# Patient Record
Sex: MALE | Race: WHITE | NOT HISPANIC OR LATINO | Employment: FULL TIME | URBAN - METROPOLITAN AREA
[De-identification: names, ages, dates, MRNs, and addresses within clinical notes are randomized per-mention and may not be internally consistent; named-entity substitution may affect disease eponyms.]

---

## 2017-01-21 RX ORDER — METFORMIN HYDROCHLORIDE 500 MG/1
TABLET, EXTENDED RELEASE ORAL
Qty: 360 TABLET | Refills: 1 | Status: SHIPPED | OUTPATIENT
Start: 2017-01-21 | End: 2017-02-21 | Stop reason: SDUPTHER

## 2017-01-21 RX ORDER — GEMFIBROZIL 600 MG/1
TABLET, FILM COATED ORAL
Qty: 180 TABLET | Refills: 1 | Status: SHIPPED | OUTPATIENT
Start: 2017-01-21 | End: 2017-02-21 | Stop reason: SDUPTHER

## 2017-02-05 ENCOUNTER — PATIENT MESSAGE (OUTPATIENT)
Dept: FAMILY MEDICINE | Facility: CLINIC | Age: 57
End: 2017-02-05

## 2017-02-06 ENCOUNTER — PATIENT MESSAGE (OUTPATIENT)
Dept: FAMILY MEDICINE | Facility: CLINIC | Age: 57
End: 2017-02-06

## 2017-02-07 ENCOUNTER — OFFICE VISIT (OUTPATIENT)
Dept: FAMILY MEDICINE | Facility: CLINIC | Age: 57
End: 2017-02-07
Payer: MEDICARE

## 2017-02-07 VITALS
WEIGHT: 315 LBS | SYSTOLIC BLOOD PRESSURE: 190 MMHG | OXYGEN SATURATION: 98 % | BODY MASS INDEX: 41.75 KG/M2 | HEART RATE: 96 BPM | DIASTOLIC BLOOD PRESSURE: 86 MMHG | HEIGHT: 73 IN | TEMPERATURE: 98 F

## 2017-02-07 DIAGNOSIS — E66.01 MORBID OBESITY DUE TO EXCESS CALORIES: ICD-10-CM

## 2017-02-07 DIAGNOSIS — M25.561 ARTHRALGIA OF RIGHT KNEE: ICD-10-CM

## 2017-02-07 DIAGNOSIS — K04.7 DENTAL ABSCESS: Primary | ICD-10-CM

## 2017-02-07 PROCEDURE — 2022F DILAT RTA XM EVC RTNOPTHY: CPT | Mod: S$GLB,,,

## 2017-02-07 PROCEDURE — 3079F DIAST BP 80-89 MM HG: CPT | Mod: S$GLB,,,

## 2017-02-07 PROCEDURE — 3077F SYST BP >= 140 MM HG: CPT | Mod: S$GLB,,,

## 2017-02-07 PROCEDURE — 99999 PR PBB SHADOW E&M-EST. PATIENT-LVL IV: CPT | Mod: PBBFAC,,,

## 2017-02-07 PROCEDURE — 4010F ACE/ARB THERAPY RXD/TAKEN: CPT | Mod: S$GLB,,,

## 2017-02-07 PROCEDURE — 99214 OFFICE O/P EST MOD 30 MIN: CPT | Mod: 25,S$GLB,,

## 2017-02-07 PROCEDURE — 20610 DRAIN/INJ JOINT/BURSA W/O US: CPT | Mod: RT,S$GLB,,

## 2017-02-07 PROCEDURE — 3046F HEMOGLOBIN A1C LEVEL >9.0%: CPT | Mod: S$GLB,,,

## 2017-02-07 RX ORDER — METHYLPREDNISOLONE ACETATE 80 MG/ML
80 INJECTION, SUSPENSION INTRA-ARTICULAR; INTRALESIONAL; INTRAMUSCULAR; SOFT TISSUE
Status: DISCONTINUED | OUTPATIENT
Start: 2017-02-07 | End: 2017-02-07 | Stop reason: HOSPADM

## 2017-02-07 RX ORDER — AMOXICILLIN 500 MG/1
500 TABLET, FILM COATED ORAL EVERY 8 HOURS
Qty: 30 TABLET | Refills: 0 | Status: ON HOLD | OUTPATIENT
Start: 2017-02-07 | End: 2017-02-17 | Stop reason: HOSPADM

## 2017-02-07 RX ADMIN — METHYLPREDNISOLONE ACETATE 80 MG: 80 INJECTION, SUSPENSION INTRA-ARTICULAR; INTRALESIONAL; INTRAMUSCULAR; SOFT TISSUE at 12:02

## 2017-02-07 NOTE — PROCEDURES
Large Joint Aspiration/Injection  Date/Time: 2/7/2017 12:00 PM  Performed by: REJI HALE  Authorized by: REJI HALE     Consent Done?:  Yes (Written)  Indications:  Pain  Procedure site marked: Yes    Timeout: Prior to procedure the correct patient, procedure, and site was verified      Location:  Knee  Site:  R knee  Prep: Patient was prepped and draped in usual sterile fashion    Ultrasonic Guidance for needle placement: No  Needle size:  18 G  Approach:  Anteromedial  Medications:  80 mg methylPREDNISolone acetate 80 mg/mL  Aspirate amount (ml):  0  Patient tolerance:  Patient tolerated the procedure well with no immediate complications

## 2017-02-07 NOTE — LETTER
February 7, 2017      [facility]  [address] [facility] [address]  Phone: [phone]  Fax: [phone]       Patient: Virgiloi Acuña   YOB: 1960  Date of Visit: 02/07/2017    To Whom It May Concern:    Virgilio was at [facility] on 02/07/2017. He may return to work/school on 02/13/2017 with no restrictions. If you have any questions or concerns, or if I can be of further assistance, please do not hesitate to contact me.    Sincerely,    CLEMENTINE Smith

## 2017-02-07 NOTE — PROGRESS NOTES
Subjective:       Patient ID: Virgilio Acuña is a 56 y.o. male.    Chief Complaint: Joint Swelling    HPI   Patient presents with a one-week complaint of right knee pain that he describes as a constant severe intensity aching sensation that is worse when he moves.  He voices associated decrease in range of motion.   He says the pain started spontaneously.  He denies any recent or remote trauma to the knee but does state that he had a medial meniscectomy some years back.    The patient also voices a small abscess on his lower left gingiva.  He states it is been there about 3 days it is constant and mild in intensity.  He has now made appointment to see his dentist yet but says he plans to.  The patient denies any associated symptoms.  He cannot identify exacerbating or mitigating factors.    The patient also voices a chronic history of diabetes which is now very well controlled his last hemoglobin A1c was 9.5%.  He states he needs a glucometer.  He says he is trying to get his sugar under control.  He denies any organized diet or exercise routine.     also is noted to be morbidly obese with a BMI 44.3.  He denies any exercise routine state that he can't because of his chronic pain issues.    Review of Systems   Constitutional: Negative for activity change, appetite change, fatigue and unexpected weight change.   HENT: Negative.         Dental abscess   Eyes: Negative.    Respiratory: Negative for cough, chest tightness, shortness of breath and wheezing.    Cardiovascular: Negative for chest pain, palpitations and leg swelling.   Gastrointestinal: Negative for constipation, diarrhea, nausea and vomiting.   Endocrine: Negative.    Genitourinary: Negative.    Musculoskeletal: Positive for arthralgias (right knee).   Skin: Negative for color change.   Allergic/Immunologic: Negative.    Neurological: Negative for dizziness, weakness and light-headedness.   Hematological: Negative.    Psychiatric/Behavioral: Negative  for sleep disturbance.         Objective:      Physical Exam   Constitutional: He is oriented to person, place, and time. Vital signs are normal. He appears well-developed and well-nourished. He is active and cooperative. No distress.   HENT:   Head: Normocephalic and atraumatic.   Mouth/Throat:       Eyes: Conjunctivae are normal. Pupils are equal, round, and reactive to light. No scleral icterus.   Neck: Normal range of motion. Neck supple.   Cardiovascular: Normal rate, regular rhythm, normal heart sounds and intact distal pulses.  Exam reveals no gallop and no friction rub.    No murmur heard.  Pulmonary/Chest: Effort normal and breath sounds normal. No respiratory distress. He has no wheezes. He has no rales. He exhibits no tenderness.   Musculoskeletal: He exhibits no edema or tenderness.        Right knee: He exhibits decreased range of motion and swelling. He exhibits no effusion, no ecchymosis, no deformity, no laceration, no erythema, normal alignment, no LCL laxity, normal patellar mobility, no bony tenderness, normal meniscus and no MCL laxity. No tenderness found.   Neurological: He is alert and oriented to person, place, and time. He exhibits normal muscle tone. Coordination normal.   Skin: Skin is warm and dry. No rash noted. He is not diaphoretic. No erythema. No pallor.   Psychiatric: He has a normal mood and affect. His speech is normal and behavior is normal. Judgment and thought content normal.       Assessment:       1. Dental abscess    2. Arthralgia of right knee    3. Uncontrolled type 2 diabetes mellitus with diabetic neuropathy, with long-term current use of insulin    4. Morbid obesity due to excess calories          Plan:   Dental abscess  -     amoxicillin (AMOXIL) 500 MG Tab; Take 1 tablet (500 mg total) by mouth every 8 (eight) hours.  Dispense: 30 tablet; Refill: 0    Arthralgia of right knee        -     Steroidal injection of the right knee    Uncontrolled type 2 diabetes mellitus  with diabetic neuropathy, with long-term current use of insulin        -     Provided the patient with a glucometer    Morbid obesity due to excess calories        -     Discussed carbohydrate control as well as the importance of exercise and weight loss          Disclaimer: This note is prepared using voice recognition software.  As such there may be errors in the dictation.  It has not been proofread.

## 2017-02-10 ENCOUNTER — TELEPHONE (OUTPATIENT)
Dept: SPORTS MEDICINE | Facility: CLINIC | Age: 57
End: 2017-02-10

## 2017-02-10 ENCOUNTER — OFFICE VISIT (OUTPATIENT)
Dept: SPORTS MEDICINE | Facility: CLINIC | Age: 57
End: 2017-02-10
Payer: MEDICARE

## 2017-02-10 ENCOUNTER — HOSPITAL ENCOUNTER (OUTPATIENT)
Dept: RADIOLOGY | Facility: HOSPITAL | Age: 57
Discharge: HOME | End: 2017-02-10
Attending: FAMILY MEDICINE
Payer: MEDICARE

## 2017-02-10 VITALS — WEIGHT: 315 LBS | TEMPERATURE: 99 F | HEIGHT: 73 IN | BODY MASS INDEX: 41.75 KG/M2

## 2017-02-10 DIAGNOSIS — M25.561 CHRONIC PAIN OF RIGHT KNEE: Primary | ICD-10-CM

## 2017-02-10 DIAGNOSIS — M25.461 EFFUSION OF RIGHT KNEE: ICD-10-CM

## 2017-02-10 DIAGNOSIS — M25.561 RIGHT KNEE PAIN, UNSPECIFIED CHRONICITY: ICD-10-CM

## 2017-02-10 DIAGNOSIS — M17.0 PRIMARY OSTEOARTHRITIS OF BOTH KNEES: ICD-10-CM

## 2017-02-10 DIAGNOSIS — G89.29 CHRONIC PAIN OF RIGHT KNEE: Primary | ICD-10-CM

## 2017-02-10 LAB
APPEARANCE FLD: NORMAL
BODY FLD TYPE: NORMAL
BODY FLD TYPE: NORMAL
BODY FLUID COMMENTS: NORMAL
COLOR FLD: YELLOW
CRYSTALS FLD MICRO: NEGATIVE
LYMPHOCYTES NFR FLD MANUAL: 1 %
MONOS+MACROS NFR FLD MANUAL: 5 %
NEUTROPHILS NFR FLD MANUAL: 94 %
WBC # FLD: NORMAL /CU MM

## 2017-02-10 PROCEDURE — 73564 X-RAY EXAM KNEE 4 OR MORE: CPT | Mod: TC,PO,RT

## 2017-02-10 PROCEDURE — 20611 DRAIN/INJ JOINT/BURSA W/US: CPT | Mod: RT,S$GLB,, | Performed by: FAMILY MEDICINE

## 2017-02-10 PROCEDURE — 73562 X-RAY EXAM OF KNEE 3: CPT | Mod: 26,59,LT, | Performed by: RADIOLOGY

## 2017-02-10 PROCEDURE — 73564 X-RAY EXAM KNEE 4 OR MORE: CPT | Mod: 26,RT,, | Performed by: RADIOLOGY

## 2017-02-10 PROCEDURE — 99203 OFFICE O/P NEW LOW 30 MIN: CPT | Mod: 25,S$GLB,, | Performed by: FAMILY MEDICINE

## 2017-02-10 PROCEDURE — 99999 PR PBB SHADOW E&M-EST. PATIENT-LVL III: CPT | Mod: PBBFAC,,, | Performed by: FAMILY MEDICINE

## 2017-02-10 NOTE — TELEPHONE ENCOUNTER
Left voicemail.     Informed pt Dr. Padilla has written letter for pt excusing him from missed work today.     At his f/u appt 02/13/17, Dr. Padilla will write another letter excusing him from work on that day.     Return call to [facility] if he has more questions.

## 2017-02-10 NOTE — MR AVS SNAPSHOT
"    [facility]  [address]  [facility] [address]  Phone: [phone]                  Virgilio Acuña   2/10/2017 12:00 PM   Appointment    Description:  Male : 1960   Provider:  Vinicius Padilla MD   Department:  [facility]                To Do List           Future Appointments        Provider Department Dept Phone    2/10/2017 12:00 PM Vinicius Padilla MD [facility] [phone]      Goals (5 Years of Data)     None      [facility]     [facility] -  Assistance  Registered nurses in the [facility] provide clinical advisement, health education, appointment booking, and other advisory services.  Call for this free service at [phone].             Medications           Message regarding Medications     Verify the changes and/or additions to your medication regime listed below are the same as discussed with your clinician today.  If any of these changes or additions are incorrect, please notify your healthcare provider.             Verify that the below list of medications is an accurate representation of the medications you are currently taking.  If none reported, the list may be blank. If incorrect, please contact your healthcare provider. Carry this list with you in case of emergency.           Current Medications     amoxicillin (AMOXIL) 500 MG Tab Take 1 tablet (500 mg total) by mouth every 8 (eight) hours.    BD ULTRA-FINE WILDA PEN NEEDLES 32 x 5/32 " Ndle INJECT FOUR TIMES DAILY    blood sugar diagnostic Strp Accucheck Smartview test strips and Fastclix lancets. AC/HS    busPIRone (BUSPAR) 7.5 MG tablet Take 1 tablet (7.5 mg total) by mouth 2 (two) times daily.    clonazepam (KLONOPIN) 0.5 MG tablet Take 0.5 mg by mouth as needed.     enalapril (VASOTEC) 10 MG tablet Take 1 tablet (10 mg total) by mouth once daily.    enalapril (VASOTEC) 10 MG tablet TAKE 1 TABLET DAILY    gabapentin " (NEURONTIN) 600 MG tablet Take 1 tablet (600 mg total) by mouth once daily.    gemfibrozil (LOPID) 600 MG tablet TAKE 1 TABLET TWICE A DAY BEFORE MEALS    gemfibrozil (LOPID) 600 MG tablet TAKE 1 TABLET TWICE A DAY BEFORE MEALS    insulin aspart (NOVOLOG FLEXPEN) 100 unit/mL InPn pen INJECT 65 UNITS SUBCUTANEOUSLY THREE TIMES DAILY WITH MEALS PLUS SLIDING SCALE. Supply with pen needles    insulin glargine (LANTUS SOLOSTAR) 100 unit/mL (3 mL) InPn pen Inject 70 Units into the skin once daily. Supply pen needles    lancets (ACCU-CHEK SOFTCLIX LANCETS) Misc 1 Device by Other route 4 (four) times daily.    metformin (GLUCOPHAGE-XR) 500 MG 24 hr tablet Take 2 tablets (1,000 mg total) by mouth 2 (two) times daily with meals.    metformin (GLUCOPHAGE-XR) 500 MG 24 hr tablet TAKE 2 TABLETS TWICE A DAY WITH MEALS    NOVOLOG FLEXPEN 100 unit/mL InPn pen INJECT 65 UNITS UNDER THE SKIN THREE TIMES A DAY WITH MEALS PLUS SLIDING SCALE    oxycodone (ROXICODONE) 15 MG Tab Take 7.5 mg by mouth every 4 (four) hours as needed (takes 1/2 tablet every 4 to 6 hrs PRN pain).     pravastatin (PRAVACHOL) 40 MG tablet Take 1 tablet (40 mg total) by mouth once daily.           Clinical Reference Information           Allergies as of 2/10/2017     No Known Allergies      Immunizations Administered on Date of Encounter - 2/10/2017     None      Language Assistance Services     ATTENTION: Language assistance services are available, free of charge. Please call [phone].      ATENCIÓN: Si habla sterling, tiene a maravilla disposición servicios gratuitos de asistencia lingüística. Llame al [phone].     [facility] Ý: N?u b?n nói Ti?ng Vi?t, có các d?ch v? h? tr? ngôn ng? mi?n phí dành cho b?n. G?i s? [phone].         [facility] complies with applicable Federal civil rights laws and does not discriminate on the basis of race, color, national origin, age, disability, or sex.

## 2017-02-10 NOTE — PROGRESS NOTES
Virgilio Acuña, a 56 y.o. male, presents today for evaluation of his right knee.      HISTORY OF PRESENT ILLNESS   Location: global knee, right  Onset: insidious, early February 2017  Palliative:    Relative rest   Oral analgesics   Cane   Asp/CSI, [facility], 02/08/17, OSH in [place]   Provocative:    ADLs  Prior:    13.10 - R knee - arthroscopy - [facility]   Progression: worsening discomfort  Quality:    Sharp at times   Ache    Swollen   Radiation: none  Severity: per nursing documentation  Timing: intermittent w/ use  Trauma: none    Review of systems (ROS):  A 10+ review of systems was performed with pertinent positives and negatives noted above in the history of present illness. Other systems were negative unless otherwise specified.      PHYSICAL EXAMINATION  General:  The patient is alert and oriented x 3. Mood is pleasant. Observation of ears, eyes and nose reveal no gross abnormalities. HEENT: NCAT, sclera anicteric.   Lungs: Respirations are equal and unlabored.  Gait is coordinated. Patient can toe walk and heel walk without difficulty.    RIGHT KNEE EXAMINATION    Observation/Inspection  Gait:   Antalgic   Alignment:  Neutral   Scars:   Present   Muscle atrophy: Mild  Effusion:  Present with pitting edema   Warmth:  None   Discoloration:   none     Tenderness / Crepitus (T / C):         T / C      T / C  Patella   + / -   Lateral joint line   + / -     Peripatellar medial  +  Medial joint line    + / -  Peripatellar lateral +  Medial plica   - / -  Patellar tendon +   Popliteal fossa   + / -  Quad tendon   +   Gastrocnemius   -  Prepatellar Bursa + / -   Quadricep   -  Tibial tubercle  -  Thigh/hamstring  -  Pes anserine/HS -  Fibula    -  ITB   - / -  Tibia     -  Tib/fib joint  - / -  LCL    -    MFC   - / -   MCL: Proximal  -    LFC   - / -   Distal    -          ROM: (* = pain)  PASSIVE   ACTIVE    Left :   5 / 0 / 145   5 / 0 / 145     Right :    5 / 0 / 60   5 / 0 / 60    Patellofemoral  examination:  See above noted areas of tenderness.   Patella position    Subluxation / dislocation: Centered        Sup. / Inf;   Normal   Crepitus (PF):    Absent   Patellar Mobility:       Medial-lateral:   Normal    Superior-inferior:  Normal    Inferior tilt   Normal    Patellar tendon:  Normal   Lateral tilt:    Normal   J-sign:     None   Patellofemoral grind:   No pain       Meniscal Signs:     Pain on terminal extension:  +*  Pain on terminal flexion:  +*  Teagans maneuver:  +*  Squat     NT    Ligament Examination:  ACL / Lachman:  WNL  PCL-Post.  drawer: normal 0 to 2mm  MCL- Valgus:  normal 0 to 2mm  LCL- Varus:    normal 0 to 2mm  Pivot shift:  guarding   Dial Test:   difference c/w other side   At 30° flexion: normal (< 5°)    At 90° flexion: normal (< 5°)   Reverse Pivot Shift:   normal (Equal)     Strength: (* = with pain) Painful Side  Quadriceps   5/5  Hamstrin/5    Extremity Neuro-vascular Examination:   Sensation:  Grossly intact to light touch all dermatomal regions.   Motor Function:  Fully intact motor function at hip, knee, foot and ankle    DTRs;  quadriceps and  achilles 2+.  No clonus and downgoing Babinski.    Vascular status:  DP and PT pulses 2+, brisk capillary refill, symmetric.     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, jovan med compartments, bilat  #2 knee effusion, right   S/p arthroscopy 13.10, [facility]   #3 pitting edema, lower extremity, right   W/ DM2    No evidence of neurologic pathology  No evidence of vascular pathology    Imaging studies reviewed:   X-ray knee, bilateral 17.02    Plan:    #1  Discuss following resolution of #2    We discussed the importance of appropriate diet, weight, and regular exercise including quadriceps strengthening     We discussed options including:  #1 watchful waiting  #2 physical therapy aimed at:   Quadriceps strengthening   Gait training   #3 injection  therapy:   I    VSI    Orthobiologics   #4 MRI for further evaluation      The patient chooses #    #2  Aspiration  Aspiration fluid sent for lab analysis    #3   Consultation w/ cardiology colleagues    Pain management: handout given  Bracing: cane, prn  Physical therapy:   Activity (e.g. sports, work) restrictions: as tolerated   School/vocation:     Follow up on Monday  Review results of lab analysis  Cardiology appt scheduled  Should symptoms worsen or fail to resolve, consider:  Revisiting the above options

## 2017-02-10 NOTE — PROCEDURES
Large Joint Aspiration/Injection  Date/Time: 2/10/2017 11:43 AM  Performed by: JEFFERY DE LA ROSA  Authorized by: JEFFREY DE LA ROSA     Consent Done?:  Yes (Verbal)  Indications:  Pain  Procedure site marked: Yes    Timeout: Prior to procedure the correct patient, procedure, and site was verified      Location:  Knee  Site:  R knee  Prep: Patient was prepped and draped in usual sterile fashion    Ultrasonic Guidance for needle placement: Yes  Images are saved and documented.  Needle size:  18 G  Approach:  Lateral  Aspirate amount (ml):  20  Aspirate:  Cloudy  Patient tolerance:  Patient tolerated the procedure well with no immediate complications    Additional Comments: Description of ultrasound utilization for needle guidance:   Ultrasound guidance used for needle localization.  Images saved and stored for documentation.  The knee joint was visualized.  Dynamic visualization of the 20g x 1.5  needle was continuous throughout the procedure.

## 2017-02-10 NOTE — TELEPHONE ENCOUNTER
----- Message from Neha Padilla sent at 2/10/2017 12:12 PM CST -----  Contact: [email or web address]  Pt called in stating that he needs a letter to excuse him from work for Monday and Tueday. Pt asked if letter can be emailed to him    Email: [email or web address]  Call back is home number    Thank you

## 2017-02-11 LAB
GRAM STN SPEC: NORMAL
GRAM STN SPEC: NORMAL

## 2017-02-13 ENCOUNTER — OFFICE VISIT (OUTPATIENT)
Dept: SPORTS MEDICINE | Facility: CLINIC | Age: 57
DRG: 488 | End: 2017-02-13
Payer: COMMERCIAL

## 2017-02-13 VITALS — WEIGHT: 315 LBS | TEMPERATURE: 98 F | BODY MASS INDEX: 41.75 KG/M2 | HEIGHT: 73 IN

## 2017-02-13 DIAGNOSIS — M25.561 CHRONIC PAIN OF RIGHT KNEE: ICD-10-CM

## 2017-02-13 DIAGNOSIS — M25.461 EFFUSION OF RIGHT KNEE: Primary | ICD-10-CM

## 2017-02-13 DIAGNOSIS — R60.0 BILATERAL EDEMA OF LOWER EXTREMITY: ICD-10-CM

## 2017-02-13 DIAGNOSIS — G89.29 CHRONIC PAIN OF RIGHT KNEE: ICD-10-CM

## 2017-02-13 DIAGNOSIS — M17.11 PRIMARY OSTEOARTHRITIS OF RIGHT KNEE: ICD-10-CM

## 2017-02-13 LAB
BACTERIA SPEC AEROBE CULT: NO GROWTH
GRAM STN SPEC: NORMAL
GRAM STN SPEC: NORMAL
PATH INTERP FLD-IMP: NORMAL

## 2017-02-13 PROCEDURE — 99999 PR PBB SHADOW E&M-EST. PATIENT-LVL III: CPT | Mod: PBBFAC,,, | Performed by: FAMILY MEDICINE

## 2017-02-13 PROCEDURE — 20611 DRAIN/INJ JOINT/BURSA W/US: CPT | Mod: RT,S$GLB,, | Performed by: FAMILY MEDICINE

## 2017-02-13 PROCEDURE — 99214 OFFICE O/P EST MOD 30 MIN: CPT | Mod: 25,S$GLB,, | Performed by: FAMILY MEDICINE

## 2017-02-13 NOTE — MR AVS SNAPSHOT
"    [facility]  [address]  [facility] [address]  Phone: [phone]                  Virgilio Acuña   2017 2:30 PM   Appointment    Description:  Male : 1960   Provider:  Vinicius Padilla MD   Department:  [facility]                To Do List           Future Appointments        Provider Department Dept Phone    2017 2:30 PM Vinicius Padilla MD [facility] [phone]      Goals (5 Years of Data)     None      [facility]     [facility] -  Assistance  Registered nurses in the [facility] Center provide clinical advisement, health education, appointment booking, and other advisory services.  Call for this free service at [phone].             Medications           Message regarding Medications     Verify the changes and/or additions to your medication regime listed below are the same as discussed with your clinician today.  If any of these changes or additions are incorrect, please notify your healthcare provider.             Verify that the below list of medications is an accurate representation of the medications you are currently taking.  If none reported, the list may be blank. If incorrect, please contact your healthcare provider. Carry this list with you in case of emergency.           Current Medications     amoxicillin (AMOXIL) 500 MG Tab Take 1 tablet (500 mg total) by mouth every 8 (eight) hours.    BD ULTRA-FINE WILDA PEN NEEDLES 32 x 5/32 " Ndle INJECT FOUR TIMES DAILY    blood sugar diagnostic Strp Accucheck Smartview test strips and Fastclix lancets. AC/HS    busPIRone (BUSPAR) 7.5 MG tablet Take 1 tablet (7.5 mg total) by mouth 2 (two) times daily.    clonazepam (KLONOPIN) 0.5 MG tablet Take 0.5 mg by mouth as needed.     enalapril (VASOTEC) 10 MG tablet Take 1 tablet (10 mg total) by mouth once daily.    enalapril (VASOTEC) 10 MG tablet TAKE 1 TABLET DAILY    gabapentin " (NEURONTIN) 600 MG tablet Take 1 tablet (600 mg total) by mouth once daily.    gemfibrozil (LOPID) 600 MG tablet TAKE 1 TABLET TWICE A DAY BEFORE MEALS    gemfibrozil (LOPID) 600 MG tablet TAKE 1 TABLET TWICE A DAY BEFORE MEALS    insulin aspart (NOVOLOG FLEXPEN) 100 unit/mL InPn pen INJECT 65 UNITS SUBCUTANEOUSLY THREE TIMES DAILY WITH MEALS PLUS SLIDING SCALE. Supply with pen needles    insulin glargine (LANTUS SOLOSTAR) 100 unit/mL (3 mL) InPn pen Inject 70 Units into the skin once daily. Supply pen needles    lancets (ACCU-CHEK SOFTCLIX LANCETS) Misc 1 Device by Other route 4 (four) times daily.    metformin (GLUCOPHAGE-XR) 500 MG 24 hr tablet Take 2 tablets (1,000 mg total) by mouth 2 (two) times daily with meals.    metformin (GLUCOPHAGE-XR) 500 MG 24 hr tablet TAKE 2 TABLETS TWICE A DAY WITH MEALS    NOVOLOG FLEXPEN 100 unit/mL InPn pen INJECT 65 UNITS UNDER THE SKIN THREE TIMES A DAY WITH MEALS PLUS SLIDING SCALE    oxycodone (ROXICODONE) 15 MG Tab Take 7.5 mg by mouth every 4 (four) hours as needed (takes 1/2 tablet every 4 to 6 hrs PRN pain).     pravastatin (PRAVACHOL) 40 MG tablet Take 1 tablet (40 mg total) by mouth once daily.           Clinical Reference Information           Allergies as of 2/13/2017     No Known Allergies      Immunizations Administered on Date of Encounter - 2/13/2017     None      Language Assistance Services     ATTENTION: Language assistance services are available, free of charge. Please call [phone].      ATENCIÓN: Si habla sterling, tiene a maravilla disposición servicios gratuitos de asistencia lingüística. Llame al [phone].     [facility] Ý: N?u b?n nói Ti?ng Vi?t, có các d?ch v? h? tr? ngôn ng? mi?n phí dành cho b?n. G?i s? [phone].         [facility] complies with applicable Federal civil rights laws and does not discriminate on the basis of race, color, national origin, age, disability, or sex.

## 2017-02-13 NOTE — LETTER
Patient: Virgilio Acuña    YOB: 1960   Clinic Number: [id number]   Today's Date: February 13, 2017        Certificate to Return to Work     Virgilio was seen by Vinicius Padilla MD on 2/13/2017.    iVrgilio can return to work on 02/14/17 with limited duty.    Specific restrictions: limit prolonged standing/ambulation/stair climbing.     Virgilio will follow back up with me on 02/27/17.     If you have any questions or concerns, please feel free to contact the office at [phone].    Thank you.    Vinicius Padilla MD        Signature: __________________________________________________    Kassidy Santana  Clinical Assistant to Vinicius Padilla MD  [facility]

## 2017-02-13 NOTE — PROCEDURES
Large Joint Aspiration/Injection  Date/Time: 2/13/2017 5:42 PM  Performed by: JEFFREY DE LA ROSA  Authorized by: JEFFREY DE LA ROSA     Consent Done?:  Yes (Verbal)  Indications:  Pain  Procedure site marked: Yes    Timeout: Prior to procedure the correct patient, procedure, and site was verified      Location:  Knee  Site:  R knee  Prep: Patient was prepped and draped in usual sterile fashion    Ultrasonic Guidance for needle placement: Yes  Images are saved and documented.  Needle size:  18 G  Approach:  Lateral  Aspirate amount (ml):  20  Aspirate:  Cloudy  Patient tolerance:  Patient tolerated the procedure well with no immediate complications    Additional Comments: Description of ultrasound utilization for needle guidance:   Ultrasound guidance used for needle localization.  Images saved and stored for documentation.  The knee joint was visualized.  Dynamic visualization of the 20g x 1.5  needle was continuous throughout the procedure.

## 2017-02-13 NOTE — PROGRESS NOTES
Virgilio Acuña, a 56 y.o. male, presents today for evaluation of his right knee.      HISTORY OF PRESENT ILLNESS   Location: global knee, right  Onset: insidious, early February 2017  Palliative:    Relative rest   Oral analgesics   Cane   Asp/CSIJose, 02/08/17, OSH in [place]    AspJose, 02/10/17, moderate improvement   Provocative:    ADLs  Prior:    13.10 - R knee - arthroscopy - [facility]   Progression: slowly improving discomfort  Quality:    Sharp at times   Ache    Swollen   Radiation: none  Severity: per nursing documentation  Timing: intermittent w/ use  Trauma: none    Review of systems (ROS):  A 10+ review of systems was performed with pertinent positives and negatives noted above in the history of present illness. Other systems were negative unless otherwise specified.    PHYSICAL EXAMINATION  General:  The patient is alert and oriented x 3. Mood is pleasant. Observation of ears, eyes and nose reveal no gross abnormalities. HEENT: NCAT, sclera anicteric.   Lungs: Respirations are equal and unlabored.  Gait is coordinated. Patient can toe walk and heel walk without difficulty.    RIGHT KNEE EXAMINATION    Observation/Inspection  Gait:   Antalgic   Alignment:  Neutral   Scars:   Present   Muscle atrophy: Mild  Effusion:  Present with pitting edema   Warmth:  None   Discoloration:   none     Tenderness / Crepitus (T / C):         T / C      T / C  Patella   + / -   Lateral joint line   + / -     Peripatellar medial  +  Medial joint line    + / -  Peripatellar lateral +  Medial plica   - / -  Patellar tendon +   Popliteal fossa   + / -  Quad tendon   +   Gastrocnemius   -  Prepatellar Bursa + / -   Quadricep   -  Tibial tubercle  -  Thigh/hamstring  -  Pes anserine/HS -  Fibula    -  ITB   - / -  Tibia     -  Tib/fib joint  - / -  LCL    -    MFC   - / -   MCL: Proximal  -    LFC   - / -   Distal    -          ROM: (* = pain)  PASSIVE   ACTIVE    Left :   5 / 0 / 145   5 / 0 / 145     Right :     5 / 0  60   5 / 0 / 60    Patellofemoral examination:  See above noted areas of tenderness.   Patella position    Subluxation / dislocation: Centered        Sup. / Inf;   Normal   Crepitus (PF):    Absent   Patellar Mobility:       Medial-lateral:   Normal    Superior-inferior:  Normal    Inferior tilt   Normal    Patellar tendon:  Normal   Lateral tilt:    Normal   J-sign:     None   Patellofemoral grind:   No pain     Meniscal Signs:     Pain on terminal extension:  +*  Pain on terminal flexion:  +*  Teagans maneuver:  +*  Squat     NT    Ligament Examination:  ACL / Lachman:  WNL  PCL-Post.  drawer: normal 0 to 2mm  MCL- Valgus:  normal 0 to 2mm  LCL- Varus:    normal 0 to 2mm  Pivot shift:  guarding   Dial Test:   difference c/w other side   At 30° flexion: normal (< 5°)    At 90° flexion: normal (< 5°)   Reverse Pivot Shift:   normal (Equal)     Strength: (* = with pain) Painful Side  Quadriceps   5/5  Hamstrin/5    Extremity Neuro-vascular Examination:   Sensation:  Grossly intact to light touch all dermatomal regions.   Motor Function:  Fully intact motor function at hip, knee, foot and ankle    DTRs;  quadriceps and  achilles 2+.  No clonus and downgoing Babinski.    Vascular status:  DP and PT pulses 2+, brisk capillary refill, symmetric.     Other Findings:    ASSESSMENT & PLAN  Assessment:   #1 Kellgren-Jose Grade II osteoarthritis of knee, jovan med compartments, bilat  #2 recurrent knee effusion, right   S/p arthroscopy 13.10, [facility]   #3 pitting edema, lower extremity, right  #4 poor dentition   On amoxicillin  #5 modestly controlled DM2   Followed by [facility]    No evidence of neurologic pathology    Imaging studies reviewed:   X-ray knee, bilateral 17.02    Plan:    Concerning case given the lack of clear etiology.    He continues to be afebrile; there is no evidence of bacteremia/septicemia.    Recurrent knee aspirate is likewise cloudy.  Prior lab studies are  negative for evidence of bacteria or crystalline pathology.  Cultures are being followed.  We will repeat aspirate lab tests on today's aspirate.  Continued vigilance for septic arthritis.    Encouraged to see cardiology (consultation ordered).      No calf pain, but w/ RLE edematous changes, will obtain RLE ultrasound to r/o DVT.    If the above do not elucidate the underlying pathology, will obtain MRI of knee.    Pain management: handout given  Bracing: cane, prn  Physical therapy:   Activity (e.g. sports, work) restrictions: as tolerated   School/vocation:     Follow up   Should symptoms worsen or fail to resolve, consider:  Revisiting the above options

## 2017-02-14 ENCOUNTER — PATIENT MESSAGE (OUTPATIENT)
Dept: SPORTS MEDICINE | Facility: CLINIC | Age: 57
End: 2017-02-14

## 2017-02-14 ENCOUNTER — TELEPHONE (OUTPATIENT)
Dept: CARDIOLOGY | Facility: CLINIC | Age: 57
End: 2017-02-14

## 2017-02-14 ENCOUNTER — TELEPHONE (OUTPATIENT)
Dept: SPORTS MEDICINE | Facility: CLINIC | Age: 57
End: 2017-02-14

## 2017-02-14 DIAGNOSIS — M79.661 PAIN AND SWELLING OF LOWER LEG, RIGHT: Primary | ICD-10-CM

## 2017-02-14 DIAGNOSIS — M79.89 PAIN AND SWELLING OF LOWER LEG, RIGHT: Primary | ICD-10-CM

## 2017-02-14 LAB
BODY FLD TYPE: NORMAL
CRYSTALS FLD MICRO: NEGATIVE
PATH INTERP FLD-IMP: NORMAL

## 2017-02-14 NOTE — TELEPHONE ENCOUNTER
Left voicemail.     Informed pt Dr. Padilla is wanting to rule out a DVT.     Return call to \[facility]\"" to schedule.     ==    Spoke c pt.     Given # to Cardiology in [place] & was instructed to speak to Nimisha for US & cardio appt scheduling [phone].     Pt understood.

## 2017-02-14 NOTE — TELEPHONE ENCOUNTER
----- Message from Radha Johnson sent at 2/14/2017  8:30 AM CST -----  bharati Bansal called for you to set up an Ultrasound. Please give him a call back..thanks

## 2017-02-15 ENCOUNTER — PATIENT MESSAGE (OUTPATIENT)
Dept: SPORTS MEDICINE | Facility: CLINIC | Age: 57
End: 2017-02-15

## 2017-02-15 ENCOUNTER — TELEPHONE (OUTPATIENT)
Dept: SPORTS MEDICINE | Facility: CLINIC | Age: 57
End: 2017-02-15

## 2017-02-15 ENCOUNTER — CLINICAL SUPPORT (OUTPATIENT)
Dept: CARDIOLOGY | Facility: CLINIC | Age: 57
DRG: 488 | End: 2017-02-15
Payer: MEDICARE

## 2017-02-15 DIAGNOSIS — M79.661 PAIN AND SWELLING OF LOWER LEG, RIGHT: ICD-10-CM

## 2017-02-15 DIAGNOSIS — M79.89 PAIN AND SWELLING OF LOWER LEG, RIGHT: ICD-10-CM

## 2017-02-15 PROCEDURE — 93970 EXTREMITY STUDY: CPT | Mod: S$GLB,,, | Performed by: INTERNAL MEDICINE

## 2017-02-15 NOTE — TELEPHONE ENCOUNTER
Patient is now scheduled for the appropriate testing and is seeing Dr Bai as well in [place] per pcp referral, was already scheduled.

## 2017-02-15 NOTE — TELEPHONE ENCOUNTER
Left voicemail.    Informed pt I spoke c cardiology & appt have been scheduled c Dr. Bai 02/16/17 @ 10:30am.    I also explained by Dr. Padilla cancelled his VSI injection appts.     CHAINelst message was also sent to pt.

## 2017-02-15 NOTE — TELEPHONE ENCOUNTER
W/ pt    Pt did not schedule his ultrasound/Dopler appointment nor his cardiology referral.  I impressed upon him 1) why I wanted these done and 2) the urgency with which I would like them performed.  We discussed his lab results which, thus far, are notable for evidence of inflammation though not infection.  We will continue to follow the cultures for growth of pathogens, though as time passes septic arthritis seems less and less likely.  If 1) cardiology visit and 2) ultrasound imaging do not yield causative etiology, we will obtain MRI imaging of the knee.

## 2017-02-15 NOTE — TELEPHONE ENCOUNTER
Spoke c pt.     Scheduled US today at [facility] @ 2:00p.    Pt again stressed that his job has no light duty.     Dr. Padilla will draft letter & it will be emailed to pt as requested.

## 2017-02-16 ENCOUNTER — ANESTHESIA EVENT (OUTPATIENT)
Dept: SURGERY | Facility: HOSPITAL | Age: 57
DRG: 488 | End: 2017-02-16
Payer: COMMERCIAL

## 2017-02-16 ENCOUNTER — PATIENT MESSAGE (OUTPATIENT)
Dept: SPORTS MEDICINE | Facility: CLINIC | Age: 57
End: 2017-02-16

## 2017-02-16 ENCOUNTER — OFFICE VISIT (OUTPATIENT)
Dept: CARDIOLOGY | Facility: CLINIC | Age: 57
DRG: 488 | End: 2017-02-16
Payer: MEDICARE

## 2017-02-16 ENCOUNTER — TELEPHONE (OUTPATIENT)
Dept: SPORTS MEDICINE | Facility: CLINIC | Age: 57
End: 2017-02-16

## 2017-02-16 ENCOUNTER — HOSPITAL ENCOUNTER (INPATIENT)
Facility: HOSPITAL | Age: 57
LOS: 2 days | Discharge: HOME HEALTH | DRG: 488 | End: 2017-02-18
Attending: EMERGENCY MEDICINE | Admitting: ORTHOPAEDIC SURGERY
Payer: COMMERCIAL

## 2017-02-16 VITALS
HEIGHT: 73 IN | SYSTOLIC BLOOD PRESSURE: 159 MMHG | DIASTOLIC BLOOD PRESSURE: 78 MMHG | WEIGHT: 315 LBS | HEART RATE: 61 BPM | BODY MASS INDEX: 41.75 KG/M2

## 2017-02-16 DIAGNOSIS — I10 ESSENTIAL HYPERTENSION: ICD-10-CM

## 2017-02-16 DIAGNOSIS — E11.42 TYPE 2 DIABETES MELLITUS WITH DIABETIC POLYNEUROPATHY, WITH LONG-TERM CURRENT USE OF INSULIN: ICD-10-CM

## 2017-02-16 DIAGNOSIS — Z79.4 TYPE 2 DIABETES MELLITUS WITH DIABETIC POLYNEUROPATHY, WITH LONG-TERM CURRENT USE OF INSULIN: ICD-10-CM

## 2017-02-16 DIAGNOSIS — E66.01 MORBID OBESITY DUE TO EXCESS CALORIES: Primary | ICD-10-CM

## 2017-02-16 DIAGNOSIS — M25.561 CHRONIC PAIN OF RIGHT KNEE: Primary | ICD-10-CM

## 2017-02-16 DIAGNOSIS — M00.9 SEPTIC JOINT OF RIGHT KNEE JOINT: ICD-10-CM

## 2017-02-16 DIAGNOSIS — L97.529 FOOT ULCER, LEFT, WITH UNSPECIFIED SEVERITY: ICD-10-CM

## 2017-02-16 DIAGNOSIS — L97.529 DIABETIC ULCER OF LEFT FOOT ASSOCIATED WITH TYPE 2 DIABETES MELLITUS: ICD-10-CM

## 2017-02-16 DIAGNOSIS — G89.29 CHRONIC PAIN OF RIGHT KNEE: Primary | ICD-10-CM

## 2017-02-16 DIAGNOSIS — R60.0 EDEMA OF RIGHT LOWER EXTREMITY: ICD-10-CM

## 2017-02-16 DIAGNOSIS — M00.9 PYOGENIC ARTHRITIS OF RIGHT KNEE JOINT: ICD-10-CM

## 2017-02-16 DIAGNOSIS — M00.9 PYOGENIC ARTHRITIS OF RIGHT KNEE JOINT, DUE TO UNSPECIFIED ORGANISM: Primary | ICD-10-CM

## 2017-02-16 DIAGNOSIS — E11.621 DIABETIC ULCER OF LEFT FOOT ASSOCIATED WITH TYPE 2 DIABETES MELLITUS: ICD-10-CM

## 2017-02-16 LAB
ALBUMIN SERPL BCP-MCNC: 2.9 G/DL
ALP SERPL-CCNC: 71 U/L
ALT SERPL W/O P-5'-P-CCNC: 28 U/L
ANION GAP SERPL CALC-SCNC: 9 MMOL/L
APTT BLDCRRT: 23.1 SEC
AST SERPL-CCNC: 17 U/L
BASOPHILS # BLD AUTO: 0.06 K/UL
BASOPHILS NFR BLD: 0.7 %
BILIRUB SERPL-MCNC: 0.2 MG/DL
BUN SERPL-MCNC: 21 MG/DL
CALCIUM SERPL-MCNC: 10.1 MG/DL
CHLORIDE SERPL-SCNC: 99 MMOL/L
CO2 SERPL-SCNC: 30 MMOL/L
CREAT SERPL-MCNC: 1 MG/DL
CRP SERPL-MCNC: 36.8 MG/L
DIFFERENTIAL METHOD: ABNORMAL
EOSINOPHIL # BLD AUTO: 0.1 K/UL
EOSINOPHIL NFR BLD: 1 %
ERYTHROCYTE [DISTWIDTH] IN BLOOD BY AUTOMATED COUNT: 13.8 %
ERYTHROCYTE [SEDIMENTATION RATE] IN BLOOD BY WESTERGREN METHOD: 99 MM/HR
EST. GFR  (AFRICAN AMERICAN): >60 ML/MIN/1.73 M^2
EST. GFR  (NON AFRICAN AMERICAN): >60 ML/MIN/1.73 M^2
GLUCOSE SERPL-MCNC: 188 MG/DL
HCT VFR BLD AUTO: 39.8 %
HGB BLD-MCNC: 13 G/DL
INR PPP: 1
LYMPHOCYTES # BLD AUTO: 2.2 K/UL
LYMPHOCYTES NFR BLD: 24.9 %
MCH RBC QN AUTO: 28.4 PG
MCHC RBC AUTO-ENTMCNC: 32.7 %
MCV RBC AUTO: 87 FL
MONOCYTES # BLD AUTO: 0.7 K/UL
MONOCYTES NFR BLD: 7.3 %
NEUTROPHILS # BLD AUTO: 5.7 K/UL
NEUTROPHILS NFR BLD: 64.2 %
PLATELET # BLD AUTO: 509 K/UL
PMV BLD AUTO: 8.9 FL
POCT GLUCOSE: 115 MG/DL (ref 70–110)
POCT GLUCOSE: 172 MG/DL (ref 70–110)
POTASSIUM SERPL-SCNC: 4.6 MMOL/L
PREALB SERPL-MCNC: 21 MG/DL
PROT SERPL-MCNC: 7.7 G/DL
PROTHROMBIN TIME: 10.3 SEC
RBC # BLD AUTO: 4.58 M/UL
SODIUM SERPL-SCNC: 138 MMOL/L
WBC # BLD AUTO: 8.88 K/UL

## 2017-02-16 PROCEDURE — 99222 1ST HOSP IP/OBS MODERATE 55: CPT | Mod: GC,,, | Performed by: INTERNAL MEDICINE

## 2017-02-16 PROCEDURE — 99285 EMERGENCY DEPT VISIT HI MDM: CPT | Mod: ,,, | Performed by: EMERGENCY MEDICINE

## 2017-02-16 PROCEDURE — 87075 CULTR BACTERIA EXCEPT BLOOD: CPT

## 2017-02-16 PROCEDURE — 63600175 PHARM REV CODE 636 W HCPCS: Performed by: STUDENT IN AN ORGANIZED HEALTH CARE EDUCATION/TRAINING PROGRAM

## 2017-02-16 PROCEDURE — 87186 SC STD MICRODIL/AGAR DIL: CPT

## 2017-02-16 PROCEDURE — 85610 PROTHROMBIN TIME: CPT

## 2017-02-16 PROCEDURE — 85651 RBC SED RATE NONAUTOMATED: CPT

## 2017-02-16 PROCEDURE — 84134 ASSAY OF PREALBUMIN: CPT

## 2017-02-16 PROCEDURE — 85025 COMPLETE CBC W/AUTO DIFF WBC: CPT

## 2017-02-16 PROCEDURE — 11000001 HC ACUTE MED/SURG PRIVATE ROOM

## 2017-02-16 PROCEDURE — 99999 PR PBB SHADOW E&M-EST. PATIENT-LVL III: CPT | Mod: PBBFAC,,, | Performed by: INTERNAL MEDICINE

## 2017-02-16 PROCEDURE — 96374 THER/PROPH/DIAG INJ IV PUSH: CPT

## 2017-02-16 PROCEDURE — 80053 COMPREHEN METABOLIC PANEL: CPT

## 2017-02-16 PROCEDURE — 83036 HEMOGLOBIN GLYCOSYLATED A1C: CPT

## 2017-02-16 PROCEDURE — 93005 ELECTROCARDIOGRAM TRACING: CPT

## 2017-02-16 PROCEDURE — 96375 TX/PRO/DX INJ NEW DRUG ADDON: CPT

## 2017-02-16 PROCEDURE — 87147 CULTURE TYPE IMMUNOLOGIC: CPT

## 2017-02-16 PROCEDURE — 25000003 PHARM REV CODE 250: Performed by: STUDENT IN AN ORGANIZED HEALTH CARE EDUCATION/TRAINING PROGRAM

## 2017-02-16 PROCEDURE — 86140 C-REACTIVE PROTEIN: CPT

## 2017-02-16 PROCEDURE — 87070 CULTURE OTHR SPECIMN AEROBIC: CPT

## 2017-02-16 PROCEDURE — 99285 EMERGENCY DEPT VISIT HI MDM: CPT | Mod: 25

## 2017-02-16 PROCEDURE — 99204 OFFICE O/P NEW MOD 45 MIN: CPT | Mod: S$GLB,,, | Performed by: INTERNAL MEDICINE

## 2017-02-16 PROCEDURE — 3077F SYST BP >= 140 MM HG: CPT | Mod: S$GLB,,, | Performed by: INTERNAL MEDICINE

## 2017-02-16 PROCEDURE — 82962 GLUCOSE BLOOD TEST: CPT

## 2017-02-16 PROCEDURE — 85730 THROMBOPLASTIN TIME PARTIAL: CPT

## 2017-02-16 PROCEDURE — 87077 CULTURE AEROBIC IDENTIFY: CPT

## 2017-02-16 PROCEDURE — 99499 UNLISTED E&M SERVICE: CPT | Mod: ,,, | Performed by: PHYSICIAN ASSISTANT

## 2017-02-16 PROCEDURE — 3078F DIAST BP <80 MM HG: CPT | Mod: S$GLB,,, | Performed by: INTERNAL MEDICINE

## 2017-02-16 PROCEDURE — 93010 ELECTROCARDIOGRAM REPORT: CPT | Mod: ,,, | Performed by: INTERNAL MEDICINE

## 2017-02-16 RX ORDER — ENALAPRIL MALEATE 10 MG/1
10 TABLET ORAL DAILY
Status: DISCONTINUED | OUTPATIENT
Start: 2017-02-16 | End: 2017-02-18 | Stop reason: HOSPADM

## 2017-02-16 RX ORDER — RAMELTEON 8 MG/1
8 TABLET ORAL NIGHTLY PRN
Status: DISCONTINUED | OUTPATIENT
Start: 2017-02-16 | End: 2017-02-18 | Stop reason: HOSPADM

## 2017-02-16 RX ORDER — OXYCODONE HYDROCHLORIDE 5 MG/1
10 TABLET ORAL EVERY 4 HOURS PRN
Status: DISCONTINUED | OUTPATIENT
Start: 2017-02-16 | End: 2017-02-18 | Stop reason: HOSPADM

## 2017-02-16 RX ORDER — IBUPROFEN 200 MG
16 TABLET ORAL
Status: DISCONTINUED | OUTPATIENT
Start: 2017-02-16 | End: 2017-02-18 | Stop reason: HOSPADM

## 2017-02-16 RX ORDER — CLONAZEPAM 0.5 MG/1
0.5 TABLET ORAL 2 TIMES DAILY PRN
Status: DISCONTINUED | OUTPATIENT
Start: 2017-02-16 | End: 2017-02-18 | Stop reason: HOSPADM

## 2017-02-16 RX ORDER — SODIUM CHLORIDE 0.9 % (FLUSH) 0.9 %
3 SYRINGE (ML) INJECTION EVERY 8 HOURS
Status: DISCONTINUED | OUTPATIENT
Start: 2017-02-16 | End: 2017-02-18 | Stop reason: HOSPADM

## 2017-02-16 RX ORDER — DIPHENHYDRAMINE HCL 25 MG
25 CAPSULE ORAL EVERY 6 HOURS PRN
Status: DISCONTINUED | OUTPATIENT
Start: 2017-02-16 | End: 2017-02-16

## 2017-02-16 RX ORDER — IBUPROFEN 200 MG
24 TABLET ORAL
Status: DISCONTINUED | OUTPATIENT
Start: 2017-02-16 | End: 2017-02-18 | Stop reason: HOSPADM

## 2017-02-16 RX ORDER — ACETAMINOPHEN 325 MG/1
650 TABLET ORAL EVERY 8 HOURS PRN
Status: DISCONTINUED | OUTPATIENT
Start: 2017-02-16 | End: 2017-02-16

## 2017-02-16 RX ORDER — ONDANSETRON 8 MG/1
8 TABLET, ORALLY DISINTEGRATING ORAL EVERY 8 HOURS PRN
Status: DISCONTINUED | OUTPATIENT
Start: 2017-02-16 | End: 2017-02-18 | Stop reason: HOSPADM

## 2017-02-16 RX ORDER — ACETAMINOPHEN 325 MG/1
650 TABLET ORAL EVERY 4 HOURS PRN
Status: DISCONTINUED | OUTPATIENT
Start: 2017-02-16 | End: 2017-02-18 | Stop reason: HOSPADM

## 2017-02-16 RX ORDER — INSULIN ASPART 100 [IU]/ML
1-10 INJECTION, SOLUTION INTRAVENOUS; SUBCUTANEOUS
Status: DISCONTINUED | OUTPATIENT
Start: 2017-02-16 | End: 2017-02-18 | Stop reason: HOSPADM

## 2017-02-16 RX ORDER — PRAVASTATIN SODIUM 40 MG/1
40 TABLET ORAL DAILY
Status: DISCONTINUED | OUTPATIENT
Start: 2017-02-16 | End: 2017-02-18 | Stop reason: HOSPADM

## 2017-02-16 RX ORDER — MORPHINE SULFATE 2 MG/ML
2 INJECTION, SOLUTION INTRAMUSCULAR; INTRAVENOUS EVERY 4 HOURS PRN
Status: DISCONTINUED | OUTPATIENT
Start: 2017-02-16 | End: 2017-02-18

## 2017-02-16 RX ORDER — DIPHENHYDRAMINE HYDROCHLORIDE 50 MG/ML
25 INJECTION INTRAMUSCULAR; INTRAVENOUS EVERY 4 HOURS PRN
Status: DISCONTINUED | OUTPATIENT
Start: 2017-02-16 | End: 2017-02-18 | Stop reason: HOSPADM

## 2017-02-16 RX ORDER — GABAPENTIN 300 MG/1
600 CAPSULE ORAL DAILY
Status: DISCONTINUED | OUTPATIENT
Start: 2017-02-16 | End: 2017-02-18 | Stop reason: HOSPADM

## 2017-02-16 RX ORDER — HYDRALAZINE HYDROCHLORIDE 25 MG/1
25 TABLET, FILM COATED ORAL EVERY 6 HOURS PRN
Status: DISCONTINUED | OUTPATIENT
Start: 2017-02-16 | End: 2017-02-18 | Stop reason: HOSPADM

## 2017-02-16 RX ORDER — OXYCODONE HYDROCHLORIDE 5 MG/1
5 TABLET ORAL EVERY 4 HOURS PRN
Status: DISCONTINUED | OUTPATIENT
Start: 2017-02-16 | End: 2017-02-18 | Stop reason: HOSPADM

## 2017-02-16 RX ORDER — GLUCAGON 1 MG
1 KIT INJECTION
Status: DISCONTINUED | OUTPATIENT
Start: 2017-02-16 | End: 2017-02-18 | Stop reason: HOSPADM

## 2017-02-16 RX ADMIN — SODIUM CHLORIDE, PRESERVATIVE FREE 3 ML: 5 INJECTION INTRAVENOUS at 04:02

## 2017-02-16 RX ADMIN — MORPHINE SULFATE 2 MG: 2 INJECTION, SOLUTION INTRAMUSCULAR; INTRAVENOUS at 05:02

## 2017-02-16 RX ADMIN — ENALAPRIL MALEATE 10 MG: 10 TABLET ORAL at 05:02

## 2017-02-16 RX ADMIN — GABAPENTIN 600 MG: 300 CAPSULE ORAL at 04:02

## 2017-02-16 RX ADMIN — SODIUM CHLORIDE, PRESERVATIVE FREE 3 ML: 5 INJECTION INTRAVENOUS at 10:02

## 2017-02-16 RX ADMIN — MORPHINE SULFATE 2 MG: 2 INJECTION, SOLUTION INTRAMUSCULAR; INTRAVENOUS at 10:02

## 2017-02-16 RX ADMIN — PRAVASTATIN SODIUM 40 MG: 20 TABLET ORAL at 04:02

## 2017-02-16 NOTE — CONSULTS
"[facility] Hospital Medicine  Consult Note    Patient Name: Virgilio Acuña  MRN: [id number]  Admission Date: 2/16/2017  Hospital Length of Stay: 0 days  Attending Physician: Rich Vargas MD   Primary Care Provider: Juanito Hilton MD     [facility] Medicine Team: Networked reference to record PCT  Cassidy Fuentes MD      Patient information was obtained from patient and past medical records.     Inpatient consult to Hospital Medicine-Ortho Comanagement Only  Consult performed by: CASSIDY FUENTES  Consult ordered by: MARGARET BROWN  Reason for consult: diabetes management        Subjective:     Principal Problem: Septic joint of right knee joint    Chief Complaint:   Chief Complaint   Patient presents with    Knee Pain     Complains of R knee pain, sent for ortho consult for possible septic joint.        HPI: 56 year old male with PMHx T2DM, HTN, HLD who presents with R septic knee joint. Pt reports pain and swelling in his R knee for 2 weeks. He denies any fevers or chills at home. He also denies any chest pain, sob, abdominal pain, N/V, diarrhea, constipation or urinary symptoms. Pt is scheduled to have a wash out per Orthopedics on 2/17. Medicine was consulted for diabetes management.    Past Medical History   Diagnosis Date    Diabetes mellitus, type II 2003    Diabetic nephropathy     Dyslipidemia     HTN (hypertension)     Obese        Past Surgical History   Procedure Laterality Date    Elbow surgery      Knee cartilage surgery  10/21/2013     right knee    Left index finger surgery  03/2015       Review of patient's allergies indicates:  No Known Allergies    No current facility-administered medications on file prior to encounter.      Current Outpatient Prescriptions on File Prior to Encounter   Medication Sig    amoxicillin (AMOXIL) 500 MG Tab Take 1 tablet (500 mg total) by mouth every 8 (eight) hours.    BD ULTRA-FINE WILDA PEN NEEDLES 32 x 5/32 " Ndle INJECT FOUR TIMES DAILY    " blood sugar diagnostic Strp Accucheck Smartview test strips and Fastclix lancets. AC/HS    busPIRone (BUSPAR) 7.5 MG tablet Take 1 tablet (7.5 mg total) by mouth 2 (two) times daily.    clonazepam (KLONOPIN) 0.5 MG tablet Take 0.5 mg by mouth as needed.     enalapril (VASOTEC) 10 MG tablet Take 1 tablet (10 mg total) by mouth once daily.    enalapril (VASOTEC) 10 MG tablet TAKE 1 TABLET DAILY    gabapentin (NEURONTIN) 600 MG tablet Take 1 tablet (600 mg total) by mouth once daily.    gemfibrozil (LOPID) 600 MG tablet TAKE 1 TABLET TWICE A DAY BEFORE MEALS    gemfibrozil (LOPID) 600 MG tablet TAKE 1 TABLET TWICE A DAY BEFORE MEALS    insulin aspart (NOVOLOG FLEXPEN) 100 unit/mL InPn pen INJECT 65 UNITS SUBCUTANEOUSLY THREE TIMES DAILY WITH MEALS PLUS SLIDING SCALE. Supply with pen needles    insulin glargine (LANTUS SOLOSTAR) 100 unit/mL (3 mL) InPn pen Inject 70 Units into the skin once daily. Supply pen needles    lancets (ACCU-CHEK SOFTCLIX LANCETS) Misc 1 Device by Other route 4 (four) times daily.    metformin (GLUCOPHAGE-XR) 500 MG 24 hr tablet Take 2 tablets (1,000 mg total) by mouth 2 (two) times daily with meals.    metformin (GLUCOPHAGE-XR) 500 MG 24 hr tablet TAKE 2 TABLETS TWICE A DAY WITH MEALS    NOVOLOG FLEXPEN 100 unit/mL InPn pen INJECT 65 UNITS UNDER THE SKIN THREE TIMES A DAY WITH MEALS PLUS SLIDING SCALE    oxycodone (ROXICODONE) 15 MG Tab Take 7.5 mg by mouth every 4 (four) hours as needed (takes 1/2 tablet every 4 to 6 hrs PRN pain).     pravastatin (PRAVACHOL) 40 MG tablet Take 1 tablet (40 mg total) by mouth once daily.     Family History     Problem Relation (Age of Onset)    Diabetes Mother, Father        Social History Main Topics    Smoking status: Never Smoker    Smokeless tobacco: Never Used    Alcohol use Yes      Comment: occasional    Drug use: No    Sexual activity: Yes     Partners: Female     Review of Systems   Constitutional: Negative for chills and fever.    HENT: Negative for postnasal drip, rhinorrhea, sinus pressure and sore throat.    Eyes: Negative for photophobia and visual disturbance.   Respiratory: Negative for cough, shortness of breath and wheezing.    Cardiovascular: Negative for chest pain, palpitations and leg swelling.   Gastrointestinal: Negative for abdominal pain, constipation, diarrhea, nausea and vomiting.   Genitourinary: Negative for difficulty urinating and dysuria.   Musculoskeletal: Positive for arthralgias (R knee) and joint swelling (R knee).   Skin: Negative for pallor and rash.   Neurological: Negative for dizziness, syncope, weakness, light-headedness and headaches.   Hematological: Negative for adenopathy. Does not bruise/bleed easily.   Psychiatric/Behavioral: Negative for dysphoric mood. The patient is not nervous/anxious.      Objective:     Vital Signs (Most Recent):  Temp: 97.3 °F (36.3 °C) (02/16/17 1325)  Pulse: 74 (02/16/17 1325)  Resp: 18 (02/16/17 1325)  BP: (!) 203/86 (02/16/17 1325)  SpO2: 95 % (02/16/17 1325) Vital Signs (24h Range):  Temp:  [97.3 °F (36.3 °C)] 97.3 °F (36.3 °C)  Pulse:  [61-74] 74  Resp:  [18] 18  SpO2:  [95 %] 95 %  BP: (159-203)/(78-86) 203/86     Weight: (!) 158.8 kg (350 lb)  Body mass index is 46.18 kg/(m^2).    Physical Exam   Constitutional: He is oriented to person, place, and time. He appears well-developed and well-nourished. No distress.   HENT:   Head: Normocephalic.   Right Ear: External ear normal.   Left Ear: External ear normal.   Eyes: EOM are normal. Pupils are equal, round, and reactive to light.   Neck: Normal range of motion. Neck supple.   Cardiovascular: Normal rate, regular rhythm, normal heart sounds and intact distal pulses.  Exam reveals no gallop and no friction rub.    No murmur heard.  Pulmonary/Chest: Effort normal and breath sounds normal. No respiratory distress. He has no wheezes. He has no rales.   Abdominal: Soft. Bowel sounds are normal. He exhibits no distension. There  is no tenderness.   Musculoskeletal: He exhibits edema (R knee) and tenderness (R knee).   Lymphadenopathy:     He has no cervical adenopathy.   Neurological: He is alert and oriented to person, place, and time.   Skin: Skin is warm and dry.   Psychiatric: He has a normal mood and affect. Thought content normal.       Significant Labs: CBC: No results for input(s): WBC, HGB, HCT, PLT in the last 48 hours.  CMP: No results for input(s): NA, K, CL, CO2, GLU, BUN, CREATININE, CALCIUM, PROT, ALBUMIN, BILITOT, ALKPHOS, AST, ALT, ANIONGAP, EGFRNONAA in the last 48 hours.    Invalid input(s): ESTGFAFRICA    Significant Imaging: I have reviewed all pertinent imaging results/findings within the past 24 hours.    Assessment/Plan:     * Septic joint of right knee joint  --Treatment per primary team  --Plan for I&D tomorrow      Diabetic peripheral neuropathy associated with type 2 diabetes mellitus  --HbA1c 9.5 in September 2016. Repeat HbA1c pending  --Pt reports blood glucoses normally run in the 150s. He reports he does not always take his insulin before eating  --Pt takes 70 units lantus daily and 65 units aspart qac with ssi  --Agree with starting 50 units levemir qhs. Will hold aspart scheduled for now as pt is to be npo for procedure tomorrow.  --Moderate dose ssi  --Diabetic diet      Essential hypertension  --Pt on enalapril at home  --BP elevated to 200s on admission. Likely a component of pain associated with this  --Pain control per primary team  --Continue home enalapril.      VTE Risk Mitigation         Ordered     Medium Risk of VTE  Once      02/16/17 1413     Place sequential compression device  Until discontinued      02/16/17 1413        Thank you for your consult. I will follow-up with patient. Please contact us if you have any additional questions.    Csasidy Ta MD  Department of Hospital Medicine   [facility]

## 2017-02-16 NOTE — ED PROVIDER NOTES
Encounter Date: 2/16/2017    SCRIBE #1 NOTE: I, Freddy Ramirez, am scribing for, and in the presence of,  Dr. Vargas. I have scribed the following portions of the note - the EKG reading and the Resident attestation.       History     Chief Complaint   Patient presents with    Knee Pain     Complains of R knee pain, sent for ortho consult for possible septic joint.     Review of patient's allergies indicates:  No Known Allergies  HPI Comments: 56 year old gentleman with a past medical history of DM2, diabetic neuropathy, diabetic nephropathy, morbid obesity, HLD, HTN and right knee injury s/p meniscectomy several years ago who presents to the ED due to laboratory findings of leukocytosis of right knee aspirate concerning for infection despite negative cultures. The patient states that he had developed right knee pain and swelling with the right knee tapped in clinic. The patient states that he had developed an abscess in his mouth around the same time as his knee pain for which he was prescribed a penicillin course he has since completed. He states that otherwise he feels fine other that being angry about having to be here. On further questioning the patient states that he also has had an unhealing wound of his left great toe. He states that he was wearing boots for an extended period of time so his feet became very moist and due to his neuropathy he was unable to feel the injury. He states that since that time the wound has stayed approximately the same without discharge or erythema. The patient denies fever, chills.    The history is provided by the patient.     Past Medical History   Diagnosis Date    Diabetes mellitus, type II 2003    Diabetic nephropathy     Dyslipidemia     HTN (hypertension)     Obese      Past Medical History Pertinent Negatives   Diagnosis Date Noted    Difficult intubation 2/16/2015    General anesthetics causing adverse effect in therapeutic use 2/16/2015    Hypotension, iatrogenic  2/16/2015    Malignant hyperthermia 2/16/2015    PONV (postoperative nausea and vomiting) 2/16/2015    Respiratory distress 2/16/2015     Past Surgical History   Procedure Laterality Date    Elbow surgery      Knee cartilage surgery  10/21/2013     right knee    Left index finger surgery  03/2015     Family History   Problem Relation Age of Onset    Diabetes Mother     Diabetes Father      Social History   Substance Use Topics    Smoking status: Never Smoker    Smokeless tobacco: Never Used    Alcohol use Yes      Comment: occasional     Review of Systems   Constitutional: Negative for chills, diaphoresis, fatigue and fever.   HENT: Negative for dental problem, facial swelling and rhinorrhea.    Eyes: Negative for photophobia and visual disturbance.   Respiratory: Negative for cough, chest tightness and shortness of breath.    Cardiovascular: Positive for leg swelling (right knee, improved from 2 weeks ago). Negative for chest pain and palpitations.   Gastrointestinal: Negative for abdominal distention, abdominal pain, anal bleeding, blood in stool, constipation, diarrhea, nausea and vomiting.   Genitourinary: Negative for difficulty urinating, dysuria, frequency, hematuria and urgency.   Musculoskeletal: Negative for arthralgias, joint swelling and myalgias.   Neurological: Negative for dizziness, facial asymmetry, weakness, numbness and headaches.   Hematological: Negative for adenopathy. Does not bruise/bleed easily.   Psychiatric/Behavioral: Positive for agitation. Negative for behavioral problems, confusion, decreased concentration and self-injury. The patient is nervous/anxious.        Physical Exam   Initial Vitals   BP Pulse Resp Temp SpO2   02/16/17 1325 02/16/17 1325 02/16/17 1325 02/16/17 1325 02/16/17 1325   203/86 74 18 97.3 °F (36.3 °C) 95 %     Physical Exam    Constitutional: He appears well-developed and well-nourished. He is not diaphoretic. No distress.   HENT:   Head: Normocephalic and  atraumatic.   Nose: Nose normal.   Mouth/Throat: Oropharynx is clear and moist. No oropharyngeal exudate.   Eyes: Conjunctivae are normal. Pupils are equal, round, and reactive to light. Right eye exhibits no discharge. Left eye exhibits no discharge. No scleral icterus.   Neck: Normal range of motion. Neck supple. No thyromegaly present. No tracheal deviation present. No JVD present.   Cardiovascular: Normal rate, regular rhythm and normal heart sounds.   Pulmonary/Chest: Breath sounds normal. No respiratory distress. He has no wheezes. He exhibits no tenderness.   Abdominal: Soft. Bowel sounds are normal. He exhibits no distension. There is no tenderness.   Musculoskeletal: Normal range of motion. He exhibits tenderness (right knee). He exhibits no edema.   Right knee ROM limited by pain     Lymphadenopathy:     He has no cervical adenopathy.   Neurological: He is alert and oriented to person, place, and time. He has normal strength.   Skin: Skin is warm and dry. No abscess noted. No erythema. No pallor.   Wound to left great toe, transverse. Wound extends beyond dermis. No bleeding or discharge.   Psychiatric: Judgment and thought content normal.         ED Course   Procedures  Labs Reviewed   C-REACTIVE PROTEIN - Abnormal; Notable for the following:        Result Value    CRP 36.8 (*)     All other components within normal limits   SEDIMENTATION RATE, MANUAL - Abnormal; Notable for the following:     Sed Rate 99 (*)     All other components within normal limits   COMPREHENSIVE METABOLIC PANEL - Abnormal; Notable for the following:     CO2 30 (*)     Glucose 188 (*)     BUN, Bld 21 (*)     Albumin 2.9 (*)     All other components within normal limits   CBC W/ AUTO DIFFERENTIAL - Abnormal; Notable for the following:     RBC 4.58 (*)     Hemoglobin 13.0 (*)     Hematocrit 39.8 (*)     Platelets 509 (*)     MPV 8.9 (*)     All other components within normal limits   POCT GLUCOSE - Abnormal; Notable for the  following:     POCT Glucose 172 (*)     All other components within normal limits   CULTURE, ANAEROBIC   CULTURE, AEROBIC  (SPECIFY SOURCE)   APTT   PREALBUMIN   PROTIME-INR   HEMOGLOBIN A1C   POCT GLUCOSE MONITORING CONTINUOUS   POCT GLUCOSE MONITORING CONTINUOUS   POCT GLUCOSE MONITORING CONTINUOUS     EKG Readings: (Independently Interpreted)   Sinus rhythm rate of 65, T wave inversions in 3 and AVF which represent changes from previous EKG on 10/8/13, possible inferior ischemia. Negative STEMI          Medical Decision Making:   History:   Old Medical Records: I decided to obtain old medical records.  Initial Assessment:   56 year old gentleman with history of poorly controlled DM with complication of nephropathy and neuropathy, morbid obesity, HTN, HLD who presents for findings of leukocytotic joint aspirate from right knee with pain and effusion concerning for septic arthritis of right knee.  Differential Diagnosis:   Septic arthritis  Clinical Tests:   Lab Tests: Ordered  Radiological Study: Reviewed  ED Management:  - Patient evaluated at bedside  - orthopedic surgery at bedside already aware, will admit to orthopedic surgery for washout in AM  - podiatry consulted to evaluate unhealing left toe wound         APC / Resident Notes:   Update 1630:  - no systemic leukocytosis  - CRP 37  - CBC non contributory  - CMP non contributory    Update 1710:  - sed rate 99       Scribe Attestation:   Scribe #1: I performed the above scribed service and the documentation accurately describes the services I performed. I attest to the accuracy of the note.    Attending Attestation:   Physician Attestation Statement for Resident:  As the supervising MD   Physician Attestation Statement: I have personally seen and examined this patient.   I agree with the above history. -: Pt was seen and examined by me. Pt reports that his knee is better now than before when he had the arthrocentesis. He denies any fever, chills, increased  swelling or redness. He has been able to walk on it better since the fluid was drawn off. Ortho has been down to see him and wants him to be admitted for arthroscopy and wash out    As the supervising MD I agree with the above PE.   -: Agree with the findings of the resident. Skin is warm and dry. Sclera and conjunctivae are clear. PERRL and EOMI. Chest: lungs are clear, heart regular rhythm no murmur or gallop. Abdomen is soft and non tender. Extremities: right knee gives no evidence of erythema. There is mild soft tissue swelling, which may be a small effusion. Pt does have a dime sized ulceration on the plantar surface of his left great toe. There is no evidence of secondary infection there is no lymphangitis. Full range of motion and extension and flexion without difficulty. Pulses are 2+. Neuro: gross motor and sensory intact, cranial nerves intact.    As the supervising MD I agree with the above treatment, course, plan, and disposition.   -: Plan is as per ortho. Pt will be admitted for arthroscopy and wash out of the knee joint. Podiatry consult has been requested for his toe lesions           Physician Attestation for Scribe:  Physician Attestation Statement for Scribe #1: I, Dr. Vargas, reviewed documentation, as scribed by Freddy Ramirez in my presence, and it is both accurate and complete.                 ED Course     Clinical Impression:   The primary encounter diagnosis was Pyogenic arthritis of right knee joint, due to unspecified organism. Diagnoses of Septic joint of right knee joint and Diabetic ulcer of left foot associated with type 2 diabetes mellitus were also pertinent to this visit.          Robbie Aly MD  Resident  02/16/17 [id number]

## 2017-02-16 NOTE — TELEPHONE ENCOUNTER
Reviewed cardiology consultation: negative for CV etiology of LE edema.    Reviewed ultrasound evaluation: negative for vascular / thrombus etiology of LE edema.    Discussed case with [place]: even w/ negative Gram stain and cultures, given leukocyte count well over 100,000, we will send pt to ED for knee arthroscopy and clean out.      Pt was called and plan was discussed.  He will present to the ED.  He was instructed as to NPO status.  On-call team was alerted.

## 2017-02-16 NOTE — ASSESSMENT & PLAN NOTE
--Pt on enalapril at home  --BP elevated to 200s on admission. Likely a component of pain associated with this  --Pain control per primary team  --Continue home enalapril.

## 2017-02-16 NOTE — SUBJECTIVE & OBJECTIVE
"Past Medical History   Diagnosis Date    Diabetes mellitus, type II 2003    Diabetic nephropathy     Dyslipidemia     HTN (hypertension)     Obese        Past Surgical History   Procedure Laterality Date    Elbow surgery      Knee cartilage surgery  10/21/2013     right knee    Left index finger surgery  03/2015       Review of patient's allergies indicates:  No Known Allergies    No current facility-administered medications on file prior to encounter.      Current Outpatient Prescriptions on File Prior to Encounter   Medication Sig    amoxicillin (AMOXIL) 500 MG Tab Take 1 tablet (500 mg total) by mouth every 8 (eight) hours.    BD ULTRA-FINE WILDA PEN NEEDLES 32 x 5/32 " Ndle INJECT FOUR TIMES DAILY    blood sugar diagnostic Strp Accucheck Smartview test strips and Fastclix lancets. AC/HS    busPIRone (BUSPAR) 7.5 MG tablet Take 1 tablet (7.5 mg total) by mouth 2 (two) times daily.    clonazepam (KLONOPIN) 0.5 MG tablet Take 0.5 mg by mouth as needed.     enalapril (VASOTEC) 10 MG tablet Take 1 tablet (10 mg total) by mouth once daily.    enalapril (VASOTEC) 10 MG tablet TAKE 1 TABLET DAILY    gabapentin (NEURONTIN) 600 MG tablet Take 1 tablet (600 mg total) by mouth once daily.    gemfibrozil (LOPID) 600 MG tablet TAKE 1 TABLET TWICE A DAY BEFORE MEALS    gemfibrozil (LOPID) 600 MG tablet TAKE 1 TABLET TWICE A DAY BEFORE MEALS    insulin aspart (NOVOLOG FLEXPEN) 100 unit/mL InPn pen INJECT 65 UNITS SUBCUTANEOUSLY THREE TIMES DAILY WITH MEALS PLUS SLIDING SCALE. Supply with pen needles    insulin glargine (LANTUS SOLOSTAR) 100 unit/mL (3 mL) InPn pen Inject 70 Units into the skin once daily. Supply pen needles    lancets (ACCU-CHEK SOFTCLIX LANCETS) Misc 1 Device by Other route 4 (four) times daily.    metformin (GLUCOPHAGE-XR) 500 MG 24 hr tablet Take 2 tablets (1,000 mg total) by mouth 2 (two) times daily with meals.    metformin (GLUCOPHAGE-XR) 500 MG 24 hr tablet TAKE 2 TABLETS TWICE A " DAY WITH MEALS    NOVOLOG FLEXPEN 100 unit/mL InPn pen INJECT 65 UNITS UNDER THE SKIN THREE TIMES A DAY WITH MEALS PLUS SLIDING SCALE    oxycodone (ROXICODONE) 15 MG Tab Take 7.5 mg by mouth every 4 (four) hours as needed (takes 1/2 tablet every 4 to 6 hrs PRN pain).     pravastatin (PRAVACHOL) 40 MG tablet Take 1 tablet (40 mg total) by mouth once daily.     Family History     Problem Relation (Age of Onset)    Diabetes Mother, Father        Social History Main Topics    Smoking status: Never Smoker    Smokeless tobacco: Never Used    Alcohol use Yes      Comment: occasional    Drug use: No    Sexual activity: Yes     Partners: Female     Review of Systems   Constitutional: Negative for chills and fever.   HENT: Negative for postnasal drip, rhinorrhea, sinus pressure and sore throat.    Eyes: Negative for photophobia and visual disturbance.   Respiratory: Negative for cough, shortness of breath and wheezing.    Cardiovascular: Negative for chest pain, palpitations and leg swelling.   Gastrointestinal: Negative for abdominal pain, constipation, diarrhea, nausea and vomiting.   Genitourinary: Negative for difficulty urinating and dysuria.   Musculoskeletal: Positive for arthralgias (R knee) and joint swelling (R knee).   Skin: Negative for pallor and rash.   Neurological: Negative for dizziness, syncope, weakness, light-headedness and headaches.   Hematological: Negative for adenopathy. Does not bruise/bleed easily.   Psychiatric/Behavioral: Negative for dysphoric mood. The patient is not nervous/anxious.      Objective:     Vital Signs (Most Recent):  Temp: 97.3 °F (36.3 °C) (02/16/17 1325)  Pulse: 74 (02/16/17 1325)  Resp: 18 (02/16/17 1325)  BP: (!) 203/86 (02/16/17 1325)  SpO2: 95 % (02/16/17 1325) Vital Signs (24h Range):  Temp:  [97.3 °F (36.3 °C)] 97.3 °F (36.3 °C)  Pulse:  [61-74] 74  Resp:  [18] 18  SpO2:  [95 %] 95 %  BP: (159-203)/(78-86) 203/86     Weight: (!) 158.8 kg (350 lb)  Body mass index is  46.18 kg/(m^2).    Physical Exam   Constitutional: He is oriented to person, place, and time. He appears well-developed and well-nourished. No distress.   HENT:   Head: Normocephalic.   Right Ear: External ear normal.   Left Ear: External ear normal.   Eyes: EOM are normal. Pupils are equal, round, and reactive to light.   Neck: Normal range of motion. Neck supple.   Cardiovascular: Normal rate, regular rhythm, normal heart sounds and intact distal pulses.  Exam reveals no gallop and no friction rub.    No murmur heard.  Pulmonary/Chest: Effort normal and breath sounds normal. No respiratory distress. He has no wheezes. He has no rales.   Abdominal: Soft. Bowel sounds are normal. He exhibits no distension. There is no tenderness.   Musculoskeletal: He exhibits edema (R knee) and tenderness (R knee).   Lymphadenopathy:     He has no cervical adenopathy.   Neurological: He is alert and oriented to person, place, and time.   Skin: Skin is warm and dry.   Psychiatric: He has a normal mood and affect. Thought content normal.       Significant Labs: CBC: No results for input(s): WBC, HGB, HCT, PLT in the last 48 hours.  CMP: No results for input(s): NA, K, CL, CO2, GLU, BUN, CREATININE, CALCIUM, PROT, ALBUMIN, BILITOT, ALKPHOS, AST, ALT, ANIONGAP, EGFRNONAA in the last 48 hours.    Invalid input(s): ESTGFAFRICA    Significant Imaging: I have reviewed all pertinent imaging results/findings within the past 24 hours.

## 2017-02-16 NOTE — IP AVS SNAPSHOT
[facility]  [address]  [facility] [address]  Phone: [phone]           Patient Discharge Instructions     Our goal is to set you up for success. This packet includes information on your condition, medications, and your home care. It will help you to care for yourself so you don't get sicker and need to go back to the hospital.     Please ask your nurse if you have any questions.        There are many details to remember when preparing to leave the hospital. Here is what you will need to do:    1. Take your medicine. If you are prescribed medications, review your Medication List in the following pages. You may have new medications to  at the pharmacy and others that you'll need to stop taking. Review the instructions for how and when to take your medications. Talk with your doctor or nurses if you are unsure of what to do.     2. Go to your follow-up appointments. Specific follow-up information is listed in the following pages. Your may be contacted by a transition nurse or clinical provider about future appointments. Be sure we have all of the phone numbers to reach you, if needed. Please contact your provider's office if you are unable to make an appointment.     3. Watch for warning signs. Your doctor or nurse will give you detailed warning signs to watch for and when to call for assistance. These instructions may also include educational information about your condition. If you experience any of warning signs to your health, call your doctor.               [facility]  Unless otherwise directed by your provider, please contact [facility], our nurse care line that is available for 24/7 assistance.     [phone] (toll-free)    Registered nurses in the [facility] provide clinical advisement, health education, appointment booking, and other advisory services.                    ** Verify the list of medication(s) below is accurate and up  "to date. Carry this with you in case of emergency. If your medications have changed, please notify your healthcare provider.             Medication List      START taking these medications        Additional Info                      cefTRIAXone 2 g in dextrose 5 % 50 mL 2 g/50 mL Pgbk IVPB   Commonly known as:  ROCEPHIN   Quantity:  14 each   Refills:  0   Dose:  2 g   Indications:  Bone/Joint    Last time this was given:  2 g on 2/18/2017  2:49 PM   Instructions:  Inject 50 mLs (2 g total) into the vein once daily.     Begin Date    AM    Noon    PM    Bedtime       dextrose 5 % SolP 250 mL with vancomycin 1,000 mg SolR 1,500 mg   Quantity:  1500 mg   Refills:  27   Dose:  1500 mg   Indications:  Bone/Joint    Last time this was given:  1,500 mg on 2/18/2017  9:56 AM   Instructions:  Inject 1,500 mg into the vein every 12 (twelve) hours.     Begin Date    AM    Noon    PM    Bedtime       docusate sodium 100 MG capsule   Commonly known as:  COLACE   Quantity:  60 capsule   Refills:  1   Dose:  100 mg    Instructions:  Take 1 capsule (100 mg total) by mouth 2 (two) times daily as needed for Constipation.     Begin Date    AM    Noon    PM    Bedtime       ondansetron 8 MG Tbdl   Commonly known as:  ZOFRAN-ODT   Quantity:  30 tablet   Refills:  0   Dose:  8 mg    Instructions:  Take 1 tablet (8 mg total) by mouth every 6 (six) hours as needed.     Begin Date    AM    Noon    PM    Bedtime       oxycodone-acetaminophen  mg per tablet   Commonly known as:  PERCOCET   Quantity:  41 tablet   Refills:  0   Dose:  1 tablet    Instructions:  Take 1 tablet by mouth every 4 (four) hours as needed for Pain.     Begin Date    AM    Noon    PM    Bedtime         CONTINUE taking these medications        Additional Info                      BD ULTRA-FINE WILDA PEN NEEDLES 32 gauge x 5/32" Ndle   Quantity:  4 each   Refills:  3   Generic drug:  pen needle, diabetic    Instructions:  INJECT FOUR TIMES DAILY     Begin Date    AM "    Noon    PM    Bedtime       blood sugar diagnostic Strp   Quantity:  450 each   Refills:  3    Instructions:  Accucheck Smartview test strips and Fastclix lancets. AC/HS     Begin Date    AM    Noon    PM    Bedtime       busPIRone 7.5 MG tablet   Commonly known as:  BUSPAR   Quantity:  60 tablet   Refills:  3   Dose:  7.5 mg    Last time this was given:  7.5 mg on 2/17/2017  9:00 AM   Instructions:  Take 1 tablet (7.5 mg total) by mouth 2 (two) times daily.     Begin Date    AM    Noon    PM    Bedtime       clonazePAM 0.5 MG tablet   Commonly known as:  KLONOPIN   Refills:  0   Dose:  0.5 mg    Instructions:  Take 0.5 mg by mouth as needed.     Begin Date    AM    Noon    PM    Bedtime       * enalapril 10 MG tablet   Commonly known as:  VASOTEC   Quantity:  90 tablet   Refills:  2   Dose:  10 mg    Last time this was given:  10 mg on 2/18/2017  8:24 AM   Instructions:  Take 1 tablet (10 mg total) by mouth once daily.     Begin Date    AM    Noon    PM    Bedtime       * enalapril 10 MG tablet   Commonly known as:  VASOTEC   Quantity:  90 tablet   Refills:  1    Last time this was given:  10 mg on 2/18/2017  8:24 AM   Instructions:  TAKE 1 TABLET DAILY     Begin Date    AM    Noon    PM    Bedtime       gabapentin 600 MG tablet   Commonly known as:  NEURONTIN   Quantity:  90 tablet   Refills:  3   Dose:  600 mg    Instructions:  Take 1 tablet (600 mg total) by mouth once daily.     Begin Date    AM    Noon    PM    Bedtime       * gemfibrozil 600 MG tablet   Commonly known as:  LOPID   Quantity:  180 tablet   Refills:  2    Instructions:  TAKE 1 TABLET TWICE A DAY BEFORE MEALS     Begin Date    AM    Noon    PM    Bedtime       * gemfibrozil 600 MG tablet   Commonly known as:  LOPID   Quantity:  180 tablet   Refills:  1    Instructions:  TAKE 1 TABLET TWICE A DAY BEFORE MEALS     Begin Date    AM    Noon    PM    Bedtime       * insulin aspart 100 unit/mL Inpn pen   Commonly known as:  NOVOLOG FLEXPEN    Quantity:  60 mL   Refills:  3    Last time this was given:  2 Units on 2/18/2017 12:27 PM   Instructions:  INJECT 65 UNITS SUBCUTANEOUSLY THREE TIMES DAILY WITH MEALS PLUS SLIDING SCALE. Supply with pen needles     Begin Date    AM    Noon    PM    Bedtime       * NOVOLOG FLEXPEN 100 unit/mL Inpn pen   Quantity:  60 mL   Refills:  2   Generic drug:  insulin aspart    Last time this was given:  2 Units on 2/18/2017 12:27 PM   Instructions:  INJECT 65 UNITS UNDER THE SKIN THREE TIMES A DAY WITH MEALS PLUS SLIDING SCALE     Begin Date    AM    Noon    PM    Bedtime       insulin glargine 100 unit/mL (3 mL) Inpn pen   Commonly known as:  LANTUS SOLOSTAR   Quantity:  2 Box   Refills:  3   Dose:  70 Units    Instructions:  Inject 70 Units into the skin once daily. Supply pen needles     Begin Date    AM    Noon    PM    Bedtime       lancets Misc   Commonly known as:  ACCU-CHEK SOFTCLIX LANCETS   Quantity:  360 each   Refills:  3   Dose:  1 Device    Instructions:  1 Device by Other route 4 (four) times daily.     Begin Date    AM    Noon    PM    Bedtime       * metformin 500 MG 24 hr tablet   Commonly known as:  GLUCOPHAGE-XR   Quantity:  360 tablet   Refills:  2   Dose:  1000 mg    Instructions:  Take 2 tablets (1,000 mg total) by mouth 2 (two) times daily with meals.     Begin Date    AM    Noon    PM    Bedtime       * metformin 500 MG 24 hr tablet   Commonly known as:  GLUCOPHAGE-XR   Quantity:  360 tablet   Refills:  1    Instructions:  TAKE 2 TABLETS TWICE A DAY WITH MEALS     Begin Date    AM    Noon    PM    Bedtime       pravastatin 40 MG tablet   Commonly known as:  PRAVACHOL   Quantity:  30 tablet   Refills:  1   Dose:  40 mg    Last time this was given:  40 mg on 2/17/2017 10:43 AM   Instructions:  Take 1 tablet (40 mg total) by mouth once daily.     Begin Date    AM    Noon    PM    Bedtime       * Notice:  This list has 8 medication(s) that are the same as other medications prescribed for you. Read the  directions carefully, and ask your doctor or other care provider to review them with you.      STOP taking these medications     amoxicillin 500 MG Tab   Commonly known as:  AMOXIL       oxycodone 15 MG Tab   Commonly known as:  ROXICODONE            Where to Get Your Medications      These medications were sent to [facility] [place], [place] -  [facility]  [address], [facility] [address]     Phone:  [phone]     cefTRIAXone 2 g in dextrose 5 % 50 mL 2 g/50 mL Pgbk IVPB    dextrose 5 % SolP 250 mL with vancomycin 1,000 mg SolR 1,500 mg         You can get these medications from any pharmacy     Bring a paper prescription for each of these medications     docusate sodium 100 MG capsule    ondansetron 8 MG Tbdl    oxycodone-acetaminophen  mg per tablet                  Please bring to all follow up appointments:    1. A copy of your discharge instructions.  2. All medicines you are currently taking in their original bottles.  3. Identification and insurance card.    Please arrive 15 minutes ahead of scheduled appointment time.    Please call 24 hours in advance if you must reschedule your appointment and/or time.        Follow-up Information     Follow up with Cynthia Maher PA-C. Go in 2 weeks.    Specialty:  Orthopedic Surgery    Why:  For wound re-check    Contact information:    [address] [place] [id number] [phone]          Discharge Instructions     Future Orders    Activity as tolerated     Call MD for:  difficulty breathing or increased cough     Call MD for:  increased confusion or weakness     Call MD for:  persistent dizziness, light-headedness, or visual disturbances     Call MD for:  persistent nausea and vomiting or diarrhea     Call MD for:  redness, tenderness, or signs of infection (pain, swelling, redness, odor or green/yellow discharge around incision site)     Call MD for:  severe persistent headache     Call MD for:  severe uncontrolled pain      "Call MD for:  temperature >100.4     Call MD for:  worsening rash     Diet general     Questions:    Total calories:      Fat restriction, if any:      Protein restriction, if any:      Na restriction, if any:      Fluid restriction:      Additional restrictions:          Discharge Instructions         Pt set up with [facility], [phone], and [facility], [phone].  Home health nurse will be out tomorrow for labs and nursing care      Primary Diagnosis     Your primary diagnosis was:  Joint Infection      Admission Information     Date & Time Provider Department CSN    2/16/2017  1:59 PM Virgilio Lee MD [facility] [id number]      Care Providers     Provider Role Specialty Primary office phone    Virgilio Lee MD Attending Provider Orthopedic Surgery [phone]    Sondra Hernandes MD Consulting Physician  Hospitalist [phone]    Hilary Velazco MD Consulting Physician  Hospitalist [phone]    Virgilio Lee MD Surgeon  Orthopedic Surgery [phone]      Your Vitals Were     BP Pulse Temp Resp Height Weight    138/68 (BP Location: Left arm, Patient Position: Sitting, BP Method: Automatic) 70 97.8 °F (36.6 °C) (Oral) 18 6' 1" (1.854 m) 146 kg (321 lb 14 oz)    SpO2 BMI             95% 42.47 kg/m2         Recent Lab Values        7/7/2014 8/25/2014 11/4/2014 5/27/2015 8/6/2015 6/17/2016 9/19/2016 2/16/2017     10:15 AM  8:32 AM 11:40 AM  7:55 AM  8:06 AM  8:30 AM  8:19 AM  3:21 PM    A1C 9.9 (H) 9.2 (H) 9.0 (H) 9.2 (H) 8.6 (H) 9.2 (H) 9.5 (H) 8.3 (H)    Comment for A1C at  8:19 AM on 9/19/2016:  According to ADA guidelines, hemoglobin A1C <7.0% represents  optimal control in non-pregnant diabetic patients.  Different  metrics may apply to specific populations.   Standards of Medical Care in Diabetes - 2016.  For the purpose of screening for the presence of diabetes:  <5.7%     Consistent with the absence of diabetes  5.7-6.4%  Consistent with increasing risk " for diabetes   (prediabetes)  >or=6.5%  Consistent with diabetes  Currently no consensus exists for use of hemoglobin A1C  for diagnosis of diabetes for children.      Comment for A1C at  3:21 PM on 2/16/2017:  According to ADA guidelines, hemoglobin A1C <7.0% represents  optimal control in non-pregnant diabetic patients.  Different  metrics may apply to specific populations.   Standards of Medical Care in Diabetes - 2016.  For the purpose of screening for the presence of diabetes:  <5.7%     Consistent with the absence of diabetes  5.7-6.4%  Consistent with increasing risk for diabetes   (prediabetes)  >or=6.5%  Consistent with diabetes  Currently no consensus exists for use of hemoglobin A1C  for diagnosis of diabetes for children.        Pending Labs     Order Current Status    AFB Culture & Smear In process    Culture, Anaerobe In process    Culture, Anaerobe In process    Fungus culture In process    Fungus culture In process    VANCOMYCIN, TROUGH before 4th dose In process    Aerobic culture Preliminary result    Aerobic culture Preliminary result    Aerobic culture Preliminary result    Culture, Anaerobe Preliminary result      Allergies as of 2/18/2017     No Known Allergies      Advance Directives     An advance directive is a document which, in the event you are no longer able to make decisions for yourself, tells your healthcare team what kind of treatment you do or do not want to receive, or who you would like to make those decisions for you.  If you do not currently have an advance directive, [facility] encourages you to create one.  For more information call:  [phone]), [phone] ([phone]),  or log on to [email or web address].        Language Assistance Services     ATTENTION: Language assistance services are available, free of charge. Please call [phone].      ATENCIÓN: Si habla español, tiene a maravilla disposición servicios gratuitos de asistencia lingüística. Llame al  [phone].     NATASHA Ý: N?u b?n nói Ti?ng Vi?t, có các d?ch v? h? tr? ngôn ng? mi?n phí dành cho b?n. G?i s? [phone].        Diabetes Discharge Instructions                                    [facility] complies with applicable Federal civil rights laws and does not discriminate on the basis of race, color, national origin, age, disability, or sex.

## 2017-02-16 NOTE — ASSESSMENT & PLAN NOTE
--HbA1c 9.5 in September 2016. Repeat HbA1c pending  --Pt reports blood glucoses normally run in the 150s. He reports he does not always take his insulin before eating  --Pt takes 70 units lantus daily and 65 units aspart qac with ssi  --Agree with starting 50 units levemir qhs. Will hold aspart scheduled for now as pt is to be npo for procedure tomorrow.  --Moderate dose ssi  --Diabetic diet

## 2017-02-16 NOTE — ED TRIAGE NOTES
Pt comes to the ED with a chief complaint of R knee pain; states that sports medicine doctor says that there is an infection. Pt states that the pain began about to weeks ago. Pt denies any fever, chills, nausea/vomiting, SOB.

## 2017-02-16 NOTE — CONSULTS
Consult Note  Podiatry    Consult Requested By: Rich Vargas MD  Reason for Consult: left great toe unhealing wound to subcutaenous tissue for 3-4 mos in setting of poorly controlled DM2    SUBJECTIVE     History of Present Illness:  Virgilio Acuña is a 56 y.o. male who  has a past medical history of Diabetes mellitus, type II; Diabetic nephropathy; Dyslipidemia; HTN (hypertension); and Obese. He is inpatient with a septic knee undergoing surgery per orthopedics tomorrow morning. He has a left hallux ulcer that has been present for about 3-4 months, he has been putting neosporin and a band aid on it. Denies pain, however has neuropathy. Does not currently see a podiatrist or anyone else managing this wound.     Scheduled Meds:   busPIRone  7.5 mg Oral BID    enalapril  10 mg Oral Daily    gabapentin  600 mg Oral Daily    insulin detemir  50 Units Subcutaneous QHS    pravastatin  40 mg Oral Daily    sodium chloride 0.9%  3 mL Intravenous Q8H     Continuous Infusions:   PRN Meds:acetaminophen, clonazePAM, dextrose 50%, dextrose 50%, diphenhydrAMINE, glucagon (human recombinant), glucose, glucose, hydrALAZINE, insulin aspart, morphine, ondansetron, oxycodone, oxycodone, promethazine (PHENERGAN) IVPB, ramelteon    Review of patient's allergies indicates:  No Known Allergies     Past Medical History   Diagnosis Date    Diabetes mellitus, type II 2003    Diabetic nephropathy     Dyslipidemia     HTN (hypertension)     Obese      Past Surgical History   Procedure Laterality Date    Elbow surgery      Knee cartilage surgery  10/21/2013     right knee    Left index finger surgery  03/2015     Family History   Problem Relation Age of Onset    Diabetes Mother     Diabetes Father      Social History   Substance Use Topics    Smoking status: Never Smoker    Smokeless tobacco: Never Used    Alcohol use Yes      Comment: occasional       Review of Systems:  Constitutional: no fever or chills  Respiratory: no  cough or shortness of breath  Cardiovascular: no chest pain or palpitations  Gastrointestinal: no nausea or vomiting, tolerating diet  Musculoskeletal: no arthralgias or myalgias  Neurological: history of numbness or paresthesias in the feet    OBJECTIVE     Vital Signs (Most Recent):  Temp: 97.3 °F (36.3 °C) (02/16/17 1325)  Pulse: 74 (02/16/17 1325)  Resp: 18 (02/16/17 1325)  BP: (!) 203/86 (02/16/17 1325)  SpO2: 95 % (02/16/17 1325)    Vital Signs Range (Last 24H):  Temp:  [97.3 °F (36.3 °C)]   Pulse:  [61-74]   Resp:  [18]   BP: (159-203)/(78-86)   SpO2:  [95 %]     Physical Exam:  General: Oriented to person, place, time, and situation. No acute distress.  Vascular: DP and PT pulses are 2+ bilaterally. CRT<3 seconds to digits 1-5 bilaterally. No pedal edema or varicosities noted bilaterally.  Musculoskeletal: Equinus noted b/l ankles with < 10 deg DF noted. Strength 5/5 in DF/PF/Inv/Ev resistance with no reproduction of pain in any direction. Passive range of motion of ankle and pedal joints is painless b/l.  Neuro: Protective sensation absent bilateral   Derm: No open lesions, lacerations or wounds noted right foot.     Ulcer Location: left plantar distal hallux  Measurements:1.0x0.8x0.4 cm  Periwound: Intact  Drainage: serosanguinous.  Pus: None.  Malodor: None.  Base:  100% granular. 0% fibrin.  Signs of infection: None.            Laboratory:  CBC:   Recent Labs  Lab 02/16/17  1521   WBC 8.88   RBC 4.58*   HGB 13.0*   HCT 39.8*   *   MCV 87   MCH 28.4   MCHC 32.7     BMP:   Recent Labs  Lab 02/16/17  1521   *      K 4.6   CL 99   CO2 30*   BUN 21*   CREATININE 1.0   CALCIUM 10.1     ESR: No results for input(s): SEDRATE in the last 168 hours.  CRP:   Recent Labs  Lab 02/16/17  1521   CRP 36.8*     Microbiology Results (last 7 days)     Procedure Component Value Units Date/Time    Aerobic culture [[id number]] Collected:  02/16/17 1550    Order Status:  Sent Specimen:  Wound from Toe, Left  Foot Updated:  02/16/17 1609    Culture, Anaerobe [[id number]] Collected:  02/16/17 1550    Order Status:  Sent Specimen:  Wound from Toe, Left Foot Updated:  02/16/17 1609          Diagnostic Results:  X-Ray: pending    ASSESSMENT/PLAN     Assessment:  -DM II uncontrolled  -Diabetic ulcer left foot stable  -Neuropathy    Plan:  -Wound was inspected   Obtained verbal consent from the patient for wound debridement left great toe. No anesthesia was required, Utilizing a sterile #15 scalpel all non-viable skin was excised to level of SQ tissue. A healthy appearing wound base was achieved with minimal blood loss. Hemostasis achieved with compression. Wound site was irrigated with normal saline and dressed with anum and a football dressing. He tolerated the procedure well.  -Dressing to remain clean dry and intact, ambulate in darco boot only  -Aerobic and anaerobic cultures were taken.   -Will need to follow up with podiatry within 1 week of discharge.  -Appreciate the consult, podiatry will follow up Monday for dressing change if still inpatient. Call with questions or concerns    Inocente Smith DPM PGY-3  Pager [phone]

## 2017-02-16 NOTE — PROGRESS NOTES
Subjective:    Patient ID:  Virgilio Acuña is a 56 y.o. male who presents for evaluation of Hypertension (New pt); Knee Pain; and Edema      HPI 57 yo WM who had was on his feet for an extended period at work who developed swelling and pain in the right knee that has been aspirated twice in the past week who has unilateral swelling of calf and ankle of the affected leg. Had venous dopplers done yesterday that were negative.    TEST DESCRIPTION   Impression:    RIGHT:  No evidence of Right lower extremity DVT.      LEFT:  No evidence of Left lower extremity DVT.      Right calf edema.          This document has been electronically    SIGNED BY: HAZEL Lamas MD On: 02/15/2017 16:43    Review of Systems   Constitution: Negative for decreased appetite, fever, weakness, malaise/fatigue, weight gain and weight loss.   HENT: Negative for headaches, hearing loss and nosebleeds.    Eyes: Negative for visual disturbance.   Cardiovascular: Positive for leg swelling. Negative for chest pain, claudication, cyanosis, dyspnea on exertion, irregular heartbeat, near-syncope, orthopnea, palpitations, paroxysmal nocturnal dyspnea and syncope.   Respiratory: Negative for cough, hemoptysis, shortness of breath, sleep disturbances due to breathing, snoring and wheezing.    Endocrine: Negative for cold intolerance, heat intolerance, polydipsia and polyuria.   Hematologic/Lymphatic: Negative for adenopathy and bleeding problem. Does not bruise/bleed easily.   Skin: Negative for color change, itching, poor wound healing, rash and suspicious lesions.   Musculoskeletal: Positive for joint pain and joint swelling. Negative for arthritis, back pain, falls, muscle cramps, muscle weakness and myalgias.   Gastrointestinal: Negative for bloating, abdominal pain, change in bowel habit, constipation, flatus, heartburn, hematemesis, hematochezia, hemorrhoids, jaundice, melena, nausea and vomiting.   Genitourinary: Negative for bladder  "incontinence, decreased libido, frequency, hematuria, hesitancy and urgency.   Neurological: Negative for brief paralysis, difficulty with concentration, excessive daytime sleepiness, dizziness, focal weakness, light-headedness, loss of balance, numbness and vertigo.   Psychiatric/Behavioral: Negative for altered mental status, depression and memory loss. The patient does not have insomnia and is not nervous/anxious.    Allergic/Immunologic: Negative for environmental allergies, hives and persistent infections.        Objective:    Physical Exam   Constitutional: He is oriented to person, place, and time. He appears well-developed and well-nourished. No distress.   BP (!) 159/78  Pulse 61  Ht 6' 1" (1.854 m)  Wt (!) 149 kg (328 lb 7.8 oz)  BMI 43.34 kg/m2     HENT:   Head: Normocephalic and atraumatic.   Eyes: Conjunctivae and lids are normal. Pupils are equal, round, and reactive to light. Right eye exhibits no discharge. No scleral icterus.   Neck: Normal range of motion. Neck supple. No JVD present. No tracheal deviation present. No thyromegaly present.   Cardiovascular: Normal rate, regular rhythm, S1 normal, S2 normal, normal heart sounds and intact distal pulses.  Exam reveals no gallop and no friction rub.    No murmur heard.  Pulses:       Carotid pulses are 2+ on the right side, and 2+ on the left side.       Radial pulses are 2+ on the right side, and 2+ on the left side.        Femoral pulses are 2+ on the right side, and 2+ on the left side.       Popliteal pulses are 2+ on the right side, and 2+ on the left side.        Dorsalis pedis pulses are 2+ on the left side.        Posterior tibial pulses are 1+ on the right side, and 2+ on the left side.   Has decrease PT pulse on right because of ankle edema. DP good   Pulmonary/Chest: Effort normal and breath sounds normal. No respiratory distress. He has no wheezes. He has no rales. He exhibits no tenderness.   Abdominal: Soft. Bowel sounds are normal. He " exhibits no distension and no mass. There is no hepatosplenomegaly or hepatomegaly. There is no tenderness. There is no rebound and no guarding.   Musculoskeletal: Normal range of motion. He exhibits edema. He exhibits no tenderness.   Unilateral right calf and ankle edema   Lymphadenopathy:     He has no cervical adenopathy.   Neurological: He is alert and oriented to person, place, and time. He has normal reflexes. No cranial nerve deficit. Coordination normal.   Skin: Skin is warm and dry. No rash noted. He is not diaphoretic. No erythema. No pallor.   Psychiatric: He has a normal mood and affect. His speech is normal and behavior is normal. Judgment and thought content normal. Cognition and memory are normal.         Assessment:       1. Morbid obesity due to excess calories    2. Essential hypertension    3. Edema of right lower extremity         Plan:     There is no evidence that the swelling of calf and ankle is cardiac or vascular related.  BP up but patient in considerable pain  Follow up with orthopedics  No orders of the defined types were placed in this encounter.    Return if symptoms worsen or fail to improve.

## 2017-02-16 NOTE — H&P
H&P  Orthopedic Surgery      SUBJECTIVE:     History of Present Illness:  Virgilio Acuña is a 56 y.o. male who presents with right knee pain. The patient reports a 10 day history of atraumatic right knee pain. [facility] where arthrocentesis was performed and results were concerning for infection. He reports that his current pain level is 8/10.    Of note, he has also been on Penicillin for the past ~10 days for a dental abscess; last dose was yesterday.      Past Medical History   Diagnosis Date    Diabetes mellitus, type II 2003    Diabetic nephropathy     Dyslipidemia     HTN (hypertension)     Obese        Past Surgical History   Procedure Laterality Date    Elbow surgery      Knee cartilage surgery  10/21/2013     right knee    Left index finger surgery  03/2015       Family History   Problem Relation Age of Onset    Diabetes Mother     Diabetes Father        Social History     Social History    Marital status:      Spouse name: N/A    Number of children: N/A    Years of education: N/A     Occupational History     [employer]     Social History Main Topics    Smoking status: Never Smoker    Smokeless tobacco: Never Used    Alcohol use Yes      Comment: occasional    Drug use: No    Sexual activity: Yes     Partners: Female     Other Topics Concern    Not on file     Social History Narrative       Current Facility-Administered Medications   Medication Dose Route Frequency Provider Last Rate Last Dose    acetaminophen tablet 650 mg  650 mg Oral Q4H PRN Renny Villaseñor MD        busPIRone split tablet 7.5 mg  7.5 mg Oral BID Renny Villaseñor MD        clonazePAM tablet 0.5 mg  0.5 mg Oral BID PRN Renny Villaseñor MD        dextrose 50% injection 12.5 g  12.5 g Intravenous PRN Renny Villaseñor MD        dextrose 50% injection 25 g  25 g Intravenous PRN Renny Villaseñor MD         "diphenhydrAMINE capsule 25 mg  25 mg Oral Q6H PRN Renny Villaseñor MD        diphenhydrAMINE injection 25 mg  25 mg Intravenous Q4H PRN Renny Villaseñor MD        enalapril tablet 10 mg  10 mg Oral Daily Renny Villaseñor MD        gabapentin capsule 600 mg  600 mg Oral Daily Renny Villaseñor MD        glucagon (human recombinant) injection 1 mg  1 mg Intramuscular PRN Renny Villaseñor MD        glucose chewable tablet 16 g  16 g Oral PRN Renny Villaseñor MD        glucose chewable tablet 24 g  24 g Oral PRN Renny Villaseñor MD        hydrALAZINE tablet 25 mg  25 mg Oral Q6H PRN Renny Villaseñor MD        insulin aspart pen 1-10 Units  1-10 Units Subcutaneous QID (AC + HS) PRN Renny Villaseñor MD        insulin detemir pen 50 Units  50 Units Subcutaneous Daily Renny Villaseñor MD        morphine injection 2 mg  2 mg Intravenous Q4H PRN Renny Villaseñor MD        ondansetron disintegrating tablet 8 mg  8 mg Oral Q8H PRN Renny Villaseñor MD        oxycodone immediate release tablet 10 mg  10 mg Oral Q4H PRN Renny Villaseñor MD        oxycodone immediate release tablet 5 mg  5 mg Oral Q4H PRN Renny Villaseñor MD        pravastatin tablet 40 mg  40 mg Oral Daily Renny Villaseñor MD        promethazine (PHENERGAN) 6.25 mg in dextrose 5 % 50 mL IVPB  6.25 mg Intravenous Q6H PRN Renny Villaseñor MD        ramelteon tablet 8 mg  8 mg Oral Nightly PRN Renny Villaseñor MD        sodium chloride 0.9% flush 3 mL  3 mL Intravenous Q8H Renny Villaseñor MD         Current Outpatient Prescriptions   Medication Sig Dispense Refill    amoxicillin (AMOXIL) 500 MG Tab Take 1 tablet (500 mg total) by mouth every 8 (eight) hours. 30 tablet 0    BD ULTRA-FINE WILDA PEN NEEDLES 32 x 5/32 " Ndle INJECT FOUR TIMES DAILY 4 each 3    blood sugar diagnostic Strp Accucheck Smartview test strips and Fastclix lancets. AC/ each 3    busPIRone (BUSPAR) 7.5 MG tablet Take 1 tablet (7.5 mg total) by mouth 2 (two) times daily. " 60 tablet 3    clonazepam (KLONOPIN) 0.5 MG tablet Take 0.5 mg by mouth as needed.       enalapril (VASOTEC) 10 MG tablet Take 1 tablet (10 mg total) by mouth once daily. 90 tablet 2    enalapril (VASOTEC) 10 MG tablet TAKE 1 TABLET DAILY 90 tablet 1    gabapentin (NEURONTIN) 600 MG tablet Take 1 tablet (600 mg total) by mouth once daily. 90 tablet 3    gemfibrozil (LOPID) 600 MG tablet TAKE 1 TABLET TWICE A DAY BEFORE MEALS 180 tablet 2    gemfibrozil (LOPID) 600 MG tablet TAKE 1 TABLET TWICE A DAY BEFORE MEALS 180 tablet 1    insulin aspart (NOVOLOG FLEXPEN) 100 unit/mL InPn pen INJECT 65 UNITS SUBCUTANEOUSLY THREE TIMES DAILY WITH MEALS PLUS SLIDING SCALE. Supply with pen needles 60 mL 3    insulin glargine (LANTUS SOLOSTAR) 100 unit/mL (3 mL) InPn pen Inject 70 Units into the skin once daily. Supply pen needles 2 Box 3    lancets (ACCU-CHEK SOFTCLIX LANCETS) Misc 1 Device by Other route 4 (four) times daily. 360 each 3    metformin (GLUCOPHAGE-XR) 500 MG 24 hr tablet Take 2 tablets (1,000 mg total) by mouth 2 (two) times daily with meals. 360 tablet 2    metformin (GLUCOPHAGE-XR) 500 MG 24 hr tablet TAKE 2 TABLETS TWICE A DAY WITH MEALS 360 tablet 1    NOVOLOG FLEXPEN 100 unit/mL InPn pen INJECT 65 UNITS UNDER THE SKIN THREE TIMES A DAY WITH MEALS PLUS SLIDING SCALE 60 mL 2    oxycodone (ROXICODONE) 15 MG Tab Take 7.5 mg by mouth every 4 (four) hours as needed (takes 1/2 tablet every 4 to 6 hrs PRN pain).   0    pravastatin (PRAVACHOL) 40 MG tablet Take 1 tablet (40 mg total) by mouth once daily. 30 tablet 1       Review of patient's allergies indicates:  No Known Allergies      Review of Systems:  ROS: Patient denies constitutional symptoms, cardiac symptoms, respiratory symptoms, GI symptoms. The remainder of the musculoskeletal ROS is included in the HPI.      OBJECTIVE:     Vital Signs (Most Recent)  Vitals:    02/16/17 1325   BP: (!) 203/86   Pulse: 74   Resp: 18   Temp: 97.3 °F (36.3 °C)  "  TempSrc: Oral   SpO2: 95%   Weight: (!) 158.8 kg (350 lb)   Height: 6' 1" (1.854 m)         Physical Exam:  Constitutional:  NAD; well-developed and well-nourished  Pulmonary/Chest: Effort normal  Skin: Warm and dry  Neuro: Alert and oriented to person, place, and time; no focal numbness or weakness    RLE: skin intact. No erythema. Mild effusion. ROM intact from 0-110 degrees. NVI distally.  LLE: non-healing ulcer noted to great toe.    Diagnostic Results:  X-Ray: Reviewed   Mild degenerative changes in medial compartment    ASSESSMENT/PLAN:     56 y.o. male with septic arthritis of right knee  - To OR tomorrow for right knee arthroscopy (booked/marked/consented). I have explained the risks, benefits, and alternatives of the procedure in detail.  The patient voices understanding and all questions have been answered.  The patient agrees to proceed as planned.  - NPO after midnight/IVF's  - Hold antibiotics until cultures are taken intra-operatively. ID consult pending.  - Podiatry consult for left toe ulcer    Michael J. Casale, MD PGY-3  Department of Orthopaedic Surgery        Attg Note:  I agree with the above assessment and plan.  To OR for ATS exploration and irrigation.  The risks, benefits and alternatives to surgery were discussed with the patient at great length.  These include bleeding, infection, vessel/nerve damage, pain, numbness, tingling, complex regional pain syndrome, hardware/surgical failure, need for further surgery, joint damage, recurrent infection, DVT, PE, arthritis and death.  Patient states an understanding and wishes to proceed with surgery.   All questions were answered.  No guarantees were implied or stated.  Informed consent was obtained.      Virgilio Lee MD    "

## 2017-02-16 NOTE — ANESTHESIA PREPROCEDURE EVALUATION
"                                                                                                             02/16/2017  Pre-operative evaluation for Procedure(s) (LRB):  ARTHROSCOPY-KNEE DEBRIDEMENT-RIGHT (Right)    Virgilio Acuña is a 56 y.o. male with PMH significant for DM2 with peripheral neuropathy, HTN, and HLD who presents with R septic knee joint. Plan now on procedure above.     LDA: R forearm 20g    Prev airway:   Method of Intubation: Glidescope; Inserted by: CRNA; Airway Device: Endotracheal Tube; Mask Ventilation: Easy; Airway Device Size: 7.5; Style: Cuffed; Cuff Inflation: Minimal occlusive pressure; Inflation Amount: 3; Placement Verified By: Auscultation, Capnometry; Grade: Grade III; Complicating Factors: Obesity, Short neck, Poor neck/head extension; Intubation Findings: Positive EtCO2, Bilateral breath sounds, Atraumatic/Condition of teeth unchanged;  Depth of Insertion: 22; Securment: Lips; Complications: None    Drips: None    Patient Active Problem List   Diagnosis    Type II diabetes mellitus with neurological manifestations, uncontrolled    Diabetic peripheral neuropathy associated with type 2 diabetes mellitus    Obese    Dyslipidemia    Diabetic nephropathy    Fatigue    Medial meniscus tear    S/P knee surgery, medial/lateral menisectomy    Cellulitis of left index finger    Mucous cyst of finger    Infected finger joint    Essential hypertension    Edema of right lower extremity    Septic joint of right knee joint       Review of patient's allergies indicates:  No Known Allergies     No current facility-administered medications on file prior to encounter.      Current Outpatient Prescriptions on File Prior to Encounter   Medication Sig Dispense Refill    amoxicillin (AMOXIL) 500 MG Tab Take 1 tablet (500 mg total) by mouth every 8 (eight) hours. 30 tablet 0    BD ULTRA-FINE WILDA PEN NEEDLES 32 x 5/32 " Ndle INJECT FOUR TIMES DAILY 4 each 3    blood sugar diagnostic Strp " Accucheck Smartview test strips and Fastclix lancets. AC/ each 3    clonazepam (KLONOPIN) 0.5 MG tablet Take 0.5 mg by mouth as needed.       enalapril (VASOTEC) 10 MG tablet Take 1 tablet (10 mg total) by mouth once daily. 90 tablet 2    enalapril (VASOTEC) 10 MG tablet TAKE 1 TABLET DAILY 90 tablet 1    gabapentin (NEURONTIN) 600 MG tablet Take 1 tablet (600 mg total) by mouth once daily. 90 tablet 3    gemfibrozil (LOPID) 600 MG tablet TAKE 1 TABLET TWICE A DAY BEFORE MEALS 180 tablet 2    gemfibrozil (LOPID) 600 MG tablet TAKE 1 TABLET TWICE A DAY BEFORE MEALS 180 tablet 1    insulin aspart (NOVOLOG FLEXPEN) 100 unit/mL InPn pen INJECT 65 UNITS SUBCUTANEOUSLY THREE TIMES DAILY WITH MEALS PLUS SLIDING SCALE. Supply with pen needles 60 mL 3    insulin glargine (LANTUS SOLOSTAR) 100 unit/mL (3 mL) InPn pen Inject 70 Units into the skin once daily. Supply pen needles 2 Box 3    lancets (ACCU-CHEK SOFTCLIX LANCETS) Misc 1 Device by Other route 4 (four) times daily. 360 each 3    metformin (GLUCOPHAGE-XR) 500 MG 24 hr tablet Take 2 tablets (1,000 mg total) by mouth 2 (two) times daily with meals. 360 tablet 2    metformin (GLUCOPHAGE-XR) 500 MG 24 hr tablet TAKE 2 TABLETS TWICE A DAY WITH MEALS 360 tablet 1    NOVOLOG FLEXPEN 100 unit/mL InPn pen INJECT 65 UNITS UNDER THE SKIN THREE TIMES A DAY WITH MEALS PLUS SLIDING SCALE 60 mL 2    oxycodone (ROXICODONE) 15 MG Tab Take 7.5 mg by mouth every 4 (four) hours as needed (takes 1/2 tablet every 4 to 6 hrs PRN pain).   0    pravastatin (PRAVACHOL) 40 MG tablet Take 1 tablet (40 mg total) by mouth once daily. 30 tablet 1    busPIRone (BUSPAR) 7.5 MG tablet Take 1 tablet (7.5 mg total) by mouth 2 (two) times daily. 60 tablet 3       Past Surgical History   Procedure Laterality Date    Elbow surgery      Knee cartilage surgery  10/21/2013     right knee    Left index finger surgery  03/2015       Social History     Social History    Marital status:       Spouse name: N/A    Number of children: N/A    Years of education: N/A     Occupational History     [employer]     Social History Main Topics    Smoking status: Never Smoker    Smokeless tobacco: Never Used    Alcohol use Yes      Comment: occasional    Drug use: No    Sexual activity: Yes     Partners: Female     Other Topics Concern    Not on file     Social History Narrative     Vital Signs Range (Last 24H):  Temp:  [36.3 °C (97.3 °F)]   Pulse:  [61-74]   Resp:  [18]   BP: (159-203)/(78-86)   SpO2:  [95 %]       CBC:   Recent Labs      02/16/17   1521   WBC  8.88   RBC  4.58*   HGB  13.0*   HCT  39.8*   PLT  509*   MCV  87   MCH  28.4   MCHC  32.7       CMP:   Recent Labs      02/16/17   1521   NA  138   K  4.6   CL  99   CO2  30*   BUN  21*   CREATININE  1.0   GLU  188*   CALCIUM  10.1   ALBUMIN  2.9*   PROT  7.7   ALKPHOS  71   ALT  28   AST  17   BILITOT  0.2       INR  Recent Labs      02/16/17   1521   INR  1.0   APTT  23.1     Diagnostic Studies:  CXR: No acute cardiothoracic disease evident.     EKG: Ordered    2D Echo: None on file    [facility] Anesthesia Evaluation    I have reviewed the Patient Summary Reports.    I have reviewed the Nursing Notes.   I have reviewed the Medications.     Review of Systems  Anesthesia Hx:  History of prior surgery of interest to airway management or planning: Denies Family Hx of Anesthesia complications.   Denies Personal Hx of Anesthesia complications.   Social:  Social Alcohol Use, Non-Smoker    Cardiovascular:   Exercise tolerance: poor Hypertension Denies MI.  Denies CAD.    hyperlipidemia    Pulmonary:   Sleep Apnea    Renal/:   Denies Chronic Renal Disease.     Hepatic/GI:   Denies GERD. Denies Liver Disease.    Musculoskeletal:   R septic knee   Neurological:   Denies TIA. Denies CVA. Denies Seizures.   Peripheral Neuropathy    Endocrine:   Diabetes, type 2        Physical  Exam  General:  Morbid Obesity    Airway/Jaw/Neck:  Airway Findings: Mouth Opening: Normal Tongue: Large  General Airway Assessment: Possible difficult mask airway, Possible difficult intubation  Mallampati: III  TM Distance: 4 - 6 cm  Jaw/Neck Findings:  Micrognathia  Neck ROM: Normal ROM      Dental:  Dental Findings: In tact   Chest/Lungs:  Chest/Lungs Findings: Normal Respiratory Rate, Clear to auscultation     Heart/Vascular:  Heart Findings: Rate: Normal  Rhythm: Regular Rhythm        Mental Status:  Mental Status Findings:  Cooperative, Alert and Oriented         Anesthesia Plan  Type of Anesthesia, risks & benefits discussed:  Anesthesia Type:  general, regional  Patient's Preference:   Intra-op Monitoring Plan:   Intra-op Monitoring Plan Comments:   Post Op Pain Control Plan:   Post Op Pain Control Plan Comments:   Induction:   IV  Beta Blocker:  Patient is not currently on a Beta-Blocker (No further documentation required).       Informed Consent: Patient understands risks and agrees with Anesthesia plan.  Questions answered. Anesthesia consent signed with patient.  ASA Score: 3     Day of Surgery Review of History & Physical:    H&P update referred to the surgeon.         Ready For Surgery From Anesthesia Perspective.

## 2017-02-17 ENCOUNTER — ANESTHESIA (OUTPATIENT)
Dept: SURGERY | Facility: HOSPITAL | Age: 57
DRG: 488 | End: 2017-02-17
Payer: COMMERCIAL

## 2017-02-17 ENCOUNTER — SURGERY (OUTPATIENT)
Age: 57
End: 2017-02-17

## 2017-02-17 PROBLEM — L97.529 FOOT ULCER, LEFT: Status: ACTIVE | Noted: 2017-02-17

## 2017-02-17 LAB
ALBUMIN SERPL BCP-MCNC: 2.8 G/DL
ALP SERPL-CCNC: 69 U/L
ALT SERPL W/O P-5'-P-CCNC: 24 U/L
ANION GAP SERPL CALC-SCNC: 10 MMOL/L
AST SERPL-CCNC: 16 U/L
BACTERIA SPEC AEROBE CULT: NO GROWTH
BACTERIA SPEC ANAEROBE CULT: NORMAL
BASOPHILS # BLD AUTO: 0.02 K/UL
BASOPHILS NFR BLD: 0.2 %
BILIRUB SERPL-MCNC: 0.4 MG/DL
BUN SERPL-MCNC: 18 MG/DL
CALCIUM SERPL-MCNC: 9.2 MG/DL
CHLORIDE SERPL-SCNC: 100 MMOL/L
CO2 SERPL-SCNC: 26 MMOL/L
CREAT SERPL-MCNC: 0.8 MG/DL
DIFFERENTIAL METHOD: ABNORMAL
EOSINOPHIL # BLD AUTO: 0.2 K/UL
EOSINOPHIL NFR BLD: 1.7 %
ERYTHROCYTE [DISTWIDTH] IN BLOOD BY AUTOMATED COUNT: 13.9 %
EST. GFR  (AFRICAN AMERICAN): >60 ML/MIN/1.73 M^2
EST. GFR  (NON AFRICAN AMERICAN): >60 ML/MIN/1.73 M^2
ESTIMATED AVG GLUCOSE: 192 MG/DL
GLUCOSE SERPL-MCNC: 151 MG/DL
GRAM STN SPEC: NORMAL
GRAM STN SPEC: NORMAL
HBA1C MFR BLD HPLC: 8.3 %
HCT VFR BLD AUTO: 38.2 %
HGB BLD-MCNC: 12.2 G/DL
LYMPHOCYTES # BLD AUTO: 2.8 K/UL
LYMPHOCYTES NFR BLD: 30.9 %
MCH RBC QN AUTO: 28.4 PG
MCHC RBC AUTO-ENTMCNC: 31.9 %
MCV RBC AUTO: 89 FL
MONOCYTES # BLD AUTO: 0.6 K/UL
MONOCYTES NFR BLD: 6.4 %
NEUTROPHILS # BLD AUTO: 5.3 K/UL
NEUTROPHILS NFR BLD: 59.2 %
PLATELET # BLD AUTO: 444 K/UL
PMV BLD AUTO: 8.9 FL
POCT GLUCOSE: 140 MG/DL (ref 70–110)
POCT GLUCOSE: 142 MG/DL (ref 70–110)
POCT GLUCOSE: 157 MG/DL (ref 70–110)
POCT GLUCOSE: 243 MG/DL (ref 70–110)
POCT GLUCOSE: 264 MG/DL (ref 70–110)
POTASSIUM SERPL-SCNC: 4.3 MMOL/L
PROT SERPL-MCNC: 7 G/DL
RBC # BLD AUTO: 4.29 M/UL
SODIUM SERPL-SCNC: 136 MMOL/L
WBC # BLD AUTO: 9.02 K/UL

## 2017-02-17 PROCEDURE — 71000039 HC RECOVERY, EACH ADD'L HOUR: Performed by: ORTHOPAEDIC SURGERY

## 2017-02-17 PROCEDURE — D9220A PRA ANESTHESIA: Mod: CRNA,,, | Performed by: NURSE ANESTHETIST, CERTIFIED REGISTERED

## 2017-02-17 PROCEDURE — 63600175 PHARM REV CODE 636 W HCPCS: Performed by: ANESTHESIOLOGY

## 2017-02-17 PROCEDURE — 64447 NJX AA&/STRD FEMORAL NRV IMG: CPT | Mod: 59,RT,, | Performed by: ANESTHESIOLOGY

## 2017-02-17 PROCEDURE — 63600175 PHARM REV CODE 636 W HCPCS: Performed by: STUDENT IN AN ORGANIZED HEALTH CARE EDUCATION/TRAINING PROGRAM

## 2017-02-17 PROCEDURE — 76942 ECHO GUIDE FOR BIOPSY: CPT | Mod: 26,,, | Performed by: ANESTHESIOLOGY

## 2017-02-17 PROCEDURE — 3E0T3GC INTRODUCTION OF OTHER THERAPEUTIC SUBSTANCE INTO PERIPHERAL NERVES AND PLEXI, PERCUTANEOUS APPROACH: ICD-10-PCS | Performed by: ANESTHESIOLOGY

## 2017-02-17 PROCEDURE — 0S9C4ZZ DRAINAGE OF RIGHT KNEE JOINT, PERCUTANEOUS ENDOSCOPIC APPROACH: ICD-10-PCS | Performed by: ORTHOPAEDIC SURGERY

## 2017-02-17 PROCEDURE — 71000033 HC RECOVERY, INTIAL HOUR: Performed by: ORTHOPAEDIC SURGERY

## 2017-02-17 PROCEDURE — 11000001 HC ACUTE MED/SURG PRIVATE ROOM

## 2017-02-17 PROCEDURE — 27000221 HC OXYGEN, UP TO 24 HOURS

## 2017-02-17 PROCEDURE — 25000003 PHARM REV CODE 250: Performed by: STUDENT IN AN ORGANIZED HEALTH CARE EDUCATION/TRAINING PROGRAM

## 2017-02-17 PROCEDURE — 36000711: Performed by: ORTHOPAEDIC SURGERY

## 2017-02-17 PROCEDURE — 36415 COLL VENOUS BLD VENIPUNCTURE: CPT

## 2017-02-17 PROCEDURE — 85025 COMPLETE CBC W/AUTO DIFF WBC: CPT

## 2017-02-17 PROCEDURE — 87205 SMEAR GRAM STAIN: CPT

## 2017-02-17 PROCEDURE — 99223 1ST HOSP IP/OBS HIGH 75: CPT | Mod: ,,, | Performed by: INTERNAL MEDICINE

## 2017-02-17 PROCEDURE — 87075 CULTR BACTERIA EXCEPT BLOOD: CPT

## 2017-02-17 PROCEDURE — 76942 ECHO GUIDE FOR BIOPSY: CPT | Performed by: ANESTHESIOLOGY

## 2017-02-17 PROCEDURE — 87015 SPECIMEN INFECT AGNT CONCNTJ: CPT

## 2017-02-17 PROCEDURE — 29877 ARTHRS KNEE SURG DBRDMT/SHVG: CPT | Mod: RT,,, | Performed by: ORTHOPAEDIC SURGERY

## 2017-02-17 PROCEDURE — 87102 FUNGUS ISOLATION CULTURE: CPT

## 2017-02-17 PROCEDURE — 87070 CULTURE OTHR SPECIMN AEROBIC: CPT

## 2017-02-17 PROCEDURE — 25000003 PHARM REV CODE 250: Performed by: NURSE ANESTHETIST, CERTIFIED REGISTERED

## 2017-02-17 PROCEDURE — 87116 MYCOBACTERIA CULTURE: CPT

## 2017-02-17 PROCEDURE — 37000008 HC ANESTHESIA 1ST 15 MINUTES: Performed by: ORTHOPAEDIC SURGERY

## 2017-02-17 PROCEDURE — 0SBC4ZZ EXCISION OF RIGHT KNEE JOINT, PERCUTANEOUS ENDOSCOPIC APPROACH: ICD-10-PCS | Performed by: ORTHOPAEDIC SURGERY

## 2017-02-17 PROCEDURE — 36000710: Performed by: ORTHOPAEDIC SURGERY

## 2017-02-17 PROCEDURE — 63600175 PHARM REV CODE 636 W HCPCS

## 2017-02-17 PROCEDURE — 80053 COMPREHEN METABOLIC PANEL: CPT

## 2017-02-17 PROCEDURE — 63600175 PHARM REV CODE 636 W HCPCS: Performed by: NURSE ANESTHETIST, CERTIFIED REGISTERED

## 2017-02-17 PROCEDURE — 25000003 PHARM REV CODE 250: Performed by: PHYSICIAN ASSISTANT

## 2017-02-17 PROCEDURE — 25000003 PHARM REV CODE 250: Performed by: ORTHOPAEDIC SURGERY

## 2017-02-17 PROCEDURE — 27201423 OPTIME MED/SURG SUP & DEVICES STERILE SUPPLY: Performed by: ORTHOPAEDIC SURGERY

## 2017-02-17 PROCEDURE — 99223 1ST HOSP IP/OBS HIGH 75: CPT | Mod: 57,GC,, | Performed by: ORTHOPAEDIC SURGERY

## 2017-02-17 PROCEDURE — D9220A PRA ANESTHESIA: Mod: ANES,,, | Performed by: ANESTHESIOLOGY

## 2017-02-17 PROCEDURE — 27200750 HC INSULATED NEEDLE/ STIMUPLEX: Performed by: STUDENT IN AN ORGANIZED HEALTH CARE EDUCATION/TRAINING PROGRAM

## 2017-02-17 PROCEDURE — 99499 UNLISTED E&M SERVICE: CPT | Mod: ,,, | Performed by: PHYSICIAN ASSISTANT

## 2017-02-17 PROCEDURE — 37000009 HC ANESTHESIA EA ADD 15 MINS: Performed by: ORTHOPAEDIC SURGERY

## 2017-02-17 PROCEDURE — 63600175 PHARM REV CODE 636 W HCPCS: Performed by: PHYSICIAN ASSISTANT

## 2017-02-17 RX ORDER — ONDANSETRON 8 MG/1
8 TABLET, ORALLY DISINTEGRATING ORAL EVERY 6 HOURS PRN
Qty: 30 TABLET | Refills: 0 | Status: SHIPPED | OUTPATIENT
Start: 2017-02-17 | End: 2017-07-31

## 2017-02-17 RX ORDER — LORAZEPAM 2 MG/ML
1 INJECTION INTRAMUSCULAR ONCE
Status: COMPLETED | OUTPATIENT
Start: 2017-02-17 | End: 2017-02-17

## 2017-02-17 RX ORDER — BACITRACIN ZINC 500 UNIT/G
OINTMENT (GRAM) TOPICAL
Status: DISCONTINUED | OUTPATIENT
Start: 2017-02-17 | End: 2017-02-17 | Stop reason: HOSPADM

## 2017-02-17 RX ORDER — OXYCODONE AND ACETAMINOPHEN 10; 325 MG/1; MG/1
1 TABLET ORAL EVERY 4 HOURS PRN
Qty: 41 TABLET | Refills: 0 | Status: SHIPPED | OUTPATIENT
Start: 2017-02-17 | End: 2017-07-07

## 2017-02-17 RX ORDER — GLYCOPYRROLATE 0.2 MG/ML
INJECTION INTRAMUSCULAR; INTRAVENOUS
Status: DISCONTINUED | OUTPATIENT
Start: 2017-02-17 | End: 2017-02-17

## 2017-02-17 RX ORDER — FENTANYL CITRATE 50 UG/ML
25 INJECTION, SOLUTION INTRAMUSCULAR; INTRAVENOUS EVERY 5 MIN PRN
Status: COMPLETED | OUTPATIENT
Start: 2017-02-17 | End: 2017-02-17

## 2017-02-17 RX ORDER — HYDROMORPHONE HYDROCHLORIDE 1 MG/ML
0.2 INJECTION, SOLUTION INTRAMUSCULAR; INTRAVENOUS; SUBCUTANEOUS EVERY 5 MIN PRN
Status: DISCONTINUED | OUTPATIENT
Start: 2017-02-17 | End: 2017-02-18 | Stop reason: HOSPADM

## 2017-02-17 RX ORDER — SODIUM CHLORIDE 9 MG/ML
INJECTION, SOLUTION INTRAVENOUS CONTINUOUS PRN
Status: DISCONTINUED | OUTPATIENT
Start: 2017-02-17 | End: 2017-02-17

## 2017-02-17 RX ORDER — EPINEPHRINE CONVENIENCE KIT 1 MG/ML(1)
KIT INTRAMUSCULAR; SUBCUTANEOUS
Status: DISCONTINUED | OUTPATIENT
Start: 2017-02-17 | End: 2017-02-17 | Stop reason: HOSPADM

## 2017-02-17 RX ORDER — ACETAMINOPHEN 10 MG/ML
INJECTION, SOLUTION INTRAVENOUS
Status: DISCONTINUED | OUTPATIENT
Start: 2017-02-17 | End: 2017-02-17

## 2017-02-17 RX ORDER — LORAZEPAM 2 MG/ML
INJECTION INTRAMUSCULAR
Status: COMPLETED
Start: 2017-02-17 | End: 2017-02-17

## 2017-02-17 RX ORDER — ONDANSETRON 2 MG/ML
INJECTION INTRAMUSCULAR; INTRAVENOUS
Status: DISCONTINUED | OUTPATIENT
Start: 2017-02-17 | End: 2017-02-17

## 2017-02-17 RX ORDER — FENTANYL CITRATE 50 UG/ML
INJECTION, SOLUTION INTRAMUSCULAR; INTRAVENOUS
Status: DISCONTINUED | OUTPATIENT
Start: 2017-02-17 | End: 2017-02-17

## 2017-02-17 RX ORDER — LORAZEPAM 2 MG/ML
0.5 INJECTION INTRAMUSCULAR EVERY 10 MIN PRN
Status: COMPLETED | OUTPATIENT
Start: 2017-02-17 | End: 2017-02-17

## 2017-02-17 RX ORDER — PROPOFOL 10 MG/ML
VIAL (ML) INTRAVENOUS
Status: DISCONTINUED | OUTPATIENT
Start: 2017-02-17 | End: 2017-02-17

## 2017-02-17 RX ORDER — HYDROMORPHONE HYDROCHLORIDE 1 MG/ML
INJECTION, SOLUTION INTRAMUSCULAR; INTRAVENOUS; SUBCUTANEOUS
Status: COMPLETED
Start: 2017-02-17 | End: 2017-02-17

## 2017-02-17 RX ORDER — MIDAZOLAM HYDROCHLORIDE 1 MG/ML
INJECTION, SOLUTION INTRAMUSCULAR; INTRAVENOUS
Status: DISCONTINUED | OUTPATIENT
Start: 2017-02-17 | End: 2017-02-17

## 2017-02-17 RX ORDER — LIDOCAINE HCL/PF 100 MG/5ML
SYRINGE (ML) INTRAVENOUS
Status: DISCONTINUED | OUTPATIENT
Start: 2017-02-17 | End: 2017-02-17

## 2017-02-17 RX ORDER — CEFAZOLIN SODIUM 1 G/3ML
INJECTION, POWDER, FOR SOLUTION INTRAMUSCULAR; INTRAVENOUS
Status: DISCONTINUED | OUTPATIENT
Start: 2017-02-17 | End: 2017-02-17

## 2017-02-17 RX ORDER — DOCUSATE SODIUM 100 MG/1
100 CAPSULE, LIQUID FILLED ORAL 2 TIMES DAILY PRN
Qty: 60 CAPSULE | Refills: 1 | Status: SHIPPED | OUTPATIENT
Start: 2017-02-17 | End: 2017-07-31

## 2017-02-17 RX ORDER — MIDAZOLAM HYDROCHLORIDE 1 MG/ML
0.5 INJECTION INTRAMUSCULAR; INTRAVENOUS EVERY 5 MIN PRN
Status: DISCONTINUED | OUTPATIENT
Start: 2017-02-17 | End: 2017-02-17

## 2017-02-17 RX ORDER — FAMOTIDINE 10 MG/ML
INJECTION INTRAVENOUS
Status: DISCONTINUED | OUTPATIENT
Start: 2017-02-17 | End: 2017-02-17

## 2017-02-17 RX ADMIN — INSULIN ASPART 3 UNITS: 100 INJECTION, SOLUTION INTRAVENOUS; SUBCUTANEOUS at 08:02

## 2017-02-17 RX ADMIN — FENTANYL CITRATE 50 MCG: 50 INJECTION, SOLUTION INTRAMUSCULAR; INTRAVENOUS at 08:02

## 2017-02-17 RX ADMIN — ONDANSETRON 4 MG: 2 INJECTION INTRAMUSCULAR; INTRAVENOUS at 06:02

## 2017-02-17 RX ADMIN — HYDROMORPHONE HYDROCHLORIDE 0.2 MG: 1 INJECTION, SOLUTION INTRAMUSCULAR; INTRAVENOUS; SUBCUTANEOUS at 09:02

## 2017-02-17 RX ADMIN — LORAZEPAM 0.5 MG: 2 INJECTION INTRAMUSCULAR; INTRAVENOUS at 10:02

## 2017-02-17 RX ADMIN — FENTANYL CITRATE 75 MCG: 50 INJECTION, SOLUTION INTRAMUSCULAR; INTRAVENOUS at 07:02

## 2017-02-17 RX ADMIN — FAMOTIDINE 20 MG: 10 INJECTION, SOLUTION INTRAVENOUS at 06:02

## 2017-02-17 RX ADMIN — INSULIN ASPART 4 UNITS: 100 INJECTION, SOLUTION INTRAVENOUS; SUBCUTANEOUS at 06:02

## 2017-02-17 RX ADMIN — SODIUM CHLORIDE: 0.9 INJECTION, SOLUTION INTRAVENOUS at 07:02

## 2017-02-17 RX ADMIN — SODIUM CHLORIDE, PRESERVATIVE FREE 3 ML: 5 INJECTION INTRAVENOUS at 09:02

## 2017-02-17 RX ADMIN — FENTANYL CITRATE 50 MCG: 50 INJECTION, SOLUTION INTRAMUSCULAR; INTRAVENOUS at 07:02

## 2017-02-17 RX ADMIN — SODIUM CHLORIDE, SODIUM GLUCONATE, SODIUM ACETATE, POTASSIUM CHLORIDE, MAGNESIUM CHLORIDE, SODIUM PHOSPHATE, DIBASIC, AND POTASSIUM PHOSPHATE: .53; .5; .37; .037; .03; .012; .00082 INJECTION, SOLUTION INTRAVENOUS at 08:02

## 2017-02-17 RX ADMIN — PROPOFOL 40 MG: 10 INJECTION, EMULSION INTRAVENOUS at 07:02

## 2017-02-17 RX ADMIN — FENTANYL CITRATE 25 MCG: 50 INJECTION, SOLUTION INTRAMUSCULAR; INTRAVENOUS at 07:02

## 2017-02-17 RX ADMIN — SODIUM CHLORIDE, PRESERVATIVE FREE 3 ML: 5 INJECTION INTRAVENOUS at 05:02

## 2017-02-17 RX ADMIN — GABAPENTIN 600 MG: 300 CAPSULE ORAL at 10:02

## 2017-02-17 RX ADMIN — INSULIN DETEMIR 50 UNITS: 100 INJECTION, SOLUTION SUBCUTANEOUS at 08:02

## 2017-02-17 RX ADMIN — VANCOMYCIN HYDROCHLORIDE 1500 MG: 1 INJECTION, POWDER, LYOPHILIZED, FOR SOLUTION INTRAVENOUS at 09:02

## 2017-02-17 RX ADMIN — ENALAPRIL MALEATE 10 MG: 10 TABLET ORAL at 12:02

## 2017-02-17 RX ADMIN — PROPOFOL 160 MG: 10 INJECTION, EMULSION INTRAVENOUS at 07:02

## 2017-02-17 RX ADMIN — CEFAZOLIN 1 G: 1 INJECTION, POWDER, FOR SOLUTION INTRAVENOUS at 07:02

## 2017-02-17 RX ADMIN — OXYCODONE HYDROCHLORIDE 10 MG: 5 TABLET ORAL at 09:02

## 2017-02-17 RX ADMIN — PRAVASTATIN SODIUM 40 MG: 20 TABLET ORAL at 10:02

## 2017-02-17 RX ADMIN — FENTANYL CITRATE 25 MCG: 50 INJECTION INTRAMUSCULAR; INTRAVENOUS at 08:02

## 2017-02-17 RX ADMIN — LIDOCAINE HYDROCHLORIDE 100 SYRINGE: 20 INJECTION, SOLUTION INTRAVENOUS at 07:02

## 2017-02-17 RX ADMIN — MORPHINE SULFATE 2 MG: 2 INJECTION, SOLUTION INTRAMUSCULAR; INTRAVENOUS at 03:02

## 2017-02-17 RX ADMIN — BUSPIRONE HYDROCHLORIDE 7.5 MG: 5 TABLET ORAL at 09:02

## 2017-02-17 RX ADMIN — BACITRACIN ZINC 4 TUBE: 500 OINTMENT TOPICAL at 08:02

## 2017-02-17 RX ADMIN — EPINEPHRINE 3 MG: 1 INJECTION, SOLUTION INTRAMUSCULAR; SUBCUTANEOUS at 07:02

## 2017-02-17 RX ADMIN — MIDAZOLAM HYDROCHLORIDE 2 MG: 1 INJECTION, SOLUTION INTRAMUSCULAR; INTRAVENOUS at 06:02

## 2017-02-17 RX ADMIN — FENTANYL CITRATE 25 MCG: 50 INJECTION INTRAMUSCULAR; INTRAVENOUS at 09:02

## 2017-02-17 RX ADMIN — OXYCODONE HYDROCHLORIDE 10 MG: 5 TABLET ORAL at 06:02

## 2017-02-17 RX ADMIN — LORAZEPAM 1 MG: 2 INJECTION INTRAMUSCULAR; INTRAVENOUS at 11:02

## 2017-02-17 RX ADMIN — MORPHINE SULFATE 2 MG: 2 INJECTION, SOLUTION INTRAMUSCULAR; INTRAVENOUS at 08:02

## 2017-02-17 RX ADMIN — ACETAMINOPHEN 1000 MG: 10 INJECTION, SOLUTION INTRAVENOUS at 08:02

## 2017-02-17 RX ADMIN — BACITRACIN ZINC 7 TUBE: 500 OINTMENT TOPICAL at 07:02

## 2017-02-17 RX ADMIN — PROPOFOL 50 MG: 10 INJECTION, EMULSION INTRAVENOUS at 07:02

## 2017-02-17 RX ADMIN — FENTANYL CITRATE 100 MCG: 50 INJECTION INTRAMUSCULAR; INTRAVENOUS at 06:02

## 2017-02-17 RX ADMIN — VANCOMYCIN HYDROCHLORIDE 1000 MG: 1 INJECTION, POWDER, LYOPHILIZED, FOR SOLUTION INTRAVENOUS at 12:02

## 2017-02-17 RX ADMIN — GLYCOPYRROLATE 0.2 MG: 0.2 INJECTION, SOLUTION INTRAMUSCULAR; INTRAVENOUS at 07:02

## 2017-02-17 RX ADMIN — PROPOFOL 50 MG: 10 INJECTION, EMULSION INTRAVENOUS at 08:02

## 2017-02-17 RX ADMIN — OXYCODONE HYDROCHLORIDE 10 MG: 5 TABLET ORAL at 02:02

## 2017-02-17 NOTE — CONSULTS
[facility]  Infectious Disease  Consult Note    Patient Name: Virgilio Acuña  MRN: [id number]  Admission Date: 2/16/2017  Hospital Length of Stay: 0 days  Attending Physician: Rich Vargas MD  Primary Care Provider: Juanito Hilton MD     Isolation Status: No active isolations        Inpatient consult to Infectious Diseases  Consult performed by: CARI PEGUERO JR  Consult ordered by: MARGARET BROWN        Consult received.  Full consult to follow.      PANDA Moreira  Infectious Disease  [facility]

## 2017-02-17 NOTE — ASSESSMENT & PLAN NOTE
-cultures obtained by podiatry growing staph aureus. sensitivities pending  -no purulent discharge, no bone exposure  -podiatry following-dressing change on Monday

## 2017-02-17 NOTE — CONSULTS
[facility]  Infectious Disease  Consult Note    Patient Name: Virgilio Acuña  MRN: [id number]  Admission Date: 2/16/2017  Hospital Length of Stay: 1 days  Attending Physician: Virgilio Lee MD  Primary Care Provider: Juanito Hilton MD     Isolation Status: No active isolations    Patient information was obtained from patient and past medical records.      Consults  Assessment/Plan:     * Pyogenic arthritis of right knee joint  55 y/o male with hx of T2DM, HTN, HLP presented with 2 weeks of right knee pain, was seen in ortho clinic with aspiration positive for infection. Pt now s/p irrigation and debridement of right knee.   -Remained afebrile, no leukocytosis  -No urate or pyrophosphate crystals identified from aspiration  -Intra op cultures pending- will follow  -Started 1.5g q12 Vancomycin today. Vanc trough goal 15-20. Vanc trough due 2/19 at 8 AM.   -anticipate 4 weeks of IV abx for septic arthritis.   -Discussed with ID staff. Will follow     Foot ulcer, left  -cultures obtained by podiatry growing staph aureus. sensitivities pending  -no purulent discharge, no bone exposure  -podiatry following-dressing change on Monday    Discussed with ID staff. Will follow with you    Thank you for your consult. I will follow-up with patient. Please contact us if you have any additional questions.   If you have any questions over the weekend, please contact ID on call.    Kleber Jensen PA-C  Infectious Disease  [facility]  Pgr [phone]  Subjective:     Principal Problem: Pyogenic arthritis of right knee joint    HPI: 55 y/o male with Type 2 diabetes with polyneuropathy with long term insulin therapy , HLP and essential HTN being admitted to Orthopedic service for septic right knee with plans to take patient to OR today for I+D and washout of infected joint. Pt reports pain and swelling in his R knee for 2 weeks. He denies any fevers or chills at home. He also denies any chest pain,  "sob, abdominal pain, N/V, diarrhea, constipation or urinary symptoms. Pt denies having any prosthesis or hardware. He was seen in ortho clinic, had arthrocentesis which had >156k WBC and 94% segs. Cultures were no growth. ESR 99 and CRP 36.8. Intraop cultures are pending. Pt also notes having a left great toe ulcer for ~ 3 months, denies having any purulent material, but notes it being bloody. Podiatry saw patient,cleaned the wound and obtained cultures. Cultures are staph aureus. Otherwise denies having any other symptoms.     Past Medical History   Diagnosis Date    Cellulitis of left index finger 2/16/2015    Dyslipidemia     Essential hypertension 2/16/2017    Infected finger joint 2/17/2015    Obese     S/P knee surgery, medial/lateral menisectomy 11/4/2013    Type 2 diabetes mellitus with diabetic polyneuropathy, with long-term current use of insulin 2003       Past Surgical History   Procedure Laterality Date    Elbow surgery      Knee cartilage surgery  10/21/2013     right knee    Left index finger surgery  03/2015       Review of patient's allergies indicates:  No Known Allergies    Medications:  Prescriptions Prior to Admission   Medication Sig    BD ULTRA-FINE WILDA PEN NEEDLES 32 x 5/32 " Ndle INJECT FOUR TIMES DAILY    blood sugar diagnostic Strp Accucheck Smartview test strips and Fastclix lancets. AC/HS    clonazepam (KLONOPIN) 0.5 MG tablet Take 0.5 mg by mouth as needed.     enalapril (VASOTEC) 10 MG tablet Take 1 tablet (10 mg total) by mouth once daily.    enalapril (VASOTEC) 10 MG tablet TAKE 1 TABLET DAILY    gabapentin (NEURONTIN) 600 MG tablet Take 1 tablet (600 mg total) by mouth once daily.    gemfibrozil (LOPID) 600 MG tablet TAKE 1 TABLET TWICE A DAY BEFORE MEALS    gemfibrozil (LOPID) 600 MG tablet TAKE 1 TABLET TWICE A DAY BEFORE MEALS    insulin aspart (NOVOLOG FLEXPEN) 100 unit/mL InPn pen INJECT 65 UNITS SUBCUTANEOUSLY THREE TIMES DAILY WITH MEALS PLUS SLIDING " SCALE. Supply with pen needles    insulin glargine (LANTUS SOLOSTAR) 100 unit/mL (3 mL) InPn pen Inject 70 Units into the skin once daily. Supply pen needles    lancets (ACCU-CHEK SOFTCLIX LANCETS) Misc 1 Device by Other route 4 (four) times daily.    metformin (GLUCOPHAGE-XR) 500 MG 24 hr tablet Take 2 tablets (1,000 mg total) by mouth 2 (two) times daily with meals.    metformin (GLUCOPHAGE-XR) 500 MG 24 hr tablet TAKE 2 TABLETS TWICE A DAY WITH MEALS    NOVOLOG FLEXPEN 100 unit/mL InPn pen INJECT 65 UNITS UNDER THE SKIN THREE TIMES A DAY WITH MEALS PLUS SLIDING SCALE    pravastatin (PRAVACHOL) 40 MG tablet Take 1 tablet (40 mg total) by mouth once daily.    [DISCONTINUED] amoxicillin (AMOXIL) 500 MG Tab Take 1 tablet (500 mg total) by mouth every 8 (eight) hours.    [DISCONTINUED] oxycodone (ROXICODONE) 15 MG Tab Take 7.5 mg by mouth every 4 (four) hours as needed (takes 1/2 tablet every 4 to 6 hrs PRN pain).     busPIRone (BUSPAR) 7.5 MG tablet Take 1 tablet (7.5 mg total) by mouth 2 (two) times daily.     Antibiotics     Start     Stop Route Frequency Ordered    02/17/17 1000  vancomycin 1 g in dextrose 5 % 250 mL IVPB (ready to mix system)  (Vancomycin IVPB with levels panel)      -- IV Every 12 hours (non-standard times) 02/17/17 0854        Antifungals     None        Antivirals     None           Immunization History   Administered Date(s) Administered    Influenza 10/26/2011, 08/29/2013    influenza - Quadrivalent - PF (ADULT) 11/04/2014, 10/13/2015, 09/14/2016       Family History     Problem Relation (Age of Onset)    Diabetes Mother, Father        Social History     Social History    Marital status:      Spouse name: N/A    Number of children: N/A    Years of education: N/A     Occupational History     [employer]     Social History Main Topics    Smoking status: Never Smoker    Smokeless tobacco: Never Used    Alcohol  use Yes      Comment: occasional    Drug use: No    Sexual activity: Yes     Partners: Female     Other Topics Concern    None     Social History Narrative     Review of Systems   Constitutional: Negative for chills, diaphoresis, fatigue and fever.   HENT: Negative for congestion, ear pain, nosebleeds, rhinorrhea and sore throat.    Eyes: Negative for pain.   Respiratory: Negative for cough, shortness of breath and wheezing.    Cardiovascular: Negative for chest pain and leg swelling.   Gastrointestinal: Negative for abdominal pain, constipation, diarrhea, nausea and vomiting.   Genitourinary: Negative for dysuria, frequency and urgency.   Musculoskeletal: Negative for arthralgias, back pain and neck pain.        Positive right knee pain   Skin: Positive for wound (left great toe).   Neurological: Negative for weakness, numbness and headaches.     Objective:     Vital Signs (Most Recent):  Temp: 98 °F (36.7 °C) (02/17/17 1245)  Pulse: 60 (02/17/17 1245)  Resp: (!) 23 (02/17/17 1245)  BP: (!) 150/66 (02/17/17 1245)  SpO2: (!) 94 % (02/17/17 1245) Vital Signs (24h Range):  Temp:  [97.3 °F (36.3 °C)-98.7 °F (37.1 °C)] 98 °F (36.7 °C)  Pulse:  [56-75] 60  Resp:  [10-29] 23  SpO2:  [89 %-100 %] 94 %  BP: (143-203)/(57-98) 150/66     Weight: (!) 146 kg (321 lb 14 oz)  Body mass index is 42.47 kg/(m^2).    Estimated Creatinine Clearance: 153.2 mL/min (based on Cr of 0.8).    Physical Exam   Constitutional: He is oriented to person, place, and time. He appears well-developed and well-nourished. No distress.   Morbidly obese   HENT:   Head: Normocephalic and atraumatic.   Mouth/Throat: Oropharynx is clear and moist.   Eyes: EOM are normal. Pupils are equal, round, and reactive to light.   Neck: Normal range of motion. Neck supple.   Cardiovascular: Normal rate, regular rhythm, normal heart sounds and intact distal pulses.  Exam reveals no gallop and no friction rub.    No murmur heard.  Pulmonary/Chest: Effort normal and  breath sounds normal. No respiratory distress. He has no wheezes. He has no rales. He exhibits no tenderness.   Abdominal: Bowel sounds are normal. He exhibits no distension and no mass. There is no tenderness. There is no guarding.   Musculoskeletal: Normal range of motion. He exhibits no edema or deformity.   Right knee wrapped. Unable to assess. Left foot wrapped. Unable to assess ulcer at this time. Will refer to photos taken by podiatry.    Neurological: He is alert and oriented to person, place, and time.   Skin: Skin is warm and dry. No rash noted. He is not diaphoretic. No erythema.   Psychiatric: He has a normal mood and affect. His behavior is normal. Judgment and thought content normal.   Nursing note and vitals reviewed.          Significant Labs: None    Significant Imaging: I have reviewed all pertinent imaging results/findings within the past 24 hours.

## 2017-02-17 NOTE — BRIEF OP NOTE
Preop Dx: Probable septic arthritis right knee    Postop Dx: Probable septic arthritis right knee    Chondromalacia right MFC/LFC grade I, lateral tibial plateau grade III    Synovitis right knee    Partial tear lateral meniscus inner rim    Procedure: 1.  Right knee arthroscopic irrigation for septic arthritis    2.  Arthroscopic meniscectomy right knee partial lateral meniscus    3.  Right knee athrocopic synovectomy, minor, anterior    Surgeon: Virgilio Lee M.D.    Asst:  Michael Casale, M.D    Anesthesia: GLMA plus regional    EBL:  Minimal    IVF:  800cc crystalloid    Specimens: Cultures    Findings: As above    Dispo:  To PACU awake/stable.

## 2017-02-17 NOTE — ED NOTES
Patient identifiers verified and correct for Virgilio Acuña    C/C: Right knee pain/infection in the right knee  APPEARANCE: awake and alert in NAD.  SKIN: warm & dry. Pt has wound on left toe; MD cleaned/wrapped the foot.   MUSCULOSKELETAL: Patient moving all extremities spontaneously, slight swelling of the lower extremities. Ambulates independently.  RESPIRATORY: no shortness of breath. All breath sounds CTA bilaterally.  CARDIAC: heart tones normal. Regular rate and rhythm; 2+ distal pulses  ABDOMEN: S/ND/NT, normoactive bowel sounds present in all four quadrants. Normal stool pattern.  : voids spontaneously without difficulty.  Neurologic: AAO x 4; follows commands equal strength in all extremities; denies numbness/tingling.

## 2017-02-17 NOTE — SUBJECTIVE & OBJECTIVE
"Past Medical History   Diagnosis Date    Cellulitis of left index finger 2/16/2015    Dyslipidemia     Essential hypertension 2/16/2017    Infected finger joint 2/17/2015    Obese     S/P knee surgery, medial/lateral menisectomy 11/4/2013    Type 2 diabetes mellitus with diabetic polyneuropathy, with long-term current use of insulin 2003       Past Surgical History   Procedure Laterality Date    Elbow surgery      Knee cartilage surgery  10/21/2013     right knee    Left index finger surgery  03/2015       Review of patient's allergies indicates:  No Known Allergies    Medications:  Prescriptions Prior to Admission   Medication Sig    BD ULTRA-FINE WILDA PEN NEEDLES 32 x 5/32 " Ndle INJECT FOUR TIMES DAILY    blood sugar diagnostic Strp Accucheck Smartview test strips and Fastclix lancets. AC/HS    clonazepam (KLONOPIN) 0.5 MG tablet Take 0.5 mg by mouth as needed.     enalapril (VASOTEC) 10 MG tablet Take 1 tablet (10 mg total) by mouth once daily.    enalapril (VASOTEC) 10 MG tablet TAKE 1 TABLET DAILY    gabapentin (NEURONTIN) 600 MG tablet Take 1 tablet (600 mg total) by mouth once daily.    gemfibrozil (LOPID) 600 MG tablet TAKE 1 TABLET TWICE A DAY BEFORE MEALS    gemfibrozil (LOPID) 600 MG tablet TAKE 1 TABLET TWICE A DAY BEFORE MEALS    insulin aspart (NOVOLOG FLEXPEN) 100 unit/mL InPn pen INJECT 65 UNITS SUBCUTANEOUSLY THREE TIMES DAILY WITH MEALS PLUS SLIDING SCALE. Supply with pen needles    insulin glargine (LANTUS SOLOSTAR) 100 unit/mL (3 mL) InPn pen Inject 70 Units into the skin once daily. Supply pen needles    lancets (ACCU-CHEK SOFTCLIX LANCETS) Misc 1 Device by Other route 4 (four) times daily.    metformin (GLUCOPHAGE-XR) 500 MG 24 hr tablet Take 2 tablets (1,000 mg total) by mouth 2 (two) times daily with meals.    metformin (GLUCOPHAGE-XR) 500 MG 24 hr tablet TAKE 2 TABLETS TWICE A DAY WITH MEALS    NOVOLOG FLEXPEN 100 unit/mL InPn pen INJECT 65 UNITS UNDER THE SKIN THREE " TIMES A DAY WITH MEALS PLUS SLIDING SCALE    pravastatin (PRAVACHOL) 40 MG tablet Take 1 tablet (40 mg total) by mouth once daily.    [DISCONTINUED] amoxicillin (AMOXIL) 500 MG Tab Take 1 tablet (500 mg total) by mouth every 8 (eight) hours.    [DISCONTINUED] oxycodone (ROXICODONE) 15 MG Tab Take 7.5 mg by mouth every 4 (four) hours as needed (takes 1/2 tablet every 4 to 6 hrs PRN pain).     busPIRone (BUSPAR) 7.5 MG tablet Take 1 tablet (7.5 mg total) by mouth 2 (two) times daily.     Antibiotics     Start     Stop Route Frequency Ordered    02/17/17 1000  vancomycin 1 g in dextrose 5 % 250 mL IVPB (ready to mix system)  (Vancomycin IVPB with levels panel)      -- IV Every 12 hours (non-standard times) 02/17/17 0854        Antifungals     None        Antivirals     None           Immunization History   Administered Date(s) Administered    Influenza 10/26/2011, 08/29/2013    influenza - Quadrivalent - PF (ADULT) 11/04/2014, 10/13/2015, 09/14/2016       Family History     Problem Relation (Age of Onset)    Diabetes Mother, Father        Social History     Social History    Marital status:      Spouse name: N/A    Number of children: N/A    Years of education: N/A     Occupational History     [employer]     Social History Main Topics    Smoking status: Never Smoker    Smokeless tobacco: Never Used    Alcohol use Yes      Comment: occasional    Drug use: No    Sexual activity: Yes     Partners: Female     Other Topics Concern    None     Social History Narrative     Review of Systems   Constitutional: Negative for chills, diaphoresis, fatigue and fever.   HENT: Negative for congestion, ear pain, nosebleeds, rhinorrhea and sore throat.    Eyes: Negative for pain.   Respiratory: Negative for cough, shortness of breath and wheezing.    Cardiovascular: Negative for chest pain and leg swelling.   Gastrointestinal: Negative for abdominal pain,  constipation, diarrhea, nausea and vomiting.   Genitourinary: Negative for dysuria, frequency and urgency.   Musculoskeletal: Negative for arthralgias, back pain and neck pain.        Positive right knee pain   Skin: Positive for wound (left great toe).   Neurological: Negative for weakness, numbness and headaches.     Objective:     Vital Signs (Most Recent):  Temp: 98 °F (36.7 °C) (02/17/17 1245)  Pulse: 60 (02/17/17 1245)  Resp: (!) 23 (02/17/17 1245)  BP: (!) 150/66 (02/17/17 1245)  SpO2: (!) 94 % (02/17/17 1245) Vital Signs (24h Range):  Temp:  [97.3 °F (36.3 °C)-98.7 °F (37.1 °C)] 98 °F (36.7 °C)  Pulse:  [56-75] 60  Resp:  [10-29] 23  SpO2:  [89 %-100 %] 94 %  BP: (143-203)/(57-98) 150/66     Weight: (!) 146 kg (321 lb 14 oz)  Body mass index is 42.47 kg/(m^2).    Estimated Creatinine Clearance: 153.2 mL/min (based on Cr of 0.8).    Physical Exam   Constitutional: He is oriented to person, place, and time. He appears well-developed and well-nourished. No distress.   Morbidly obese   HENT:   Head: Normocephalic and atraumatic.   Mouth/Throat: Oropharynx is clear and moist.   Eyes: EOM are normal. Pupils are equal, round, and reactive to light.   Neck: Normal range of motion. Neck supple.   Cardiovascular: Normal rate, regular rhythm, normal heart sounds and intact distal pulses.  Exam reveals no gallop and no friction rub.    No murmur heard.  Pulmonary/Chest: Effort normal and breath sounds normal. No respiratory distress. He has no wheezes. He has no rales. He exhibits no tenderness.   Abdominal: Bowel sounds are normal. He exhibits no distension and no mass. There is no tenderness. There is no guarding.   Musculoskeletal: Normal range of motion. He exhibits no edema or deformity.   Right knee wrapped. Unable to assess. Left foot wrapped. Unable to assess ulcer at this time. Will refer to photos taken by podiatry.    Neurological: He is alert and oriented to person, place, and time.   Skin: Skin is warm and  dry. No rash noted. He is not diaphoretic. No erythema.   Psychiatric: He has a normal mood and affect. His behavior is normal. Judgment and thought content normal.   Nursing note and vitals reviewed.          Significant Labs: None    Significant Imaging: I have reviewed all pertinent imaging results/findings within the past 24 hours.

## 2017-02-17 NOTE — NURSING TRANSFER
Nursing Transfer Note      2/17/2017     Transfer To: [id number]    Transfer via bed    Transfer with n/a    Transported by [email or web address]    Medicines sent: n/a    Chart send with patient: Yes    Notified: spouse

## 2017-02-17 NOTE — ANESTHESIA POSTPROCEDURE EVALUATION
"Anesthesia Post Evaluation    Patient: Virgilio Acuña    Procedure(s) Performed: Procedure(s) (LRB):  ARTHROSCOPY-KNEE DEBRIDEMENT-RIGHT (Right)    Final Anesthesia Type: general  Patient location during evaluation: PACU  Patient participation: Yes- Able to Participate  Level of consciousness: awake and alert and oriented  Post-procedure vital signs: reviewed and stable  Pain management: adequate  Airway patency: patent  PONV status at discharge: No PONV  Anesthetic complications: no      Cardiovascular status: blood pressure returned to baseline and hemodynamically stable  Respiratory status: unassisted, spontaneous ventilation and room air  Hydration status: euvolemic  Follow-up not needed.        Visit Vitals    BP (!) 150/66    Pulse 60    Temp 36.7 °C (98 °F) (Oral)    Resp (!) 23    Ht 6' 1" (1.854 m)    Wt (!) 146 kg (321 lb 14 oz)    SpO2 (!) 94%    BMI 42.47 kg/m2       Pain/Sebastian Score: Pain Assessment Performed: Yes (2/17/2017  8:50 AM)  Presence of Pain: complains of pain/discomfort (2/17/2017  8:50 AM)  Pain Rating Prior to Med Admin: 9 (2/17/2017  9:55 AM)  Sebastian Score: 10 (2/17/2017  8:50 AM)      "

## 2017-02-17 NOTE — PROGRESS NOTES
ORTHO PROGRESS NOTE:    Virgilio Acuña is a 56 y.o. male admitted on 2/16/2017  Hospital Day: 1      The patient was seen and examined this morning at the bedside. No acute issues overnight.  Minimal pain to right knee this AM.  NPO in preparation for surgery.    PHYSICAL EXAM:  Awake/alert/oriented x 3  No acute distress  AFVSS  Good inspiratory effort with unlabored breathing    RLE: skin intact. No erythema. Mild effusion. ROM intact from 0-110 degrees. NVI distally.    Vitals:    02/16/17 1903 02/16/17 1946 02/16/17 2049 02/17/17 0343   BP: (!) 179/91 (!) 143/66 (!) 158/72 (!) 161/68   BP Location:   Left arm Left arm   Patient Position:   Lying Lying   BP Method:   Automatic Automatic   Pulse: 64 64 62 60   Resp:   16 18   Temp:   98.2 °F (36.8 °C) 98 °F (36.7 °C)   TempSrc:   Oral Oral   SpO2: (!) 93% 100% 100% 99%   Weight:       Height:              Recent Labs  Lab 02/16/17  1521   CALCIUM 10.1   PROT 7.7      K 4.6   CO2 30*   CL 99   BUN 21*   CREATININE 1.0       Recent Labs  Lab 02/16/17  1521   WBC 8.88   RBC 4.58*   HGB 13.0*   HCT 39.8*   *       Recent Labs  Lab 02/16/17  1521   INR 1.0   APTT 23.1       A/P: 56 y.o. male with right knee septic arthritis  - To OR this AM for right knee arthroscopy (booked/marked/consented).   - Hold antibiotics until cultures are taken intra-operatively. ID consult pending.    Mike Casale, M.D. PGY-3  Department of Orthopedic Surgery

## 2017-02-17 NOTE — TRANSFER OF CARE
"Anesthesia Transfer of Care Note    Patient: Virgilio Acuña    Procedure(s) Performed: Procedure(s) (LRB):  ARTHROSCOPY-KNEE DEBRIDEMENT-RIGHT (Right)    Patient location: PACU    Anesthesia Type: general    Transport from OR: Transported from OR on 6-10 L/min O2 by face mask with adequate spontaneous ventilation    Post pain: adequate analgesia    Post assessment: tolerated procedure well and no apparent anesthetic complications    Post vital signs: stable    Level of consciousness: awake    Nausea/Vomiting: no nausea/vomiting    Complications: none          Last vitals:   Visit Vitals    BP (!) 186/89    Pulse 75    Temp 36.7 °C (98 °F) (Axillary)    Resp 16    Ht 6' 1" (1.854 m)    Wt (!) 146 kg (321 lb 14 oz)    SpO2 98%    BMI 42.47 kg/m2     "

## 2017-02-17 NOTE — PLAN OF CARE
TOY informed by MD that pt will need IV Abx and will possibly discharge over the weekend. TOY left VM with request for call back from Diya at [facility] [phone] to provide referral. TOY added [facility] to pt's treatment team.    Stephanie Rivera LMSW  [phone]

## 2017-02-17 NOTE — ANESTHESIA RELEASE NOTE
"Anesthesia Release from PACU Note    Patient: Virgilio Acuña    Procedure(s) Performed: Procedure(s) (LRB):  ARTHROSCOPY-KNEE DEBRIDEMENT-RIGHT (Right)    Anesthesia type: general    Post pain: Adequate analgesia    Post assessment: no apparent anesthetic complications    Last Vitals:   Visit Vitals    BP (!) 150/66    Pulse 60    Temp 36.7 °C (98 °F) (Oral)    Resp (!) 23    Ht 6' 1" (1.854 m)    Wt (!) 146 kg (321 lb 14 oz)    SpO2 (!) 94%    BMI 42.47 kg/m2       Post vital signs: stable    Level of consciousness: awake, alert  and oriented    Nausea/Vomiting: no nausea/no vomiting    Complications: none    Airway Patency: patent    Respiratory: unassisted, spontaneous ventilation, room air    Cardiovascular: stable and blood pressure at baseline    Hydration: euvolemic  "

## 2017-02-17 NOTE — ASSESSMENT & PLAN NOTE
57 y/o male with hx of T2DM, HTN, HLP presented with 2 weeks of right knee pain, was seen in ortho clinic with aspiration positive for infection. Pt now s/p irrigation and debridement of right knee.   -Remained afebrile, no leukocytosis  -No urate or pyrophosphate crystals identified from aspiration  -Intra op cultures pending- will follow  -Started 1.5g q12 Vancomycin today. Vanc trough goal 15-20. Vanc trough due 2/19 at 8 AM.   -anticipate 4 weeks of IV abx for septic arthritis.   -Discussed with ID staff. Will follow

## 2017-02-17 NOTE — PLAN OF CARE
[facility]    HOME HEALTH ORDERS  FACE TO FACE ENCOUNTER    Patient Name: Virgilio Acuña  YOB: 1960    PCP: Juanito Hilton MD   PCP Address: [id number] [place] [address] / [place] [address]  PCP Phone Number: [phone]  PCP Fax: [phone]    Encounter Date: 02/17/2017    Admit to Home Health    Diagnoses:  Active Hospital Problems    Diagnosis  POA    *Pyogenic arthritis of right knee joint [M00.9]  Yes    Foot ulcer, left [L97.529]  Unknown    Essential hypertension [I10]  Yes    Type 2 diabetes mellitus with diabetic polyneuropathy, with long-term current use of insulin [E11.42, Z79.4]  Not Applicable    Obese [E66.9]  Yes      Resolved Hospital Problems    Diagnosis Date Resolved POA   No resolved problems to display.       No future appointments.  Follow-up Information     Follow up with Cynthia Maher PA-C. Go in 2 weeks.    Specialty:  Orthopedic Surgery    Why:  For wound re-check    Contact information:    [address] [place] [address] [phone]              I have seen and examined this patient face to face today. My clinical findings that support the need for the home health skilled services and home bound status are the following:  Medical restrictions requiring assistance of another human to leave home due to  IV infusion Needs.    Allergies:Review of patient's allergies indicates:  No Known Allergies    Diet: diabetic diet: 2000 calorie    Activities: activity as tolerated    Nursing:   SN to complete comprehensive assessment including routine vital signs. Instruct on disease process and s/s of complications to report to MD. Follow specific home health arthoplasty protocol. Review/verify medication list sent home with the patient at time of discharge  and instruct patient/caregiver as needed.     MISCELLANEOUS CARE:  LABS:  SN to perform labs:  CBC, CMP, ESR, CRP and Vanc Trough;  Frequency: Weekly ; Duration: 4 week(s).  Please fax lab results to the  "Infectious Disease Department at [phone]    Vanc Trough to be drawn morning of 2/19/17 prior to morning dose and sent to Infectious disease department       HOME INFUSION THERAPY:   SN to perform Infusion Therapy/Central Line Care.  Review Central Line Care & Central Line Flush with patient.    Administer (drug and dose): IV Vancomycin 1500mg q12 hours  Last dose given: 2/17/17  2200                       Home dose due: 2/18/17 2200    Scrub the Hub: Prior to accessing the line, always perform a 30 second alcohol scrub  Each lumen of the central line is to be flushed at least daily with 10 mL Normal Saline and 3 mL Heparin flush (100 units/mL)  Skilled Nurse (SN) may draw blood from IV access  Blood Draw Procedure:   - Aspirate at least 5 mL of blood   - Discard   - Obtain specimen   - Change posiflow cap   - Flush with 20 mL Normal Saline followed by a                 3-5 mL Heparin flush (100 units/mL)      WOUND CARE ORDERS  Change dressing to right knee PRN      Medications: Review discharge medications with patient and family and provide education.      Current Discharge Medication List      START taking these medications    Details   dextrose 5 % SolP 250 mL with vancomycin 1,000 mg SolR 1,500 mg Inject 1,500 mg into the vein every 12 (twelve) hours.  Qty: 1500 mg, Refills: 27  END DATE: 3/17/17      docusate sodium (COLACE) 100 MG capsule Take 1 capsule (100 mg total) by mouth 2 (two) times daily as needed for Constipation.  Qty: 60 capsule, Refills: 1      ondansetron (ZOFRAN-ODT) 8 MG TbDL Take 1 tablet (8 mg total) by mouth every 6 (six) hours as needed.  Qty: 30 tablet, Refills: 0      oxycodone-acetaminophen (PERCOCET)  mg per tablet Take 1 tablet by mouth every 4 (four) hours as needed for Pain.  Qty: 41 tablet, Refills: 0         CONTINUE these medications which have NOT CHANGED    Details   BD ULTRA-FINE WILDA PEN NEEDLES 32 x 5/32 " Ndle INJECT FOUR TIMES DAILY  Qty: 4 each, Refills: 3    "   blood sugar diagnostic Strp Accucheck Smartview test strips and Fastclix lancets. AC/HS  Qty: 450 each, Refills: 3      clonazepam (KLONOPIN) 0.5 MG tablet Take 0.5 mg by mouth as needed.       !! enalapril (VASOTEC) 10 MG tablet Take 1 tablet (10 mg total) by mouth once daily.  Qty: 90 tablet, Refills: 2      !! enalapril (VASOTEC) 10 MG tablet TAKE 1 TABLET DAILY  Qty: 90 tablet, Refills: 1      gabapentin (NEURONTIN) 600 MG tablet Take 1 tablet (600 mg total) by mouth once daily.  Qty: 90 tablet, Refills: 3    Associated Diagnoses: Other and unspecified hyperlipidemia      !! gemfibrozil (LOPID) 600 MG tablet TAKE 1 TABLET TWICE A DAY BEFORE MEALS  Qty: 180 tablet, Refills: 2      !! gemfibrozil (LOPID) 600 MG tablet TAKE 1 TABLET TWICE A DAY BEFORE MEALS  Qty: 180 tablet, Refills: 1      !! insulin aspart (NOVOLOG FLEXPEN) 100 unit/mL InPn pen INJECT 65 UNITS SUBCUTANEOUSLY THREE TIMES DAILY WITH MEALS PLUS SLIDING SCALE. Supply with pen needles  Qty: 60 mL, Refills: 3      insulin glargine (LANTUS SOLOSTAR) 100 unit/mL (3 mL) InPn pen Inject 70 Units into the skin once daily. Supply pen needles  Qty: 2 Box, Refills: 3      lancets (ACCU-CHEK SOFTCLIX LANCETS) Misc 1 Device by Other route 4 (four) times daily.  Qty: 360 each, Refills: 3      !! metformin (GLUCOPHAGE-XR) 500 MG 24 hr tablet Take 2 tablets (1,000 mg total) by mouth 2 (two) times daily with meals.  Qty: 360 tablet, Refills: 2      !! metformin (GLUCOPHAGE-XR) 500 MG 24 hr tablet TAKE 2 TABLETS TWICE A DAY WITH MEALS  Qty: 360 tablet, Refills: 1      !! NOVOLOG FLEXPEN 100 unit/mL InPn pen INJECT 65 UNITS UNDER THE SKIN THREE TIMES A DAY WITH MEALS PLUS SLIDING SCALE  Qty: 60 mL, Refills: 2      pravastatin (PRAVACHOL) 40 MG tablet Take 1 tablet (40 mg total) by mouth once daily.  Qty: 30 tablet, Refills: 1    Associated Diagnoses: Other and unspecified hyperlipidemia      busPIRone (BUSPAR) 7.5 MG tablet Take 1 tablet (7.5 mg total) by mouth 2  (two) times daily.  Qty: 60 tablet, Refills: 3    Associated Diagnoses: Anxiety       !! - Potential duplicate medications found. Please discuss with provider.      STOP taking these medications       amoxicillin (AMOXIL) 500 MG Tab Comments:   Reason for Stopping:         oxycodone (ROXICODONE) 15 MG Tab Comments:   Reason for Stopping:               I certify that this patient is confined to his home and needs intermittent skilled nursing care.

## 2017-02-17 NOTE — ANESTHESIA PROCEDURE NOTES
Adductor Canal Single Shot Block    Patient location during procedure: pre-op   Block not for primary anesthetic.  Reason for block: at surgeon's request and post-op pain management   Post-op Pain Location: Rt knee pain  Start time: 2/17/2017 6:40 AM  Timeout: 2/17/2017 6:32 AM   End time: 2/17/2017 6:45 AM  Staffing  Anesthesiologist: JOSE NEWMAN  Resident/CRNA: DEV DEL CASTILLO  Performed by: resident/CRNA   Preanesthetic Checklist  Completed: patient identified, site marked, surgical consent, pre-op evaluation, timeout performed, IV checked, risks and benefits discussed and monitors and equipment checked  Peripheral Block  Patient position: supine  Prep: ChloraPrep  Patient monitoring: heart rate, cardiac monitor, continuous pulse ox, continuous capnometry and frequent blood pressure checks  Block type: adductor canal  Laterality: right  Injection technique: single shot  Needle  Needle type: Stimuplex   Needle gauge: 21 G  Needle length: 4 in  Needle localization: anatomical landmarks and ultrasound guidance   -ultrasound image captured on disc.  Assessment  Injection assessment: negative aspiration, negative parasthesia and local visualized surrounding nerve  Paresthesia pain: none  Heart rate change: no  Slow fractionated injection: yes  Medications:  Bolus administered: 20 mL of 0.25 ropivacaine  Epinephrine added: 3.75 mcg/mL (1/300,000)  Additional Notes  VSS.  [facility] RN monitoring vitals throughout procedure.  Patient tolerated procedure well.

## 2017-02-18 VITALS
TEMPERATURE: 98 F | WEIGHT: 315 LBS | OXYGEN SATURATION: 95 % | HEIGHT: 73 IN | DIASTOLIC BLOOD PRESSURE: 68 MMHG | HEART RATE: 70 BPM | RESPIRATION RATE: 18 BRPM | SYSTOLIC BLOOD PRESSURE: 138 MMHG | BODY MASS INDEX: 41.75 KG/M2

## 2017-02-18 PROBLEM — L97.529 FOOT ULCER, LEFT: Status: ACTIVE | Noted: 2017-02-18

## 2017-02-18 LAB
ANION GAP SERPL CALC-SCNC: 9 MMOL/L
BASOPHILS # BLD AUTO: 0.05 K/UL
BASOPHILS NFR BLD: 0.5 %
BUN SERPL-MCNC: 20 MG/DL
CALCIUM SERPL-MCNC: 9.5 MG/DL
CHLORIDE SERPL-SCNC: 99 MMOL/L
CO2 SERPL-SCNC: 27 MMOL/L
CREAT SERPL-MCNC: 1 MG/DL
DIFFERENTIAL METHOD: ABNORMAL
EOSINOPHIL # BLD AUTO: 0.2 K/UL
EOSINOPHIL NFR BLD: 1.8 %
ERYTHROCYTE [DISTWIDTH] IN BLOOD BY AUTOMATED COUNT: 13.8 %
EST. GFR  (AFRICAN AMERICAN): >60 ML/MIN/1.73 M^2
EST. GFR  (NON AFRICAN AMERICAN): >60 ML/MIN/1.73 M^2
GLUCOSE SERPL-MCNC: 230 MG/DL
HCT VFR BLD AUTO: 40.3 %
HGB BLD-MCNC: 13.1 G/DL
LYMPHOCYTES # BLD AUTO: 2.5 K/UL
LYMPHOCYTES NFR BLD: 26.4 %
MCH RBC QN AUTO: 28.4 PG
MCHC RBC AUTO-ENTMCNC: 32.5 %
MCV RBC AUTO: 87 FL
MONOCYTES # BLD AUTO: 0.7 K/UL
MONOCYTES NFR BLD: 6.9 %
NEUTROPHILS # BLD AUTO: 6 K/UL
NEUTROPHILS NFR BLD: 62.4 %
PLATELET # BLD AUTO: 515 K/UL
PMV BLD AUTO: 8.8 FL
POCT GLUCOSE: 166 MG/DL (ref 70–110)
POCT GLUCOSE: 200 MG/DL (ref 70–110)
POCT GLUCOSE: 215 MG/DL (ref 70–110)
POCT GLUCOSE: 356 MG/DL (ref 70–110)
POCT GLUCOSE: 435 MG/DL (ref 70–110)
POTASSIUM SERPL-SCNC: 4.7 MMOL/L
RBC # BLD AUTO: 4.62 M/UL
SODIUM SERPL-SCNC: 135 MMOL/L
WBC # BLD AUTO: 9.61 K/UL

## 2017-02-18 PROCEDURE — 63600175 PHARM REV CODE 636 W HCPCS: Performed by: ORTHOPAEDIC SURGERY

## 2017-02-18 PROCEDURE — 25000003 PHARM REV CODE 250: Performed by: PHYSICIAN ASSISTANT

## 2017-02-18 PROCEDURE — 80048 BASIC METABOLIC PNL TOTAL CA: CPT

## 2017-02-18 PROCEDURE — 99232 SBSQ HOSP IP/OBS MODERATE 35: CPT | Mod: ,,, | Performed by: NURSE PRACTITIONER

## 2017-02-18 PROCEDURE — 63600175 PHARM REV CODE 636 W HCPCS: Performed by: NURSE PRACTITIONER

## 2017-02-18 PROCEDURE — 76937 US GUIDE VASCULAR ACCESS: CPT

## 2017-02-18 PROCEDURE — 36415 COLL VENOUS BLD VENIPUNCTURE: CPT

## 2017-02-18 PROCEDURE — 36569 INSJ PICC 5 YR+ W/O IMAGING: CPT

## 2017-02-18 PROCEDURE — 97530 THERAPEUTIC ACTIVITIES: CPT

## 2017-02-18 PROCEDURE — 97802 MEDICAL NUTRITION INDIV IN: CPT

## 2017-02-18 PROCEDURE — 63600175 PHARM REV CODE 636 W HCPCS: Performed by: PHYSICIAN ASSISTANT

## 2017-02-18 PROCEDURE — 85025 COMPLETE CBC W/AUTO DIFF WBC: CPT

## 2017-02-18 PROCEDURE — 25000003 PHARM REV CODE 250: Performed by: STUDENT IN AN ORGANIZED HEALTH CARE EDUCATION/TRAINING PROGRAM

## 2017-02-18 PROCEDURE — 63600175 PHARM REV CODE 636 W HCPCS: Performed by: STUDENT IN AN ORGANIZED HEALTH CARE EDUCATION/TRAINING PROGRAM

## 2017-02-18 PROCEDURE — 02HV33Z INSERTION OF INFUSION DEVICE INTO SUPERIOR VENA CAVA, PERCUTANEOUS APPROACH: ICD-10-PCS | Performed by: ORTHOPAEDIC SURGERY

## 2017-02-18 PROCEDURE — 97165 OT EVAL LOW COMPLEX 30 MIN: CPT

## 2017-02-18 PROCEDURE — C1751 CATH, INF, PER/CENT/MIDLINE: HCPCS

## 2017-02-18 PROCEDURE — 97161 PT EVAL LOW COMPLEX 20 MIN: CPT

## 2017-02-18 RX ORDER — SODIUM CHLORIDE 0.9 % (FLUSH) 0.9 %
10 SYRINGE (ML) INJECTION EVERY 6 HOURS
Status: DISCONTINUED | OUTPATIENT
Start: 2017-02-18 | End: 2017-02-18 | Stop reason: HOSPADM

## 2017-02-18 RX ORDER — MORPHINE SULFATE 4 MG/ML
4 INJECTION, SOLUTION INTRAMUSCULAR; INTRAVENOUS EVERY 4 HOURS PRN
Status: DISCONTINUED | OUTPATIENT
Start: 2017-02-18 | End: 2017-02-18 | Stop reason: HOSPADM

## 2017-02-18 RX ORDER — SODIUM CHLORIDE 0.9 % (FLUSH) 0.9 %
10 SYRINGE (ML) INJECTION
Status: DISCONTINUED | OUTPATIENT
Start: 2017-02-18 | End: 2017-02-18 | Stop reason: HOSPADM

## 2017-02-18 RX ADMIN — OXYCODONE HYDROCHLORIDE 10 MG: 5 TABLET ORAL at 01:02

## 2017-02-18 RX ADMIN — SODIUM CHLORIDE, PRESERVATIVE FREE 3 ML: 5 INJECTION INTRAVENOUS at 02:02

## 2017-02-18 RX ADMIN — INSULIN ASPART 2 UNITS: 100 INJECTION, SOLUTION INTRAVENOUS; SUBCUTANEOUS at 08:02

## 2017-02-18 RX ADMIN — GABAPENTIN 600 MG: 300 CAPSULE ORAL at 08:02

## 2017-02-18 RX ADMIN — SODIUM CHLORIDE, PRESERVATIVE FREE 3 ML: 5 INJECTION INTRAVENOUS at 04:02

## 2017-02-18 RX ADMIN — OXYCODONE HYDROCHLORIDE 10 MG: 5 TABLET ORAL at 09:02

## 2017-02-18 RX ADMIN — CEFTRIAXONE 2 G: 2 INJECTION, SOLUTION INTRAVENOUS at 02:02

## 2017-02-18 RX ADMIN — MORPHINE SULFATE 2 MG: 2 INJECTION, SOLUTION INTRAMUSCULAR; INTRAVENOUS at 01:02

## 2017-02-18 RX ADMIN — INSULIN ASPART 4 UNITS: 100 INJECTION, SOLUTION INTRAVENOUS; SUBCUTANEOUS at 05:02

## 2017-02-18 RX ADMIN — INSULIN ASPART 10 UNITS: 100 INJECTION, SOLUTION INTRAVENOUS; SUBCUTANEOUS at 04:02

## 2017-02-18 RX ADMIN — ENALAPRIL MALEATE 10 MG: 10 TABLET ORAL at 08:02

## 2017-02-18 RX ADMIN — VANCOMYCIN HYDROCHLORIDE 1500 MG: 1 INJECTION, POWDER, LYOPHILIZED, FOR SOLUTION INTRAVENOUS at 09:02

## 2017-02-18 RX ADMIN — MORPHINE SULFATE 2 MG: 2 INJECTION, SOLUTION INTRAMUSCULAR; INTRAVENOUS at 05:02

## 2017-02-18 RX ADMIN — OXYCODONE HYDROCHLORIDE 10 MG: 5 TABLET ORAL at 05:02

## 2017-02-18 RX ADMIN — MORPHINE SULFATE 4 MG: 4 INJECTION INTRAVENOUS at 12:02

## 2017-02-18 RX ADMIN — INSULIN ASPART 2 UNITS: 100 INJECTION, SOLUTION INTRAVENOUS; SUBCUTANEOUS at 12:02

## 2017-02-18 NOTE — PLAN OF CARE
Problem: Patient Care Overview  Goal: Plan of Care Review  Outcome: Ongoing (interventions implemented as appropriate)  Pt alert and oriented to name, , place, and situation. Bed in  Lowest position with call light within reach. No falls observed or reported. Pt encouraged to ambulate. Pt pain assessed and managed.

## 2017-02-18 NOTE — ASSESSMENT & PLAN NOTE
55 y/o male with hx of T2DM, HTN, HLP presented with 2 weeks of right knee pain, was seen in ortho clinic with aspiration positive for infection. Pt now POD#1  irrigation and debridement of right knee.   -Remained afebrile, no leukocytosis  -No urate or pyrophosphate crystals identified from aspiration  -Intra op cultures pending and show NGTD   -Currently on vancomycin IV.  Vanc trough goal 15-20. Vanc trough due 2/19 at 8 AM.  -  Will add ceftriaxone 2 grams IV q 24 hours.  First dose today in hospital before discharge.  - Anticipate 4 weeks of IV antibiotics.  Will need PICC      Discharge recommendations:   1.  Vancomycin 1.5 g IV q 12 hours.  Maintain trough 15-20.   2.  Ceftriaxone 2 grams IV q 24 hours.   3.  If discharged today, will need vancomycin trough tomorrow before the morning dose to be drawn by Home Health and fax results to ID tomorrow at [phone]  4.  Weekly vancomycin trough, cbc, cmp, esr, and crp and fax result to ID at [phone]  5.  ID follow up in 10-14 days.    6.  Will need podiatry follow up

## 2017-02-18 NOTE — CONSULTS
"RN generated c/s received re: DM. A1C of 8.3 noted. Provided pt w/ CHO counting & DM diet guidelines/handout. Pt reports he has had diet educ in the past & is familiar w/ guidelines. States his A1C is elevated "b/c I forget to take my insulin". Enc'd him to f/u w/ CDE in outpatient setting. RD's contact info provided in case further diet questions arise.   "

## 2017-02-18 NOTE — PROCEDURES
"Virgilio Acuña is a 56 y.o. male patient.    Temp: 98.4 °F (36.9 °C) (02/18/17 0800)  Pulse: 75 (02/18/17 0800)  Resp: 19 (02/18/17 0800)  BP: (!) 147/80 (02/18/17 0800)  SpO2: 96 % (02/18/17 0800)  Weight: (!) 146 kg (321 lb 14 oz) (02/17/17 0621)  Height: 6' 1" (185.4 cm) (02/17/17 0621)    PICC  Date/Time: 2/18/2017 11:45 AM  Performed by: DAI SEVERINO  Consent Done: Yes  Time out: Immediately prior to procedure a time out was called to verify the correct patient, procedure, equipment, support staff and site/side marked as required  Indications: med administration and vascular access  Anesthesia: local infiltration  Local anesthetic: lidocaine 1% without epinephrine  Anesthetic Total (mL): 0  Preparation: skin prepped with ChloraPrep  Skin prep agent dried: skin prep agent completely dried prior to procedure  Sterile barriers: all five maximum sterile barriers used - cap, mask, sterile gown, sterile gloves, and large sterile sheet  Hand hygiene: hand hygiene performed prior to central venous catheter insertion  Location details: right basilic  Catheter type: double lumen  Catheter size: 5 Fr  Catheter Length: 38cm    Ultrasound guidance: yes  Needle advanced into vessel with real time Ultrasound guidance.  Guidewire confirmed in vessel.  Image recorded and saved.  Sterile sheath used.  Number of attempts: 1  Post-procedure: blood return through all ports, chlorhexidine patch and sterile dressing applied    Assessment: placement verified by x-ray        Nivia Mcginnis  2/18/2017  "

## 2017-02-18 NOTE — PT/OT/SLP EVAL
Physical Therapy  Evaluation    Virgilio Acuña   MRN: [id number]   Admitting Diagnosis: Pyogenic arthritis of right knee joint    PT Received On: 02/18/17  PT Start Time: 0922     PT Stop Time: 0940    PT Total Time (min): 18 min       Billable Minutes:  Evaluation 8 and Therapeutic Activity 10    Diagnosis: Pyogenic arthritis of right knee joint      Past Medical History   Diagnosis Date    Cellulitis of left index finger 2/16/2015    Dyslipidemia     Essential hypertension 2/16/2017    Infected finger joint 2/17/2015    Obese     S/P knee surgery, medial/lateral menisectomy 11/4/2013    Type 2 diabetes mellitus with diabetic polyneuropathy, with long-term current use of insulin 2003      Past Surgical History   Procedure Laterality Date    Elbow surgery      Knee cartilage surgery  10/21/2013     right knee    Left index finger surgery  03/2015       Referring physician: Jesus  Date referred to PT: 2/18/2017      General Precautions: Standard, fall  Orthopedic Precautions: RLE weight bearing as tolerated   Braces: N/A       Do you have any cultural, spiritual, Yarsani conflicts, given your current situation?: none    Patient History:  Lives With: spouse  Living Arrangements: house  Home Accessibility: stairs to enter home  Home Layout: Able to live on 1st floor  Number of Stairs to Enter Home: 4  Stair Railings at Home: outside, present at both sides  Transportation Available: family or friend will provide  Living Environment Comment: Pt will have assistance from his wife at home 24/7.   Equipment Currently Used at Home: cane, straight  DME owned (not currently used):     Previous Level of Function:  Ambulation Skills: independent  Transfer Skills: independent  ADL Skills: independent  Work/Leisure Activity: independent    Subjective:  Communicated with nsg prior to session. Pt agreeable to PT session    Chief Complaint: decreased functional mobility with (R) LE weakness  Patient goals: return home to  [facility]    Pain Ratin/10   Location - Side: Right  Location - Orientation: posterior     Pain Addressed: Pre-medicate for activity  Pain Rating Post-Intervention: 4/10    Objective:   Patient found with:  (no lines)     Cognitive Exam:  Oriented to: Person, Place, Time and Situation    Follows Commands/attention: Follows multistep  commands  Communication: clear/fluent  Safety awareness/insight to disability: intact    Physical Exam:  Postural examination/scapula alignment: No postural abnormalities identified    Skin integrity: Visible skin intact  Edema: Mild (R) LE    Sensation:   Intact  light/touch (B) LE    Lower Extremity Range of Motion:  Right Lower Extremity: WNL  Left Lower Extremity: WNL    Lower Extremity Strength:  Right Lower Extremity: Deficits: 4+/5 grossly  Left Lower Extremity: WNL     Fine motor coordination:  Intact    Gross motor coordination: WFL    Functional Mobility:  Bed Mobility: pt found up in chair       Transfers:  Sit <> Stand Assistance: Supervision x2 trials from chair  Sit <> Stand Assistive Device: No Assistive Device  Bed <> Chair Technique: Stand Pivot  Bed <> Chair Assistance: Supervision  Bed <> Chair Assistive Device: No Assistive Device    Gait:   Gait Distance: 200' x1 trial with no AD, x1 trial with quad cane  Assistance 1: Supervision  Gait Assistive Device: No device, Large base quad cane  Gait Pattern: 2-point gait  Gait Deviation(s): decreased prashanth, increased time in double stance, decreased velocity of limb motion, decreased stride length, decreased step length, decreased toe-to-floor clearance, decreased weight-shifting ability     -Pt stability increased with use of quad cane and demonstrated a reduction in antalgic limp on (R) side. Pt given cueing to follow correct sequencing when using cane.    Stairs:  Pt ascended/descend 4 stair(s) with No Assistive Device with bilateral rails with Supervision or Set-up Assistance.     -Pt with no LOB during stair training  and was given cueing for step-sequencing    Balance:   Static Sit: GOOD: Takes MODERATE challenges from all directions  Dynamic Sit: GOOD: Maintains balance through MODERATE excursions of active trunk movement  Static Stand: GOOD: Takes MODERATE challenges from all directions  Dynamic stand: GOOD: Needs SUPERVISION only during gait and able to self right with moderate     Therapeutic Activities and Exercises:  -Pt educated on:  A. DME mgmt  B. Safety when using AD for ambulation  C. Car t/f  D. Stair training  E. Performing HEP to prevent blood clots    AM-PAC 6 CLICK MOBILITY  How much help from another person does this patient currently need?   1 = Unable, Total/Dependent Assistance  2 = A lot, Maximum/Moderate Assistance  3 = A little, Minimum/Contact Guard/Supervision  4 = None, Modified [place]/Independent    Turning over in bed (including adjusting bedclothes, sheets and blankets)?: 4  Sitting down on and standing up from a chair with arms (e.g., wheelchair, bedside commode, etc.): 4  Moving from lying on back to sitting on the side of the bed?: 4  Moving to and from a bed to a chair (including a wheelchair)?: 4  Need to walk in hospital room?: 4  Climbing 3-5 steps with a railing?: 4  Total Score: 24     AM-PAC Raw Score CMS G-Code Modifier Level of Impairment Assistance   6 % Total / Unable   7 - 9 CM 80 - 100% Maximal Assist   10 - 14 CL 60 - 80% Moderate Assist   15 - 19 CK 40 - 60% Moderate Assist   20 - 22 CJ 20 - 40% Minimal Assist   23 CI 1-20% SBA / CGA   24 CH 0% Independent/ Mod I     Patient left up in chair with all lines intact, call button in reach and nsg notified.    Assessment:   Virgilio Acuña is a 56 y.o. male with a medical diagnosis of Pyogenic arthritis of right knee joint and presents with impaired functional mobility. Pt tolerated session well today with no complications. Pt ambulated 200' x1 trial with no AD and demonstrated antalgic limp on (R) LE. Pt posture and  stability improved with use of quad cane for an additional 200'. Pt issued HEP and was educated on car t/f's and safety when returning home. Pt safe to d/c home with OPPT at this time.    Rehab identified problem list/impairments: Rehab identified problem list/impairments: weakness, impaired endurance, impaired self care skills, impaired functional mobilty, gait instability, impaired balance, pain, decreased lower extremity function, decreased ROM, impaired joint extensibility, orthopedic precautions    Rehab potential is good.    Activity tolerance: Good    Discharge recommendations: Discharge Facility/Level Of Care Needs: outpatient PT     Barriers to discharge: Barriers to Discharge: None    Equipment recommendations: Equipment Needed After Discharge: cane, quad     GOALS:   Physical Therapy Goals     Not on file      Multidisciplinary Problems (Resolved)        Problem: Physical Therapy Goal    Goal Priority Disciplines Outcome Goal Variances Interventions   Physical Therapy Goal   (Resolved)     PT/OT, PT Outcome(s) achieved               PLAN:  Pt to be d/c'd from therapy services          Vinicius Gomez, PT  02/18/2017

## 2017-02-18 NOTE — PROGRESS NOTES
Daily Orthopaedic Progress Note    Virgilio Acuña is a 56 y.o. male admitted on 2/16/2017  Hospital Day: 2  Post Op Day: 1 Day Post-Op      The patient was seen and examined this morning at the bedside. GURJIT overnight. Pain improved. PICC team consulted. ID on board. Remains on IV Vanc. Foot wound growing staph. Afebrile     +voiding  +flatus  +Tolerating PO  - fevers/chills     PHYSICAL EXAM:  Awake/alert/oriented x3, No acute distress, Afebrile, Vital signs stable  Good inspiratory effort with unlaboured breathing  Dressings Clean,Dry,Intact  Palpable distal pulses  Neurovascularly intact      Vitals:    02/17/17 1345 02/17/17 2015 02/18/17 0000 02/18/17 0500   BP: (!) 157/72 (!) 166/74 133/71 (!) 141/67   BP Location:       Patient Position:       BP Method:       Pulse: (!) 59 68 72 64   Resp:  20 20 20   Temp:  96.6 °F (35.9 °C) 98 °F (36.7 °C) 98 °F (36.7 °C)   TempSrc:  Oral     SpO2:  (!) 92% 98% (!) 94%   Weight:       Height:         I/O last 3 completed shifts:  In: 1100 [I.V.:1100]  Out: 225 [Urine:225]    Recent Labs  Lab 02/16/17  1521 02/17/17  0512   CALCIUM 10.1 9.2   PROT 7.7 7.0    136   K 4.6 4.3   CO2 30* 26   CL 99 100   BUN 21* 18   CREATININE 1.0 0.8       Recent Labs  Lab 02/16/17  1521 02/17/17  0512   WBC 8.88 9.02   RBC 4.58* 4.29*   HGB 13.0* 12.2*   HCT 39.8* 38.2*   * 444*       Recent Labs  Lab 02/16/17  1521   INR 1.0   APTT 23.1       A/P: 56 y.o. male 1 Day Post-Op s/p right knee ATS I&D for septic arthritis   -Continue with current pain control regimen  -Continue with current physical therapy plan, WBAT  -PICC  -Cont IV Abx, follow cx, NGTD,/ ID on board      Dispo: home when pain controlled and IV Abx setup with SAGE Miranda M.D. PGY3  [facility]   Orthopaedic Surgery Resident

## 2017-02-18 NOTE — PROGRESS NOTES
[facility]  Infectious Disease  Progress Note    Patient Name: Virgilio Acuña  MRN: [id number]  Admission Date: 2/16/2017  Length of Stay: 2 days  Attending Physician: Virgilio Lee MD  Primary Care Provider: Juanito Hilton MD    Isolation Status: No active isolations  Assessment/Plan:      * Pyogenic arthritis of right knee joint  57 y/o male with hx of T2DM, HTN, HLP presented with 2 weeks of right knee pain, was seen in ortho clinic with aspiration positive for infection. Pt now POD#1  irrigation and debridement of right knee.   -Remained afebrile, no leukocytosis  -No urate or pyrophosphate crystals identified from aspiration  -Intra op cultures pending and show NGTD   -Currently on vancomycin IV.  Vanc trough goal 15-20. Vanc trough due 2/19 at 8 AM.  -  Will add ceftriaxone 2 grams IV q 24 hours.  First dose today in hospital before discharge.  - Anticipate 4 weeks of IV antibiotics.  Will need PICC      Discharge recommendations:   1.  Vancomycin 1.5 g IV q 12 hours.  Maintain trough 15-20.   2.  Ceftriaxone 2 grams IV q 24 hours.   3. Estimated end date 4 weeks from date of washout  -  3/17/17.   4.  If discharged today, will need vancomycin trough tomorrow before the morning dose to be drawn by Home Health and fax results to ID tomorrow at [phone]  5.  Weekly vancomycin trough, cbc, cmp, esr, and crp and fax result to ID at [phone]  6.  ID follow up in 10-14 days.    7.  Will need podiatry follow up     Foot ulcer, left  - Cultures obtained by podiatry showing MSSA  - No purulent discharge, no bone exposure  -  X-ray shows no bony erosion.   -  Will need podiatry follow up as outpatient.    -  See antibiotic recommendations above      Plan discussed with ID staff   Discussed with Orthopedics     Thank you.   Please call for any questions or concerns.  MARYAM Loving, ANP-C  [phone]    Subjective:     Principal Problem:Pyogenic arthritis of right knee joint    Interval  History: No acute events overnight.  Complaining of 6/10 knee pain.  No other complaints.  Anxious to go home.  Does not want to stay another night.  Joint fluid cultures NGTD.  Foot ulcer showing MSSA.  Currently on IV vancomycin. For discharge today    HPI:  55 y/o male with Type 2 diabetes with polyneuropathy with long term insulin therapy , HLP and essential HTN being admitted to Orthopedic service for septic right knee with plans to take patient to OR today for I+D and washout of infected joint. Pt reports pain and swelling in his R knee for 2 weeks. He denies any fevers or chills at home. He also denies any chest pain, sob, abdominal pain, N/V, diarrhea, constipation or urinary symptoms. Pt denies having any prosthesis or hardware. He was seen in ortho clinic, had arthrocentesis which had >156k WBC and 94% segs. Cultures were no growth. ESR 99 and CRP 36.8. Intraop cultures are pending. Pt also notes having a left great toe ulcer for ~ 3 months, denies having any purulent material, but notes it being bloody. Podiatry saw patient,cleaned the wound and obtained cultures. Cultures are staph aureus. Otherwise denies having any other symptoms.     Review of Systems   Constitutional: Negative for chills, diaphoresis, fatigue and fever.   HENT: Negative for congestion, ear pain, nosebleeds, rhinorrhea and sore throat.    Eyes: Negative for pain.   Respiratory: Negative for cough, shortness of breath and wheezing.    Cardiovascular: Negative for chest pain and leg swelling.   Gastrointestinal: Negative for abdominal pain, constipation, diarrhea, nausea and vomiting.   Genitourinary: Negative for dysuria, frequency and urgency.   Musculoskeletal: Negative for arthralgias, back pain and neck pain.        Positive right knee pain   Skin: Positive for wound (left great toe).   Neurological: Negative for weakness, numbness and headaches.     Objective:     Vital Signs (Most Recent):  Temp: 98.4 °F (36.9 °C) (02/18/17  0800)  Pulse: 75 (02/18/17 0800)  Resp: 19 (02/18/17 0800)  BP: (!) 147/80 (02/18/17 0800)  SpO2: 96 % (02/18/17 0800) Vital Signs (24h Range):  Temp:  [96.6 °F (35.9 °C)-98.4 °F (36.9 °C)] 98.4 °F (36.9 °C)  Pulse:  [59-75] 75  Resp:  [15-23] 19  SpO2:  [92 %-98 %] 96 %  BP: (133-166)/(66-80) 147/80     Weight: (!) 146 kg (321 lb 14 oz)  Body mass index is 42.47 kg/(m^2).    Estimated Creatinine Clearance: 122.5 mL/min (based on Cr of 1).    Physical Exam   Constitutional: He is oriented to person, place, and time. He appears well-developed and well-nourished. No distress.   Morbidly obese   HENT:   Head: Normocephalic and atraumatic.   Mouth/Throat: Oropharynx is clear and moist.   Eyes: EOM are normal. Pupils are equal, round, and reactive to light.   Neck: Normal range of motion. Neck supple.   Cardiovascular: Normal rate, regular rhythm and normal heart sounds.  Exam reveals no gallop and no friction rub.    No murmur heard.  Pulmonary/Chest: Effort normal and breath sounds normal. No respiratory distress. He has no wheezes. He has no rales. He exhibits no tenderness.   Abdominal: Bowel sounds are normal. He exhibits no distension and no mass. There is no tenderness. There is no guarding.   Musculoskeletal: Normal range of motion. He exhibits no edema or deformity.   Right knee incision clean, dry, intact.  No drainage.   Left toe dressing c/d/i.  No football dressing to foot.    Neurological: He is alert and oriented to person, place, and time.   Skin: Skin is warm and dry. No rash noted. He is not diaphoretic. No erythema.   Psychiatric: He has a normal mood and affect. His behavior is normal. Judgment and thought content normal.   Nursing note and vitals reviewed.      Significant Labs:   Blood Culture: No results for input(s): LABBLOO in the last 4320 hours.  CBC:   Recent Labs  Lab 02/16/17  1521 02/17/17  0512 02/18/17  0719   WBC 8.88 9.02 9.61   HGB 13.0* 12.2* 13.1*   HCT 39.8* 38.2* 40.3   *  444* 515*     CMP:   Recent Labs  Lab 02/16/17  1521 02/17/17  0512 02/18/17  0719    136 135*   K 4.6 4.3 4.7   CL 99 100 99   CO2 30* 26 27   * 151* 230*   BUN 21* 18 20   CREATININE 1.0 0.8 1.0   CALCIUM 10.1 9.2 9.5   PROT 7.7 7.0  --    ALBUMIN 2.9* 2.8*  --    BILITOT 0.2 0.4  --    ALKPHOS 71 69  --    AST 17 16  --    ALT 28 24  --    ANIONGAP 9 10 9   EGFRNONAA >60.0 >60.0 >60.0       Significant Imaging: I have reviewed all pertinent imaging results/findings within the past 24 hours.

## 2017-02-18 NOTE — CONSULTS
Double lumen PICC placed in right basilic vein on 2/18/2017. Length 38cm and arm circumference 41cm. Lot#[id number].

## 2017-02-18 NOTE — PLAN OF CARE
[facility]    HOME HEALTH ORDERS  FACE TO FACE ENCOUNTER    Patient Name: Virgilio Acuña  YOB: 1960    PCP: Juanito Hilton MD   PCP Address: [id number] [place] [address] / [place] [address]  PCP Phone Number: [phone]  PCP Fax: [phone]    Encounter Date: 02/18/2017    Admit to Home Health    Diagnoses:  Active Hospital Problems    Diagnosis  POA    *Pyogenic arthritis of right knee joint [M00.9]  Yes    Foot ulcer, left [L97.529]  Yes    Essential hypertension [I10]  Yes    Type 2 diabetes mellitus with diabetic polyneuropathy, with long-term current use of insulin [E11.42, Z79.4]  Not Applicable    Obese [E66.9]  Yes      Resolved Hospital Problems    Diagnosis Date Resolved POA   No resolved problems to display.       No future appointments.  Follow-up Information     Follow up with Cynthia Maher PA-C. Go in 2 weeks.    Specialty:  Orthopedic Surgery    Why:  For wound re-check    Contact information:    [address] [place] [address] [phone]            I have seen and examined this patient face to face today. My clinical findings that support the need for the home health skilled services and home bound status are the following:  Medical restrictions requiring assistance of another human to leave home due to  IV infusion Needs.    Allergies:Review of patient's allergies indicates:  No Known Allergies    Diet: diabetic diet: 2000 calorie    Activities: activity as tolerated    Nursing:   SN to complete comprehensive assessment including routine vital signs. Instruct on disease process and s/s of complications to report to MD. Follow specific home health arthoplasty protocol. Review/verify medication list sent home with the patient at time of discharge  and instruct patient/caregiver as needed.     MISCELLANEOUS CARE:  LABS:  SN to perform labs:  CBC, CMP, ESR, CRP and Vanc Trough;  Frequency: Weekly ; Duration: 4 week(s).  Please fax lab results to the  Infectious Disease Department at [phone]    Vancomycin trough 2-19-17 before the morning dose to be drawn by Home Health and fax results to ID at [phone]    Weekly vancomycin trough, cbc, cmp, esr, and crp and fax result to ID at [phone]        HOME INFUSION THERAPY:   SN to perform Infusion Therapy/Central Line Care.  Review Central Line Care & Central Line Flush with patient.    Administer (drug and dose): IV Vancomycin 1500mg q12 hours  Last dose given: 2/17/17 2200                       Home dose due: 2/18/17 2200    Scrub the Hub: Prior to accessing the line, always perform a 30 second alcohol scrub  Each lumen of the central line is to be flushed at least daily with 10 mL Normal Saline and 3 mL Heparin flush (100 units/mL)  Skilled Nurse (SN) may draw blood from IV access  Blood Draw Procedure:   - Aspirate at least 5 mL of blood   - Discard   - Obtain specimen   - Change posiflow cap   - Flush with 20 mL Normal Saline followed by a                 3-5 mL Heparin flush (100 units/mL)      WOUND CARE ORDERS  Change dressing to right knee PRN      Medications: Review discharge medications with patient and family and provide education.      Current Discharge Medication List      START taking these medications    Details   cefTRIAXone 2 g in dextrose 5 % 50 mL (ROCEPHIN) 2 g/50 mL PgBk IVPB Inject 50 mLs (2 g total) into the vein once daily.  Qty: 14 each, Refills: 0      dextrose 5 % SolP 250 mL with vancomycin 1,000 mg SolR 1,500 mg Inject 1,500 mg into the vein every 12 (twelve) hours.  Qty: 1500 mg, Refills: 27      docusate sodium (COLACE) 100 MG capsule Take 1 capsule (100 mg total) by mouth 2 (two) times daily as needed for Constipation.  Qty: 60 capsule, Refills: 1      ondansetron (ZOFRAN-ODT) 8 MG TbDL Take 1 tablet (8 mg total) by mouth every 6 (six) hours as needed.  Qty: 30 tablet, Refills: 0      oxycodone-acetaminophen (PERCOCET)  mg per tablet Take 1 tablet by mouth every 4  "(four) hours as needed for Pain.  Qty: 41 tablet, Refills: 0         CONTINUE these medications which have NOT CHANGED    Details   BD ULTRA-FINE WILDA PEN NEEDLES 32 x 5/32 " Ndle INJECT FOUR TIMES DAILY  Qty: 4 each, Refills: 3      blood sugar diagnostic Strp Accucheck Smartview test strips and Fastclix lancets. AC/HS  Qty: 450 each, Refills: 3      clonazepam (KLONOPIN) 0.5 MG tablet Take 0.5 mg by mouth as needed.       !! enalapril (VASOTEC) 10 MG tablet Take 1 tablet (10 mg total) by mouth once daily.  Qty: 90 tablet, Refills: 2      !! enalapril (VASOTEC) 10 MG tablet TAKE 1 TABLET DAILY  Qty: 90 tablet, Refills: 1      gabapentin (NEURONTIN) 600 MG tablet Take 1 tablet (600 mg total) by mouth once daily.  Qty: 90 tablet, Refills: 3    Associated Diagnoses: Other and unspecified hyperlipidemia      !! gemfibrozil (LOPID) 600 MG tablet TAKE 1 TABLET TWICE A DAY BEFORE MEALS  Qty: 180 tablet, Refills: 2      !! gemfibrozil (LOPID) 600 MG tablet TAKE 1 TABLET TWICE A DAY BEFORE MEALS  Qty: 180 tablet, Refills: 1      !! insulin aspart (NOVOLOG FLEXPEN) 100 unit/mL InPn pen INJECT 65 UNITS SUBCUTANEOUSLY THREE TIMES DAILY WITH MEALS PLUS SLIDING SCALE. Supply with pen needles  Qty: 60 mL, Refills: 3      insulin glargine (LANTUS SOLOSTAR) 100 unit/mL (3 mL) InPn pen Inject 70 Units into the skin once daily. Supply pen needles  Qty: 2 Box, Refills: 3      lancets (ACCU-CHEK SOFTCLIX LANCETS) Misc 1 Device by Other route 4 (four) times daily.  Qty: 360 each, Refills: 3      !! metformin (GLUCOPHAGE-XR) 500 MG 24 hr tablet Take 2 tablets (1,000 mg total) by mouth 2 (two) times daily with meals.  Qty: 360 tablet, Refills: 2      !! metformin (GLUCOPHAGE-XR) 500 MG 24 hr tablet TAKE 2 TABLETS TWICE A DAY WITH MEALS  Qty: 360 tablet, Refills: 1      !! NOVOLOG FLEXPEN 100 unit/mL InPn pen INJECT 65 UNITS UNDER THE SKIN THREE TIMES A DAY WITH MEALS PLUS SLIDING SCALE  Qty: 60 mL, Refills: 2      pravastatin (PRAVACHOL) " 40 MG tablet Take 1 tablet (40 mg total) by mouth once daily.  Qty: 30 tablet, Refills: 1    Associated Diagnoses: Other and unspecified hyperlipidemia      busPIRone (BUSPAR) 7.5 MG tablet Take 1 tablet (7.5 mg total) by mouth 2 (two) times daily.  Qty: 60 tablet, Refills: 3    Associated Diagnoses: Anxiety       !! - Potential duplicate medications found. Please discuss with provider.      STOP taking these medications       amoxicillin (AMOXIL) 500 MG Tab Comments:   Reason for Stopping:         oxycodone (ROXICODONE) 15 MG Tab Comments:   Reason for Stopping:               I certify that this patient is confined to his home and needs intermittent skilled nursing care.

## 2017-02-18 NOTE — PT/OT/SLP DISCHARGE
Physical Therapy Discharge Summary    Virgilio Acuña  MRN: [id number]   Pyogenic arthritis of right knee joint   Patient Discharged from acute Physical Therapy on 2/18/17.  Please refer to prior PT noted date on 2/18/17 for functional status.     Assessment:   Patient has met all goals and is not appropriate for therapy.  GOALS:   Physical Therapy Goals     Not on file      Multidisciplinary Problems (Resolved)        Problem: Physical Therapy Goal    Goal Priority Disciplines Outcome Goal Variances Interventions   Physical Therapy Goal   (Resolved)     PT/OT, PT Outcome(s) achieved             Reasons for Discontinuation of Therapy Services  Satisfactory goal achievement.      Plan:  Patient Discharged to: Outpatient Therapy Services   Vinicius Gomez, PT  .

## 2017-02-18 NOTE — PLAN OF CARE
Spoke with pharmacist at [facility] who stated nurse would be coming out to do the teaching, and they would be dropping drugs off at patient's home.  Pt will be ready to be discharged after his teaching and his ride arrives.

## 2017-02-18 NOTE — DISCHARGE INSTRUCTIONS
Pt set up with [facility], [phone], and [facility], [phone].  Home health nurse will be out tomorrow for labs and nursing care

## 2017-02-18 NOTE — ASSESSMENT & PLAN NOTE
- Cultures obtained by podiatry showing MSSA  - No purulent discharge, no bone exposure  -  X-ray shows no bony erosion.   -  Will need podiatry follow up as outpatient.    -  See antibiotic recommendations above

## 2017-02-18 NOTE — PLAN OF CARE
Problem: Physical Therapy Goal  Goal: Physical Therapy Goal  Outcome: Outcome(s) achieved Date Met:  02/18/17  Pt tolerated session well today with no complications. Pt ambulated 200' x1 trial with no AD and demonstrated antalgic limp on (R) LE. Pt posture and stability improved with use of quad cane for an additional 200'. Pt issued HEP and was educated on car t/f's and safety when returning home. Pt safe to d/c home with OPPT at this time.

## 2017-02-18 NOTE — PLAN OF CARE
Problem: Patient Care Overview  Goal: Plan of Care Review  Outcome: Ongoing (interventions implemented as appropriate)  No falls or injuries this shift. Will continue to monitor.

## 2017-02-18 NOTE — SUBJECTIVE & OBJECTIVE
Interval History: No acute events overnight.  Complaining of 6/10 knee pain.  No other complaints.  Anxious to go home.  Does not want to stay another night.  Joint fluid cultures NGTD.  Foot ulcer showing MSSA.  Currently on IV vancomycin. For discharge today    HPI:  55 y/o male with Type 2 diabetes with polyneuropathy with long term insulin therapy , HLP and essential HTN being admitted to Orthopedic service for septic right knee with plans to take patient to OR today for I+D and washout of infected joint. Pt reports pain and swelling in his R knee for 2 weeks. He denies any fevers or chills at home. He also denies any chest pain, sob, abdominal pain, N/V, diarrhea, constipation or urinary symptoms. Pt denies having any prosthesis or hardware. He was seen in ortho clinic, had arthrocentesis which had >156k WBC and 94% segs. Cultures were no growth. ESR 99 and CRP 36.8. Intraop cultures are pending. Pt also notes having a left great toe ulcer for ~ 3 months, denies having any purulent material, but notes it being bloody. Podiatry saw patient,cleaned the wound and obtained cultures. Cultures are staph aureus. Otherwise denies having any other symptoms.     Review of Systems   Constitutional: Negative for chills, diaphoresis, fatigue and fever.   HENT: Negative for congestion, ear pain, nosebleeds, rhinorrhea and sore throat.    Eyes: Negative for pain.   Respiratory: Negative for cough, shortness of breath and wheezing.    Cardiovascular: Negative for chest pain and leg swelling.   Gastrointestinal: Negative for abdominal pain, constipation, diarrhea, nausea and vomiting.   Genitourinary: Negative for dysuria, frequency and urgency.   Musculoskeletal: Negative for arthralgias, back pain and neck pain.        Positive right knee pain   Skin: Positive for wound (left great toe).   Neurological: Negative for weakness, numbness and headaches.     Objective:     Vital Signs (Most Recent):  Temp: 98.4 °F (36.9 °C)  (02/18/17 0800)  Pulse: 75 (02/18/17 0800)  Resp: 19 (02/18/17 0800)  BP: (!) 147/80 (02/18/17 0800)  SpO2: 96 % (02/18/17 0800) Vital Signs (24h Range):  Temp:  [96.6 °F (35.9 °C)-98.4 °F (36.9 °C)] 98.4 °F (36.9 °C)  Pulse:  [59-75] 75  Resp:  [15-23] 19  SpO2:  [92 %-98 %] 96 %  BP: (133-166)/(66-80) 147/80     Weight: (!) 146 kg (321 lb 14 oz)  Body mass index is 42.47 kg/(m^2).    Estimated Creatinine Clearance: 122.5 mL/min (based on Cr of 1).    Physical Exam   Constitutional: He is oriented to person, place, and time. He appears well-developed and well-nourished. No distress.   Morbidly obese   HENT:   Head: Normocephalic and atraumatic.   Mouth/Throat: Oropharynx is clear and moist.   Eyes: EOM are normal. Pupils are equal, round, and reactive to light.   Neck: Normal range of motion. Neck supple.   Cardiovascular: Normal rate, regular rhythm and normal heart sounds.  Exam reveals no gallop and no friction rub.    No murmur heard.  Pulmonary/Chest: Effort normal and breath sounds normal. No respiratory distress. He has no wheezes. He has no rales. He exhibits no tenderness.   Abdominal: Bowel sounds are normal. He exhibits no distension and no mass. There is no tenderness. There is no guarding.   Musculoskeletal: Normal range of motion. He exhibits no edema or deformity.   Right knee incision clean, dry, intact.  No drainage.   Left toe dressing c/d/i.  No football dressing to foot.    Neurological: He is alert and oriented to person, place, and time.   Skin: Skin is warm and dry. No rash noted. He is not diaphoretic. No erythema.   Psychiatric: He has a normal mood and affect. His behavior is normal. Judgment and thought content normal.   Nursing note and vitals reviewed.      Significant Labs:   Blood Culture: No results for input(s): LABBLOO in the last 4320 hours.  CBC:   Recent Labs  Lab 02/16/17  1521 02/17/17  0512 02/18/17  0719   WBC 8.88 9.02 9.61   HGB 13.0* 12.2* 13.1*   HCT 39.8* 38.2* 40.3    * 444* 515*     CMP:   Recent Labs  Lab 02/16/17  1521 02/17/17  0512 02/18/17  0719    136 135*   K 4.6 4.3 4.7   CL 99 100 99   CO2 30* 26 27   * 151* 230*   BUN 21* 18 20   CREATININE 1.0 0.8 1.0   CALCIUM 10.1 9.2 9.5   PROT 7.7 7.0  --    ALBUMIN 2.9* 2.8*  --    BILITOT 0.2 0.4  --    ALKPHOS 71 69  --    AST 17 16  --    ALT 28 24  --    ANIONGAP 9 10 9   EGFRNONAA >60.0 >60.0 >60.0       Significant Imaging: I have reviewed all pertinent imaging results/findings within the past 24 hours.

## 2017-02-18 NOTE — PLAN OF CARE
Pt being discharged home today, on IV antibiotics. Pt due to have PICC line placement today.  Will put in call to [facility], [phone].    Received call back from pharmacistJean, [phone], will fax orders and paperwork to [phone]. Spoke with nurse on floor who stated she would let me know when pt's PICC line is placed.  I will also, let pharmacistJean know.  Awaiting PICC line placement.      Pt stated his wife will be providing transportation home.

## 2017-02-19 ENCOUNTER — TELEPHONE (OUTPATIENT)
Dept: INFECTIOUS DISEASES | Facility: HOSPITAL | Age: 57
End: 2017-02-19

## 2017-02-19 NOTE — DISCHARGE SUMMARY
[facility]  Discharge Summary  Orthopedic Surgery      Admit Date: 2/16/2017    Discharge Date and Time: 2/18/2017  5:13 PM    Attending Physician: Dr Virgilio Lee    Discharge Provider: Martin Miranda    Reason for Admission: Right knee septic arthritis     Procedures Performed: Procedure(s) (LRB):  ARTHROSCOPY-KNEE DEBRIDEMENT-RIGHT (Right)    Hospital Course     Virgilio Acuña is a 56 y.o. year old male who presented to [facility] with a chief complaint of right knee pain. Aspiration was performed in clinic showing 150,000 WBC consistent with infection. The decision was made to proceed with arthroscopic I&D for source control. No guarantees were made. Verbal and written consent was obtained. All risks and benefits were explained in full detail and the patient and family agreed to proceed. On the day of surgery, the patient underwent a right knee arthroscopy and washout without complication. The patient was transported from the operative suite to the PACU in stable condition. The patient was then transferred to the floor for post surgical care and close monitoring. No complications were encountered.  Physical therapy was consulted to assist with mobility and transfers and they felt that his progress was sufficient for discharge home. ID was consulted and recommended 1500 mg Vancomycin q12hrs for 4-6 weeks.The patient's pain was well controlled on oral medications and they wanted to be discharged home. The patient was scheduled for a follow up appointment in 2 weeks with Dr. Lee.  Wound care instructions were explained in detail to the patient and family.     Weight bearing status: WBAT           Consults: ID, PT and OT    Significant Diagnostic Studies: Microbiology: aspirate right knee joint    Final Diagnoses:   Principal Problem: Pyogenic arthritis of right knee joint   Secondary Diagnoses:   Active Hospital Problems    Diagnosis  POA    *Pyogenic arthritis of right knee joint [M00.9]  Yes  "   Foot ulcer, left []  Yes    Essential hypertension [I10]  Yes    Type 2 diabetes mellitus with diabetic polyneuropathy, with long-term current use of insulin [E11.42, Z79.4]  Not Applicable    Obese [E66.9]  Yes      Resolved Hospital Problems    Diagnosis Date Resolved POA   No resolved problems to display.       Discharged Condition: good    Disposition: Home or Self Care    Follow Up/Patient Instructions:     Medications:  Reconciled Home Medications:   Discharge Medication List as of 2/18/2017  4:25 PM      START taking these medications    Details   cefTRIAXone 2 g in dextrose 5 % 50 mL (ROCEPHIN) 2 g/50 mL PgBk IVPB Inject 50 mLs (2 g total) into the vein once daily., Starting 2/18/2017, Until Sat 3/4/17, Normal      dextrose 5 % SolP 250 mL with vancomycin 1,000 mg SolR 1,500 mg Inject 1,500 mg into the vein every 12 (twelve) hours., Starting 2/17/2017, Until Discontinued, Normal      docusate sodium (COLACE) 100 MG capsule Take 1 capsule (100 mg total) by mouth 2 (two) times daily as needed for Constipation., Starting 2/17/2017, Until Discontinued, Print      ondansetron (ZOFRAN-ODT) 8 MG TbDL Take 1 tablet (8 mg total) by mouth every 6 (six) hours as needed., Starting 2/17/2017, Until Discontinued, Print      oxycodone-acetaminophen (PERCOCET)  mg per tablet Take 1 tablet by mouth every 4 (four) hours as needed for Pain., Starting 2/17/2017, Until Discontinued, Print         CONTINUE these medications which have NOT CHANGED    Details   BD ULTRA-FINE WILDA PEN NEEDLES 32 x 5/32 " Ndle INJECT FOUR TIMES DAILY, Normal      blood sugar diagnostic Strp Accucheck Smartview test strips and Fastclix lancets. AC/HS, Normal      busPIRone (BUSPAR) 7.5 MG tablet Take 1 tablet (7.5 mg total) by mouth 2 (two) times daily., Starting 8/28/2015, Until Thu 2/16/17, Normal      clonazepam (KLONOPIN) 0.5 MG tablet Take 0.5 mg by mouth as needed. , Starting 8/19/2013, Until Discontinued, Historical Med    "   !! enalapril (VASOTEC) 10 MG tablet Take 1 tablet (10 mg total) by mouth once daily., Starting 9/23/2016, Until Discontinued, Normal      !! enalapril (VASOTEC) 10 MG tablet TAKE 1 TABLET DAILY, Normal      gabapentin (NEURONTIN) 600 MG tablet Take 1 tablet (600 mg total) by mouth once daily., Starting 9/23/2016, Until Discontinued, Normal      !! gemfibrozil (LOPID) 600 MG tablet TAKE 1 TABLET TWICE A DAY BEFORE MEALS, Normal      !! gemfibrozil (LOPID) 600 MG tablet TAKE 1 TABLET TWICE A DAY BEFORE MEALS, Normal      !! insulin aspart (NOVOLOG FLEXPEN) 100 unit/mL InPn pen INJECT 65 UNITS SUBCUTANEOUSLY THREE TIMES DAILY WITH MEALS PLUS SLIDING SCALE. Supply with pen needles, Normal      insulin glargine (LANTUS SOLOSTAR) 100 unit/mL (3 mL) InPn pen Inject 70 Units into the skin once daily. Supply pen needles, Starting 9/23/2016, Until Sat 9/23/17, Normal      lancets (ACCU-CHEK SOFTCLIX LANCETS) Misc 1 Device by Other route 4 (four) times daily., Starting 5/22/2015, Until Discontinued, Normal      !! metformin (GLUCOPHAGE-XR) 500 MG 24 hr tablet Take 2 tablets (1,000 mg total) by mouth 2 (two) times daily with meals., Starting 9/23/2016, Until Discontinued, Normal      !! metformin (GLUCOPHAGE-XR) 500 MG 24 hr tablet TAKE 2 TABLETS TWICE A DAY WITH MEALS, Normal      !! NOVOLOG FLEXPEN 100 unit/mL InPn pen INJECT 65 UNITS UNDER THE SKIN THREE TIMES A DAY WITH MEALS PLUS SLIDING SCALE, Normal      pravastatin (PRAVACHOL) 40 MG tablet Take 1 tablet (40 mg total) by mouth once daily., Starting 9/23/2016, Until Discontinued, Normal       !! - Potential duplicate medications found. Please discuss with provider.      STOP taking these medications       amoxicillin (AMOXIL) 500 MG Tab Comments:   Reason for Stopping:         oxycodone (ROXICODONE) 15 MG Tab Comments:   Reason for Stopping:               Discharge Procedure Orders  Diet general     Activity as tolerated     Ice to affected area     Keep surgical  extremity elevated     Call MD for:  temperature >100.4     Call MD for:  persistent nausea and vomiting or diarrhea     Call MD for:  severe uncontrolled pain     Call MD for:  redness, tenderness, or signs of infection (pain, swelling, redness, odor or green/yellow discharge around incision site)     Call MD for:  difficulty breathing or increased cough     Call MD for:  severe persistent headache     Call MD for:  worsening rash     Call MD for:  persistent dizziness, light-headedness, or visual disturbances     Call MD for:  increased confusion or weakness     Change dressing (specify)   Order Comments: Change dressing as needed with mepilex dressing. Keep wound clean and dry. Do not apply ointments of any kind to the wound as this may interfere with healing tissue. Pat dry, do not rub the wound. May clean with soap and water if necessary. Watch closely for signs of infection such as foul swelling odor, increased drainage, or redness.       Follow-up Information     Follow up with COREEN Simmons in 2 weeks.    Specialty:  Orthopedic Surgery    Why:  For wound re-check    Contact information:    [address]  [facility] [id number]  [phone]

## 2017-02-19 NOTE — TELEPHONE ENCOUNTER
Patient discharged yesterday and was to have vancomycin trough done this morning and faxed to me.  Have not received results.  Called [facility] to follow up on status of lab. Awaiting callback.  Called patient to see if blood was drawn.  No answer.

## 2017-02-20 ENCOUNTER — TELEPHONE (OUTPATIENT)
Dept: INFECTIOUS DISEASES | Facility: CLINIC | Age: 57
End: 2017-02-20

## 2017-02-20 LAB
BACTERIA SPEC AEROBE CULT: NORMAL
BACTERIA SPEC AEROBE CULT: NORMAL
BACTERIA SPEC ANAEROBE CULT: NORMAL

## 2017-02-20 NOTE — TELEPHONE ENCOUNTER
Called [facility].  Still have not received vancomycin trough that was drawn yesterday.    They have not received results from [facility].   They will fax/call when received.

## 2017-02-20 NOTE — OP NOTE
DATE OF PROCEDURE:  02/17/2017.    PREOPERATIVE DIAGNOSIS:  Probable septic arthritis of right knee.    POSTOPERATIVE DIAGNOSES:  1.  Probable septic arthritis of right knee.  2.  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  3.  Synovitis of right knee.  4.  Partial tear, lateral meniscus, inner rim, right knee.    PROCEDURE PERFORMED:  1.  Right knee arthroscopic irrigation for septic arthritis.  2.  Right knee arthroscopic meniscectomy for partial tear of lateral meniscus.  3.  Right knee arthroscopic synovectomy, minor.    SURGEON:  Virgilio Lee M.D.    ASSISTANT:  Michael Casale, M.D. (RES)    ANESTHESIA:  General laryngeal mask plus regional.    ESTIMATED BLOOD LOSS:  Minimal.    IV FLUIDS:  800 mL crystalloid.    SPECIMENS:  Cultures.    INDICATIONS FOR PROCEDURE:  The patient is a 56-year-old male who had pain and   effusion in the right knee, which was aspirated, returning a white blood cell   count of 130,000 range with 94% segs and negative crystals.  The patient is now   being brought to the Operating Room for arthroscopic exploration with   irrigation.  Risks, benefits and alternatives of surgery were discussed with the   patient prior to going to the Operating Room.  Informed consent was obtained.    PROCEDURE IN DETAIL:  The patient was identified in the preoperative holding   area and the site was marked.  The patient was wheeled to the Operating Room and   placed on the operating table in supine position.  Right lower extremity was   placed in a nonsterile tourniquet and prepped and draped in sterile fashion.  A   timeout was undertaken to confirm patient, site, surgery, and surgeon.  All   agreed and we proceeded.    The patient had a prior knee arthroscopy three years ago.  I used these portals   as well as inferomedial and inferolateral.  I placed a camera in the   inferolateral portal into the patellofemoral joint.  I got back some thickened   synovial fluid with little bit of murkiness.  I sent this for culture.  We   entered the patellofemoral joint where we made our way round the lateral gutter   and going back around towards the notch.  The patient had a good bit of   synovitis here, placed a shaver in the inferomedial portal, taking out synovitis   to good visualization of ACL and PCL, which were both noted to be intact.    Moving to the medial joint line, the medial meniscus looked pretty good, into   the medial femoral condyle.  Moving to the lateral joint line, the patient had a   little bit of synovitis in front of this which I also shaved with the   arthroscopic shaver.  He had some grade III chondromalacia of the lateral tibial   plateau and inner rim tear of the lateral meniscus, which I took the shaver to   bringing back to a good rim.  We then moved up the lateral gutter back into the   patellofemoral joint, got any loose debris out of the knee.  We put a total of 9   L of fluid through the knee, suctioned all the remaining fluid out, removed all   the instruments, closed the portal sites with 3-0 nylon suture in interrupted   fashion.    All instrument and sponge counts were reported correct at the end of the case.    There were no complications.  The patient was given antibiotics after cultures   were taken.    PLAN:  We will follow up on his cultures and treat him appropriately with   antibiotics.            /david [id number] chrissy(s)        ROMA  dd: 02/17/2017 13:21:56 (CST)  td: 02/17/2017 19:15:12 (CST)  Doc ID   #[id number]  Job ID #[id number]    CC:

## 2017-02-21 ENCOUNTER — PATIENT OUTREACH (OUTPATIENT)
Dept: ADMINISTRATIVE | Facility: CLINIC | Age: 57
End: 2017-02-21
Payer: COMMERCIAL

## 2017-02-21 ENCOUNTER — TELEPHONE (OUTPATIENT)
Dept: INFECTIOUS DISEASES | Facility: CLINIC | Age: 57
End: 2017-02-21

## 2017-02-21 LAB
BACTERIA SPEC AEROBE CULT: NO GROWTH
BACTERIA SPEC AEROBE CULT: NO GROWTH
BACTERIA SPEC ANAEROBE CULT: NORMAL

## 2017-02-21 NOTE — TELEPHONE ENCOUNTER
Call from [facility].  Vancomycin level 7.9. Unclear if he missed doses in transition to home.   Will repeat Wednesday after 4 regular doses before making any adjustments

## 2017-02-21 NOTE — PATIENT INSTRUCTIONS
Discharge Instructions for Total Knee Replacement  You have undergone knee replacement surgery. The knee joint forms where the thighbone, shinbone, and kneecap meet. The knee joint is supported by muscles and ligaments, and is lined with a cushioning called cartilage. Over time, cartilage wears away. This can make the knee feel stiff and painful. Your doctor replaced your painful joint with a knee prosthesis (artificial joint) to relieve pain and restore movement. Here are some instructions to follow once at home.  Home Care  When you are allowed to shower, carefully wash your incision with soap and water. Rinse the incision well. Then gently pat it dry. Dont rub the incision, or apply creams or lotions. And sit on a shower stool or chair when you shower to keep from falling.  Take pain medication as directed by your doctor.  Sitting and Sleeping  Sit in chairs with arms. The arms make it easier for you to stand up or sit down.  Dont sit for more than 30-45 minutes at one time.  Nap if you are tired, but dont stay in bed all day.  Sleep with a pillow under your ankle, not your knee. Be sure to change the position of your leg during the night.  Moving Safely  The key to successful recovery is movement is walking and exercising your knee as directed by your doctor.  Walk up and down stairs with support. Try one step at a time--good knee up, bad knee down. Use the railing if possible.  Dont drive until your doctor says its okay. Most people can start driving about 6 weeks after surgery. Dont drive while you are taking narcotic pain medication.  Other Precautions  Avoid soaking your knee in water (no hot tubs, bathtubs, swimming pools) until your doctor says its okay.  Wear the support stockings you were given in the hospital, as instructed by your doctor. You may wear these stockings for 4-6 weeks after surgery. If needed, you can place a bandage over the incision to prevent irritation from clothing or support  stockings.  Arrange your household to keep the items you need handy. Keep everything else out of the way. Remove items that may cause you to fall, such as throw rugs and electrical cords.  Use nonslip bath mats, grab bars, an elevated toilet seat, and a shower chair in your bathroom.  Until your balance, flexibility, and strength improve, use a cane, crutches, a walker, handrails, or someone to help you.  Keep your hands free by using a backpack, audrey pack, apron, or pockets to carry things.  Prevent infection. Ask your doctor for instructions if you havent already received them. Any infection will need to be treated immediately with antibiotics. Call your doctor right away if you think you might have an infection.  Tell your dentist that you have an artificial joint and take antibiotics as prescribed before any dental work.  Tell all your healthcare providers about your artificial joint before any medical procedure.  Maintain a healthy weight. Get help to lose any extra pounds. Added body weight puts stress on the knee.  Take any medication you may have been given after surgery. This may include blood-thinning medications to prevent blood clots or antibiotics to prevent infection.  Follow-Up  Make a follow-up appointment as directed by our staff. You will need to have your staples removed 2-3 weeks following surgery.    When to Seek Medical Attention  Call 911 right away if you have:  Chest pain.  Shortness of breath.  Any pain or tenderness in your calf.  Otherwise, call your doctor immediately if you have:  Fever of 100.4°F higher, or shaking chills.  Stiffness, or inability to move the knee.  Increased swelling in your leg.  Increased redness, tenderness, or swelling in or around the knee incision.  Drainage from the knee incision.  Increased knee pain.   © [id number] [employer], [address], [place] [address]. All rights reserved. This information is not intended as a substitute for  professional medical care. Always follow your healthcare professional's instructions.

## 2017-02-22 ENCOUNTER — PATIENT MESSAGE (OUTPATIENT)
Dept: ORTHOPEDICS | Facility: CLINIC | Age: 57
End: 2017-02-22

## 2017-02-22 ENCOUNTER — TELEPHONE (OUTPATIENT)
Dept: INFECTIOUS DISEASES | Facility: HOSPITAL | Age: 57
End: 2017-02-22

## 2017-02-23 NOTE — TELEPHONE ENCOUNTER
Patricia ([phone]) with [employer], advised pt's vanc trough is 8.8. Currently on 1.5g q12

## 2017-02-23 NOTE — TELEPHONE ENCOUNTER
jaci with [facility] advised to increase vancomycin to 1750mg q12, per onofre patel and will have home health repeat vanc trough before 4th dose

## 2017-02-24 ENCOUNTER — PATIENT MESSAGE (OUTPATIENT)
Dept: ORTHOPEDICS | Facility: CLINIC | Age: 57
End: 2017-02-24

## 2017-02-24 ENCOUNTER — DOCUMENTATION ONLY (OUTPATIENT)
Dept: ORTHOPEDICS | Facility: CLINIC | Age: 57
End: 2017-02-24

## 2017-02-24 LAB
BACTERIA SPEC ANAEROBE CULT: NORMAL
BACTERIA SPEC ANAEROBE CULT: NORMAL

## 2017-02-24 NOTE — PROGRESS NOTES
Notified pt. Pt stated that he have a little increase pain 5/10 and restless unable to sleep. Pt have been elevating, ice, and taking pain med as schedule. Pt can go to his pcp for medication to sleep. Patient states verbal understanding and has no further questions.

## 2017-02-27 ENCOUNTER — TELEPHONE (OUTPATIENT)
Dept: ORTHOPEDICS | Facility: CLINIC | Age: 57
End: 2017-02-27

## 2017-02-27 ENCOUNTER — OFFICE VISIT (OUTPATIENT)
Dept: ORTHOPEDICS | Facility: CLINIC | Age: 57
End: 2017-02-27
Payer: COMMERCIAL

## 2017-02-27 VITALS
BODY MASS INDEX: 41.75 KG/M2 | HEIGHT: 73 IN | RESPIRATION RATE: 16 BRPM | HEART RATE: 84 BPM | WEIGHT: 315 LBS | DIASTOLIC BLOOD PRESSURE: 78 MMHG | TEMPERATURE: 99 F | SYSTOLIC BLOOD PRESSURE: 139 MMHG

## 2017-02-27 DIAGNOSIS — Z98.890 S/P KNEE SURGERY: Primary | ICD-10-CM

## 2017-02-27 PROCEDURE — 99999 PR PBB SHADOW E&M-EST. PATIENT-LVL IV: CPT | Mod: PBBFAC,,, | Performed by: PHYSICIAN ASSISTANT

## 2017-02-27 PROCEDURE — 99024 POSTOP FOLLOW-UP VISIT: CPT | Mod: S$GLB,,, | Performed by: PHYSICIAN ASSISTANT

## 2017-02-27 RX ORDER — MELOXICAM 15 MG/1
15 TABLET ORAL DAILY
Qty: 30 TABLET | Refills: 0 | Status: SHIPPED | OUTPATIENT
Start: 2017-02-27 | End: 2017-03-20

## 2017-02-27 NOTE — TELEPHONE ENCOUNTER
----- Message from Nataliia Dumont sent at 2/27/2017 12:19 PM CST -----  Contact: pt  The pt is in a lot of pain and would like to be seen today by anyone. Please call the pt at [phone]

## 2017-02-28 NOTE — ADDENDUM NOTE
Addendum  created 02/27/17 2045 by Valerie Medel MD    Anesthesia Intra Blocks edited, Anesthesia Intra Flowsheets edited, LDA updated via procedure documentation, Sign clinical note

## 2017-03-01 NOTE — PROGRESS NOTES
Mr. Acuña is here today for a post-operative visit after a  1. Right knee arthroscopic irrigation for septic arthritis.  2. Right knee arthroscopic meniscectomy for partial tear of lateral meniscus.  3. Right knee arthroscopic synovectomy, minor.   by  on 02/17/2017.  he reports that he is doing not so good.  Pain is not controlled.  he is taking pain medication.  He reports swelling and increased pain in his knee. He stated that he has a shooting pain the travels down his leg. .    he denies fever, chills, and sweats since the time of the surgery.     Physical exam:  Walked into clinic, mild swelling no TTP.  Incision is clean, dry and intact.  Sutures in place.    PICC line in place    Assessment:  Post-op visit    Plan:  - patient is to rest ice elevate   - pain medication: patient has pain contract with outside main management facility , He is to use NSAID  - return to clinic in 2 weeks post op for reevaluation and possible removal of sutures.

## 2017-03-02 ENCOUNTER — DOCUMENTATION ONLY (OUTPATIENT)
Dept: ORTHOPEDICS | Facility: CLINIC | Age: 57
End: 2017-03-02

## 2017-03-02 NOTE — PROGRESS NOTES
Notified pt , pt will see a different provider 03/06/17 @ 1400/Mailed,(SB). Patient states verbal understanding and has no further questions.

## 2017-03-03 ENCOUNTER — TELEPHONE (OUTPATIENT)
Dept: INFECTIOUS DISEASES | Facility: CLINIC | Age: 57
End: 2017-03-03

## 2017-03-03 ENCOUNTER — TELEPHONE (OUTPATIENT)
Dept: FAMILY MEDICINE | Facility: CLINIC | Age: 57
End: 2017-03-03

## 2017-03-03 NOTE — TELEPHONE ENCOUNTER
Call from from [facility],  Vancomycin trough 8 on 1.75 q 12 hours.  Other labs pending.   Patient on empiric IV therapy for pyogenic arthritis of native right knee, sp I&D.  Cultures have been negative and we are treating empirically  Estimated end date 3/17.    Will wait for other labs.   Patient reports compliance with current therapy and hasn't missed doses.   Discussed with ID staff and pharmacist. Will increase to 2 grams IV q 12 hours.  Trough before 4th dose.      Was seen by Ortho on 2/28.  Surgical site looked good per their note.  No increased swelling/erythema in setting of subtherapeutic vancomycin levels.  If levels do not come up, will consider discontinuing vanc, continuing rocephin and adding minocycline.

## 2017-03-03 NOTE — TELEPHONE ENCOUNTER
Ricardo fax from  lab that was performed on 3/2 has patient's vancomycin level at 8.9     There is a note in system from infectious diseases from today.

## 2017-03-03 NOTE — TELEPHONE ENCOUNTER
Received verbal report on labs drawn today -     WBC 7.4  H/H 11.5/34.7  Creatinine .81  CRP 67.7 (up from baseline of 38)  ESR 93 (baseline 99)    Increase vancomycin to 2 grams IV q 12.    Repeat trough and labs and run stat on Monday

## 2017-03-05 ENCOUNTER — PATIENT MESSAGE (OUTPATIENT)
Dept: INFECTIOUS DISEASES | Facility: CLINIC | Age: 57
End: 2017-03-05

## 2017-03-06 ENCOUNTER — INFUSION (OUTPATIENT)
Dept: INFECTIOUS DISEASES | Facility: HOSPITAL | Age: 57
End: 2017-03-06
Attending: NURSE PRACTITIONER
Payer: COMMERCIAL

## 2017-03-06 ENCOUNTER — OFFICE VISIT (OUTPATIENT)
Dept: INFECTIOUS DISEASES | Facility: CLINIC | Age: 57
End: 2017-03-06
Payer: COMMERCIAL

## 2017-03-06 ENCOUNTER — OFFICE VISIT (OUTPATIENT)
Dept: ORTHOPEDICS | Facility: CLINIC | Age: 57
End: 2017-03-06
Payer: COMMERCIAL

## 2017-03-06 VITALS
WEIGHT: 315 LBS | SYSTOLIC BLOOD PRESSURE: 159 MMHG | HEIGHT: 73 IN | RESPIRATION RATE: 18 BRPM | DIASTOLIC BLOOD PRESSURE: 79 MMHG | TEMPERATURE: 98 F | BODY MASS INDEX: 41.75 KG/M2 | HEART RATE: 80 BPM

## 2017-03-06 DIAGNOSIS — Z79.2 RECEIVING INTRAVENOUS ANTIBIOTIC TREATMENT AT HOME: ICD-10-CM

## 2017-03-06 DIAGNOSIS — M00.9 PYOGENIC ARTHRITIS OF RIGHT KNEE JOINT, DUE TO UNSPECIFIED ORGANISM: Primary | ICD-10-CM

## 2017-03-06 DIAGNOSIS — M00.9 PYOGENIC ARTHRITIS OF RIGHT KNEE JOINT, DUE TO UNSPECIFIED ORGANISM: ICD-10-CM

## 2017-03-06 DIAGNOSIS — Z51.81 THERAPEUTIC DRUG MONITORING: ICD-10-CM

## 2017-03-06 DIAGNOSIS — Z79.2 RECEIVING INTRAVENOUS ANTIBIOTIC TREATMENT AT HOME: Primary | ICD-10-CM

## 2017-03-06 DIAGNOSIS — Z98.890 S/P RIGHT KNEE ARTHROSCOPY: Primary | ICD-10-CM

## 2017-03-06 LAB
ALBUMIN SERPL BCP-MCNC: 3.3 G/DL
ALP SERPL-CCNC: 65 U/L
ALT SERPL W/O P-5'-P-CCNC: 18 U/L
ANION GAP SERPL CALC-SCNC: 8 MMOL/L
AST SERPL-CCNC: 14 U/L
BASOPHILS # BLD AUTO: 0.01 K/UL
BASOPHILS NFR BLD: 0.2 %
BILIRUB SERPL-MCNC: 0.4 MG/DL
BUN SERPL-MCNC: 13 MG/DL
CALCIUM SERPL-MCNC: 9.3 MG/DL
CHLORIDE SERPL-SCNC: 101 MMOL/L
CO2 SERPL-SCNC: 27 MMOL/L
CREAT SERPL-MCNC: 0.9 MG/DL
CRP SERPL-MCNC: 35.4 MG/L
DIFFERENTIAL METHOD: ABNORMAL
EOSINOPHIL # BLD AUTO: 0.2 K/UL
EOSINOPHIL NFR BLD: 2.6 %
ERYTHROCYTE [DISTWIDTH] IN BLOOD BY AUTOMATED COUNT: 13.8 %
ERYTHROCYTE [SEDIMENTATION RATE] IN BLOOD BY WESTERGREN METHOD: 71 MM/HR
EST. GFR  (AFRICAN AMERICAN): >60 ML/MIN/1.73 M^2
EST. GFR  (NON AFRICAN AMERICAN): >60 ML/MIN/1.73 M^2
GLUCOSE SERPL-MCNC: 197 MG/DL
HCT VFR BLD AUTO: 36.8 %
HGB BLD-MCNC: 11.9 G/DL
LYMPHOCYTES # BLD AUTO: 1.8 K/UL
LYMPHOCYTES NFR BLD: 26.9 %
MCH RBC QN AUTO: 28.1 PG
MCHC RBC AUTO-ENTMCNC: 32.3 %
MCV RBC AUTO: 87 FL
MONOCYTES # BLD AUTO: 0.4 K/UL
MONOCYTES NFR BLD: 5.4 %
NEUTROPHILS # BLD AUTO: 4.3 K/UL
NEUTROPHILS NFR BLD: 63.7 %
PLATELET # BLD AUTO: 357 K/UL
PMV BLD AUTO: 9.3 FL
POTASSIUM SERPL-SCNC: 4.5 MMOL/L
PROT SERPL-MCNC: 7.4 G/DL
RBC # BLD AUTO: 4.23 M/UL
SODIUM SERPL-SCNC: 136 MMOL/L
VANCOMYCIN SERPL-MCNC: 9.8 UG/ML
WBC # BLD AUTO: 6.66 K/UL

## 2017-03-06 PROCEDURE — 85651 RBC SED RATE NONAUTOMATED: CPT

## 2017-03-06 PROCEDURE — 36592 COLLECT BLOOD FROM PICC: CPT

## 2017-03-06 PROCEDURE — 99999 PR PBB SHADOW E&M-EST. PATIENT-LVL IV: CPT | Mod: PBBFAC,,, | Performed by: NURSE PRACTITIONER

## 2017-03-06 PROCEDURE — 1160F RVW MEDS BY RX/DR IN RCRD: CPT | Mod: S$GLB,,, | Performed by: NURSE PRACTITIONER

## 2017-03-06 PROCEDURE — 80053 COMPREHEN METABOLIC PANEL: CPT

## 2017-03-06 PROCEDURE — 85025 COMPLETE CBC W/AUTO DIFF WBC: CPT

## 2017-03-06 PROCEDURE — 99999 PR PBB SHADOW E&M-EST. PATIENT-LVL III: CPT | Mod: PBBFAC,,, | Performed by: NURSE PRACTITIONER

## 2017-03-06 PROCEDURE — 86140 C-REACTIVE PROTEIN: CPT

## 2017-03-06 PROCEDURE — 99024 POSTOP FOLLOW-UP VISIT: CPT | Mod: S$GLB,,, | Performed by: NURSE PRACTITIONER

## 2017-03-06 PROCEDURE — 80202 ASSAY OF VANCOMYCIN: CPT

## 2017-03-06 PROCEDURE — 99212 OFFICE O/P EST SF 10 MIN: CPT | Mod: S$GLB,,, | Performed by: NURSE PRACTITIONER

## 2017-03-06 PROCEDURE — 3077F SYST BP >= 140 MM HG: CPT | Mod: S$GLB,,, | Performed by: NURSE PRACTITIONER

## 2017-03-06 PROCEDURE — 3078F DIAST BP <80 MM HG: CPT | Mod: S$GLB,,, | Performed by: NURSE PRACTITIONER

## 2017-03-06 PROCEDURE — 96374 THER/PROPH/DIAG INJ IV PUSH: CPT

## 2017-03-06 RX ORDER — OXYCODONE HYDROCHLORIDE 15 MG/1
TABLET ORAL
Refills: 0 | COMMUNITY
Start: 2017-02-19 | End: 2017-07-07

## 2017-03-06 RX ORDER — INSULIN GLARGINE 100 [IU]/ML
70 INJECTION, SOLUTION SUBCUTANEOUS DAILY
Qty: 2 BOX | Refills: 3 | Status: SHIPPED | OUTPATIENT
Start: 2017-03-06 | End: 2017-05-02 | Stop reason: SDUPTHER

## 2017-03-06 NOTE — PROGRESS NOTES
"Virgilio Acuña is here for post- op visit following a Right knee arthroscopic irrigation for septic arthritis, meniscectomy for partial tear of lateral meniscus, and synovectomy  by Dr. Lee on 2/17/17.        Exam:  Blood pressure (!) 159/79, pulse 80, temperature 97.8 °F (36.6 °C), temperature source Oral, resp. rate 18, height 6' 1" (1.854 m), weight (!) 145.6 kg (321 lb).   Incisions: well healed without drainage or erythema.   Effusion: small   TTP: Mild   Range of motion: 0-110.      Impression: 3 weeks s/p right knee arthroscopic irrigation for septic arthritis, meniscectomy for partial tear of lateral meniscus, and synovectomy    Plan:   -Follow up in 3 weeks with Cynthia Maher PA-C or sooner as needed.  -He will f/u with ID in clinic immediately following this appointment, then as scheduled. He will have PICC care and bloodwork done today in ID  -Pain medication will be managed by his pain management clinic. He is c/o insomnia, anxiety, and pain. I referred him back to his pain clinic as I do not know what medication he is taking and what would be appropriate. He has an appt with them 3.22. He states that his physician is no longer working there. I explained that someone there would be able to look at his chart and help him even if his physician isn't there.  -His [place] paperwork was sent to Medical Records for completion       "

## 2017-03-06 NOTE — PROGRESS NOTES
cathlfow administer, blood return noted.  Labs drawn per MD orders.  Dsg changed.  sharda well.  D/c'd in NAD

## 2017-03-06 NOTE — TELEPHONE ENCOUNTER
----- Message from Citlalli Ordonez sent at 3/6/2017 12:44 PM CST -----  Contact: Lee from [facility]  Lee is calling to speak with nurse in regards to Pt needing Rx for insulin glargine (LANTUS SOLOSTAR) 100 unit/mL (3 mL) InPn pen and test strips. Pt is completely out.    GEMFIBRODIL may increase pravastain concentration, Lee stated avoid combination,  Also recommends substituting with fenofibrate    Lee can be reached at [phone] ext [phone].  Thank you

## 2017-03-07 ENCOUNTER — PATIENT MESSAGE (OUTPATIENT)
Dept: INFECTIOUS DISEASES | Facility: CLINIC | Age: 57
End: 2017-03-07

## 2017-03-07 NOTE — PROGRESS NOTES
Subjective:      Patient ID: Virgilio Acuña is a 56 y.o. male.    Chief Complaint:Follow-up (septic arthritis of knee )      History of Present Illness  HPI     Follow-up    Additional comments: septic arthritis of knee        Last edited by MARYAM Márquez, ANP on 3/7/2017  7:17 AM. (History)      Virgilio Acuña is here for follow up of right knee arthroscopic irrigation for septic arthritis, meniscectomy for partial tear of lateral meniscus, and synovectomy  by Dr. Lee on 2/17/17.  His surgical cultures were negative and he was discharged on empiric vancomycin and ceftriaxone with plan for 4 weeks of therapy, estimated end date 3/17.   I am seeing him today with Orthopedics. He is accompanied by his wife.     He complains of continued pain and swelling in the right knee making it difficult for him to sleep.  Denies any drainage from incisions.  No fevers, chills.  No n/v/d.  He is complaining that his PICC line is very sluggish.  Having difficulty administering the antibiotic over prescribed time - takes hours.  No pain or swelling at PICC site.     Last vancomycin trough was 8.9 and dose was increased to 2 grams IV q 12 hours.  Was supposed to have labs done this morning, but HH did not come.  He did not take his vancomycin dose this morn as he was afraid it would take too long and he would not be able to make appointment.     Labs last week showed continued elevation of CRP and ESR (67 and 93).  No leukocytosis.      Review of Systems   Constitution: Negative for chills, decreased appetite, fever, weakness, malaise/fatigue and night sweats.   Cardiovascular: Positive for leg swelling. Negative for chest pain.   Respiratory: Negative for cough and shortness of breath.    Skin: Negative for itching and rash.   Musculoskeletal: Positive for joint pain and joint swelling. Negative for myalgias.   Gastrointestinal: Positive for constipation. Negative for diarrhea, nausea and vomiting.    Psychiatric/Behavioral: The patient is nervous/anxious.      Objective:   Physical Exam   Constitutional: He is oriented to person, place, and time. No distress.   Cardiovascular: Normal rate and regular rhythm.    Pulmonary/Chest: Effort normal and breath sounds normal. No respiratory distress.   Abdominal: Soft. There is no tenderness.   Musculoskeletal: He exhibits tenderness. He exhibits no edema.   Right knee TTP on medial side.  Mild swelling.  Sutures intact. No drainage, erythema.     Neurological: He is alert and oriented to person, place, and time.   Skin: Skin is warm and dry. No rash noted. He is not diaphoretic.   PICC RUE - c/d/i   Psychiatric: He has a normal mood and affect. His behavior is normal.   Vitals reviewed.    Assessment:       1. Pyogenic arthritis of right knee joint, due to unspecified organism    2. Receiving intravenous antibiotic treatment at home    3. Therapeutic drug monitoring        56 year old here for follow up of septic arthritis of right knee, now 3 weeks post-op I&D, meniscectomy for partial tear of lateral meniscus, and synovectomy.  All cultures to date have been negative.   On empiric therapy with vancomycin and ceftriaxone.  Patient has been having difficulty with PICC and IV infusions, which may explain difficulty attaining trough > 10.   Inflammatory markers elevated, though knee appears to be healing well with mild post-op swelling.      Plan:     1.  Continue IV vancomycin 2 grams q 12 hours and ceftriaxone 2 grams IV q 24 hours.   2.  Will get random vanc and labs here today  3.  Troubleshoot PICC today in our infusion center.  If unable to establish patency, will order new PICC/midline  4.  Will need to get vancomycin trough Wednesday.  If unable to get vancomycin troughs therapeutic, will consider minocycline and ceftriaxone to complete therapy.   5.  Continue weekly labs and follow markers  6.  Follow up 3 weeks with next orthopedics appointment

## 2017-03-08 NOTE — PT/OT/SLP EVAL
Occupational Therapy  Evaluation/Discharge Summary    Virgilio Acuña   MRN: [id number]   Admitting Diagnosis: Pyogenic arthritis of right knee joint    OT Date of Treatment: 17   OT Start Time: 922  OT Stop Time: 932  OT Total Time (min): 10 min    Billable Minutes:  Evaluation 10    Diagnosis: Pyogenic arthritis of right knee joint   S/p I&D R knee    Past Medical History:   Diagnosis Date    Cellulitis of left index finger 2015    Dyslipidemia     Essential hypertension 2017    Infected finger joint 2015    Obese     S/P knee surgery, medial/lateral menisectomy 2013    Type 2 diabetes mellitus with diabetic polyneuropathy, with long-term current use of insulin       Past Surgical History:   Procedure Laterality Date    ELBOW SURGERY      KNEE CARTILAGE SURGERY  10/21/2013    right knee    Left index finger surgery  2015     General Precautions: Standard, fall  Orthopedic Precautions: RLE weight bearing as tolerated    Patient History:  Living Environment  Lives With: spouse  Living Arrangements: house  Home Accessibility: stairs to enter home  Home Layout: Able to live on 1st floor  Number of Stairs to Enter Home: 4  Stair Railings at Home: outside, present at both sides  Transportation Available: family or friend will provide  Living Environment Comment:  Spv/(A) available  Equipment Currently Used at Home: cane, straight    Prior level of function:   Bed Mobility/Transfers: independent  Grooming: independent  Bathing: independent  Upper Body Dressing: independent  Lower Body Dressing: independent  Toileting: independent  Home Management Skills: independent    Subjective:  Communicated with RN prior to session.    Pt agreeable to Evaluation    Pain Ratin/10  Location - Side: Right  Location: knee  Pain Addressed: Pre-medicate for activity, Reposition, Distraction    Objective:  Upper Extremity Range of Motion:  Right Upper Extremity: WFL  Left Upper Extremity:  "WFL    Upper Extremity Strength:  Right Upper Extremity: WFL  Left Upper Extremity: WFL    Functional Mobility:  Transfers:  Sit <> Stand Assistance: Supervision  Sit <> Stand Assistive Device: Quad Cane    Functional Ambulation: SPv with  feet    Activities of Daily Living:  UE Dressing Level of Assistance: Supervision    LE Dressing Level of Assistance: Supervision    AM-PAC 6 CLICK ADL  How much help from another person does this patient currently need?  1 = Unable, Total/Dependent Assistance  2 = A lot, Maximum/Moderate Assistance  3 = A little, Minimum/Contact Guard/Supervision  4 = None, Modified [place]/Independent    Putting on and taking off regular lower body clothing? : 3  Bathing (including washing, rinsing, drying)?: 3  Toileting, which includes using toilet, bedpan, or urinal? : 3  Putting on and taking off regular upper body clothing?: 4  Taking care of personal grooming such as brushing teeth?: 4  Eating meals?: 4  Total Score: 21    AM-PAC Raw Score CMS "G-Code Modifier Level of Impairment Assistance   6 % Total / Unable   7 - 9 CM 80 - 100% Maximal Assist   10 - 14 CL 60 - 80% Moderate Assist   15 - 19 CK 40 - 60% Moderate Assist   20 - 22 CJ 20 - 40% Minimal Assist   23 CI 1-20% SBA / CGA   24 CH 0% Independent/ Mod I       Patient left ambulating with PT present    Assessment:  Virgilio Acuña is a 56 y.o. male with a medical diagnosis of Pyogenic arthritis of right knee joint and is performing ADLs and functional mobility with Spv and is not a candidate for skilled OT services at this time.    Rehab identified problem list/impairments: Rehab identified problem list/impairments: weakness, impaired endurance, gait instability, impaired functional mobilty, decreased lower extremity function, pain, edema, decreased ROM    Rehab potential is good.    Activity tolerance: Good    Discharge recommendations: Discharge Facility/Level Of Care Needs: outpatient PT     Barriers to discharge: " Barriers to Discharge: None    Equipment recommendations: cane, quad     GOALS:   N/A    PLAN: D/C OT  Plan of Care reviewed with: patient    JEREMÍAS Villalobos  02/18/2017

## 2017-03-10 ENCOUNTER — TELEPHONE (OUTPATIENT)
Dept: INFECTIOUS DISEASES | Facility: CLINIC | Age: 57
End: 2017-03-10

## 2017-03-10 ENCOUNTER — DOCUMENTATION ONLY (OUTPATIENT)
Dept: ORTHOPEDICS | Facility: CLINIC | Age: 57
End: 2017-03-10

## 2017-03-10 NOTE — PROGRESS NOTES
Notified [place] at [facility] at [phone]. Stated that the pt can call for appt and that they do accept his insurance. Notified pt with the updated information to call [facility]. Pt stated that he would like to get treatment in [place], [place], because it's much closer to his home. Pt stated that he has enough medication until 03/20-22/2017. Pt stated that he has an Appt 03/20/17 with [place].  Patient states verbal understanding and has no further questions.

## 2017-03-10 NOTE — PROGRESS NOTES
Called and Informed patient of appointment on 03/21/17 at 1420 PMR Dr. Kang and mailed.  Per PMR, If something come up earlier than 03/21/17. PMR will contact pt. Patient states verbal understanding and has no further questions.

## 2017-03-10 NOTE — TELEPHONE ENCOUNTER
ID follow up:  Vancomycin trough now 11.3.    Treating empirically - cultures negative   Will continue vancomycin 2 grams IV q 12 hours and repeat on Monday.   Continue ceftriaxone.

## 2017-03-11 ENCOUNTER — PATIENT MESSAGE (OUTPATIENT)
Dept: ORTHOPEDICS | Facility: CLINIC | Age: 57
End: 2017-03-11

## 2017-03-13 ENCOUNTER — PATIENT MESSAGE (OUTPATIENT)
Dept: ORTHOPEDICS | Facility: CLINIC | Age: 57
End: 2017-03-13

## 2017-03-13 ENCOUNTER — TELEPHONE (OUTPATIENT)
Dept: ORTHOPEDICS | Facility: CLINIC | Age: 57
End: 2017-03-13

## 2017-03-13 ENCOUNTER — PATIENT MESSAGE (OUTPATIENT)
Dept: INFECTIOUS DISEASES | Facility: CLINIC | Age: 57
End: 2017-03-13

## 2017-03-13 LAB
FUNGUS SPEC CULT: NORMAL
FUNGUS SPEC CULT: NORMAL

## 2017-03-13 NOTE — TELEPHONE ENCOUNTER
Received an e-mail from pt reporting significant pain.   Phoned pt and spoke with him about his pain.   Pt reports increased pain in the early morning hours and intermittently throughout the day.   Pt reports pain is tolerable right now.   Pt reports he is not looking for pain medication.   Offered pt an appointment in clinic today or tomorrow for evaluation.  Pt declined.   Pt states due to things that have transpired since sending the e-mail, he no longer can come in for evaluation and treatment.   Pt instructed to contact our office or go to the ED for uncontrolled pain or other serious symptoms.

## 2017-03-14 ENCOUNTER — TELEPHONE (OUTPATIENT)
Dept: INFECTIOUS DISEASES | Facility: CLINIC | Age: 57
End: 2017-03-14

## 2017-03-14 ENCOUNTER — TELEPHONE (OUTPATIENT)
Dept: INFECTIOUS DISEASES | Facility: HOSPITAL | Age: 57
End: 2017-03-14

## 2017-03-14 ENCOUNTER — TELEPHONE (OUTPATIENT)
Dept: ENDOCRINOLOGY | Facility: CLINIC | Age: 57
End: 2017-03-14

## 2017-03-14 ENCOUNTER — PATIENT MESSAGE (OUTPATIENT)
Dept: INFECTIOUS DISEASES | Facility: CLINIC | Age: 57
End: 2017-03-14

## 2017-03-14 DIAGNOSIS — Z79.2 RECEIVING INTRAVENOUS ANTIBIOTIC TREATMENT AT HOME: Primary | ICD-10-CM

## 2017-03-14 DIAGNOSIS — M00.9 PYOGENIC ARTHRITIS OF RIGHT KNEE JOINT, DUE TO UNSPECIFIED ORGANISM: ICD-10-CM

## 2017-03-14 RX ORDER — CEFADROXIL 1000 MG/1
1 TABLET ORAL 2 TIMES DAILY
Qty: 28 TABLET | Refills: 0 | Status: SHIPPED | OUTPATIENT
Start: 2017-03-14 | End: 2017-03-28

## 2017-03-14 RX ORDER — DOXYCYCLINE 100 MG/1
100 CAPSULE ORAL EVERY 12 HOURS
Qty: 28 CAPSULE | Refills: 0 | Status: SHIPPED | OUTPATIENT
Start: 2017-03-14 | End: 2017-03-28

## 2017-03-14 NOTE — TELEPHONE ENCOUNTER
----- Message from Stacia Edmondson sent at 3/14/2017 11:08 AM CDT -----  Contact: Lee sauceda [employer]  Needs test strips changed to match One Touch Verio IQ. Also needs lancets. The ones called in is not what he use.     Quick supply to  [facility] [address] [place] [address] [place] AT [facility] [address]    3 month supply to  [facility] - [address] [address]  Phone: [phone] Fax: [phone]

## 2017-03-14 NOTE — TELEPHONE ENCOUNTER
I called patient because we received a fax from home health yesterday that his picc was out a little bit. When I spoke to Vinh I was advised to call pt and to have him come in if he can. Patient has been having knee pain and said it was on a 5 for the pain scale. He said Monday he has an appointment in his town and he can come for 2:30pm. I scheduled him with Ortho. I will ask Vinh would she like to see him the same time. She will be on hospital that day so i'm not sure if the time will work for her. Will schedule if so.

## 2017-03-14 NOTE — TELEPHONE ENCOUNTER
Received report from [facility] that PICC was noted to be 5.5 cm out from site, stat loc no longer on line and dressing loose on bottom.  Line flushes, but slow to withdraw blood.  We have had difficulty managing vancomycin levels.  At last clinical visit, incision was noted to be well healed without drainage or erythema and patient appeared to be progressing as expected post operatively.  Inflammatory markers have not yet normalized, but unclear if this is infection or related to other inflammation.  Afebrile, no leukocytosis, and per patient no increased swelling/erythema.  Patient continues to complain of pain, but patient has issues with chronic pain management.  See prior encounters.  Concerned with potential infection risk with PICC.  Patient offered appointment here today or this week to assess, but he declined this week.   Patient is due to end 4 weeks of IV therapy on Friday.  Will discontinue PICC at this time and transition to oral antibiotics (doxy and cefadroxil empirically.  All cultures have been negative)  until he can be seen on Monday.  Will arrange to have PICC removed at [facility] in [place] ( [facility] unable to remove PICC).  Will see him with Orthopedics on Monday.   Discussed with patient and notified Elliot Benson.

## 2017-03-15 ENCOUNTER — PATIENT MESSAGE (OUTPATIENT)
Dept: ORTHOPEDICS | Facility: CLINIC | Age: 57
End: 2017-03-15

## 2017-03-15 ENCOUNTER — INFUSION (OUTPATIENT)
Dept: INFUSION THERAPY | Facility: HOSPITAL | Age: 57
End: 2017-03-15
Attending: NURSE PRACTITIONER
Payer: COMMERCIAL

## 2017-03-15 VITALS
WEIGHT: 315 LBS | HEART RATE: 74 BPM | SYSTOLIC BLOOD PRESSURE: 159 MMHG | BODY MASS INDEX: 41.75 KG/M2 | RESPIRATION RATE: 18 BRPM | TEMPERATURE: 99 F | DIASTOLIC BLOOD PRESSURE: 74 MMHG | HEIGHT: 73 IN

## 2017-03-15 NOTE — NURSING
Pt very upset ortho/ID Drs wont give him more pain meds for his knee. Pt states they say he doesn't need them. When asked questions during assessment, pt states why tell you, you would call me a liar. Hyacinth Schulte notified of issue. IM sent to [facility] need for consult on [facility].

## 2017-03-15 NOTE — PLAN OF CARE
Problem: Patient Care Overview  Goal: Discharge Needs Assessment  Outcome: Ongoing (interventions implemented as appropriate)  PICC line removed per protocol. Catheter intact. Tolerated well, ambulated w/ cane from infusion center

## 2017-03-15 NOTE — MR AVS SNAPSHOT
"Patient Information     Patient Name Sex Virgilio Holden Male 1960      Visit Information        Provider Department Dept Phone Center    3/15/2017 1:30 PM CHAIR 32, [facility] CHEMO [facility] [phone] [facility]      Patient Instructions     None      Your Current Medications Are     BD ULTRA-FINE WILDA PEN NEEDLES 32 x 5/32 " Ndle    blood sugar diagnostic Strp    cefadroxil (DURICEF) 1 gram tablet    clonazepam (KLONOPIN) 0.5 MG tablet    dextrose 5 % SolP 250 mL with vancomycin 1,000 mg SolR 1,500 mg    docusate sodium (COLACE) 100 MG capsule    doxycycline (VIBRAMYCIN) 100 MG Cap    enalapril (VASOTEC) 10 MG tablet    gabapentin (NEURONTIN) 600 MG tablet    gemfibrozil (LOPID) 600 MG tablet    insulin aspart (NOVOLOG FLEXPEN) 100 unit/mL InPn pen    insulin glargine (LANTUS SOLOSTAR) 100 unit/mL (3 mL) InPn pen    meloxicam (MOBIC) 15 MG tablet    metformin (GLUCOPHAGE-XR) 500 MG 24 hr tablet    NOVOLOG FLEXPEN 100 unit/mL InPn pen    ondansetron (ZOFRAN-ODT) 8 MG TbDL    oxycodone (ROXICODONE) 15 MG Tab    oxycodone-acetaminophen (PERCOCET)  mg per tablet    pravastatin (PRAVACHOL) 40 MG tablet    busPIRone (BUSPAR) 7.5 MG tablet      Appointments for Next Year     3/15/2017  1:30 PM INFUSION 030 MIN (30 min.) [facility] CHAIR [facility]    Arrive at check-in approximately 15 minutes before your scheduled appointment time. Bring all outside medical records and imaging, along with a list of your current medications and insurance card.    1st Floor    3/20/2017  2:30 PM ESTABLISHED PATIENT (30 min.) [facility] - Infectious Diseases MARYAM Márquez, ANP    Arrive at check-in approximately 15 minutes before your scheduled appointment time. Bring all outside medical records and imaging, along with a list of your current medications and insurance card.    1st Floor - [facility]    3/20/2017  2:30 PM ESTABLISHED PATIENT (30 min.) [facility] Ghosh - " "Orthopedics Mariluz Lauren NP    Arrive at check-in approximately 15 minutes before your scheduled appointment time. Bring all outside medical records and imaging, along with a list of your current medications and insurance card.    [facility] - 5th Floor    3/21/2017  2:20 PM CONSULT (40 min.) [facility]-Physical Med & Rehab Giulia Kang MD    Arrive at check-in approximately 15 minutes before your scheduled appointment time. Bring all outside medical records and imaging, along with a list of your current medications and insurance card.    [facility] - 5th Floor         Default Flowsheet Data (last 24 hours)      Amb Complex Vitals Lázaro        03/15/17 1230                Measurements    Weight (!)  146.2 kg (322 lb 6.4 oz)        Height 6' 1" (1.854 m)        BSA (Calculated - sq m) 2.74 sq meters        BMI (Calculated) 42.6        BP (!)  159/74        Temp 98.6 °F (37 °C)        Pulse 74        Resp 18                Allergies     No Known Allergies      Current Discharge Medication List     Cannot display discharge medications since this is not an admission.      "

## 2017-03-16 RX ORDER — PRAVASTATIN SODIUM 40 MG/1
TABLET ORAL
Qty: 90 TABLET | Refills: 1 | Status: SHIPPED | OUTPATIENT
Start: 2017-03-16 | End: 2017-07-31

## 2017-03-16 RX ORDER — CITALOPRAM 20 MG/1
TABLET, FILM COATED ORAL
Qty: 90 TABLET | Refills: 0 | Status: SHIPPED | OUTPATIENT
Start: 2017-03-16 | End: 2017-06-14 | Stop reason: SDUPTHER

## 2017-03-17 ENCOUNTER — TELEPHONE (OUTPATIENT)
Dept: INFECTIOUS DISEASES | Facility: CLINIC | Age: 57
End: 2017-03-17

## 2017-03-17 NOTE — TELEPHONE ENCOUNTER
Infectious Disease - Lab follow up   Labs received from  - drawn 3/13    Dx:Pyogenic arthritis right knee  Antibiotics:  IV ceftriaxone and vancomycin  Estimated End Date: 3/17    WBC - 7.4  H/H - 12/2/37.4  Platelets - 396  Creatinine - .75  ESR - 68  CRP - 52  AST/ALT - 17/19  Vancomycin Trough - 10.7    IV abx d/c'd on 3/15 since these labs drawn because PICC coming out.  Started on oral cefadroxil and doxycycline.  Follow up on 3/20 and will repeat blood work at that time.

## 2017-03-20 ENCOUNTER — OFFICE VISIT (OUTPATIENT)
Dept: INFECTIOUS DISEASES | Facility: CLINIC | Age: 57
End: 2017-03-20
Payer: COMMERCIAL

## 2017-03-20 ENCOUNTER — PATIENT MESSAGE (OUTPATIENT)
Dept: ORTHOPEDICS | Facility: CLINIC | Age: 57
End: 2017-03-20

## 2017-03-20 ENCOUNTER — HOSPITAL ENCOUNTER (OUTPATIENT)
Dept: RADIOLOGY | Facility: HOSPITAL | Age: 57
Discharge: HOME | End: 2017-03-20
Attending: ORTHOPAEDIC SURGERY
Payer: COMMERCIAL

## 2017-03-20 ENCOUNTER — OFFICE VISIT (OUTPATIENT)
Dept: ORTHOPEDICS | Facility: CLINIC | Age: 57
End: 2017-03-20
Payer: COMMERCIAL

## 2017-03-20 DIAGNOSIS — M25.561 RIGHT KNEE PAIN, UNSPECIFIED CHRONICITY: Primary | ICD-10-CM

## 2017-03-20 DIAGNOSIS — M25.561 RIGHT KNEE PAIN, UNSPECIFIED CHRONICITY: ICD-10-CM

## 2017-03-20 DIAGNOSIS — Z98.890 S/P RIGHT KNEE ARTHROSCOPY: Primary | ICD-10-CM

## 2017-03-20 DIAGNOSIS — Z98.890 S/P KNEE SURGERY: ICD-10-CM

## 2017-03-20 DIAGNOSIS — M00.9 PYOGENIC ARTHRITIS OF RIGHT KNEE JOINT, DUE TO UNSPECIFIED ORGANISM: Primary | ICD-10-CM

## 2017-03-20 PROBLEM — Z79.2 RECEIVING INTRAVENOUS ANTIBIOTIC TREATMENT AT HOME: Status: RESOLVED | Noted: 2017-03-06 | Resolved: 2017-03-20

## 2017-03-20 LAB
FUNGUS SPEC CULT: NORMAL
FUNGUS SPEC CULT: NORMAL

## 2017-03-20 PROCEDURE — 99999 PR PBB SHADOW E&M-EST. PATIENT-LVL III: CPT | Mod: PBBFAC,,, | Performed by: PHYSICIAN ASSISTANT

## 2017-03-20 PROCEDURE — 99212 OFFICE O/P EST SF 10 MIN: CPT | Mod: S$GLB,,, | Performed by: NURSE PRACTITIONER

## 2017-03-20 PROCEDURE — 1160F RVW MEDS BY RX/DR IN RCRD: CPT | Mod: S$GLB,,, | Performed by: NURSE PRACTITIONER

## 2017-03-20 PROCEDURE — 99999 PR PBB SHADOW E&M-EST. PATIENT-LVL III: CPT | Mod: PBBFAC,,, | Performed by: NURSE PRACTITIONER

## 2017-03-20 PROCEDURE — 99024 POSTOP FOLLOW-UP VISIT: CPT | Mod: S$GLB,,, | Performed by: PHYSICIAN ASSISTANT

## 2017-03-20 RX ORDER — DICLOFENAC SODIUM AND MISOPROSTOL 75; 200 MG/1; UG/1
1 TABLET, DELAYED RELEASE ORAL 2 TIMES DAILY
Qty: 60 TABLET | Refills: 2 | Status: SHIPPED | OUTPATIENT
Start: 2017-03-20 | End: 2017-04-05

## 2017-03-21 ENCOUNTER — TELEPHONE (OUTPATIENT)
Dept: ORTHOPEDICS | Facility: CLINIC | Age: 57
End: 2017-03-21

## 2017-03-21 ENCOUNTER — PATIENT MESSAGE (OUTPATIENT)
Dept: INFECTIOUS DISEASES | Facility: CLINIC | Age: 57
End: 2017-03-21

## 2017-03-21 DIAGNOSIS — M25.561 RIGHT KNEE PAIN, UNSPECIFIED CHRONICITY: Primary | ICD-10-CM

## 2017-03-21 DIAGNOSIS — Z98.890 S/P KNEE SURGERY: ICD-10-CM

## 2017-03-21 DIAGNOSIS — Z98.890 S/P RIGHT KNEE ARTHROSCOPY: Primary | ICD-10-CM

## 2017-03-21 RX ORDER — INDOMETHACIN 50 MG/1
50 CAPSULE ORAL
Qty: 15 CAPSULE | Refills: 0 | Status: SHIPPED | OUTPATIENT
Start: 2017-03-21 | End: 2017-03-26

## 2017-03-21 NOTE — TELEPHONE ENCOUNTER
Called patient re: voltren gel.   New prescription for Indomethacin sent to pharmacy. He is to call if no improvement.

## 2017-03-21 NOTE — PROGRESS NOTES
Subjective:      Patient ID: Virgilio Acuña is a 56 y.o. male.    Chief Complaint:Follow-up (pyogenic arthritis right knee )      History of Present Illness  HPI     Follow-up    Additional comments: pyogenic arthritis right knee        Last edited by MARYAM Márquez, ANP on 3/21/2017  7:24 AM. (History)        Virgilio Acuña is here today  for follow up of right knee arthroscopic irrigation for septic arthritis, meniscectomy for partial tear of lateral meniscus, and synovectomy  by Dr. Lee on 2/17/17.   His surgical cultures were negative and he was discharged on empiric vancomycin and ceftriaxone with plan for 4 weeks of therapy, estimated end date 3/17.  He ended IV therapy last week on 3/15 as PICC was coming out and not functioning properly and was pulled.  At that time, initiated empiric therapy with oral cefadroxil and doxycycline.    I am seeing him today with Orthopedics. He is accompanied by his wife.     He complains of continued pain and swelling in the right knee.  He has in the past had issues with chronic pain, following with pain management doctor with large doses of narcotic medications.  Recently lost pain management physician and has recently established with someone new.  See prior patient communications and telephone calls.   Denies any drainage from surgical incisions, no redness, no fevers, chills.  No n/v/d.   He does complain that the knee remains swollen.        Labs last visit had shown continued elevation of CRP and ESR (52, 93).  Labs last week showed slow trend down, CRP 52 and ESR 68.    No leukocytosis.   He reports he is taking his oral antibiotics without problem.     Review of Systems   Constitution: Negative for chills, fever, malaise/fatigue and night sweats.   Cardiovascular: Negative for chest pain, leg swelling and palpitations.   Respiratory: Negative for cough, shortness of breath and sputum production.    Hematologic/Lymphatic: Negative for adenopathy.   Skin:  Negative for poor wound healing and rash.   Musculoskeletal: Positive for joint pain and joint swelling.   Gastrointestinal: Negative for abdominal pain, diarrhea, nausea and vomiting.   Neurological: Negative for numbness and paresthesias.     Objective:   Physical Exam   Constitutional: He is oriented to person, place, and time. He appears well-developed and well-nourished. No distress.   Eyes: Conjunctivae are normal. No scleral icterus.   Cardiovascular: Normal rate.    Pulmonary/Chest: Effort normal. No respiratory distress.   Abdominal: There is no tenderness.   Musculoskeletal: He exhibits no edema or tenderness.   Right knee with mild TTP on medial side.  Mild swelling compared to left, but no significant appreciable increase from last visit. Surgical site healed.  No drainage.  No warmth or  Erythema of knee.    Neurological: He is alert and oriented to person, place, and time.   Skin: Skin is warm and dry. No rash noted. He is not diaphoretic.   Psychiatric: He has a normal mood and affect.   Vitals reviewed.    Assessment:       1. Pyogenic arthritis of right knee joint, due to unspecified organism    2. S/P knee surgery, medial/lateral menisectomy        56 year old here for follow up of septic arthritis of right knee, now weeks 4 weeks post-op I&D, meniscectomy for partial tear of lateral meniscus, and synovectomy.  All cultures to date have been negative.   Completed close to 4 weeks of empiric IV therapy and now on oral doxy and cefadroxil given continued elevation in inflammatory markers.  He has mild swelling, but no erythema, warmth of knee but continues to complain of unrelieved pain that has been very difficult to manage.  He does not appear to have active infection, but markers have not normalized.  May be more related to his chronic arthritis.  He has not been taking an antiinflammatory.   Will continue oral abx for now.  Will discuss with orthopedics to see if recommend attempt at knee  aspiration and more cultures    Plan:     1.  Doxycycline 100 mg orally twice a day and cefadroxil 1 gram orally twice a day X 1 more week.   2.  Will check cbc, cmp, crp, esr.    3.  Anti-inflammatories as prescribed by Orthopedics.   4.  Follow up with next Orthopedics visit.   5.  Consider Physical Therapy?    6.  Call or seek immediate medical attention for any fevers, chills, sweats, significant increase in swelling of knee over baseline, increase in redness, warmth, drainage from surgical site.

## 2017-03-22 ENCOUNTER — PATIENT MESSAGE (OUTPATIENT)
Dept: ORTHOPEDICS | Facility: CLINIC | Age: 57
End: 2017-03-22

## 2017-03-22 RX ORDER — MELOXICAM 15 MG/1
TABLET ORAL
Qty: 30 TABLET | Refills: 0 | OUTPATIENT
Start: 2017-03-22

## 2017-03-23 ENCOUNTER — PATIENT MESSAGE (OUTPATIENT)
Dept: ORTHOPEDICS | Facility: CLINIC | Age: 57
End: 2017-03-23

## 2017-03-23 NOTE — PROGRESS NOTES
Mr. Acuña is here today for a post-operative visit after a  1. Right knee arthroscopic irrigation for septic arthritis.  2. Right knee arthroscopic meniscectomy for partial tear of lateral meniscus.  3. Right knee arthroscopic synovectomy, minor.   by  on 02/17/2017.  he reports that he is doing not so good.  Pain is not controlled.  he is taking pain medication.  He reports swelling and increased pain in his knee after getting up in the morning.  He stated that he has tried rest ice elevation and antiinflammatory with out any relief. He stated that he does not want anything for the pain he just wants it to go away.   He was followed by pain management, though around the time he had surgery his doctor stopped seeing any patients. The patient has worked on establishing care with a new pain management physician close to his home and he has been able to do so. This physician is decreasing his oxycodone intake and placing him on a patch for potential withdrawal symptoms he may begin to have due to decreased dosage. Patient is also considering a stimulator in his back and or a pain pump.    he denies fever, chills, and sweats since the time of the surgery.   He is followed by infectious disease. He is currently takign antibiotics as prescribed.   Doxycycline 100 mg orally twice a day and cefadroxil 1 gram orally twice a day     Physical exam:  Walked into clinic , mild swelling, mild TTP.  Incision is clean, dry and intact well healed. range of motion knee is 0-90 with mild soreness,     Reviewed RADS with patient form 02/10/2017: DJD changes right knee joint particularly medial compartment and patellofemoral joint    Assessment:  Post-op visit ( 6 weeks)    Plan:  - patient is to rest ice elevate   - pain medication: patient has pain contract with outside main management facility , He is to use NSAID, order placed for voltren gel  - Order for home health PT placed, though patient stated that he does not feel  usman he will be able to participate  - continue antibiotic as prescribed by infectious disease, follow up with infectious disease.   - return to clinic in 6 weeks.

## 2017-03-24 ENCOUNTER — PATIENT MESSAGE (OUTPATIENT)
Dept: ORTHOPEDICS | Facility: CLINIC | Age: 57
End: 2017-03-24

## 2017-03-27 ENCOUNTER — PATIENT MESSAGE (OUTPATIENT)
Dept: ORTHOPEDICS | Facility: CLINIC | Age: 57
End: 2017-03-27

## 2017-03-28 ENCOUNTER — PATIENT MESSAGE (OUTPATIENT)
Dept: ORTHOPEDICS | Facility: CLINIC | Age: 57
End: 2017-03-28

## 2017-03-29 ENCOUNTER — PATIENT MESSAGE (OUTPATIENT)
Dept: ORTHOPEDICS | Facility: CLINIC | Age: 57
End: 2017-03-29

## 2017-03-30 ENCOUNTER — PATIENT MESSAGE (OUTPATIENT)
Dept: ORTHOPEDICS | Facility: CLINIC | Age: 57
End: 2017-03-30

## 2017-03-30 ENCOUNTER — TELEPHONE (OUTPATIENT)
Dept: ORTHOPEDICS | Facility: CLINIC | Age: 57
End: 2017-03-30

## 2017-03-31 ENCOUNTER — CLINICAL SUPPORT (OUTPATIENT)
Dept: FAMILY MEDICINE | Facility: CLINIC | Age: 57
End: 2017-03-31

## 2017-03-31 ENCOUNTER — TELEPHONE (OUTPATIENT)
Dept: INFECTIOUS DISEASES | Facility: CLINIC | Age: 57
End: 2017-03-31

## 2017-03-31 DIAGNOSIS — Z00.00 ROUTINE GENERAL MEDICAL EXAMINATION AT A HEALTH CARE FACILITY: ICD-10-CM

## 2017-03-31 PROCEDURE — 99080 SPECIAL REPORTS OR FORMS: CPT | Mod: S$GLB,,, | Performed by: FAMILY MEDICINE

## 2017-03-31 PROCEDURE — 99201 PR OFFICE/OUTPT VISIT,NEW,LEVL I: CPT | Mod: S$GLB,,, | Performed by: FAMILY MEDICINE

## 2017-03-31 NOTE — PROGRESS NOTES
Virgilio has presented today on behalf of [facility]. Virgilio Acuña has completed Fit for Duty Exam .    Total charge is 75.00      Sharri Matamoros

## 2017-03-31 NOTE — PROGRESS NOTES
Subjective:      Patient ID: Virgilio Acuña is a 56 y.o. male.    Chief Complaint: No chief complaint on file.    HPI  Review of Systems  Objective:     Physical Exam  Assessment:     No diagnosis found.  Plan:     New Prescriptions    No medications on file     Discontinued Medications    No medications on file     Modified Medications    No medications on file       There are no diagnoses linked to this encounter.

## 2017-03-31 NOTE — TELEPHONE ENCOUNTER
Called patient on behalf of [facility]. I asked pt how was he doing since he stopped his abx about 10 days. Patient said as far as his abx he is doing fine, no chills, fever, etc. He said his knee has been giving him problems but he will follow up with his provider for anti inflammatory medicine.

## 2017-04-03 ENCOUNTER — PATIENT MESSAGE (OUTPATIENT)
Dept: ORTHOPEDICS | Facility: CLINIC | Age: 57
End: 2017-04-03

## 2017-04-03 ENCOUNTER — LAB VISIT (OUTPATIENT)
Dept: LAB | Facility: HOSPITAL | Age: 57
End: 2017-04-03
Attending: NURSE PRACTITIONER
Payer: COMMERCIAL

## 2017-04-03 ENCOUNTER — OFFICE VISIT (OUTPATIENT)
Dept: FAMILY MEDICINE | Facility: CLINIC | Age: 57
End: 2017-04-03
Payer: COMMERCIAL

## 2017-04-03 ENCOUNTER — TELEPHONE (OUTPATIENT)
Dept: ORTHOPEDICS | Facility: CLINIC | Age: 57
End: 2017-04-03

## 2017-04-03 VITALS
HEIGHT: 73 IN | DIASTOLIC BLOOD PRESSURE: 69 MMHG | TEMPERATURE: 98 F | SYSTOLIC BLOOD PRESSURE: 149 MMHG | BODY MASS INDEX: 41.75 KG/M2 | HEART RATE: 74 BPM | OXYGEN SATURATION: 96 % | WEIGHT: 315 LBS

## 2017-04-03 DIAGNOSIS — M25.561 RIGHT KNEE PAIN, UNSPECIFIED CHRONICITY: ICD-10-CM

## 2017-04-03 DIAGNOSIS — M25.561 RIGHT KNEE PAIN, UNSPECIFIED CHRONICITY: Primary | ICD-10-CM

## 2017-04-03 DIAGNOSIS — M19.90 ARTHRITIS: ICD-10-CM

## 2017-04-03 LAB
RHEUMATOID FACT SERPL-ACNC: <10 IU/ML
URATE SERPL-MCNC: 7.2 MG/DL

## 2017-04-03 PROCEDURE — 86431 RHEUMATOID FACTOR QUANT: CPT

## 2017-04-03 PROCEDURE — 99999 PR PBB SHADOW E&M-EST. PATIENT-LVL V: CPT | Mod: PBBFAC,,, | Performed by: NURSE PRACTITIONER

## 2017-04-03 PROCEDURE — 36415 COLL VENOUS BLD VENIPUNCTURE: CPT | Mod: PO

## 2017-04-03 PROCEDURE — 1160F RVW MEDS BY RX/DR IN RCRD: CPT | Mod: S$GLB,,, | Performed by: NURSE PRACTITIONER

## 2017-04-03 PROCEDURE — 84550 ASSAY OF BLOOD/URIC ACID: CPT

## 2017-04-03 PROCEDURE — 3078F DIAST BP <80 MM HG: CPT | Mod: S$GLB,,, | Performed by: NURSE PRACTITIONER

## 2017-04-03 PROCEDURE — 99213 OFFICE O/P EST LOW 20 MIN: CPT | Mod: S$GLB,,, | Performed by: NURSE PRACTITIONER

## 2017-04-03 PROCEDURE — 86038 ANTINUCLEAR ANTIBODIES: CPT

## 2017-04-03 PROCEDURE — 3077F SYST BP >= 140 MM HG: CPT | Mod: S$GLB,,, | Performed by: NURSE PRACTITIONER

## 2017-04-03 NOTE — LETTER
April 3, 2017      Hank Fitzgerald MD  [address] [place] [id number]           [facility]  [address] [facility] [address]  Phone: [phone]  Fax: [phone]          Patient: Virgilio Acuña   MR Number: [id number]   YOB: 1960   Date of Visit: 4/3/2017       Dear Dr. Hank Fitzgerald:    Thank you for referring Virgilio Acuña to me for evaluation. Attached you will find relevant portions of my assessment and plan of care.    If you have questions, please do not hesitate to call me. I look forward to following Virgilio Acuña along with you.    Sincerely,    Breanna Askew, MINNA    Enclosure  CC:  No Recipients    If you would like to receive this communication electronically, please contact [email or web address] or [phone] to request more information on [employer] access.    For providers and/or their staff who would like to refer a patient to [facility], please contact us through our one-stop-shop provider referral line, [facility], at [phone].    If you feel you have received this communication in error or would no longer like to receive these types of communications, please e-mail [email or web address]

## 2017-04-03 NOTE — MR AVS SNAPSHOT
"    [facility]  [address] [address] [place] [address]  Phone: [phone]  Fax: [phone]                  Virgilio Acuña   4/3/2017 9:40 AM   Office Visit    Description:  Male : 1960   Provider:  Breanna Askew NP   Department:  [facility]           Reason for Visit     Leg Pain           Diagnoses this Visit        Comments    Right knee pain, unspecified chronicity    -  Primary     Arthritis                To Do List           Future Appointments        Provider Department Dept Phone    4/3/2017 9:40 AM Breanna Askew NP [facility] [phone]    2017 8:00 AM Cynthia Maher PA-C [facility] - Orthopedics [phone]      Goals (5 Years of Data)     None      [facility] On [facility]     [facility] On Call Nurse Care Line -  Assistance  Unless otherwise directed by your provider, please contact [facility], our nurse care line that is available for  assistance.     Registered nurses in the [facility] On Call Center provide: appointment scheduling, clinical advisement, health education, and other advisory services.  Call: [phone] (toll free)               Medications           Message regarding Medications     Verify the changes and/or additions to your medication regime listed below are the same as discussed with your clinician today.  If any of these changes or additions are incorrect, please notify your healthcare provider.             Verify that the below list of medications is an accurate representation of the medications you are currently taking.  If none reported, the list may be blank. If incorrect, please contact your healthcare provider. Carry this list with you in case of emergency.           Current Medications     BD ULTRA-FINE WILDA PEN NEEDLES 32 x 5/32 " Ndle INJECT FOUR TIMES DAILY    blood sugar diagnostic Strp 1 strip by Misc.(Non-Drug; Combo Route) route 3 (three) times daily.    citalopram (CELEXA) 20 " MG tablet TAKE 1 TABLET DAILY    clonazepam (KLONOPIN) 0.5 MG tablet Take 0.5 mg by mouth as needed.     dextrose 5 % SolP 250 mL with vancomycin 1,000 mg SolR 1,500 mg Inject 1,500 mg into the vein every 12 (twelve) hours.    diclofenac-misoprostol  mg-mcg (ARTHROTEC 75)  mg-mcg per tablet Take 1 tablet by mouth 2 (two) times daily.    docusate sodium (COLACE) 100 MG capsule Take 1 capsule (100 mg total) by mouth 2 (two) times daily as needed for Constipation.    enalapril (VASOTEC) 10 MG tablet Take 1 tablet (10 mg total) by mouth once daily.    gabapentin (NEURONTIN) 600 MG tablet Take 1 tablet (600 mg total) by mouth once daily.    gemfibrozil (LOPID) 600 MG tablet TAKE 1 TABLET TWICE A DAY BEFORE MEALS    insulin aspart (NOVOLOG FLEXPEN) 100 unit/mL InPn pen INJECT 65 UNITS SUBCUTANEOUSLY THREE TIMES DAILY WITH MEALS PLUS SLIDING SCALE. Supply with pen needles    insulin glargine (LANTUS SOLOSTAR) 100 unit/mL (3 mL) InPn pen Inject 70 Units into the skin once daily. Supply pen needles    metformin (GLUCOPHAGE-XR) 500 MG 24 hr tablet Take 2 tablets (1,000 mg total) by mouth 2 (two) times daily with meals.    NOVOLOG FLEXPEN 100 unit/mL InPn pen INJECT 65 UNITS UNDER THE SKIN THREE TIMES A DAY WITH MEALS PLUS SLIDING SCALE    ondansetron (ZOFRAN-ODT) 8 MG TbDL Take 1 tablet (8 mg total) by mouth every 6 (six) hours as needed.    oxycodone (ROXICODONE) 15 MG Tab TK 1 TO 2 TS PO Q 4 TO 6 H PRN P MANAGEMENT    oxycodone-acetaminophen (PERCOCET)  mg per tablet Take 1 tablet by mouth every 4 (four) hours as needed for Pain.    pravastatin (PRAVACHOL) 40 MG tablet Take 1 tablet (40 mg total) by mouth once daily.    pravastatin (PRAVACHOL) 40 MG tablet TAKE 1 TABLET DAILY    busPIRone (BUSPAR) 7.5 MG tablet Take 1 tablet (7.5 mg total) by mouth 2 (two) times daily.           Clinical Reference Information           Your Vitals Were     BP Pulse Temp Height Weight SpO2    149/69 74 98.3 °F (36.8 °C) 6'  "1" (1.854 m) 148.2 kg (326 lb 13.3 oz) 96%    BMI                43.12 kg/m2          Blood Pressure          Most Recent Value    BP  (!)  149/69      Allergies as of 4/3/2017     No Known Allergies      Immunizations Administered on Date of Encounter - 4/3/2017     None      Orders Placed During Today's Visit     Future Labs/Procedures Expected by Expires    LINETTE  4/3/2017 6/2/2018    Rheumatoid factor  4/3/2017 6/2/2018    Uric acid  4/3/2017 6/2/2018      Instructions    Report to ER immediately if symptoms worsen       Language Assistance Services     ATTENTION: Language assistance services are available, free of charge. Please call [phone].      ATENCIÓN: Si markla sterling, tiene a maravilla disposición servicios gratuitos de asistencia lingüística. Llame al [phone].     CHÚ Ý: N?u b?n nói Ti?ng Vi?t, có các d?ch v? h? tr? ngôn ng? mi?n phí dành cho b?n. G?i s? [phone].         [facility] complies with applicable Federal civil rights laws and does not discriminate on the basis of race, color, national origin, age, disability, or sex.        "

## 2017-04-03 NOTE — PROGRESS NOTES
Subjective:       Patient ID: Virgilio Acuña is a 56 y.o. male.    Chief Complaint: Leg Pain    Knee Pain    The incident occurred more than 1 week ago (Pt states began after arthroscopy). There was no injury mechanism. The pain is present in the right knee. The pain is moderate. The pain has been constant since onset. Pertinent negatives include no inability to bear weight, loss of motion, loss of sensation, muscle weakness, numbness or tingling. He reports no foreign bodies present. The symptoms are aggravated by weight bearing and movement. He has tried NSAIDs (Also takes percocet, roxicodone, neurontin, arthrotec) for the symptoms. The treatment provided no relief (Saw orthopedics, infectious disease on 3/20/17).   ESR done 3/20/17; elevated. WBC on 3/20/17 WNL. Right knee xray on 2/10/2017 showed DJD (refer to results).   Past Medical History:   Diagnosis Date    Cellulitis of left index finger 2/16/2015    Dyslipidemia     Essential hypertension 2/16/2017    Infected finger joint 2/17/2015    Obese     S/P knee surgery, medial/lateral menisectomy 11/4/2013    Type 2 diabetes mellitus with diabetic polyneuropathy, with long-term current use of insulin 2003     Social History     Social History    Marital status:      Spouse name: N/A    Number of children: N/A    Years of education: N/A     Occupational History     [employer]     Social History Main Topics    Smoking status: Never Smoker    Smokeless tobacco: Never Used    Alcohol use Yes      Comment: occasional    Drug use: No    Sexual activity: Yes     Partners: Female     Social History Narrative     Past Surgical History:   Procedure Laterality Date    ELBOW SURGERY      KNEE CARTILAGE SURGERY  10/21/2013    right knee    KNEE SURGERY Right 02/17/2017    Left index finger surgery  03/2015       Review of Systems   Constitutional: Negative.    HENT: Negative.    Eyes:  Negative.    Respiratory: Negative.    Cardiovascular: Negative.    Gastrointestinal: Negative.    Endocrine: Negative.    Genitourinary: Negative.    Musculoskeletal:        Right knee pain   Skin: Negative.    Allergic/Immunologic: Negative.    Neurological: Negative.  Negative for tingling and numbness.   Psychiatric/Behavioral: Negative.        Objective:      Physical Exam   Constitutional: He is oriented to person, place, and time. He appears well-developed and well-nourished.   HENT:   Head: Normocephalic.   Right Ear: External ear normal.   Left Ear: External ear normal.   Nose: Nose normal.   Mouth/Throat: Oropharynx is clear and moist.   Eyes: Conjunctivae are normal. Pupils are equal, round, and reactive to light.   Neck: Normal range of motion. Neck supple.   Cardiovascular: Normal rate, regular rhythm and normal heart sounds.    Pulmonary/Chest: Effort normal and breath sounds normal.   Abdominal: Soft. Bowel sounds are normal.   Musculoskeletal: Normal range of motion.        Right knee: He exhibits swelling (mild) and bony tenderness. He exhibits normal range of motion, no effusion, no ecchymosis, no deformity, no laceration, no erythema, normal alignment, no LCL laxity, normal meniscus and no MCL laxity. No tenderness found.   Neurological: He is alert and oriented to person, place, and time.   Skin: Skin is warm and dry.   Psychiatric: He has a normal mood and affect. His behavior is normal. Judgment and thought content normal.   Nursing note and vitals reviewed.      Assessment:       1. Right knee pain, unspecified chronicity    2. Arthritis        Plan:       Virgilio was seen today for leg pain.    Diagnoses and all orders for this visit:    Right knee pain, unspecified chronicity  Arthritis  -     Uric acid; Future  -     LINETTE; Future  -     Rheumatoid factor; Future  Continue current medications  Report to ER immediately if symptoms worsen

## 2017-04-04 ENCOUNTER — PATIENT MESSAGE (OUTPATIENT)
Dept: FAMILY MEDICINE | Facility: CLINIC | Age: 57
End: 2017-04-04

## 2017-04-04 ENCOUNTER — TELEPHONE (OUTPATIENT)
Dept: FAMILY MEDICINE | Facility: CLINIC | Age: 57
End: 2017-04-04

## 2017-04-04 ENCOUNTER — PATIENT MESSAGE (OUTPATIENT)
Dept: ORTHOPEDICS | Facility: CLINIC | Age: 57
End: 2017-04-04

## 2017-04-04 LAB — ANA SER QL IF: NORMAL

## 2017-04-04 NOTE — TELEPHONE ENCOUNTER
Reviewed; uric acid mildly elevated. Continue arthrotec as prescribed. Recommend f/u with PCP as needed.

## 2017-04-05 ENCOUNTER — PATIENT MESSAGE (OUTPATIENT)
Dept: FAMILY MEDICINE | Facility: CLINIC | Age: 57
End: 2017-04-05

## 2017-04-05 ENCOUNTER — DOCUMENTATION ONLY (OUTPATIENT)
Dept: ORTHOPEDICS | Facility: CLINIC | Age: 57
End: 2017-04-05

## 2017-04-05 ENCOUNTER — TELEPHONE (OUTPATIENT)
Dept: INTERNAL MEDICINE | Facility: CLINIC | Age: 57
End: 2017-04-05

## 2017-04-05 DIAGNOSIS — M10.9 GOUTY ARTHRITIS: Primary | ICD-10-CM

## 2017-04-05 RX ORDER — COLCHICINE 0.6 MG/1
0.6 TABLET ORAL 2 TIMES DAILY
Qty: 10 TABLET | Refills: 0 | Status: SHIPPED | OUTPATIENT
Start: 2017-04-05 | End: 2017-05-09 | Stop reason: SDUPTHER

## 2017-04-05 RX ORDER — DICLOFENAC SODIUM 75 MG/1
75 TABLET, DELAYED RELEASE ORAL 2 TIMES DAILY
Qty: 60 TABLET | Refills: 1 | Status: SHIPPED | OUTPATIENT
Start: 2017-04-05 | End: 2017-07-31

## 2017-04-05 RX ORDER — INSULIN ASPART 100 [IU]/ML
INJECTION, SOLUTION INTRAVENOUS; SUBCUTANEOUS
Qty: 60 ML | Refills: 1 | Status: SHIPPED | OUTPATIENT
Start: 2017-04-05 | End: 2017-05-04

## 2017-04-05 NOTE — TELEPHONE ENCOUNTER
----- Message from Shital Miranda sent at 4/4/2017  3:59 PM CDT -----  Call pt at [phone]//returning your call//chantelle lunsford

## 2017-04-05 NOTE — TELEPHONE ENCOUNTER
Pt's lab levels came back with elevated uric acid. Patient was prescribed arthotec by orthopedic PA but needed prior authorization. The prescription has been cancelled. Patient would like prescription sent to [facility].

## 2017-04-05 NOTE — PROGRESS NOTES
Called and Informed patient of an reschedule appointment, due to JULIAN Maher PA-C will not be in the office 05/08/17 . New appt 05/09/17at 1045/mailed.  Patient states verbal understanding and has no further questions.

## 2017-04-14 LAB
ACID FAST MOD KINY STN SPEC: NORMAL
MYCOBACTERIUM SPEC QL CULT: NORMAL

## 2017-04-17 LAB
ACID FAST MOD KINY STN SPEC: NORMAL
MYCOBACTERIUM SPEC QL CULT: NORMAL

## 2017-04-20 ENCOUNTER — CLINICAL SUPPORT (OUTPATIENT)
Dept: FAMILY MEDICINE | Facility: CLINIC | Age: 57
End: 2017-04-20

## 2017-04-20 DIAGNOSIS — Z00.00 ROUTINE GENERAL MEDICAL EXAMINATION AT A HEALTH CARE FACILITY: Primary | ICD-10-CM

## 2017-04-20 PROCEDURE — 99201 PR OFFICE/OUTPT VISIT,NEW,LEVL I: CPT | Mod: S$GLB,,, | Performed by: FAMILY MEDICINE

## 2017-04-20 PROCEDURE — 80307 DRUG TEST PRSMV CHEM ANLYZR: CPT | Mod: S$GLB,,, | Performed by: FAMILY MEDICINE

## 2017-04-20 NOTE — PROGRESS NOTES
Virgilio has presented today for return to work screening on behalf of [facility] [employer]. Virgilio Acuña has completed Non-DOT Physical and drugscreen.    Total charge $90    Elisha Vincent

## 2017-04-21 LAB
ACID FAST MOD KINY STN SPEC: NORMAL
MYCOBACTERIUM SPEC QL CULT: NORMAL

## 2017-04-29 ENCOUNTER — PATIENT MESSAGE (OUTPATIENT)
Dept: ORTHOPEDICS | Facility: CLINIC | Age: 57
End: 2017-04-29

## 2017-05-01 ENCOUNTER — PATIENT MESSAGE (OUTPATIENT)
Dept: ENDOCRINOLOGY | Facility: CLINIC | Age: 57
End: 2017-05-01

## 2017-05-02 DIAGNOSIS — E78.5 OTHER AND UNSPECIFIED HYPERLIPIDEMIA: ICD-10-CM

## 2017-05-02 RX ORDER — INSULIN GLARGINE 100 [IU]/ML
70 INJECTION, SOLUTION SUBCUTANEOUS DAILY
Qty: 2 BOX | Refills: 3 | Status: SHIPPED | OUTPATIENT
Start: 2017-05-02 | End: 2017-05-04 | Stop reason: SDUPTHER

## 2017-05-02 RX ORDER — INSULIN ASPART 100 [IU]/ML
INJECTION, SOLUTION INTRAVENOUS; SUBCUTANEOUS
Qty: 60 ML | Refills: 3 | Status: SHIPPED | OUTPATIENT
Start: 2017-05-02 | End: 2017-05-04

## 2017-05-02 RX ORDER — GABAPENTIN 600 MG/1
600 TABLET ORAL DAILY
Qty: 90 TABLET | Refills: 3 | Status: SHIPPED | OUTPATIENT
Start: 2017-05-02 | End: 2017-05-24 | Stop reason: SDUPTHER

## 2017-05-02 RX ORDER — INSULIN LISPRO 100 [IU]/ML
INJECTION, SOLUTION INTRAVENOUS; SUBCUTANEOUS
Qty: 60 ML | Refills: 3 | Status: SHIPPED | OUTPATIENT
Start: 2017-05-02 | End: 2017-05-04 | Stop reason: SDUPTHER

## 2017-05-02 NOTE — TELEPHONE ENCOUNTER
Spoke w/ pt, he's now saying his insurance no longer covers novolog, will need to change to humalog.  Also asking for refill of gabapentin, not lyrica as requested in previous message.  Med pended and routed.  Will be sent to [facility] in [place] as pt is out of both meds and cannot wait for the mail order pharmacy.

## 2017-05-04 ENCOUNTER — TELEPHONE (OUTPATIENT)
Dept: ENDOCRINOLOGY | Facility: CLINIC | Age: 57
End: 2017-05-04

## 2017-05-04 RX ORDER — INSULIN GLARGINE 100 [IU]/ML
70 INJECTION, SOLUTION SUBCUTANEOUS DAILY
Qty: 2 BOX | Refills: 3 | Status: ON HOLD | OUTPATIENT
Start: 2017-05-04 | End: 2018-03-12

## 2017-05-04 RX ORDER — INSULIN LISPRO 100 [IU]/ML
INJECTION, SOLUTION INTRAVENOUS; SUBCUTANEOUS
Qty: 60 ML | Refills: 3 | Status: ON HOLD | OUTPATIENT
Start: 2017-05-04 | End: 2018-03-12

## 2017-05-04 NOTE — TELEPHONE ENCOUNTER
Pt is requesting lantus and humalog to be sent to [facility] per his insurance.  Meds pended and routed to MARYAM Mancilla.

## 2017-05-04 NOTE — TELEPHONE ENCOUNTER
----- Message from Patricia Harris sent at 5/4/2017 11:13 AM CDT -----  Contact: patient  Patient returning a missed call. Please advise. Call to pod. No answer.  Call back .  Thanks!

## 2017-05-04 NOTE — TELEPHONE ENCOUNTER
----- Message from Amber Clarke sent at 5/4/2017 10:52 AM CDT -----  Patient is calling concerning the insulin and medications that was sent to the pharmacy in [place].Because of his insurance he needs the insulin sent through his home delivery. Please resend to:      [facility] - [address] [address]  Phone: [phone] Fax: [phone]    Please call when completed at [phone].

## 2017-05-09 DIAGNOSIS — M25.561 RIGHT KNEE PAIN, UNSPECIFIED CHRONICITY: Primary | ICD-10-CM

## 2017-05-09 DIAGNOSIS — M10.9 GOUTY ARTHRITIS: ICD-10-CM

## 2017-05-09 RX ORDER — COLCHICINE 0.6 MG/1
0.6 TABLET ORAL 2 TIMES DAILY
Qty: 10 TABLET | Refills: 0 | Status: SHIPPED | OUTPATIENT
Start: 2017-05-09 | End: 2017-08-02

## 2017-05-10 ENCOUNTER — TELEPHONE (OUTPATIENT)
Dept: RHEUMATOLOGY | Facility: CLINIC | Age: 57
End: 2017-05-10

## 2017-05-10 ENCOUNTER — TELEPHONE (OUTPATIENT)
Dept: ORTHOPEDICS | Facility: CLINIC | Age: 57
End: 2017-05-10

## 2017-05-10 NOTE — TELEPHONE ENCOUNTER
Spoke with patient. He stated that he was concerned that a gout test was not run prior to surgery. Explained that an analysis was completed of his joint fluid and was negative for crystals. Patient then was concerned on why I was not treating him long term for gout. I explained that usually primary care or Rheumatology is best for this condition and that an appointment was made for him to follow up for this. Patient stated that he did not understand why I could not start him on medication. He expressed his concern for his up coming schedule being busy. I explained to him that a condition like gout is best explained and treated long term by a rheumatologist or the physician that had ordered his uric acid labs.

## 2017-05-10 NOTE — TELEPHONE ENCOUNTER
Attempted to contact pt. To offer sooner appt, unavailable at this time, left message to call clinic back.

## 2017-05-23 ENCOUNTER — PATIENT MESSAGE (OUTPATIENT)
Dept: ENDOCRINOLOGY | Facility: CLINIC | Age: 57
End: 2017-05-23

## 2017-05-23 DIAGNOSIS — E78.5 OTHER AND UNSPECIFIED HYPERLIPIDEMIA: ICD-10-CM

## 2017-05-25 ENCOUNTER — TELEPHONE (OUTPATIENT)
Dept: ENDOCRINOLOGY | Facility: CLINIC | Age: 57
End: 2017-05-25

## 2017-05-25 RX ORDER — GABAPENTIN 600 MG/1
600 TABLET ORAL 3 TIMES DAILY
Qty: 270 TABLET | Refills: 3 | Status: SHIPPED | OUTPATIENT
Start: 2017-05-25 | End: 2017-07-14 | Stop reason: SDUPTHER

## 2017-05-25 NOTE — TELEPHONE ENCOUNTER
Called pt again to verify pharmacy.  No answer.  Called pt's wife, verified pharmacy.  Verbalized understanding.

## 2017-05-25 NOTE — TELEPHONE ENCOUNTER
----- Message from Kristy Buchanan sent at 5/24/2017  3:21 PM CDT -----  Contact: [phone]  Patient is returning nurse's phone call.  Please call patient back at [phone].

## 2017-06-14 RX ORDER — CITALOPRAM 20 MG/1
TABLET, FILM COATED ORAL
Qty: 90 TABLET | Refills: 4 | Status: SHIPPED | OUTPATIENT
Start: 2017-06-14 | End: 2017-08-02

## 2017-06-28 RX ORDER — ENALAPRIL MALEATE 10 MG/1
TABLET ORAL
Qty: 90 TABLET | Refills: 0 | Status: SHIPPED | OUTPATIENT
Start: 2017-06-28 | End: 2017-07-31

## 2017-06-29 ENCOUNTER — PATIENT MESSAGE (OUTPATIENT)
Dept: RHEUMATOLOGY | Facility: CLINIC | Age: 57
End: 2017-06-29

## 2017-06-30 ENCOUNTER — TELEPHONE (OUTPATIENT)
Dept: RHEUMATOLOGY | Facility: CLINIC | Age: 57
End: 2017-06-30

## 2017-07-01 ENCOUNTER — PATIENT MESSAGE (OUTPATIENT)
Dept: RHEUMATOLOGY | Facility: CLINIC | Age: 57
End: 2017-07-01

## 2017-07-06 ENCOUNTER — PATIENT MESSAGE (OUTPATIENT)
Dept: FAMILY MEDICINE | Facility: CLINIC | Age: 57
End: 2017-07-06

## 2017-07-06 ENCOUNTER — PATIENT MESSAGE (OUTPATIENT)
Dept: ENDOCRINOLOGY | Facility: CLINIC | Age: 57
End: 2017-07-06

## 2017-07-07 ENCOUNTER — PATIENT MESSAGE (OUTPATIENT)
Dept: FAMILY MEDICINE | Facility: CLINIC | Age: 57
End: 2017-07-07

## 2017-07-07 ENCOUNTER — OFFICE VISIT (OUTPATIENT)
Dept: FAMILY MEDICINE | Facility: CLINIC | Age: 57
End: 2017-07-07
Payer: COMMERCIAL

## 2017-07-07 ENCOUNTER — LAB VISIT (OUTPATIENT)
Dept: LAB | Facility: HOSPITAL | Age: 57
End: 2017-07-07
Attending: FAMILY MEDICINE
Payer: COMMERCIAL

## 2017-07-07 VITALS
DIASTOLIC BLOOD PRESSURE: 82 MMHG | WEIGHT: 313.06 LBS | SYSTOLIC BLOOD PRESSURE: 159 MMHG | TEMPERATURE: 98 F | BODY MASS INDEX: 41.49 KG/M2 | HEART RATE: 81 BPM | HEIGHT: 73 IN

## 2017-07-07 DIAGNOSIS — E11.42 TYPE 2 DIABETES MELLITUS WITH DIABETIC POLYNEUROPATHY, WITH LONG-TERM CURRENT USE OF INSULIN: Primary | ICD-10-CM

## 2017-07-07 DIAGNOSIS — R53.83 FATIGUE, UNSPECIFIED TYPE: ICD-10-CM

## 2017-07-07 DIAGNOSIS — Z79.4 TYPE 2 DIABETES MELLITUS WITH DIABETIC POLYNEUROPATHY, WITH LONG-TERM CURRENT USE OF INSULIN: ICD-10-CM

## 2017-07-07 DIAGNOSIS — E34.9 HYPOTESTOSTERONEMIA: ICD-10-CM

## 2017-07-07 DIAGNOSIS — Z79.4 TYPE 2 DIABETES MELLITUS WITH DIABETIC POLYNEUROPATHY, WITH LONG-TERM CURRENT USE OF INSULIN: Primary | ICD-10-CM

## 2017-07-07 DIAGNOSIS — I10 ESSENTIAL HYPERTENSION: ICD-10-CM

## 2017-07-07 DIAGNOSIS — M35.3 POLYMYALGIA: ICD-10-CM

## 2017-07-07 DIAGNOSIS — E11.42 TYPE 2 DIABETES MELLITUS WITH DIABETIC POLYNEUROPATHY, WITH LONG-TERM CURRENT USE OF INSULIN: ICD-10-CM

## 2017-07-07 LAB
ALBUMIN SERPL BCP-MCNC: 3.9 G/DL
ALP SERPL-CCNC: 70 U/L
ALT SERPL W/O P-5'-P-CCNC: 16 U/L
AMPHET+METHAMPHET UR QL: NEGATIVE
ANION GAP SERPL CALC-SCNC: 10 MMOL/L
AST SERPL-CCNC: 19 U/L
BARBITURATES UR QL SCN>200 NG/ML: NEGATIVE
BASOPHILS # BLD AUTO: 0.04 K/UL
BASOPHILS NFR BLD: 0.5 %
BENZODIAZ UR QL SCN>200 NG/ML: NEGATIVE
BILIRUB SERPL-MCNC: 0.5 MG/DL
BUN SERPL-MCNC: 14 MG/DL
BZE UR QL SCN: NEGATIVE
CALCIUM SERPL-MCNC: 9.7 MG/DL
CANNABINOIDS UR QL SCN: NEGATIVE
CHLORIDE SERPL-SCNC: 101 MMOL/L
CO2 SERPL-SCNC: 26 MMOL/L
CREAT SERPL-MCNC: 1 MG/DL
CREAT UR-MCNC: 150 MG/DL
CREAT UR-MCNC: 153 MG/DL
DIFFERENTIAL METHOD: NORMAL
EOSINOPHIL # BLD AUTO: 0.2 K/UL
EOSINOPHIL NFR BLD: 2.3 %
ERYTHROCYTE [DISTWIDTH] IN BLOOD BY AUTOMATED COUNT: 14.3 %
ERYTHROCYTE [SEDIMENTATION RATE] IN BLOOD BY WESTERGREN METHOD: 16 MM/HR
EST. GFR  (AFRICAN AMERICAN): >60 ML/MIN/1.73 M^2
EST. GFR  (NON AFRICAN AMERICAN): >60 ML/MIN/1.73 M^2
ETHANOL UR-MCNC: <10 MG/DL
FOLATE SERPL-MCNC: 14.6 NG/ML
GLUCOSE SERPL-MCNC: 146 MG/DL
HCT VFR BLD AUTO: 47 %
HGB BLD-MCNC: 15.1 G/DL
LYMPHOCYTES # BLD AUTO: 1.8 K/UL
LYMPHOCYTES NFR BLD: 20.8 %
MCH RBC QN AUTO: 28.5 PG
MCHC RBC AUTO-ENTMCNC: 32.1 %
MCV RBC AUTO: 89 FL
METHADONE UR QL SCN>300 NG/ML: NEGATIVE
MICROALBUMIN UR DL<=1MG/L-MCNC: 96 UG/ML
MICROALBUMIN/CREATININE RATIO: 62.7 UG/MG
MONOCYTES # BLD AUTO: 0.6 K/UL
MONOCYTES NFR BLD: 7.1 %
NEUTROPHILS # BLD AUTO: 6 K/UL
NEUTROPHILS NFR BLD: 68.7 %
OPIATES UR QL SCN: NEGATIVE
PCP UR QL SCN>25 NG/ML: NEGATIVE
PLATELET # BLD AUTO: 336 K/UL
PMV BLD AUTO: 10.1 FL
POTASSIUM SERPL-SCNC: 4.3 MMOL/L
PROT SERPL-MCNC: 7.9 G/DL
RBC # BLD AUTO: 5.29 M/UL
SODIUM SERPL-SCNC: 137 MMOL/L
TESTOST SERPL-MCNC: 360 NG/DL
TOXICOLOGY INFORMATION: NORMAL
TSH SERPL DL<=0.005 MIU/L-ACNC: 1.17 UIU/ML
VIT B12 SERPL-MCNC: 486 PG/ML
WBC # BLD AUTO: 8.76 K/UL

## 2017-07-07 PROCEDURE — 80053 COMPREHEN METABOLIC PANEL: CPT

## 2017-07-07 PROCEDURE — 85025 COMPLETE CBC W/AUTO DIFF WBC: CPT

## 2017-07-07 PROCEDURE — 84443 ASSAY THYROID STIM HORMONE: CPT

## 2017-07-07 PROCEDURE — 3045F PR MOST RECENT HEMOGLOBIN A1C LEVEL 7.0-9.0%: CPT | Mod: S$GLB,,, | Performed by: FAMILY MEDICINE

## 2017-07-07 PROCEDURE — 85651 RBC SED RATE NONAUTOMATED: CPT | Mod: PO

## 2017-07-07 PROCEDURE — 82607 VITAMIN B-12: CPT

## 2017-07-07 PROCEDURE — 99215 OFFICE O/P EST HI 40 MIN: CPT | Mod: S$GLB,,, | Performed by: FAMILY MEDICINE

## 2017-07-07 PROCEDURE — 80307 DRUG TEST PRSMV CHEM ANLYZR: CPT

## 2017-07-07 PROCEDURE — 82746 ASSAY OF FOLIC ACID SERUM: CPT

## 2017-07-07 PROCEDURE — 82570 ASSAY OF URINE CREATININE: CPT

## 2017-07-07 PROCEDURE — 84403 ASSAY OF TOTAL TESTOSTERONE: CPT

## 2017-07-07 PROCEDURE — 99999 PR PBB SHADOW E&M-EST. PATIENT-LVL III: CPT | Mod: PBBFAC,,, | Performed by: FAMILY MEDICINE

## 2017-07-07 PROCEDURE — 83036 HEMOGLOBIN GLYCOSYLATED A1C: CPT

## 2017-07-07 PROCEDURE — 36415 COLL VENOUS BLD VENIPUNCTURE: CPT | Mod: PO

## 2017-07-07 PROCEDURE — 4010F ACE/ARB THERAPY RXD/TAKEN: CPT | Mod: S$GLB,,, | Performed by: FAMILY MEDICINE

## 2017-07-07 RX ORDER — PREGABALIN 75 MG/1
CAPSULE ORAL
Refills: 0 | COMMUNITY
Start: 2017-05-15 | End: 2017-07-07

## 2017-07-07 RX ORDER — HYDROXYZINE PAMOATE 50 MG/1
100 CAPSULE ORAL NIGHTLY
Qty: 60 CAPSULE | Refills: 11 | Status: SHIPPED | OUTPATIENT
Start: 2017-07-07 | End: 2017-07-31

## 2017-07-07 RX ORDER — AMITRIPTYLINE HYDROCHLORIDE 10 MG/1
TABLET, FILM COATED ORAL
Refills: 1 | COMMUNITY
Start: 2017-06-09 | End: 2017-07-07

## 2017-07-07 RX ORDER — CLONAZEPAM 1 MG/1
TABLET ORAL
Refills: 2 | COMMUNITY
Start: 2017-06-02 | End: 2017-07-31

## 2017-07-07 NOTE — PROGRESS NOTES
"  Virgilio Acuña presents with moderate severe gen myalgia arthralgia el BP severe 24/7 fatigue El uric acid poss hypotestosterone  DM not recently cked Also has chronic neuropathy legs.  Past Medical History:   Diagnosis Date    Cellulitis of left index finger 2/16/2015    Dyslipidemia     Essential hypertension 2/16/2017    Infected finger joint 2/17/2015    Obese     S/P knee surgery, medial/lateral menisectomy 11/4/2013    Type 2 diabetes mellitus with diabetic polyneuropathy, with long-term current use of insulin 2003     Past Surgical History:   Procedure Laterality Date    ELBOW SURGERY      KNEE CARTILAGE SURGERY  10/21/2013    right knee    KNEE SURGERY Right 02/17/2017    Left index finger surgery  03/2015     No Known Allergies  Current Outpatient Prescriptions on File Prior to Visit   Medication Sig Dispense Refill    BD ULTRA-FINE WILDA PEN NEEDLES 32 x 5/32 " Ndle INJECT FOUR TIMES DAILY 4 each 3    blood sugar diagnostic Strp 1 strip by Misc.(Non-Drug; Combo Route) route 3 (three) times daily. 360 each 3    citalopram (CELEXA) 20 MG tablet TAKE 1 TABLET DAILY 90 tablet 4    enalapril (VASOTEC) 10 MG tablet TAKE 1 TABLET DAILY 90 tablet 0    gabapentin (NEURONTIN) 600 MG tablet Take 1 tablet (600 mg total) by mouth 3 (three) times daily. 270 tablet 3    gemfibrozil (LOPID) 600 MG tablet TAKE 1 TABLET TWICE A DAY BEFORE MEALS 180 tablet 2    metformin (GLUCOPHAGE-XR) 500 MG 24 hr tablet Take 2 tablets (1,000 mg total) by mouth 2 (two) times daily with meals. 360 tablet 2    pravastatin (PRAVACHOL) 40 MG tablet Take 1 tablet (40 mg total) by mouth once daily. 30 tablet 1    busPIRone (BUSPAR) 7.5 MG tablet Take 1 tablet (7.5 mg total) by mouth 2 (two) times daily. 60 tablet 3    clonazepam (KLONOPIN) 0.5 MG tablet Take 0.5 mg by mouth as needed.       colchicine 0.6 mg tablet Take 1 tablet (0.6 mg total) by mouth 2 (two) times daily. 10 tablet 0    dextrose 5 % SolP 250 mL with " vancomycin 1,000 mg SolR 1,500 mg Inject 1,500 mg into the vein every 12 (twelve) hours. 1500 mg 27    diclofenac (VOLTAREN) 75 MG EC tablet Take 1 tablet (75 mg total) by mouth 2 (two) times daily. 60 tablet 1    docusate sodium (COLACE) 100 MG capsule Take 1 capsule (100 mg total) by mouth 2 (two) times daily as needed for Constipation. 60 capsule 1    insulin glargine (LANTUS SOLOSTAR) 100 unit/mL (3 mL) InPn pen Inject 70 Units into the skin once daily. Supply pen needles 2 Box 3    insulin lispro (HUMALOG) 100 unit/mL injection Inject 65 units into the skin three times daily with meals plus sliding scale.  Supply with pen needles. 60 mL 3    ondansetron (ZOFRAN-ODT) 8 MG TbDL Take 1 tablet (8 mg total) by mouth every 6 (six) hours as needed. 30 tablet 0    oxycodone (ROXICODONE) 15 MG Tab TK 1 TO 2 TS PO Q 4 TO 6 H PRN P MANAGEMENT  0    oxycodone-acetaminophen (PERCOCET)  mg per tablet Take 1 tablet by mouth every 4 (four) hours as needed for Pain. 41 tablet 0    pravastatin (PRAVACHOL) 40 MG tablet TAKE 1 TABLET DAILY 90 tablet 1     No current facility-administered medications on file prior to visit.      Social History     Social History    Marital status:      Spouse name: N/A    Number of children: N/A    Years of education: N/A     Occupational History     [employer]     Social History Main Topics    Smoking status: Never Smoker    Smokeless tobacco: Never Used    Alcohol use Yes      Comment: occasional    Drug use: No    Sexual activity: Yes     Partners: Female     Other Topics Concern    Not on file     Social History Narrative    No narrative on file     Family History   Problem Relation Age of Onset    Diabetes Mother     Diabetes Father          ROS:  SKIN: No rashes, itching or changes in color or texture of skin.  EYES: Visual acuity fine. No photophobia, ocular pain or diplopia.EARS: Denies ear pain,  discharge or vertigo.NOSE: No loss of smell, no epistaxis some postnasal drip.MOUTH & THROAT: No hoarseness or change in voice. No excessive gum bleeding.CHEST: Denies SHARP, cyanosis, wheezing  CARDIOVASCULAR: Denies chest pain, PND, orthopnea or reduced exercise tolerance.  ABDOMEN:  No weight loss.No abdominal pain, no hematemesis or blood in stool.  URINARY: No flank pain, dysuria or hematuria.  PERIPHERAL VASCULAR: No claudication or cyanosis.  MUSCULOSKELETAL: Negative   NEUROLOGIC: No history of seizures, paralysis, alteration of gait or coordination.    PE: Vital signs as noted  Heent:Normocephalic with no recent cranial trauma,PERRLA,EOMI,conjunctiva clear,fundi reveal no hemmorhage exudate or papilledema.Otic canals clear, tympanic membranes slightly dull bilaterally.Nasal mucosa slightly red and edematous.Posterior pharynx slightly red but without exudate.  Neck:Supple with minimal anterior cervical adenopathy.  Chest:Clear bilateral breath sounds  Heart:Regular rhthym without murmer  Abdomen:Soft, non tender,no masses, no hepatosplenomegaly  Ext Gen degen changes and myalgia   Impression:Neuropathy  Hypotestosterone  Anxiety  Insomnia  Diabetes/neuropathy  Xerostomia  MARCOS Poole was seen today for knee pain, fatigue and other.    Diagnoses and all orders for this visit:    Type 2 diabetes mellitus with diabetic polyneuropathy, with long-term current use of insulin  -     CBC auto differential; Future  -     Comprehensive metabolic panel; Future  -     TSH; Future  -     Microalbumin/creatinine urine ratio; Future  -     Hemoglobin A1c; Future  -     Sedimentation rate, manual; Future  -     TOXICOLOGY SCREEN, URINE, RANDOM (COMPLIANCE)    Essential hypertension    Hypotestosteronemia  -     Testosterone; Future    Polymyalgia    Fatigue, unspecified type      Plan: Lab eval  Rec testosterone supplement   Weight dose guy to night  Consider allopurinol   45 min ov >50 % counseling dx and tx options Incr  clonazepam

## 2017-07-08 ENCOUNTER — PATIENT MESSAGE (OUTPATIENT)
Dept: ENDOCRINOLOGY | Facility: CLINIC | Age: 57
End: 2017-07-08

## 2017-07-08 LAB
ESTIMATED AVG GLUCOSE: 166 MG/DL
HBA1C MFR BLD HPLC: 7.4 %

## 2017-07-08 NOTE — TELEPHONE ENCOUNTER
If no side effects from nortriptyline -rec much higher dose than we were trying -Ill wait to send rx til he confirms no side effects bc if did have side effects we should consider different med like topiramate

## 2017-07-11 ENCOUNTER — PATIENT MESSAGE (OUTPATIENT)
Dept: FAMILY MEDICINE | Facility: CLINIC | Age: 57
End: 2017-07-11

## 2017-07-11 ENCOUNTER — TELEPHONE (OUTPATIENT)
Dept: FAMILY MEDICINE | Facility: CLINIC | Age: 57
End: 2017-07-11

## 2017-07-11 DIAGNOSIS — E11.42 TYPE 2 DIABETES MELLITUS WITH DIABETIC POLYNEUROPATHY, WITH LONG-TERM CURRENT USE OF INSULIN: Primary | ICD-10-CM

## 2017-07-11 DIAGNOSIS — E34.9 HYPOTESTOSTERONEMIA: ICD-10-CM

## 2017-07-11 DIAGNOSIS — M35.3 POLYMYALGIA: ICD-10-CM

## 2017-07-11 DIAGNOSIS — Z79.4 TYPE 2 DIABETES MELLITUS WITH DIABETIC POLYNEUROPATHY, WITH LONG-TERM CURRENT USE OF INSULIN: Primary | ICD-10-CM

## 2017-07-12 NOTE — TELEPHONE ENCOUNTER
Testosterone normal values are also unique to each lab. I have never treated patients with a value above 300-he could con  if he wants to explore that option

## 2017-07-14 ENCOUNTER — PATIENT MESSAGE (OUTPATIENT)
Dept: FAMILY MEDICINE | Facility: CLINIC | Age: 57
End: 2017-07-14

## 2017-07-14 DIAGNOSIS — E78.5 OTHER AND UNSPECIFIED HYPERLIPIDEMIA: Primary | ICD-10-CM

## 2017-07-17 ENCOUNTER — PATIENT OUTREACH (OUTPATIENT)
Dept: ADMINISTRATIVE | Facility: HOSPITAL | Age: 57
End: 2017-07-17

## 2017-07-17 ENCOUNTER — PATIENT MESSAGE (OUTPATIENT)
Dept: FAMILY MEDICINE | Facility: CLINIC | Age: 57
End: 2017-07-17

## 2017-07-17 ENCOUNTER — PATIENT MESSAGE (OUTPATIENT)
Dept: ADMINISTRATIVE | Facility: HOSPITAL | Age: 57
End: 2017-07-17

## 2017-07-17 DIAGNOSIS — E78.5 OTHER AND UNSPECIFIED HYPERLIPIDEMIA: ICD-10-CM

## 2017-07-17 RX ORDER — GABAPENTIN 600 MG/1
600 TABLET ORAL 3 TIMES DAILY
Qty: 90 TABLET | Refills: 0 | Status: SHIPPED | OUTPATIENT
Start: 2017-07-17 | End: 2018-04-17 | Stop reason: SDUPTHER

## 2017-07-17 RX ORDER — GABAPENTIN 600 MG/1
600 TABLET ORAL 3 TIMES DAILY
Qty: 90 TABLET | Refills: 0 | Status: SHIPPED | OUTPATIENT
Start: 2017-07-17 | End: 2017-07-17 | Stop reason: SDUPTHER

## 2017-07-17 NOTE — TELEPHONE ENCOUNTER
----- Message from Jazlyn Garcia sent at 7/17/2017  1:15 PM CDT -----  needs gabapentin 30 day refill called in (3x/day) (mail in won't be in til middle of next mth)..[phone]

## 2017-07-17 NOTE — TELEPHONE ENCOUNTER
Patient is out of gabapentin and waiting for new script from [facility] to come in.  Patient would also like to have uric acid checked out too

## 2017-07-21 RX ORDER — GEMFIBROZIL 600 MG/1
TABLET, FILM COATED ORAL
Qty: 180 TABLET | Refills: 0 | Status: SHIPPED | OUTPATIENT
Start: 2017-07-21 | End: 2017-08-02

## 2017-07-21 RX ORDER — METFORMIN HYDROCHLORIDE 500 MG/1
TABLET, EXTENDED RELEASE ORAL
Qty: 360 TABLET | Refills: 0 | Status: SHIPPED | OUTPATIENT
Start: 2017-07-21 | End: 2018-04-17 | Stop reason: SDUPTHER

## 2017-07-31 ENCOUNTER — OFFICE VISIT (OUTPATIENT)
Dept: FAMILY MEDICINE | Facility: CLINIC | Age: 57
End: 2017-07-31
Payer: COMMERCIAL

## 2017-07-31 ENCOUNTER — LAB VISIT (OUTPATIENT)
Dept: LAB | Facility: HOSPITAL | Age: 57
End: 2017-07-31
Attending: FAMILY MEDICINE
Payer: COMMERCIAL

## 2017-07-31 VITALS
DIASTOLIC BLOOD PRESSURE: 88 MMHG | WEIGHT: 315 LBS | BODY MASS INDEX: 41.75 KG/M2 | HEIGHT: 73 IN | HEART RATE: 72 BPM | SYSTOLIC BLOOD PRESSURE: 143 MMHG

## 2017-07-31 DIAGNOSIS — E34.9 HYPOTESTOSTERONEMIA: ICD-10-CM

## 2017-07-31 DIAGNOSIS — E11.42 TYPE 2 DIABETES MELLITUS WITH DIABETIC POLYNEUROPATHY, WITH LONG-TERM CURRENT USE OF INSULIN: Primary | ICD-10-CM

## 2017-07-31 DIAGNOSIS — M10.9 GOUT, UNSPECIFIED CAUSE, UNSPECIFIED CHRONICITY, UNSPECIFIED SITE: ICD-10-CM

## 2017-07-31 DIAGNOSIS — M19.012 ARTHRITIS OF LEFT SHOULDER REGION: ICD-10-CM

## 2017-07-31 DIAGNOSIS — M35.3 POLYMYALGIA: ICD-10-CM

## 2017-07-31 DIAGNOSIS — E66.01 SEVERE OBESITY (BMI >= 40): ICD-10-CM

## 2017-07-31 DIAGNOSIS — I10 ESSENTIAL HYPERTENSION: ICD-10-CM

## 2017-07-31 DIAGNOSIS — Z79.4 TYPE 2 DIABETES MELLITUS WITH DIABETIC POLYNEUROPATHY, WITH LONG-TERM CURRENT USE OF INSULIN: Primary | ICD-10-CM

## 2017-07-31 LAB
TESTOST SERPL-MCNC: 214 NG/DL
URATE SERPL-MCNC: 6.4 MG/DL

## 2017-07-31 PROCEDURE — 84403 ASSAY OF TOTAL TESTOSTERONE: CPT

## 2017-07-31 PROCEDURE — 20610 DRAIN/INJ JOINT/BURSA W/O US: CPT | Mod: S$GLB,,, | Performed by: FAMILY MEDICINE

## 2017-07-31 PROCEDURE — 90670 PCV13 VACCINE IM: CPT | Mod: S$GLB,,, | Performed by: FAMILY MEDICINE

## 2017-07-31 PROCEDURE — 36415 COLL VENOUS BLD VENIPUNCTURE: CPT | Mod: PO

## 2017-07-31 PROCEDURE — 99999 PR PBB SHADOW E&M-EST. PATIENT-LVL II: CPT | Mod: PBBFAC,,, | Performed by: FAMILY MEDICINE

## 2017-07-31 PROCEDURE — 99214 OFFICE O/P EST MOD 30 MIN: CPT | Mod: 25,S$GLB,, | Performed by: FAMILY MEDICINE

## 2017-07-31 PROCEDURE — 3045F PR MOST RECENT HEMOGLOBIN A1C LEVEL 7.0-9.0%: CPT | Mod: S$GLB,,, | Performed by: FAMILY MEDICINE

## 2017-07-31 PROCEDURE — 90471 IMMUNIZATION ADMIN: CPT | Mod: S$GLB,,, | Performed by: FAMILY MEDICINE

## 2017-07-31 PROCEDURE — 84550 ASSAY OF BLOOD/URIC ACID: CPT

## 2017-07-31 PROCEDURE — 4010F ACE/ARB THERAPY RXD/TAKEN: CPT | Mod: S$GLB,,, | Performed by: FAMILY MEDICINE

## 2017-07-31 RX ORDER — ENALAPRIL MALEATE 20 MG/1
20 TABLET ORAL DAILY
Qty: 90 TABLET | Refills: 3 | Status: SHIPPED | OUTPATIENT
Start: 2017-07-31 | End: 2018-04-17 | Stop reason: SDUPTHER

## 2017-07-31 NOTE — PROGRESS NOTES
"  Virgilio Acuña presents with moderate severe gen myalgia arthralgia rt knee left shoulder. Hx gout w el uric acid recently Fu el BP severe 24/7 fatigue Last nl but hx  hypotestosterone  DM better a1c 7.4  Also has chronic neuropathy legs.  Past Medical History:   Diagnosis Date    Cellulitis of left index finger 2/16/2015    Dyslipidemia     Essential hypertension 2/16/2017    Infected finger joint 2/17/2015    Obese     S/P knee surgery, medial/lateral menisectomy 11/4/2013    Type 2 diabetes mellitus with diabetic polyneuropathy, with long-term current use of insulin 2003     Past Surgical History:   Procedure Laterality Date    ELBOW SURGERY      KNEE CARTILAGE SURGERY  10/21/2013    right knee    KNEE SURGERY Right 02/17/2017    Left index finger surgery  03/2015     No Known Allergies  Current Outpatient Prescriptions on File Prior to Visit   Medication Sig Dispense Refill    blood sugar diagnostic Strp 1 strip by Misc.(Non-Drug; Combo Route) route 3 (three) times daily. 360 each 3    citalopram (CELEXA) 20 MG tablet TAKE 1 TABLET DAILY 90 tablet 4    enalapril (VASOTEC) 10 MG tablet TAKE 1 TABLET DAILY 90 tablet 0    gabapentin (NEURONTIN) 600 MG tablet Take 1 tablet (600 mg total) by mouth 3 (three) times daily. 90 tablet 0    gemfibrozil (LOPID) 600 MG tablet TAKE 1 TABLET TWICE A DAY BEFORE MEALS 180 tablet 0    insulin glargine (LANTUS SOLOSTAR) 100 unit/mL (3 mL) InPn pen Inject 70 Units into the skin once daily. Supply pen needles 2 Box 3    insulin lispro (HUMALOG) 100 unit/mL injection Inject 65 units into the skin three times daily with meals plus sliding scale.  Supply with pen needles. 60 mL 3    metformin (GLUCOPHAGE-XR) 500 MG 24 hr tablet TAKE 2 TABLETS TWICE A DAY WITH MEALS 360 tablet 0    pravastatin (PRAVACHOL) 40 MG tablet Take 1 tablet (40 mg total) by mouth once daily. 30 tablet 1    BD ULTRA-FINE WILDA PEN NEEDLES 32 x 5/32 " Ndle INJECT FOUR TIMES DAILY 4 each 3    " busPIRone (BUSPAR) 7.5 MG tablet Take 1 tablet (7.5 mg total) by mouth 2 (two) times daily. 60 tablet 3    colchicine 0.6 mg tablet Take 1 tablet (0.6 mg total) by mouth 2 (two) times daily. 10 tablet 0    [DISCONTINUED] clonazepam (KLONOPIN) 0.5 MG tablet Take 0.5 mg by mouth as needed.       [DISCONTINUED] clonazePAM (KLONOPIN) 1 MG tablet TK 1 T PO  QPM  2    [DISCONTINUED] CMPD diclofenac 3%- cyclobenzaprine 2%- baclofen 2%- gabapentin 6%- lidocaine 2%- prilocaine 2% transdermal gel Apply 1 to 2 grams up to 4 times daily as directed for additional pain relief 240 g 11    [DISCONTINUED] diclofenac (VOLTAREN) 75 MG EC tablet Take 1 tablet (75 mg total) by mouth 2 (two) times daily. 60 tablet 1    [DISCONTINUED] docusate sodium (COLACE) 100 MG capsule Take 1 capsule (100 mg total) by mouth 2 (two) times daily as needed for Constipation. 60 capsule 1    [DISCONTINUED] hydrOXYzine pamoate (VISTARIL) 50 MG Cap Take 2 capsules (100 mg total) by mouth every evening. 60 capsule 11    [DISCONTINUED] ondansetron (ZOFRAN-ODT) 8 MG TbDL Take 1 tablet (8 mg total) by mouth every 6 (six) hours as needed. 30 tablet 0    [DISCONTINUED] pravastatin (PRAVACHOL) 40 MG tablet TAKE 1 TABLET DAILY 90 tablet 1     No current facility-administered medications on file prior to visit.      Social History     Social History    Marital status:      Spouse name: N/A    Number of children: N/A    Years of education: N/A     Occupational History     [employer]     Social History Main Topics    Smoking status: Never Smoker    Smokeless tobacco: Never Used    Alcohol use Yes      Comment: occasional    Drug use: No    Sexual activity: Yes     Partners: Female     Other Topics Concern    Not on file     Social History Narrative    No narrative on file     Family History   Problem Relation Age of Onset    Diabetes Mother     Diabetes Father           ROS:  SKIN: No rashes, itching or changes in color or texture of skin.  EYES: Visual acuity fine. No photophobia, ocular pain or diplopia.EARS: Denies ear pain, discharge or vertigo.NOSE: No loss of smell, no epistaxis some postnasal drip.MOUTH & THROAT: No hoarseness or change in voice. No excessive gum bleeding.CHEST: Denies SHARP, cyanosis, wheezing  CARDIOVASCULAR: Denies chest pain, PND, orthopnea or reduced exercise tolerance.  ABDOMEN:  No weight loss.No abdominal pain, no hematemesis or blood in stool.  URINARY: No flank pain, dysuria or hematuria.  PERIPHERAL VASCULAR: No claudication or cyanosis.  MUSCULOSKELETAL: Negative   NEUROLOGIC: No history of seizures, paralysis, alteration of gait or coordination.    PE: Vital signs as noted  Heent:Normocephalic with no recent cranial trauma,PERRLA,EOMI,conjunctiva clear,fundi reveal no hemmorhage exudate or papilledema.Otic canals clear, tympanic membranes slightly dull bilaterally.Nasal mucosa slightly red and edematous.Posterior pharynx slightly red but without exudate.  Neck:Supple with minimal anterior cervical adenopathy.  Chest:Clear bilateral breath sounds  Heart:Regular rhthym without murmer  Abdomen:Soft, non tender,no masses, no hepatosplenomegaly  Ext Gen degen changes and myalgia   Impression:Neuropathy  Hypotestosterone  Obesity BMI 41.7   Anxiety  Insomnia  Diabetes/neuropathy  Xerostomia  HLP   Plan: Lab eval  Consider testosterone supplement   Weight dose guy to night  Consider allopurinol   Incr enalpril 10 to 20  Consider Surg eval for wt loss   Procedure - after sterile prep intraarticular inj decadron 4mg medrol 40 mg posterior lt shoulder wo complications, aftercare instructions given

## 2017-08-01 ENCOUNTER — PATIENT MESSAGE (OUTPATIENT)
Dept: FAMILY MEDICINE | Facility: CLINIC | Age: 57
End: 2017-08-01

## 2017-08-02 ENCOUNTER — PATIENT MESSAGE (OUTPATIENT)
Dept: FAMILY MEDICINE | Facility: CLINIC | Age: 57
End: 2017-08-02

## 2017-08-02 ENCOUNTER — CLINICAL SUPPORT (OUTPATIENT)
Dept: FAMILY MEDICINE | Facility: CLINIC | Age: 57
End: 2017-08-02

## 2017-08-02 DIAGNOSIS — Z79.4 TYPE 2 DIABETES MELLITUS WITH DIABETIC POLYNEUROPATHY, WITH LONG-TERM CURRENT USE OF INSULIN: Primary | ICD-10-CM

## 2017-08-02 DIAGNOSIS — Z00.00 ROUTINE GENERAL MEDICAL EXAMINATION AT A HEALTH CARE FACILITY: ICD-10-CM

## 2017-08-02 DIAGNOSIS — E11.42 TYPE 2 DIABETES MELLITUS WITH DIABETIC POLYNEUROPATHY, WITH LONG-TERM CURRENT USE OF INSULIN: Primary | ICD-10-CM

## 2017-08-02 LAB — GLUCOSE SERPL-MCNC: 142 MG/DL (ref 70–110)

## 2017-08-02 PROCEDURE — 82948 REAGENT STRIP/BLOOD GLUCOSE: CPT | Mod: ,,, | Performed by: FAMILY MEDICINE

## 2017-08-02 PROCEDURE — 80305 DRUG TEST PRSMV DIR OPT OBS: CPT | Mod: S$GLB,,, | Performed by: FAMILY MEDICINE

## 2017-08-02 PROCEDURE — 99201 PR OFFICE/OUTPT VISIT,NEW,LEVL I: CPT | Mod: S$GLB,,, | Performed by: FAMILY MEDICINE

## 2017-08-02 NOTE — PROGRESS NOTES
Virgilio has presented today on behalf of [place] [facility]. Virgilio Acuña has completed Non-DOT Physical and Non-DOT Drug Screen .  poct glucose (53981) also done  $25      Total $115    Melanie Hernandez

## 2017-08-04 ENCOUNTER — TELEPHONE (OUTPATIENT)
Dept: FAMILY MEDICINE | Facility: CLINIC | Age: 57
End: 2017-08-04

## 2017-08-04 RX ORDER — TESTOSTERONE CYPIONATE 200 MG/ML
300 INJECTION, SOLUTION INTRAMUSCULAR
Qty: 10 ML | Refills: 0 | Status: SHIPPED | OUTPATIENT
Start: 2017-08-04 | End: 2017-09-26 | Stop reason: SDUPTHER

## 2017-08-05 ENCOUNTER — PATIENT MESSAGE (OUTPATIENT)
Dept: FAMILY MEDICINE | Facility: CLINIC | Age: 57
End: 2017-08-05

## 2017-09-11 ENCOUNTER — LAB VISIT (OUTPATIENT)
Dept: LAB | Facility: HOSPITAL | Age: 57
End: 2017-09-11
Attending: FAMILY MEDICINE
Payer: COMMERCIAL

## 2017-09-11 DIAGNOSIS — E34.9 HYPOTESTOSTERONEMIA: ICD-10-CM

## 2017-09-11 DIAGNOSIS — M35.3 POLYMYALGIA: ICD-10-CM

## 2017-09-11 LAB — ERYTHROCYTE [SEDIMENTATION RATE] IN BLOOD BY WESTERGREN METHOD: 10 MM/HR

## 2017-09-11 PROCEDURE — 84403 ASSAY OF TOTAL TESTOSTERONE: CPT

## 2017-09-11 PROCEDURE — 85651 RBC SED RATE NONAUTOMATED: CPT | Mod: PO

## 2017-09-11 PROCEDURE — 36415 COLL VENOUS BLD VENIPUNCTURE: CPT | Mod: PO

## 2017-09-12 DIAGNOSIS — E78.5 OTHER AND UNSPECIFIED HYPERLIPIDEMIA: ICD-10-CM

## 2017-09-12 LAB — TESTOST SERPL-MCNC: 872 NG/DL

## 2017-09-12 RX ORDER — PRAVASTATIN SODIUM 40 MG/1
TABLET ORAL
Qty: 90 TABLET | Refills: 1 | Status: SHIPPED | OUTPATIENT
Start: 2017-09-12 | End: 2018-04-17 | Stop reason: SDUPTHER

## 2017-09-26 ENCOUNTER — PATIENT MESSAGE (OUTPATIENT)
Dept: FAMILY MEDICINE | Facility: CLINIC | Age: 57
End: 2017-09-26

## 2017-09-26 RX ORDER — TESTOSTERONE CYPIONATE 200 MG/ML
300 INJECTION, SOLUTION INTRAMUSCULAR
Qty: 10 ML | Refills: 0 | Status: SHIPPED | OUTPATIENT
Start: 2017-09-26 | End: 2017-10-18 | Stop reason: SDUPTHER

## 2017-09-26 NOTE — TELEPHONE ENCOUNTER
----- Message from Vicki Mcmahon sent at 9/26/2017 12:51 PM CDT -----  Pt states he is returning the nurses call but did not disclose /please call [phone]/ma

## 2017-09-30 ENCOUNTER — PATIENT MESSAGE (OUTPATIENT)
Dept: FAMILY MEDICINE | Facility: CLINIC | Age: 57
End: 2017-09-30

## 2017-10-04 ENCOUNTER — OFFICE VISIT (OUTPATIENT)
Dept: FAMILY MEDICINE | Facility: CLINIC | Age: 57
End: 2017-10-04
Payer: COMMERCIAL

## 2017-10-04 VITALS
SYSTOLIC BLOOD PRESSURE: 130 MMHG | BODY MASS INDEX: 41.75 KG/M2 | DIASTOLIC BLOOD PRESSURE: 73 MMHG | HEART RATE: 83 BPM | HEIGHT: 73 IN | WEIGHT: 315 LBS | TEMPERATURE: 98 F

## 2017-10-04 DIAGNOSIS — Z79.4 TYPE 2 DIABETES MELLITUS WITH DIABETIC POLYNEUROPATHY, WITH LONG-TERM CURRENT USE OF INSULIN: ICD-10-CM

## 2017-10-04 DIAGNOSIS — E11.42 TYPE 2 DIABETES MELLITUS WITH DIABETIC POLYNEUROPATHY, WITH LONG-TERM CURRENT USE OF INSULIN: ICD-10-CM

## 2017-10-04 DIAGNOSIS — I10 ESSENTIAL HYPERTENSION: ICD-10-CM

## 2017-10-04 DIAGNOSIS — M54.42 ACUTE BILATERAL LOW BACK PAIN WITH BILATERAL SCIATICA: Primary | ICD-10-CM

## 2017-10-04 DIAGNOSIS — M54.41 ACUTE BILATERAL LOW BACK PAIN WITH BILATERAL SCIATICA: Primary | ICD-10-CM

## 2017-10-04 PROCEDURE — 99213 OFFICE O/P EST LOW 20 MIN: CPT | Mod: 25,S$GLB,, | Performed by: NURSE PRACTITIONER

## 2017-10-04 PROCEDURE — 96372 THER/PROPH/DIAG INJ SC/IM: CPT | Mod: S$GLB,,, | Performed by: NURSE PRACTITIONER

## 2017-10-04 PROCEDURE — 99999 PR PBB SHADOW E&M-EST. PATIENT-LVL III: CPT | Mod: PBBFAC,,, | Performed by: NURSE PRACTITIONER

## 2017-10-04 RX ORDER — KETOROLAC TROMETHAMINE 30 MG/ML
30 INJECTION, SOLUTION INTRAMUSCULAR; INTRAVENOUS
Status: COMPLETED | OUTPATIENT
Start: 2017-10-04 | End: 2017-10-04

## 2017-10-04 RX ORDER — METHYLPREDNISOLONE ACETATE 40 MG/ML
40 INJECTION, SUSPENSION INTRA-ARTICULAR; INTRALESIONAL; INTRAMUSCULAR; SOFT TISSUE
Status: COMPLETED | OUTPATIENT
Start: 2017-10-04 | End: 2017-10-04

## 2017-10-04 RX ORDER — MELOXICAM 15 MG/1
15 TABLET ORAL DAILY
Qty: 30 TABLET | Refills: 0 | Status: SHIPPED | OUTPATIENT
Start: 2017-10-04 | End: 2017-11-03

## 2017-10-04 RX ORDER — TIZANIDINE 4 MG/1
2 TABLET ORAL EVERY 6 HOURS PRN
Qty: 30 TABLET | Refills: 0 | Status: SHIPPED | OUTPATIENT
Start: 2017-10-04 | End: 2017-10-14

## 2017-10-04 RX ADMIN — KETOROLAC TROMETHAMINE 30 MG: 30 INJECTION, SOLUTION INTRAMUSCULAR; INTRAVENOUS at 09:10

## 2017-10-04 RX ADMIN — METHYLPREDNISOLONE ACETATE 40 MG: 40 INJECTION, SUSPENSION INTRA-ARTICULAR; INTRALESIONAL; INTRAMUSCULAR; SOFT TISSUE at 09:10

## 2017-10-04 NOTE — PROGRESS NOTES
Subjective:      Patient ID: Virgilio Acuña is a 57 y.o. male.    Chief Complaint: Back Pain and Blood Pressure Check    HPI   Back Pain  Patient presents for evaluation of low back problems. Symptoms have been present for 1 day and include constant aching, nagging pain and shooting pain with certain movements.  Pain radiates down both legs.. Initial inciting event: none. Symptoms are worse at nighttime. Alleviating factors identifiable by the patient are none. Aggravating factors identifiable by the patient are recumbency, sitting, standing and walking. Treatments initiated by the patient: Advil. Previous lower back problems: Sacroiliac joint dysfunction. Previous work up: xray, MRI. Previous treatments: SI joint injections.  Denies bowel and bladder incontinence.  Reports diabetic neuropathy in bilateral feet, unchanged with back pain.    The patient voices a diagnosis of hypertension for which he currently takes enalapril.  Medication was adjusted at last visit.  He is tolerating the medication well and is in good compliance.  The patient is experiencing no side effects.  He reports a reduced salt intake.  He denies a regular exercise regimen.  He reports occasional leg swelling noted with increased sodium intake.  He denies chest pain, palpitations, shortness of breath, dyspnea on exertion, headache, blurred vision, neck pain, nausea, vomiting, diaphoresis, paroxysmal nocturnal dyspnea, or orthopnea.     Review of Systems   Constitutional: Negative for chills, fatigue and fever.   HENT: Negative.    Eyes: Negative.    Respiratory: Negative for cough, shortness of breath and wheezing.    Cardiovascular: Positive for leg swelling. Negative for chest pain and palpitations.   Endocrine: Negative.    Musculoskeletal: Positive for arthralgias, back pain and myalgias.   Skin: Negative for rash and wound.   Allergic/Immunologic: Negative.    Neurological: Positive for numbness (tingling). Negative for weakness.  "  Hematological: Negative.    Psychiatric/Behavioral: The patient is not nervous/anxious.        Objective:     /73   Pulse 83   Temp 97.7 °F (36.5 °C) (Oral)   Ht 6' 1" (1.854 m)   Wt (!) 149 kg (328 lb 7.8 oz)   BMI 43.34 kg/m²     Physical Exam   Constitutional: He is oriented to person, place, and time. He appears well-developed and well-nourished.   HENT:   Head: Normocephalic.   Eyes: Pupils are equal, round, and reactive to light.   Cardiovascular: Normal rate, regular rhythm and normal heart sounds.    Pulmonary/Chest: Effort normal and breath sounds normal. No respiratory distress. He has no wheezes. He has no rales.   Musculoskeletal: Normal range of motion. He exhibits edema (trace edema BLE). He exhibits no deformity.        Lumbar back: He exhibits tenderness, pain and spasm. He exhibits normal range of motion, no bony tenderness, no swelling, no edema, no deformity and no laceration.        Back:    Neurological: He is alert and oriented to person, place, and time.   Skin: Skin is warm and dry. No rash noted.   Psychiatric: He has a normal mood and affect. His behavior is normal. Judgment and thought content normal.   Vitals reviewed.    Assessment:     1. Acute bilateral low back pain with bilateral sciatica    2. Essential hypertension    3. Type 2 diabetes mellitus with diabetic polyneuropathy, with long-term current use of insulin        Plan:   The patient was seen today for follow up and back pain  Acute bilateral low back pain with bilateral sciatica  -     methylPREDNISolone acetate injection 40 mg; Inject 1 mL (40 mg total) into the muscle one time.  -     ketorolac injection 30 mg; Inject 30 mg into the muscle one time.  -     tizanidine (ZANAFLEX) 4 MG tablet; Take 0.5 tablets (2 mg total) by mouth every 6 (six) hours as needed (muscle spasm).  Dispense: 30 tablet; Refill: 0  -     meloxicam (MOBIC) 15 MG tablet; Take 1 tablet (15 mg total) by mouth once daily.  Dispense: 30 " tablet; Refill: 0  -     Educational handout provided    Essential hypertension  Continue medication as prescribed    Type 2 diabetes mellitus with diabetic polyneuropathy, with long-term current use of insulin  Monitor glucose closely due to depo-medrol injection and report back if severe elevation or persistent elevation in glucose occurs    Return if symptoms worsen or fail to improve.

## 2017-10-11 ENCOUNTER — PATIENT MESSAGE (OUTPATIENT)
Dept: FAMILY MEDICINE | Facility: CLINIC | Age: 57
End: 2017-10-11

## 2017-10-18 ENCOUNTER — OFFICE VISIT (OUTPATIENT)
Dept: FAMILY MEDICINE | Facility: CLINIC | Age: 57
End: 2017-10-18
Payer: COMMERCIAL

## 2017-10-18 VITALS
BODY MASS INDEX: 41.75 KG/M2 | HEIGHT: 73 IN | WEIGHT: 315 LBS | HEART RATE: 74 BPM | SYSTOLIC BLOOD PRESSURE: 120 MMHG | TEMPERATURE: 98 F | DIASTOLIC BLOOD PRESSURE: 71 MMHG

## 2017-10-18 DIAGNOSIS — M35.3 POLYMYALGIA: ICD-10-CM

## 2017-10-18 DIAGNOSIS — I10 ESSENTIAL HYPERTENSION: ICD-10-CM

## 2017-10-18 DIAGNOSIS — E78.5 DYSLIPIDEMIA: ICD-10-CM

## 2017-10-18 DIAGNOSIS — E34.9 HYPOTESTOSTERONEMIA: ICD-10-CM

## 2017-10-18 DIAGNOSIS — Z79.4 TYPE 2 DIABETES MELLITUS WITH DIABETIC POLYNEUROPATHY, WITH LONG-TERM CURRENT USE OF INSULIN: Primary | ICD-10-CM

## 2017-10-18 DIAGNOSIS — E66.01 SEVERE OBESITY (BMI >= 40): ICD-10-CM

## 2017-10-18 DIAGNOSIS — E11.42 TYPE 2 DIABETES MELLITUS WITH DIABETIC POLYNEUROPATHY, WITH LONG-TERM CURRENT USE OF INSULIN: Primary | ICD-10-CM

## 2017-10-18 PROBLEM — L97.529 FOOT ULCER, LEFT: Status: RESOLVED | Noted: 2017-02-18 | Resolved: 2017-10-18

## 2017-10-18 PROBLEM — M00.9 PYOGENIC ARTHRITIS OF RIGHT KNEE JOINT: Status: RESOLVED | Noted: 2017-02-16 | Resolved: 2017-10-18

## 2017-10-18 PROBLEM — Z51.81 THERAPEUTIC DRUG MONITORING: Status: RESOLVED | Noted: 2017-03-06 | Resolved: 2017-10-18

## 2017-10-18 PROCEDURE — 99214 OFFICE O/P EST MOD 30 MIN: CPT | Mod: S$GLB,,, | Performed by: FAMILY MEDICINE

## 2017-10-18 PROCEDURE — 99999 PR PBB SHADOW E&M-EST. PATIENT-LVL III: CPT | Mod: PBBFAC,,, | Performed by: FAMILY MEDICINE

## 2017-10-18 RX ORDER — HYDROXYZINE PAMOATE 50 MG/1
CAPSULE ORAL
Refills: 11 | COMMUNITY
Start: 2017-10-03 | End: 2018-08-22 | Stop reason: CLARIF

## 2017-10-18 RX ORDER — CITALOPRAM 20 MG/1
20 TABLET, FILM COATED ORAL DAILY
Status: ON HOLD | COMMUNITY
End: 2018-03-12 | Stop reason: HOSPADM

## 2017-10-18 RX ORDER — TESTOSTERONE CYPIONATE 200 MG/ML
300 INJECTION, SOLUTION INTRAMUSCULAR
Qty: 10 ML | Refills: 0 | Status: SHIPPED | OUTPATIENT
Start: 2017-10-18 | End: 2018-05-01 | Stop reason: SDUPTHER

## 2017-10-18 NOTE — PROGRESS NOTES
"  Virgilio Acuña presents with moderate severe rt SI pain Also fu  gen myalgia arthralgia Fu el BP severe 24/7 fatigue Last nl but hx  hypotestosterone  DM better a1c  Also has chronic neuropathy legs.  Past Medical History:   Diagnosis Date    Cellulitis of left index finger 2/16/2015    Dyslipidemia     Essential hypertension 2/16/2017    Infected finger joint 2/17/2015    Obese     S/P knee surgery, medial/lateral menisectomy 11/4/2013    Type 2 diabetes mellitus with diabetic polyneuropathy, with long-term current use of insulin 2003     Past Surgical History:   Procedure Laterality Date    ELBOW SURGERY      KNEE CARTILAGE SURGERY  10/21/2013    right knee    KNEE SURGERY Right 02/17/2017    Left index finger surgery  03/2015     No Known Allergies  Current Outpatient Prescriptions on File Prior to Visit   Medication Sig Dispense Refill    BD ULTRA-FINE WILDA PEN NEEDLES 32 x 5/32 " Ndle INJECT FOUR TIMES DAILY 4 each 3    blood sugar diagnostic Strp 1 strip by Misc.(Non-Drug; Combo Route) route 3 (three) times daily. 360 each 3    enalapril (VASOTEC) 20 MG tablet Take 1 tablet (20 mg total) by mouth once daily. 90 tablet 3    gabapentin (NEURONTIN) 600 MG tablet Take 1 tablet (600 mg total) by mouth 3 (three) times daily. 90 tablet 0    insulin glargine (LANTUS SOLOSTAR) 100 unit/mL (3 mL) InPn pen Inject 70 Units into the skin once daily. Supply pen needles 2 Box 3    insulin lispro (HUMALOG) 100 unit/mL injection Inject 65 units into the skin three times daily with meals plus sliding scale.  Supply with pen needles. 60 mL 3    metformin (GLUCOPHAGE-XR) 500 MG 24 hr tablet TAKE 2 TABLETS TWICE A DAY WITH MEALS 360 tablet 0    pravastatin (PRAVACHOL) 40 MG tablet TAKE 1 TABLET DAILY 90 tablet 1    safety needles 22 gauge x 1 1/2" Ndle To be used once a month for testosterone injections 50 each 0    testosterone cypionate (DEPOTESTOTERONE CYPIONATE) 200 mg/mL injection Inject 1.5 mLs (300 mg " total) into the muscle every 28 days. 10 mL 0    meloxicam (MOBIC) 15 MG tablet Take 1 tablet (15 mg total) by mouth once daily. 30 tablet 0     No current facility-administered medications on file prior to visit.      Social History     Social History    Marital status:      Spouse name: N/A    Number of children: N/A    Years of education: N/A     Occupational History     [employer]     Social History Main Topics    Smoking status: Never Smoker    Smokeless tobacco: Never Used    Alcohol use Yes      Comment: occasional    Drug use: No    Sexual activity: Yes     Partners: Female     Other Topics Concern    Not on file     Social History Narrative    No narrative on file     Family History   Problem Relation Age of Onset    Diabetes Mother     Diabetes Father          ROS:  SKIN: No rashes, itching or changes in color or texture of skin.  EYES: Visual acuity fine. No photophobia, ocular pain or diplopia.EARS: Denies ear pain, discharge or vertigo.NOSE: No loss of smell, no epistaxis some postnasal drip.MOUTH & THROAT: No hoarseness or change in voice. No excessive gum bleeding.CHEST: Denies SHRAP, cyanosis, wheezing  CARDIOVASCULAR: Denies chest pain, PND, orthopnea or reduced exercise tolerance.  ABDOMEN:  No weight loss.No abdominal pain, no hematemesis or blood in stool.  URINARY: No flank pain, dysuria or hematuria.  PERIPHERAL VASCULAR: No claudication or cyanosis.  MUSCULOSKELETAL: Negative   NEUROLOGIC: No history of seizures, paralysis, alteration of gait or coordination.    PE: Vital signs as noted  Heent:Normocephalic with no recent cranial trauma,PERRLA,EOMI,conjunctiva clear,fundi reveal no hemmorhage exudate or papilledema.Otic canals clear, tympanic membranes slightly dull bilaterally.Nasal mucosa slightly red and edematous.Posterior pharynx slightly red but without exudate.  Neck:Supple with minimal anterior cervical  adenopathy.  Chest:Clear bilateral breath sounds  Heart:Regular rhthym without murmer  Abdomen:Soft, non tender,no masses, no hepatosplenomegaly  Ext Gen degen changes and myalgia   Impression:Neuropathy  Hypotestosterone  Obesity BMI 41.7   Anxiety  Insomnia  Diabetes/neuropathy  Xerostomia  HLP   Plan: Lab eval rev  Continue testosterone supplement   Consider allopurinol   Cont enalpril  20  Consider Surg eval for wt loss

## 2017-10-22 RX ORDER — METFORMIN HYDROCHLORIDE 500 MG/1
TABLET, EXTENDED RELEASE ORAL
Qty: 360 TABLET | Refills: 0
Start: 2017-10-22

## 2017-10-22 RX ORDER — GEMFIBROZIL 600 MG/1
TABLET, FILM COATED ORAL
Qty: 180 TABLET | Refills: 0
Start: 2017-10-22

## 2017-10-23 ENCOUNTER — PATIENT MESSAGE (OUTPATIENT)
Dept: FAMILY MEDICINE | Facility: CLINIC | Age: 57
End: 2017-10-23

## 2017-10-24 NOTE — TELEPHONE ENCOUNTER
It's a good sign actually. The steroid is a potent anti inflammatory. The fact that it helps even temporarily suggests that the cause of the pain is inflammatory. We need to try some different non steroid anti-inflammatories-if we find the one that works best in your system Id expect improvement over time. We need to know what antiinflammatories have already been tried-like the current meloxicam-including otc and if any stood out or gave side effects

## 2017-11-14 ENCOUNTER — CLINICAL SUPPORT (OUTPATIENT)
Dept: FAMILY MEDICINE | Facility: CLINIC | Age: 57
End: 2017-11-14

## 2017-11-14 DIAGNOSIS — Z00.00 ROUTINE GENERAL MEDICAL EXAMINATION AT A HEALTH CARE FACILITY: Primary | ICD-10-CM

## 2017-11-14 PROCEDURE — 99201 PR OFFICE/OUTPT VISIT,NEW,LEVL I: CPT | Mod: S$GLB,,, | Performed by: FAMILY MEDICINE

## 2017-11-14 NOTE — PROGRESS NOTES
Virgilio has presented today on behalf of [place] [facility]. Virgilio Acuña has completed Non-DOT Physical.    RTW physical    Total $55    Melanie Hernandez

## 2017-12-01 DIAGNOSIS — E11.9 TYPE 2 DIABETES MELLITUS WITHOUT COMPLICATION: ICD-10-CM

## 2018-01-07 ENCOUNTER — PATIENT MESSAGE (OUTPATIENT)
Dept: FAMILY MEDICINE | Facility: CLINIC | Age: 58
End: 2018-01-07

## 2018-01-08 ENCOUNTER — OFFICE VISIT (OUTPATIENT)
Dept: FAMILY MEDICINE | Facility: CLINIC | Age: 58
End: 2018-01-08
Payer: COMMERCIAL

## 2018-01-08 VITALS
WEIGHT: 315 LBS | TEMPERATURE: 103 F | DIASTOLIC BLOOD PRESSURE: 67 MMHG | HEART RATE: 118 BPM | BODY MASS INDEX: 40.43 KG/M2 | HEIGHT: 74 IN | SYSTOLIC BLOOD PRESSURE: 114 MMHG

## 2018-01-08 DIAGNOSIS — I10 ESSENTIAL HYPERTENSION: ICD-10-CM

## 2018-01-08 DIAGNOSIS — E66.01 SEVERE OBESITY (BMI >= 40): ICD-10-CM

## 2018-01-08 DIAGNOSIS — E11.42 TYPE 2 DIABETES MELLITUS WITH DIABETIC POLYNEUROPATHY, WITH LONG-TERM CURRENT USE OF INSULIN: Primary | ICD-10-CM

## 2018-01-08 DIAGNOSIS — Z79.4 TYPE 2 DIABETES MELLITUS WITH DIABETIC POLYNEUROPATHY, WITH LONG-TERM CURRENT USE OF INSULIN: Primary | ICD-10-CM

## 2018-01-08 DIAGNOSIS — J06.9 UPPER RESPIRATORY TRACT INFECTION, UNSPECIFIED TYPE: ICD-10-CM

## 2018-01-08 PROBLEM — M35.3 POLYMYALGIA: Status: RESOLVED | Noted: 2017-07-07 | Resolved: 2018-01-08

## 2018-01-08 PROCEDURE — 99999 PR PBB SHADOW E&M-EST. PATIENT-LVL III: CPT | Mod: PBBFAC,,, | Performed by: FAMILY MEDICINE

## 2018-01-08 PROCEDURE — 99213 OFFICE O/P EST LOW 20 MIN: CPT | Mod: 25,S$GLB,, | Performed by: FAMILY MEDICINE

## 2018-01-08 PROCEDURE — 96372 THER/PROPH/DIAG INJ SC/IM: CPT | Mod: S$GLB,,, | Performed by: FAMILY MEDICINE

## 2018-01-08 RX ORDER — DEXAMETHASONE SODIUM PHOSPHATE 4 MG/ML
8 INJECTION, SOLUTION INTRA-ARTICULAR; INTRALESIONAL; INTRAMUSCULAR; INTRAVENOUS; SOFT TISSUE ONCE
Status: COMPLETED | OUTPATIENT
Start: 2018-01-08 | End: 2018-01-08

## 2018-01-08 RX ORDER — OSELTAMIVIR PHOSPHATE 75 MG/1
75 CAPSULE ORAL 2 TIMES DAILY
Qty: 10 CAPSULE | Refills: 0 | Status: ON HOLD | OUTPATIENT
Start: 2018-01-08 | End: 2018-01-18 | Stop reason: HOSPADM

## 2018-01-08 RX ORDER — KETOROLAC TROMETHAMINE 10 MG/1
10 TABLET, FILM COATED ORAL EVERY 6 HOURS
Qty: 20 TABLET | Refills: 0 | Status: SHIPPED | OUTPATIENT
Start: 2018-01-08 | End: 2018-03-07

## 2018-01-08 RX ADMIN — DEXAMETHASONE SODIUM PHOSPHATE 8 MG: 4 INJECTION, SOLUTION INTRA-ARTICULAR; INTRALESIONAL; INTRAMUSCULAR; INTRAVENOUS; SOFT TISSUE at 02:01

## 2018-01-08 NOTE — PROGRESS NOTES
"Virgilio Acuña presents with moderate upper respiratory congestion,rhinnorhea,moderate cough past 2-3 days. OTC help slightly. Denies nausea,vomiting,diarrhea or significant fever.    Past Medical History:   Diagnosis Date    Cellulitis of left index finger 2/16/2015    Dyslipidemia     Essential hypertension 2/16/2017    Infected finger joint 2/17/2015    Obese     S/P knee surgery, medial/lateral menisectomy 11/4/2013    Type 2 diabetes mellitus with diabetic polyneuropathy, with long-term current use of insulin 2003     Past Surgical History:   Procedure Laterality Date    ELBOW SURGERY      KNEE CARTILAGE SURGERY  10/21/2013    right knee    KNEE SURGERY Right 02/17/2017    Left index finger surgery  03/2015     Review of patient's allergies indicates:  No Known Allergies  Current Outpatient Prescriptions on File Prior to Visit   Medication Sig Dispense Refill    BD ULTRA-FINE WILDA PEN NEEDLES 32 x 5/32 " Ndle INJECT FOUR TIMES DAILY 4 each 3    blood sugar diagnostic Strp 1 strip by Misc.(Non-Drug; Combo Route) route 3 (three) times daily. 360 each 3    enalapril (VASOTEC) 20 MG tablet Take 1 tablet (20 mg total) by mouth once daily. 90 tablet 3    FLUVIRIN [id number] 45 mcg (15 mcg x 3)/0.5 mL Susp ADM 0.5ML IM UTD  0    gabapentin (NEURONTIN) 600 MG tablet Take 1 tablet (600 mg total) by mouth 3 (three) times daily. (Patient taking differently: Take 600 mg by mouth 3 (three) times daily. ) 90 tablet 0    hydrOXYzine pamoate (VISTARIL) 50 MG Cap   11    insulin lispro (HUMALOG) 100 unit/mL injection Inject 65 units into the skin three times daily with meals plus sliding scale.  Supply with pen needles. 60 mL 3    metformin (GLUCOPHAGE-XR) 500 MG 24 hr tablet TAKE 2 TABLETS TWICE A DAY WITH MEALS 360 tablet 0    pravastatin (PRAVACHOL) 40 MG tablet TAKE 1 TABLET DAILY 90 tablet 1    safety needles 22 gauge x 1 1/2" Ndle To be used once a month for testosterone injections 50 each 0    " testosterone cypionate (DEPOTESTOTERONE CYPIONATE) 200 mg/mL injection Inject 1.5 mLs (300 mg total) into the muscle every 28 days. 10 mL 0    citalopram (CELEXA) 20 MG tablet Take 20 mg by mouth once daily.      insulin glargine (LANTUS SOLOSTAR) 100 unit/mL (3 mL) InPn pen Inject 70 Units into the skin once daily. Supply pen needles 2 Box 3     No current facility-administered medications on file prior to visit.      Social History     Social History    Marital status:      Spouse name: N/A    Number of children: N/A    Years of education: N/A     Occupational History     [employer]     Social History Main Topics    Smoking status: Never Smoker    Smokeless tobacco: Never Used    Alcohol use Yes      Comment: occasional    Drug use: No    Sexual activity: Yes     Partners: Female     Other Topics Concern    Not on file     Social History Narrative    No narrative on file     Family History   Problem Relation Age of Onset    Diabetes Mother     Diabetes Father          ROS:  SKIN: No rashes, itching or changes in color or texture of skin.  EYES: Visual acuity fine. No photophobia, ocular pain or diplopia.EARS: Denies ear pain, discharge or vertigo.NOSE: No loss of smell, no epistaxis some postnasal drip.MOUTH & THROAT: No hoarseness or change in voice. No excessive gum bleeding.CHEST: Denies SHARP, cyanosis, wheezing  CARDIOVASCULAR: Denies chest pain, PND, orthopnea or reduced exercise tolerance.  ABDOMEN:  No weight loss.No abdominal pain, no hematemesis or blood in stool.  URINARY: No flank pain, dysuria or hematuria.  PERIPHERAL VASCULAR: No claudication or cyanosis.  MUSCULOSKELETAL: Negative   NEUROLOGIC: No history of seizures, paralysis, alteration of gait or coordination.    PE: Vital signs as noted  Heent:Normocephalic with no recent cranial trauma,PERRLA,EOMI,conjunctiva clear,fundi reveal no hemmorhage exudate or  papilledema.Otic canals clear, tympanic membranes slightly dull bilaterally.Nasal mucosa slightly red and edematous.Posterior pharynx slightly red but without exudate.  Neck:Supple with minimal anterior cervical adenopathy.  Chest:Clear bilateral breath sounds with mild scattered ronchi  Heart:Regular rhthym without murmer  Abdomen:Soft, non tender,no masses, no hepatosplenomegalyExtremeties and Neurologic:Grossly within normal limits  Impression: Upper Respiratory Infection. 465.9/influenza    Plan: Zxim1hz/tamiflu

## 2018-01-09 ENCOUNTER — PATIENT MESSAGE (OUTPATIENT)
Dept: FAMILY MEDICINE | Facility: CLINIC | Age: 58
End: 2018-01-09

## 2018-01-09 ENCOUNTER — TELEPHONE (OUTPATIENT)
Dept: FAMILY MEDICINE | Facility: CLINIC | Age: 58
End: 2018-01-09

## 2018-01-09 DIAGNOSIS — L97.509 DIABETIC FOOT ULCER ASSOCIATED WITH TYPE 2 DIABETES MELLITUS, UNSPECIFIED LATERALITY, UNSPECIFIED PART OF FOOT, UNSPECIFIED ULCER STAGE: Primary | ICD-10-CM

## 2018-01-09 DIAGNOSIS — E11.621 DIABETIC FOOT ULCER ASSOCIATED WITH TYPE 2 DIABETES MELLITUS, UNSPECIFIED LATERALITY, UNSPECIFIED PART OF FOOT, UNSPECIFIED ULCER STAGE: Primary | ICD-10-CM

## 2018-01-09 NOTE — TELEPHONE ENCOUNTER
See how quicly we can get him to Pod to look at it-this needs to be watched very carefully -if Pod not available pls ask their staff what is their current treatment for small foot ulcers in diabetics

## 2018-01-10 ENCOUNTER — OFFICE VISIT (OUTPATIENT)
Dept: PODIATRY | Facility: CLINIC | Age: 58
End: 2018-01-10
Payer: COMMERCIAL

## 2018-01-10 ENCOUNTER — PATIENT MESSAGE (OUTPATIENT)
Dept: PODIATRY | Facility: CLINIC | Age: 58
End: 2018-01-10

## 2018-01-10 ENCOUNTER — TELEPHONE (OUTPATIENT)
Dept: PODIATRY | Facility: CLINIC | Age: 58
End: 2018-01-10

## 2018-01-10 ENCOUNTER — PATIENT MESSAGE (OUTPATIENT)
Dept: FAMILY MEDICINE | Facility: CLINIC | Age: 58
End: 2018-01-10

## 2018-01-10 VITALS — WEIGHT: 315 LBS | HEIGHT: 74 IN | BODY MASS INDEX: 40.43 KG/M2

## 2018-01-10 DIAGNOSIS — E66.01 SEVERE OBESITY (BMI >= 40): ICD-10-CM

## 2018-01-10 DIAGNOSIS — L02.612 CELLULITIS AND ABSCESS OF TOE OF LEFT FOOT: ICD-10-CM

## 2018-01-10 DIAGNOSIS — L08.9 TYPE 2 DIABETES MELLITUS WITH LEFT DIABETIC FOOT INFECTION: Primary | ICD-10-CM

## 2018-01-10 DIAGNOSIS — E11.49 TYPE II DIABETES MELLITUS WITH NEUROLOGICAL MANIFESTATIONS: ICD-10-CM

## 2018-01-10 DIAGNOSIS — E11.628 TYPE 2 DIABETES MELLITUS WITH LEFT DIABETIC FOOT INFECTION: Primary | ICD-10-CM

## 2018-01-10 DIAGNOSIS — L03.032 CELLULITIS AND ABSCESS OF TOE OF LEFT FOOT: ICD-10-CM

## 2018-01-10 PROBLEM — E11.8 DIABETIC FOOT: Status: ACTIVE | Noted: 2018-01-10

## 2018-01-10 PROCEDURE — 87075 CULTR BACTERIA EXCEPT BLOOD: CPT

## 2018-01-10 PROCEDURE — 99203 OFFICE O/P NEW LOW 30 MIN: CPT | Mod: 25,S$GLB,, | Performed by: PODIATRIST

## 2018-01-10 PROCEDURE — 11042 DBRDMT SUBQ TIS 1ST 20SQCM/<: CPT | Mod: S$GLB,,, | Performed by: PODIATRIST

## 2018-01-10 PROCEDURE — 87070 CULTURE OTHR SPECIMN AEROBIC: CPT

## 2018-01-10 PROCEDURE — 87186 SC STD MICRODIL/AGAR DIL: CPT

## 2018-01-10 PROCEDURE — 99999 PR PBB SHADOW E&M-EST. PATIENT-LVL IV: CPT | Mod: PBBFAC,,, | Performed by: PODIATRIST

## 2018-01-10 PROCEDURE — 87077 CULTURE AEROBIC IDENTIFY: CPT

## 2018-01-10 PROCEDURE — 87147 CULTURE TYPE IMMUNOLOGIC: CPT

## 2018-01-10 NOTE — TELEPHONE ENCOUNTER
Left message that the consent will be done by Dr Smith tomorrow at the hospital, and that he put the case request in - if she needs more info to please call in the AM.

## 2018-01-10 NOTE — PROCEDURES
"Wound Debridement  Date/Time: 1/10/2018 4:16 PM  Performed by: BARAK EUCEDA  Authorized by: BARAK EUCEDA     Time out: Immediately prior to procedure a "time out" was called to verify the correct patient, procedure, equipment, support staff and site/side marked as required.    Consent Done?:  Yes (Verbal)  Local anesthesia used?: No      Wound Details:    Location:  Left foot    Location:  Left 2nd Metatarsal Head    Type of Debridement:  Excisional       Length (cm):  1.5       Area (sq cm):  1.95       Width (cm):  1.3       Percent Debrided (%):  100       Depth (cm):  0.5       Total Area Debrided (sq cm):  1.95    Depth of debridement:  Subcutaneous tissue    Tissue debrided:  Dermis and Subcutaneous    Devitalized tissue debrided:  Fibrin, Callus, Biofilm and Sough    Instruments:  Blade and Forceps    Bleeding:  Minimal  Hemostasis Achieved: Yes    Method Used:  Pressure  Patient tolerance:  Patient tolerated the procedure well with no immediate complications        "

## 2018-01-10 NOTE — TELEPHONE ENCOUNTER
Spoke with patient.  Dr Smith talked to someone about a direct admit, but cannot be done.  He will need to go to the ED.  Patient also informed that he may have a long wait in the ED per the nurse Dr Smith spoke with.

## 2018-01-10 NOTE — TELEPHONE ENCOUNTER
----- Message from Yaimaamara Noble sent at 1/10/2018  3:40 PM CST -----  Please call [facility] in reference to orders and consent.  Call Radha at [phone].

## 2018-01-10 NOTE — PROGRESS NOTES
Subjective:      Patient ID: Virgilio Acuña is a 57 y.o. male.    Chief Complaint: Foot Ulcer (left great toe); Diabetes Mellitus (A1C 7/7/2017 7.4); and Other Misc (PCP Dr. Hilton 1/8/2018)    Virgilio is a 57 y.o. male who presents to the clinic for evaluation and treatment of high risk feet. Virgilio has a past medical history of Cellulitis of left index finger (2/16/2015); Dyslipidemia; Essential hypertension (2/16/2017); Infected finger joint (2/17/2015); Obese; S/P knee surgery, medial/lateral menisectomy (11/4/2013); and Type 2 diabetes mellitus with diabetic polyneuropathy, with long-term current use of insulin (2003). The patient's chief complaint is diabetic foot ulcer, left foot. Patient relates that a couple of weeks ago he noticed what appeared to be a blister on the bottom of the left foot. He tore that off and there was a wound under there. He has kept the area washed and cleaned the best he could but the wound is not healing, he has not seen a doctor for the wound to date. He relates over the last couple of days noticing redness on the top of the foot that is getting worse. He has had fevers, he thought he was just sick. There is drainage from the wound.     PCP: Juanito Hilton MD    Date Last Seen by PCP: 1/8/18    Current shoe gear:  Affected Foot: Tennis shoes     Unaffected Foot: Tennis shoes    History of Trauma: negative  Sign of Infection: malaise and redness warmth and drainage.    Hemoglobin A1C   Date Value Ref Range Status   07/07/2017 7.4 (H) 4.0 - 5.6 % Final     Comment:     According to ADA guidelines, hemoglobin A1c <7.0% represents  optimal control in non-pregnant diabetic patients. Different  metrics may apply to specific patient populations.   Standards of Medical Care in Diabetes-2016.  For the purpose of screening for the presence of diabetes:  <5.7%     Consistent with the absence of diabetes  5.7-6.4%  Consistent with increasing risk for diabetes   (prediabetes)  >or=6.5%  Consistent  with diabetes  Currently, no consensus exists for use of hemoglobin A1c  for diagnosis of diabetes for children.  This Hemoglobin A1c assay has significant interference with fetal   hemoglobin   (HbF). The results are invalid for patients with abnormal amounts of   HbF,   including those with known Hereditary Persistence   of Fetal Hemoglobin. Heterozygous hemoglobin variants (HbAS, HbAC,   HbAD, HbAE, HbA2) do not significantly interfere with this assay;   however, presence of multiple variants in a sample may impact the %   interference.     02/16/2017 8.3 (H) 4.5 - 6.2 % Final     Comment:     According to ADA guidelines, hemoglobin A1C <7.0% represents  optimal control in non-pregnant diabetic patients.  Different  metrics may apply to specific populations.   Standards of Medical Care in Diabetes - 2016.  For the purpose of screening for the presence of diabetes:  <5.7%     Consistent with the absence of diabetes  5.7-6.4%  Consistent with increasing risk for diabetes   (prediabetes)  >or=6.5%  Consistent with diabetes  Currently no consensus exists for use of hemoglobin A1C  for diagnosis of diabetes for children.     09/19/2016 9.5 (H) 4.5 - 6.2 % Final     Comment:     According to ADA guidelines, hemoglobin A1C <7.0% represents  optimal control in non-pregnant diabetic patients.  Different  metrics may apply to specific populations.   Standards of Medical Care in Diabetes - 2016.  For the purpose of screening for the presence of diabetes:  <5.7%     Consistent with the absence of diabetes  5.7-6.4%  Consistent with increasing risk for diabetes   (prediabetes)  >or=6.5%  Consistent with diabetes  Currently no consensus exists for use of hemoglobin A1C  for diagnosis of diabetes for children.         Review of Systems   Constitution: Positive for fever. Negative for chills.   Cardiovascular: Positive for leg swelling. Negative for claudication.   Respiratory: Negative for shortness of breath.    Skin: Positive  for color change, nail changes, poor wound healing and suspicious lesions. Negative for itching and rash.   Musculoskeletal: Negative for muscle cramps, muscle weakness and myalgias.   Gastrointestinal: Negative for nausea and vomiting.   Neurological: Positive for numbness and paresthesias. Negative for focal weakness and loss of balance.           Objective:      Physical Exam   Constitutional: He is oriented to person, place, and time. He appears well-developed and well-nourished. No distress.   Cardiovascular:   Pulses:       Dorsalis pedis pulses are 2+ on the right side, and 2+ on the left side.        Posterior tibial pulses are 2+ on the right side, and 2+ on the left side.   < 3 sec capillary refill time to toes 1-5 bilateral. Toes and feet are warm to touch proximally with normal distal cooling b/l. There is no hair growth on the feet and toes b/l. There is moderate edema left foot and mild edema right.      Musculoskeletal:   Equinus noted b/l ankles with < 10 deg DF noted. MMT 5/5 in DF/PF/Inv/Ev resistance with no reproduction of pain in any direction. Passive range of motion of ankle and pedal joints is painless b/l.     Feet:   Right Foot:   Protective Sensation: 10 sites tested. 0 sites sensed.   Left Foot:   Protective Sensation: 10 sites tested. 0 sites sensed.   Neurological: He is alert and oriented to person, place, and time. He has normal strength. He displays no atrophy and no tremor. A sensory deficit is present. He exhibits normal muscle tone.   Negative tinel sign bilateral.   Skin: Skin is warm and dry. No abrasion, no bruising, no burn, no ecchymosis, no laceration, no lesion, no petechiae and no rash noted. He is not diaphoretic. There is erythema. No cyanosis. No pallor. Nails show no clubbing.   Skin atrophic    Ulcer Location: Left plantar forefoot sub second metatarsal head  Measurements: pre: 1.0x1.0x0.3 cm post: 1.5x.1.3x0.5 cm  Periwound: Hyperkeratotic and erythematous  Drainage:  Malodorous serosanguinous+purulent drainage  Malodor: None.  Base:  Fibrous/necrotic  Signs of infection: Malodorous drainage, erythema to entire forefoot back to the midfoot. The wound tracks about 5-6 cm proximal medial direction and 2-3 cm distal toward the great toe. There is bulla formation along the erythematous dorsal foot.     Ulcer Location: Left distal hallux  Measurements:0.5x0.4x0.2 with   Periwound: Hyperkeratotic  Drainage: serosanguinous.  Pus: None.  Malodor: None.  Base:  100% granular. 0% fibrin.  Signs of infection: None.       Psychiatric: He has a normal mood and affect. His behavior is normal.             Assessment:       Encounter Diagnoses   Name Primary?    Type 2 diabetes mellitus with left diabetic foot infection Yes    Cellulitis and abscess of toe of left foot     Type II diabetes mellitus with neurological manifestations          Plan:       Virgilio was seen today for foot ulcer, diabetes mellitus and other misc.    Diagnoses and all orders for this visit:    Type 2 diabetes mellitus with left diabetic foot infection  -     Aerobic culture  -     Culture, Anaerobic  -     Case Request Operating Room: INCISION AND DRAINAGE (I&D), FOOT    Cellulitis and abscess of toe of left foot  -     Aerobic culture  -     Culture, Anaerobic  -     Case Request Operating Room: INCISION AND DRAINAGE (I&D), FOOT    Type II diabetes mellitus with neurological manifestations  -     Aerobic culture  -     Culture, Anaerobic  -     Case Request Operating Room: INCISION AND DRAINAGE (I&D), FOOT      I counseled the patient on his conditions, their implications and medical management.    Shoe inspection. Diabetic Foot Education. Patient reminded of the importance of good nutrition and blood sugar control to help prevent podiatric complications of diabetes. Patient instructed on proper foot hygeine. We discussed wearing proper shoe gear, daily foot inspections, never walking without protective shoe gear,  never putting sharp instruments to feet    Discussed importance of healthy diet and weight loss as to his diabetes control and healing potential of the wound.    Bedside wound debridement    Wound flushed and packed with aquacel packing strips covered with susy foam and wrapped in football dressing, offloading in darco. Cultures taken.    Long discussion involving the severity of the infection and the prognosis with this limb threatening infection. He was sent directly to the ED at [facility], we called ahead to let them know he is coming and was told they were backed up several hours and there are no beds currently at the hospital. We offered for him to go to [facility], he declined and said he would go wait at [facility].     Will need to begin on broad spectrum IV antibiotics ASAP, order CBC, ESR, CRP, Left foot x-ray and Left foot MRI with and without contrast please. Please take out packing from the wound before MRI.    Case request for left foot incision and drainage tomorrow at noon in the OR, will need to be NPO after midnight.    Please admit to medicine, consult Dr. Smith Podiatry, I will see him first thing in the morning and have him sign consent for surgery at that time.     Inocente Smith DPM

## 2018-01-10 NOTE — LETTER
January 10, 2018      Juanito Hilton MD  [address] [id number]           [facility]  [address] [facility] [address] [place] [address]  Phone: [phone]          Patient: Virgilio Acuña   MR Number: [id number]   YOB: 1960   Date of Visit: 1/10/2018       Dear Dr. Juanito Hilton:    Thank you for referring Virgilio Acuña to me for evaluation. Attached you will find relevant portions of my assessment and plan of care.    If you have questions, please do not hesitate to call me. I look forward to following Virgilio Acuña along with you.    Sincerely,    Inocente Smith, DEYSI    Enclosure  CC:  No Recipients    If you would like to receive this communication electronically, please contact [email or web address] or [phone] to request more information on [employer] Link access.    For providers and/or their staff who would like to refer a patient to [facility], please contact us through our one-stop-shop provider referral line, Nohemi Kwon, at [phone].    If you feel you have received this communication in error or would no longer like to receive these types of communications, please e-mail [email or web address]

## 2018-01-10 NOTE — TELEPHONE ENCOUNTER
----- Message from Lashae Domingo sent at 1/10/2018  2:46 PM CST -----  Please call [phone]// calling to  Speak to  The  Nurse // please call

## 2018-01-11 PROBLEM — L03.032 CELLULITIS AND ABSCESS OF TOE OF LEFT FOOT: Status: ACTIVE | Noted: 2018-01-10

## 2018-01-11 PROBLEM — L02.612 CELLULITIS AND ABSCESS OF TOE OF LEFT FOOT: Status: ACTIVE | Noted: 2018-01-10

## 2018-01-11 PROBLEM — L08.9 TYPE 2 DIABETES MELLITUS WITH DIABETIC FOOT INFECTION: Status: ACTIVE | Noted: 2018-01-11

## 2018-01-11 PROBLEM — E11.628 TYPE 2 DIABETES MELLITUS WITH DIABETIC FOOT INFECTION: Status: ACTIVE | Noted: 2018-01-11

## 2018-01-11 PROBLEM — E11.628 TYPE 2 DIABETES MELLITUS WITH LEFT DIABETIC FOOT INFECTION: Status: ACTIVE | Noted: 2018-01-10

## 2018-01-11 PROBLEM — E11.49 TYPE II DIABETES MELLITUS WITH NEUROLOGICAL MANIFESTATIONS: Status: ACTIVE | Noted: 2018-01-10

## 2018-01-11 PROBLEM — L08.9 TYPE 2 DIABETES MELLITUS WITH LEFT DIABETIC FOOT INFECTION: Status: ACTIVE | Noted: 2018-01-10

## 2018-01-12 ENCOUNTER — PATIENT MESSAGE (OUTPATIENT)
Dept: PODIATRY | Facility: CLINIC | Age: 58
End: 2018-01-12

## 2018-01-12 PROBLEM — E11.628 DIABETIC FOOT INFECTION: Status: ACTIVE | Noted: 2018-01-10

## 2018-01-12 PROBLEM — L97.509 DIABETIC FOOT ULCER: Status: ACTIVE | Noted: 2018-01-12

## 2018-01-12 PROBLEM — E11.621 DIABETIC FOOT ULCER: Status: ACTIVE | Noted: 2018-01-12

## 2018-01-12 PROBLEM — L08.9 DIABETIC FOOT INFECTION: Status: ACTIVE | Noted: 2018-01-10

## 2018-01-13 PROBLEM — A49.01 MSSA (METHICILLIN SUSCEPTIBLE STAPHYLOCOCCUS AUREUS): Status: ACTIVE | Noted: 2018-01-13

## 2018-01-13 PROBLEM — L08.9 INFECTED SKIN ULCER LIMITED TO BREAKDOWN OF SKIN: Status: ACTIVE | Noted: 2018-01-13

## 2018-01-13 PROBLEM — L02.619 CELLULITIS AND ABSCESS OF FOOT: Status: ACTIVE | Noted: 2018-01-13

## 2018-01-13 PROBLEM — L03.119 CELLULITIS AND ABSCESS OF FOOT: Status: ACTIVE | Noted: 2018-01-13

## 2018-01-13 PROBLEM — L98.491 INFECTED SKIN ULCER LIMITED TO BREAKDOWN OF SKIN: Status: ACTIVE | Noted: 2018-01-13

## 2018-01-14 ENCOUNTER — PATIENT MESSAGE (OUTPATIENT)
Dept: PODIATRY | Facility: CLINIC | Age: 58
End: 2018-01-14

## 2018-01-14 LAB
BACTERIA SPEC AEROBE CULT: NORMAL
BACTERIA SPEC AEROBE CULT: NORMAL

## 2018-01-15 PROBLEM — E11.8 DIABETIC FOOT: Status: ACTIVE | Noted: 2018-01-15

## 2018-01-15 PROBLEM — A49.1 GROUP B STREPTOCOCCAL INFECTION: Status: ACTIVE | Noted: 2018-01-15

## 2018-01-15 LAB — BACTERIA SPEC ANAEROBE CULT: NORMAL

## 2018-01-17 ENCOUNTER — TELEPHONE (OUTPATIENT)
Dept: PODIATRY | Facility: CLINIC | Age: 58
End: 2018-01-17

## 2018-01-17 ENCOUNTER — PATIENT MESSAGE (OUTPATIENT)
Dept: FAMILY MEDICINE | Facility: CLINIC | Age: 58
End: 2018-01-17

## 2018-01-17 NOTE — TELEPHONE ENCOUNTER
----- Message from Patricia Harris sent at 1/16/2018  3:24 PM CST -----  Contact: Debra GARCIA calling to let the Nurse know she will be faxing over a form for the patient to get negative pressure therapy for a wound. She wants to speak with the Nurse to explain everything to her. Please advise. Call to pod. Call connected to pod. Warm transferred  Thanks!

## 2018-01-17 NOTE — TELEPHONE ENCOUNTER
Spoke with Debra yesterday - informed her that Dr Smith would be at the hospital to see patient Wed AM, and that he would look at any paperwork at that time.  Per Dr Smith:  done

## 2018-01-18 ENCOUNTER — PATIENT MESSAGE (OUTPATIENT)
Dept: PODIATRY | Facility: CLINIC | Age: 58
End: 2018-01-18

## 2018-01-18 ENCOUNTER — TELEPHONE (OUTPATIENT)
Dept: FAMILY MEDICINE | Facility: CLINIC | Age: 58
End: 2018-01-18

## 2018-01-19 ENCOUNTER — PATIENT MESSAGE (OUTPATIENT)
Dept: PODIATRY | Facility: CLINIC | Age: 58
End: 2018-01-19

## 2018-01-22 ENCOUNTER — OFFICE VISIT (OUTPATIENT)
Dept: PODIATRY | Facility: CLINIC | Age: 58
End: 2018-01-22
Payer: COMMERCIAL

## 2018-01-22 VITALS — WEIGHT: 315 LBS | BODY MASS INDEX: 40.43 KG/M2 | HEIGHT: 74 IN

## 2018-01-22 DIAGNOSIS — Z98.890 POST-OPERATIVE STATE: Primary | ICD-10-CM

## 2018-01-22 DIAGNOSIS — E11.49 TYPE II DIABETES MELLITUS WITH NEUROLOGICAL MANIFESTATIONS: ICD-10-CM

## 2018-01-22 PROCEDURE — 99024 POSTOP FOLLOW-UP VISIT: CPT | Mod: S$GLB,,, | Performed by: PODIATRIST

## 2018-01-22 PROCEDURE — 99999 PR PBB SHADOW E&M-EST. PATIENT-LVL III: CPT | Mod: PBBFAC,,, | Performed by: PODIATRIST

## 2018-01-23 ENCOUNTER — TELEPHONE (OUTPATIENT)
Dept: PODIATRY | Facility: CLINIC | Age: 58
End: 2018-01-23

## 2018-01-23 NOTE — TELEPHONE ENCOUNTER
----- Message from Sneha Soriano sent at 1/23/2018 12:29 PM CST -----  Contact: Ronda Arianne   Wife called regarding the patient's FMLA paperwork. Please contact [phone] and leave vm if no answer

## 2018-01-23 NOTE — TELEPHONE ENCOUNTER
Spoke with patient's wife.  She stated that paperwork for FMLA was being faxed for Dr Smith to add time frame dates to.  Informed her that I will check on it and have Dr Smtih add dates.  Will let her know when done.

## 2018-01-25 ENCOUNTER — PATIENT MESSAGE (OUTPATIENT)
Dept: FAMILY MEDICINE | Facility: CLINIC | Age: 58
End: 2018-01-25

## 2018-01-25 NOTE — PROGRESS NOTES
Subjective:      Patient ID: Virgilio Acuña is a 57 y.o. male.    Chief Complaint: Post-op Evaluation (left foot) and Diabetes Mellitus (PCP:  Dr Hilton  1/8/18; HgbA`1c: 1/10/18  8.7)    Virgilio is a 57 y.o. male who presents to the clinic for evaluation and treatment of high risk feet. Virgilio has a past medical history of Cellulitis of left index finger (2/16/2015); Dyslipidemia; Essential hypertension (2/16/2017); Infected finger joint (2/17/2015); Obese; S/P knee surgery, medial/lateral menisectomy (11/4/2013); and Type 2 diabetes mellitus with diabetic polyneuropathy, with long-term current use of insulin (2003). The patient's chief complaint is diabetic foot ulcer, left foot. Patient relates that a couple of weeks ago he noticed what appeared to be a blister on the bottom of the left foot. He tore that off and there was a wound under there. He has kept the area washed and cleaned the best he could but the wound is not healing, he has not seen a doctor for the wound to date. He relates over the last couple of days noticing redness on the top of the foot that is getting worse. He has had fevers, he thought he was just sick. There is drainage from the wound.     1/25/18: Patient is returning to clinic for follow up left foot infection having been discharged from the hospital last week, s/p extensive I&D 1/11/18 with repeat OR washout and debridement on 1/15/18. Wound vac being changed twice a week by home health. He relates some residual pain but doing well overall. Relates no weight bearing to the foot except occasional weight bearing to the heel during transfers. Denies f/c/n/v    PCP: Juanito Hilton MD    Date Last Seen by PCP: 1/8/18    Current shoe gear:  Affected Foot: Tennis shoes     Unaffected Foot: Tennis shoes    History of Trauma: negative  Sign of Infection: malaise and redness warmth and drainage.    Hemoglobin A1C   Date Value Ref Range Status   01/10/2018 8.7 (H) 0.0 - 5.6 % Final     Comment:      Reference Interval:  5.0 - 5.6 Normal   5.7 - 6.4 High Risk   > 6.5 Diabetic    Hgb A1c results are standardized based on the (NGSP) National   Glycohemoglobin Standardization Program.    Hemoglobin A1C levels are related to mean serum/plasma glucose   during the preceding 2-3 months.        07/07/2017 7.4 (H) 4.0 - 5.6 % Final     Comment:     According to ADA guidelines, hemoglobin A1c <7.0% represents  optimal control in non-pregnant diabetic patients. Different  metrics may apply to specific patient populations.   Standards of Medical Care in Diabetes-2016.  For the purpose of screening for the presence of diabetes:  <5.7%     Consistent with the absence of diabetes  5.7-6.4%  Consistent with increasing risk for diabetes   (prediabetes)  >or=6.5%  Consistent with diabetes  Currently, no consensus exists for use of hemoglobin A1c  for diagnosis of diabetes for children.  This Hemoglobin A1c assay has significant interference with fetal   hemoglobin   (HbF). The results are invalid for patients with abnormal amounts of   HbF,   including those with known Hereditary Persistence   of Fetal Hemoglobin. Heterozygous hemoglobin variants (HbAS, HbAC,   HbAD, HbAE, HbA2) do not significantly interfere with this assay;   however, presence of multiple variants in a sample may impact the %   interference.     02/16/2017 8.3 (H) 4.5 - 6.2 % Final     Comment:     According to ADA guidelines, hemoglobin A1C <7.0% represents  optimal control in non-pregnant diabetic patients.  Different  metrics may apply to specific populations.   Standards of Medical Care in Diabetes - 2016.  For the purpose of screening for the presence of diabetes:  <5.7%     Consistent with the absence of diabetes  5.7-6.4%  Consistent with increasing risk for diabetes   (prediabetes)  >or=6.5%  Consistent with diabetes  Currently no consensus exists for use of hemoglobin A1C  for diagnosis of diabetes for children.         Review of Systems    Constitution: Negative for chills and fever.   Cardiovascular: Positive for leg swelling. Negative for claudication.   Respiratory: Negative for shortness of breath.    Skin: Positive for color change, nail changes and poor wound healing. Negative for itching and rash.   Musculoskeletal: Negative for muscle cramps, muscle weakness and myalgias.   Gastrointestinal: Negative for nausea and vomiting.   Neurological: Positive for numbness and paresthesias. Negative for focal weakness and loss of balance.           Objective:      Physical Exam   Constitutional: He is oriented to person, place, and time. He appears well-developed and well-nourished. No distress.   Cardiovascular:   Pulses:       Dorsalis pedis pulses are 2+ on the right side, and 2+ on the left side.        Posterior tibial pulses are 2+ on the right side, and 2+ on the left side.   < 3 sec capillary refill time to toes 1-5 bilateral. Toes and feet are warm to touch proximally with normal distal cooling b/l. There is no hair growth on the feet and toes b/l. There is moderate edema left foot and mild edema right.      Musculoskeletal:   Equinus noted b/l ankles with < 10 deg DF noted. MMT 5/5 in DF/PF/Inv/Ev resistance with no reproduction of pain in any direction. Passive range of motion of ankle and pedal joints is painless b/l.     Feet:   Right Foot:   Protective Sensation: 10 sites tested. 0 sites sensed.   Left Foot:   Protective Sensation: 10 sites tested. 0 sites sensed.   Neurological: He is alert and oriented to person, place, and time. He has normal strength. He displays no atrophy and no tremor. A sensory deficit is present. He exhibits normal muscle tone.   Negative tinel sign bilateral.   Skin: Skin is warm and dry. No abrasion, no bruising, no burn, no ecchymosis, no laceration, no lesion, no petechiae and no rash noted. He is not diaphoretic. No cyanosis or erythema. No pallor. Nails show no clubbing.   Skin atrophic    Plantar incision  inspected with sub second metatarsal head ulcer. Underlying tissue appears granular and healthy with minimal residual necrosis deep at the subcutaneous level, no longer purulent drainage noted from the wound. The 7 cm plantar incision sutured with the skin slightly macerated. There is some exposed tendon and capsule, no exposed bone, does not probe to bone. Wound measures about 2.2x2.0x1.0        Dorsal incision with resolving cellulitis and edema, there is moderate serosanguionous drainage from the wound that was expressed and the underlying base of the wound is mixed fibrous/granular 50:50 wound measures 6.2x2.2x0.3 cm lateral tracking about 2-3 cm some necrosis to the skin at the bease of the second toe.   Psychiatric: He has a normal mood and affect. His behavior is normal.             Assessment:       Encounter Diagnoses   Name Primary?    Post-operative state Yes    Type II diabetes mellitus with neurological manifestations    s/p extensive I&D 1/11/18 with repeat OR washout and debridement on 1/15/18      Plan:       Virgilio was seen today for post-op evaluation and diabetes mellitus.    Diagnoses and all orders for this visit:    Post-operative state    Type II diabetes mellitus with neurological manifestations      I counseled the patient on his conditions, their implications and medical management.    Shoe inspection. Diabetic Foot Education. Patient reminded of the importance of good nutrition and blood sugar control to help prevent podiatric complications of diabetes. Patient instructed on proper foot hygeine. We discussed wearing proper shoe gear, daily foot inspections, never walking without protective shoe gear, never putting sharp instruments to feet    Discussed importance of healthy diet and weight loss as to his diabetes control and healing potential of the wound.    No debridement necessary on evaluation today    Wounds flushed and cleansed with gauze and packed with wound vac foam. Wound vac  applied by me in office and set to 125 mmHg continuous.    Continue Home health twice a week wound vac changes with the dorsal and plantar wounds being bridged to the dorsomedial foot. Cover plantar incision with alginate or silvercel.    Antibiotics per ID recommendation at discharge.    Return in 1 week continued wound care.    Inocente Smith DPM

## 2018-01-27 ENCOUNTER — PATIENT MESSAGE (OUTPATIENT)
Dept: PODIATRY | Facility: CLINIC | Age: 58
End: 2018-01-27

## 2018-01-29 ENCOUNTER — OFFICE VISIT (OUTPATIENT)
Dept: PODIATRY | Facility: CLINIC | Age: 58
End: 2018-01-29
Payer: COMMERCIAL

## 2018-01-29 VITALS — WEIGHT: 315 LBS | BODY MASS INDEX: 40.43 KG/M2 | HEIGHT: 74 IN

## 2018-01-29 DIAGNOSIS — E11.628 TYPE 2 DIABETES MELLITUS WITH LEFT DIABETIC FOOT INFECTION: ICD-10-CM

## 2018-01-29 DIAGNOSIS — E11.49 TYPE II DIABETES MELLITUS WITH NEUROLOGICAL MANIFESTATIONS: ICD-10-CM

## 2018-01-29 DIAGNOSIS — Z98.890 POST-OPERATIVE STATE: Primary | ICD-10-CM

## 2018-01-29 DIAGNOSIS — L08.9 TYPE 2 DIABETES MELLITUS WITH LEFT DIABETIC FOOT INFECTION: ICD-10-CM

## 2018-01-29 PROCEDURE — 99024 POSTOP FOLLOW-UP VISIT: CPT | Mod: S$GLB,,, | Performed by: PODIATRIST

## 2018-01-29 PROCEDURE — 99999 PR PBB SHADOW E&M-EST. PATIENT-LVL III: CPT | Mod: PBBFAC,,, | Performed by: PODIATRIST

## 2018-01-29 RX ORDER — TRAMADOL HYDROCHLORIDE 50 MG/1
50 TABLET ORAL EVERY 4 HOURS PRN
Qty: 42 TABLET | Refills: 0 | Status: SHIPPED | OUTPATIENT
Start: 2018-01-29 | End: 2018-02-08

## 2018-01-31 ENCOUNTER — PATIENT MESSAGE (OUTPATIENT)
Dept: PODIATRY | Facility: CLINIC | Age: 58
End: 2018-01-31

## 2018-01-31 NOTE — PROGRESS NOTES
Subjective:      Patient ID: Virgilio Acuña is a 57 y.o. male.    Chief Complaint: Follow-up (1 week post op left foot ) and Other Misc (Yobani 01/8/2017)    Virgilio is a 57 y.o. male who presents to the clinic for evaluation and treatment of high risk feet. Virgilio has a past medical history of Cellulitis of left index finger (2/16/2015); Dyslipidemia; Essential hypertension (2/16/2017); Infected finger joint (2/17/2015); Obese; S/P knee surgery, medial/lateral menisectomy (11/4/2013); and Type 2 diabetes mellitus with diabetic polyneuropathy, with long-term current use of insulin (2003). The patient's chief complaint is diabetic foot ulcer, left foot. Patient relates that a couple of weeks ago he noticed what appeared to be a blister on the bottom of the left foot. He tore that off and there was a wound under there. He has kept the area washed and cleaned the best he could but the wound is not healing, he has not seen a doctor for the wound to date. He relates over the last couple of days noticing redness on the top of the foot that is getting worse. He has had fevers, he thought he was just sick. There is drainage from the wound.     1/25/18: Patient is returning to clinic for follow up left foot infection having been discharged from the hospital last week, s/p extensive I&D 1/11/18 with repeat OR washout and debridement on 1/15/18. Wound vac being changed twice a week by home health. He relates some residual pain but doing well overall. Relates no weight bearing to the foot except occasional weight bearing to the heel during transfers. Denies f/c/n/v    1/31/18: Patient returns for follow up left foot infection s/p extensive I&D 1/11/18 with repeat OR washout and debridement on 1/15/18. Non weight bearing. Vac being change twice a week by home health with weekly visits here. Denies f/c/n/v. Still has pain especially with pressure to the foot but getting better.    PCP: Juanito Hilton MD    Date Last Seen by PCP:  1/8/18    Current shoe gear:  Affected Foot: Tennis shoes     Unaffected Foot: Tennis shoes    History of Trauma: negative  Sign of Infection: malaise and redness warmth and drainage.    Hemoglobin A1C   Date Value Ref Range Status   01/10/2018 8.7 (H) 0.0 - 5.6 % Final     Comment:     Reference Interval:  5.0 - 5.6 Normal   5.7 - 6.4 High Risk   > 6.5 Diabetic    Hgb A1c results are standardized based on the (NGSP) National   Glycohemoglobin Standardization Program.    Hemoglobin A1C levels are related to mean serum/plasma glucose   during the preceding 2-3 months.        07/07/2017 7.4 (H) 4.0 - 5.6 % Final     Comment:     According to ADA guidelines, hemoglobin A1c <7.0% represents  optimal control in non-pregnant diabetic patients. Different  metrics may apply to specific patient populations.   Standards of Medical Care in Diabetes-2016.  For the purpose of screening for the presence of diabetes:  <5.7%     Consistent with the absence of diabetes  5.7-6.4%  Consistent with increasing risk for diabetes   (prediabetes)  >or=6.5%  Consistent with diabetes  Currently, no consensus exists for use of hemoglobin A1c  for diagnosis of diabetes for children.  This Hemoglobin A1c assay has significant interference with fetal   hemoglobin   (HbF). The results are invalid for patients with abnormal amounts of   HbF,   including those with known Hereditary Persistence   of Fetal Hemoglobin. Heterozygous hemoglobin variants (HbAS, HbAC,   HbAD, HbAE, HbA2) do not significantly interfere with this assay;   however, presence of multiple variants in a sample may impact the %   interference.     02/16/2017 8.3 (H) 4.5 - 6.2 % Final     Comment:     According to ADA guidelines, hemoglobin A1C <7.0% represents  optimal control in non-pregnant diabetic patients.  Different  metrics may apply to specific populations.   Standards of Medical Care in Diabetes - 2016.  For the purpose of screening for the presence of diabetes:  <5.7%      Consistent with the absence of diabetes  5.7-6.4%  Consistent with increasing risk for diabetes   (prediabetes)  >or=6.5%  Consistent with diabetes  Currently no consensus exists for use of hemoglobin A1C  for diagnosis of diabetes for children.         Review of Systems   Constitution: Negative for chills and fever.   Cardiovascular: Positive for leg swelling. Negative for claudication.   Respiratory: Negative for shortness of breath.    Skin: Positive for color change, nail changes and poor wound healing. Negative for itching and rash.   Musculoskeletal: Negative for muscle cramps, muscle weakness and myalgias.   Gastrointestinal: Negative for nausea and vomiting.   Neurological: Positive for numbness and paresthesias. Negative for focal weakness and loss of balance.           Objective:      Physical Exam   Constitutional: He is oriented to person, place, and time. He appears well-developed and well-nourished. No distress.   Cardiovascular:   Pulses:       Dorsalis pedis pulses are 2+ on the right side, and 2+ on the left side.        Posterior tibial pulses are 2+ on the right side, and 2+ on the left side.   < 3 sec capillary refill time to toes 1-5 bilateral. Toes and feet are warm to touch proximally with normal distal cooling b/l. There is no hair growth on the feet and toes b/l. There is moderate edema left foot and mild edema right.      Musculoskeletal:   Equinus noted b/l ankles with < 10 deg DF noted. MMT 5/5 in DF/PF/Inv/Ev resistance with no reproduction of pain in any direction. Passive range of motion of ankle and pedal joints is painless b/l.     Feet:   Right Foot:   Protective Sensation: 10 sites tested. 0 sites sensed.   Left Foot:   Protective Sensation: 10 sites tested. 0 sites sensed.   Neurological: He is alert and oriented to person, place, and time. He has normal strength. He displays no atrophy and no tremor. A sensory deficit is present. He exhibits normal muscle tone.   Negative tinel  sign bilateral.   Skin: Skin is warm and dry. No abrasion, no bruising, no burn, no ecchymosis, no laceration, no lesion, no petechiae and no rash noted. He is not diaphoretic. No cyanosis or erythema. No pallor. Nails show no clubbing.   Skin atrophic    Plantar incision inspected with sub second metatarsal head ulcer. Underlying tissue appears granular and healthy with minimal residual necrosis deep at the subcutaneous level, no longer purulent drainage noted from the wound. The 7 cm plantar incision sutured with the skin slightly macerated. There is some exposed tendon and capsule, no exposed bone, does not probe to bone. Wound measures about 2.2x2.0x1.0        Dorsal incision with resolving cellulitis and edema, there is moderate serosanguionous drainage from the wound that was expressed and the underlying base of the wound is mixed fibrous/granular 20:80 wound measures 6.2x2.2x0.3 cm lateral tracking about 2-3 cm some necrosis to the skin at the bease of the second toe.   Psychiatric: He has a normal mood and affect. His behavior is normal.             Assessment:       Encounter Diagnoses   Name Primary?    Post-operative state Yes    Type II diabetes mellitus with neurological manifestations     Type 2 diabetes mellitus with left diabetic foot infection    s/p extensive I&D 1/11/18 with repeat OR washout and debridement on 1/15/18      Plan:       Virgilio was seen today for follow-up and other misc.    Diagnoses and all orders for this visit:    Post-operative state    Type II diabetes mellitus with neurological manifestations    Type 2 diabetes mellitus with left diabetic foot infection    Other orders  -     traMADol (ULTRAM) 50 mg tablet; Take 1 tablet (50 mg total) by mouth every 4 (four) hours as needed for Pain.      I counseled the patient on his conditions, their implications and medical management.    Shoe inspection. Diabetic Foot Education. Patient reminded of the importance of good nutrition and  blood sugar control to help prevent podiatric complications of diabetes. Patient instructed on proper foot hygeine. We discussed wearing proper shoe gear, daily foot inspections, never walking without protective shoe gear, never putting sharp instruments to feet    Discussed importance of healthy diet and weight loss as to his diabetes control and healing potential of the wound.    No debridement necessary on evaluation today    Wounds flushed and cleansed with gauze and packed with wound vac foam. Wound vac applied by me in office and set to 125 mmHg continuous.    Continue Home health twice a week wound vac changes with the dorsal and plantar wounds being bridged to the dorsomedial foot. Cover plantar incision with alginate or silvercel.    Antibiotics per ID recommendation at discharge.    Return in 1 week continued wound care.    Inocente Smith DPM

## 2018-02-05 ENCOUNTER — OFFICE VISIT (OUTPATIENT)
Dept: PODIATRY | Facility: CLINIC | Age: 58
End: 2018-02-05
Payer: COMMERCIAL

## 2018-02-05 VITALS — BODY MASS INDEX: 40.43 KG/M2 | WEIGHT: 315 LBS | HEIGHT: 74 IN

## 2018-02-05 DIAGNOSIS — Z98.890 POST-OPERATIVE STATE: Primary | ICD-10-CM

## 2018-02-05 DIAGNOSIS — L97.522 DIABETIC ULCER OF LEFT FOOT ASSOCIATED WITH DIABETES MELLITUS DUE TO UNDERLYING CONDITION, WITH FAT LAYER EXPOSED, UNSPECIFIED PART OF FOOT: ICD-10-CM

## 2018-02-05 DIAGNOSIS — E08.621 DIABETIC ULCER OF LEFT FOOT ASSOCIATED WITH DIABETES MELLITUS DUE TO UNDERLYING CONDITION, WITH FAT LAYER EXPOSED, UNSPECIFIED PART OF FOOT: ICD-10-CM

## 2018-02-05 DIAGNOSIS — E11.49 TYPE II DIABETES MELLITUS WITH NEUROLOGICAL MANIFESTATIONS: ICD-10-CM

## 2018-02-05 PROCEDURE — 99024 POSTOP FOLLOW-UP VISIT: CPT | Mod: S$GLB,,, | Performed by: PODIATRIST

## 2018-02-05 PROCEDURE — 99999 PR PBB SHADOW E&M-EST. PATIENT-LVL III: CPT | Mod: PBBFAC,,, | Performed by: PODIATRIST

## 2018-02-05 NOTE — PROGRESS NOTES
Subjective:      Patient ID: Virgilio Acuña is a 57 y.o. male.    Chief Complaint: Post-op Evaluation (1 wk re-ck - left foot) and Diabetes Mellitus (PCP:  Dr Hilton 1/8/18; HgbA1c: 1/10/18  8.7)    Virgilio is a 57 y.o. male who presents to the clinic for evaluation and treatment of high risk feet. iVrgilio has a past medical history of Cellulitis of left index finger (2/16/2015); Dyslipidemia; Essential hypertension (2/16/2017); Infected finger joint (2/17/2015); Obese; S/P knee surgery, medial/lateral menisectomy (11/4/2013); and Type 2 diabetes mellitus with diabetic polyneuropathy, with long-term current use of insulin (2003). The patient's chief complaint is diabetic foot ulcer, left foot. Patient relates that a couple of weeks ago he noticed what appeared to be a blister on the bottom of the left foot. He tore that off and there was a wound under there. He has kept the area washed and cleaned the best he could but the wound is not healing, he has not seen a doctor for the wound to date. He relates over the last couple of days noticing redness on the top of the foot that is getting worse. He has had fevers, he thought he was just sick. There is drainage from the wound.     1/25/18: Patient is returning to clinic for follow up left foot infection having been discharged from the hospital last week, s/p extensive I&D 1/11/18 with repeat OR washout and debridement on 1/15/18. Wound vac being changed twice a week by home health. He relates some residual pain but doing well overall. Relates no weight bearing to the foot except occasional weight bearing to the heel during transfers. Denies f/c/n/v    1/31/18: Patient returns for follow up left foot infection s/p extensive I&D 1/11/18 with repeat OR washout and debridement on 1/15/18. Non weight bearing. Vac being change twice a week by home health with weekly visits here. Denies f/c/n/v. Still has pain especially with pressure to the foot but getting better.    2/5/18:  Patient returns for follow up left foot infection s/p extensive I&D 1/11/18 with repeat OR washout and debridement on 1/15/18. Still non weight bearing. Vac changed by home health. Denies f/c/n/v. Pain to the foot being treated with tramadol, he says only helps a little.    PCP: Juanito Hilton MD    Date Last Seen by PCP: 1/8/18    Current shoe gear:  Affected Foot: Tennis shoes     Unaffected Foot: Tennis shoes    History of Trauma: negative  Sign of Infection: malaise and redness warmth and drainage.    Hemoglobin A1C   Date Value Ref Range Status   01/10/2018 8.7 (H) 0.0 - 5.6 % Final     Comment:     Reference Interval:  5.0 - 5.6 Normal   5.7 - 6.4 High Risk   > 6.5 Diabetic    Hgb A1c results are standardized based on the (NGSP) National   Glycohemoglobin Standardization Program.    Hemoglobin A1C levels are related to mean serum/plasma glucose   during the preceding 2-3 months.        07/07/2017 7.4 (H) 4.0 - 5.6 % Final     Comment:     According to ADA guidelines, hemoglobin A1c <7.0% represents  optimal control in non-pregnant diabetic patients. Different  metrics may apply to specific patient populations.   Standards of Medical Care in Diabetes-2016.  For the purpose of screening for the presence of diabetes:  <5.7%     Consistent with the absence of diabetes  5.7-6.4%  Consistent with increasing risk for diabetes   (prediabetes)  >or=6.5%  Consistent with diabetes  Currently, no consensus exists for use of hemoglobin A1c  for diagnosis of diabetes for children.  This Hemoglobin A1c assay has significant interference with fetal   hemoglobin   (HbF). The results are invalid for patients with abnormal amounts of   HbF,   including those with known Hereditary Persistence   of Fetal Hemoglobin. Heterozygous hemoglobin variants (HbAS, HbAC,   HbAD, HbAE, HbA2) do not significantly interfere with this assay;   however, presence of multiple variants in a sample may impact the %   interference.     02/16/2017 8.3  (H) 4.5 - 6.2 % Final     Comment:     According to ADA guidelines, hemoglobin A1C <7.0% represents  optimal control in non-pregnant diabetic patients.  Different  metrics may apply to specific populations.   Standards of Medical Care in Diabetes - 2016.  For the purpose of screening for the presence of diabetes:  <5.7%     Consistent with the absence of diabetes  5.7-6.4%  Consistent with increasing risk for diabetes   (prediabetes)  >or=6.5%  Consistent with diabetes  Currently no consensus exists for use of hemoglobin A1C  for diagnosis of diabetes for children.         Review of Systems   Constitution: Negative for chills and fever.   Cardiovascular: Positive for leg swelling. Negative for claudication.   Respiratory: Negative for shortness of breath.    Skin: Positive for color change, nail changes and poor wound healing. Negative for itching and rash.   Musculoskeletal: Negative for muscle cramps, muscle weakness and myalgias.   Gastrointestinal: Negative for nausea and vomiting.   Neurological: Positive for numbness and paresthesias. Negative for focal weakness and loss of balance.           Objective:      Physical Exam   Constitutional: He is oriented to person, place, and time. He appears well-developed and well-nourished. No distress.   Cardiovascular:   Pulses:       Dorsalis pedis pulses are 2+ on the right side, and 2+ on the left side.        Posterior tibial pulses are 2+ on the right side, and 2+ on the left side.   < 3 sec capillary refill time to toes 1-5 bilateral. Toes and feet are warm to touch proximally with normal distal cooling b/l. There is no hair growth on the feet and toes b/l. There is moderate edema left foot and mild edema right.      Musculoskeletal:   Equinus noted b/l ankles with < 10 deg DF noted. MMT 5/5 in DF/PF/Inv/Ev resistance with no reproduction of pain in any direction. Passive range of motion of ankle and pedal joints is painless b/l.     Feet:   Right Foot:   Protective  Sensation: 10 sites tested. 0 sites sensed.   Left Foot:   Protective Sensation: 10 sites tested. 0 sites sensed.   Neurological: He is alert and oriented to person, place, and time. He has normal strength. He displays no atrophy and no tremor. A sensory deficit is present. He exhibits normal muscle tone.   Negative tinel sign bilateral.   Skin: Skin is warm and dry. No abrasion, no bruising, no burn, no ecchymosis, no laceration, no lesion, no petechiae and no rash noted. He is not diaphoretic. There is erythema. No cyanosis. No pallor. Nails show no clubbing.   Skin atrophic    Plantar incision inspected with sub second metatarsal head ulcer. Underlying tissue appears granular and healthy with minimal residual necrosis deep at the subcutaneous level, no longer purulent drainage noted from the wound. The sutures removed from the plantar ulcer the superficila layers did not heal fully but the underlying tissue is 100% granular. Measures 6.5x1.5x1.0 cm.      Dorsal incision with resolving cellulitis and edema, there is moderate serosanguionous drainage from the wound that was expressed and the underlying base of the wound is 100% granular wound measures 4.4x1.6x0.3 cm no longer tracking, amita wound skin is slightly erythematous. There is no fluctuance and only slight serosanguinous drainage.       Psychiatric: He has a normal mood and affect. His behavior is normal.             Assessment:       Encounter Diagnoses   Name Primary?    Post-operative state Yes    Type II diabetes mellitus with neurological manifestations     Diabetic ulcer of left foot associated with diabetes mellitus due to underlying condition, with fat layer exposed, unspecified part of foot    s/p extensive I&D 1/11/18 with repeat OR washout and debridement on 1/15/18      Plan:       Virgilio was seen today for post-op evaluation and diabetes mellitus.    Diagnoses and all orders for this visit:    Post-operative state    Type II diabetes  mellitus with neurological manifestations    Diabetic ulcer of left foot associated with diabetes mellitus due to underlying condition, with fat layer exposed, unspecified part of foot      I counseled the patient on his conditions, their implications and medical management.    Shoe inspection. Diabetic Foot Education. Patient reminded of the importance of good nutrition and blood sugar control to help prevent podiatric complications of diabetes. Patient instructed on proper foot hygeine. We discussed wearing proper shoe gear, daily foot inspections, never walking without protective shoe gear, never putting sharp instruments to feet    Discussed importance of healthy diet and weight loss as to his diabetes control and healing potential of the wound.    Wounds flushed and cleansed with gauze and packed with Aquacel Ag plantar wound and Radha to the dorsal. He did not have the right wound vac supplied to apply the vac in office.   Continue Home health twice a week wound vac changes (new orders below to be faxed in today).     Home Health Orders:   Dorsal wound: Flush with sterile saline and cleanse with gauze. Apply radha to the wound base and cover with wound foam/gauze. Dorsal wound no longer needs the wound vac.  Plantar wound: Flush with saline. Please cover the wound with black foam, set at 125 mmHg continuous.   Thank you.    Antibiotics per ID recommendation at discharge, should be finishing this week. Once gone he can discontinue antibiotics, will watch for any signs of increasing infection.    Return in 1 week continued wound care.    Inocente Smith DPM

## 2018-02-05 NOTE — PROCEDURES
"Wound Debridement  Date/Time: 2/5/2018 1:51 PM  Performed by: BARAK EUCEDA  Authorized by: BARAK EUCEDA     Time out: Immediately prior to procedure a "time out" was called to verify the correct patient, procedure, equipment, support staff and site/side marked as required.    Consent Done?:  Yes (Verbal)  Local anesthesia used?: No      Wound Details:    Location:  Left foot    Location:  Left 2nd Metatarsal Head    Type of Debridement:  Excisional       Length (cm):  6.5       Area (sq cm):  9.75       Width (cm):  1.5       Percent Debrided (%):  100       Depth (cm):  1       Total Area Debrided (sq cm):  9.75    Depth of debridement:  Muscle/fascia/tendon    Tissue debrided:  Tendon    Devitalized tissue debrided:  Biofilm, Fibrin and Sough    Instruments:  Scissors, Forceps and Curette    Additional wounds:  1    2nd Wound Details:     Location:  Left foot    Location:  Left Dorsal Foot    Location:  Left Dorsal Foot    Type of Debridement:  Excisional       Length (cm):  4.4       Area (sq cm):  7.04       Width (cm):  1.6       Percent Debrided (%):  100       Depth (cm):  0.3       Total Area Debrided (sq cm):  7.04    Depth of debridement:  Subcutaneous tissue    Devitalized tissue debrided:  Biofilm, Fibrin and Sough    Instruments:  Curette    Bleeding:  Minimal  Hemostasis Achieved: Yes    Method Used:  Pressure  Patient tolerance:  Patient tolerated the procedure well with no immediate complications      "

## 2018-02-08 ENCOUNTER — PATIENT MESSAGE (OUTPATIENT)
Dept: PODIATRY | Facility: CLINIC | Age: 58
End: 2018-02-08

## 2018-02-12 ENCOUNTER — PATIENT MESSAGE (OUTPATIENT)
Dept: PODIATRY | Facility: CLINIC | Age: 58
End: 2018-02-12

## 2018-02-12 RX ORDER — AMOXICILLIN AND CLAVULANATE POTASSIUM 875; 125 MG/1; MG/1
1 TABLET, FILM COATED ORAL EVERY 12 HOURS
Qty: 14 TABLET | Refills: 0 | Status: SHIPPED | OUTPATIENT
Start: 2018-02-12 | End: 2018-02-19

## 2018-02-14 ENCOUNTER — OFFICE VISIT (OUTPATIENT)
Dept: PODIATRY | Facility: CLINIC | Age: 58
End: 2018-02-14
Payer: COMMERCIAL

## 2018-02-14 VITALS — HEIGHT: 74 IN | BODY MASS INDEX: 40.43 KG/M2 | WEIGHT: 315 LBS

## 2018-02-14 DIAGNOSIS — L97.522 DIABETIC ULCER OF LEFT FOOT ASSOCIATED WITH DIABETES MELLITUS DUE TO UNDERLYING CONDITION, WITH FAT LAYER EXPOSED, UNSPECIFIED PART OF FOOT: ICD-10-CM

## 2018-02-14 DIAGNOSIS — E08.621 DIABETIC ULCER OF LEFT FOOT ASSOCIATED WITH DIABETES MELLITUS DUE TO UNDERLYING CONDITION, WITH FAT LAYER EXPOSED, UNSPECIFIED PART OF FOOT: ICD-10-CM

## 2018-02-14 DIAGNOSIS — E11.49 TYPE II DIABETES MELLITUS WITH NEUROLOGICAL MANIFESTATIONS: ICD-10-CM

## 2018-02-14 DIAGNOSIS — Z98.890 POST-OPERATIVE STATE: Primary | ICD-10-CM

## 2018-02-14 PROCEDURE — 99024 POSTOP FOLLOW-UP VISIT: CPT | Mod: S$GLB,,, | Performed by: PODIATRIST

## 2018-02-14 PROCEDURE — 99999 PR PBB SHADOW E&M-EST. PATIENT-LVL III: CPT | Mod: PBBFAC,,, | Performed by: PODIATRIST

## 2018-02-14 RX ORDER — TRAMADOL HYDROCHLORIDE 50 MG/1
50 TABLET ORAL EVERY 6 HOURS PRN
COMMUNITY
End: 2018-03-07

## 2018-02-14 NOTE — PROGRESS NOTES
Subjective:      Patient ID: Virgilio Acuña is a 57 y.o. male.    Chief Complaint: Follow-up (1 wk wound ck) and Diabetes Mellitus (PCP:  Dr Hilton 1/8/18; HgbA1c: 1/12/18  8.7)    Virgilio is a 57 y.o. male who presents to the clinic for evaluation and treatment of high risk feet. Virgilio has a past medical history of Cellulitis of left index finger (2/16/2015); Dyslipidemia; Essential hypertension (2/16/2017); Infected finger joint (2/17/2015); Obese; S/P knee surgery, medial/lateral menisectomy (11/4/2013); and Type 2 diabetes mellitus with diabetic polyneuropathy, with long-term current use of insulin (2003). The patient's chief complaint is diabetic foot ulcer, left foot. Patient relates that a couple of weeks ago he noticed what appeared to be a blister on the bottom of the left foot. He tore that off and there was a wound under there. He has kept the area washed and cleaned the best he could but the wound is not healing, he has not seen a doctor for the wound to date. He relates over the last couple of days noticing redness on the top of the foot that is getting worse. He has had fevers, he thought he was just sick. There is drainage from the wound.     1/25/18: Patient is returning to clinic for follow up left foot infection having been discharged from the hospital last week, s/p extensive I&D 1/11/18 with repeat OR washout and debridement on 1/15/18. Wound vac being changed twice a week by home health. He relates some residual pain but doing well overall. Relates no weight bearing to the foot except occasional weight bearing to the heel during transfers. Denies f/c/n/v    1/31/18: Patient returns for follow up left foot infection s/p extensive I&D 1/11/18 with repeat OR washout and debridement on 1/15/18. Non weight bearing. Vac being change twice a week by home health with weekly visits here. Denies f/c/n/v. Still has pain especially with pressure to the foot but getting better.    2/5/18: Patient returns for  follow up left foot infection s/p extensive I&D 1/11/18 with repeat OR washout and debridement on 1/15/18. Still non weight bearing. Vac changed by home health. Denies f/c/n/v. Pain to the foot being treated with tramadol, he says only helps a little.    2/14/18: Patient returns for follow up left foot infection with ulcerations left foot. Non weight bearing. Vac changed twice a week by home health. Noted pain to the lateral foot with pressure.     PCP: Juanito Hilton MD    Date Last Seen by PCP: 1/8/18    Current shoe gear:  Affected Foot: Tennis shoes     Unaffected Foot: Tennis shoes    History of Trauma: negative  Sign of Infection: malaise and redness warmth and drainage.    Hemoglobin A1C   Date Value Ref Range Status   01/10/2018 8.7 (H) 0.0 - 5.6 % Final     Comment:     Reference Interval:  5.0 - 5.6 Normal   5.7 - 6.4 High Risk   > 6.5 Diabetic    Hgb A1c results are standardized based on the (NGSP) National   Glycohemoglobin Standardization Program.    Hemoglobin A1C levels are related to mean serum/plasma glucose   during the preceding 2-3 months.        07/07/2017 7.4 (H) 4.0 - 5.6 % Final     Comment:     According to ADA guidelines, hemoglobin A1c <7.0% represents  optimal control in non-pregnant diabetic patients. Different  metrics may apply to specific patient populations.   Standards of Medical Care in Diabetes-2016.  For the purpose of screening for the presence of diabetes:  <5.7%     Consistent with the absence of diabetes  5.7-6.4%  Consistent with increasing risk for diabetes   (prediabetes)  >or=6.5%  Consistent with diabetes  Currently, no consensus exists for use of hemoglobin A1c  for diagnosis of diabetes for children.  This Hemoglobin A1c assay has significant interference with fetal   hemoglobin   (HbF). The results are invalid for patients with abnormal amounts of   HbF,   including those with known Hereditary Persistence   of Fetal Hemoglobin. Heterozygous hemoglobin variants (HbAS,  HbAC,   HbAD, HbAE, HbA2) do not significantly interfere with this assay;   however, presence of multiple variants in a sample may impact the %   interference.     02/16/2017 8.3 (H) 4.5 - 6.2 % Final     Comment:     According to ADA guidelines, hemoglobin A1C <7.0% represents  optimal control in non-pregnant diabetic patients.  Different  metrics may apply to specific populations.   Standards of Medical Care in Diabetes - 2016.  For the purpose of screening for the presence of diabetes:  <5.7%     Consistent with the absence of diabetes  5.7-6.4%  Consistent with increasing risk for diabetes   (prediabetes)  >or=6.5%  Consistent with diabetes  Currently no consensus exists for use of hemoglobin A1C  for diagnosis of diabetes for children.         Review of Systems   Constitution: Negative for chills and fever.   Cardiovascular: Positive for leg swelling. Negative for claudication.   Respiratory: Negative for shortness of breath.    Skin: Positive for color change, nail changes and poor wound healing. Negative for itching and rash.   Musculoskeletal: Negative for muscle cramps, muscle weakness and myalgias.   Gastrointestinal: Negative for nausea and vomiting.   Neurological: Positive for numbness and paresthesias. Negative for focal weakness and loss of balance.           Objective:      Physical Exam   Constitutional: He is oriented to person, place, and time. He appears well-developed and well-nourished. No distress.   Cardiovascular:   Pulses:       Dorsalis pedis pulses are 2+ on the right side, and 2+ on the left side.        Posterior tibial pulses are 2+ on the right side, and 2+ on the left side.   < 3 sec capillary refill time to toes 1-5 bilateral. Toes and feet are warm to touch proximally with normal distal cooling b/l. There is no hair growth on the feet and toes b/l. There is moderate edema left foot and mild edema right.      Musculoskeletal:   Equinus noted b/l ankles with < 10 deg DF noted. MMT  5/5 in DF/PF/Inv/Ev resistance with no reproduction of pain in any direction. Passive range of motion of ankle and pedal joints is painless b/l.     Feet:   Right Foot:   Protective Sensation: 10 sites tested. 0 sites sensed.   Left Foot:   Protective Sensation: 10 sites tested. 0 sites sensed.   Neurological: He is alert and oriented to person, place, and time. He has normal strength. He displays no atrophy and no tremor. A sensory deficit is present. He exhibits normal muscle tone.   Negative tinel sign bilateral.   Skin: Skin is warm and dry. No abrasion, no bruising, no burn, no ecchymosis, no laceration, no lesion, no petechiae and no rash noted. He is not diaphoretic. There is erythema. No cyanosis. No pallor. Nails show no clubbing.   Skin atrophic    Plantar incision inspected with sub second metatarsal head ulcer. Underlying tissue appears granular and healthy with no longer purulent drainage noted from the wound.  Measures 6.3x1.5x1.0 cm.      Dorsal incision with resolving cellulitis and edema, there is moderate serosanguionous drainage from the wound that was expressed and the underlying base of the wound is 100% granular wound measures 3.4x1.2x0.3 cm no longer tracking, amita wound skin is slightly erythematous. There is no fluctuance and only slight serosanguinous drainage.       Psychiatric: He has a normal mood and affect. His behavior is normal.             Assessment:       Encounter Diagnoses   Name Primary?    Post-operative state Yes    Type II diabetes mellitus with neurological manifestations     Diabetic ulcer of left foot associated with diabetes mellitus due to underlying condition, with fat layer exposed, unspecified part of foot    s/p extensive I&D 1/11/18 with repeat OR washout and debridement on 1/15/18      Plan:       Virgilio was seen today for follow-up and diabetes mellitus.    Diagnoses and all orders for this visit:    Post-operative state    Type II diabetes mellitus with  neurological manifestations    Diabetic ulcer of left foot associated with diabetes mellitus due to underlying condition, with fat layer exposed, unspecified part of foot      I counseled the patient on his conditions, their implications and medical management.    Shoe inspection. Diabetic Foot Education. Patient reminded of the importance of good nutrition and blood sugar control to help prevent podiatric complications of diabetes. Patient instructed on proper foot hygeine. We discussed wearing proper shoe gear, daily foot inspections, never walking without protective shoe gear, never putting sharp instruments to feet    Discussed importance of healthy diet and weight loss as to his diabetes control and healing potential of the wound.    Wounds flushed and cleansed with gauze and packed with wound vac foam plantar wound with vac set at 125 mmHg and Radha to the dorsal.     Continue Home health twice a week wound vac changes    Home Health Orders:   Dorsal wound: Flush with sterile saline and cleanse with gauze. Apply radha to the wound base and cover with wound foam/gauze. Dorsal wound no longer needs the wound vac.  Plantar wound: Flush with saline. Please cover the wound with foam, set at 125 mmHg continuous.   Thank you.    Antibiotics finish the Augmentin and should no longer need antibiotics.    Return in 1 week continued wound care.    Inocente Smith DPM

## 2018-02-19 ENCOUNTER — PATIENT MESSAGE (OUTPATIENT)
Dept: PODIATRY | Facility: CLINIC | Age: 58
End: 2018-02-19

## 2018-02-21 ENCOUNTER — PATIENT MESSAGE (OUTPATIENT)
Dept: PODIATRY | Facility: CLINIC | Age: 58
End: 2018-02-21

## 2018-02-21 ENCOUNTER — OFFICE VISIT (OUTPATIENT)
Dept: PODIATRY | Facility: CLINIC | Age: 58
End: 2018-02-21
Payer: COMMERCIAL

## 2018-02-21 ENCOUNTER — LAB VISIT (OUTPATIENT)
Dept: LAB | Facility: HOSPITAL | Age: 58
End: 2018-02-21
Attending: PODIATRIST
Payer: COMMERCIAL

## 2018-02-21 VITALS — WEIGHT: 315 LBS | HEIGHT: 74 IN | BODY MASS INDEX: 40.43 KG/M2

## 2018-02-21 DIAGNOSIS — L97.522 DIABETIC ULCER OF LEFT FOOT ASSOCIATED WITH DIABETES MELLITUS DUE TO UNDERLYING CONDITION, WITH FAT LAYER EXPOSED, UNSPECIFIED PART OF FOOT: Primary | ICD-10-CM

## 2018-02-21 DIAGNOSIS — Z98.890 POST-OPERATIVE STATE: ICD-10-CM

## 2018-02-21 DIAGNOSIS — E08.621 DIABETIC ULCER OF LEFT FOOT ASSOCIATED WITH DIABETES MELLITUS DUE TO UNDERLYING CONDITION, WITH FAT LAYER EXPOSED, UNSPECIFIED PART OF FOOT: Primary | ICD-10-CM

## 2018-02-21 DIAGNOSIS — L97.522 DIABETIC ULCER OF LEFT FOOT ASSOCIATED WITH DIABETES MELLITUS DUE TO UNDERLYING CONDITION, WITH FAT LAYER EXPOSED, UNSPECIFIED PART OF FOOT: ICD-10-CM

## 2018-02-21 DIAGNOSIS — E08.621 DIABETIC ULCER OF LEFT FOOT ASSOCIATED WITH DIABETES MELLITUS DUE TO UNDERLYING CONDITION, WITH FAT LAYER EXPOSED, UNSPECIFIED PART OF FOOT: ICD-10-CM

## 2018-02-21 LAB
BASOPHILS # BLD AUTO: 0.07 K/UL
BASOPHILS NFR BLD: 0.9 %
CRP SERPL-MCNC: 15.7 MG/L
DIFFERENTIAL METHOD: NORMAL
EOSINOPHIL # BLD AUTO: 0.1 K/UL
EOSINOPHIL NFR BLD: 1.5 %
ERYTHROCYTE [DISTWIDTH] IN BLOOD BY AUTOMATED COUNT: 14.2 %
ERYTHROCYTE [SEDIMENTATION RATE] IN BLOOD BY WESTERGREN METHOD: 12 MM/HR
HCT VFR BLD AUTO: 48.2 %
HGB BLD-MCNC: 15.5 G/DL
IMM GRANULOCYTES # BLD AUTO: 0.04 K/UL
IMM GRANULOCYTES NFR BLD AUTO: 0.5 %
LYMPHOCYTES # BLD AUTO: 1.9 K/UL
LYMPHOCYTES NFR BLD: 24.3 %
MCH RBC QN AUTO: 27.9 PG
MCHC RBC AUTO-ENTMCNC: 32.2 G/DL
MCV RBC AUTO: 87 FL
MONOCYTES # BLD AUTO: 0.7 K/UL
MONOCYTES NFR BLD: 8.4 %
NEUTROPHILS # BLD AUTO: 5.1 K/UL
NEUTROPHILS NFR BLD: 64.4 %
NRBC BLD-RTO: 0 /100 WBC
PLATELET # BLD AUTO: 325 K/UL
PMV BLD AUTO: 9.8 FL
RBC # BLD AUTO: 5.55 M/UL
WBC # BLD AUTO: 7.89 K/UL

## 2018-02-21 PROCEDURE — 99999 PR PBB SHADOW E&M-EST. PATIENT-LVL III: CPT | Mod: PBBFAC,,, | Performed by: PODIATRIST

## 2018-02-21 PROCEDURE — 86140 C-REACTIVE PROTEIN: CPT

## 2018-02-21 PROCEDURE — 29445 APPL RIGID TOT CNTC LEG CAST: CPT | Mod: 58,LT,S$GLB, | Performed by: PODIATRIST

## 2018-02-21 PROCEDURE — 99024 POSTOP FOLLOW-UP VISIT: CPT | Mod: S$GLB,,, | Performed by: PODIATRIST

## 2018-02-21 PROCEDURE — 36415 COLL VENOUS BLD VENIPUNCTURE: CPT | Mod: PO

## 2018-02-21 PROCEDURE — 85025 COMPLETE CBC W/AUTO DIFF WBC: CPT

## 2018-02-21 PROCEDURE — 85651 RBC SED RATE NONAUTOMATED: CPT | Mod: PO

## 2018-02-21 NOTE — PROGRESS NOTES
Subjective:      Patient ID: Virgilio Acuña is a 57 y.o. male.    Chief Complaint: Follow-up (1 wk wound ck) and Diabetes Mellitus (PCP:  Dr Hilton 1/8/18; HgbA1c: 1/12/18  8.7)    Virgilio is a 57 y.o. male who presents to the clinic for evaluation and treatment of high risk feet. Virgilio has a past medical history of Cellulitis of left index finger (2/16/2015); Dyslipidemia; Essential hypertension (2/16/2017); Infected finger joint (2/17/2015); Obese; S/P knee surgery, medial/lateral menisectomy (11/4/2013); and Type 2 diabetes mellitus with diabetic polyneuropathy, with long-term current use of insulin (2003). The patient's chief complaint is diabetic foot ulcer, left foot. Patient relates that a couple of weeks ago he noticed what appeared to be a blister on the bottom of the left foot. He tore that off and there was a wound under there. He has kept the area washed and cleaned the best he could but the wound is not healing, he has not seen a doctor for the wound to date. He relates over the last couple of days noticing redness on the top of the foot that is getting worse. He has had fevers, he thought he was just sick. There is drainage from the wound.     1/25/18: Patient is returning to clinic for follow up left foot infection having been discharged from the hospital last week, s/p extensive I&D 1/11/18 with repeat OR washout and debridement on 1/15/18. Wound vac being changed twice a week by home health. He relates some residual pain but doing well overall. Relates no weight bearing to the foot except occasional weight bearing to the heel during transfers. Denies f/c/n/v    1/31/18: Patient returns for follow up left foot infection s/p extensive I&D 1/11/18 with repeat OR washout and debridement on 1/15/18. Non weight bearing. Vac being change twice a week by home health with weekly visits here. Denies f/c/n/v. Still has pain especially with pressure to the foot but getting better.    2/5/18: Patient returns for  follow up left foot infection s/p extensive I&D 1/11/18 with repeat OR washout and debridement on 1/15/18. Still non weight bearing. Vac changed by home health. Denies f/c/n/v. Pain to the foot being treated with tramadol, he says only helps a little.    2/14/18: Patient returns for follow up left foot infection with ulcerations left foot. Non weight bearing. Vac changed twice a week by home health. Noted pain to the lateral foot with pressure.     2/21/18: Patient returns for follow up left foot ulcer  s/p extensive I&D 1/11/18 with repeat OR washout and debridement on 1/15/18. Home health changing the vac twice a week. No new pedal complaints.    PCP: Junaito Hilton MD    Date Last Seen by PCP: 1/8/18    Current shoe gear:  Affected Foot: Tennis shoes     Unaffected Foot: Tennis shoes    History of Trauma: negative  Sign of Infection: malaise and redness warmth and drainage.    Hemoglobin A1C   Date Value Ref Range Status   01/10/2018 8.7 (H) 0.0 - 5.6 % Final     Comment:     Reference Interval:  5.0 - 5.6 Normal   5.7 - 6.4 High Risk   > 6.5 Diabetic    Hgb A1c results are standardized based on the (NGSP) National   Glycohemoglobin Standardization Program.    Hemoglobin A1C levels are related to mean serum/plasma glucose   during the preceding 2-3 months.        07/07/2017 7.4 (H) 4.0 - 5.6 % Final     Comment:     According to ADA guidelines, hemoglobin A1c <7.0% represents  optimal control in non-pregnant diabetic patients. Different  metrics may apply to specific patient populations.   Standards of Medical Care in Diabetes-2016.  For the purpose of screening for the presence of diabetes:  <5.7%     Consistent with the absence of diabetes  5.7-6.4%  Consistent with increasing risk for diabetes   (prediabetes)  >or=6.5%  Consistent with diabetes  Currently, no consensus exists for use of hemoglobin A1c  for diagnosis of diabetes for children.  This Hemoglobin A1c assay has significant interference with fetal    hemoglobin   (HbF). The results are invalid for patients with abnormal amounts of   HbF,   including those with known Hereditary Persistence   of Fetal Hemoglobin. Heterozygous hemoglobin variants (HbAS, HbAC,   HbAD, HbAE, HbA2) do not significantly interfere with this assay;   however, presence of multiple variants in a sample may impact the %   interference.     02/16/2017 8.3 (H) 4.5 - 6.2 % Final     Comment:     According to ADA guidelines, hemoglobin A1C <7.0% represents  optimal control in non-pregnant diabetic patients.  Different  metrics may apply to specific populations.   Standards of Medical Care in Diabetes - 2016.  For the purpose of screening for the presence of diabetes:  <5.7%     Consistent with the absence of diabetes  5.7-6.4%  Consistent with increasing risk for diabetes   (prediabetes)  >or=6.5%  Consistent with diabetes  Currently no consensus exists for use of hemoglobin A1C  for diagnosis of diabetes for children.         Review of Systems   Constitution: Negative for chills and fever.   Cardiovascular: Positive for leg swelling. Negative for claudication.   Respiratory: Negative for shortness of breath.    Skin: Positive for color change, nail changes and poor wound healing. Negative for itching and rash.   Musculoskeletal: Negative for muscle cramps, muscle weakness and myalgias.   Gastrointestinal: Negative for nausea and vomiting.   Neurological: Positive for numbness and paresthesias. Negative for focal weakness and loss of balance.           Objective:      Physical Exam   Constitutional: He is oriented to person, place, and time. He appears well-developed and well-nourished. No distress.   Cardiovascular:   Pulses:       Dorsalis pedis pulses are 2+ on the right side, and 2+ on the left side.        Posterior tibial pulses are 2+ on the right side, and 2+ on the left side.   < 3 sec capillary refill time to toes 1-5 bilateral. Toes and feet are warm to touch proximally with normal  distal cooling b/l. There is no hair growth on the feet and toes b/l. There is moderate edema left foot and mild edema right.      Musculoskeletal:   Equinus noted b/l ankles with < 10 deg DF noted. MMT 5/5 in DF/PF/Inv/Ev resistance with no reproduction of pain in any direction. Passive range of motion of ankle and pedal joints is painless b/l.     Feet:   Right Foot:   Protective Sensation: 10 sites tested. 0 sites sensed.   Left Foot:   Protective Sensation: 10 sites tested. 0 sites sensed.   Neurological: He is alert and oriented to person, place, and time. He has normal strength. He displays no atrophy and no tremor. A sensory deficit is present. He exhibits normal muscle tone.   Negative tinel sign bilateral.   Skin: Skin is warm and dry. No abrasion, no bruising, no burn, no ecchymosis, no laceration, no lesion, no petechiae and no rash noted. He is not diaphoretic. There is erythema. No cyanosis. No pallor. Nails show no clubbing.   Skin atrophic    Plantar incision inspected with sub second metatarsal head ulcer. Underlying tissue appears granular and healthy with no longer purulent drainage noted from the wound.  Measures 5.5x1.0x0.8 cm.      Dorsal incision with resolving cellulitis and edema, there is moderate serosanguionous drainage from the wound that was expressed and the underlying base of the wound is 100% granular wound measures 2.1x1.0x0.2 cm no longer tracking, amita wound skin is slightly erythematous. There is no fluctuance and only slight serosanguinous drainage.       Psychiatric: He has a normal mood and affect. His behavior is normal.             Assessment:       Encounter Diagnoses   Name Primary?    Diabetic ulcer of left foot associated with diabetes mellitus due to underlying condition, with fat layer exposed, unspecified part of foot Yes    Post-operative state    s/p extensive I&D 1/11/18 with repeat OR washout and debridement on 1/15/18      Plan:       Virgilio was seen today for  follow-up and diabetes mellitus.    Diagnoses and all orders for this visit:    Diabetic ulcer of left foot associated with diabetes mellitus due to underlying condition, with fat layer exposed, unspecified part of foot  -     CBC auto differential; Future  -     Sedimentation rate, manual; Future  -     C-reactive protein; Future    Post-operative state      I counseled the patient on his conditions, their implications and medical management.    Shoe inspection. Diabetic Foot Education. Patient reminded of the importance of good nutrition and blood sugar control to help prevent podiatric complications of diabetes. Patient instructed on proper foot hygeine. We discussed wearing proper shoe gear, daily foot inspections, never walking without protective shoe gear, never putting sharp instruments to feet    Discussed importance of healthy diet and weight loss as to his diabetes control and healing potential of the wound.    Wounds flushed and cleansed with gauze. Will discontinue wound vac at this time as there is a nice granular base to both wounds and they are filling in well, we will apply anum today and a total contact cast for offloading, he can begin weight bearing in the cast. Will begin pre authorization for affinity wound graft to be applied next week to assist in wound closure. Will continue with weekly TCC until plantar wound is healed.     Discontinue Home health, is now in a total contact cast and will remain in the cast with weekly changes in office    Discontinue wound vac    Return in 1 week continued wound care and cast change at that time.    Inocente Smith DPM

## 2018-02-22 ENCOUNTER — PATIENT MESSAGE (OUTPATIENT)
Dept: PODIATRY | Facility: CLINIC | Age: 58
End: 2018-02-22

## 2018-02-28 ENCOUNTER — OFFICE VISIT (OUTPATIENT)
Dept: PODIATRY | Facility: CLINIC | Age: 58
End: 2018-02-28
Payer: COMMERCIAL

## 2018-02-28 VITALS — WEIGHT: 315 LBS | HEIGHT: 74 IN | BODY MASS INDEX: 40.43 KG/M2

## 2018-02-28 DIAGNOSIS — L97.522 DIABETIC ULCER OF LEFT FOOT ASSOCIATED WITH DIABETES MELLITUS DUE TO UNDERLYING CONDITION, WITH FAT LAYER EXPOSED, UNSPECIFIED PART OF FOOT: Primary | ICD-10-CM

## 2018-02-28 DIAGNOSIS — E08.621 DIABETIC ULCER OF LEFT FOOT ASSOCIATED WITH DIABETES MELLITUS DUE TO UNDERLYING CONDITION, WITH FAT LAYER EXPOSED, UNSPECIFIED PART OF FOOT: Primary | ICD-10-CM

## 2018-02-28 DIAGNOSIS — Z98.890 POST-OPERATIVE STATE: ICD-10-CM

## 2018-02-28 PROCEDURE — 99024 POSTOP FOLLOW-UP VISIT: CPT | Mod: S$GLB,,, | Performed by: PODIATRIST

## 2018-02-28 PROCEDURE — 99999 PR PBB SHADOW E&M-EST. PATIENT-LVL III: CPT | Mod: PBBFAC,,, | Performed by: PODIATRIST

## 2018-02-28 PROCEDURE — 29445 APPL RIGID TOT CNTC LEG CAST: CPT | Mod: 58,LT,S$GLB, | Performed by: PODIATRIST

## 2018-02-28 NOTE — PROGRESS NOTES
Subjective:      Patient ID: Virgilio Acuña is a 57 y.o. male.    Chief Complaint: Post-op Evaluation (1 wk wound ck (removal of cast - application of graft and cast)) and Diabetes Mellitus (PCP:  Dr Hilton  1/8/18; HgbA1c: 1/10/18  8.7)    Virgilio is a 57 y.o. male who presents to the clinic for evaluation and treatment of high risk feet. Virgilio has a past medical history of Cellulitis of left index finger (2/16/2015); Dyslipidemia; Essential hypertension (2/16/2017); Infected finger joint (2/17/2015); Obese; S/P knee surgery, medial/lateral menisectomy (11/4/2013); and Type 2 diabetes mellitus with diabetic polyneuropathy, with long-term current use of insulin (2003). The patient's chief complaint is diabetic foot ulcer, left foot. Patient relates that a couple of weeks ago he noticed what appeared to be a blister on the bottom of the left foot. He tore that off and there was a wound under there. He has kept the area washed and cleaned the best he could but the wound is not healing, he has not seen a doctor for the wound to date. He relates over the last couple of days noticing redness on the top of the foot that is getting worse. He has had fevers, he thought he was just sick. There is drainage from the wound.     1/25/18: Patient is returning to clinic for follow up left foot infection having been discharged from the hospital last week, s/p extensive I&D 1/11/18 with repeat OR washout and debridement on 1/15/18. Wound vac being changed twice a week by home health. He relates some residual pain but doing well overall. Relates no weight bearing to the foot except occasional weight bearing to the heel during transfers. Denies f/c/n/v    1/31/18: Patient returns for follow up left foot infection s/p extensive I&D 1/11/18 with repeat OR washout and debridement on 1/15/18. Non weight bearing. Vac being change twice a week by home health with weekly visits here. Denies f/c/n/v. Still has pain especially with pressure to the  foot but getting better.    2/5/18: Patient returns for follow up left foot infection s/p extensive I&D 1/11/18 with repeat OR washout and debridement on 1/15/18. Still non weight bearing. Vac changed by home health. Denies f/c/n/v. Pain to the foot being treated with tramadol, he says only helps a little.    2/14/18: Patient returns for follow up left foot infection with ulcerations left foot. Non weight bearing. Vac changed twice a week by home health. Noted pain to the lateral foot with pressure.     2/21/18: Patient returns for follow up left foot ulcer  s/p extensive I&D 1/11/18 with repeat OR washout and debridement on 1/15/18. Home health changing the vac twice a week. No new pedal complaints.    2/28/18: Patient returns for follow up left foot ulcer  s/p extensive I&D 1/11/18 with repeat OR washout and debridement on 1/15/18. Ambulating in TCC, relates some shin splints in the cast, no new pedal complaints.      PCP: Juanito Hilton MD    Date Last Seen by PCP: 1/8/18    Current shoe gear:  Affected Foot: Tennis shoes     Unaffected Foot: Tennis shoes    History of Trauma: negative  Sign of Infection: malaise and redness warmth and drainage.    Hemoglobin A1C   Date Value Ref Range Status   01/10/2018 8.7 (H) 0.0 - 5.6 % Final     Comment:     Reference Interval:  5.0 - 5.6 Normal   5.7 - 6.4 High Risk   > 6.5 Diabetic    Hgb A1c results are standardized based on the (NGSP) National   Glycohemoglobin Standardization Program.    Hemoglobin A1C levels are related to mean serum/plasma glucose   during the preceding 2-3 months.        07/07/2017 7.4 (H) 4.0 - 5.6 % Final     Comment:     According to ADA guidelines, hemoglobin A1c <7.0% represents  optimal control in non-pregnant diabetic patients. Different  metrics may apply to specific patient populations.   Standards of Medical Care in Diabetes-2016.  For the purpose of screening for the presence of diabetes:  <5.7%     Consistent with the absence of  diabetes  5.7-6.4%  Consistent with increasing risk for diabetes   (prediabetes)  >or=6.5%  Consistent with diabetes  Currently, no consensus exists for use of hemoglobin A1c  for diagnosis of diabetes for children.  This Hemoglobin A1c assay has significant interference with fetal   hemoglobin   (HbF). The results are invalid for patients with abnormal amounts of   HbF,   including those with known Hereditary Persistence   of Fetal Hemoglobin. Heterozygous hemoglobin variants (HbAS, HbAC,   HbAD, HbAE, HbA2) do not significantly interfere with this assay;   however, presence of multiple variants in a sample may impact the %   interference.     02/16/2017 8.3 (H) 4.5 - 6.2 % Final     Comment:     According to ADA guidelines, hemoglobin A1C <7.0% represents  optimal control in non-pregnant diabetic patients.  Different  metrics may apply to specific populations.   Standards of Medical Care in Diabetes - 2016.  For the purpose of screening for the presence of diabetes:  <5.7%     Consistent with the absence of diabetes  5.7-6.4%  Consistent with increasing risk for diabetes   (prediabetes)  >or=6.5%  Consistent with diabetes  Currently no consensus exists for use of hemoglobin A1C  for diagnosis of diabetes for children.         Review of Systems   Constitution: Negative for chills and fever.   Cardiovascular: Positive for leg swelling. Negative for claudication.   Respiratory: Negative for shortness of breath.    Skin: Positive for color change, nail changes and poor wound healing. Negative for itching and rash.   Musculoskeletal: Negative for muscle cramps, muscle weakness and myalgias.   Gastrointestinal: Negative for nausea and vomiting.   Neurological: Positive for numbness and paresthesias. Negative for focal weakness and loss of balance.           Objective:      Physical Exam   Constitutional: He is oriented to person, place, and time. He appears well-developed and well-nourished. No distress.    Cardiovascular:   Pulses:       Dorsalis pedis pulses are 2+ on the right side, and 2+ on the left side.        Posterior tibial pulses are 2+ on the right side, and 2+ on the left side.   < 3 sec capillary refill time to toes 1-5 bilateral. Toes and feet are warm to touch proximally with normal distal cooling b/l. There is no hair growth on the feet and toes b/l. There is moderate edema left foot and mild edema right.      Musculoskeletal:   Equinus noted b/l ankles with < 10 deg DF noted. MMT 5/5 in DF/PF/Inv/Ev resistance with no reproduction of pain in any direction. Passive range of motion of ankle and pedal joints is painless b/l.     Feet:   Right Foot:   Protective Sensation: 10 sites tested. 0 sites sensed.   Left Foot:   Protective Sensation: 10 sites tested. 0 sites sensed.   Neurological: He is alert and oriented to person, place, and time. He has normal strength. He displays no atrophy and no tremor. A sensory deficit is present. He exhibits normal muscle tone.   Negative tinel sign bilateral.   Skin: Skin is warm and dry. No abrasion, no bruising, no burn, no ecchymosis, no laceration, no lesion, no petechiae and no rash noted. He is not diaphoretic. No cyanosis or erythema. No pallor. Nails show no clubbing.   Skin atrophic    Plantar incision inspected with sub second metatarsal head ulcer. Underlying tissue appears granular and healthy with no longer purulent drainage noted from the wound.  Measures 5.0x0.7x0.6 cm.      Dorsal incision with resolving cellulitis and edema, there is moderate serosanguionous drainage from the wound that was expressed and the underlying base of the wound is 100% granular wound measures 1.3x0.5x0.2 cm no longer tracking, amita wound skin is no longer erythematous. There is no fluctuance and only slight serosanguinous drainage.       Psychiatric: He has a normal mood and affect. His behavior is normal.             Assessment:       Encounter Diagnoses   Name Primary?     Diabetic ulcer of left foot associated with diabetes mellitus due to underlying condition, with fat layer exposed, unspecified part of foot Yes    Post-operative state    s/p extensive I&D 1/11/18 with repeat OR washout and debridement on 1/15/18      Plan:       Virgilio was seen today for post-op evaluation and diabetes mellitus.    Diagnoses and all orders for this visit:    Diabetic ulcer of left foot associated with diabetes mellitus due to underlying condition, with fat layer exposed, unspecified part of foot    Post-operative state      I counseled the patient on his conditions, their implications and medical management.    Shoe inspection. Diabetic Foot Education. Patient reminded of the importance of good nutrition and blood sugar control to help prevent podiatric complications of diabetes. Patient instructed on proper foot hygeine. We discussed wearing proper shoe gear, daily foot inspections, never walking without protective shoe gear, never putting sharp instruments to feet    Discussed importance of healthy diet and weight loss as to his diabetes control and healing potential of the wound.    Affinity Graft Report    SURGEON: Inocente Smith DPM  ASSITANT: None  PRE-OP DX: Ulceration secondary to diabetes mellitus and s/p extensive I&D  POST-OP DX: same.  ANESTHESIA: Pt. has loss of sensation and therefore no anesthesia was required.  HEMOSTASIS: None  EBL: less than 35cc.            Total Size of Dermagraft : 2.5x2.5 cm   Total amount used:   All of it       Time Out performed to verify patient and site and consent obtained.    Procedure: The patient was seen in the clinic room and placed in supine position on the treatment table.  Attention was directed to the ulcer which was located on the foot, where an ulceration as measured in the note above was noted.  The ulceration was covered with 100% granular tissue.  No acute signs of infection were noted.  The wound is nontender.  Utilizing a curettel, I  debrided the wound full-thickness through subcutaneous tissue to remove surrounding callus and overlying biofilm/slough.  Patient tolerated well.  The wound was flushed with sterile saline.  There was minimal bleeding with debridement which was well controlled with direct pressure.  The  Affinity Graft  was then inspected and noted to be  acceptable.  It was then removed sterilely from its packaging.     The Affinity  was adhered down with saline soaked cotton swabs and then secured in place with Steri-Strips.  It was then covered with a non-adherent layer followed by a silver foam dressing and wrapped with a dry sterile compressive dressing.  The patient having tolerated the procedure well left the clinic with all vital signs stable and vascular status intact to all digits of both feet.     Short-term goals including promoting granulation and epithelialization of the wound. Long term goals include maintaining a healed wound with appropriate offloading and good medical management per internist.    Offloading: Total contact cast applied using True Cast kit following  recommendations.    Follow-up: Patient is to return to the clinic in 1 week for follow-up, but should call [facility] immediately if any signs of infection, such as fever, chills, sweats, increased redness or pain.     Short term goals include minimizing bioburdern, swelling, and to offload in TCC    Return in 1 week continued wound care and cast change at that time.    Inocente Smith DPM

## 2018-02-28 NOTE — LETTER
March 1, 2018      Juanito Hilton MD  [id number] [facility] [address]           [facility]  [address] [facility] [address] [place] [address]  Phone: [phone]          Patient: Virgilio Acuña   MR Number: [id number]   YOB: 1960   Date of Visit: 2/28/2018       Dear Dr. Juanito Hilton:    Thank you for referring Virgilio Acuña to me for evaluation. Attached you will find relevant portions of my assessment and plan of care.    If you have questions, please do not hesitate to call me. I look forward to following Virgilio Acuña along with you.    Sincerely,    Inocente Smith, DEYSI    Enclosure  CC:  No Recipients    If you would like to receive this communication electronically, please contact [email or web address] or [phone] to request more information on [facility] [employer] access.    For providers and/or their staff who would like to refer a patient to [facility], please contact us through our one-stop-shop provider referral line, Nohemi Kwon, at [phone].    If you feel you have received this communication in error or would no longer like to receive these types of communications, please e-mail [email or web address]

## 2018-03-01 ENCOUNTER — PATIENT MESSAGE (OUTPATIENT)
Dept: PODIATRY | Facility: CLINIC | Age: 58
End: 2018-03-01

## 2018-03-06 ENCOUNTER — PATIENT MESSAGE (OUTPATIENT)
Dept: FAMILY MEDICINE | Facility: CLINIC | Age: 58
End: 2018-03-06

## 2018-03-06 DIAGNOSIS — E11.42 TYPE 2 DIABETES MELLITUS WITH DIABETIC POLYNEUROPATHY, UNSPECIFIED LONG TERM INSULIN USE STATUS: Primary | ICD-10-CM

## 2018-03-07 ENCOUNTER — LAB VISIT (OUTPATIENT)
Dept: LAB | Facility: HOSPITAL | Age: 58
End: 2018-03-07
Attending: PODIATRIST
Payer: COMMERCIAL

## 2018-03-07 ENCOUNTER — OFFICE VISIT (OUTPATIENT)
Dept: PODIATRY | Facility: CLINIC | Age: 58
End: 2018-03-07
Payer: COMMERCIAL

## 2018-03-07 ENCOUNTER — PATIENT MESSAGE (OUTPATIENT)
Dept: PODIATRY | Facility: CLINIC | Age: 58
End: 2018-03-07

## 2018-03-07 VITALS — BODY MASS INDEX: 40.43 KG/M2 | WEIGHT: 315 LBS | HEIGHT: 74 IN

## 2018-03-07 DIAGNOSIS — E11.621 TYPE 2 DIABETES MELLITUS WITH LEFT DIABETIC FOOT ULCER: Primary | ICD-10-CM

## 2018-03-07 DIAGNOSIS — L97.529 TYPE 2 DIABETES MELLITUS WITH LEFT DIABETIC FOOT ULCER: Primary | ICD-10-CM

## 2018-03-07 DIAGNOSIS — E11.621 TYPE 2 DIABETES MELLITUS WITH LEFT DIABETIC FOOT ULCER: ICD-10-CM

## 2018-03-07 DIAGNOSIS — L97.529 TYPE 2 DIABETES MELLITUS WITH LEFT DIABETIC FOOT ULCER: ICD-10-CM

## 2018-03-07 DIAGNOSIS — L08.9 TYPE 2 DIABETES MELLITUS WITH LEFT DIABETIC FOOT INFECTION: ICD-10-CM

## 2018-03-07 DIAGNOSIS — M86.9 OSTEOMYELITIS OF TOE OF LEFT FOOT: ICD-10-CM

## 2018-03-07 DIAGNOSIS — Z98.890 POST-OPERATIVE STATE: ICD-10-CM

## 2018-03-07 DIAGNOSIS — E11.628 TYPE 2 DIABETES MELLITUS WITH LEFT DIABETIC FOOT INFECTION: ICD-10-CM

## 2018-03-07 LAB
ANION GAP SERPL CALC-SCNC: 8 MMOL/L
BASOPHILS # BLD AUTO: 0.06 K/UL
BASOPHILS NFR BLD: 0.7 %
BUN SERPL-MCNC: 13 MG/DL
CALCIUM SERPL-MCNC: 9.9 MG/DL
CHLORIDE SERPL-SCNC: 102 MMOL/L
CO2 SERPL-SCNC: 24 MMOL/L
CREAT SERPL-MCNC: 1 MG/DL
CRP SERPL-MCNC: 93.3 MG/L
DIFFERENTIAL METHOD: ABNORMAL
EOSINOPHIL # BLD AUTO: 0.1 K/UL
EOSINOPHIL NFR BLD: 1.1 %
ERYTHROCYTE [DISTWIDTH] IN BLOOD BY AUTOMATED COUNT: 13.6 %
ERYTHROCYTE [SEDIMENTATION RATE] IN BLOOD BY WESTERGREN METHOD: 61 MM/HR
EST. GFR  (AFRICAN AMERICAN): >60 ML/MIN/1.73 M^2
EST. GFR  (NON AFRICAN AMERICAN): >60 ML/MIN/1.73 M^2
GLUCOSE SERPL-MCNC: 189 MG/DL
HCT VFR BLD AUTO: 46.8 %
HGB BLD-MCNC: 15.1 G/DL
IMM GRANULOCYTES # BLD AUTO: 0.07 K/UL
IMM GRANULOCYTES NFR BLD AUTO: 0.8 %
LYMPHOCYTES # BLD AUTO: 2 K/UL
LYMPHOCYTES NFR BLD: 23.1 %
MCH RBC QN AUTO: 27.9 PG
MCHC RBC AUTO-ENTMCNC: 32.3 G/DL
MCV RBC AUTO: 86 FL
MONOCYTES # BLD AUTO: 0.7 K/UL
MONOCYTES NFR BLD: 7.6 %
NEUTROPHILS # BLD AUTO: 5.9 K/UL
NEUTROPHILS NFR BLD: 66.7 %
NRBC BLD-RTO: 0 /100 WBC
PLATELET # BLD AUTO: 397 K/UL
PMV BLD AUTO: 9.3 FL
POTASSIUM SERPL-SCNC: 4.6 MMOL/L
RBC # BLD AUTO: 5.42 M/UL
SODIUM SERPL-SCNC: 134 MMOL/L
WBC # BLD AUTO: 8.85 K/UL

## 2018-03-07 PROCEDURE — 87075 CULTR BACTERIA EXCEPT BLOOD: CPT

## 2018-03-07 PROCEDURE — 86140 C-REACTIVE PROTEIN: CPT

## 2018-03-07 PROCEDURE — 85651 RBC SED RATE NONAUTOMATED: CPT | Mod: PO

## 2018-03-07 PROCEDURE — 87077 CULTURE AEROBIC IDENTIFY: CPT

## 2018-03-07 PROCEDURE — 87186 SC STD MICRODIL/AGAR DIL: CPT | Mod: 59

## 2018-03-07 PROCEDURE — 36415 COLL VENOUS BLD VENIPUNCTURE: CPT | Mod: PO

## 2018-03-07 PROCEDURE — 80048 BASIC METABOLIC PNL TOTAL CA: CPT

## 2018-03-07 PROCEDURE — 85025 COMPLETE CBC W/AUTO DIFF WBC: CPT

## 2018-03-07 PROCEDURE — 99024 POSTOP FOLLOW-UP VISIT: CPT | Mod: S$GLB,,, | Performed by: PODIATRIST

## 2018-03-07 PROCEDURE — 99999 PR PBB SHADOW E&M-EST. PATIENT-LVL III: CPT | Mod: PBBFAC,,, | Performed by: PODIATRIST

## 2018-03-07 PROCEDURE — 87070 CULTURE OTHR SPECIMN AEROBIC: CPT

## 2018-03-07 RX ORDER — SULFAMETHOXAZOLE AND TRIMETHOPRIM 800; 160 MG/1; MG/1
1 TABLET ORAL 2 TIMES DAILY
Qty: 14 TABLET | Refills: 0 | Status: ON HOLD | OUTPATIENT
Start: 2018-03-07 | End: 2018-03-12 | Stop reason: HOSPADM

## 2018-03-07 NOTE — PROGRESS NOTES
Subjective:      Patient ID: Virgilio Acuña is a 57 y.o. male.    Chief Complaint: Follow-up (1 wk - cast) and Diabetes Mellitus (PCP:  Dr Hilton 1/8/18; HgbA1c: 1/10/18  8.7)    Virgilio is a 57 y.o. male who presents to the clinic for evaluation and treatment of high risk feet. Virgilio has a past medical history of Cellulitis of left index finger (2/16/2015); Dyslipidemia; Essential hypertension (2/16/2017); Infected finger joint (2/17/2015); Obese; S/P knee surgery, medial/lateral menisectomy (11/4/2013); and Type 2 diabetes mellitus with diabetic polyneuropathy, with long-term current use of insulin (2003). The patient's chief complaint is diabetic foot ulcer, left foot. Patient relates that a couple of weeks ago he noticed what appeared to be a blister on the bottom of the left foot. He tore that off and there was a wound under there. He has kept the area washed and cleaned the best he could but the wound is not healing, he has not seen a doctor for the wound to date. He relates over the last couple of days noticing redness on the top of the foot that is getting worse. He has had fevers, he thought he was just sick. There is drainage from the wound.     1/25/18: Patient is returning to clinic for follow up left foot infection having been discharged from the hospital last week, s/p extensive I&D 1/11/18 with repeat OR washout and debridement on 1/15/18. Wound vac being changed twice a week by home health. He relates some residual pain but doing well overall. Relates no weight bearing to the foot except occasional weight bearing to the heel during transfers. Denies f/c/n/v    1/31/18: Patient returns for follow up left foot infection s/p extensive I&D 1/11/18 with repeat OR washout and debridement on 1/15/18. Non weight bearing. Vac being change twice a week by home health with weekly visits here. Denies f/c/n/v. Still has pain especially with pressure to the foot but getting better.    2/5/18: Patient returns for  follow up left foot infection s/p extensive I&D 1/11/18 with repeat OR washout and debridement on 1/15/18. Still non weight bearing. Vac changed by home health. Denies f/c/n/v. Pain to the foot being treated with tramadol, he says only helps a little.    2/14/18: Patient returns for follow up left foot infection with ulcerations left foot. Non weight bearing. Vac changed twice a week by home health. Noted pain to the lateral foot with pressure.     2/21/18: Patient returns for follow up left foot ulcer  s/p extensive I&D 1/11/18 with repeat OR washout and debridement on 1/15/18. Home health changing the vac twice a week. No new pedal complaints.    2/28/18: Patient returns for follow up left foot ulcer  s/p extensive I&D 1/11/18 with repeat OR washout and debridement on 1/15/18. Ambulating in TCC, relates some shin splints in the cast, no new pedal complaints.    3/7/18: Patient returns for follow up left foot ulcer has been in TCC for the last week without complaint. Denies f/c/n/v.       PCP: Juanito Hilton MD    Date Last Seen by PCP: 1/8/18    Current shoe gear:  Affected Foot: Tennis shoes     Unaffected Foot: Tennis shoes    History of Trauma: negative  Sign of Infection: malaise and redness warmth and drainage.    Hemoglobin A1C   Date Value Ref Range Status   01/10/2018 8.7 (H) 0.0 - 5.6 % Final     Comment:     Reference Interval:  5.0 - 5.6 Normal   5.7 - 6.4 High Risk   > 6.5 Diabetic    Hgb A1c results are standardized based on the (NGSP) National   Glycohemoglobin Standardization Program.    Hemoglobin A1C levels are related to mean serum/plasma glucose   during the preceding 2-3 months.        07/07/2017 7.4 (H) 4.0 - 5.6 % Final     Comment:     According to ADA guidelines, hemoglobin A1c <7.0% represents  optimal control in non-pregnant diabetic patients. Different  metrics may apply to specific patient populations.   Standards of Medical Care in Diabetes-2016.  For the purpose of screening for the  presence of diabetes:  <5.7%     Consistent with the absence of diabetes  5.7-6.4%  Consistent with increasing risk for diabetes   (prediabetes)  >or=6.5%  Consistent with diabetes  Currently, no consensus exists for use of hemoglobin A1c  for diagnosis of diabetes for children.  This Hemoglobin A1c assay has significant interference with fetal   hemoglobin   (HbF). The results are invalid for patients with abnormal amounts of   HbF,   including those with known Hereditary Persistence   of Fetal Hemoglobin. Heterozygous hemoglobin variants (HbAS, HbAC,   HbAD, HbAE, HbA2) do not significantly interfere with this assay;   however, presence of multiple variants in a sample may impact the %   interference.     02/16/2017 8.3 (H) 4.5 - 6.2 % Final     Comment:     According to ADA guidelines, hemoglobin A1C <7.0% represents  optimal control in non-pregnant diabetic patients.  Different  metrics may apply to specific populations.   Standards of Medical Care in Diabetes - 2016.  For the purpose of screening for the presence of diabetes:  <5.7%     Consistent with the absence of diabetes  5.7-6.4%  Consistent with increasing risk for diabetes   (prediabetes)  >or=6.5%  Consistent with diabetes  Currently no consensus exists for use of hemoglobin A1C  for diagnosis of diabetes for children.         Review of Systems   Constitution: Negative for chills and fever.   Cardiovascular: Positive for leg swelling. Negative for claudication.   Respiratory: Negative for shortness of breath.    Skin: Positive for color change, nail changes and poor wound healing. Negative for itching and rash.   Musculoskeletal: Negative for muscle cramps, muscle weakness and myalgias.   Gastrointestinal: Negative for nausea and vomiting.   Neurological: Positive for numbness and paresthesias. Negative for focal weakness and loss of balance.           Objective:      Physical Exam   Constitutional: He is oriented to person, place, and time. He appears  well-developed and well-nourished. No distress.   Cardiovascular:   Pulses:       Dorsalis pedis pulses are 2+ on the right side, and 2+ on the left side.        Posterior tibial pulses are 2+ on the right side, and 2+ on the left side.   < 3 sec capillary refill time to toes 1-5 bilateral. Toes and feet are warm to touch proximally with normal distal cooling b/l. There is no hair growth on the feet and toes b/l. There is moderate edema left foot and mild edema right.      Musculoskeletal:   Equinus noted b/l ankles with < 10 deg DF noted. MMT 5/5 in DF/PF/Inv/Ev resistance with no reproduction of pain in any direction. Passive range of motion of ankle and pedal joints is painless b/l.     Feet:   Right Foot:   Protective Sensation: 10 sites tested. 0 sites sensed.   Left Foot:   Protective Sensation: 10 sites tested. 0 sites sensed.   Neurological: He is alert and oriented to person, place, and time. He has normal strength. He displays no atrophy and no tremor. A sensory deficit is present. He exhibits normal muscle tone.   Negative tinel sign bilateral.   Skin: Skin is warm and dry. No abrasion, no bruising, no burn, no ecchymosis, no laceration, no lesion, no petechiae and no rash noted. He is not diaphoretic. There is erythema. No cyanosis. No pallor. Nails show no clubbing.   Skin atrophic    There is new onset erythema to the entire second toe and dorsal foot at the base of the second toe. There is noted serous drainage from the plantar wound through the MTPJ area which now probes to bone. There is focal edema to the second toe and base of the second toe.    Plantar incision inspected with sub second metatarsal head ulcer. Underlying tissue appears granular and healthy with no longer purulent drainage noted from the wound.  Measures 3.6x0.7x0.6 cm. Now probes to bone under the second metatarsal head.      Base of the dorsal wound is 100% granular wound measures 0.3x0.2x0.1 cm no longer tracking.There is no  fluctuance and only slight serosanguinous drainage.       Psychiatric: He has a normal mood and affect. His behavior is normal.             Assessment:       Encounter Diagnosis   Name Primary?    Type 2 diabetes mellitus with left diabetic foot ulcer Yes   s/p extensive I&D 1/11/18 with repeat OR washout and debridement on 1/15/18      Plan:       Virgilio was seen today for follow-up and diabetes mellitus.    Diagnoses and all orders for this visit:    Type 2 diabetes mellitus with left diabetic foot ulcer  -     MRI Foot (Forefoot) Left Without Contrast; Future  -     Aerobic culture  -     Culture, Anaerobic  -     Sedimentation rate, manual; Future  -     C-reactive protein; Future  -     CBC auto differential; Future  -     BASIC METABOLIC PANEL; Future    Other orders  -     sulfamethoxazole-trimethoprim 800-160mg (BACTRIM DS) 800-160 mg Tab; Take 1 tablet by mouth 2 (two) times daily.      I counseled the patient on his conditions, their implications and medical management.    Shoe inspection. Diabetic Foot Education. Patient reminded of the importance of good nutrition and blood sugar control to help prevent podiatric complications of diabetes. Patient instructed on proper foot hygeine. We discussed wearing proper shoe gear, daily foot inspections, never walking without protective shoe gear, never putting sharp instruments to feet    Discussed importance of healthy diet and weight loss as to his diabetes control and healing potential of the wound.    Discussed that there are now acute signs of recurrent infection and there is signs that there is likely osteomyelitis to the second toe and second metatarsal head. Discussed taht we will get labs today to evaluate and scheduled an MRI for tomorrow to see the extent of the osteomyelitis. Discussed that he will likely need 6 weeks IV antibiotics versus second ray amputation if this is the case, will await MRI to decide how to proceed.     Dressed wounds with  anum, susy foam and football, offloading in tall orthopedic boot.    For now will start on Bactrim DS BID    Return in 1 week continued wound care to discuss MRI results and possible amputation of toe versus IV antibiotic treatment.     Inocente Smith DPM

## 2018-03-08 ENCOUNTER — TELEPHONE (OUTPATIENT)
Dept: PODIATRY | Facility: CLINIC | Age: 58
End: 2018-03-08

## 2018-03-08 DIAGNOSIS — M86.9 OSTEOMYELITIS OF TOE OF LEFT FOOT: Primary | ICD-10-CM

## 2018-03-08 NOTE — TELEPHONE ENCOUNTER
Called and spoke to Mr. Acuña about his MRI results. There is extensive osteomyelitis to the second toe and metatarsal head with possible dorsal abscess over the joint. Recommended he be admitted tonight with IV antibiotics started. I will take him for second ray amputation tomorrow, 7 AM will need to be NPO from 11 PM tonight.

## 2018-03-10 ENCOUNTER — PATIENT MESSAGE (OUTPATIENT)
Dept: PODIATRY | Facility: CLINIC | Age: 58
End: 2018-03-10

## 2018-03-10 PROBLEM — M86.172 ACUTE OSTEOMYELITIS OF TOE OF LEFT FOOT: Status: ACTIVE | Noted: 2018-03-10

## 2018-03-10 LAB
BACTERIA SPEC AEROBE CULT: NORMAL
BACTERIA SPEC AEROBE CULT: NORMAL

## 2018-03-11 ENCOUNTER — PATIENT MESSAGE (OUTPATIENT)
Dept: PODIATRY | Facility: CLINIC | Age: 58
End: 2018-03-11

## 2018-03-11 PROBLEM — Z02.9 DISCHARGE PLANNING ISSUES: Status: ACTIVE | Noted: 2018-03-11

## 2018-03-11 PROBLEM — Z75.8 DISCHARGE PLANNING ISSUES: Status: ACTIVE | Noted: 2018-03-11

## 2018-03-12 ENCOUNTER — PATIENT MESSAGE (OUTPATIENT)
Dept: PODIATRY | Facility: CLINIC | Age: 58
End: 2018-03-12

## 2018-03-12 ENCOUNTER — PATIENT MESSAGE (OUTPATIENT)
Dept: INFECTIOUS DISEASES | Facility: CLINIC | Age: 58
End: 2018-03-12

## 2018-03-12 LAB — BACTERIA SPEC ANAEROBE CULT: NORMAL

## 2018-03-12 RX ORDER — PRAVASTATIN SODIUM 40 MG/1
TABLET ORAL
Qty: 90 TABLET | Refills: 1 | OUTPATIENT
Start: 2018-03-12

## 2018-03-12 NOTE — TELEPHONE ENCOUNTER
Patient has not been seen in a year. Needs appointment for refills. Or if patient is now following with PCP will need to have pharmacy send refill to them

## 2018-03-13 ENCOUNTER — PATIENT MESSAGE (OUTPATIENT)
Dept: PODIATRY | Facility: CLINIC | Age: 58
End: 2018-03-13

## 2018-03-14 ENCOUNTER — OFFICE VISIT (OUTPATIENT)
Dept: PODIATRY | Facility: CLINIC | Age: 58
End: 2018-03-14
Payer: COMMERCIAL

## 2018-03-14 ENCOUNTER — PATIENT MESSAGE (OUTPATIENT)
Dept: PODIATRY | Facility: CLINIC | Age: 58
End: 2018-03-14

## 2018-03-14 VITALS — HEIGHT: 74 IN | WEIGHT: 311.5 LBS | BODY MASS INDEX: 39.98 KG/M2

## 2018-03-14 DIAGNOSIS — Z98.890 POST-OPERATIVE STATE: Primary | ICD-10-CM

## 2018-03-14 PROCEDURE — 99024 POSTOP FOLLOW-UP VISIT: CPT | Mod: S$GLB,,, | Performed by: PODIATRIST

## 2018-03-14 PROCEDURE — 99999 PR PBB SHADOW E&M-EST. PATIENT-LVL II: CPT | Mod: PBBFAC,,, | Performed by: PODIATRIST

## 2018-03-15 NOTE — PROGRESS NOTES
Subjective:      Patient ID: Virgilio Acuña is a 57 y.o. male.    Chief Complaint: Follow-up (post op left foot) and Other Misc (PCP Dr. Hilton 01/8/2018)    Virgilio is a 57 y.o. male who presents to the clinic for evaluation and treatment of high risk feet. Virgilio has a past medical history of Cellulitis of left index finger (2/16/2015); Dyslipidemia; Essential hypertension (2/16/2017); Infected finger joint (2/17/2015); Obese; S/P knee surgery, medial/lateral menisectomy (11/4/2013); and Type 2 diabetes mellitus with diabetic polyneuropathy, with long-term current use of insulin (2003). The patient's chief complaint is diabetic foot ulcer, left foot. Patient relates that a couple of weeks ago he noticed what appeared to be a blister on the bottom of the left foot. He tore that off and there was a wound under there. He has kept the area washed and cleaned the best he could but the wound is not healing, he has not seen a doctor for the wound to date. He relates over the last couple of days noticing redness on the top of the foot that is getting worse. He has had fevers, he thought he was just sick. There is drainage from the wound.     1/25/18: Patient is returning to clinic for follow up left foot infection having been discharged from the hospital last week, s/p extensive I&D 1/11/18 with repeat OR washout and debridement on 1/15/18. Wound vac being changed twice a week by home health. He relates some residual pain but doing well overall. Relates no weight bearing to the foot except occasional weight bearing to the heel during transfers. Denies f/c/n/v    1/31/18: Patient returns for follow up left foot infection s/p extensive I&D 1/11/18 with repeat OR washout and debridement on 1/15/18. Non weight bearing. Vac being change twice a week by home health with weekly visits here. Denies f/c/n/v. Still has pain especially with pressure to the foot but getting better.    2/5/18: Patient returns for follow up left foot  infection s/p extensive I&D 1/11/18 with repeat OR washout and debridement on 1/15/18. Still non weight bearing. Vac changed by home health. Denies f/c/n/v. Pain to the foot being treated with tramadol, he says only helps a little.    2/14/18: Patient returns for follow up left foot infection with ulcerations left foot. Non weight bearing. Vac changed twice a week by home health. Noted pain to the lateral foot with pressure.     2/21/18: Patient returns for follow up left foot ulcer  s/p extensive I&D 1/11/18 with repeat OR washout and debridement on 1/15/18. Home health changing the vac twice a week. No new pedal complaints.    2/28/18: Patient returns for follow up left foot ulcer  s/p extensive I&D 1/11/18 with repeat OR washout and debridement on 1/15/18. Ambulating in [facility], relates some shin splints in the cast, no new pedal complaints.    3/7/18: Patient returns for follow up left foot ulcer has been in [facility] for the last week without complaint. Denies f/c/n/v.     3/14/18: patient was seen s/p 1 week left second ray amputation on levofloxacin per ID for treatment of the infection. Weight bearing in the boot only. No new pedal complaints.       PCP: Juanito Hilton MD    Date Last Seen by PCP: 1/8/18    Current shoe gear:  Affected Foot: Camwalker boots     Unaffected Foot: Tennis shoes    History of Trauma: negative  Sign of Infection: malaise and redness warmth and drainage.    Hemoglobin A1C   Date Value Ref Range Status   03/08/2018 8.2 (H) 0.0 - 5.6 % Final     Comment:     Reference Interval:  5.0 - 5.6 Normal   5.7 - 6.4 High Risk   > 6.5 Diabetic    Hgb A1c results are standardized based on the (NGSP) National   Glycohemoglobin Standardization Program.    Hemoglobin A1C levels are related to mean serum/plasma glucose   during the preceding 2-3 months.        01/10/2018 8.7 (H) 0.0 - 5.6 % Final     Comment:     Reference Interval:  5.0 - 5.6 Normal   5.7 - 6.4 High Risk   > 6.5 Diabetic    Hgb A1c results  are standardized based on the (NGSP) National   Glycohemoglobin Standardization Program.    Hemoglobin A1C levels are related to mean serum/plasma glucose   during the preceding 2-3 months.        07/07/2017 7.4 (H) 4.0 - 5.6 % Final     Comment:     According to ADA guidelines, hemoglobin A1c <7.0% represents  optimal control in non-pregnant diabetic patients. Different  metrics may apply to specific patient populations.   Standards of Medical Care in Diabetes-2016.  For the purpose of screening for the presence of diabetes:  <5.7%     Consistent with the absence of diabetes  5.7-6.4%  Consistent with increasing risk for diabetes   (prediabetes)  >or=6.5%  Consistent with diabetes  Currently, no consensus exists for use of hemoglobin A1c  for diagnosis of diabetes for children.  This Hemoglobin A1c assay has significant interference with fetal   hemoglobin   (HbF). The results are invalid for patients with abnormal amounts of   HbF,   including those with known Hereditary Persistence   of Fetal Hemoglobin. Heterozygous hemoglobin variants (HbAS, HbAC,   HbAD, HbAE, HbA2) do not significantly interfere with this assay;   however, presence of multiple variants in a sample may impact the %   interference.         Review of Systems   Constitution: Negative for chills and fever.   Cardiovascular: Positive for leg swelling. Negative for claudication.   Respiratory: Negative for shortness of breath.    Skin: Positive for color change, nail changes and poor wound healing. Negative for itching and rash.   Musculoskeletal: Negative for muscle cramps, muscle weakness and myalgias.   Gastrointestinal: Negative for nausea and vomiting.   Neurological: Positive for numbness and paresthesias. Negative for focal weakness and loss of balance.           Objective:      Physical Exam   Constitutional: He is oriented to person, place, and time. He appears well-developed and well-nourished. No distress.   Cardiovascular:   Pulses:        Dorsalis pedis pulses are 2+ on the right side, and 2+ on the left side.        Posterior tibial pulses are 2+ on the right side, and 2+ on the left side.   < 3 sec capillary refill time to toes 1-5 bilateral. Toes and feet are warm to touch proximally with normal distal cooling b/l. There is no hair growth on the feet and toes b/l. There is moderate edema left foot and mild edema right.      Musculoskeletal:   Equinus noted b/l ankles with < 10 deg DF noted. MMT 5/5 in DF/PF/Inv/Ev resistance with no reproduction of pain in any direction. Passive range of motion of ankle and pedal joints is painless b/l.     Feet:   Right Foot:   Protective Sensation: 10 sites tested. 0 sites sensed.   Left Foot:   Protective Sensation: 10 sites tested. 0 sites sensed.   Neurological: He is alert and oriented to person, place, and time. He has normal strength. He displays no atrophy and no tremor. A sensory deficit is present. He exhibits normal muscle tone.   Negative tinel sign bilateral.   Skin: Skin is warm. No abrasion, no bruising, no burn, no ecchymosis, no laceration, no lesion, no petechiae and no rash noted. He is not diaphoretic. There is erythema. No cyanosis. No pallor. Nails show no clubbing.   There is an incision at the second toe amputation site from the dorsal foot to the plantar. Skin edges well coapted with only slight superficial gapping at the proximal portion of the incision dorsally. Skin edges slight macerated dorsally with slight serous drainage, no malodor or purulence. Erythema present but improved greatly since discharge from the hospital.     Psychiatric: He has a normal mood and affect. His behavior is normal.             Assessment:       Encounter Diagnosis   Name Primary?    Post-operative state Yes      s/p second ray amputation 1 week      Plan:       Virgilio was seen today for follow-up and other misc.    Diagnoses and all orders for this visit:    Post-operative state      I counseled the  patient on his conditions, their implications and medical management.    Shoe inspection. Diabetic Foot Education. Patient reminded of the importance of good nutrition and blood sugar control to help prevent podiatric complications of diabetes. Patient instructed on proper foot hygeine. We discussed wearing proper shoe gear, daily foot inspections, never walking without protective shoe gear, never putting sharp instruments to feet    Discussed importance of healthy diet and weight loss as to his diabetes control and healing potential of the wound.    Dressed the incisions with betadine and xeroform, cover with gauze and wrap to above ankle in cast padding and secure with coban Home health can change dressings in same way twice a week.    Continue antibiotics long term per ID recs.    Return in 1 week continued wound care     Inocente Smith DPM

## 2018-03-16 ENCOUNTER — PATIENT MESSAGE (OUTPATIENT)
Dept: PODIATRY | Facility: CLINIC | Age: 58
End: 2018-03-16

## 2018-03-20 ENCOUNTER — TELEPHONE (OUTPATIENT)
Dept: PODIATRY | Facility: CLINIC | Age: 58
End: 2018-03-20

## 2018-03-20 RX ORDER — METRONIDAZOLE 500 MG/1
500 TABLET ORAL EVERY 8 HOURS
Qty: 30 TABLET | Refills: 0 | Status: SHIPPED | OUTPATIENT
Start: 2018-03-20 | End: 2018-03-28 | Stop reason: SDUPTHER

## 2018-03-20 NOTE — TELEPHONE ENCOUNTER
Per Dr Smith:  Patient informed that a new antibiotic was ordered - sent to [facility] pharmacy-for the patient to start taking.  Patient verbalized understanding.

## 2018-03-20 NOTE — TELEPHONE ENCOUNTER
----- Message from Inocente Smith DPM sent at 3/20/2018  7:40 AM CDT -----  I sent a new antibiotic to his [facility] pharmacy on file for him to start taking, please let him know. Thanks.

## 2018-03-21 ENCOUNTER — LAB VISIT (OUTPATIENT)
Dept: LAB | Facility: HOSPITAL | Age: 58
End: 2018-03-21
Attending: PODIATRIST
Payer: COMMERCIAL

## 2018-03-21 ENCOUNTER — PATIENT MESSAGE (OUTPATIENT)
Dept: PODIATRY | Facility: CLINIC | Age: 58
End: 2018-03-21

## 2018-03-21 ENCOUNTER — OFFICE VISIT (OUTPATIENT)
Dept: PODIATRY | Facility: CLINIC | Age: 58
End: 2018-03-21
Payer: COMMERCIAL

## 2018-03-21 ENCOUNTER — PATIENT MESSAGE (OUTPATIENT)
Dept: INFECTIOUS DISEASES | Facility: CLINIC | Age: 58
End: 2018-03-21

## 2018-03-21 VITALS — HEIGHT: 74 IN | WEIGHT: 311.5 LBS | BODY MASS INDEX: 39.98 KG/M2

## 2018-03-21 DIAGNOSIS — L08.9 LEFT FOOT INFECTION: ICD-10-CM

## 2018-03-21 DIAGNOSIS — M25.561 ACUTE PAIN OF RIGHT KNEE: ICD-10-CM

## 2018-03-21 DIAGNOSIS — L08.9 LEFT FOOT INFECTION: Primary | ICD-10-CM

## 2018-03-21 PROBLEM — F41.9 ANXIETY: Status: ACTIVE | Noted: 2018-03-21

## 2018-03-21 PROBLEM — M25.461 EFFUSION OF BURSA OF RIGHT KNEE: Status: ACTIVE | Noted: 2018-03-21

## 2018-03-21 LAB
BASOPHILS # BLD AUTO: 0.06 K/UL
BASOPHILS NFR BLD: 0.9 %
CRP SERPL-MCNC: 15.9 MG/L
DIFFERENTIAL METHOD: NORMAL
EOSINOPHIL # BLD AUTO: 0.1 K/UL
EOSINOPHIL NFR BLD: 1.3 %
ERYTHROCYTE [DISTWIDTH] IN BLOOD BY AUTOMATED COUNT: 13.7 %
ERYTHROCYTE [SEDIMENTATION RATE] IN BLOOD BY WESTERGREN METHOD: 8 MM/HR
HCT VFR BLD AUTO: 49.3 %
HGB BLD-MCNC: 15.8 G/DL
IMM GRANULOCYTES # BLD AUTO: 0.02 K/UL
IMM GRANULOCYTES NFR BLD AUTO: 0.3 %
LYMPHOCYTES # BLD AUTO: 1.7 K/UL
LYMPHOCYTES NFR BLD: 24.6 %
MCH RBC QN AUTO: 27.8 PG
MCHC RBC AUTO-ENTMCNC: 32 G/DL
MCV RBC AUTO: 87 FL
MONOCYTES # BLD AUTO: 0.7 K/UL
MONOCYTES NFR BLD: 10.4 %
NEUTROPHILS # BLD AUTO: 4.2 K/UL
NEUTROPHILS NFR BLD: 62.5 %
NRBC BLD-RTO: 0 /100 WBC
PLATELET # BLD AUTO: 311 K/UL
PMV BLD AUTO: 9.9 FL
RBC # BLD AUTO: 5.68 M/UL
WBC # BLD AUTO: 6.7 K/UL

## 2018-03-21 PROCEDURE — 86140 C-REACTIVE PROTEIN: CPT

## 2018-03-21 PROCEDURE — 99024 POSTOP FOLLOW-UP VISIT: CPT | Mod: S$GLB,,, | Performed by: PODIATRIST

## 2018-03-21 PROCEDURE — 85651 RBC SED RATE NONAUTOMATED: CPT | Mod: PO

## 2018-03-21 PROCEDURE — 99999 PR PBB SHADOW E&M-EST. PATIENT-LVL III: CPT | Mod: PBBFAC,,, | Performed by: PODIATRIST

## 2018-03-21 PROCEDURE — 36415 COLL VENOUS BLD VENIPUNCTURE: CPT | Mod: PO

## 2018-03-21 PROCEDURE — 85025 COMPLETE CBC W/AUTO DIFF WBC: CPT

## 2018-03-22 ENCOUNTER — TELEPHONE (OUTPATIENT)
Dept: PODIATRY | Facility: CLINIC | Age: 58
End: 2018-03-22

## 2018-03-22 NOTE — PROGRESS NOTES
Subjective:      Patient ID: Virgilio Acuña is a 57 y.o. male.    Chief Complaint: Post-op Evaluation (1 wk ck - left foot) and Diabetes Mellitus (PCP:  Dr iHlton  1/8/18; HgbA1c: 3/8/18  5.2)    Virgilio is a 57 y.o. male who presents to the clinic for evaluation and treatment of high risk feet. Virgilio has a past medical history of Cellulitis of left index finger (2/16/2015); Dyslipidemia; Essential hypertension (2/16/2017); Infected finger joint (2/17/2015); Obese; S/P knee surgery, medial/lateral menisectomy (11/4/2013); and Type 2 diabetes mellitus with diabetic polyneuropathy, with long-term current use of insulin (2003). The patient's chief complaint is diabetic foot ulcer, left foot. Patient relates that a couple of weeks ago he noticed what appeared to be a blister on the bottom of the left foot. He tore that off and there was a wound under there. He has kept the area washed and cleaned the best he could but the wound is not healing, he has not seen a doctor for the wound to date. He relates over the last couple of days noticing redness on the top of the foot that is getting worse. He has had fevers, he thought he was just sick. There is drainage from the wound.     1/25/18: Patient is returning to clinic for follow up left foot infection having been discharged from the hospital last week, s/p extensive I&D 1/11/18 with repeat OR washout and debridement on 1/15/18. Wound vac being changed twice a week by home health. He relates some residual pain but doing well overall. Relates no weight bearing to the foot except occasional weight bearing to the heel during transfers. Denies f/c/n/v    1/31/18: Patient returns for follow up left foot infection s/p extensive I&D 1/11/18 with repeat OR washout and debridement on 1/15/18. Non weight bearing. Vac being change twice a week by home health with weekly visits here. Denies f/c/n/v. Still has pain especially with pressure to the foot but getting better.    2/5/18: Patient  returns for follow up left foot infection s/p extensive I&D 1/11/18 with repeat OR washout and debridement on 1/15/18. Still non weight bearing. Vac changed by home health. Denies f/c/n/v. Pain to the foot being treated with tramadol, he says only helps a little.    2/14/18: Patient returns for follow up left foot infection with ulcerations left foot. Non weight bearing. Vac changed twice a week by home health. Noted pain to the lateral foot with pressure.     2/21/18: Patient returns for follow up left foot ulcer  s/p extensive I&D 1/11/18 with repeat OR washout and debridement on 1/15/18. Home health changing the vac twice a week. No new pedal complaints.    2/28/18: Patient returns for follow up left foot ulcer  s/p extensive I&D 1/11/18 with repeat OR washout and debridement on 1/15/18. Ambulating in T[facility], relates some shin splints in the cast, no new pedal complaints.    3/7/18: Patient returns for follow up left foot ulcer has been in [facility] for the last week without complaint. Denies f/c/n/v.     3/14/18: patient was seen s/p 1 week left second ray amputation on levofloxacin per ID for treatment of the infection. Weight bearing in the boot only. No new pedal complaints.     3/22/18: Patient was seen s/p week 2 left second ray amputation, on levofloxacin and now started on flagyl. Does not want to do IV antibiotics, is adamant about that. Weight bearing in boot only. No new pedal complaints, he does note more anxiety and difficulty sleeping recently, he is seeing his internal med doctor this afternoon. Also relates acute increase in right knee pain with focal swelling starting yesterday, very painful and difficult to put weight on it. This is the same knee he had a septic joint before.      PCP: Juanito Hilton MD    Date Last Seen by PCP: 1/8/18    Current shoe gear:  Affected Foot: Camwalker boots     Unaffected Foot: Tennis shoes    History of Trauma: negative  Sign of Infection: malaise and redness warmth and  drainage.    Hemoglobin A1C   Date Value Ref Range Status   03/08/2018 8.2 (H) 0.0 - 5.6 % Final     Comment:     Reference Interval:  5.0 - 5.6 Normal   5.7 - 6.4 High Risk   > 6.5 Diabetic    Hgb A1c results are standardized based on the (NGSP) National   Glycohemoglobin Standardization Program.    Hemoglobin A1C levels are related to mean serum/plasma glucose   during the preceding 2-3 months.        01/10/2018 8.7 (H) 0.0 - 5.6 % Final     Comment:     Reference Interval:  5.0 - 5.6 Normal   5.7 - 6.4 High Risk   > 6.5 Diabetic    Hgb A1c results are standardized based on the (NGSP) National   Glycohemoglobin Standardization Program.    Hemoglobin A1C levels are related to mean serum/plasma glucose   during the preceding 2-3 months.        07/07/2017 7.4 (H) 4.0 - 5.6 % Final     Comment:     According to ADA guidelines, hemoglobin A1c <7.0% represents  optimal control in non-pregnant diabetic patients. Different  metrics may apply to specific patient populations.   Standards of Medical Care in Diabetes-2016.  For the purpose of screening for the presence of diabetes:  <5.7%     Consistent with the absence of diabetes  5.7-6.4%  Consistent with increasing risk for diabetes   (prediabetes)  >or=6.5%  Consistent with diabetes  Currently, no consensus exists for use of hemoglobin A1c  for diagnosis of diabetes for children.  This Hemoglobin A1c assay has significant interference with fetal   hemoglobin   (HbF). The results are invalid for patients with abnormal amounts of   HbF,   including those with known Hereditary Persistence   of Fetal Hemoglobin. Heterozygous hemoglobin variants (HbAS, HbAC,   HbAD, HbAE, HbA2) do not significantly interfere with this assay;   however, presence of multiple variants in a sample may impact the %   interference.         Review of Systems   Constitution: Negative for chills and fever.   Cardiovascular: Positive for leg swelling. Negative for claudication.   Respiratory: Negative  for shortness of breath.    Skin: Positive for color change, nail changes and poor wound healing. Negative for itching and rash.   Musculoskeletal: Negative for muscle cramps, muscle weakness and myalgias.   Gastrointestinal: Negative for nausea and vomiting.   Neurological: Positive for numbness and paresthesias. Negative for focal weakness and loss of balance.           Objective:      Physical Exam   Constitutional: He is oriented to person, place, and time. He appears well-developed and well-nourished. No distress.   Cardiovascular:   Pulses:       Dorsalis pedis pulses are 2+ on the right side, and 2+ on the left side.        Posterior tibial pulses are 2+ on the right side, and 2+ on the left side.   < 3 sec capillary refill time to toes 1-5 bilateral. Toes and feet are warm to touch proximally with normal distal cooling b/l. There is no hair growth on the feet and toes b/l. There is moderate edema left foot and mild edema right.      Musculoskeletal:   Equinus noted b/l ankles with < 10 deg DF noted. MMT 5/5 in DF/PF/Inv/Ev resistance with no reproduction of pain in any direction. Passive range of motion of ankle and pedal joints is painless b/l.     Feet:   Right Foot:   Protective Sensation: 10 sites tested. 0 sites sensed.   Left Foot:   Protective Sensation: 10 sites tested. 0 sites sensed.   Neurological: He is alert and oriented to person, place, and time. He has normal strength. He displays no atrophy and no tremor. A sensory deficit is present. He exhibits normal muscle tone.   Negative tinel sign bilateral.   Skin: Skin is warm. No abrasion, no bruising, no burn, no ecchymosis, no laceration, no lesion, no petechiae and no rash noted. He is not diaphoretic. There is erythema. No cyanosis. No pallor. Nails show no clubbing.   There is an incision at the second toe amputation site from the dorsal foot to the plantar. Skin edges well coapted with only slight superficial gapping at the proximal portion  of the incision dorsally. Skin edges slight macerated dorsally with slight serous drainage, no malodor or purulence. Erythema present but improved greatly since discharge from the hospital.     Psychiatric: He has a normal mood and affect. His behavior is normal.             Assessment:       Encounter Diagnoses   Name Primary?    Left foot infection Yes    Acute pain of right knee       s/p second ray amputation 2 weeks      Plan:       Virgilio was seen today for post-op evaluation and diabetes mellitus.    Diagnoses and all orders for this visit:    Left foot infection  -     CBC auto differential; Future  -     Sedimentation rate, manual; Future  -     C-reactive protein; Future    Acute pain of right knee  -     CBC auto differential; Future  -     Sedimentation rate, manual; Future  -     C-reactive protein; Future  -     Ambulatory consult to Orthopedics      I counseled the patient on his conditions, their implications and medical management.    Shoe inspection. Diabetic Foot Education. Patient reminded of the importance of good nutrition and blood sugar control to help prevent podiatric complications of diabetes. Patient instructed on proper foot hygeine. We discussed wearing proper shoe gear, daily foot inspections, never walking without protective shoe gear, never putting sharp instruments to feet    Discussed importance of healthy diet and weight loss as to his diabetes control and healing potential of the wound.    Dressed the incisions with betadine and xeroform, cover with gauze and wrap to above ankle in cast padding and [facility] can change dressings in same way twice a week.    Continue antibiotics long term per ID recs. Would be better off with IV antibiotics however he is against getting a PICC and having IV antibiotics.    Urgent consult to ortho for right knee pain with history of septic knee in the past, CRP, ESR, and CBC done today to rule out acute infective process.      Follow with his internal medicine doctor for anxiety and insomnia.    Return in 1 week continued wound care     Inocente Smith DPM

## 2018-03-22 NOTE — TELEPHONE ENCOUNTER
----- Message from Patricia Harris sent at 3/22/2018  1:06 PM CDT -----  Contact: Melanie stewart/ [facility]  Melanie stewart/ [employer] [facility] calling to request wound care orders for the patient. Please advise.  Call back Melanie at   Fax   Thanks!

## 2018-03-24 ENCOUNTER — PATIENT MESSAGE (OUTPATIENT)
Dept: PODIATRY | Facility: CLINIC | Age: 58
End: 2018-03-24

## 2018-03-28 ENCOUNTER — PATIENT MESSAGE (OUTPATIENT)
Dept: FAMILY MEDICINE | Facility: CLINIC | Age: 58
End: 2018-03-28

## 2018-03-28 ENCOUNTER — PATIENT MESSAGE (OUTPATIENT)
Dept: PODIATRY | Facility: CLINIC | Age: 58
End: 2018-03-28

## 2018-03-28 ENCOUNTER — OFFICE VISIT (OUTPATIENT)
Dept: PODIATRY | Facility: CLINIC | Age: 58
End: 2018-03-28
Payer: COMMERCIAL

## 2018-03-28 VITALS — HEIGHT: 74 IN | WEIGHT: 311.5 LBS | BODY MASS INDEX: 39.98 KG/M2

## 2018-03-28 DIAGNOSIS — M79.10 MYALGIA: ICD-10-CM

## 2018-03-28 DIAGNOSIS — M79.672 LEFT FOOT PAIN: ICD-10-CM

## 2018-03-28 DIAGNOSIS — E08.621 DIABETIC ULCER OF LEFT FOOT ASSOCIATED WITH DIABETES MELLITUS DUE TO UNDERLYING CONDITION, WITH FAT LAYER EXPOSED, UNSPECIFIED PART OF FOOT: ICD-10-CM

## 2018-03-28 DIAGNOSIS — M25.50 ARTHRALGIA, UNSPECIFIED JOINT: Primary | ICD-10-CM

## 2018-03-28 DIAGNOSIS — L97.529 TYPE 2 DIABETES MELLITUS WITH LEFT DIABETIC FOOT ULCER: ICD-10-CM

## 2018-03-28 DIAGNOSIS — L97.522 DIABETIC ULCER OF LEFT FOOT ASSOCIATED WITH DIABETES MELLITUS DUE TO UNDERLYING CONDITION, WITH FAT LAYER EXPOSED, UNSPECIFIED PART OF FOOT: ICD-10-CM

## 2018-03-28 DIAGNOSIS — E11.49 TYPE II DIABETES MELLITUS WITH NEUROLOGICAL MANIFESTATIONS: ICD-10-CM

## 2018-03-28 DIAGNOSIS — E11.621 TYPE 2 DIABETES MELLITUS WITH LEFT DIABETIC FOOT ULCER: ICD-10-CM

## 2018-03-28 PROCEDURE — 99024 POSTOP FOLLOW-UP VISIT: CPT | Mod: S$GLB,,, | Performed by: PODIATRIST

## 2018-03-28 PROCEDURE — 99999 PR PBB SHADOW E&M-EST. PATIENT-LVL III: CPT | Mod: PBBFAC,,, | Performed by: PODIATRIST

## 2018-03-28 RX ORDER — HYDROCODONE BITARTRATE AND ACETAMINOPHEN 7.5; 325 MG/1; MG/1
1 TABLET ORAL EVERY 6 HOURS PRN
Qty: 35 TABLET | Refills: 0 | Status: SHIPPED | OUTPATIENT
Start: 2018-03-28 | End: 2018-04-17

## 2018-03-28 RX ORDER — HYDROCODONE BITARTRATE AND ACETAMINOPHEN 7.5; 325 MG/1; MG/1
1 TABLET ORAL EVERY 6 HOURS PRN
Qty: 35 TABLET | Refills: 0 | Status: SHIPPED | OUTPATIENT
Start: 2018-03-28 | End: 2018-03-28 | Stop reason: SDUPTHER

## 2018-03-28 RX ORDER — METRONIDAZOLE 500 MG/1
500 TABLET ORAL EVERY 8 HOURS
Qty: 42 TABLET | Refills: 0 | Status: SHIPPED | OUTPATIENT
Start: 2018-03-28 | End: 2018-04-11

## 2018-04-03 ENCOUNTER — PATIENT OUTREACH (OUTPATIENT)
Dept: ADMINISTRATIVE | Facility: HOSPITAL | Age: 58
End: 2018-04-03

## 2018-04-04 ENCOUNTER — OFFICE VISIT (OUTPATIENT)
Dept: PODIATRY | Facility: CLINIC | Age: 58
End: 2018-04-04
Payer: COMMERCIAL

## 2018-04-04 VITALS — HEIGHT: 74 IN | WEIGHT: 311.5 LBS | BODY MASS INDEX: 39.98 KG/M2

## 2018-04-04 DIAGNOSIS — Z87.2 HEALED ULCER OF LEFT FOOT ON EXAMINATION: ICD-10-CM

## 2018-04-04 DIAGNOSIS — L84 CORN OR CALLUS: ICD-10-CM

## 2018-04-04 DIAGNOSIS — R60.0 EDEMA, LOWER EXTREMITY: ICD-10-CM

## 2018-04-04 DIAGNOSIS — Z98.890 POST-OPERATIVE STATE: ICD-10-CM

## 2018-04-04 DIAGNOSIS — E11.49 TYPE II DIABETES MELLITUS WITH NEUROLOGICAL MANIFESTATIONS: ICD-10-CM

## 2018-04-04 DIAGNOSIS — L97.522 DIABETIC ULCER OF LEFT FOOT ASSOCIATED WITH DIABETES MELLITUS DUE TO UNDERLYING CONDITION, WITH FAT LAYER EXPOSED, UNSPECIFIED PART OF FOOT: Primary | ICD-10-CM

## 2018-04-04 DIAGNOSIS — E08.621 DIABETIC ULCER OF LEFT FOOT ASSOCIATED WITH DIABETES MELLITUS DUE TO UNDERLYING CONDITION, WITH FAT LAYER EXPOSED, UNSPECIFIED PART OF FOOT: Primary | ICD-10-CM

## 2018-04-04 PROCEDURE — 99999 PR PBB SHADOW E&M-EST. PATIENT-LVL III: CPT | Mod: PBBFAC,,, | Performed by: PODIATRIST

## 2018-04-04 PROCEDURE — 99024 POSTOP FOLLOW-UP VISIT: CPT | Mod: S$GLB,,, | Performed by: PODIATRIST

## 2018-04-04 NOTE — PROGRESS NOTES
Subjective:      Patient ID: Virgilio Acuña is a 57 y.o. male.    Chief Complaint: Post-op Evaluation (1 wk post ck - left foot (Surgery 3/9)) and Diabetes Mellitus (PCP:  Dr Hilton 1/8/18; HgbA1c: 3/8/18  5.2)    Virgilio is a 57 y.o. male who presents to the clinic for evaluation and treatment of high risk feet. Virgilio has a past medical history of Cellulitis of left index finger (2/16/2015); Dyslipidemia; Essential hypertension (2/16/2017); Infected finger joint (2/17/2015); Obese; S/P knee surgery, medial/lateral menisectomy (11/4/2013); and Type 2 diabetes mellitus with diabetic polyneuropathy, with long-term current use of insulin (2003). The patient's chief complaint is diabetic foot ulcer, left foot. Patient relates that a couple of weeks ago he noticed what appeared to be a blister on the bottom of the left foot. He tore that off and there was a wound under there. He has kept the area washed and cleaned the best he could but the wound is not healing, he has not seen a doctor for the wound to date. He relates over the last couple of days noticing redness on the top of the foot that is getting worse. He has had fevers, he thought he was just sick. There is drainage from the wound.     1/25/18: Patient is returning to clinic for follow up left foot infection having been discharged from the hospital last week, s/p extensive I&D 1/11/18 with repeat OR washout and debridement on 1/15/18. Wound vac being changed twice a week by home health. He relates some residual pain but doing well overall. Relates no weight bearing to the foot except occasional weight bearing to the heel during transfers. Denies f/c/n/v    1/31/18: Patient returns for follow up left foot infection s/p extensive I&D 1/11/18 with repeat OR washout and debridement on 1/15/18. Non weight bearing. Vac being change twice a week by home health with weekly visits here. Denies f/c/n/v. Still has pain especially with pressure to the foot but getting  better.    2/5/18: Patient returns for follow up left foot infection s/p extensive I&D 1/11/18 with repeat OR washout and debridement on 1/15/18. Still non weight bearing. Vac changed by home health. Denies f/c/n/v. Pain to the foot being treated with tramadol, he says only helps a little.    2/14/18: Patient returns for follow up left foot infection with ulcerations left foot. Non weight bearing. Vac changed twice a week by home health. Noted pain to the lateral foot with pressure.     2/21/18: Patient returns for follow up left foot ulcer  s/p extensive I&D 1/11/18 with repeat OR washout and debridement on 1/15/18. Home health changing the vac twice a week. No new pedal complaints.    2/28/18: Patient returns for follow up left foot ulcer  s/p extensive I&D 1/11/18 with repeat OR washout and debridement on 1/15/18. Ambulating in TCC, relates some shin splints in the cast, no new pedal complaints.    3/7/18: Patient returns for follow up left foot ulcer has been in TCC for the last week without complaint. Denies f/c/n/v.     3/14/18: patient was seen s/p 1 week left second ray amputation on levofloxacin per ID for treatment of the infection. Weight bearing in the boot only. No new pedal complaints.     3/22/18: Patient was seen s/p week 2 left second ray amputation, on levofloxacin and now started on flagyl. Does not want to do IV antibiotics, is adamant about that. Weight bearing in boot only. No new pedal complaints, he does note more anxiety and difficulty sleeping recently, he is seeing his internal med doctor this afternoon. Also relates acute increase in right knee pain with focal swelling starting yesterday, very painful and difficult to put weight on it. This is the same knee he had a septic joint before.    4/4/18:Patient seen 1 month s/p left second ray amputation. Doing well as far as the foot is concerned, having severe pain in the right knee and left shoulder and generalized arthralgias. Ambulating in  the boot only. No new pedal complaints.     PCP: Juanito Hilton MD    Date Last Seen by PCP: 1/8/18    Current shoe gear:  Affected Foot: Camwalker boots     Unaffected Foot: Tennis shoes    History of Trauma: negative  Sign of Infection: malaise and redness warmth and drainage.    Hemoglobin A1C   Date Value Ref Range Status   03/08/2018 8.2 (H) 0.0 - 5.6 % Final     Comment:     Reference Interval:  5.0 - 5.6 Normal   5.7 - 6.4 High Risk   > 6.5 Diabetic    Hgb A1c results are standardized based on the (NGSP) National   Glycohemoglobin Standardization Program.    Hemoglobin A1C levels are related to mean serum/plasma glucose   during the preceding 2-3 months.        01/10/2018 8.7 (H) 0.0 - 5.6 % Final     Comment:     Reference Interval:  5.0 - 5.6 Normal   5.7 - 6.4 High Risk   > 6.5 Diabetic    Hgb A1c results are standardized based on the (NGSP) National   Glycohemoglobin Standardization Program.    Hemoglobin A1C levels are related to mean serum/plasma glucose   during the preceding 2-3 months.        07/07/2017 7.4 (H) 4.0 - 5.6 % Final     Comment:     According to ADA guidelines, hemoglobin A1c <7.0% represents  optimal control in non-pregnant diabetic patients. Different  metrics may apply to specific patient populations.   Standards of Medical Care in Diabetes-2016.  For the purpose of screening for the presence of diabetes:  <5.7%     Consistent with the absence of diabetes  5.7-6.4%  Consistent with increasing risk for diabetes   (prediabetes)  >or=6.5%  Consistent with diabetes  Currently, no consensus exists for use of hemoglobin A1c  for diagnosis of diabetes for children.  This Hemoglobin A1c assay has significant interference with fetal   hemoglobin   (HbF). The results are invalid for patients with abnormal amounts of   HbF,   including those with known Hereditary Persistence   of Fetal Hemoglobin. Heterozygous hemoglobin variants (HbAS, HbAC,   HbAD, HbAE, HbA2) do not significantly interfere  with this assay;   however, presence of multiple variants in a sample may impact the %   interference.         Review of Systems   Constitution: Negative for chills and fever.   Cardiovascular: Positive for leg swelling. Negative for claudication.   Respiratory: Negative for shortness of breath.    Skin: Positive for color change, nail changes and poor wound healing. Negative for itching and rash.   Musculoskeletal: Negative for muscle cramps, muscle weakness and myalgias.   Gastrointestinal: Negative for nausea and vomiting.   Neurological: Positive for numbness and paresthesias. Negative for focal weakness and loss of balance.           Objective:      Physical Exam   Constitutional: He is oriented to person, place, and time. He appears well-developed and well-nourished. No distress.   Cardiovascular:   Pulses:       Dorsalis pedis pulses are 2+ on the right side, and 2+ on the left side.        Posterior tibial pulses are 2+ on the right side, and 2+ on the left side.   < 3 sec capillary refill time to toes 1-5 bilateral. Toes and feet are warm to touch proximally with normal distal cooling b/l. There is no hair growth on the feet and toes b/l. There is moderate edema left foot and mild edema right.      Musculoskeletal:   Equinus noted b/l ankles with < 10 deg DF noted. MMT 5/5 in DF/PF/Inv/Ev resistance with no reproduction of pain in any direction. Passive range of motion of ankle and pedal joints is painless b/l.     Feet:   Right Foot:   Protective Sensation: 10 sites tested. 0 sites sensed.   Left Foot:   Protective Sensation: 10 sites tested. 0 sites sensed.   Neurological: He is alert and oriented to person, place, and time. He has normal strength. He displays no atrophy and no tremor. A sensory deficit is present. He exhibits normal muscle tone.   Negative tinel sign bilateral.   Skin: Skin is warm. No abrasion, no bruising, no burn, no ecchymosis, no laceration, no lesion, no petechiae and no rash  noted. He is not diaphoretic. No cyanosis or erythema. No pallor. Nails show no clubbing.   There is an incision at the second toe amputation site from the dorsal foot to the plantar. Only slight superficial gapping at the proximal portion of the incision dorsally that is healing well.  Erythema resolved. Plantar ulcer now fully healed.      Psychiatric: He has a normal mood and affect. His behavior is normal.             Assessment:       Encounter Diagnoses   Name Primary?    Diabetic ulcer of left foot associated with diabetes mellitus due to underlying condition, with fat layer exposed, unspecified part of foot Yes    Type II diabetes mellitus with neurological manifestations     Healed ulcer of left foot on examination     Edema, lower extremity     Post-operative state     Corn or callus       s/p second ray amputation 1 month      Plan:       Virgilio was seen today for post-op evaluation and diabetes mellitus.    Diagnoses and all orders for this visit:    Diabetic ulcer of left foot associated with diabetes mellitus due to underlying condition, with fat layer exposed, unspecified part of foot    Type II diabetes mellitus with neurological manifestations  -     DIABETIC SHOES FOR HOME USE    Healed ulcer of left foot on examination  -     DIABETIC SHOES FOR HOME USE    Edema, lower extremity  -     DIABETIC SHOES FOR HOME USE    Post-operative state  -     DIABETIC SHOES FOR HOME USE    Corn or callus  -     DIABETIC SHOES FOR HOME USE      I counseled the patient on his conditions, their implications and medical management.    Shoe inspection. Diabetic Foot Education. Patient reminded of the importance of good nutrition and blood sugar control to help prevent podiatric complications of diabetes. Patient instructed on proper foot hygeine. We discussed wearing proper shoe gear, daily foot inspections, never walking without protective shoe gear, never putting sharp instruments to feet    Discussed  importance of healthy diet and weight loss as to his diabetes control and healing potential of the wound.    Dressed the dorsal incision wound with betadine and mepilex border, can discontinue home health dressing changes, he can just keep this area covered with a pad at all times.    Continue antibiotics long term per ID recs. Would be better off with IV antibiotics however he is against getting a PICC and having IV antibiotics.    Follow with his internal medicine doctor for anxiety and insomnia.    Return in 1 week continued wound care and to discuss release back to work. For now he can return to a normal shoe for ambulation but will need to watch closely for signs of breakdown. Also dispensed a prescription for diabetic shoes, he will need to begin the process of filling this prescription.      Inocente Smith DPM

## 2018-04-06 NOTE — PROGRESS NOTES
Subjective:      Patient ID: Virgilio Acuña is a 57 y.o. male.    Chief Complaint: Post-op Evaluation (left foot - 1 wk re-ck) and Diabetes Mellitus (PCP:  Dr Hilton 1/8/18; HgbA1c: 3/8/1/  8.2)    Virgilio is a 57 y.o. male who presents to the clinic for evaluation and treatment of high risk feet. Virgilio has a past medical history of Cellulitis of left index finger (2/16/2015); Dyslipidemia; Essential hypertension (2/16/2017); Infected finger joint (2/17/2015); Obese; S/P knee surgery, medial/lateral menisectomy (11/4/2013); and Type 2 diabetes mellitus with diabetic polyneuropathy, with long-term current use of insulin (2003). The patient's chief complaint is diabetic foot ulcer, left foot. Patient relates that a couple of weeks ago he noticed what appeared to be a blister on the bottom of the left foot. He tore that off and there was a wound under there. He has kept the area washed and cleaned the best he could but the wound is not healing, he has not seen a doctor for the wound to date. He relates over the last couple of days noticing redness on the top of the foot that is getting worse. He has had fevers, he thought he was just sick. There is drainage from the wound.     1/25/18: Patient is returning to clinic for follow up left foot infection having been discharged from the hospital last week, s/p extensive I&D 1/11/18 with repeat OR washout and debridement on 1/15/18. Wound vac being changed twice a week by home health. He relates some residual pain but doing well overall. Relates no weight bearing to the foot except occasional weight bearing to the heel during transfers. Denies f/c/n/v    1/31/18: Patient returns for follow up left foot infection s/p extensive I&D 1/11/18 with repeat OR washout and debridement on 1/15/18. Non weight bearing. Vac being change twice a week by home health with weekly visits here. Denies f/c/n/v. Still has pain especially with pressure to the foot but getting better.    2/5/18:  Patient returns for follow up left foot infection s/p extensive I&D 1/11/18 with repeat OR washout and debridement on 1/15/18. Still non weight bearing. Vac changed by home health. Denies f/c/n/v. Pain to the foot being treated with tramadol, he says only helps a little.    2/14/18: Patient returns for follow up left foot infection with ulcerations left foot. Non weight bearing. Vac changed twice a week by home health. Noted pain to the lateral foot with pressure.     2/21/18: Patient returns for follow up left foot ulcer  s/p extensive I&D 1/11/18 with repeat OR washout and debridement on 1/15/18. Home health changing the vac twice a week. No new pedal complaints.    2/28/18: Patient returns for follow up left foot ulcer  s/p extensive I&D 1/11/18 with repeat OR washout and debridement on 1/15/18. Ambulating in TCC, relates some shin splints in the cast, no new pedal complaints.    3/7/18: Patient returns for follow up left foot ulcer has been in [facility] for the last week without complaint. Denies f/c/n/v.     3/14/18: patient was seen s/p 1 week left second ray amputation on levofloxacin per ID for treatment of the infection. Weight bearing in the boot only. No new pedal complaints.     3/22/18: Patient was seen s/p week 2 left second ray amputation, on levofloxacin and now started on flagyl. Does not want to do IV antibiotics, is adamant about that. Weight bearing in boot only. No new pedal complaints, he does note more anxiety and difficulty sleeping recently, he is seeing his internal med doctor this afternoon. Also relates acute increase in right knee pain with focal swelling starting yesterday, very painful and difficult to put weight on it. This is the same knee he had a septic joint before.    3/28/18: Follow up left foot ulcer weight bearing in ortho boot only.  No acute changes. Doing well as far as the foot, is having a lot of joint pain throughout his body      PCP: Juanito Hilton MD    Date Last Seen by  PCP: 1/8/18    Current shoe gear:  Affected Foot: Camwalker boots     Unaffected Foot: Tennis shoes    History of Trauma: negative  Sign of Infection: malaise and redness warmth and drainage.    Hemoglobin A1C   Date Value Ref Range Status   03/08/2018 8.2 (H) 0.0 - 5.6 % Final     Comment:     Reference Interval:  5.0 - 5.6 Normal   5.7 - 6.4 High Risk   > 6.5 Diabetic    Hgb A1c results are standardized based on the (NGSP) National   Glycohemoglobin Standardization Program.    Hemoglobin A1C levels are related to mean serum/plasma glucose   during the preceding 2-3 months.        01/10/2018 8.7 (H) 0.0 - 5.6 % Final     Comment:     Reference Interval:  5.0 - 5.6 Normal   5.7 - 6.4 High Risk   > 6.5 Diabetic    Hgb A1c results are standardized based on the (NGSP) National   Glycohemoglobin Standardization Program.    Hemoglobin A1C levels are related to mean serum/plasma glucose   during the preceding 2-3 months.        07/07/2017 7.4 (H) 4.0 - 5.6 % Final     Comment:     According to ADA guidelines, hemoglobin A1c <7.0% represents  optimal control in non-pregnant diabetic patients. Different  metrics may apply to specific patient populations.   Standards of Medical Care in Diabetes-2016.  For the purpose of screening for the presence of diabetes:  <5.7%     Consistent with the absence of diabetes  5.7-6.4%  Consistent with increasing risk for diabetes   (prediabetes)  >or=6.5%  Consistent with diabetes  Currently, no consensus exists for use of hemoglobin A1c  for diagnosis of diabetes for children.  This Hemoglobin A1c assay has significant interference with fetal   hemoglobin   (HbF). The results are invalid for patients with abnormal amounts of   HbF,   including those with known Hereditary Persistence   of Fetal Hemoglobin. Heterozygous hemoglobin variants (HbAS, HbAC,   HbAD, HbAE, HbA2) do not significantly interfere with this assay;   however, presence of multiple variants in a sample may impact the %    interference.         Review of Systems   Constitution: Negative for chills and fever.   Cardiovascular: Positive for leg swelling. Negative for claudication.   Respiratory: Negative for shortness of breath.    Skin: Positive for color change, nail changes and poor wound healing. Negative for itching and rash.   Musculoskeletal: Negative for muscle cramps, muscle weakness and myalgias.   Gastrointestinal: Negative for nausea and vomiting.   Neurological: Positive for numbness and paresthesias. Negative for focal weakness and loss of balance.           Objective:      Physical Exam   Constitutional: He is oriented to person, place, and time. He appears well-developed and well-nourished. No distress.   Cardiovascular:   Pulses:       Dorsalis pedis pulses are 2+ on the right side, and 2+ on the left side.        Posterior tibial pulses are 2+ on the right side, and 2+ on the left side.   < 3 sec capillary refill time to toes 1-5 bilateral. Toes and feet are warm to touch proximally with normal distal cooling b/l. There is no hair growth on the feet and toes b/l. There is moderate edema left foot and mild edema right.      Musculoskeletal:   Equinus noted b/l ankles with < 10 deg DF noted. MMT 5/5 in DF/PF/Inv/Ev resistance with no reproduction of pain in any direction. Passive range of motion of ankle and pedal joints is painless b/l.     Feet:   Right Foot:   Protective Sensation: 10 sites tested. 0 sites sensed.   Left Foot:   Protective Sensation: 10 sites tested. 0 sites sensed.   Neurological: He is alert and oriented to person, place, and time. He has normal strength. He displays no atrophy and no tremor. A sensory deficit is present. He exhibits normal muscle tone.   Negative tinel sign bilateral.   Skin: Skin is warm. No abrasion, no bruising, no burn, no ecchymosis, no laceration, no lesion, no petechiae and no rash noted. He is not diaphoretic. No cyanosis or erythema. No pallor. Nails show no clubbing.    There is an incision at the second toe amputation site from the dorsal foot to the plantar. Skin edges well coapted with only slight superficial gapping at the proximal portion of the incision dorsally. no malodor or purulence. Wound is fully granular.     Psychiatric: He has a normal mood and affect. His behavior is normal.             Assessment:       Encounter Diagnoses   Name Primary?    Left foot pain     Arthralgia, unspecified joint Yes    Myalgia     Diabetic ulcer of left foot associated with diabetes mellitus due to underlying condition, with fat layer exposed, unspecified part of foot     Type 2 diabetes mellitus with left diabetic foot ulcer     Type II diabetes mellitus with neurological manifestations       s/p second ray amputation 2 weeks      Plan:       Virgilio was seen today for post-op evaluation and diabetes mellitus.    Diagnoses and all orders for this visit:    Arthralgia, unspecified joint  -     Ambulatory Referral to Rheumatology    Left foot pain  -     Discontinue: hydrocodone-acetaminophen 7.5-325mg (NORCO) 7.5-325 mg per tablet; Take 1 tablet by mouth every 6 (six) hours as needed for Pain.  -     hydrocodone-acetaminophen 7.5-325mg (NORCO) 7.5-325 mg per tablet; Take 1 tablet by mouth every 6 (six) hours as needed for Pain.    Myalgia  -     Ambulatory Referral to Rheumatology    Diabetic ulcer of left foot associated with diabetes mellitus due to underlying condition, with fat layer exposed, unspecified part of foot    Type 2 diabetes mellitus with left diabetic foot ulcer    Type II diabetes mellitus with neurological manifestations    Other orders  -     metroNIDAZOLE (FLAGYL) 500 MG tablet; Take 1 tablet (500 mg total) by mouth every 8 (eight) hours.      I counseled the patient on his conditions, their implications and medical management.    Shoe inspection. Diabetic Foot Education. Patient reminded of the importance of good nutrition and blood sugar control to help  prevent podiatric complications of diabetes. Patient instructed on proper foot hygeine. We discussed wearing proper shoe gear, daily foot inspections, never walking without protective shoe gear, never putting sharp instruments to feet    Discussed importance of healthy diet and weight loss as to his diabetes control and healing potential of the wound.    Dressed the incisions with betadine and xeroform, cover with gauze and wrap to above ankle in cast padding and secure with coban Home health can change dressings in same way twice a week.    Continue antibiotics long term per ID recs. Would be better off with IV antibiotics however he is against getting a PICC and having IV antibiotics.    Urgent consult to ortho for right knee pain with history of septic knee in the past, CRP, ESR, and CBC done today to rule out acute infective process.     Follow with his internal medicine doctor for anxiety and insomnia.    Return in 1 week continued wound care     Inocente Smith DPM

## 2018-04-11 ENCOUNTER — OFFICE VISIT (OUTPATIENT)
Dept: PODIATRY | Facility: CLINIC | Age: 58
End: 2018-04-11
Payer: COMMERCIAL

## 2018-04-11 ENCOUNTER — PATIENT MESSAGE (OUTPATIENT)
Dept: FAMILY MEDICINE | Facility: CLINIC | Age: 58
End: 2018-04-11

## 2018-04-11 VITALS — HEIGHT: 74 IN | WEIGHT: 311.5 LBS | BODY MASS INDEX: 39.98 KG/M2

## 2018-04-11 DIAGNOSIS — G25.81 RESTLESS LEGS SYNDROME (RLS): ICD-10-CM

## 2018-04-11 DIAGNOSIS — E11.42 DIABETIC POLYNEUROPATHY ASSOCIATED WITH TYPE 2 DIABETES MELLITUS: ICD-10-CM

## 2018-04-11 DIAGNOSIS — M54.16 LUMBAR RADICULAR PAIN: Primary | ICD-10-CM

## 2018-04-11 PROCEDURE — 99024 POSTOP FOLLOW-UP VISIT: CPT | Mod: S$GLB,,, | Performed by: PODIATRIST

## 2018-04-11 PROCEDURE — 99999 PR PBB SHADOW E&M-EST. PATIENT-LVL III: CPT | Mod: PBBFAC,,, | Performed by: PODIATRIST

## 2018-04-11 RX ORDER — GABAPENTIN 600 MG/1
TABLET ORAL
Qty: 270 TABLET | Refills: 3 | OUTPATIENT
Start: 2018-04-11

## 2018-04-11 NOTE — LETTER
April 11, 2018    Virgilio Acuña  [address]  [facility] [address]         [facility]  [address]  [place] [address]  Phone: [phone] April 11, 2018     Patient: Virgilio Acuña   YOB: 1960   Date of Visit: 4/11/2018       To Whom It May Concern:    It is my medical opinion that Virgilio Acuña may return to work on 4/18/18.    If you have any questions or concerns, please don't hesitate to call.    Sincerely,        Inocente Smith DPM

## 2018-04-11 NOTE — TELEPHONE ENCOUNTER
LVM for pt as per Dr Hobson: unable to refill Rx of Gabapentin; pt has not been seen since 9/23/16; pt can have [facility] send request to Dr Hilton as it appears pt has seen him this year for his diabetes & has a appt w/ him again next week; if pt has questions or concerns, can call us.

## 2018-04-12 ENCOUNTER — TELEPHONE (OUTPATIENT)
Dept: INFECTIOUS DISEASES | Facility: CLINIC | Age: 58
End: 2018-04-12

## 2018-04-12 ENCOUNTER — LAB VISIT (OUTPATIENT)
Dept: LAB | Facility: HOSPITAL | Age: 58
End: 2018-04-12
Attending: INTERNAL MEDICINE
Payer: COMMERCIAL

## 2018-04-12 ENCOUNTER — PATIENT MESSAGE (OUTPATIENT)
Dept: FAMILY MEDICINE | Facility: CLINIC | Age: 58
End: 2018-04-12

## 2018-04-12 DIAGNOSIS — A49.01 MSSA (METHICILLIN SUSCEPTIBLE STAPHYLOCOCCUS AUREUS): Primary | ICD-10-CM

## 2018-04-12 DIAGNOSIS — A49.01 MSSA (METHICILLIN SUSCEPTIBLE STAPHYLOCOCCUS AUREUS) INFECTION: Primary | ICD-10-CM

## 2018-04-12 DIAGNOSIS — M86.9 OSTEOMYELITIS, UNSPECIFIED SITE, UNSPECIFIED TYPE: ICD-10-CM

## 2018-04-12 DIAGNOSIS — M86.9 OSTEOMYELITIS, UNSPECIFIED SITE, UNSPECIFIED TYPE: Primary | ICD-10-CM

## 2018-04-12 DIAGNOSIS — A49.01 MSSA (METHICILLIN SUSCEPTIBLE STAPHYLOCOCCUS AUREUS): ICD-10-CM

## 2018-04-12 LAB
ALBUMIN SERPL BCP-MCNC: 3.5 G/DL
ALP SERPL-CCNC: 54 U/L
ALT SERPL W/O P-5'-P-CCNC: 20 U/L
ANION GAP SERPL CALC-SCNC: 12 MMOL/L
AST SERPL-CCNC: 21 U/L
BASOPHILS # BLD AUTO: 0.07 K/UL
BASOPHILS NFR BLD: 0.8 %
BILIRUB SERPL-MCNC: 0.7 MG/DL
BUN SERPL-MCNC: 17 MG/DL
CALCIUM SERPL-MCNC: 9.3 MG/DL
CHLORIDE SERPL-SCNC: 99 MMOL/L
CO2 SERPL-SCNC: 25 MMOL/L
CREAT SERPL-MCNC: 1.1 MG/DL
CRP SERPL-MCNC: 10.2 MG/L
DIFFERENTIAL METHOD: ABNORMAL
EOSINOPHIL # BLD AUTO: 0.2 K/UL
EOSINOPHIL NFR BLD: 1.8 %
ERYTHROCYTE [DISTWIDTH] IN BLOOD BY AUTOMATED COUNT: 14.3 %
ERYTHROCYTE [SEDIMENTATION RATE] IN BLOOD BY WESTERGREN METHOD: 16 MM/HR
EST. GFR  (AFRICAN AMERICAN): >60 ML/MIN/1.73 M^2
EST. GFR  (NON AFRICAN AMERICAN): >60 ML/MIN/1.73 M^2
GLUCOSE SERPL-MCNC: 195 MG/DL
HCT VFR BLD AUTO: 52.2 %
HGB BLD-MCNC: 16.4 G/DL
IMM GRANULOCYTES # BLD AUTO: 0.07 K/UL
IMM GRANULOCYTES NFR BLD AUTO: 0.8 %
LYMPHOCYTES # BLD AUTO: 1.9 K/UL
LYMPHOCYTES NFR BLD: 21.3 %
MCH RBC QN AUTO: 27.9 PG
MCHC RBC AUTO-ENTMCNC: 31.4 G/DL
MCV RBC AUTO: 89 FL
MONOCYTES # BLD AUTO: 0.7 K/UL
MONOCYTES NFR BLD: 7.6 %
NEUTROPHILS # BLD AUTO: 6 K/UL
NEUTROPHILS NFR BLD: 67.7 %
NRBC BLD-RTO: 0 /100 WBC
PLATELET # BLD AUTO: 243 K/UL
PMV BLD AUTO: 10 FL
POTASSIUM SERPL-SCNC: 4.8 MMOL/L
PROT SERPL-MCNC: 7.2 G/DL
RBC # BLD AUTO: 5.88 M/UL
SODIUM SERPL-SCNC: 136 MMOL/L
WBC # BLD AUTO: 8.9 K/UL

## 2018-04-12 PROCEDURE — 80053 COMPREHEN METABOLIC PANEL: CPT

## 2018-04-12 PROCEDURE — 36415 COLL VENOUS BLD VENIPUNCTURE: CPT | Mod: PO

## 2018-04-12 PROCEDURE — 85651 RBC SED RATE NONAUTOMATED: CPT | Mod: PO

## 2018-04-12 PROCEDURE — 86140 C-REACTIVE PROTEIN: CPT

## 2018-04-12 PROCEDURE — 85025 COMPLETE CBC W/AUTO DIFF WBC: CPT

## 2018-04-12 NOTE — TELEPHONE ENCOUNTER
Infectious Disease Note    Chart has been reviewed. 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 Discharged on a 6 week course of oral antibiotic therapy with Levaquin as he refused PICC placement and IV antibiotics. Anticipated EOC: 4/20. Patient experiencing shoulder and knee pain both which began prior to Levaquin therapy. Wound appears stable per Dr. Smith's notes. Will get repeat CBC, BMP, ESR and CRP. Will re-schedule appointment for next week pending lab work. Please call if questions arise.      Sowmya Pepper MD  Infectious Diseases

## 2018-04-13 NOTE — TELEPHONE ENCOUNTER
We can discuss at appt next week-I wish he would bring his glucose readings in as well-we can talk about other meds we could try and ok to get pain referal

## 2018-04-14 ENCOUNTER — PATIENT MESSAGE (OUTPATIENT)
Dept: PODIATRY | Facility: CLINIC | Age: 58
End: 2018-04-14

## 2018-04-16 ENCOUNTER — TELEPHONE (OUTPATIENT)
Dept: INFECTIOUS DISEASES | Facility: CLINIC | Age: 58
End: 2018-04-16

## 2018-04-16 ENCOUNTER — PATIENT MESSAGE (OUTPATIENT)
Dept: PODIATRY | Facility: CLINIC | Age: 58
End: 2018-04-16

## 2018-04-16 NOTE — TELEPHONE ENCOUNTER
----- Message from Sowmya Pepper MD sent at 4/15/2018 10:07 PM CDT -----  Sonam  Can you let Mr. Rodriguez know that I am recommending he discontinue the antibiotic? I reviewed his chart and it seems he is experiencing worsening pain. The antibiotic may or may not be contributing to his pain so it is best to stop now.  Thanks  KBR

## 2018-04-17 ENCOUNTER — TELEPHONE (OUTPATIENT)
Dept: FAMILY MEDICINE | Facility: CLINIC | Age: 58
End: 2018-04-17

## 2018-04-17 ENCOUNTER — OFFICE VISIT (OUTPATIENT)
Dept: FAMILY MEDICINE | Facility: CLINIC | Age: 58
End: 2018-04-17
Payer: COMMERCIAL

## 2018-04-17 VITALS
TEMPERATURE: 98 F | SYSTOLIC BLOOD PRESSURE: 139 MMHG | HEART RATE: 86 BPM | WEIGHT: 315 LBS | HEIGHT: 73 IN | BODY MASS INDEX: 41.75 KG/M2 | DIASTOLIC BLOOD PRESSURE: 79 MMHG

## 2018-04-17 DIAGNOSIS — E78.5 HYPERLIPIDEMIA, UNSPECIFIED HYPERLIPIDEMIA TYPE: ICD-10-CM

## 2018-04-17 DIAGNOSIS — L08.9 TYPE 2 DIABETES MELLITUS WITH DIABETIC FOOT INFECTION: Primary | ICD-10-CM

## 2018-04-17 DIAGNOSIS — Z79.4 TYPE 2 DIABETES MELLITUS WITH OTHER SPECIFIED COMPLICATION, WITH LONG-TERM CURRENT USE OF INSULIN: Primary | ICD-10-CM

## 2018-04-17 DIAGNOSIS — G62.9 NEUROPATHY: ICD-10-CM

## 2018-04-17 DIAGNOSIS — E11.628 TYPE 2 DIABETES MELLITUS WITH DIABETIC FOOT INFECTION: Primary | ICD-10-CM

## 2018-04-17 DIAGNOSIS — E11.69 TYPE 2 DIABETES MELLITUS WITH OTHER SPECIFIED COMPLICATION, WITH LONG-TERM CURRENT USE OF INSULIN: Primary | ICD-10-CM

## 2018-04-17 PROCEDURE — 3078F DIAST BP <80 MM HG: CPT | Mod: CPTII,S$GLB,, | Performed by: FAMILY MEDICINE

## 2018-04-17 PROCEDURE — 99214 OFFICE O/P EST MOD 30 MIN: CPT | Mod: S$GLB,,, | Performed by: FAMILY MEDICINE

## 2018-04-17 PROCEDURE — 3075F SYST BP GE 130 - 139MM HG: CPT | Mod: CPTII,S$GLB,, | Performed by: FAMILY MEDICINE

## 2018-04-17 PROCEDURE — 99999 PR PBB SHADOW E&M-EST. PATIENT-LVL III: CPT | Mod: PBBFAC,,, | Performed by: FAMILY MEDICINE

## 2018-04-17 PROCEDURE — 3045F PR MOST RECENT HEMOGLOBIN A1C LEVEL 7.0-9.0%: CPT | Mod: CPTII,S$GLB,, | Performed by: FAMILY MEDICINE

## 2018-04-17 RX ORDER — PEN NEEDLE, DIABETIC 30 GX3/16"
NEEDLE, DISPOSABLE MISCELLANEOUS
Qty: 4 EACH | Refills: 3 | Status: SHIPPED | OUTPATIENT
Start: 2018-04-17 | End: 2018-12-10 | Stop reason: SDUPTHER

## 2018-04-17 RX ORDER — METFORMIN HYDROCHLORIDE 500 MG/1
1000 TABLET, EXTENDED RELEASE ORAL 2 TIMES DAILY WITH MEALS
Qty: 360 TABLET | Refills: 4 | Status: SHIPPED | OUTPATIENT
Start: 2018-04-17 | End: 2019-05-29 | Stop reason: SDUPTHER

## 2018-04-17 RX ORDER — INSULIN GLARGINE 100 [IU]/ML
70 INJECTION, SOLUTION SUBCUTANEOUS DAILY
Start: 2018-04-17 | End: 2018-05-10 | Stop reason: SDUPTHER

## 2018-04-17 RX ORDER — PRAVASTATIN SODIUM 40 MG/1
40 TABLET ORAL DAILY
Qty: 90 TABLET | Refills: 4 | Status: SHIPPED | OUTPATIENT
Start: 2018-04-17 | End: 2018-11-05

## 2018-04-17 RX ORDER — ENALAPRIL MALEATE 20 MG/1
20 TABLET ORAL DAILY
Qty: 90 TABLET | Refills: 3 | Status: SHIPPED | OUTPATIENT
Start: 2018-04-17 | End: 2019-03-13 | Stop reason: SDUPTHER

## 2018-04-17 RX ORDER — INSULIN LISPRO 100 [IU]/ML
60 INJECTION, SOLUTION INTRAVENOUS; SUBCUTANEOUS
Start: 2018-04-17 | End: 2018-05-10 | Stop reason: SDUPTHER

## 2018-04-17 RX ORDER — GABAPENTIN 600 MG/1
600 TABLET ORAL 3 TIMES DAILY
Qty: 270 TABLET | Refills: 3 | Status: SHIPPED | OUTPATIENT
Start: 2018-04-17 | End: 2019-01-07 | Stop reason: SDUPTHER

## 2018-04-17 NOTE — PROGRESS NOTES
Subjective:      Patient ID: Virgilio Acuña is a 57 y.o. male.    Chief Complaint: Follow-up (1 wk post op check left foot, PCP--01/18/18, A1c-8.2-03/08/18)    Virgilio is a 57 y.o. male who presents to the clinic for evaluation and treatment of high risk feet. Virgilio has a past medical history of Cellulitis of left index finger (2/16/2015); Dyslipidemia; Essential hypertension (2/16/2017); Infected finger joint (2/17/2015); Obese; S/P knee surgery, medial/lateral menisectomy (11/4/2013); and Type 2 diabetes mellitus with diabetic polyneuropathy, with long-term current use of insulin (2003). The patient's chief complaint is diabetic foot ulcer, left foot. Patient relates that a couple of weeks ago he noticed what appeared to be a blister on the bottom of the left foot. He tore that off and there was a wound under there. He has kept the area washed and cleaned the best he could but the wound is not healing, he has not seen a doctor for the wound to date. He relates over the last couple of days noticing redness on the top of the foot that is getting worse. He has had fevers, he thought he was just sick. There is drainage from the wound.     1/25/18: Patient is returning to clinic for follow up left foot infection having been discharged from the hospital last week, s/p extensive I&D 1/11/18 with repeat OR washout and debridement on 1/15/18. Wound vac being changed twice a week by home health. He relates some residual pain but doing well overall. Relates no weight bearing to the foot except occasional weight bearing to the heel during transfers. Denies f/c/n/v    1/31/18: Patient returns for follow up left foot infection s/p extensive I&D 1/11/18 with repeat OR washout and debridement on 1/15/18. Non weight bearing. Vac being change twice a week by home health with weekly visits here. Denies f/c/n/v. Still has pain especially with pressure to the foot but getting better.    2/5/18: Patient returns for follow up left  foot infection s/p extensive I&D 1/11/18 with repeat OR washout and debridement on 1/15/18. Still non weight bearing. Vac changed by home health. Denies f/c/n/v. Pain to the foot being treated with tramadol, he says only helps a little.    2/14/18: Patient returns for follow up left foot infection with ulcerations left foot. Non weight bearing. Vac changed twice a week by home health. Noted pain to the lateral foot with pressure.     2/21/18: Patient returns for follow up left foot ulcer  s/p extensive I&D 1/11/18 with repeat OR washout and debridement on 1/15/18. Home health changing the vac twice a week. No new pedal complaints.    2/28/18: Patient returns for follow up left foot ulcer  s/p extensive I&D 1/11/18 with repeat OR washout and debridement on 1/15/18. Ambulating in [facility], relates some shin splints in the cast, no new pedal complaints.    3/7/18: Patient returns for follow up left foot ulcer has been in [facility] for the last week without complaint. Denies f/c/n/v.     3/14/18: patient was seen s/p 1 week left second ray amputation on levofloxacin per ID for treatment of the infection. Weight bearing in the boot only. No new pedal complaints.     3/22/18: Patient was seen s/p week 2 left second ray amputation, on levofloxacin and now started on flagyl. Does not want to do IV antibiotics, is adamant about that. Weight bearing in boot only. No new pedal complaints, he does note more anxiety and difficulty sleeping recently, he is seeing his internal med doctor this afternoon. Also relates acute increase in right knee pain with focal swelling starting yesterday, very painful and difficult to put weight on it. This is the same knee he had a septic joint before.    4/4/18:Patient seen 1 month s/p left second ray amputation. Doing well as far as the foot is concerned, having severe pain in the right knee and left shoulder and generalized arthralgias. Ambulating in the boot only. No new pedal complaints.    4/11/18:  Patient was seen s/p left second ray amputation on 3/9/18, doing well however still has severe pain to the right knee and left shoulder, he also noted that he is beginning to have more neuropathy symptoms to both feet. He has returned to walking in normal shoes again without problems.    PCP: Juanito Hilton MD    Date Last Seen by PCP: 1/8/18    Current shoe gear:  Affected Foot: Camwalker boots     Unaffected Foot: Tennis shoes    History of Trauma: negative  Sign of Infection: malaise and redness warmth and drainage.    Hemoglobin A1C   Date Value Ref Range Status   03/08/2018 8.2 (H) 0.0 - 5.6 % Final     Comment:     Reference Interval:  5.0 - 5.6 Normal   5.7 - 6.4 High Risk   > 6.5 Diabetic    Hgb A1c results are standardized based on the (NGSP) National   Glycohemoglobin Standardization Program.    Hemoglobin A1C levels are related to mean serum/plasma glucose   during the preceding 2-3 months.        01/10/2018 8.7 (H) 0.0 - 5.6 % Final     Comment:     Reference Interval:  5.0 - 5.6 Normal   5.7 - 6.4 High Risk   > 6.5 Diabetic    Hgb A1c results are standardized based on the (NGSP) National   Glycohemoglobin Standardization Program.    Hemoglobin A1C levels are related to mean serum/plasma glucose   during the preceding 2-3 months.        07/07/2017 7.4 (H) 4.0 - 5.6 % Final     Comment:     According to ADA guidelines, hemoglobin A1c <7.0% represents  optimal control in non-pregnant diabetic patients. Different  metrics may apply to specific patient populations.   Standards of Medical Care in Diabetes-2016.  For the purpose of screening for the presence of diabetes:  <5.7%     Consistent with the absence of diabetes  5.7-6.4%  Consistent with increasing risk for diabetes   (prediabetes)  >or=6.5%  Consistent with diabetes  Currently, no consensus exists for use of hemoglobin A1c  for diagnosis of diabetes for children.  This Hemoglobin A1c assay has significant interference with fetal   hemoglobin   (HbF).  The results are invalid for patients with abnormal amounts of   HbF,   including those with known Hereditary Persistence   of Fetal Hemoglobin. Heterozygous hemoglobin variants (HbAS, HbAC,   HbAD, HbAE, HbA2) do not significantly interfere with this assay;   however, presence of multiple variants in a sample may impact the %   interference.         Review of Systems   Constitution: Negative for chills and fever.   Cardiovascular: Positive for leg swelling. Negative for claudication.   Respiratory: Negative for shortness of breath.    Skin: Positive for color change, nail changes and poor wound healing. Negative for itching and rash.   Musculoskeletal: Negative for muscle cramps, muscle weakness and myalgias.   Gastrointestinal: Negative for nausea and vomiting.   Neurological: Positive for numbness and paresthesias. Negative for focal weakness and loss of balance.           Objective:      Physical Exam   Constitutional: He is oriented to person, place, and time. He appears well-developed and well-nourished. No distress.   Cardiovascular:   Pulses:       Dorsalis pedis pulses are 2+ on the right side, and 2+ on the left side.        Posterior tibial pulses are 2+ on the right side, and 2+ on the left side.   < 3 sec capillary refill time to toes 1-5 bilateral. Toes and feet are warm to touch proximally with normal distal cooling b/l. There is no hair growth on the feet and toes b/l. There is moderate edema left foot and mild edema right.      Musculoskeletal:   Equinus noted b/l ankles with < 10 deg DF noted. MMT 5/5 in DF/PF/Inv/Ev resistance with no reproduction of pain in any direction. Passive range of motion of ankle and pedal joints is painless b/l.     Feet:   Right Foot:   Protective Sensation: 10 sites tested. 0 sites sensed.   Left Foot:   Protective Sensation: 10 sites tested. 0 sites sensed.   Neurological: He is alert and oriented to person, place, and time. He has normal strength. He displays no  atrophy and no tremor. A sensory deficit is present. He exhibits normal muscle tone.   Negative tinel sign bilateral.   Skin: Skin is warm. No abrasion, no bruising, no burn, no ecchymosis, no laceration, no lesion, no petechiae and no rash noted. He is not diaphoretic. No cyanosis or erythema. No pallor. Nails show no clubbing.   There is an incision at the second toe amputation site from the dorsal foot to the plantar. Only slight superficial gapping at the proximal portion of the incision dorsally that is healing well, now partial thickness without drainage.  Erythema resolved. Plantar ulcer now fully healed.      Psychiatric: He has a normal mood and affect. His behavior is normal.             Assessment:       Encounter Diagnoses   Name Primary?    Lumbar radicular pain Yes    Diabetic polyneuropathy associated with type 2 diabetes mellitus     Restless legs syndrome (RLS)       s/p second ray amputation 3/9/18      Plan:       Virgilio was seen today for follow-up.    Diagnoses and all orders for this visit:    Lumbar radicular pain  -     Ambulatory consult to Pain Clinic    Diabetic polyneuropathy associated with type 2 diabetes mellitus  -     Ambulatory consult to Pain Clinic    Restless legs syndrome (RLS)  -     Ambulatory consult to Pain Clinic      I counseled the patient on his conditions, their implications and medical management.    Shoe inspection. Diabetic Foot Education. Patient reminded of the importance of good nutrition and blood sugar control to help prevent podiatric complications of diabetes. Patient instructed on proper foot hygeine. We discussed wearing proper shoe gear, daily foot inspections, never walking without protective shoe gear, never putting sharp instruments to feet    Discussed importance of healthy diet and weight loss as to his diabetes control and healing potential of the wound.    Dressed the dorsal incision wound with mepilex border, can discontinue home health dressing  changes, he can just keep this area covered with a pad at all times over the next few days it should heal fully.    Continue antibiotics long term per ID recs.     Follow with his internal medicine doctor for anxiety and insomnia.    Return in 1 month for follow up, continue working on getting the diabetic shoes. I referred him to pain management as he has continued pain to the feet, knee and shoulder. Has tried gabapentin, Lyrica, and continues to have the severe neuropathy pains.     Inocente Smith DPM

## 2018-04-17 NOTE — PROGRESS NOTES
"  Virgilio Acuña presents to [facility]  DM Also has chronic neuropathy legs. Recent ulcer w debridement and amputation lt 2nd toe  Past Medical History:   Diagnosis Date    Cellulitis of left index finger 2/16/2015    Dyslipidemia     Essential hypertension 2/16/2017    Infected finger joint 2/17/2015    Obese     S/P knee surgery, medial/lateral menisectomy 11/4/2013    Type 2 diabetes mellitus with diabetic polyneuropathy, with long-term current use of insulin 2003     Past Surgical History:   Procedure Laterality Date    ELBOW SURGERY      FOOT SURGERY Left     KNEE CARTILAGE SURGERY  10/21/2013    right knee    KNEE SURGERY Right 02/17/2017    Left index finger surgery  03/2015     No Known Allergies  Current Outpatient Prescriptions on File Prior to Visit   Medication Sig Dispense Refill    acetaminophen (TYLENOL) 325 MG tablet Take 2 tablets (650 mg total) by mouth every 6 (six) hours as needed for Temperature greater than (or equal to 101 degree F).  0    ALPRAZolam (XANAX) 0.5 MG tablet Take 1 tablet (0.5 mg total) by mouth 3 (three) times daily. 90 tablet 0    BD ULTRA-FINE WILDA PEN NEEDLES 32 x 5/32 " Ndle INJECT FOUR TIMES DAILY 4 each 3    blood sugar diagnostic Strp 1 strip by Misc.(Non-Drug; Combo Route) route 3 (three) times daily. 360 each 3    enalapril (VASOTEC) 20 MG tablet Take 1 tablet (20 mg total) by mouth once daily. 90 tablet 3    FLUVIRIN [id number] 45 mcg (15 mcg x 3)/0.5 mL Susp ADM 0.5ML IM UTD  0    gabapentin (NEURONTIN) 600 MG tablet Take 1 tablet (600 mg total) by mouth 3 (three) times daily. (Patient taking differently: Take 600 mg by mouth 3 (three) times daily. ) 90 tablet 0    hydrocodone-acetaminophen 7.5-325mg (NORCO) 7.5-325 mg per tablet Take 1 tablet by mouth every 6 (six) hours as needed for Pain. 35 tablet 0    hydrOXYzine pamoate (VISTARIL) 50 MG Cap   11    insulin glargine (LANTUS SOLOSTAR U-100 INSULIN) 100 unit/mL (3 mL) InPn pen Inject 30 Units into the " "skin once daily. Supply pen needles      insulin lispro (HUMALOG) 100 unit/mL injection Inject 40 units into the skin three times daily with meals plus sliding scale.  Supply with pen needles.      levoFLOXacin (LEVAQUIN) 750 MG tablet Take 1 tablet (750 mg total) by mouth once daily. 39 tablet 0    meloxicam (MOBIC) 15 MG tablet Take 1 tablet (15 mg total) by mouth once daily. 30 tablet 1    metformin (GLUCOPHAGE-XR) 500 MG 24 hr tablet TAKE 2 TABLETS TWICE A DAY WITH MEALS 360 tablet 0    polyethylene glycol (GLYCOLAX) 17 gram PwPk Take 17 g by mouth once daily. 24 each 0    pravastatin (PRAVACHOL) 40 MG tablet TAKE 1 TABLET DAILY 90 tablet 1    safety needles 22 gauge x 1 1/2" Ndle To be used once a month for testosterone injections 50 each 0    testosterone cypionate (DEPOTESTOTERONE CYPIONATE) 200 mg/mL injection Inject 1.5 mLs (300 mg total) into the muscle every 28 days. 10 mL 0     No current facility-administered medications on file prior to visit.      Social History     Social History    Marital status:      Spouse name: N/A    Number of children: N/A    Years of education: N/A     Occupational History     [employer]     Social History Main Topics    Smoking status: Never Smoker    Smokeless tobacco: Never Used    Alcohol use Yes      Comment: occasional    Drug use: No    Sexual activity: Yes     Partners: Female     Other Topics Concern    Not on file     Social History Narrative    No narrative on file     Family History   Problem Relation Age of Onset    Diabetes Mother     Diabetes Father          ROS:  SKIN: No rashes, itching or changes in color or texture of skin.  EYES: Visual acuity fine. No photophobia, ocular pain or diplopia.EARS: Denies ear pain, discharge or vertigo.NOSE: No loss of smell, no epistaxis some postnasal drip.MOUTH & THROAT: No hoarseness or change in voice. No excessive gum bleeding.CHEST: " Denies SHARP, cyanosis, wheezing  CARDIOVASCULAR: Denies chest pain, PND, orthopnea or reduced exercise tolerance.  ABDOMEN:  No weight loss.No abdominal pain, no hematemesis or blood in stool.  URINARY: No flank pain, dysuria or hematuria.  PERIPHERAL VASCULAR: No claudication or cyanosis.  MUSCULOSKELETAL: Negative   NEUROLOGIC: No history of seizures, paralysis, alteration of gait or coordination.    PE: Vital signs as noted  Heent:Normocephalic with no recent cranial trauma,PERRLA,EOMI,conjunctiva clear,fundi reveal no hemmorhage exudate or papilledema.Otic canals clear, tympanic membranes slightly dull bilaterally.Nasal mucosa slightly red and edematous.Posterior pharynx slightly red but without exudate.  Neck:Supple with minimal anterior cervical adenopathy.  Chest:Clear bilateral breath sounds  Heart:Regular rhthym without murmer  Abdomen:Soft, non tender,no masses, no hepatosplenomegaly  Ext Gen degen changes and myalgia   Impression:Diabetes  Neuropathy  Hypotestosterone  Obesity BMI 41.7   Anxiety  Insomnia  Xerostomia  HLP   Plan: Lab eval rev  Continue testosterone supplement   Consider allopurinol   Cont enalpril  20  Consider Surg eval for wt loss  Rev DM goals and tx options

## 2018-04-18 ENCOUNTER — PATIENT MESSAGE (OUTPATIENT)
Dept: FAMILY MEDICINE | Facility: CLINIC | Age: 58
End: 2018-04-18

## 2018-04-20 ENCOUNTER — PATIENT MESSAGE (OUTPATIENT)
Dept: PODIATRY | Facility: CLINIC | Age: 58
End: 2018-04-20

## 2018-04-25 ENCOUNTER — PATIENT MESSAGE (OUTPATIENT)
Dept: PODIATRY | Facility: CLINIC | Age: 58
End: 2018-04-25

## 2018-05-02 ENCOUNTER — PATIENT MESSAGE (OUTPATIENT)
Dept: PODIATRY | Facility: CLINIC | Age: 58
End: 2018-05-02

## 2018-05-02 RX ORDER — TESTOSTERONE CYPIONATE 200 MG/ML
INJECTION, SOLUTION INTRAMUSCULAR
Qty: 10 ML | Refills: 0 | Status: SHIPPED | OUTPATIENT
Start: 2018-05-02 | End: 2018-11-05 | Stop reason: SDUPTHER

## 2018-05-04 ENCOUNTER — PATIENT MESSAGE (OUTPATIENT)
Dept: FAMILY MEDICINE | Facility: CLINIC | Age: 58
End: 2018-05-04

## 2018-05-08 ENCOUNTER — PATIENT MESSAGE (OUTPATIENT)
Dept: PODIATRY | Facility: CLINIC | Age: 58
End: 2018-05-08

## 2018-05-08 ENCOUNTER — PATIENT MESSAGE (OUTPATIENT)
Dept: ENDOCRINOLOGY | Facility: CLINIC | Age: 58
End: 2018-05-08

## 2018-05-08 RX ORDER — INSULIN LISPRO 100 [IU]/ML
INJECTION, SOLUTION INTRAVENOUS; SUBCUTANEOUS
Qty: 180 ML | Refills: 3 | OUTPATIENT
Start: 2018-05-08

## 2018-05-09 ENCOUNTER — PATIENT MESSAGE (OUTPATIENT)
Dept: FAMILY MEDICINE | Facility: CLINIC | Age: 58
End: 2018-05-09

## 2018-05-10 RX ORDER — INSULIN GLARGINE 100 [IU]/ML
70 INJECTION, SOLUTION SUBCUTANEOUS DAILY
Qty: 21 ML | Refills: 12 | Status: SHIPPED | OUTPATIENT
Start: 2018-05-10 | End: 2018-07-16 | Stop reason: SDUPTHER

## 2018-05-10 RX ORDER — INSULIN LISPRO 100 [IU]/ML
60 INJECTION, SOLUTION INTRAVENOUS; SUBCUTANEOUS
Qty: 30 ML | Refills: 12 | OUTPATIENT
Start: 2018-05-10 | End: 2018-07-16 | Stop reason: SDUPTHER

## 2018-05-11 ENCOUNTER — PATIENT MESSAGE (OUTPATIENT)
Dept: FAMILY MEDICINE | Facility: CLINIC | Age: 58
End: 2018-05-11

## 2018-05-15 ENCOUNTER — PATIENT MESSAGE (OUTPATIENT)
Dept: FAMILY MEDICINE | Facility: CLINIC | Age: 58
End: 2018-05-15

## 2018-06-15 ENCOUNTER — PATIENT MESSAGE (OUTPATIENT)
Dept: FAMILY MEDICINE | Facility: CLINIC | Age: 58
End: 2018-06-15

## 2018-06-15 RX ORDER — DICLOFENAC SODIUM 75 MG/1
75 TABLET, DELAYED RELEASE ORAL 2 TIMES DAILY
Qty: 60 TABLET | Refills: 0 | Status: SHIPPED | OUTPATIENT
Start: 2018-06-15 | End: 2018-10-17

## 2018-06-15 NOTE — TELEPHONE ENCOUNTER
rx for diclofenac [facility] he didn't want mailorder-dont take mobic/meloxicam if hestill has and dont take otc advil ibuprofen or aleve naprosyn.Drink plenti of fluids and if not better by Monday get ov and uric acid level

## 2018-06-16 ENCOUNTER — PATIENT MESSAGE (OUTPATIENT)
Dept: FAMILY MEDICINE | Facility: CLINIC | Age: 58
End: 2018-06-16

## 2018-06-19 ENCOUNTER — PATIENT OUTREACH (OUTPATIENT)
Dept: ADMINISTRATIVE | Facility: HOSPITAL | Age: 58
End: 2018-06-19

## 2018-06-19 ENCOUNTER — PATIENT MESSAGE (OUTPATIENT)
Dept: FAMILY MEDICINE | Facility: CLINIC | Age: 58
End: 2018-06-19

## 2018-06-20 ENCOUNTER — PATIENT MESSAGE (OUTPATIENT)
Dept: PODIATRY | Facility: CLINIC | Age: 58
End: 2018-06-20

## 2018-06-20 ENCOUNTER — PATIENT MESSAGE (OUTPATIENT)
Dept: FAMILY MEDICINE | Facility: CLINIC | Age: 58
End: 2018-06-20

## 2018-06-20 ENCOUNTER — OFFICE VISIT (OUTPATIENT)
Dept: FAMILY MEDICINE | Facility: CLINIC | Age: 58
End: 2018-06-20
Payer: COMMERCIAL

## 2018-06-20 ENCOUNTER — LAB VISIT (OUTPATIENT)
Dept: LAB | Facility: HOSPITAL | Age: 58
End: 2018-06-20
Attending: FAMILY MEDICINE
Payer: COMMERCIAL

## 2018-06-20 VITALS
WEIGHT: 315 LBS | SYSTOLIC BLOOD PRESSURE: 128 MMHG | HEART RATE: 80 BPM | DIASTOLIC BLOOD PRESSURE: 76 MMHG | HEIGHT: 73 IN | BODY MASS INDEX: 41.75 KG/M2 | TEMPERATURE: 98 F

## 2018-06-20 DIAGNOSIS — I10 ESSENTIAL HYPERTENSION: ICD-10-CM

## 2018-06-20 DIAGNOSIS — E11.42 TYPE 2 DIABETES MELLITUS WITH DIABETIC POLYNEUROPATHY, WITH LONG-TERM CURRENT USE OF INSULIN: ICD-10-CM

## 2018-06-20 DIAGNOSIS — L03.90 CELLULITIS, UNSPECIFIED CELLULITIS SITE: ICD-10-CM

## 2018-06-20 DIAGNOSIS — M10.9 GOUT, UNSPECIFIED CAUSE, UNSPECIFIED CHRONICITY, UNSPECIFIED SITE: ICD-10-CM

## 2018-06-20 DIAGNOSIS — E66.01 SEVERE OBESITY (BMI >= 40): ICD-10-CM

## 2018-06-20 DIAGNOSIS — Z79.4 TYPE 2 DIABETES MELLITUS WITH DIABETIC POLYNEUROPATHY, WITH LONG-TERM CURRENT USE OF INSULIN: ICD-10-CM

## 2018-06-20 DIAGNOSIS — M10.9 GOUT, UNSPECIFIED CAUSE, UNSPECIFIED CHRONICITY, UNSPECIFIED SITE: Primary | ICD-10-CM

## 2018-06-20 LAB
ALBUMIN SERPL BCP-MCNC: 3.6 G/DL
ALP SERPL-CCNC: 81 U/L
ALT SERPL W/O P-5'-P-CCNC: 24 U/L
ANION GAP SERPL CALC-SCNC: 8 MMOL/L
AST SERPL-CCNC: 18 U/L
BASOPHILS # BLD AUTO: 0.08 K/UL
BASOPHILS NFR BLD: 1 %
BILIRUB SERPL-MCNC: 0.5 MG/DL
BUN SERPL-MCNC: 9 MG/DL
CALCIUM SERPL-MCNC: 9.7 MG/DL
CHLORIDE SERPL-SCNC: 103 MMOL/L
CO2 SERPL-SCNC: 26 MMOL/L
CREAT SERPL-MCNC: 1 MG/DL
DIFFERENTIAL METHOD: ABNORMAL
EOSINOPHIL # BLD AUTO: 0.5 K/UL
EOSINOPHIL NFR BLD: 6.2 %
ERYTHROCYTE [DISTWIDTH] IN BLOOD BY AUTOMATED COUNT: 14.9 %
ERYTHROCYTE [SEDIMENTATION RATE] IN BLOOD BY WESTERGREN METHOD: 17 MM/HR
EST. GFR  (AFRICAN AMERICAN): >60 ML/MIN/1.73 M^2
EST. GFR  (NON AFRICAN AMERICAN): >60 ML/MIN/1.73 M^2
GLUCOSE SERPL-MCNC: 139 MG/DL
HCT VFR BLD AUTO: 48.4 %
HGB BLD-MCNC: 15.4 G/DL
IMM GRANULOCYTES # BLD AUTO: 0.06 K/UL
IMM GRANULOCYTES NFR BLD AUTO: 0.7 %
LYMPHOCYTES # BLD AUTO: 1.8 K/UL
LYMPHOCYTES NFR BLD: 22.7 %
MCH RBC QN AUTO: 28.2 PG
MCHC RBC AUTO-ENTMCNC: 31.8 G/DL
MCV RBC AUTO: 89 FL
MONOCYTES # BLD AUTO: 0.6 K/UL
MONOCYTES NFR BLD: 8 %
NEUTROPHILS # BLD AUTO: 4.9 K/UL
NEUTROPHILS NFR BLD: 61.4 %
NRBC BLD-RTO: 0 /100 WBC
PLATELET # BLD AUTO: 325 K/UL
PMV BLD AUTO: 9.4 FL
POTASSIUM SERPL-SCNC: 4.4 MMOL/L
PROT SERPL-MCNC: 7.8 G/DL
RBC # BLD AUTO: 5.47 M/UL
SODIUM SERPL-SCNC: 137 MMOL/L
URATE SERPL-MCNC: 6.6 MG/DL
WBC # BLD AUTO: 8.02 K/UL

## 2018-06-20 PROCEDURE — 3008F BODY MASS INDEX DOCD: CPT | Mod: CPTII,S$GLB,, | Performed by: FAMILY MEDICINE

## 2018-06-20 PROCEDURE — 36415 COLL VENOUS BLD VENIPUNCTURE: CPT | Mod: PO

## 2018-06-20 PROCEDURE — 3045F PR MOST RECENT HEMOGLOBIN A1C LEVEL 7.0-9.0%: CPT | Mod: CPTII,S$GLB,, | Performed by: FAMILY MEDICINE

## 2018-06-20 PROCEDURE — 99213 OFFICE O/P EST LOW 20 MIN: CPT | Mod: S$GLB,,, | Performed by: FAMILY MEDICINE

## 2018-06-20 PROCEDURE — 80053 COMPREHEN METABOLIC PANEL: CPT

## 2018-06-20 PROCEDURE — 84550 ASSAY OF BLOOD/URIC ACID: CPT

## 2018-06-20 PROCEDURE — 85025 COMPLETE CBC W/AUTO DIFF WBC: CPT

## 2018-06-20 PROCEDURE — 99999 PR PBB SHADOW E&M-EST. PATIENT-LVL III: CPT | Mod: PBBFAC,,, | Performed by: FAMILY MEDICINE

## 2018-06-20 PROCEDURE — 3074F SYST BP LT 130 MM HG: CPT | Mod: CPTII,S$GLB,, | Performed by: FAMILY MEDICINE

## 2018-06-20 PROCEDURE — 85651 RBC SED RATE NONAUTOMATED: CPT | Mod: PO

## 2018-06-20 PROCEDURE — 3078F DIAST BP <80 MM HG: CPT | Mod: CPTII,S$GLB,, | Performed by: FAMILY MEDICINE

## 2018-06-20 RX ORDER — OXYCODONE AND ACETAMINOPHEN 10; 325 MG/1; MG/1
1 TABLET ORAL EVERY 4 HOURS PRN
Qty: 30 TABLET | Refills: 0 | Status: SHIPPED | OUTPATIENT
Start: 2018-06-20 | End: 2018-06-22

## 2018-06-20 RX ORDER — COLCHICINE 0.6 MG/1
0.6 TABLET ORAL EVERY 4 HOURS
Qty: 8 TABLET | Refills: 1 | Status: SHIPPED | OUTPATIENT
Start: 2018-06-20 | End: 2018-08-22 | Stop reason: CLARIF

## 2018-06-20 NOTE — PROGRESS NOTES
Virgilio Acuña presents co redness and swelling lt foot w redness  Also to fu  DM Also has chronic neuropathy legs.   Past Medical History:   Diagnosis Date    Cellulitis of left index finger 2/16/2015    Dyslipidemia     Essential hypertension 2/16/2017    Infected finger joint 2/17/2015    Obese     S/P knee surgery, medial/lateral menisectomy 11/4/2013    Type 2 diabetes mellitus with diabetic polyneuropathy, with long-term current use of insulin 2003     Past Surgical History:   Procedure Laterality Date    ELBOW SURGERY      FOOT SURGERY Left     KNEE CARTILAGE SURGERY  10/21/2013    right knee    KNEE SURGERY Right 02/17/2017    Left index finger surgery  03/2015     No Known Allergies  Current Outpatient Prescriptions on File Prior to Visit   Medication Sig Dispense Refill    ALPRAZolam (XANAX) 0.5 MG tablet Take 1 tablet (0.5 mg total) by mouth 3 (three) times daily. 90 tablet 0    blood sugar diagnostic Strp 1 strip by Misc.(Non-Drug; Combo Route) route 3 (three) times daily. 360 each 3    diclofenac (VOLTAREN) 75 MG EC tablet Take 1 tablet (75 mg total) by mouth 2 (two) times daily. 60 tablet 0    enalapril (VASOTEC) 20 MG tablet Take 1 tablet (20 mg total) by mouth once daily. 90 tablet 3    gabapentin (NEURONTIN) 600 MG tablet Take 1 tablet (600 mg total) by mouth 3 (three) times daily. 270 tablet 3    hydrOXYzine pamoate (VISTARIL) 50 MG Cap   11    insulin glargine (LANTUS SOLOSTAR U-100 INSULIN) 100 unit/mL (3 mL) InPn pen Inject 70 Units into the skin once daily. Supply pen needles 21 mL 12    insulin lispro (HUMALOG) 100 unit/mL injection Inject 60 Units into the skin 3 (three) times daily before meals. Inject 40 units into the skin three times daily with meals plus sliding scale.  Supply with pen needles. 30 mL 12    metFORMIN (GLUCOPHAGE-XR) 500 MG 24 hr tablet Take 2 tablets (1,000 mg total) by mouth 2 (two) times daily with meals. 360 tablet 4    pen needle, diabetic (BD  "ULTRA-FINE WILDA PEN NEEDLES) 32 gauge x 5/32" Ndle INJECT FOUR TIMES DAILY 4 each 3    polyethylene glycol (GLYCOLAX) 17 gram PwPk Take 17 g by mouth once daily. 24 each 0    pravastatin (PRAVACHOL) 40 MG tablet Take 1 tablet (40 mg total) by mouth once daily. 90 tablet 4    safety needles 22 gauge x 1 1/2" Ndle To be used once a month for testosterone injections 50 each 6    testosterone cypionate (DEPOTESTOTERONE CYPIONATE) 200 mg/mL injection INJECT 1.5 ML IN THE MUSCLE EVERY 28 DAYS 10 mL 0    FLUVIRIN [id number] 45 mcg (15 mcg x 3)/0.5 mL Susp ADM 0.5ML IM UTD  0    meloxicam (MOBIC) 15 MG tablet Take 1 tablet (15 mg total) by mouth once daily. 30 tablet 1    [DISCONTINUED] acetaminophen (TYLENOL) 325 MG tablet Take 2 tablets (650 mg total) by mouth every 6 (six) hours as needed for Temperature greater than (or equal to 101 degree F).  0     No current facility-administered medications on file prior to visit.      Social History     Social History    Marital status:      Spouse name: N/A    Number of children: N/A    Years of education: N/A     Occupational History     [employer]     Social History Main Topics    Smoking status: Never Smoker    Smokeless tobacco: Never Used    Alcohol use Yes      Comment: occasional    Drug use: No    Sexual activity: Yes     Partners: Female     Other Topics Concern    Not on file     Social History Narrative    No narrative on file     Family History   Problem Relation Age of Onset    Diabetes Mother     Diabetes Father          ROS:  SKIN: No rashes, itching or changes in color or texture of skin.  EYES: Visual acuity fine. No photophobia, ocular pain or diplopia.EARS: Denies ear pain, discharge or vertigo.NOSE: No loss of smell, no epistaxis some postnasal drip.MOUTH & THROAT: No hoarseness or change in voice. No excessive gum bleeding.CHEST: Denies SHARP, cyanosis, " wheezing  CARDIOVASCULAR: Denies chest pain, PND, orthopnea or reduced exercise tolerance.  ABDOMEN:  No weight loss.No abdominal pain, no hematemesis or blood in stool.  URINARY: No flank pain, dysuria or hematuria.  PERIPHERAL VASCULAR: No claudication or cyanosis.  MUSCULOSKELETAL: Negative   NEUROLOGIC: No history of seizures, paralysis, alteration of gait or coordination.    PE: Vital signs as noted  Heent:Normocephalic with no recent cranial trauma,PERRLA,EOMI,conjunctiva clear,fundi reveal no hemmorhage exudate or papilledema.Otic canals clear, tympanic membranes slightly dull bilaterally.Nasal mucosa slightly red and edematous.Posterior pharynx slightly red but without exudate.  Neck:Supple with minimal anterior cervical adenopathy.  Chest:Clear bilateral breath sounds  Heart:Regular rhthym without murmer  Abdomen:Soft, non tender,no masses, no hepatosplenomegaly  Ext Gen degen changes and myalgia; left foot red swollen w edema to mid pre tibial area. No dc   Impression:Foot swelling gout vs cellulitis  Diabetes  Neuropathy  Hypotestosterone  Obesity BMI 41.7   Anxiety  Insomnia  Xerostomia  HLP  Plan: Lab eval rev  Colchcine  Pod consult  Rev DM goals and tx options

## 2018-06-21 ENCOUNTER — PATIENT MESSAGE (OUTPATIENT)
Dept: FAMILY MEDICINE | Facility: CLINIC | Age: 58
End: 2018-06-21

## 2018-06-21 RX ORDER — CLINDAMYCIN HYDROCHLORIDE 300 MG/1
300 CAPSULE ORAL EVERY 8 HOURS
Qty: 30 CAPSULE | Refills: 0 | Status: SHIPPED | OUTPATIENT
Start: 2018-06-21 | End: 2018-07-03

## 2018-06-21 NOTE — TELEPHONE ENCOUNTER
Spoke with patient and advised that per  he needs to be seen. I advised that we could see him tomorrow at 2:40pm. He stated that he could not come in because he has to work. He stated that he could come in early Monday morning. I advised him that  schedule is full on Monday but I would see if we could work him in early Monday morning and let him know.

## 2018-06-21 NOTE — TELEPHONE ENCOUNTER
I thought he was to speak w Podiatry today-he should do this asap. In the meantime I'll send in antibiotics to [facility]

## 2018-06-22 ENCOUNTER — PATIENT MESSAGE (OUTPATIENT)
Dept: PODIATRY | Facility: CLINIC | Age: 58
End: 2018-06-22

## 2018-06-22 ENCOUNTER — PATIENT MESSAGE (OUTPATIENT)
Dept: FAMILY MEDICINE | Facility: CLINIC | Age: 58
End: 2018-06-22

## 2018-06-22 ENCOUNTER — HOSPITAL ENCOUNTER (OUTPATIENT)
Dept: RADIOLOGY | Facility: HOSPITAL | Age: 58
Discharge: HOME | End: 2018-06-22
Attending: PODIATRIST
Payer: COMMERCIAL

## 2018-06-22 ENCOUNTER — OFFICE VISIT (OUTPATIENT)
Dept: PODIATRY | Facility: CLINIC | Age: 58
End: 2018-06-22
Payer: COMMERCIAL

## 2018-06-22 VITALS — WEIGHT: 315 LBS | BODY MASS INDEX: 41.75 KG/M2 | HEIGHT: 73 IN

## 2018-06-22 DIAGNOSIS — E11.49 TYPE II DIABETES MELLITUS WITH NEUROLOGICAL MANIFESTATIONS: ICD-10-CM

## 2018-06-22 DIAGNOSIS — L03.116 CELLULITIS OF LEFT FOOT: ICD-10-CM

## 2018-06-22 DIAGNOSIS — R60.0 EDEMA, LOWER EXTREMITY: ICD-10-CM

## 2018-06-22 DIAGNOSIS — E11.610 TYPE 2 DIABETES MELLITUS WITH CHARCOT'S JOINT OF LEFT FOOT: Primary | ICD-10-CM

## 2018-06-22 PROCEDURE — 99999 PR PBB SHADOW E&M-EST. PATIENT-LVL III: CPT | Mod: PBBFAC,,, | Performed by: PODIATRIST

## 2018-06-22 PROCEDURE — 99214 OFFICE O/P EST MOD 30 MIN: CPT | Mod: S$GLB,,, | Performed by: PODIATRIST

## 2018-06-22 PROCEDURE — 3008F BODY MASS INDEX DOCD: CPT | Mod: CPTII,S$GLB,, | Performed by: PODIATRIST

## 2018-06-22 PROCEDURE — 73630 X-RAY EXAM OF FOOT: CPT | Mod: 26,LT,, | Performed by: RADIOLOGY

## 2018-06-22 PROCEDURE — 3045F PR MOST RECENT HEMOGLOBIN A1C LEVEL 7.0-9.0%: CPT | Mod: CPTII,S$GLB,, | Performed by: PODIATRIST

## 2018-06-22 PROCEDURE — 73630 X-RAY EXAM OF FOOT: CPT | Mod: TC,PO,LT

## 2018-06-22 RX ORDER — CIPROFLOXACIN 500 MG/1
500 TABLET ORAL EVERY 12 HOURS
Qty: 14 TABLET | Refills: 0 | Status: SHIPPED | OUTPATIENT
Start: 2018-06-22 | End: 2018-06-26 | Stop reason: ALTCHOICE

## 2018-06-22 RX ORDER — OXYCODONE AND ACETAMINOPHEN 10; 325 MG/1; MG/1
1 TABLET ORAL EVERY 4 HOURS PRN
COMMUNITY
End: 2018-07-03 | Stop reason: SDUPTHER

## 2018-06-22 NOTE — LETTER
June 22, 2018    Virgilio Acuña  [address]  [facility] [address]         [facility]  [address]  [facility] [address]  Phone: [phone] June 22, 2018     Patient: Virgilio Acuña   YOB: 1960   Date of Visit: 6/22/2018       To Whom It May Concern:    It is my medical opinion that Virgilio Acuña should remain out of work until Monday 6/22/18.    If you have any questions or concerns, please don't hesitate to call.    Sincerely,        Inocente Smith DPM

## 2018-06-22 NOTE — TELEPHONE ENCOUNTER
Patient can not come in today. He stated that he can come in on Monday early morning. Is it okay to double book him early Monday morning?

## 2018-06-22 NOTE — LETTER
June 22, 2018      [facility]  [address]  [place] [address]  Phone: [phone]       Patient: Virgilio Acuña   YOB: 1960  Date of Visit: 06/22/2018    To Whom It May Concern:    Juan Acuña  was at [facility] on 06/22/2018. He may return to work/school on Tuesday 6/26/18 with no restrictions. If you have any questions or concerns, or if I can be of further assistance, please do not hesitate to contact me.    Sincerely,    Inocente Smith DPM

## 2018-06-24 ENCOUNTER — PATIENT MESSAGE (OUTPATIENT)
Dept: PODIATRY | Facility: CLINIC | Age: 58
End: 2018-06-24

## 2018-06-25 ENCOUNTER — PATIENT MESSAGE (OUTPATIENT)
Dept: FAMILY MEDICINE | Facility: CLINIC | Age: 58
End: 2018-06-25

## 2018-06-25 ENCOUNTER — PATIENT MESSAGE (OUTPATIENT)
Dept: PODIATRY | Facility: CLINIC | Age: 58
End: 2018-06-25

## 2018-06-25 ENCOUNTER — OFFICE VISIT (OUTPATIENT)
Dept: PODIATRY | Facility: CLINIC | Age: 58
End: 2018-06-25
Payer: COMMERCIAL

## 2018-06-25 ENCOUNTER — TELEPHONE (OUTPATIENT)
Dept: PODIATRY | Facility: CLINIC | Age: 58
End: 2018-06-25

## 2018-06-25 VITALS — WEIGHT: 315 LBS | BODY MASS INDEX: 41.75 KG/M2 | HEIGHT: 73 IN

## 2018-06-25 DIAGNOSIS — M14.672 CHARCOT'S JOINT OF FOOT, LEFT: Primary | ICD-10-CM

## 2018-06-25 DIAGNOSIS — E11.42 DIABETIC POLYNEUROPATHY ASSOCIATED WITH TYPE 2 DIABETES MELLITUS: ICD-10-CM

## 2018-06-25 PROCEDURE — 99499 UNLISTED E&M SERVICE: CPT | Mod: S$GLB,,, | Performed by: PODIATRIST

## 2018-06-25 PROCEDURE — 29445 APPL RIGID TOT CNTC LEG CAST: CPT | Mod: LT,S$GLB,, | Performed by: PODIATRIST

## 2018-06-25 PROCEDURE — 99999 PR PBB SHADOW E&M-EST. PATIENT-LVL III: CPT | Mod: PBBFAC,,, | Performed by: PODIATRIST

## 2018-06-25 NOTE — TELEPHONE ENCOUNTER
The xr is pretty rough. The good news is the pain should be a lot better once he is not bearing weight on it. The amount of tylenol in the oxycodone is not enough to cause problems

## 2018-06-26 PROBLEM — L08.9 TYPE 2 DIABETES MELLITUS WITH DIABETIC FOOT INFECTION: Status: RESOLVED | Noted: 2018-01-11 | Resolved: 2018-06-26

## 2018-06-26 PROBLEM — L08.9 DIABETIC FOOT INFECTION: Status: RESOLVED | Noted: 2018-01-10 | Resolved: 2018-06-26

## 2018-06-26 PROBLEM — L08.9 TYPE 2 DIABETES MELLITUS WITH LEFT DIABETIC FOOT INFECTION: Status: RESOLVED | Noted: 2018-01-10 | Resolved: 2018-06-26

## 2018-06-26 PROBLEM — M86.9 FOOT OSTEOMYELITIS, LEFT: Status: RESOLVED | Noted: 2018-03-08 | Resolved: 2018-06-26

## 2018-06-26 PROBLEM — L02.619 CELLULITIS AND ABSCESS OF FOOT: Status: RESOLVED | Noted: 2018-01-13 | Resolved: 2018-06-26

## 2018-06-26 PROBLEM — E11.628 TYPE 2 DIABETES MELLITUS WITH DIABETIC FOOT INFECTION: Status: RESOLVED | Noted: 2018-01-11 | Resolved: 2018-06-26

## 2018-06-26 PROBLEM — E11.628 DIABETIC FOOT INFECTION: Status: RESOLVED | Noted: 2018-01-10 | Resolved: 2018-06-26

## 2018-06-26 PROBLEM — L03.119 CELLULITIS AND ABSCESS OF FOOT: Status: RESOLVED | Noted: 2018-01-13 | Resolved: 2018-06-26

## 2018-06-26 PROBLEM — M86.172 ACUTE OSTEOMYELITIS OF TOE OF LEFT FOOT: Status: RESOLVED | Noted: 2018-03-10 | Resolved: 2018-06-26

## 2018-06-26 PROBLEM — E11.628 TYPE 2 DIABETES MELLITUS WITH LEFT DIABETIC FOOT INFECTION: Status: RESOLVED | Noted: 2018-01-10 | Resolved: 2018-06-26

## 2018-06-26 NOTE — PROGRESS NOTES
Subjective:      Patient ID: Virgilio Acuña is a 58 y.o. male.    Chief Complaint: Foot Problem (redness and swelling of foot - re-ck) and Diabetes Mellitus (PCP:  Dr Hilton 6/20/18; HgbA1c: 3/8/18  8.2)    Virgilio is a 58 y.o. male who presents to the clinic for evaluation and treatment of high risk feet. Virgilio has a past medical history of Cellulitis of left index finger (2/16/2015); Dyslipidemia; Essential hypertension (2/16/2017); Infected finger joint (2/17/2015); Obese; S/P knee surgery, medial/lateral menisectomy (11/4/2013); and Type 2 diabetes mellitus with diabetic polyneuropathy, with long-term current use of insulin (2003). The patient's chief complaint is left foot redness and swelling over the past several days. He has had a severe infection in the past to the left foot resulting in second ray amputation in March, this has healed and he had been back to work full time. Over the past several days he has noticed redness, pain and swelling to the foot, he reported to his primary care doctor who put him on Clindamycin which he has been taking. Also checked for gout and started on colchicine. Neither seems to be helping to date.     6/26/18: Patient returning for TCC for acute charcot left foot and to discuss further treatment plans. Has been ambulating in tall orthopedic boot only over the weekend      PCP: Juanito Hilton MD    Date Last Seen by PCP: 1/8/18    Current shoe gear:  Affected Foot: Camwalker boots     Unaffected Foot: Tennis shoes    History of Trauma: negative  Sign of Infection: Redness and swelling/warmth    Hemoglobin A1C   Date Value Ref Range Status   03/08/2018 8.2 (H) 0.0 - 5.6 % Final     Comment:     Reference Interval:  5.0 - 5.6 Normal   5.7 - 6.4 High Risk   > 6.5 Diabetic    Hgb A1c results are standardized based on the (NGSP) National   Glycohemoglobin Standardization Program.    Hemoglobin A1C levels are related to mean serum/plasma glucose   during the preceding 2-3 months.         01/10/2018 8.7 (H) 0.0 - 5.6 % Final     Comment:     Reference Interval:  5.0 - 5.6 Normal   5.7 - 6.4 High Risk   > 6.5 Diabetic    Hgb A1c results are standardized based on the (NGSP) National   Glycohemoglobin Standardization Program.    Hemoglobin A1C levels are related to mean serum/plasma glucose   during the preceding 2-3 months.        07/07/2017 7.4 (H) 4.0 - 5.6 % Final     Comment:     According to ADA guidelines, hemoglobin A1c <7.0% represents  optimal control in non-pregnant diabetic patients. Different  metrics may apply to specific patient populations.   Standards of Medical Care in Diabetes-2016.  For the purpose of screening for the presence of diabetes:  <5.7%     Consistent with the absence of diabetes  5.7-6.4%  Consistent with increasing risk for diabetes   (prediabetes)  >or=6.5%  Consistent with diabetes  Currently, no consensus exists for use of hemoglobin A1c  for diagnosis of diabetes for children.  This Hemoglobin A1c assay has significant interference with fetal   hemoglobin   (HbF). The results are invalid for patients with abnormal amounts of   HbF,   including those with known Hereditary Persistence   of Fetal Hemoglobin. Heterozygous hemoglobin variants (HbAS, HbAC,   HbAD, HbAE, HbA2) do not significantly interfere with this assay;   however, presence of multiple variants in a sample may impact the %   interference.         Review of Systems   Constitution: Negative for chills and fever.   Cardiovascular: Positive for leg swelling. Negative for claudication.   Respiratory: Negative for shortness of breath.    Skin: Positive for color change, nail changes and poor wound healing. Negative for itching and rash.   Musculoskeletal: Negative for muscle cramps, muscle weakness and myalgias.   Gastrointestinal: Negative for nausea and vomiting.   Neurological: Positive for numbness and paresthesias. Negative for focal weakness and loss of balance.           Objective:      Physical Exam    Constitutional: He is oriented to person, place, and time. He appears well-developed and well-nourished. No distress.   Cardiovascular:   Pulses:       Dorsalis pedis pulses are 2+ on the right side, and 2+ on the left side.        Posterior tibial pulses are 2+ on the right side, and 2+ on the left side.   < 3 sec capillary refill time to toes 1-5 bilateral. Toes and feet are warm to touch proximally with normal distal cooling b/l. There is no hair growth on the feet and toes b/l. There is moderate edema left foot and mild edema right.      Musculoskeletal:   Left second toe amputation    Left foot much improved generalized edema and erythema with warmth to the touch throughout.    Equinus noted b/l ankles with < 10 deg DF noted. MMT 5/5 in DF/PF/Inv/Ev resistance with no reproduction of pain in any direction. Passive range of motion of ankle and pedal joints is painless b/l.     Feet:   Right Foot:   Protective Sensation: 10 sites tested. 0 sites sensed.   Left Foot:   Protective Sensation: 10 sites tested. 0 sites sensed.   Neurological: He is alert and oriented to person, place, and time. He has normal strength. He displays no atrophy and no tremor. A sensory deficit is present. He exhibits normal muscle tone.   Negative tinel sign bilateral.   Skin: Skin is warm. No abrasion, no bruising, no burn, no ecchymosis, no laceration, no lesion, no petechiae and no rash noted. He is not diaphoretic. There is erythema. No cyanosis. No pallor. Nails show no clubbing.   Incision overlying the left second toe amputation is well healed, no signs of re ulceration.    There is a callus at the plantar left hallux IPJ with underlying discoloration, after trimming the underlying skin was intact.     Psychiatric: He has a normal mood and affect. His behavior is normal.             Assessment:       Encounter Diagnoses   Name Primary?    Charcot's joint of foot, left Yes    Diabetic polyneuropathy associated with type 2  diabetes mellitus             Plan:       Virgilio was seen today for foot problem and diabetes mellitus.    Diagnoses and all orders for this visit:    Charcot's joint of foot, left  -     Ambulatory consult to Orthopedics    Diabetic polyneuropathy associated with type 2 diabetes mellitus      I counseled the patient on his conditions, their implications and medical management.    Shoe inspection. Diabetic Foot Education. Patient reminded of the importance of good nutrition and blood sugar control to help prevent podiatric complications of diabetes. Patient instructed on proper foot hygeine. We discussed wearing proper shoe gear, daily foot inspections, never walking without protective shoe gear, never putting sharp instruments to feet    Discussed importance of healthy diet and weight loss as to his diabetes control and healing potential in preparation for surgery.     X-rays were taken and noted midfoot charcot along the lisfranc joint with complete dislocation at the first metatarsocuneiform joint. There are fractures noted to metatarsals 3-4.     Total contact cast applied using True cast casting kit, he was left for 15 minutes to allow the cast to dry and harden, he will remain in the cast until follow up in 1 week. If there are no acute problems at that time he can keep the next cast on for 2 weeks at a time.     Referral to Dr. Salinas for potential surgical intervention for charcot reconstruction of the midfoot after the acute flare has subsided. Appreciate her input.    Inocente Smith DPM

## 2018-06-26 NOTE — PROGRESS NOTES
Subjective:      Patient ID: Virgilio Acuña is a 58 y.o. male.    Chief Complaint: Foot Problem (left foot - redness and swelling) and Diabetes Mellitus (PCP:  Dr Hilton  6/20/18; HgbA1c: 3/8/18  8.2)    Virgilio is a 58 y.o. male who presents to the clinic for evaluation and treatment of high risk feet. Virgilio has a past medical history of Cellulitis of left index finger (2/16/2015); Dyslipidemia; Essential hypertension (2/16/2017); Infected finger joint (2/17/2015); Obese; S/P knee surgery, medial/lateral menisectomy (11/4/2013); and Type 2 diabetes mellitus with diabetic polyneuropathy, with long-term current use of insulin (2003). The patient's chief complaint is left foot redness and swelling over the past several days. He has had a severe infection in the past to the left foot resulting in second ray amputation in March, this has healed and he had been back to work full time. Over the past several days he has noticed redness, pain and swelling to the foot, he reported to his primary care doctor who put him on Clindamycin which he has been taking. Also checked for gout and started on colchicine. Neither seems to be helping to date.       PCP: Juanito Hilton MD    Date Last Seen by PCP: 1/8/18    Current shoe gear:  Affected Foot: Camwalker boots     Unaffected Foot: Tennis shoes    History of Trauma: negative  Sign of Infection: Redness and swelling/warmth    Hemoglobin A1C   Date Value Ref Range Status   03/08/2018 8.2 (H) 0.0 - 5.6 % Final     Comment:     Reference Interval:  5.0 - 5.6 Normal   5.7 - 6.4 High Risk   > 6.5 Diabetic    Hgb A1c results are standardized based on the (NGSP) National   Glycohemoglobin Standardization Program.    Hemoglobin A1C levels are related to mean serum/plasma glucose   during the preceding 2-3 months.        01/10/2018 8.7 (H) 0.0 - 5.6 % Final     Comment:     Reference Interval:  5.0 - 5.6 Normal   5.7 - 6.4 High Risk   > 6.5 Diabetic    Hgb A1c results are standardized based on  the (NGSP) National   Glycohemoglobin Standardization Program.    Hemoglobin A1C levels are related to mean serum/plasma glucose   during the preceding 2-3 months.        07/07/2017 7.4 (H) 4.0 - 5.6 % Final     Comment:     According to ADA guidelines, hemoglobin A1c <7.0% represents  optimal control in non-pregnant diabetic patients. Different  metrics may apply to specific patient populations.   Standards of Medical Care in Diabetes-2016.  For the purpose of screening for the presence of diabetes:  <5.7%     Consistent with the absence of diabetes  5.7-6.4%  Consistent with increasing risk for diabetes   (prediabetes)  >or=6.5%  Consistent with diabetes  Currently, no consensus exists for use of hemoglobin A1c  for diagnosis of diabetes for children.  This Hemoglobin A1c assay has significant interference with fetal   hemoglobin   (HbF). The results are invalid for patients with abnormal amounts of   HbF,   including those with known Hereditary Persistence   of Fetal Hemoglobin. Heterozygous hemoglobin variants (HbAS, HbAC,   HbAD, HbAE, HbA2) do not significantly interfere with this assay;   however, presence of multiple variants in a sample may impact the %   interference.         Review of Systems   Constitution: Negative for chills and fever.   Cardiovascular: Positive for leg swelling. Negative for claudication.   Respiratory: Negative for shortness of breath.    Skin: Positive for color change, nail changes and poor wound healing. Negative for itching and rash.   Musculoskeletal: Negative for muscle cramps, muscle weakness and myalgias.   Gastrointestinal: Negative for nausea and vomiting.   Neurological: Positive for numbness and paresthesias. Negative for focal weakness and loss of balance.           Objective:      Physical Exam   Constitutional: He is oriented to person, place, and time. He appears well-developed and well-nourished. No distress.   Cardiovascular:   Pulses:       Dorsalis pedis pulses  are 2+ on the right side, and 2+ on the left side.        Posterior tibial pulses are 2+ on the right side, and 2+ on the left side.   < 3 sec capillary refill time to toes 1-5 bilateral. Toes and feet are warm to touch proximally with normal distal cooling b/l. There is no hair growth on the feet and toes b/l. There is moderate edema left foot and mild edema right.      Musculoskeletal:   Left second toe amputation    Left foot generalized edema and erythema with warmth to the touch throughout.    Equinus noted b/l ankles with < 10 deg DF noted. MMT 5/5 in DF/PF/Inv/Ev resistance with no reproduction of pain in any direction. Passive range of motion of ankle and pedal joints is painless b/l.     Feet:   Right Foot:   Protective Sensation: 10 sites tested. 0 sites sensed.   Left Foot:   Protective Sensation: 10 sites tested. 0 sites sensed.   Neurological: He is alert and oriented to person, place, and time. He has normal strength. He displays no atrophy and no tremor. A sensory deficit is present. He exhibits normal muscle tone.   Negative tinel sign bilateral.   Skin: Skin is warm. No abrasion, no bruising, no burn, no ecchymosis, no laceration, no lesion, no petechiae and no rash noted. He is not diaphoretic. There is erythema. No cyanosis. No pallor. Nails show no clubbing.   Incision overlying the left second toe amputation is well healed, no signs of re ulceration.    There is a callus at the plantar left hallux IPJ with underlying discoloration, after trimming the underlying skin was intact.     Psychiatric: He has a normal mood and affect. His behavior is normal.             Assessment:       Encounter Diagnoses   Name Primary?    Type 2 diabetes mellitus with Charcot's joint of left foot Yes    Cellulitis of left foot     Type II diabetes mellitus with neurological manifestations     Edema, lower extremity             Plan:       Virgilio was seen today for foot problem and diabetes mellitus.    Diagnoses  and all orders for this visit:    Type 2 diabetes mellitus with Charcot's joint of left foot    Cellulitis of left foot  -     X-Ray Foot Complete Left; Future    Type II diabetes mellitus with neurological manifestations    Edema, lower extremity    Other orders  -     Discontinue: ciprofloxacin HCl (CIPRO) 500 MG tablet; Take 1 tablet (500 mg total) by mouth every 12 (twelve) hours. for 7 days      I counseled the patient on his conditions, their implications and medical management.    Shoe inspection. Diabetic Foot Education. Patient reminded of the importance of good nutrition and blood sugar control to help prevent podiatric complications of diabetes. Patient instructed on proper foot hygeine. We discussed wearing proper shoe gear, daily foot inspections, never walking without protective shoe gear, never putting sharp instruments to feet    Discussed importance of healthy diet and weight loss as to his diabetes control and healing potential in preparation for surgery.     X-rays were taken and noted midfoot charcot along the lisfranc joint with complete dislocation at the first metatarsocuneiform joint. There are fractures noted to metatarsals 3-4. He was directed to get into his tall orthopedic boot he has hat home and return on Monday for total contact casting to allow the acute charcot event to calm, we will discuss further treatment options at that time. Can discontinue the antibiotics and colchicine.    Inocente Smith DPM

## 2018-07-02 ENCOUNTER — PATIENT MESSAGE (OUTPATIENT)
Dept: FAMILY MEDICINE | Facility: CLINIC | Age: 58
End: 2018-07-02

## 2018-07-02 ENCOUNTER — PATIENT MESSAGE (OUTPATIENT)
Dept: PODIATRY | Facility: CLINIC | Age: 58
End: 2018-07-02

## 2018-07-02 ENCOUNTER — OFFICE VISIT (OUTPATIENT)
Dept: PODIATRY | Facility: CLINIC | Age: 58
End: 2018-07-02
Payer: COMMERCIAL

## 2018-07-02 VITALS — BODY MASS INDEX: 41.75 KG/M2 | HEIGHT: 73 IN | WEIGHT: 315 LBS

## 2018-07-02 DIAGNOSIS — M14.672 CHARCOT'S JOINT OF FOOT, LEFT: Primary | ICD-10-CM

## 2018-07-02 DIAGNOSIS — E11.42 DIABETIC POLYNEUROPATHY ASSOCIATED WITH TYPE 2 DIABETES MELLITUS: ICD-10-CM

## 2018-07-02 DIAGNOSIS — E11.610 TYPE 2 DIABETES MELLITUS WITH CHARCOT'S JOINT OF LEFT FOOT: ICD-10-CM

## 2018-07-02 DIAGNOSIS — R60.0 EDEMA, LOWER EXTREMITY: ICD-10-CM

## 2018-07-02 PROCEDURE — 99999 PR PBB SHADOW E&M-EST. PATIENT-LVL III: CPT | Mod: PBBFAC,,, | Performed by: PODIATRIST

## 2018-07-02 PROCEDURE — 99212 OFFICE O/P EST SF 10 MIN: CPT | Mod: S$GLB,,, | Performed by: PODIATRIST

## 2018-07-02 PROCEDURE — 3008F BODY MASS INDEX DOCD: CPT | Mod: CPTII,S$GLB,, | Performed by: PODIATRIST

## 2018-07-02 PROCEDURE — 3045F PR MOST RECENT HEMOGLOBIN A1C LEVEL 7.0-9.0%: CPT | Mod: CPTII,S$GLB,, | Performed by: PODIATRIST

## 2018-07-02 NOTE — PROGRESS NOTES
Subjective:      Patient ID: Virgilio Acuña is a 58 y.o. male.    Chief Complaint: Follow-up (1 week recheck Left TCC, PCP--06/20/2018) and Diabetes Mellitus (A1c-8.2-03/08/2018)    Virgilio is a 58 y.o. male who presents to the clinic for evaluation and treatment of high risk feet. Virgilio has a past medical history of Cellulitis of left index finger (2/16/2015); Dyslipidemia; Essential hypertension (2/16/2017); Infected finger joint (2/17/2015); Obese; S/P knee surgery, medial/lateral menisectomy (11/4/2013); and Type 2 diabetes mellitus with diabetic polyneuropathy, with long-term current use of insulin (2003). The patient's chief complaint is left foot redness and swelling over the past several days. He has had a severe infection in the past to the left foot resulting in second ray amputation in March, this has healed and he had been back to work full time. Over the past several days he has noticed redness, pain and swelling to the foot, he reported to his primary care doctor who put him on Clindamycin which he has been taking. Also checked for gout and started on colchicine. Neither seems to be helping to date.     6/26/18: Patient returning for TCC for acute charcot left foot and to discuss further treatment plans. Has been ambulating in tall orthopedic boot only over the weekend    7/2/18: Patient returns for follow up left foot acute charcot event, in TCC. Reports he may have gotten the cast wet in the shower. There is some pain to the lateral foot and medial ankle. He felt as though the cast might be tight.       PCP: Juanito Hilton MD    Date Last Seen by PCP: 1/8/18    Current shoe gear:  Affected Foot: Camwalker boots     Unaffected Foot: Tennis shoes    History of Trauma: negative  Sign of Infection: Redness and swelling/warmth    Hemoglobin A1C   Date Value Ref Range Status   03/08/2018 8.2 (H) 0.0 - 5.6 % Final     Comment:     Reference Interval:  5.0 - 5.6 Normal   5.7 - 6.4 High Risk   > 6.5  Diabetic    Hgb A1c results are standardized based on the (NGSP) National   Glycohemoglobin Standardization Program.    Hemoglobin A1C levels are related to mean serum/plasma glucose   during the preceding 2-3 months.        01/10/2018 8.7 (H) 0.0 - 5.6 % Final     Comment:     Reference Interval:  5.0 - 5.6 Normal   5.7 - 6.4 High Risk   > 6.5 Diabetic    Hgb A1c results are standardized based on the (NGSP) National   Glycohemoglobin Standardization Program.    Hemoglobin A1C levels are related to mean serum/plasma glucose   during the preceding 2-3 months.        07/07/2017 7.4 (H) 4.0 - 5.6 % Final     Comment:     According to ADA guidelines, hemoglobin A1c <7.0% represents  optimal control in non-pregnant diabetic patients. Different  metrics may apply to specific patient populations.   Standards of Medical Care in Diabetes-2016.  For the purpose of screening for the presence of diabetes:  <5.7%     Consistent with the absence of diabetes  5.7-6.4%  Consistent with increasing risk for diabetes   (prediabetes)  >or=6.5%  Consistent with diabetes  Currently, no consensus exists for use of hemoglobin A1c  for diagnosis of diabetes for children.  This Hemoglobin A1c assay has significant interference with fetal   hemoglobin   (HbF). The results are invalid for patients with abnormal amounts of   HbF,   including those with known Hereditary Persistence   of Fetal Hemoglobin. Heterozygous hemoglobin variants (HbAS, HbAC,   HbAD, HbAE, HbA2) do not significantly interfere with this assay;   however, presence of multiple variants in a sample may impact the %   interference.         Review of Systems   Constitution: Negative for chills and fever.   Cardiovascular: Positive for leg swelling. Negative for claudication.   Respiratory: Negative for shortness of breath.    Skin: Positive for color change, nail changes and poor wound healing. Negative for itching and rash.   Musculoskeletal: Negative for muscle cramps, muscle  weakness and myalgias.   Gastrointestinal: Negative for nausea and vomiting.   Neurological: Positive for numbness and paresthesias. Negative for focal weakness and loss of balance.           Objective:      Physical Exam   Constitutional: He is oriented to person, place, and time. He appears well-developed and well-nourished. No distress.   Cardiovascular:   Pulses:       Dorsalis pedis pulses are 2+ on the right side, and 2+ on the left side.        Posterior tibial pulses are 2+ on the right side, and 2+ on the left side.   < 3 sec capillary refill time to toes 1-5 bilateral. Toes and feet are warm to touch proximally with normal distal cooling b/l. There is no hair growth on the feet and toes b/l. There is moderate edema left foot and mild edema right.      Musculoskeletal:   Left second toe amputation    Left foot much improved generalized edema and erythema with warmth to the touch throughout.    Equinus noted b/l ankles with < 10 deg DF noted. MMT 5/5 in DF/PF/Inv/Ev resistance with no reproduction of pain in any direction. Passive range of motion of ankle and pedal joints is painless b/l.     Feet:   Right Foot:   Protective Sensation: 10 sites tested. 0 sites sensed.   Left Foot:   Protective Sensation: 10 sites tested. 0 sites sensed.   Neurological: He is alert and oriented to person, place, and time. He has normal strength. He displays no atrophy and no tremor. A sensory deficit is present. He exhibits normal muscle tone.   Negative tinel sign bilateral.   Skin: Skin is warm. No abrasion, no bruising, no burn, no ecchymosis, no laceration, no lesion, no petechiae and no rash noted. He is not diaphoretic. There is erythema. No cyanosis. No pallor. Nails show no clubbing.   Incision overlying the left second toe amputation is well healed, no signs of re ulceration.    There is a callus at the plantar left hallux IPJ with underlying discoloration, after trimming the underlying skin was intact.    Plantar  and lateral foot there are macerations. Lateral left foot there is a raw partial thickness wound granular red base from rubbing in the cast.      Psychiatric: He has a normal mood and affect. His behavior is normal.             Assessment:       Encounter Diagnoses   Name Primary?    Charcot's joint of foot, left Yes    Diabetic polyneuropathy associated with type 2 diabetes mellitus     Edema, lower extremity             Plan:       Virgilio was seen today for follow-up and diabetes mellitus.    Diagnoses and all orders for this visit:    Charcot's joint of foot, left    Diabetic polyneuropathy associated with type 2 diabetes mellitus    Edema, lower extremity      I counseled the patient on his conditions, their implications and medical management.    Shoe inspection. Diabetic Foot Education. Patient reminded of the importance of good nutrition and blood sugar control to help prevent podiatric complications of diabetes. Patient instructed on proper foot hygeine. We discussed wearing proper shoe gear, daily foot inspections, never walking without protective shoe gear, never putting sharp instruments to feet    Discussed importance of healthy diet and weight loss as to his diabetes control and healing potential in preparation for surgery.     Betaine to the macerated areas of the foot and covered with foam and cast padding. Will take a break from the cast for 1 week, he is to remain in the orthopedic boot only, will cast in 1 week again after the macerated skin and partial thickness wound has healed.     Referral to Dr. Salinas for potential surgical intervention for charcot reconstruction of the midfoot after the acute flare has subsided. Appreciate her input.    Inocente Smith DPM

## 2018-07-03 ENCOUNTER — PATIENT MESSAGE (OUTPATIENT)
Dept: PODIATRY | Facility: CLINIC | Age: 58
End: 2018-07-03

## 2018-07-03 ENCOUNTER — OFFICE VISIT (OUTPATIENT)
Dept: FAMILY MEDICINE | Facility: CLINIC | Age: 58
End: 2018-07-03
Payer: COMMERCIAL

## 2018-07-03 ENCOUNTER — PATIENT MESSAGE (OUTPATIENT)
Dept: FAMILY MEDICINE | Facility: CLINIC | Age: 58
End: 2018-07-03

## 2018-07-03 VITALS
DIASTOLIC BLOOD PRESSURE: 72 MMHG | BODY MASS INDEX: 41.75 KG/M2 | HEIGHT: 73 IN | WEIGHT: 315 LBS | TEMPERATURE: 98 F | HEART RATE: 73 BPM | SYSTOLIC BLOOD PRESSURE: 133 MMHG

## 2018-07-03 DIAGNOSIS — M14.679 CHARCOT'S JOINT OF FOOT, UNSPECIFIED LATERALITY: Primary | ICD-10-CM

## 2018-07-03 PROCEDURE — 99213 OFFICE O/P EST LOW 20 MIN: CPT | Mod: S$GLB,,, | Performed by: FAMILY MEDICINE

## 2018-07-03 PROCEDURE — 3075F SYST BP GE 130 - 139MM HG: CPT | Mod: CPTII,S$GLB,, | Performed by: FAMILY MEDICINE

## 2018-07-03 PROCEDURE — 99999 PR PBB SHADOW E&M-EST. PATIENT-LVL III: CPT | Mod: PBBFAC,,, | Performed by: FAMILY MEDICINE

## 2018-07-03 PROCEDURE — 3008F BODY MASS INDEX DOCD: CPT | Mod: CPTII,S$GLB,, | Performed by: FAMILY MEDICINE

## 2018-07-03 PROCEDURE — 3078F DIAST BP <80 MM HG: CPT | Mod: CPTII,S$GLB,, | Performed by: FAMILY MEDICINE

## 2018-07-03 RX ORDER — OXYCODONE AND ACETAMINOPHEN 10; 325 MG/1; MG/1
1 TABLET ORAL EVERY 4 HOURS PRN
Qty: 120 TABLET | Refills: 0 | Status: SHIPPED | OUTPATIENT
Start: 2018-07-03 | End: 2018-08-22 | Stop reason: CLARIF

## 2018-07-03 NOTE — PROGRESS NOTES
Virgilio SCOTT Arianne presents co worsening pain in foot w redness and swelling lt ft. Dr Pierre dx charcot foot . Cast was removed and now in walking shoe.  Pain not controlled Also  has chronic neuropathy legs.   Past Medical History:   Diagnosis Date    Cellulitis of left index finger 2/16/2015    Dyslipidemia     Essential hypertension 2/16/2017    Infected finger joint 2/17/2015    Obese     S/P knee surgery, medial/lateral menisectomy 11/4/2013    Type 2 diabetes mellitus with diabetic polyneuropathy, with long-term current use of insulin 2003     Past Surgical History:   Procedure Laterality Date    ELBOW SURGERY      FOOT SURGERY Left     KNEE CARTILAGE SURGERY  10/21/2013    right knee    KNEE SURGERY Right 02/17/2017    Left index finger surgery  03/2015     No Known Allergies  Current Outpatient Prescriptions on File Prior to Visit   Medication Sig Dispense Refill    ALPRAZolam (XANAX) 0.5 MG tablet Take 1 tablet (0.5 mg total) by mouth 3 (three) times daily. 90 tablet 0    blood sugar diagnostic Strp 1 strip by Misc.(Non-Drug; Combo Route) route 3 (three) times daily. 360 each 3    enalapril (VASOTEC) 20 MG tablet Take 1 tablet (20 mg total) by mouth once daily. 90 tablet 3    gabapentin (NEURONTIN) 600 MG tablet Take 1 tablet (600 mg total) by mouth 3 (three) times daily. 270 tablet 3    hydrOXYzine pamoate (VISTARIL) 50 MG Cap   11    insulin glargine (LANTUS SOLOSTAR U-100 INSULIN) 100 unit/mL (3 mL) InPn pen Inject 70 Units into the skin once daily. Supply pen needles 21 mL 12    insulin lispro (HUMALOG) 100 unit/mL injection Inject 60 Units into the skin 3 (three) times daily before meals. Inject 40 units into the skin three times daily with meals plus sliding scale.  Supply with pen needles. 30 mL 12    metFORMIN (GLUCOPHAGE-XR) 500 MG 24 hr tablet Take 2 tablets (1,000 mg total) by mouth 2 (two) times daily with meals. 360 tablet 4    oxyCODONE-acetaminophen (PERCOCET)  mg  "per tablet Take 1 tablet by mouth every 4 (four) hours as needed for Pain.      pen needle, diabetic (BD ULTRA-FINE WILDA PEN NEEDLES) 32 gauge x 5/32" Ndle INJECT FOUR TIMES DAILY 4 each 3    polyethylene glycol (GLYCOLAX) 17 gram PwPk Take 17 g by mouth once daily. 24 each 0    pravastatin (PRAVACHOL) 40 MG tablet Take 1 tablet (40 mg total) by mouth once daily. 90 tablet 4    safety needles 22 gauge x 1 1/2" Ndle To be used once a month for testosterone injections 50 each 6    testosterone cypionate (DEPOTESTOTERONE CYPIONATE) 200 mg/mL injection INJECT 1.5 ML IN THE MUSCLE EVERY 28 DAYS 10 mL 0    colchicine 0.6 mg tablet Take 1 tablet (0.6 mg total) by mouth every 4 (four) hours. 8 tablet 1    diclofenac (VOLTAREN) 75 MG EC tablet Take 1 tablet (75 mg total) by mouth 2 (two) times daily. 60 tablet 0    FLUVIRIN [id number] 45 mcg (15 mcg x 3)/0.5 mL Susp ADM 0.5ML IM UTD  0    [DISCONTINUED] clindamycin (CLEOCIN) 300 MG capsule Take 1 capsule (300 mg total) by mouth every 8 (eight) hours. 30 capsule 0     No current facility-administered medications on file prior to visit.      Social History     Social History    Marital status:      Spouse name: N/A    Number of children: N/A    Years of education: N/A     Occupational History     [employer]     Social History Main Topics    Smoking status: Never Smoker    Smokeless tobacco: Never Used    Alcohol use Yes      Comment: occasional    Drug use: No    Sexual activity: Yes     Partners: Female     Other Topics Concern    Not on file     Social History Narrative    No narrative on file     Family History   Problem Relation Age of Onset    Diabetes Mother     Diabetes Father          ROS:  SKIN: No rashes, itching or changes in color or texture of skin.  EYES: Visual acuity fine. No photophobia, ocular pain or diplopia.EARS: Denies ear pain, discharge or vertigo.NOSE: No loss of " smell, no epistaxis some postnasal drip.MOUTH & THROAT: No hoarseness or change in voice. No excessive gum bleeding.CHEST: Denies SHARP, cyanosis, wheezing  CARDIOVASCULAR: Denies chest pain, PND, orthopnea or reduced exercise tolerance.  ABDOMEN:  No weight loss.No abdominal pain, no hematemesis or blood in stool.  URINARY: No flank pain, dysuria or hematuria.  PERIPHERAL VASCULAR: No claudication or cyanosis.  MUSCULOSKELETAL: Negative   NEUROLOGIC: No history of seizures, paralysis, alteration of gait or coordination.    PE: Vital signs as noted  Heent:Normocephalic with no recent cranial trauma,PERRLA,EOMI,conjunctiva clear,fundi reveal no hemmorhage exudate or papilledema.Otic canals clear, tympanic membranes slightly dull bilaterally.Nasal mucosa slightly red and edematous.Posterior pharynx slightly red but without exudate.  Neck:Supple with minimal anterior cervical adenopathy.  Chest:Clear bilateral breath sounds  Heart:Regular rhthym without murmer  Abdomen:Soft, non tender,no masses, no hepatosplenomegaly  Ext Gen degen changes and myalgia; left foot red swollen w edema to mid pre tibial area. Tender w near ulceration    Impression:Charcot foot  Diabetes  Neuropathy  Hypotestosterone  Obesity BMI 41.7   Anxiety  Insomnia  HLP  Plan: Rev pain control goals and tx options   RF oxycodone until definitive tx

## 2018-07-06 ENCOUNTER — PATIENT MESSAGE (OUTPATIENT)
Dept: PODIATRY | Facility: CLINIC | Age: 58
End: 2018-07-06

## 2018-07-09 ENCOUNTER — OFFICE VISIT (OUTPATIENT)
Dept: PODIATRY | Facility: CLINIC | Age: 58
End: 2018-07-09
Payer: COMMERCIAL

## 2018-07-09 ENCOUNTER — PATIENT MESSAGE (OUTPATIENT)
Dept: PODIATRY | Facility: CLINIC | Age: 58
End: 2018-07-09

## 2018-07-09 ENCOUNTER — TELEPHONE (OUTPATIENT)
Dept: PODIATRY | Facility: CLINIC | Age: 58
End: 2018-07-09

## 2018-07-09 VITALS — BODY MASS INDEX: 41.75 KG/M2 | WEIGHT: 315 LBS | HEIGHT: 73 IN

## 2018-07-09 DIAGNOSIS — Z87.2 HEALED ULCER OF LEFT FOOT ON EXAMINATION: ICD-10-CM

## 2018-07-09 DIAGNOSIS — E11.42 DIABETIC POLYNEUROPATHY ASSOCIATED WITH TYPE 2 DIABETES MELLITUS: ICD-10-CM

## 2018-07-09 DIAGNOSIS — M14.672 CHARCOT'S JOINT OF FOOT, LEFT: Primary | ICD-10-CM

## 2018-07-09 PROCEDURE — 29445 APPL RIGID TOT CNTC LEG CAST: CPT | Mod: LT,S$GLB,, | Performed by: PODIATRIST

## 2018-07-09 PROCEDURE — 3008F BODY MASS INDEX DOCD: CPT | Mod: CPTII,S$GLB,, | Performed by: PODIATRIST

## 2018-07-09 PROCEDURE — 99213 OFFICE O/P EST LOW 20 MIN: CPT | Mod: 25,S$GLB,, | Performed by: PODIATRIST

## 2018-07-09 PROCEDURE — 3045F PR MOST RECENT HEMOGLOBIN A1C LEVEL 7.0-9.0%: CPT | Mod: CPTII,S$GLB,, | Performed by: PODIATRIST

## 2018-07-09 PROCEDURE — 99999 PR PBB SHADOW E&M-EST. PATIENT-LVL III: CPT | Mod: PBBFAC,,, | Performed by: PODIATRIST

## 2018-07-09 NOTE — PROGRESS NOTES
Subjective:      Patient ID: Virgilio Acuña is a 58 y.o. male.    Chief Complaint: Follow-up (1 week recheck left foot, PCP--07/03/2018) and Diabetes Mellitus (A1c-8.2-03/08/2018)    Virgilio is a 58 y.o. male who presents to the clinic for evaluation and treatment of high risk feet. Virgilio has a past medical history of Cellulitis of left index finger (2/16/2015); Dyslipidemia; Essential hypertension (2/16/2017); Infected finger joint (2/17/2015); Obese; S/P knee surgery, medial/lateral menisectomy (11/4/2013); and Type 2 diabetes mellitus with diabetic polyneuropathy, with long-term current use of insulin (2003). The patient's chief complaint is left foot redness and swelling over the past several days. He has had a severe infection in the past to the left foot resulting in second ray amputation in March, this has healed and he had been back to work full time. Over the past several days he has noticed redness, pain and swelling to the foot, he reported to his primary care doctor who put him on Clindamycin which he has been taking. Also checked for gout and started on colchicine. Neither seems to be helping to date.     6/26/18: Patient returning for TCC for acute charcot left foot and to discuss further treatment plans. Has been ambulating in tall orthopedic boot only over the weekend    7/2/18: Patient returns for follow up left foot acute charcot event, in TCC. Reports he may have gotten the cast wet in the shower. There is some pain to the lateral foot and medial ankle. He felt as though the cast might be tight.     7/9/18: Patient returns for follow up left foot acute charcot, ambulating in tall orthopedic boot at all times, will go back to a TCC today. No new pedal complaints, continues to have sharp pains to the foot at times. Feels worse without the cast.    PCP: Juanito Hilton MD    Date Last Seen by PCP: 1/8/18    Current shoe gear:  Affected Foot: Camwalker boots     Unaffected Foot: Tennis  shoes    History of Trauma: negative  Sign of Infection: Redness and swelling/warmth    Hemoglobin A1C   Date Value Ref Range Status   03/08/2018 8.2 (H) 0.0 - 5.6 % Final     Comment:     Reference Interval:  5.0 - 5.6 Normal   5.7 - 6.4 High Risk   > 6.5 Diabetic    Hgb A1c results are standardized based on the (NGSP) National   Glycohemoglobin Standardization Program.    Hemoglobin A1C levels are related to mean serum/plasma glucose   during the preceding 2-3 months.        01/10/2018 8.7 (H) 0.0 - 5.6 % Final     Comment:     Reference Interval:  5.0 - 5.6 Normal   5.7 - 6.4 High Risk   > 6.5 Diabetic    Hgb A1c results are standardized based on the (NGSP) National   Glycohemoglobin Standardization Program.    Hemoglobin A1C levels are related to mean serum/plasma glucose   during the preceding 2-3 months.        07/07/2017 7.4 (H) 4.0 - 5.6 % Final     Comment:     According to ADA guidelines, hemoglobin A1c <7.0% represents  optimal control in non-pregnant diabetic patients. Different  metrics may apply to specific patient populations.   Standards of Medical Care in Diabetes-2016.  For the purpose of screening for the presence of diabetes:  <5.7%     Consistent with the absence of diabetes  5.7-6.4%  Consistent with increasing risk for diabetes   (prediabetes)  >or=6.5%  Consistent with diabetes  Currently, no consensus exists for use of hemoglobin A1c  for diagnosis of diabetes for children.  This Hemoglobin A1c assay has significant interference with fetal   hemoglobin   (HbF). The results are invalid for patients with abnormal amounts of   HbF,   including those with known Hereditary Persistence   of Fetal Hemoglobin. Heterozygous hemoglobin variants (HbAS, HbAC,   HbAD, HbAE, HbA2) do not significantly interfere with this assay;   however, presence of multiple variants in a sample may impact the %   interference.         Review of Systems   Constitution: Negative for chills and fever.   Cardiovascular:  Positive for leg swelling. Negative for claudication.   Respiratory: Negative for shortness of breath.    Skin: Positive for color change, nail changes and poor wound healing. Negative for itching and rash.   Musculoskeletal: Negative for muscle cramps, muscle weakness and myalgias.   Gastrointestinal: Negative for nausea and vomiting.   Neurological: Positive for numbness and paresthesias. Negative for focal weakness and loss of balance.           Objective:      Physical Exam   Constitutional: He is oriented to person, place, and time. He appears well-developed and well-nourished. No distress.   Cardiovascular:   Pulses:       Dorsalis pedis pulses are 2+ on the right side, and 2+ on the left side.        Posterior tibial pulses are 2+ on the right side, and 2+ on the left side.   < 3 sec capillary refill time to toes 1-5 bilateral. Toes and feet are warm to touch proximally with normal distal cooling b/l. There is no hair growth on the feet and toes b/l. There is moderate edema left foot and mild edema right.      Musculoskeletal:   Left second toe amputation    Left foot much improved generalized edema and erythema with warmth to the touch throughout.    Equinus noted b/l ankles with < 10 deg DF noted. MMT 5/5 in DF/PF/Inv/Ev resistance with no reproduction of pain in any direction. Passive range of motion of ankle and pedal joints is painless b/l.     Feet:   Right Foot:   Protective Sensation: 10 sites tested. 0 sites sensed.   Left Foot:   Protective Sensation: 10 sites tested. 0 sites sensed.   Neurological: He is alert and oriented to person, place, and time. He has normal strength. He displays no atrophy and no tremor. A sensory deficit is present. He exhibits normal muscle tone.   Negative tinel sign bilateral.   Skin: Skin is warm. No abrasion, no bruising, no burn, no ecchymosis, no laceration, no lesion, no petechiae and no rash noted. He is not diaphoretic. There is erythema. No cyanosis. No pallor.  Nails show no clubbing.   Incision overlying the left second toe amputation is well healed, no signs of re ulceration.    There is a callus at the plantar left hallux IPJ with underlying discoloration, after trimming the underlying skin was intact.    Plantar and lateral foot there are macerations. Lateral left foot there is a raw partial thickness wound granular red base from rubbing in the cast.      Psychiatric: He has a normal mood and affect. His behavior is normal.             Assessment:       Encounter Diagnoses   Name Primary?    Charcot's joint of foot, left Yes    Diabetic polyneuropathy associated with type 2 diabetes mellitus     Healed ulcer of left foot on examination             Plan:       Virgilio was seen today for follow-up and diabetes mellitus.    Diagnoses and all orders for this visit:    Charcot's joint of foot, left    Diabetic polyneuropathy associated with type 2 diabetes mellitus    Healed ulcer of left foot on examination      I counseled the patient on his conditions, their implications and medical management.    Shoe inspection. Diabetic Foot Education. Patient reminded of the importance of good nutrition and blood sugar control to help prevent podiatric complications of diabetes. Patient instructed on proper foot hygeine. We discussed wearing proper shoe gear, daily foot inspections, never walking without protective shoe gear, never putting sharp instruments to feet    Discussed importance of healthy diet and weight loss as to his diabetes control and healing potential in preparation for surgery.     Total contact cast applied using True cast casting kit, he will report any discomfort and will return right away if the cast gets wet.    Return in 2 weeks for cast change follow up    He will need to get his A1C down below 8 before considering surgery with Dr. Salinas, other option would be to see Dr. Marcos in [place].    Inocente Smith DPM

## 2018-07-09 NOTE — TELEPHONE ENCOUNTER
----- Message from Cecilia Cano LPN sent at 7/9/2018  3:01 PM CDT -----  Good Afternoon,  Patient was referred to Dr. Salinas from Dr. Pierre. Dr. Salinas reviewed chart and feels it is best the patient have surgery with Dr. Marcos at [facility]. Please call the patient to schedule an appt.    Thank you!!!

## 2018-07-10 ENCOUNTER — TELEPHONE (OUTPATIENT)
Dept: PODIATRY | Facility: CLINIC | Age: 58
End: 2018-07-10

## 2018-07-10 NOTE — TELEPHONE ENCOUNTER
Left message on patient's vmail asking him to return my call to schedule an appointment with Dr. Marcos.

## 2018-07-12 ENCOUNTER — PATIENT MESSAGE (OUTPATIENT)
Dept: PODIATRY | Facility: CLINIC | Age: 58
End: 2018-07-12

## 2018-07-16 ENCOUNTER — PATIENT MESSAGE (OUTPATIENT)
Dept: FAMILY MEDICINE | Facility: CLINIC | Age: 58
End: 2018-07-16

## 2018-07-16 ENCOUNTER — PATIENT MESSAGE (OUTPATIENT)
Dept: PODIATRY | Facility: CLINIC | Age: 58
End: 2018-07-16

## 2018-07-16 ENCOUNTER — OFFICE VISIT (OUTPATIENT)
Dept: PODIATRY | Facility: CLINIC | Age: 58
End: 2018-07-16
Payer: COMMERCIAL

## 2018-07-16 ENCOUNTER — TELEPHONE (OUTPATIENT)
Dept: PODIATRY | Facility: CLINIC | Age: 58
End: 2018-07-16

## 2018-07-16 VITALS — HEIGHT: 73 IN | WEIGHT: 315 LBS | BODY MASS INDEX: 41.75 KG/M2

## 2018-07-16 DIAGNOSIS — M14.672 CHARCOT'S JOINT OF FOOT, LEFT: Primary | ICD-10-CM

## 2018-07-16 PROCEDURE — 99999 PR PBB SHADOW E&M-EST. PATIENT-LVL III: CPT | Mod: PBBFAC,,, | Performed by: PODIATRIST

## 2018-07-16 PROCEDURE — 99212 OFFICE O/P EST SF 10 MIN: CPT | Mod: S$GLB,,, | Performed by: PODIATRIST

## 2018-07-16 PROCEDURE — 3008F BODY MASS INDEX DOCD: CPT | Mod: CPTII,S$GLB,, | Performed by: PODIATRIST

## 2018-07-16 RX ORDER — INSULIN LISPRO 100 [IU]/ML
60 INJECTION, SOLUTION INTRAVENOUS; SUBCUTANEOUS
Qty: 90 ML | Refills: 12 | Status: SHIPPED | OUTPATIENT
Start: 2018-07-16 | End: 2018-07-16 | Stop reason: SDUPTHER

## 2018-07-16 RX ORDER — INSULIN GLARGINE 100 [IU]/ML
70 INJECTION, SOLUTION SUBCUTANEOUS DAILY
Qty: 63 ML | Refills: 12 | Status: SHIPPED | OUTPATIENT
Start: 2018-07-16 | End: 2019-05-29 | Stop reason: SDUPTHER

## 2018-07-16 NOTE — TELEPHONE ENCOUNTER
----- Message from Diya Todd sent at 7/16/2018  1:04 PM CDT -----  Contact: Self [phone]  Patient Returning Your Phone Call

## 2018-07-16 NOTE — PROGRESS NOTES
Subjective:      Patient ID: Virgilio Acuña is a 58 y.o. male.    Chief Complaint: Follow-up (for cast removal) and Diabetes Mellitus (PCP:  Dr Hilton  7/3/18; HgbA1c: 3/8/18  8.2)    Virgilio is a 58 y.o. male who presents to the clinic for evaluation and treatment of high risk feet. Virgilio has a past medical history of Cellulitis of left index finger (2/16/2015); Dyslipidemia; Essential hypertension (2/16/2017); Infected finger joint (2/17/2015); Obese; S/P knee surgery, medial/lateral menisectomy (11/4/2013); and Type 2 diabetes mellitus with diabetic polyneuropathy, with long-term current use of insulin (2003). The patient's chief complaint is left foot redness and swelling over the past several days. He has had a severe infection in the past to the left foot resulting in second ray amputation in March, this has healed and he had been back to work full time. Over the past several days he has noticed redness, pain and swelling to the foot, he reported to his primary care doctor who put him on Clindamycin which he has been taking. Also checked for gout and started on colchicine. Neither seems to be helping to date.     6/26/18: Patient returning for TCC for acute charcot left foot and to discuss further treatment plans. Has been ambulating in tall orthopedic boot only over the weekend    7/2/18: Patient returns for follow up left foot acute charcot event, in TCC. Reports he may have gotten the cast wet in the shower. There is some pain to the lateral foot and medial ankle. He felt as though the cast might be tight.     7/9/18: Patient returns for follow up left foot acute charcot, ambulating in tall orthopedic boot at all times, will go back to a TCC today. No new pedal complaints, continues to have sharp pains to the foot at times. Feels worse without the cast.     7/16/18: Patient reports that he is having problems with the cast and would like to have it removed and go back to the orthopedic boot. No new pedal  complaints.    PCP: Juanito Hilton MD    Date Last Seen by PCP: 7/3/18    Current shoe gear:  Affected Foot: Camwalker boots     Unaffected Foot: Tennis shoes    History of Trauma: negative  Sign of Infection: Redness and swelling/warmth    Hemoglobin A1C   Date Value Ref Range Status   03/08/2018 8.2 (H) 0.0 - 5.6 % Final     Comment:     Reference Interval:  5.0 - 5.6 Normal   5.7 - 6.4 High Risk   > 6.5 Diabetic    Hgb A1c results are standardized based on the (NGSP) National   Glycohemoglobin Standardization Program.    Hemoglobin A1C levels are related to mean serum/plasma glucose   during the preceding 2-3 months.        01/10/2018 8.7 (H) 0.0 - 5.6 % Final     Comment:     Reference Interval:  5.0 - 5.6 Normal   5.7 - 6.4 High Risk   > 6.5 Diabetic    Hgb A1c results are standardized based on the (NGSP) National   Glycohemoglobin Standardization Program.    Hemoglobin A1C levels are related to mean serum/plasma glucose   during the preceding 2-3 months.        07/07/2017 7.4 (H) 4.0 - 5.6 % Final     Comment:     According to ADA guidelines, hemoglobin A1c <7.0% represents  optimal control in non-pregnant diabetic patients. Different  metrics may apply to specific patient populations.   Standards of Medical Care in Diabetes-2016.  For the purpose of screening for the presence of diabetes:  <5.7%     Consistent with the absence of diabetes  5.7-6.4%  Consistent with increasing risk for diabetes   (prediabetes)  >or=6.5%  Consistent with diabetes  Currently, no consensus exists for use of hemoglobin A1c  for diagnosis of diabetes for children.  This Hemoglobin A1c assay has significant interference with fetal   hemoglobin   (HbF). The results are invalid for patients with abnormal amounts of   HbF,   including those with known Hereditary Persistence   of Fetal Hemoglobin. Heterozygous hemoglobin variants (HbAS, HbAC,   HbAD, HbAE, HbA2) do not significantly interfere with this assay;   however, presence of  multiple variants in a sample may impact the %   interference.         Review of Systems   Constitution: Negative for chills and fever.   Cardiovascular: Positive for leg swelling. Negative for claudication.   Respiratory: Negative for shortness of breath.    Skin: Positive for color change, nail changes and poor wound healing. Negative for itching and rash.   Musculoskeletal: Negative for muscle cramps, muscle weakness and myalgias.   Gastrointestinal: Negative for nausea and vomiting.   Neurological: Positive for numbness and paresthesias. Negative for focal weakness and loss of balance.           Objective:      Physical Exam   Constitutional: He is oriented to person, place, and time. He appears well-developed and well-nourished. No distress.   Cardiovascular:   Pulses:       Dorsalis pedis pulses are 2+ on the right side, and 2+ on the left side.        Posterior tibial pulses are 2+ on the right side, and 2+ on the left side.   < 3 sec capillary refill time to toes 1-5 bilateral. Toes and feet are warm to touch proximally with normal distal cooling b/l. There is no hair growth on the feet and toes b/l. There is moderate edema left foot and mild edema right.      Musculoskeletal:   Left second toe amputation    Left foot much improved generalized edema and erythema with warmth to the touch throughout.    Equinus noted b/l ankles with < 10 deg DF noted. MMT 5/5 in DF/PF/Inv/Ev resistance with no reproduction of pain in any direction. Passive range of motion of ankle and pedal joints is painless b/l.     Feet:   Right Foot:   Protective Sensation: 10 sites tested. 0 sites sensed.   Left Foot:   Protective Sensation: 10 sites tested. 0 sites sensed.   Neurological: He is alert and oriented to person, place, and time. He has normal strength. He displays no atrophy and no tremor. A sensory deficit is present. He exhibits normal muscle tone.   Negative tinel sign bilateral.   Skin: Skin is warm. No abrasion, no  bruising, no burn, no ecchymosis, no laceration, no lesion, no petechiae and no rash noted. He is not diaphoretic. There is erythema. No cyanosis. No pallor. Nails show no clubbing.   Incision overlying the left second toe amputation is well healed, no signs of re ulceration.    There is a callus at the plantar left hallux IPJ with underlying discoloration, after trimming the underlying skin was intact.    Plantar and lateral foot there are macerations. Lateral left foot there is a raw partial thickness wound granular red base from rubbing in the cast.      Psychiatric: He has a normal mood and affect. His behavior is normal.             Assessment:       Encounter Diagnosis   Name Primary?    Charcot's joint of foot, left Yes            Plan:       Virgilio was seen today for follow-up and diabetes mellitus.    Diagnoses and all orders for this visit:    Charcot's joint of foot, left  -     X-Ray Foot Complete Left; Future      I counseled the patient on his conditions, their implications and medical management.    Shoe inspection. Diabetic Foot Education. Patient reminded of the importance of good nutrition and blood sugar control to help prevent podiatric complications of diabetes. Patient instructed on proper foot hygeine. We discussed wearing proper shoe gear, daily foot inspections, never walking without protective shoe gear, never putting sharp instruments to feet    Discussed importance of healthy diet and weight loss as to his diabetes control and healing potential in preparation for surgery.     Total contact cast removed, he will remain in the tall orthopedic boot at all times when ambulating    Return in 2 weeks for x-ray and to plan for charcot reconstruction surgery    He will need to get A1C below 8    Plan for surgery for beginning of September, will get new x-rays at next appointment.    Inocente Smith DPM

## 2018-07-17 RX ORDER — INSULIN LISPRO 100 [IU]/ML
60 INJECTION, SOLUTION INTRAVENOUS; SUBCUTANEOUS
Qty: 90 ML | Refills: 12 | OUTPATIENT
Start: 2018-07-17 | End: 2018-08-02 | Stop reason: SDUPTHER

## 2018-07-19 ENCOUNTER — PATIENT OUTREACH (OUTPATIENT)
Dept: ADMINISTRATIVE | Facility: HOSPITAL | Age: 58
End: 2018-07-19

## 2018-07-20 ENCOUNTER — PATIENT MESSAGE (OUTPATIENT)
Dept: FAMILY MEDICINE | Facility: CLINIC | Age: 58
End: 2018-07-20

## 2018-07-21 NOTE — TELEPHONE ENCOUNTER
I rarely see sleep issues w tylenol-I'd suggest getting some regular strength tylenol and take it for a day or 2 without oxy and see if there is a difference-the oxy itself can cause stimulation and insomnia. The other thing is because the oxy is such a potent drug, as it drops down 3-4 hours after dose you can get some mini withdrawal symptoms which can present as increased anxiety and wakeful ness. If it continues to be problematic Id suggest a Pain consult-they may have some longer half life meds to suggest

## 2018-07-23 ENCOUNTER — PATIENT MESSAGE (OUTPATIENT)
Dept: PODIATRY | Facility: CLINIC | Age: 58
End: 2018-07-23

## 2018-07-27 ENCOUNTER — PATIENT MESSAGE (OUTPATIENT)
Dept: PODIATRY | Facility: CLINIC | Age: 58
End: 2018-07-27

## 2018-07-30 ENCOUNTER — HOSPITAL ENCOUNTER (OUTPATIENT)
Dept: RADIOLOGY | Facility: HOSPITAL | Age: 58
Discharge: HOME | End: 2018-07-30
Attending: PODIATRIST
Payer: COMMERCIAL

## 2018-07-30 ENCOUNTER — OFFICE VISIT (OUTPATIENT)
Dept: PODIATRY | Facility: CLINIC | Age: 58
End: 2018-07-30
Payer: COMMERCIAL

## 2018-07-30 VITALS — WEIGHT: 315 LBS | HEIGHT: 73 IN | BODY MASS INDEX: 41.75 KG/M2

## 2018-07-30 DIAGNOSIS — M14.672 CHARCOT'S JOINT OF FOOT, LEFT: Primary | ICD-10-CM

## 2018-07-30 DIAGNOSIS — E11.49 TYPE II DIABETES MELLITUS WITH NEUROLOGICAL MANIFESTATIONS: ICD-10-CM

## 2018-07-30 DIAGNOSIS — M14.672 CHARCOT'S JOINT OF FOOT, LEFT: ICD-10-CM

## 2018-07-30 PROCEDURE — 99213 OFFICE O/P EST LOW 20 MIN: CPT | Mod: S$GLB,,, | Performed by: PODIATRIST

## 2018-07-30 PROCEDURE — 99999 PR PBB SHADOW E&M-EST. PATIENT-LVL III: CPT | Mod: PBBFAC,,, | Performed by: PODIATRIST

## 2018-07-30 PROCEDURE — 73630 X-RAY EXAM OF FOOT: CPT | Mod: 26,LT,, | Performed by: RADIOLOGY

## 2018-07-30 PROCEDURE — 3045F PR MOST RECENT HEMOGLOBIN A1C LEVEL 7.0-9.0%: CPT | Mod: CPTII,S$GLB,, | Performed by: PODIATRIST

## 2018-07-30 PROCEDURE — 73630 X-RAY EXAM OF FOOT: CPT | Mod: TC,PO,LT

## 2018-07-30 PROCEDURE — 3008F BODY MASS INDEX DOCD: CPT | Mod: CPTII,S$GLB,, | Performed by: PODIATRIST

## 2018-07-30 NOTE — PROGRESS NOTES
Subjective:      Patient ID: Virgilio Acuña is a 58 y.o. male.    Chief Complaint: Follow-up (left foot) and Diabetes Mellitus (PCP:  Dr Hilton 7/3/18; HgbA1c: 3/8/18  8.2)    Virgilio is a 58 y.o. male who presents to the clinic for evaluation and treatment of high risk feet. Virgilio has a past medical history of Cellulitis of left index finger (2/16/2015); Dyslipidemia; Essential hypertension (2/16/2017); Infected finger joint (2/17/2015); Obese; S/P knee surgery, medial/lateral menisectomy (11/4/2013); and Type 2 diabetes mellitus with diabetic polyneuropathy, with long-term current use of insulin (2003). The patient's chief complaint is left foot redness and swelling over the past several days. He has had a severe infection in the past to the left foot resulting in second ray amputation in March, this has healed and he had been back to work full time. Over the past several days he has noticed redness, pain and swelling to the foot, he reported to his primary care doctor who put him on Clindamycin which he has been taking. Also checked for gout and started on colchicine. Neither seems to be helping to date.     6/26/18: Patient returning for TCC for acute charcot left foot and to discuss further treatment plans. Has been ambulating in tall orthopedic boot only over the weekend    7/2/18: Patient returns for follow up left foot acute charcot event, in TCC. Reports he may have gotten the cast wet in the shower. There is some pain to the lateral foot and medial ankle. He felt as though the cast might be tight.     7/9/18: Patient returns for follow up left foot acute charcot, ambulating in tall orthopedic boot at all times, will go back to a TCC today. No new pedal complaints, continues to have sharp pains to the foot at times. Feels worse without the cast.     7/16/18: Patient reports that he is having problems with the cast and would like to have it removed and go back to the orthopedic boot. No new pedal  complaints.    7/30/18: Patient reports for x-rays and to plan for his surgery for charcot left foot, no new pedal complaints. He is currently in a tall orthopedic boot as he refuses a TCC. No new pedal complaints.    PCP: Juanito Hilton MD    Date Last Seen by PCP: 7/3/18    Current shoe gear:  Affected Foot: Camwalker boots     Unaffected Foot: Tennis shoes    History of Trauma: negative  Sign of Infection: Redness and swelling/warmth    Hemoglobin A1C   Date Value Ref Range Status   03/08/2018 8.2 (H) 0.0 - 5.6 % Final     Comment:     Reference Interval:  5.0 - 5.6 Normal   5.7 - 6.4 High Risk   > 6.5 Diabetic    Hgb A1c results are standardized based on the (NGSP) National   Glycohemoglobin Standardization Program.    Hemoglobin A1C levels are related to mean serum/plasma glucose   during the preceding 2-3 months.        01/10/2018 8.7 (H) 0.0 - 5.6 % Final     Comment:     Reference Interval:  5.0 - 5.6 Normal   5.7 - 6.4 High Risk   > 6.5 Diabetic    Hgb A1c results are standardized based on the (NGSP) National   Glycohemoglobin Standardization Program.    Hemoglobin A1C levels are related to mean serum/plasma glucose   during the preceding 2-3 months.        07/07/2017 7.4 (H) 4.0 - 5.6 % Final     Comment:     According to ADA guidelines, hemoglobin A1c <7.0% represents  optimal control in non-pregnant diabetic patients. Different  metrics may apply to specific patient populations.   Standards of Medical Care in Diabetes-2016.  For the purpose of screening for the presence of diabetes:  <5.7%     Consistent with the absence of diabetes  5.7-6.4%  Consistent with increasing risk for diabetes   (prediabetes)  >or=6.5%  Consistent with diabetes  Currently, no consensus exists for use of hemoglobin A1c  for diagnosis of diabetes for children.  This Hemoglobin A1c assay has significant interference with fetal   hemoglobin   (HbF). The results are invalid for patients with abnormal amounts of   HbF,   including  those with known Hereditary Persistence   of Fetal Hemoglobin. Heterozygous hemoglobin variants (HbAS, HbAC,   HbAD, HbAE, HbA2) do not significantly interfere with this assay;   however, presence of multiple variants in a sample may impact the %   interference.         Review of Systems   Constitution: Negative for chills and fever.   Cardiovascular: Positive for leg swelling. Negative for claudication.   Respiratory: Negative for shortness of breath.    Skin: Positive for color change, nail changes and poor wound healing. Negative for itching and rash.   Musculoskeletal: Negative for muscle cramps, muscle weakness and myalgias.   Gastrointestinal: Negative for nausea and vomiting.   Neurological: Positive for numbness and paresthesias. Negative for focal weakness and loss of balance.           Objective:      Physical Exam   Constitutional: He is oriented to person, place, and time. He appears well-developed and well-nourished. No distress.   Cardiovascular:   Pulses:       Dorsalis pedis pulses are 2+ on the right side, and 2+ on the left side.        Posterior tibial pulses are 2+ on the right side, and 2+ on the left side.   < 3 sec capillary refill time to toes 1-5 bilateral. Toes and feet are warm to touch proximally with normal distal cooling b/l. There is no hair growth on the feet and toes b/l. There is moderate edema left foot and mild edema right.      Musculoskeletal:   Left second toe amputation    Left foot much improved generalized edema and erythema with warmth to the touch throughout.    Equinus noted b/l ankles with < 10 deg DF noted. MMT 5/5 in DF/PF/Inv/Ev resistance with no reproduction of pain in any direction. Passive range of motion of ankle and pedal joints is painless b/l.     Feet:   Right Foot:   Protective Sensation: 10 sites tested. 0 sites sensed.   Left Foot:   Protective Sensation: 10 sites tested. 0 sites sensed.   Neurological: He is alert and oriented to person, place, and time.  He has normal strength. He displays no atrophy and no tremor. A sensory deficit is present. He exhibits normal muscle tone.   Negative tinel sign bilateral.   Skin: Skin is warm. No abrasion, no bruising, no burn, no ecchymosis, no laceration, no lesion, no petechiae and no rash noted. He is not diaphoretic. There is erythema. No cyanosis. No pallor. Nails show no clubbing.   Incision overlying the left second toe amputation is well healed, no signs of re ulceration.    There is a callus at the plantar left hallux IPJ with underlying discoloration, after trimming the underlying skin was intact.    Plantar and lateral foot there are macerations. Lateral left foot there is a raw partial thickness wound granular red base from rubbing in the cast.      Psychiatric: He has a normal mood and affect. His behavior is normal.             Assessment:       Encounter Diagnoses   Name Primary?    Charcot's joint of foot, left Yes    Type II diabetes mellitus with neurological manifestations             Plan:       Virgilio was seen today for follow-up and diabetes mellitus.    Diagnoses and all orders for this visit:    Charcot's joint of foot, left  -     Case Request Operating Room: FUSION, JOINT, MIDFOOT    Type II diabetes mellitus with neurological manifestations  -     Case Request Operating Room: FUSION, JOINT, MIDFOOT      I counseled the patient on his conditions, their implications and medical management.    Shoe inspection. Diabetic Foot Education. Patient reminded of the importance of good nutrition and blood sugar control to help prevent podiatric complications of diabetes. Patient instructed on proper foot hygeine. We discussed wearing proper shoe gear, daily foot inspections, never walking without protective shoe gear, never putting sharp instruments to feet    Discussed importance of healthy diet and weight loss as to his diabetes control and healing potential in preparation for surgery.     Remain in tall  orthopedic boot at all times    Case request for surgery 2/27/18 midfoot fusion for charcot neuroarthropathy. Upon reviewing the risks/benefits,  the planned procedure, the surgical goal, and the postoperative course for the Lt. Foot fusion, the written consent was signed, timed, and dated by the patient with a witness present.      Plan is for first metatarsocuneiform joint and naviculocuneiform joint fusion with plate fixation with Wheatley salvation plate, will fixate the 3-4 metatarsals at the fracture however the tarsometatarsal  Joint for the lateral foot appears intact likely will not need to beam laterally.    Referred to PCP for medical optimization and risk stratification    No noted significant change since the last x-ray    Inocente Smith DPM

## 2018-08-01 NOTE — PROGRESS NOTES
Virgilio SCOTT Arianne presents for pre op consult req by Dr Pierre anticipating foot surgery for charcot foot Now in walking shoe. No acute illnesses. DM not completely controlled but on high dose insulin.   Past Medical History:   Diagnosis Date    Cellulitis of left index finger 2/16/2015    Dyslipidemia     Essential hypertension 2/16/2017    Infected finger joint 2/17/2015    Obese     S/P knee surgery, medial/lateral menisectomy 11/4/2013    Type 2 diabetes mellitus with diabetic polyneuropathy, with long-term current use of insulin 2003     Past Surgical History:   Procedure Laterality Date    ELBOW SURGERY      FOOT SURGERY Left     KNEE CARTILAGE SURGERY  10/21/2013    right knee    KNEE SURGERY Right 02/17/2017    Left index finger surgery  03/2015     No Known Allergies  Current Outpatient Prescriptions on File Prior to Visit   Medication Sig Dispense Refill    ALPRAZolam (XANAX) 0.5 MG tablet Take 1 tablet (0.5 mg total) by mouth 3 (three) times daily. 90 tablet 0    blood sugar diagnostic Strp 1 strip by Misc.(Non-Drug; Combo Route) route 3 (three) times daily. 360 each 3    colchicine 0.6 mg tablet Take 1 tablet (0.6 mg total) by mouth every 4 (four) hours. 8 tablet 1    diclofenac (VOLTAREN) 75 MG EC tablet Take 1 tablet (75 mg total) by mouth 2 (two) times daily. 60 tablet 0    enalapril (VASOTEC) 20 MG tablet Take 1 tablet (20 mg total) by mouth once daily. 90 tablet 3    FLUVIRIN [id number] 45 mcg (15 mcg x 3)/0.5 mL Susp ADM 0.5ML IM UTD  0    gabapentin (NEURONTIN) 600 MG tablet Take 1 tablet (600 mg total) by mouth 3 (three) times daily. 270 tablet 3    hydrOXYzine pamoate (VISTARIL) 50 MG Cap   11    insulin glargine (LANTUS SOLOSTAR U-100 INSULIN) 100 unit/mL (3 mL) InPn pen Inject 70 Units into the skin once daily. Supply pen needles 63 mL 12    insulin lispro (HUMALOG) 100 unit/mL injection Inject 60 Units into the skin 3 (three) times daily before meals. Inject 40 units  "into the skin three times daily with meals plus sliding scale.  Supply with pen needles. 90 mL 12    metFORMIN (GLUCOPHAGE-XR) 500 MG 24 hr tablet Take 2 tablets (1,000 mg total) by mouth 2 (two) times daily with meals. 360 tablet 4    oxyCODONE-acetaminophen (PERCOCET)  mg per tablet Take 1 tablet by mouth every 4 (four) hours as needed for Pain. 120 tablet 0    pen needle, diabetic (BD ULTRA-FINE WILDA PEN NEEDLES) 32 gauge x 5/32" Ndle INJECT FOUR TIMES DAILY 4 each 3    polyethylene glycol (GLYCOLAX) 17 gram PwPk Take 17 g by mouth once daily. 24 each 0    pravastatin (PRAVACHOL) 40 MG tablet Take 1 tablet (40 mg total) by mouth once daily. 90 tablet 4    safety needles 22 gauge x 1 1/2" Ndle To be used once a month for testosterone injections 50 each 6    testosterone cypionate (DEPOTESTOTERONE CYPIONATE) 200 mg/mL injection INJECT 1.5 ML IN THE MUSCLE EVERY 28 DAYS 10 mL 0     No current facility-administered medications on file prior to visit.      Social History     Social History    Marital status:      Spouse name: N/A    Number of children: N/A    Years of education: N/A     Occupational History     [employer]     Social History Main Topics    Smoking status: Never Smoker    Smokeless tobacco: Never Used    Alcohol use Yes      Comment: occasional    Drug use: No    Sexual activity: Yes     Partners: Female     Other Topics Concern    Not on file     Social History Narrative    No narrative on file     Family History   Problem Relation Age of Onset    Diabetes Mother     Diabetes Father          ROS:  SKIN: No rashes, itching or changes in color or texture of skin.  EYES: Visual acuity fine. No photophobia, ocular pain or diplopia.EARS: Denies ear pain, discharge or vertigo.NOSE: No loss of smell, no epistaxis some postnasal drip.MOUTH & THROAT: No hoarseness or change in voice. No excessive gum bleeding.CHEST: " Denies SHARP, cyanosis, wheezing  CARDIOVASCULAR: Denies chest pain, PND, orthopnea or reduced exercise tolerance.  ABDOMEN:  No weight loss.No abdominal pain, no hematemesis or blood in stool.  URINARY: No flank pain, dysuria or hematuria.  PERIPHERAL VASCULAR: No claudication or cyanosis.  MUSCULOSKELETAL: Negative   NEUROLOGIC: No history of seizures, paralysis, alteration of gait or coordination.    PE: Vital signs as noted  Heent:Normocephalic with no recent cranial trauma,PERRLA,EOMI,conjunctiva clear,fundi reveal no hemmorhage exudate or papilledema.Otic canals clear, tympanic membranes slightly dull bilaterally.Nasal mucosa slightly red and edematous.Posterior pharynx slightly red but without exudate.  Neck:Supple with minimal anterior cervical adenopathy.  Chest:Clear bilateral breath sounds  Heart:Regular rhthym without murmer  Abdomen:Soft, non tender,no masses, no hepatosplenomegaly  Ext Gen degen changes and myalgia; left foot red swollen w edema to mid pre tibial area. Tender w near ulceration    Impression:Charcot foot  Diabetes  Neuropathy  Hypotestosterone  Obesity BMI 41.7   Anxiety  Insomnia  HLP  Plan: Clear for surgery

## 2018-08-02 ENCOUNTER — OFFICE VISIT (OUTPATIENT)
Dept: FAMILY MEDICINE | Facility: CLINIC | Age: 58
End: 2018-08-02
Payer: COMMERCIAL

## 2018-08-02 VITALS
HEIGHT: 73 IN | SYSTOLIC BLOOD PRESSURE: 136 MMHG | WEIGHT: 315 LBS | BODY MASS INDEX: 41.75 KG/M2 | DIASTOLIC BLOOD PRESSURE: 76 MMHG | TEMPERATURE: 98 F | HEART RATE: 76 BPM

## 2018-08-02 DIAGNOSIS — Z12.11 ENCOUNTER FOR SCREENING COLONOSCOPY: ICD-10-CM

## 2018-08-02 DIAGNOSIS — Z01.818 PRE-OPERATIVE CLEARANCE: Primary | ICD-10-CM

## 2018-08-02 PROCEDURE — 99999 PR PBB SHADOW E&M-EST. PATIENT-LVL III: CPT | Mod: PBBFAC,,, | Performed by: FAMILY MEDICINE

## 2018-08-02 PROCEDURE — 3075F SYST BP GE 130 - 139MM HG: CPT | Mod: CPTII,S$GLB,, | Performed by: FAMILY MEDICINE

## 2018-08-02 PROCEDURE — 3008F BODY MASS INDEX DOCD: CPT | Mod: CPTII,S$GLB,, | Performed by: FAMILY MEDICINE

## 2018-08-02 PROCEDURE — 3078F DIAST BP <80 MM HG: CPT | Mod: CPTII,S$GLB,, | Performed by: FAMILY MEDICINE

## 2018-08-02 PROCEDURE — 99214 OFFICE O/P EST MOD 30 MIN: CPT | Mod: S$GLB,,, | Performed by: FAMILY MEDICINE

## 2018-08-02 RX ORDER — GEMFIBROZIL 600 MG/1
600 TABLET, FILM COATED ORAL
Qty: 180 TABLET | Refills: 3 | Status: SHIPPED | OUTPATIENT
Start: 2018-08-02 | End: 2019-05-29 | Stop reason: SDUPTHER

## 2018-08-02 RX ORDER — INSULIN LISPRO 100 [IU]/ML
60 INJECTION, SOLUTION INTRAVENOUS; SUBCUTANEOUS
Qty: 90 ML | Refills: 12 | Status: SHIPPED | OUTPATIENT
Start: 2018-08-02 | End: 2018-08-13 | Stop reason: SDUPTHER

## 2018-08-08 ENCOUNTER — PATIENT MESSAGE (OUTPATIENT)
Dept: FAMILY MEDICINE | Facility: CLINIC | Age: 58
End: 2018-08-08

## 2018-08-13 ENCOUNTER — PATIENT MESSAGE (OUTPATIENT)
Dept: FAMILY MEDICINE | Facility: CLINIC | Age: 58
End: 2018-08-13

## 2018-08-13 ENCOUNTER — TELEPHONE (OUTPATIENT)
Dept: PODIATRY | Facility: CLINIC | Age: 58
End: 2018-08-13

## 2018-08-13 DIAGNOSIS — M14.672 CHARCOT'S JOINT OF LEFT FOOT: Primary | ICD-10-CM

## 2018-08-13 RX ORDER — TEMAZEPAM 15 MG/1
15 CAPSULE ORAL NIGHTLY PRN
Qty: 30 CAPSULE | Refills: 0 | Status: SHIPPED | OUTPATIENT
Start: 2018-08-13 | End: 2018-08-14 | Stop reason: SDUPTHER

## 2018-08-13 RX ORDER — INSULIN LISPRO 100 [IU]/ML
60 INJECTION, SOLUTION INTRAVENOUS; SUBCUTANEOUS
Qty: 90 ML | Refills: 12 | Status: SHIPPED | OUTPATIENT
Start: 2018-08-13 | End: 2018-08-28 | Stop reason: SDUPTHER

## 2018-08-13 NOTE — TELEPHONE ENCOUNTER
----- Message from Inocente Smith DPM sent at 8/13/2018  4:50 PM CDT -----  Please schedule Mr. Acuña for a CT of the left foot to be done any time before surgery which is scheduled for 8/27

## 2018-08-14 RX ORDER — TEMAZEPAM 15 MG/1
15 CAPSULE ORAL NIGHTLY PRN
Qty: 30 CAPSULE | Refills: 0 | Status: SHIPPED | OUTPATIENT
Start: 2018-08-14 | End: 2018-09-13

## 2018-08-15 ENCOUNTER — HOSPITAL ENCOUNTER (OUTPATIENT)
Dept: RADIOLOGY | Facility: HOSPITAL | Age: 58
Discharge: HOME | End: 2018-08-15
Attending: PODIATRIST
Payer: COMMERCIAL

## 2018-08-15 DIAGNOSIS — M14.672 CHARCOT'S JOINT OF LEFT FOOT: ICD-10-CM

## 2018-08-15 PROCEDURE — 73700 CT LOWER EXTREMITY W/O DYE: CPT | Mod: TC,PO,LT

## 2018-08-15 PROCEDURE — 73700 CT LOWER EXTREMITY W/O DYE: CPT | Mod: 26,LT,, | Performed by: RADIOLOGY

## 2018-08-16 ENCOUNTER — PATIENT MESSAGE (OUTPATIENT)
Dept: FAMILY MEDICINE | Facility: CLINIC | Age: 58
End: 2018-08-16

## 2018-08-27 PROBLEM — M14.672 CHARCOT'S JOINT OF LEFT FOOT: Status: ACTIVE | Noted: 2018-08-27

## 2018-08-28 ENCOUNTER — PATIENT MESSAGE (OUTPATIENT)
Dept: FAMILY MEDICINE | Facility: CLINIC | Age: 58
End: 2018-08-28

## 2018-08-28 ENCOUNTER — PATIENT MESSAGE (OUTPATIENT)
Dept: PODIATRY | Facility: CLINIC | Age: 58
End: 2018-08-28

## 2018-08-28 RX ORDER — INSULIN LISPRO 100 [IU]/ML
60 INJECTION, SOLUTION INTRAVENOUS; SUBCUTANEOUS
Qty: 90 ML | Refills: 12 | Status: SHIPPED | OUTPATIENT
Start: 2018-08-28 | End: 2018-09-12

## 2018-08-29 ENCOUNTER — PATIENT MESSAGE (OUTPATIENT)
Dept: PODIATRY | Facility: CLINIC | Age: 58
End: 2018-08-29

## 2018-08-29 ENCOUNTER — PATIENT MESSAGE (OUTPATIENT)
Dept: FAMILY MEDICINE | Facility: CLINIC | Age: 58
End: 2018-08-29

## 2018-08-30 ENCOUNTER — PATIENT MESSAGE (OUTPATIENT)
Dept: PODIATRY | Facility: CLINIC | Age: 58
End: 2018-08-30

## 2018-08-30 DIAGNOSIS — M25.519 ACUTE SHOULDER PAIN, UNSPECIFIED LATERALITY: Primary | ICD-10-CM

## 2018-09-01 ENCOUNTER — PATIENT MESSAGE (OUTPATIENT)
Dept: PODIATRY | Facility: CLINIC | Age: 58
End: 2018-09-01

## 2018-09-03 ENCOUNTER — PATIENT MESSAGE (OUTPATIENT)
Dept: PODIATRY | Facility: CLINIC | Age: 58
End: 2018-09-03

## 2018-09-04 ENCOUNTER — PATIENT MESSAGE (OUTPATIENT)
Dept: FAMILY MEDICINE | Facility: CLINIC | Age: 58
End: 2018-09-04

## 2018-09-04 ENCOUNTER — OFFICE VISIT (OUTPATIENT)
Dept: PODIATRY | Facility: CLINIC | Age: 58
End: 2018-09-04
Payer: COMMERCIAL

## 2018-09-04 ENCOUNTER — PATIENT MESSAGE (OUTPATIENT)
Dept: PODIATRY | Facility: CLINIC | Age: 58
End: 2018-09-04

## 2018-09-04 VITALS — BODY MASS INDEX: 41.75 KG/M2 | HEIGHT: 73 IN | WEIGHT: 315 LBS

## 2018-09-04 DIAGNOSIS — Z98.890 POST-OPERATIVE STATE: Primary | ICD-10-CM

## 2018-09-04 PROCEDURE — 99999 PR PBB SHADOW E&M-EST. PATIENT-LVL III: CPT | Mod: PBBFAC,,, | Performed by: PODIATRIST

## 2018-09-04 PROCEDURE — 99024 POSTOP FOLLOW-UP VISIT: CPT | Mod: S$GLB,,, | Performed by: PODIATRIST

## 2018-09-04 RX ORDER — CHLORHEXIDINE GLUCONATE ORAL RINSE 1.2 MG/ML
SOLUTION DENTAL
Refills: 0 | COMMUNITY
Start: 2018-08-22 | End: 2018-11-05

## 2018-09-04 RX ORDER — OXYCODONE HYDROCHLORIDE 15 MG/1
7 TABLET, FILM COATED, EXTENDED RELEASE ORAL
COMMUNITY
End: 2018-09-10

## 2018-09-04 RX ORDER — MUPIROCIN 20 MG/G
OINTMENT TOPICAL
Refills: 0 | COMMUNITY
Start: 2018-08-22 | End: 2018-11-05

## 2018-09-04 RX ORDER — SULFAMETHOXAZOLE AND TRIMETHOPRIM 800; 160 MG/1; MG/1
1 TABLET ORAL 2 TIMES DAILY
Qty: 14 TABLET | Refills: 0 | Status: SHIPPED | OUTPATIENT
Start: 2018-09-04 | End: 2018-09-11

## 2018-09-05 ENCOUNTER — PATIENT MESSAGE (OUTPATIENT)
Dept: PODIATRY | Facility: CLINIC | Age: 58
End: 2018-09-05

## 2018-09-07 ENCOUNTER — TELEPHONE (OUTPATIENT)
Dept: FAMILY MEDICINE | Facility: CLINIC | Age: 58
End: 2018-09-07

## 2018-09-07 NOTE — TELEPHONE ENCOUNTER
There was an issue with receiving the rx for the pt's Humalog at [facility], I have spoken to [facility] states they will ship the Humalog insulin medication to the pt who should receive it by 09/10/2018. Pt is requesting a short term supply of his Humalog for 2 day until he receive his insulin on 9/10/18, he is out. Requesting it to be sent to [facility] on [facility]. Please advise. Pt is informed that medication will go to [facility] if approved.

## 2018-09-10 ENCOUNTER — OFFICE VISIT (OUTPATIENT)
Dept: PODIATRY | Facility: CLINIC | Age: 58
End: 2018-09-10
Payer: COMMERCIAL

## 2018-09-10 ENCOUNTER — PATIENT MESSAGE (OUTPATIENT)
Dept: PODIATRY | Facility: CLINIC | Age: 58
End: 2018-09-10

## 2018-09-10 VITALS — HEIGHT: 73 IN | BODY MASS INDEX: 41.75 KG/M2 | WEIGHT: 315 LBS

## 2018-09-10 DIAGNOSIS — Z98.890 POST-OPERATIVE STATE: Primary | ICD-10-CM

## 2018-09-10 PROCEDURE — 99024 POSTOP FOLLOW-UP VISIT: CPT | Mod: S$GLB,,, | Performed by: PODIATRIST

## 2018-09-10 PROCEDURE — 99999 PR PBB SHADOW E&M-EST. PATIENT-LVL II: CPT | Mod: PBBFAC,,, | Performed by: PODIATRIST

## 2018-09-10 PROCEDURE — 87070 CULTURE OTHR SPECIMN AEROBIC: CPT

## 2018-09-10 PROCEDURE — 87075 CULTR BACTERIA EXCEPT BLOOD: CPT

## 2018-09-10 PROCEDURE — 29445 APPL RIGID TOT CNTC LEG CAST: CPT | Mod: 58,LT,S$GLB, | Performed by: PODIATRIST

## 2018-09-10 PROCEDURE — 87076 CULTURE ANAEROBE IDENT EACH: CPT

## 2018-09-10 RX ORDER — OXYCODONE AND ACETAMINOPHEN 5; 325 MG/1; MG/1
1 TABLET ORAL EVERY 4 HOURS PRN
Qty: 42 TABLET | Refills: 0 | Status: SHIPPED | OUTPATIENT
Start: 2018-09-10 | End: 2018-10-17

## 2018-09-11 ENCOUNTER — PATIENT MESSAGE (OUTPATIENT)
Dept: FAMILY MEDICINE | Facility: CLINIC | Age: 58
End: 2018-09-11

## 2018-09-12 ENCOUNTER — PATIENT MESSAGE (OUTPATIENT)
Dept: PODIATRY | Facility: CLINIC | Age: 58
End: 2018-09-12

## 2018-09-12 RX ORDER — INSULIN LISPRO 100 [IU]/ML
60 INJECTION, SOLUTION INTRAVENOUS; SUBCUTANEOUS 3 TIMES DAILY
Qty: 162 ML | Refills: 12 | Status: SHIPPED | OUTPATIENT
Start: 2018-09-12 | End: 2019-05-29 | Stop reason: SDUPTHER

## 2018-09-12 NOTE — PROGRESS NOTES
Subjective:      Patient ID: Virgilio Acuña is a 58 y.o. male.    Chief Complaint: Post-op Evaluation (left foot - surgery 8/27) and Diabetes Mellitus (PCP:  Dr Hilton 8/2/18; HgbA1c: 8/27/18  8.1)    Virgilio is a 58 y.o. male who presents to the clinic for evaluation and treatment of high risk feet. Virgilio has a past medical history of Cellulitis of left index finger (2/16/2015), Charcot's joint of foot, left (08/2018), Dyslipidemia, Essential hypertension (2/16/2017), Infected finger joint (2/17/2015), Obese, S/P knee surgery, medial/lateral menisectomy (11/4/2013), and Type 2 diabetes mellitus with diabetic polyneuropathy, with long-term current use of insulin (2003).     9/4/18: Patient presents s/p 1 week left foot first ray arthrodesis (MC and NC joints) secondary to charcot. Patient relates pain is 10/10 controlled at times with PO medications. Pain related to the left shoulder since surgery with weakness to the left arm all since waking up from surgery, Orthopedic consult was placed he has not gotten an appointment with them yet.    PCP: Juanito Hilton MD    Date Last Seen by PCP: 7/3/18    Current shoe gear:  Affected Foot: posterior splint     Unaffected Foot: Tennis shoes    History of Trauma: negative  Sign of Infection: Redness and swelling/warmth    Hemoglobin A1C   Date Value Ref Range Status   08/27/2018 8.1 (H) 0.0 - 5.6 % Final     Comment:     Reference Interval:  5.0 - 5.6 Normal   5.7 - 6.4 High Risk   > 6.5 Diabetic    Hgb A1c results are standardized based on the (NGSP) National   Glycohemoglobin Standardization Program.    Hemoglobin A1C levels are related to mean serum/plasma glucose   during the preceding 2-3 months.        03/08/2018 8.2 (H) 0.0 - 5.6 % Final     Comment:     Reference Interval:  5.0 - 5.6 Normal   5.7 - 6.4 High Risk   > 6.5 Diabetic    Hgb A1c results are standardized based on the (NGSP) National   Glycohemoglobin Standardization Program.    Hemoglobin A1C levels are related  to mean serum/plasma glucose   during the preceding 2-3 months.        01/10/2018 8.7 (H) 0.0 - 5.6 % Final     Comment:     Reference Interval:  5.0 - 5.6 Normal   5.7 - 6.4 High Risk   > 6.5 Diabetic    Hgb A1c results are standardized based on the (NGSP) National   Glycohemoglobin Standardization Program.    Hemoglobin A1C levels are related to mean serum/plasma glucose   during the preceding 2-3 months.            Review of Systems   Constitution: Negative for chills and fever.   Cardiovascular: Positive for leg swelling. Negative for claudication.   Respiratory: Negative for shortness of breath.    Skin: Positive for color change, nail changes and poor wound healing. Negative for itching and rash.   Musculoskeletal: Negative for muscle cramps, muscle weakness and myalgias.   Gastrointestinal: Negative for nausea and vomiting.   Neurological: Positive for numbness and paresthesias. Negative for focal weakness and loss of balance.           Objective:      Physical Exam   Constitutional: He is oriented to person, place, and time. He appears well-developed and well-nourished. No distress.   Cardiovascular:   Pulses:       Dorsalis pedis pulses are 2+ on the right side, and 2+ on the left side.        Posterior tibial pulses are 2+ on the right side, and 2+ on the left side.   < 3 sec capillary refill time to toes 1-5 bilateral. Toes and feet are warm to touch proximally with normal distal cooling b/l. There is no hair growth on the feet and toes b/l. There is moderate edema left foot and mild edema right.      Musculoskeletal:   Left second toe amputation    Left foot now more narrow in appearance with the first ray properly reduced, normal post op edema and tenderness.    Equinus noted b/l ankles with < 10 deg DF noted. MMT 5/5 in DF/PF/Inv/Ev resistance with no reproduction of pain in any direction. Passive range of motion of ankle and pedal joints is painless b/l.     Feet:   Right Foot:   Protective Sensation:  10 sites tested. 0 sites sensed.   Left Foot:   Protective Sensation: 10 sites tested. 0 sites sensed.   Neurological: He is alert and oriented to person, place, and time. He has normal strength. He displays no atrophy and no tremor. A sensory deficit is present. He exhibits normal muscle tone.   Negative tinel sign bilateral.   Skin: Skin is warm. No abrasion, no bruising, no burn, no ecchymosis, no laceration, no lesion, no petechiae and no rash noted. He is not diaphoretic. No cyanosis or erythema. No pallor. Nails show no clubbing.   Incision overlying the left second metatarsal base and the medial foot are both well coapted and sutures intact. Minimal drainage noted, minimal erythema.        Psychiatric: He has a normal mood and affect. His behavior is normal.             Assessment:       Encounter Diagnosis   Name Primary?    Post-operative state Yes            Plan:       Virgilio was seen today for post-op evaluation and diabetes mellitus.    Diagnoses and all orders for this visit:    Post-operative state    Other orders  -     sulfamethoxazole-trimethoprim 800-160mg (BACTRIM DS) 800-160 mg Tab; Take 1 tablet by mouth 2 (two) times daily. for 7 days      I counseled the patient on his conditions, their implications and medical management.    Discussed importance of healthy diet and weight loss as to his diabetes control and healing potential.     Wounds dressed in xeroform, mepilex foam, and cast padding, non weight bearing     Short leg cast was placed with padding and 4 rolls of fiberglass cast with the ankle in neutral position.    Started on bactrim due to the minor erythema with underlying hardware will be cautious and begin antibiotics right away.    Return in 1 week for suture removal.    Inocente Smith DPM

## 2018-09-14 ENCOUNTER — TELEPHONE (OUTPATIENT)
Dept: PODIATRY | Facility: CLINIC | Age: 58
End: 2018-09-14

## 2018-09-14 LAB — BACTERIA SPEC AEROBE CULT: NORMAL

## 2018-09-14 NOTE — TELEPHONE ENCOUNTER
----- Message from Casi Thompson sent at 9/14/2018 11:09 AM CDT -----  Contact: patient   Patient returning a missed call. Please advise. Call to pod. No answer.   Call back   Thanks!

## 2018-09-14 NOTE — TELEPHONE ENCOUNTER
Patient was not called by this clinic let him know. He was messaged through [facility]. Message taken care of.

## 2018-09-17 ENCOUNTER — PATIENT MESSAGE (OUTPATIENT)
Dept: PODIATRY | Facility: CLINIC | Age: 58
End: 2018-09-17

## 2018-09-17 ENCOUNTER — OFFICE VISIT (OUTPATIENT)
Dept: PODIATRY | Facility: CLINIC | Age: 58
End: 2018-09-17
Payer: COMMERCIAL

## 2018-09-17 VITALS — BODY MASS INDEX: 41.75 KG/M2 | HEIGHT: 73 IN | WEIGHT: 315 LBS

## 2018-09-17 DIAGNOSIS — T81.31XD DEHISCENCE OF INCISION, SUBSEQUENT ENCOUNTER: ICD-10-CM

## 2018-09-17 DIAGNOSIS — Z98.890 POST-OPERATIVE STATE: Primary | ICD-10-CM

## 2018-09-17 DIAGNOSIS — L97.522 FOOT ULCER WITH FAT LAYER EXPOSED, LEFT: ICD-10-CM

## 2018-09-17 LAB — BACTERIA SPEC ANAEROBE CULT: NORMAL

## 2018-09-17 PROCEDURE — 99024 POSTOP FOLLOW-UP VISIT: CPT | Mod: S$GLB,,, | Performed by: PODIATRIST

## 2018-09-17 PROCEDURE — 99999 PR PBB SHADOW E&M-EST. PATIENT-LVL III: CPT | Mod: PBBFAC,,, | Performed by: PODIATRIST

## 2018-09-17 PROCEDURE — 29445 APPL RIGID TOT CNTC LEG CAST: CPT | Mod: 58,LT,S$GLB, | Performed by: PODIATRIST

## 2018-09-17 NOTE — PROGRESS NOTES
Subjective:      Patient ID: Virgilio Acuña is a 58 y.o. male.    Chief Complaint: Post-op Evaluation (1 week post op left TCC,) and Diabetes Mellitus ( PCP--08/02/2018   A1c-8.1-08/27/2018)    Virgilio is a 58 y.o. male who presents to the clinic for evaluation and treatment of high risk feet. Virgilio has a past medical history of Cellulitis of left index finger (2/16/2015), Charcot's joint of foot, left (08/2018), Dyslipidemia, Essential hypertension (2/16/2017), Infected finger joint (2/17/2015), Obese, S/P knee surgery, medial/lateral menisectomy (11/4/2013), and Type 2 diabetes mellitus with diabetic polyneuropathy, with long-term current use of insulin (2003).     9/4/18: Patient presents s/p 1 week left foot first ray arthrodesis (MC and NC joints) secondary to charcot. Patient relates pain is 10/10 controlled at times with PO medications. Pain related to the left shoulder since surgery with weakness to the left arm all since waking up from surgery, Orthopedic consult was placed he has not gotten an appointment with them yet.    9/10/18: Patient returns s/p week 2 left foot first ray arthrodesis (MC and NC joints) secondary to charcot. Pain somewhat improved but still 8/10. Non weight bearing to the foot although he relates he sometimes puts weight on the heel for transfers.     9/17/18: patient returns s/p week 3 left foot first ray arthrodesis (MC and NC joints) secondary to charcot. Done with the antibiotics, minimal pain to the foot although the shoulder still hurts him quite a bit. No new pedal complaints.     PCP: Juanito Hilton MD    Date Last Seen by PCP: 7/3/18    Current shoe gear:  Affected Foot: Total contact cast     Unaffected Foot: Tennis shoes    History of Trauma: negative      Hemoglobin A1C   Date Value Ref Range Status   08/27/2018 8.1 (H) 0.0 - 5.6 % Final     Comment:     Reference Interval:  5.0 - 5.6 Normal   5.7 - 6.4 High Risk   > 6.5 Diabetic    Hgb A1c results are standardized  based on the (NG[employer]) National   Glycohemoglobin Standardization Program.    Hemoglobin A1C levels are related to mean serum/plasma glucose   during the preceding 2-3 months.        03/08/2018 8.2 (H) 0.0 - 5.6 % Final     Comment:     Reference Interval:  5.0 - 5.6 Normal   5.7 - 6.4 High Risk   > 6.5 Diabetic    Hgb A1c results are standardized based on the (NGSP) National   Glycohemoglobin Standardization Program.    Hemoglobin A1C levels are related to mean serum/plasma glucose   during the preceding 2-3 months.        01/10/2018 8.7 (H) 0.0 - 5.6 % Final     Comment:     Reference Interval:  5.0 - 5.6 Normal   5.7 - 6.4 High Risk   > 6.5 Diabetic    Hgb A1c results are standardized based on the (NGSP) National   Glycohemoglobin Standardization Program.    Hemoglobin A1C levels are related to mean serum/plasma glucose   during the preceding 2-3 months.            Review of Systems   Constitution: Negative for chills and fever.   Cardiovascular: Positive for leg swelling. Negative for claudication.   Respiratory: Negative for shortness of breath.    Skin: Positive for color change, nail changes and poor wound healing. Negative for itching and rash.   Musculoskeletal: Negative for muscle cramps, muscle weakness and myalgias.   Gastrointestinal: Negative for nausea and vomiting.   Neurological: Positive for numbness and paresthesias. Negative for focal weakness and loss of balance.           Objective:      Physical Exam   Constitutional: He is oriented to person, place, and time. He appears well-developed and well-nourished. No distress.   Cardiovascular:   Pulses:       Dorsalis pedis pulses are 2+ on the right side, and 2+ on the left side.        Posterior tibial pulses are 2+ on the right side, and 2+ on the left side.   < 3 sec capillary refill time to toes 1-5 bilateral. Toes and feet are warm to touch proximally with normal distal cooling b/l. There is no hair growth on the feet and toes b/l. There is  moderate edema left foot and mild edema right.      Musculoskeletal:   Left second toe amputation    Left foot now more narrow in appearance with the first ray properly reduced, normal post op edema and tenderness.    Equinus noted b/l ankles with < 10 deg DF noted. MMT 5/5 in DF/PF/Inv/Ev resistance with no reproduction of pain in any direction. Passive range of motion of ankle and pedal joints is painless b/l.     Feet:   Right Foot:   Protective Sensation: 10 sites tested. 0 sites sensed.   Left Foot:   Protective Sensation: 10 sites tested. 0 sites sensed.   Neurological: He is alert and oriented to person, place, and time. He has normal strength. He displays no atrophy and no tremor. A sensory deficit is present. He exhibits normal muscle tone.   Negative tinel sign bilateral.   Skin: Skin is warm. No abrasion, no bruising, no burn, no ecchymosis, no laceration, no lesion, no petechiae and no rash noted. He is not diaphoretic. There is erythema. No cyanosis. No pallor. Nails show no clubbing.   Incision overlying the left second metatarsal base is well healed    Incision overlying the first ray there is dehiscence noted along the mid incision about 1 cm in length, this probes proximal about 2 cm. Does not probe to bone or to hardware, no exposed hardware noted. There is serous drainage and amita wound erythema is now fully resolved.        Psychiatric: He has a normal mood and affect. His behavior is normal.             Assessment:       Encounter Diagnoses   Name Primary?    Post-operative state Yes    Dehiscence of incision, subsequent encounter     Foot ulcer with fat layer exposed, left             Plan:       Virgilio was seen today for post-op evaluation and diabetes mellitus.    Diagnoses and all orders for this visit:    Post-operative state    Dehiscence of incision, subsequent encounter    Foot ulcer with fat layer exposed, left      I counseled the patient on his conditions, their implications and  medical management.    Discussed importance of healthy diet and weight loss as to his diabetes control and healing potential.     Open wound cleansed with sterile saline. Packed with aquacel Ag and covered with wound foam.    Cultures taken last week were negative, no longer on antibiotics, will watch closely for recurrent signs of infection.    Due to his putting some weight on for transfers, he noted this would likely not change, I placed his again in a total contact cast using the True Kast system with the ankle in neutral position. Non weight bearing mostly with knee roller    Return in 1 week for wound check and cast change and x-rays    Inocente Smith DPM

## 2018-09-17 NOTE — PROGRESS NOTES
Subjective:      Patient ID: Virgilio Acuña is a 58 y.o. male.    Chief Complaint: Post-op Evaluation (1 wk post ck - left - surgery 8/27) and Diabetes Mellitus (PCP:  Dr Hilton  8/2/18; HgbA1c: 8/27/18  8.1)    Virgilio is a 58 y.o. male who presents to the clinic for evaluation and treatment of high risk feet. Virgilio has a past medical history of Cellulitis of left index finger (2/16/2015), Charcot's joint of foot, left (08/2018), Dyslipidemia, Essential hypertension (2/16/2017), Infected finger joint (2/17/2015), Obese, S/P knee surgery, medial/lateral menisectomy (11/4/2013), and Type 2 diabetes mellitus with diabetic polyneuropathy, with long-term current use of insulin (2003).     9/4/18: Patient presents s/p 1 week left foot first ray arthrodesis (MC and NC joints) secondary to charcot. Patient relates pain is 10/10 controlled at times with PO medications. Pain related to the left shoulder since surgery with weakness to the left arm all since waking up from surgery, Orthopedic consult was placed he has not gotten an appointment with them yet.    9/10/18: Patient returns s/p week 2 left foot first ray arthrodesis (MC and NC joints) secondary to charcot. Pain somewhat improved but still 8/10. Non weight bearing to the foot although he relates he sometimes puts weight on the heel for transfers.     PCP: Juanito Hilton MD    Date Last Seen by PCP: 7/3/18    Current shoe gear:  Affected Foot: short leg cast below knee     Unaffected Foot: Tennis shoes    History of Trauma: negative  Sign of Infection: Redness and swelling/warmth    Hemoglobin A1C   Date Value Ref Range Status   08/27/2018 8.1 (H) 0.0 - 5.6 % Final     Comment:     Reference Interval:  5.0 - 5.6 Normal   5.7 - 6.4 High Risk   > 6.5 Diabetic    Hgb A1c results are standardized based on the (NGSP) National   Glycohemoglobin Standardization Program.    Hemoglobin A1C levels are related to mean serum/plasma glucose   during the preceding 2-3 months.         03/08/2018 8.2 (H) 0.0 - 5.6 % Final     Comment:     Reference Interval:  5.0 - 5.6 Normal   5.7 - 6.4 High Risk   > 6.5 Diabetic    Hgb A1c results are standardized based on the (NGSP) National   Glycohemoglobin Standardization Program.    Hemoglobin A1C levels are related to mean serum/plasma glucose   during the preceding 2-3 months.        01/10/2018 8.7 (H) 0.0 - 5.6 % Final     Comment:     Reference Interval:  5.0 - 5.6 Normal   5.7 - 6.4 High Risk   > 6.5 Diabetic    Hgb A1c results are standardized based on the (NGSP) National   Glycohemoglobin Standardization Program.    Hemoglobin A1C levels are related to mean serum/plasma glucose   during the preceding 2-3 months.            Review of Systems   Constitution: Negative for chills and fever.   Cardiovascular: Positive for leg swelling. Negative for claudication.   Respiratory: Negative for shortness of breath.    Skin: Positive for color change, nail changes and poor wound healing. Negative for itching and rash.   Musculoskeletal: Negative for muscle cramps, muscle weakness and myalgias.   Gastrointestinal: Negative for nausea and vomiting.   Neurological: Positive for numbness and paresthesias. Negative for focal weakness and loss of balance.           Objective:      Physical Exam   Constitutional: He is oriented to person, place, and time. He appears well-developed and well-nourished. No distress.   Cardiovascular:   Pulses:       Dorsalis pedis pulses are 2+ on the right side, and 2+ on the left side.        Posterior tibial pulses are 2+ on the right side, and 2+ on the left side.   < 3 sec capillary refill time to toes 1-5 bilateral. Toes and feet are warm to touch proximally with normal distal cooling b/l. There is no hair growth on the feet and toes b/l. There is moderate edema left foot and mild edema right.      Musculoskeletal:   Left second toe amputation    Left foot now more narrow in appearance with the first ray properly reduced, normal  post op edema and tenderness.    Equinus noted b/l ankles with < 10 deg DF noted. MMT 5/5 in DF/PF/Inv/Ev resistance with no reproduction of pain in any direction. Passive range of motion of ankle and pedal joints is painless b/l.     Feet:   Right Foot:   Protective Sensation: 10 sites tested. 0 sites sensed.   Left Foot:   Protective Sensation: 10 sites tested. 0 sites sensed.   Neurological: He is alert and oriented to person, place, and time. He has normal strength. He displays no atrophy and no tremor. A sensory deficit is present. He exhibits normal muscle tone.   Negative tinel sign bilateral.   Skin: Skin is warm. No abrasion, no bruising, no burn, no ecchymosis, no laceration, no lesion, no petechiae and no rash noted. He is not diaphoretic. There is erythema. No cyanosis. No pallor. Nails show no clubbing.   Incision overlying the left second metatarsal baseis well coapted and sutures intact. No drainage noted, minimal erythema.     Incision overlying the first ray there is dehiscence noted along the mid incision about 1 cm in length, this probes proximal about 2 cm. Does not probe to bone or to hardware, no exposed hardware noted. There is serous drainage and mild amita wound erythema.        Psychiatric: He has a normal mood and affect. His behavior is normal.             Assessment:       Encounter Diagnosis   Name Primary?    Post-operative state Yes            Plan:       Virgilio was seen today for post-op evaluation and diabetes mellitus.    Diagnoses and all orders for this visit:    Post-operative state  -     Aerobic culture  -     Culture, Anaerobic    Other orders  -     oxyCODONE-acetaminophen (PERCOCET) 5-325 mg per tablet; Take 1 tablet by mouth every 4 (four) hours as needed for Pain.      I counseled the patient on his conditions, their implications and medical management.    Discussed importance of healthy diet and weight loss as to his diabetes control and healing potential.     Sutures  removed and cultures taken of drainage and wound, open wound cleansed with sterile saline. Dressed with mepilex Ag.    Due to his putting some weight on for transfers, he noted this would likely not change, I placed a total contact cast using the True Kast system with the ankle in neutral position.    Continue on Bactrim until the full 10 days is done.    Return in 1 week for wound check and cast change, I will follow the cultures closely and change antibiotics if needed.    NATALIA HassanM

## 2018-09-23 ENCOUNTER — PATIENT MESSAGE (OUTPATIENT)
Dept: PODIATRY | Facility: CLINIC | Age: 58
End: 2018-09-23

## 2018-09-24 ENCOUNTER — HOSPITAL ENCOUNTER (OUTPATIENT)
Dept: RADIOLOGY | Facility: HOSPITAL | Age: 58
Discharge: HOME | End: 2018-09-24
Attending: PODIATRIST
Payer: COMMERCIAL

## 2018-09-24 ENCOUNTER — OFFICE VISIT (OUTPATIENT)
Dept: PODIATRY | Facility: CLINIC | Age: 58
End: 2018-09-24
Payer: COMMERCIAL

## 2018-09-24 VITALS — HEIGHT: 73 IN | BODY MASS INDEX: 41.75 KG/M2 | WEIGHT: 315 LBS

## 2018-09-24 DIAGNOSIS — Z98.890 POST-OPERATIVE STATE: Primary | ICD-10-CM

## 2018-09-24 DIAGNOSIS — Z98.890 POST-OPERATIVE STATE: ICD-10-CM

## 2018-09-24 DIAGNOSIS — M14.672 CHARCOT'S JOINT OF LEFT FOOT: ICD-10-CM

## 2018-09-24 DIAGNOSIS — E11.49 TYPE II DIABETES MELLITUS WITH NEUROLOGICAL MANIFESTATIONS: ICD-10-CM

## 2018-09-24 DIAGNOSIS — T81.31XD DEHISCENCE OF INCISION, SUBSEQUENT ENCOUNTER: ICD-10-CM

## 2018-09-24 PROCEDURE — 99999 PR PBB SHADOW E&M-EST. PATIENT-LVL II: CPT | Mod: PBBFAC,,, | Performed by: PODIATRIST

## 2018-09-24 PROCEDURE — 99024 POSTOP FOLLOW-UP VISIT: CPT | Mod: S$GLB,,, | Performed by: PODIATRIST

## 2018-09-24 PROCEDURE — 73630 X-RAY EXAM OF FOOT: CPT | Mod: TC,PO,LT

## 2018-09-24 PROCEDURE — 29445 APPL RIGID TOT CNTC LEG CAST: CPT | Mod: 58,LT,S$GLB, | Performed by: PODIATRIST

## 2018-09-24 PROCEDURE — 73630 X-RAY EXAM OF FOOT: CPT | Mod: 26,LT,, | Performed by: RADIOLOGY

## 2018-09-24 NOTE — PROGRESS NOTES
Subjective:      Patient ID: Virgilio Acuña is a 58 y.o. male.    Chief Complaint: Post-op Evaluation (1 wk post ck - left foot - TCC) and Diabetes Mellitus (PCP:  Dr Hilton 8/2/18; HgbA1c: 8/2/18  8.1)    Virgilio is a 58 y.o. male who presents to the clinic for evaluation and treatment of high risk feet. Virgilio has a past medical history of Cellulitis of left index finger (2/16/2015), Charcot's joint of foot, left (08/2018), Dyslipidemia, Essential hypertension (2/16/2017), Infected finger joint (2/17/2015), Obese, S/P knee surgery, medial/lateral menisectomy (11/4/2013), and Type 2 diabetes mellitus with diabetic polyneuropathy, with long-term current use of insulin (2003).     9/4/18: Patient presents s/p 1 week left foot first ray arthrodesis (MC and NC joints) secondary to charcot. Patient relates pain is 10/10 controlled at times with PO medications. Pain related to the left shoulder since surgery with weakness to the left arm all since waking up from surgery, Orthopedic consult was placed he has not gotten an appointment with them yet.    9/10/18: Patient returns s/p week 2 left foot first ray arthrodesis (MC and NC joints) secondary to charcot. Pain somewhat improved but still 8/10. Non weight bearing to the foot although he relates he sometimes puts weight on the heel for transfers.     9/17/18: patient returns s/p week 3 left foot first ray arthrodesis (MC and NC joints) secondary to charcot. Done with the antibiotics, minimal pain to the foot although the shoulder still hurts him quite a bit. No new pedal complaints.     9/24/18: Patient returns s/p week 4 left foot first ray arthrodesis (MC and NC joints) secondary to charcot for dehiscence wound check and cast change.    PCP: Juanito Hilton MD    Date Last Seen by PCP: 7/3/18    Current shoe gear:  Affected Foot: Total contact cast     Unaffected Foot: Tennis shoes    History of Trauma: negative      Hemoglobin A1C   Date Value Ref Range Status   08/27/2018  8.1 (H) 0.0 - 5.6 % Final     Comment:     Reference Interval:  5.0 - 5.6 Normal   5.7 - 6.4 High Risk   > 6.5 Diabetic    Hgb A1c results are standardized based on the (NGSP) National   Glycohemoglobin Standardization Program.    Hemoglobin A1C levels are related to mean serum/plasma glucose   during the preceding 2-3 months.        03/08/2018 8.2 (H) 0.0 - 5.6 % Final     Comment:     Reference Interval:  5.0 - 5.6 Normal   5.7 - 6.4 High Risk   > 6.5 Diabetic    Hgb A1c results are standardized based on the (NGSP) National   Glycohemoglobin Standardization Program.    Hemoglobin A1C levels are related to mean serum/plasma glucose   during the preceding 2-3 months.        01/10/2018 8.7 (H) 0.0 - 5.6 % Final     Comment:     Reference Interval:  5.0 - 5.6 Normal   5.7 - 6.4 High Risk   > 6.5 Diabetic    Hgb A1c results are standardized based on the (NGSP) National   Glycohemoglobin Standardization Program.    Hemoglobin A1C levels are related to mean serum/plasma glucose   during the preceding 2-3 months.            Review of Systems   Constitution: Negative for chills and fever.   Cardiovascular: Positive for leg swelling. Negative for claudication.   Respiratory: Negative for shortness of breath.    Skin: Positive for color change, nail changes and poor wound healing. Negative for itching and rash.   Musculoskeletal: Negative for muscle cramps, muscle weakness and myalgias.   Gastrointestinal: Negative for nausea and vomiting.   Neurological: Positive for numbness and paresthesias. Negative for focal weakness and loss of balance.           Objective:      Physical Exam   Constitutional: He is oriented to person, place, and time. He appears well-developed and well-nourished. No distress.   Cardiovascular:   Pulses:       Dorsalis pedis pulses are 2+ on the right side, and 2+ on the left side.        Posterior tibial pulses are 2+ on the right side, and 2+ on the left side.   < 3 sec capillary refill time to toes  1-5 bilateral. Toes and feet are warm to touch proximally with normal distal cooling b/l. There is no hair growth on the feet and toes b/l. There is moderate edema left foot and mild edema right.      Musculoskeletal:   Left second toe amputation    Left foot now more narrow in appearance with the first ray properly reduced, normal post op edema and tenderness.    Equinus noted b/l ankles with < 10 deg DF noted. MMT 5/5 in DF/PF/Inv/Ev resistance with no reproduction of pain in any direction. Passive range of motion of ankle and pedal joints is painless b/l.     Feet:   Right Foot:   Protective Sensation: 10 sites tested. 0 sites sensed.   Left Foot:   Protective Sensation: 10 sites tested. 0 sites sensed.   Neurological: He is alert and oriented to person, place, and time. He has normal strength. He displays no atrophy and no tremor. A sensory deficit is present. He exhibits normal muscle tone.   Negative tinel sign bilateral.   Skin: Skin is warm. No abrasion, no bruising, no burn, no ecchymosis, no laceration, no lesion, no petechiae and no rash noted. He is not diaphoretic. No cyanosis or erythema. No pallor. Nails show no clubbing.   Incision overlying the left second metatarsal base is well healed    Incision overlying the first ray there is dehiscence noted along the mid incision about 1 cm in length, this probes proximal about 2 cm. Does not probe to bone or to hardware, no exposed hardware noted. There is mild serous drainage and amita wound erythema is now fully resolved. Base is 100% granular       Psychiatric: He has a normal mood and affect. His behavior is normal.             Assessment:       Encounter Diagnoses   Name Primary?    Post-operative state Yes    Charcot's joint of left foot     Dehiscence of incision, subsequent encounter     Type II diabetes mellitus with neurological manifestations             Plan:       Virgilio was seen today for post-op evaluation and diabetes mellitus.    Diagnoses  and all orders for this visit:    Post-operative state    Charcot's joint of left foot    Dehiscence of incision, subsequent encounter    Type II diabetes mellitus with neurological manifestations      I counseled the patient on his conditions, their implications and medical management.    Discussed importance of healthy diet and weight loss as to his diabetes control and healing potential.     Open wound cleansed with sterile saline. Packed with aquacel Ag and covered with wound foam.    Cultures taken last week were negative, no longer on antibiotics, will watch closely for recurrent signs of infection.    Due to his putting some weight on for transfers, he noted this would likely not change, I placed his again in a total contact cast using the True Kast system with the ankle in neutral position. Non weight bearing mostly with knee roller    X-rays reviewed with patient     Return in 1 week for wound check and cast change     Inocente Smith DPM

## 2018-10-01 ENCOUNTER — PATIENT MESSAGE (OUTPATIENT)
Dept: PODIATRY | Facility: CLINIC | Age: 58
End: 2018-10-01

## 2018-10-01 ENCOUNTER — OFFICE VISIT (OUTPATIENT)
Dept: PODIATRY | Facility: CLINIC | Age: 58
End: 2018-10-01
Payer: COMMERCIAL

## 2018-10-01 VITALS
HEIGHT: 73 IN | DIASTOLIC BLOOD PRESSURE: 86 MMHG | BODY MASS INDEX: 41.75 KG/M2 | SYSTOLIC BLOOD PRESSURE: 150 MMHG | HEART RATE: 80 BPM | WEIGHT: 315 LBS

## 2018-10-01 DIAGNOSIS — Z98.890 POST-OPERATIVE STATE: Primary | ICD-10-CM

## 2018-10-01 DIAGNOSIS — T81.31XD DEHISCENCE OF INCISION, SUBSEQUENT ENCOUNTER: ICD-10-CM

## 2018-10-01 DIAGNOSIS — M14.672 CHARCOT'S JOINT OF FOOT, LEFT: ICD-10-CM

## 2018-10-01 DIAGNOSIS — E11.49 TYPE II DIABETES MELLITUS WITH NEUROLOGICAL MANIFESTATIONS: ICD-10-CM

## 2018-10-01 PROCEDURE — 29445 APPL RIGID TOT CNTC LEG CAST: CPT | Mod: 58,LT,S$GLB, | Performed by: PODIATRIST

## 2018-10-01 PROCEDURE — 99999 PR PBB SHADOW E&M-EST. PATIENT-LVL III: CPT | Mod: PBBFAC,,, | Performed by: PODIATRIST

## 2018-10-01 PROCEDURE — 99024 POSTOP FOLLOW-UP VISIT: CPT | Mod: S$GLB,,, | Performed by: PODIATRIST

## 2018-10-01 NOTE — PROGRESS NOTES
Subjective:      Patient ID: Virgilio Acuña is a 58 y.o. male.    Chief Complaint: Post-op Evaluation (1 wk - TCC) and Diabetes Mellitus (PCP:  Dr Hilton 8/2/18; HgbA1c: 8/27/18  8.1)    Virgilio is a 58 y.o. male who presents to the clinic for evaluation and treatment of high risk feet. Virgilio has a past medical history of Cellulitis of left index finger (2/16/2015), Charcot's joint of foot, left (08/2018), Dyslipidemia, Essential hypertension (2/16/2017), Infected finger joint (2/17/2015), Obese, S/P knee surgery, medial/lateral menisectomy (11/4/2013), and Type 2 diabetes mellitus with diabetic polyneuropathy, with long-term current use of insulin (2003).     9/4/18: Patient presents s/p 1 week left foot first ray arthrodesis (MC and NC joints) secondary to charcot. Patient relates pain is 10/10 controlled at times with PO medications. Pain related to the left shoulder since surgery with weakness to the left arm all since waking up from surgery, Orthopedic consult was placed he has not gotten an appointment with them yet.    9/10/18: Patient returns s/p week 2 left foot first ray arthrodesis (MC and NC joints) secondary to charcot. Pain somewhat improved but still 8/10. Non weight bearing to the foot although he relates he sometimes puts weight on the heel for transfers.     9/17/18: patient returns s/p week 3 left foot first ray arthrodesis (MC and NC joints) secondary to charcot. Done with the antibiotics, minimal pain to the foot although the shoulder still hurts him quite a bit. No new pedal complaints.     9/24/18: Patient returns s/p week 4 left foot first ray arthrodesis (MC and NC joints) secondary to charcot for dehiscence wound check and cast change.    10/1/18: Patient presents for follow up s/p 5 weeks first ray arthrodesis, in [facility] non weight bearing. Here for wound check and cast change. No new pedal complaints.     PCP: Juanito Hilton MD    Date Last Seen by PCP: 7/3/18    Current shoe gear:  Affected  Foot: Total contact cast     Unaffected Foot: Tennis shoes    History of Trauma: negative      Hemoglobin A1C   Date Value Ref Range Status   08/27/2018 8.1 (H) 0.0 - 5.6 % Final     Comment:     Reference Interval:  5.0 - 5.6 Normal   5.7 - 6.4 High Risk   > 6.5 Diabetic    Hgb A1c results are standardized based on the (NGSP) National   Glycohemoglobin Standardization Program.    Hemoglobin A1C levels are related to mean serum/plasma glucose   during the preceding 2-3 months.        03/08/2018 8.2 (H) 0.0 - 5.6 % Final     Comment:     Reference Interval:  5.0 - 5.6 Normal   5.7 - 6.4 High Risk   > 6.5 Diabetic    Hgb A1c results are standardized based on the (NGSP) National   Glycohemoglobin Standardization Program.    Hemoglobin A1C levels are related to mean serum/plasma glucose   during the preceding 2-3 months.        01/10/2018 8.7 (H) 0.0 - 5.6 % Final     Comment:     Reference Interval:  5.0 - 5.6 Normal   5.7 - 6.4 High Risk   > 6.5 Diabetic    Hgb A1c results are standardized based on the (NGSP) National   Glycohemoglobin Standardization Program.    Hemoglobin A1C levels are related to mean serum/plasma glucose   during the preceding 2-3 months.            Review of Systems   Constitution: Negative for chills and fever.   Cardiovascular: Positive for leg swelling. Negative for claudication.   Respiratory: Negative for shortness of breath.    Skin: Positive for color change, nail changes and poor wound healing. Negative for itching and rash.   Musculoskeletal: Negative for muscle cramps, muscle weakness and myalgias.   Gastrointestinal: Negative for nausea and vomiting.   Neurological: Positive for numbness and paresthesias. Negative for focal weakness and loss of balance.           Objective:      Physical Exam   Constitutional: He is oriented to person, place, and time. He appears well-developed and well-nourished. No distress.   Cardiovascular:   Pulses:       Dorsalis pedis pulses are 2+ on the right  side, and 2+ on the left side.        Posterior tibial pulses are 2+ on the right side, and 2+ on the left side.   < 3 sec capillary refill time to toes 1-5 bilateral. Toes and feet are warm to touch proximally with normal distal cooling b/l. There is no hair growth on the feet and toes b/l. There is moderate edema left foot and mild edema right.      Musculoskeletal:   Left second toe amputation    Left foot now more narrow in appearance with the first ray properly reduced, normal post op edema and tenderness.    Equinus noted b/l ankles with < 10 deg DF noted. MMT 5/5 in DF/PF/Inv/Ev resistance with no reproduction of pain in any direction. Passive range of motion of ankle and pedal joints is painless b/l.     Feet:   Right Foot:   Protective Sensation: 10 sites tested. 0 sites sensed.   Left Foot:   Protective Sensation: 10 sites tested. 0 sites sensed.   Neurological: He is alert and oriented to person, place, and time. He has normal strength. He displays no atrophy and no tremor. A sensory deficit is present. He exhibits normal muscle tone.   Negative tinel sign bilateral.   Skin: Skin is warm. No abrasion, no bruising, no burn, no ecchymosis, no laceration, no lesion, no petechiae and no rash noted. He is not diaphoretic. No cyanosis or erythema. No pallor. Nails show no clubbing.   Incision overlying the left second metatarsal base is well healed    Incision overlying the first ray there is dehiscence noted along the mid incision about 1 cm in length, this probes proximal about 1.4 cm and measures 1.0x0.5 xm. Does not probe to bone or to hardware, no exposed hardware noted. There is mild serous drainage and amita wound erythema is now fully resolved. Base is 100% granular       Psychiatric: He has a normal mood and affect. His behavior is normal.             Assessment:       Encounter Diagnoses   Name Primary?    Post-operative state Yes    Type II diabetes mellitus with neurological manifestations      Charcot's joint of foot, left     Dehiscence of incision, subsequent encounter             Plan:       Virgilio was seen today for post-op evaluation and diabetes mellitus.    Diagnoses and all orders for this visit:    Post-operative state    Type II diabetes mellitus with neurological manifestations    Charcot's joint of foot, left    Dehiscence of incision, subsequent encounter      I counseled the patient on his conditions, their implications and medical management.    Discussed importance of healthy diet and weight loss as to his diabetes control and healing potential.     Open wound cleansed with sterile saline. Packed with aquacel Ag and covered with wound foam.    Cultures taken last week were negative, no longer on antibiotics, will watch closely for recurrent signs of infection.    Due to his putting some weight on for transfers, he noted this would likely not change, I placed his again in a total contact cast using the [employer] system with the ankle in neutral position. Non weight bearing mostly with knee roller    Return in 1 week for wound check and cast change     Inocente Smith DPM

## 2018-10-10 ENCOUNTER — OFFICE VISIT (OUTPATIENT)
Dept: PODIATRY | Facility: CLINIC | Age: 58
End: 2018-10-10
Payer: COMMERCIAL

## 2018-10-10 VITALS
SYSTOLIC BLOOD PRESSURE: 138 MMHG | HEIGHT: 73 IN | BODY MASS INDEX: 41.75 KG/M2 | HEART RATE: 97 BPM | RESPIRATION RATE: 13 BRPM | DIASTOLIC BLOOD PRESSURE: 83 MMHG | WEIGHT: 315 LBS

## 2018-10-10 DIAGNOSIS — M14.672 CHARCOT'S JOINT OF LEFT FOOT: ICD-10-CM

## 2018-10-10 DIAGNOSIS — T81.31XD DEHISCENCE OF INCISION, SUBSEQUENT ENCOUNTER: ICD-10-CM

## 2018-10-10 DIAGNOSIS — Z98.890 POST-OPERATIVE STATE: Primary | ICD-10-CM

## 2018-10-10 DIAGNOSIS — E11.49 TYPE II DIABETES MELLITUS WITH NEUROLOGICAL MANIFESTATIONS: ICD-10-CM

## 2018-10-10 PROCEDURE — 29445 APPL RIGID TOT CNTC LEG CAST: CPT | Mod: 58,LT,S$GLB, | Performed by: PODIATRIST

## 2018-10-10 PROCEDURE — 99024 POSTOP FOLLOW-UP VISIT: CPT | Mod: S$GLB,,, | Performed by: PODIATRIST

## 2018-10-10 PROCEDURE — 99999 PR PBB SHADOW E&M-EST. PATIENT-LVL III: CPT | Mod: PBBFAC,,, | Performed by: PODIATRIST

## 2018-10-10 NOTE — PROGRESS NOTES
Subjective:      Patient ID: Virgilio Acuña is a 58 y.o. male.    Chief Complaint: No chief complaint on file.    Virgilio is a 58 y.o. male who presents to the clinic for evaluation and treatment of high risk feet. Virgilio has a past medical history of Cellulitis of left index finger (2/16/2015), Charcot's joint of foot, left (08/2018), Dyslipidemia, Essential hypertension (2/16/2017), Infected finger joint (2/17/2015), Obese, S/P knee surgery, medial/lateral menisectomy (11/4/2013), and Type 2 diabetes mellitus with diabetic polyneuropathy, with long-term current use of insulin (2003).     9/4/18: Patient presents s/p 1 week left foot first ray arthrodesis (MC and NC joints) secondary to charcot. Patient relates pain is 10/10 controlled at times with PO medications. Pain related to the left shoulder since surgery with weakness to the left arm all since waking up from surgery, Orthopedic consult was placed he has not gotten an appointment with them yet.    9/10/18: Patient returns s/p week 2 left foot first ray arthrodesis (MC and NC joints) secondary to charcot. Pain somewhat improved but still 8/10. Non weight bearing to the foot although he relates he sometimes puts weight on the heel for transfers.     9/17/18: patient returns s/p week 3 left foot first ray arthrodesis (MC and NC joints) secondary to charcot. Done with the antibiotics, minimal pain to the foot although the shoulder still hurts him quite a bit. No new pedal complaints.     9/24/18: Patient returns s/p week 4 left foot first ray arthrodesis (MC and NC joints) secondary to charcot for dehiscence wound check and cast change.    10/1/18: Patient presents for follow up s/p 5 weeks first ray arthrodesis, in [facility] non weight bearing. Here for wound check and cast change. No new pedal complaints.     10/10/18: Patient presents for follow up s/p 6 weeks first ray arthrodesis in [facility] non weight bearing. Here for wound check and cast change again. No new  complaints.     PCP: Juanito Hilton MD    Date Last Seen by PCP: 7/3/18    Current shoe gear:  Affected Foot: Total contact cast     Unaffected Foot: Tennis shoes    History of Trauma: negative      Hemoglobin A1C   Date Value Ref Range Status   08/27/2018 8.1 (H) 0.0 - 5.6 % Final     Comment:     Reference Interval:  5.0 - 5.6 Normal   5.7 - 6.4 High Risk   > 6.5 Diabetic    Hgb A1c results are standardized based on the (NGSP) National   Glycohemoglobin Standardization Program.    Hemoglobin A1C levels are related to mean serum/plasma glucose   during the preceding 2-3 months.        03/08/2018 8.2 (H) 0.0 - 5.6 % Final     Comment:     Reference Interval:  5.0 - 5.6 Normal   5.7 - 6.4 High Risk   > 6.5 Diabetic    Hgb A1c results are standardized based on the (NGSP) National   Glycohemoglobin Standardization Program.    Hemoglobin A1C levels are related to mean serum/plasma glucose   during the preceding 2-3 months.        01/10/2018 8.7 (H) 0.0 - 5.6 % Final     Comment:     Reference Interval:  5.0 - 5.6 Normal   5.7 - 6.4 High Risk   > 6.5 Diabetic    Hgb A1c results are standardized based on the (NGSP) National   Glycohemoglobin Standardization Program.    Hemoglobin A1C levels are related to mean serum/plasma glucose   during the preceding 2-3 months.            Review of Systems   Constitution: Negative for chills and fever.   Cardiovascular: Positive for leg swelling. Negative for claudication.   Respiratory: Negative for shortness of breath.    Skin: Positive for color change, nail changes and poor wound healing. Negative for itching and rash.   Musculoskeletal: Negative for muscle cramps, muscle weakness and myalgias.   Gastrointestinal: Negative for nausea and vomiting.   Neurological: Positive for numbness and paresthesias. Negative for focal weakness and loss of balance.           Objective:      Physical Exam   Constitutional: He is oriented to person, place, and time. He appears well-developed and  well-nourished. No distress.   Cardiovascular:   Pulses:       Dorsalis pedis pulses are 2+ on the right side, and 2+ on the left side.        Posterior tibial pulses are 2+ on the right side, and 2+ on the left side.   < 3 sec capillary refill time to toes 1-5 bilateral. Toes and feet are warm to touch proximally with normal distal cooling b/l. There is no hair growth on the feet and toes b/l. There is moderate edema left foot and mild edema right.      Musculoskeletal:   Left second toe amputation    Left foot now more narrow in appearance with the first ray properly reduced, normal post op edema and tenderness.    Equinus noted b/l ankles with < 10 deg DF noted. MMT 5/5 in DF/PF/Inv/Ev resistance with no reproduction of pain in any direction. Passive range of motion of ankle and pedal joints is painless b/l.     Feet:   Right Foot:   Protective Sensation: 10 sites tested. 0 sites sensed.   Left Foot:   Protective Sensation: 10 sites tested. 0 sites sensed.   Neurological: He is alert and oriented to person, place, and time. He has normal strength. He displays no atrophy and no tremor. A sensory deficit is present. He exhibits normal muscle tone.   Negative tinel sign bilateral.   Skin: Skin is warm. No abrasion, no bruising, no burn, no ecchymosis, no laceration, no lesion, no petechiae and no rash noted. He is not diaphoretic. No cyanosis or erythema. No pallor. Nails show no clubbing.   Incision overlying the left second metatarsal base is well healed    Incision overlying the first ray there is dehiscence noted along the mid incision about 1 cm in length, this probes proximal about 1.0 cm and measures 0.9x0.5 xm. Does not probe to bone or to hardware, no exposed hardware noted. There is mild serous drainage and amita wound erythema is now fully resolved. Base is 100% granular       Psychiatric: He has a normal mood and affect. His behavior is normal.             Assessment:       Encounter Diagnoses   Name  Primary?    Post-operative state Yes    Type II diabetes mellitus with neurological manifestations     Dehiscence of incision, subsequent encounter     Charcot's joint of left foot             Plan:       Diagnoses and all orders for this visit:    Post-operative state    Type II diabetes mellitus with neurological manifestations    Dehiscence of incision, subsequent encounter    Charcot's joint of left foot      I counseled the patient on his conditions, their implications and medical management.    Discussed importance of healthy diet and weight loss as to his diabetes control and healing potential.     Open wound cleansed with sterile saline. Packed with anum and covered with wound foam.    Cultures taken previously were negative    Total contact cast was applied using the [employer] system, he can begin weight bearing this week in the cast.    X-rays next week and he can begin walking in the boot pending x-ray results.     Return in 1 week for wound check and cast change     Inocente Smith DPM

## 2018-10-17 ENCOUNTER — HOSPITAL ENCOUNTER (OUTPATIENT)
Dept: RADIOLOGY | Facility: HOSPITAL | Age: 58
Discharge: HOME | End: 2018-10-17
Attending: PODIATRIST
Payer: COMMERCIAL

## 2018-10-17 ENCOUNTER — OFFICE VISIT (OUTPATIENT)
Dept: PODIATRY | Facility: CLINIC | Age: 58
End: 2018-10-17
Payer: COMMERCIAL

## 2018-10-17 VITALS
DIASTOLIC BLOOD PRESSURE: 88 MMHG | SYSTOLIC BLOOD PRESSURE: 153 MMHG | HEIGHT: 73 IN | HEART RATE: 99 BPM | WEIGHT: 315 LBS | BODY MASS INDEX: 41.75 KG/M2

## 2018-10-17 DIAGNOSIS — Z98.890 POST-OPERATIVE STATE: Primary | ICD-10-CM

## 2018-10-17 DIAGNOSIS — M14.672 CHARCOT'S JOINT OF LEFT FOOT: ICD-10-CM

## 2018-10-17 DIAGNOSIS — Z98.890 POST-OPERATIVE STATE: ICD-10-CM

## 2018-10-17 DIAGNOSIS — E11.49 TYPE II DIABETES MELLITUS WITH NEUROLOGICAL MANIFESTATIONS: ICD-10-CM

## 2018-10-17 DIAGNOSIS — Z89.422 HISTORY OF AMPUTATION OF LESSER TOE, LEFT: ICD-10-CM

## 2018-10-17 DIAGNOSIS — R60.0 EDEMA, LOWER EXTREMITY: ICD-10-CM

## 2018-10-17 DIAGNOSIS — T81.31XD DEHISCENCE OF INCISION, SUBSEQUENT ENCOUNTER: ICD-10-CM

## 2018-10-17 DIAGNOSIS — M14.672 CHARCOT'S JOINT OF FOOT, LEFT: ICD-10-CM

## 2018-10-17 PROCEDURE — 73630 X-RAY EXAM OF FOOT: CPT | Mod: TC,PO,LT

## 2018-10-17 PROCEDURE — 99024 POSTOP FOLLOW-UP VISIT: CPT | Mod: S$GLB,,, | Performed by: PODIATRIST

## 2018-10-17 PROCEDURE — 73630 X-RAY EXAM OF FOOT: CPT | Mod: 26,LT,, | Performed by: RADIOLOGY

## 2018-10-17 PROCEDURE — 99999 PR PBB SHADOW E&M-EST. PATIENT-LVL III: CPT | Mod: PBBFAC,,, | Performed by: PODIATRIST

## 2018-10-17 NOTE — PROGRESS NOTES
Subjective:      Patient ID: Virgilio Acuña is a 58 y.o. male.    Chief Complaint: Post-op Evaluation (1 wk post ck - left - [facility] ) and Diabetes Mellitus (PCP:  Dr Hilton  8/2/18; HgbA1c: 8/27/18  8.1)    Virgilio is a 58 y.o. male who presents to the clinic for evaluation and treatment of high risk feet. Virgilio has a past medical history of Cellulitis of left index finger (2/16/2015), Charcot's joint of foot, left (08/2018), Dyslipidemia, Essential hypertension (2/16/2017), Infected finger joint (2/17/2015), Obese, S/P knee surgery, medial/lateral menisectomy (11/4/2013), and Type 2 diabetes mellitus with diabetic polyneuropathy, with long-term current use of insulin (2003).     9/4/18: Patient presents s/p 1 week left foot first ray arthrodesis (MC and NC joints) secondary to charcot. Patient relates pain is 10/10 controlled at times with PO medications. Pain related to the left shoulder since surgery with weakness to the left arm all since waking up from surgery, Orthopedic consult was placed he has not gotten an appointment with them yet.    9/10/18: Patient returns s/p week 2 left foot first ray arthrodesis (MC and NC joints) secondary to charcot. Pain somewhat improved but still 8/10. Non weight bearing to the foot although he relates he sometimes puts weight on the heel for transfers.     9/17/18: patient returns s/p week 3 left foot first ray arthrodesis (MC and NC joints) secondary to charcot. Done with the antibiotics, minimal pain to the foot although the shoulder still hurts him quite a bit. No new pedal complaints.     9/24/18: Patient returns s/p week 4 left foot first ray arthrodesis (MC and NC joints) secondary to charcot for dehiscence wound check and cast change.    10/1/18: Patient presents for follow up s/p 5 weeks first ray arthrodesis, in [facility] non weight bearing. Here for wound check and cast change. No new pedal complaints.     10/10/18: Patient presents for follow up s/p 6 weeks first ray arthrodesis  in [facility] non weight bearing. Here for wound check and cast change again. No new complaints.     10/17/18: No acute changes, walking in the cast, will have x-rays today and transition out of the cast.    PCP: Juanito Hilton MD    Date Last Seen by PCP: 7/3/18    Current shoe gear:  Affected Foot: Total contact cast     Unaffected Foot: Tennis shoes    History of Trauma: negative      Hemoglobin A1C   Date Value Ref Range Status   08/27/2018 8.1 (H) 0.0 - 5.6 % Final     Comment:     Reference Interval:  5.0 - 5.6 Normal   5.7 - 6.4 High Risk   > 6.5 Diabetic    Hgb A1c results are standardized based on the (NGSP) National   Glycohemoglobin Standardization Program.    Hemoglobin A1C levels are related to mean serum/plasma glucose   during the preceding 2-3 months.        03/08/2018 8.2 (H) 0.0 - 5.6 % Final     Comment:     Reference Interval:  5.0 - 5.6 Normal   5.7 - 6.4 High Risk   > 6.5 Diabetic    Hgb A1c results are standardized based on the (NGSP) National   Glycohemoglobin Standardization Program.    Hemoglobin A1C levels are related to mean serum/plasma glucose   during the preceding 2-3 months.        01/10/2018 8.7 (H) 0.0 - 5.6 % Final     Comment:     Reference Interval:  5.0 - 5.6 Normal   5.7 - 6.4 High Risk   > 6.5 Diabetic    Hgb A1c results are standardized based on the (NGSP) National   Glycohemoglobin Standardization Program.    Hemoglobin A1C levels are related to mean serum/plasma glucose   during the preceding 2-3 months.            Review of Systems   Constitution: Negative for chills and fever.   Cardiovascular: Positive for leg swelling. Negative for claudication.   Respiratory: Negative for shortness of breath.    Skin: Positive for color change, nail changes and poor wound healing. Negative for itching and rash.   Musculoskeletal: Negative for muscle cramps, muscle weakness and myalgias.   Gastrointestinal: Negative for nausea and vomiting.   Neurological: Positive for numbness and  paresthesias. Negative for focal weakness and loss of balance.           Objective:      Physical Exam   Constitutional: He is oriented to person, place, and time. He appears well-developed and well-nourished. No distress.   Cardiovascular:   Pulses:       Dorsalis pedis pulses are 2+ on the right side, and 2+ on the left side.        Posterior tibial pulses are 2+ on the right side, and 2+ on the left side.   < 3 sec capillary refill time to toes 1-5 bilateral. Toes and feet are warm to touch proximally with normal distal cooling b/l. There is no hair growth on the feet and toes b/l. There is moderate edema left foot and mild edema right.      Musculoskeletal:   Left second toe amputation    Left foot now more narrow in appearance with the first ray properly reduced, normal post op edema and tenderness.    Equinus noted b/l ankles with < 10 deg DF noted. MMT 5/5 in DF/PF/Inv/Ev resistance with no reproduction of pain in any direction. Passive range of motion of ankle and pedal joints is painless b/l.     Feet:   Right Foot:   Protective Sensation: 10 sites tested. 0 sites sensed.   Left Foot:   Protective Sensation: 10 sites tested. 0 sites sensed.   Neurological: He is alert and oriented to person, place, and time. He has normal strength. He displays no atrophy and no tremor. A sensory deficit is present. He exhibits normal muscle tone.   Negative tinel sign bilateral.   Skin: Skin is warm. No abrasion, no bruising, no burn, no ecchymosis, no laceration, no lesion, no petechiae and no rash noted. He is not diaphoretic. No cyanosis or erythema. No pallor. Nails show no clubbing.   Incision overlying the left second metatarsal base is well healed    Incision overlying the first ray there is dehiscence noted along the mid incision about 0.4 cm in length, this probes proximal about 0.3 cm and measures 0.4x0.4 cm. Does not probe to bone or to hardware, no exposed hardware noted. There is mild serous drainage and amita  wound erythema is now fully resolved. Base is 100% granular       Psychiatric: He has a normal mood and affect. His behavior is normal.             Assessment:       Encounter Diagnoses   Name Primary?    Post-operative state Yes    Charcot's joint of foot, left     Type II diabetes mellitus with neurological manifestations     Dehiscence of incision, subsequent encounter     Edema, lower extremity     History of amputation of lesser toe, left             Plan:       Virgilio was seen today for post-op evaluation and diabetes mellitus.    Diagnoses and all orders for this visit:    Post-operative state  -     DIABETIC SHOES FOR HOME USE    Charcot's joint of foot, left  -     DIABETIC SHOES FOR HOME USE    Type II diabetes mellitus with neurological manifestations  -     DIABETIC SHOES FOR HOME USE    Dehiscence of incision, subsequent encounter  -     DIABETIC SHOES FOR HOME USE    Edema, lower extremity  -     DIABETIC SHOES FOR HOME USE    History of amputation of lesser toe, left  -     DIABETIC SHOES FOR HOME USE      I counseled the patient on his conditions, their implications and medical management.    Discussed importance of healthy diet and weight loss as to his diabetes control and healing potential.     Open wound cleansed with sterile saline. Packed with anum and covered with wound foam.    Cultures taken previously were negative    Transition to a tall CAM boot waking and weight bearing in the boot only    X-rays reviewed with patient and there is satisfactory healing noted at both arthrodesis sites    Begin the process of getting extra depth custom diabetic shoes with custom supportive inserts for when he comes out of the boot    Return in 1 week for wound check    Inocente Smith DPM

## 2018-10-22 ENCOUNTER — PATIENT OUTREACH (OUTPATIENT)
Dept: ADMINISTRATIVE | Facility: HOSPITAL | Age: 58
End: 2018-10-22

## 2018-10-24 ENCOUNTER — OFFICE VISIT (OUTPATIENT)
Dept: PODIATRY | Facility: CLINIC | Age: 58
End: 2018-10-24
Payer: COMMERCIAL

## 2018-10-24 VITALS
BODY MASS INDEX: 41.75 KG/M2 | HEIGHT: 73 IN | HEART RATE: 98 BPM | WEIGHT: 315 LBS | DIASTOLIC BLOOD PRESSURE: 82 MMHG | SYSTOLIC BLOOD PRESSURE: 143 MMHG

## 2018-10-24 DIAGNOSIS — T81.31XD DEHISCENCE OF INCISION, SUBSEQUENT ENCOUNTER: ICD-10-CM

## 2018-10-24 DIAGNOSIS — Z98.890 POST-OPERATIVE STATE: Primary | ICD-10-CM

## 2018-10-24 PROCEDURE — 99024 POSTOP FOLLOW-UP VISIT: CPT | Mod: S$GLB,,, | Performed by: PODIATRIST

## 2018-10-24 PROCEDURE — 99999 PR PBB SHADOW E&M-EST. PATIENT-LVL III: CPT | Mod: PBBFAC,,, | Performed by: PODIATRIST

## 2018-10-24 NOTE — PROGRESS NOTES
Subjective:      Patient ID: Virgilio Acuña is a 58 y.o. male.    Chief Complaint: Post-op Evaluation (11 week post op left foot surgery, PCP--08/02/2018) and Diabetes Mellitus (A1c-8.1-08/27/2018)    Virgilio is a 58 y.o. male who presents to the clinic for evaluation and treatment of high risk feet. Virgilio has a past medical history of Cellulitis of left index finger (2/16/2015), Charcot's joint of foot, left (08/2018), Dyslipidemia, Essential hypertension (2/16/2017), Infected finger joint (2/17/2015), Obese, S/P knee surgery, medial/lateral menisectomy (11/4/2013), and Type 2 diabetes mellitus with diabetic polyneuropathy, with long-term current use of insulin (2003).     9/4/18: Patient presents s/p 1 week left foot first ray arthrodesis (MC and NC joints) secondary to charcot. Patient relates pain is 10/10 controlled at times with PO medications. Pain related to the left shoulder since surgery with weakness to the left arm all since waking up from surgery, Orthopedic consult was placed he has not gotten an appointment with them yet.    9/10/18: Patient returns s/p week 2 left foot first ray arthrodesis (MC and NC joints) secondary to charcot. Pain somewhat improved but still 8/10. Non weight bearing to the foot although he relates he sometimes puts weight on the heel for transfers.     9/17/18: patient returns s/p week 3 left foot first ray arthrodesis (MC and NC joints) secondary to charcot. Done with the antibiotics, minimal pain to the foot although the shoulder still hurts him quite a bit. No new pedal complaints.     9/24/18: Patient returns s/p week 4 left foot first ray arthrodesis (MC and NC joints) secondary to charcot for dehiscence wound check and cast change.    10/1/18: Patient presents for follow up s/p 5 weeks first ray arthrodesis, in TCC non weight bearing. Here for wound check and cast change. No new pedal complaints.     10/10/18: Patient presents for follow up s/p 6 weeks first ray  arthrodesis in TCC non weight bearing. Here for wound check and cast change again. No new complaints.     10/17/18: No acute changes, walking in the cast, will have x-rays today and transition out of the cast.    10/24/18: Doing well ambulating in the tall boot, no new pedal complaints here for wound care.    PCP: Juanito Hilton MD    Date Last Seen by PCP: 8/2/18    Current shoe gear:  Affected Foot: Total contact cast     Unaffected Foot: Tennis shoes    History of Trauma: negative      Hemoglobin A1C   Date Value Ref Range Status   08/27/2018 8.1 (H) 0.0 - 5.6 % Final     Comment:     Reference Interval:  5.0 - 5.6 Normal   5.7 - 6.4 High Risk   > 6.5 Diabetic    Hgb A1c results are standardized based on the (NGSP) National   Glycohemoglobin Standardization Program.    Hemoglobin A1C levels are related to mean serum/plasma glucose   during the preceding 2-3 months.        03/08/2018 8.2 (H) 0.0 - 5.6 % Final     Comment:     Reference Interval:  5.0 - 5.6 Normal   5.7 - 6.4 High Risk   > 6.5 Diabetic    Hgb A1c results are standardized based on the (NGSP) National   Glycohemoglobin Standardization Program.    Hemoglobin A1C levels are related to mean serum/plasma glucose   during the preceding 2-3 months.        01/10/2018 8.7 (H) 0.0 - 5.6 % Final     Comment:     Reference Interval:  5.0 - 5.6 Normal   5.7 - 6.4 High Risk   > 6.5 Diabetic    Hgb A1c results are standardized based on the (NGSP) National   Glycohemoglobin Standardization Program.    Hemoglobin A1C levels are related to mean serum/plasma glucose   during the preceding 2-3 months.            Review of Systems   Constitution: Negative for chills and fever.   Cardiovascular: Positive for leg swelling. Negative for claudication.   Respiratory: Negative for shortness of breath.    Skin: Positive for color change, nail changes and poor wound healing. Negative for itching and rash.   Musculoskeletal: Negative for muscle cramps, muscle weakness and  myalgias.   Gastrointestinal: Negative for nausea and vomiting.   Neurological: Positive for numbness and paresthesias. Negative for focal weakness and loss of balance.           Objective:      Physical Exam   Constitutional: He is oriented to person, place, and time. He appears well-developed and well-nourished. No distress.   Cardiovascular:   Pulses:       Dorsalis pedis pulses are 2+ on the right side, and 2+ on the left side.        Posterior tibial pulses are 2+ on the right side, and 2+ on the left side.   < 3 sec capillary refill time to toes 1-5 bilateral. Toes and feet are warm to touch proximally with normal distal cooling b/l. There is no hair growth on the feet and toes b/l. There is moderate edema left foot and mild edema right.      Musculoskeletal:   Left second toe amputation    Left foot now more narrow in appearance with the first ray properly reduced, normal post op edema and tenderness.    Equinus noted b/l ankles with < 10 deg DF noted. MMT 5/5 in DF/PF/Inv/Ev resistance with no reproduction of pain in any direction. Passive range of motion of ankle and pedal joints is painless b/l.     Feet:   Right Foot:   Protective Sensation: 10 sites tested. 0 sites sensed.   Left Foot:   Protective Sensation: 10 sites tested. 0 sites sensed.   Neurological: He is alert and oriented to person, place, and time. He has normal strength. He displays no atrophy and no tremor. A sensory deficit is present. He exhibits normal muscle tone.   Negative tinel sign bilateral.   Skin: Skin is warm. No abrasion, no bruising, no burn, no ecchymosis, no laceration, no lesion, no petechiae and no rash noted. He is not diaphoretic. No cyanosis or erythema. No pallor. Nails show no clubbing.   Incision overlying the left second metatarsal base is well healed    Incision overlying the first ray there is dehiscence noted along the mid incision about 0.2 cm in length, this probes proximal about 0.2 cm and measures 0.2x0.1 cm.  Does not probe to bone or to hardware, no exposed hardware noted. There is mild serous drainage and amita wound erythema is now fully resolved. Base is 100% granular       Psychiatric: He has a normal mood and affect. His behavior is normal.             Assessment:       Encounter Diagnoses   Name Primary?    Post-operative state Yes    Dehiscence of incision, subsequent encounter             Plan:       Virgilio was seen today for post-op evaluation and diabetes mellitus.    Diagnoses and all orders for this visit:    Post-operative state    Dehiscence of incision, subsequent encounter      I counseled the patient on his conditions, their implications and medical management.    Discussed importance of healthy diet and weight loss as to his diabetes control and healing potential.     Open wound cleansed with sterile saline. Packed with anum and covered with ban aid.    Cultures taken previously were negative no signs of infection    Transition to a tall CAM boot waking and weight bearing in the boot only    X-rays reviewed with patient and there is satisfactory healing noted at both arthrodesis sites    Begin the process of getting extra depth custom diabetic shoes with custom supportive inserts for when he comes out of the boot    Return in 2 week for wound check    Inocente Smith DPM

## 2018-10-27 ENCOUNTER — PATIENT MESSAGE (OUTPATIENT)
Dept: PODIATRY | Facility: CLINIC | Age: 58
End: 2018-10-27

## 2018-11-04 ENCOUNTER — PATIENT MESSAGE (OUTPATIENT)
Dept: FAMILY MEDICINE | Facility: CLINIC | Age: 58
End: 2018-11-04

## 2018-11-05 ENCOUNTER — LAB VISIT (OUTPATIENT)
Dept: LAB | Facility: HOSPITAL | Age: 58
End: 2018-11-05
Attending: FAMILY MEDICINE
Payer: COMMERCIAL

## 2018-11-05 ENCOUNTER — OFFICE VISIT (OUTPATIENT)
Dept: FAMILY MEDICINE | Facility: CLINIC | Age: 58
End: 2018-11-05
Payer: COMMERCIAL

## 2018-11-05 VITALS
HEIGHT: 73 IN | DIASTOLIC BLOOD PRESSURE: 86 MMHG | BODY MASS INDEX: 41.75 KG/M2 | TEMPERATURE: 98 F | WEIGHT: 315 LBS | SYSTOLIC BLOOD PRESSURE: 136 MMHG | HEART RATE: 100 BPM

## 2018-11-05 DIAGNOSIS — E11.69 TYPE 2 DIABETES MELLITUS WITH OTHER SPECIFIED COMPLICATION, WITH LONG-TERM CURRENT USE OF INSULIN: ICD-10-CM

## 2018-11-05 DIAGNOSIS — E11.42 TYPE 2 DIABETES MELLITUS WITH DIABETIC POLYNEUROPATHY, WITH LONG-TERM CURRENT USE OF INSULIN: Primary | ICD-10-CM

## 2018-11-05 DIAGNOSIS — Z79.4 TYPE 2 DIABETES MELLITUS WITH DIABETIC POLYNEUROPATHY, WITH LONG-TERM CURRENT USE OF INSULIN: Primary | ICD-10-CM

## 2018-11-05 DIAGNOSIS — M14.672 CHARCOT'S JOINT OF LEFT FOOT: ICD-10-CM

## 2018-11-05 DIAGNOSIS — I10 ESSENTIAL HYPERTENSION: ICD-10-CM

## 2018-11-05 DIAGNOSIS — E78.5 DYSLIPIDEMIA: ICD-10-CM

## 2018-11-05 DIAGNOSIS — Z79.4 TYPE 2 DIABETES MELLITUS WITH OTHER SPECIFIED COMPLICATION, WITH LONG-TERM CURRENT USE OF INSULIN: ICD-10-CM

## 2018-11-05 DIAGNOSIS — E34.9 HYPOTESTOSTERONEMIA: ICD-10-CM

## 2018-11-05 DIAGNOSIS — E66.01 SEVERE OBESITY (BMI >= 40): ICD-10-CM

## 2018-11-05 DIAGNOSIS — E11.49 TYPE II DIABETES MELLITUS WITH NEUROLOGICAL MANIFESTATIONS: ICD-10-CM

## 2018-11-05 LAB
ESTIMATED AVG GLUCOSE: 183 MG/DL
HBA1C MFR BLD HPLC: 8 %
TESTOST SERPL-MCNC: 257 NG/DL

## 2018-11-05 PROCEDURE — 3075F SYST BP GE 130 - 139MM HG: CPT | Mod: CPTII,S$GLB,, | Performed by: FAMILY MEDICINE

## 2018-11-05 PROCEDURE — 99999 PR PBB SHADOW E&M-EST. PATIENT-LVL III: CPT | Mod: PBBFAC,,, | Performed by: FAMILY MEDICINE

## 2018-11-05 PROCEDURE — 99214 OFFICE O/P EST MOD 30 MIN: CPT | Mod: S$GLB,,, | Performed by: FAMILY MEDICINE

## 2018-11-05 PROCEDURE — 84403 ASSAY OF TOTAL TESTOSTERONE: CPT

## 2018-11-05 PROCEDURE — 3079F DIAST BP 80-89 MM HG: CPT | Mod: CPTII,S$GLB,, | Performed by: FAMILY MEDICINE

## 2018-11-05 PROCEDURE — 3045F PR MOST RECENT HEMOGLOBIN A1C LEVEL 7.0-9.0%: CPT | Mod: CPTII,S$GLB,, | Performed by: FAMILY MEDICINE

## 2018-11-05 PROCEDURE — 3008F BODY MASS INDEX DOCD: CPT | Mod: CPTII,S$GLB,, | Performed by: FAMILY MEDICINE

## 2018-11-05 PROCEDURE — 83036 HEMOGLOBIN GLYCOSYLATED A1C: CPT

## 2018-11-05 PROCEDURE — 36415 COLL VENOUS BLD VENIPUNCTURE: CPT | Mod: PO

## 2018-11-05 RX ORDER — TOPIRAMATE 50 MG/1
50 TABLET, FILM COATED ORAL 2 TIMES DAILY
Qty: 60 TABLET | Refills: 11 | Status: SHIPPED | OUTPATIENT
Start: 2018-11-05 | End: 2018-11-13

## 2018-11-05 RX ORDER — TRAZODONE HYDROCHLORIDE 150 MG/1
150 TABLET ORAL NIGHTLY
Qty: 30 TABLET | Refills: 0 | Status: SHIPPED | OUTPATIENT
Start: 2018-11-05 | End: 2019-05-29

## 2018-11-05 RX ORDER — TRAZODONE HYDROCHLORIDE 150 MG/1
150 TABLET ORAL NIGHTLY
Qty: 90 TABLET | Refills: 4 | Status: SHIPPED | OUTPATIENT
Start: 2018-11-05 | End: 2019-05-29 | Stop reason: SDUPTHER

## 2018-11-05 RX ORDER — TESTOSTERONE CYPIONATE 200 MG/ML
INJECTION, SOLUTION INTRAMUSCULAR
Qty: 10 ML | Refills: 0 | Status: SHIPPED | OUTPATIENT
Start: 2018-11-05 | End: 2019-05-29 | Stop reason: SDUPTHER

## 2018-11-05 NOTE — PROGRESS NOTES
Virgilio SCOTT Arianne presents to [facility]-recently had foot surgery for charcot foot Dr Ignacio tello Now in walking shoe.   DM not completely controlled but on high dose insulin. Hx hypotest last inj 6 w ago   Past Medical History:   Diagnosis Date    Cellulitis of left index finger 2/16/2015    Charcot's joint of foot, left 08/2018    Dyslipidemia     Essential hypertension 2/16/2017    Infected finger joint 2/17/2015    Obese     S/P knee surgery, medial/lateral menisectomy 11/4/2013    Type 2 diabetes mellitus with diabetic polyneuropathy, with long-term current use of insulin 2003     Past Surgical History:   Procedure Laterality Date    AMPUTATION-TOE with Metatarsal Head - 2nd Toe Left 3/9/2018    Performed by Inocente Smith DPM at [facility] OR    ARTHROSCOPY, KNEE Right 10/21/2013    Performed by Tobias Henderson MD at [facility] OR    ARTHROSCOPY-KNEE DEBRIDEMENT-RIGHT Right 2/17/2017    Performed by [facility]ilio Lee [facility] at [facility] OR 2ND FLR    CHONDRECTOMY, KNEE, SEMILUNAR CARTILAGE Right 10/21/2013    Performed by Tobias Henderson MD at [facility] OR    DEBRIDEMENT-FOOT Left 1/15/2018    Performed by Inocente Smith DPM at [facility] OR    ELBOW SURGERY      FOOT SURGERY Left     FUSION, JOINT, MIDFOOT - FIRST METATARSOCUNEIFORM JOINT AND NAVICULOCUNEIFORM JOINT Left 8/27/2018    Performed by Inocente Smith DPM at [facility] OR    INCISION AND DRAINAGE (I&D), FOOT Left 1/11/2018    Performed by Inocente Smith DPM at [facility] OR    IRRIGATION AND DEBRIDEMENT UPPER EXTREMITY Left 2/18/2015    Performed by Lisa Ordaz [facility] at [facility] OR    KNEE CARTILAGE SURGERY  10/21/2013    right knee    KNEE SURGERY Right 02/17/2017    Left index finger surgery  03/2015    LENGTHENING,TENDON,ACHILLES Left 8/27/2018    Performed by Inocente Smith DPM at [facility] OR    MIDFOOT ARTHRODESIS Left 8/27/2018    Procedure: FUSION, JOINT, MIDFOOT - FIRST METATARSOCUNEIFORM JOINT AND NAVICULOCUNEIFORM JOINT;  Surgeon:  "Inocente Smith DPM;  [email or web address] OR;  Service: Podiatry;  Laterality: Left;    PLACEMENT-WOUND VAC Left 1/15/2018    Performed by Inoc[facility] Smith D[facility] at [email or web address] OR    TOE AMPUTATION  03/2018    left great toe     No Known Allergies  Current Outpatient Medications on File Prior to Visit   Medication Sig Dispense Refill    blood sugar diagnostic Strp 1 strip by Misc.(Non-Drug; Combo Route) route 3 (three) times daily. 360 each 3    chlorhexidine (PERIDEX) 0.12 % solution SWISH AND SPIT 10 ML FOR 30 SECONDS BID FOR 5 DAYS STARTING 8/25/2018  0    enalapril (VASOTEC) 20 MG tablet Take 1 tablet (20 mg total) by mouth once daily. (Patient taking differently: Take 20 mg by mouth every evening. ) 90 tablet 3    gabapentin (NEURONTIN) 600 MG tablet Take 1 tablet (600 mg total) by mouth 3 (three) times daily. 270 tablet 3    gemfibrozil (LOPID) 600 MG tablet Take 1 tablet (600 mg total) by mouth 2 (two) times daily before meals. 180 tablet 3    insulin glargine (LANTUS SOLOSTAR U-100 INSULIN) 100 unit/mL (3 mL) InPn pen Inject 70 Units into the skin once daily. Supply pen needles (Patient taking differently: Inject 70 Units into the skin every evening. Supply pen needles) 63 mL 12    insulin lispro (HUMALOG KWIKPEN INSULIN) 100 unit/mL InPn pen Inject 60 Units into the skin 3 (three) times daily. 162 mL 12    metFORMIN (GLUCOPHAGE-XR) 500 MG 24 hr tablet Take 2 tablets (1,000 mg total) by mouth 2 (two) times daily with meals. 360 tablet 4    mupirocin (BACTROBAN) 2 % ointment APPLY BOTH NOSTRILS CLEAN COTTON SWAB BID 5 DAYS BEGIN 8/25/2018  0    pen needle, diabetic (BD ULTRA-FINE WILDA PEN NEEDLES) 32 gauge x 5/32" Ndle INJECT FOUR TIMES DAILY 4 each 3    polyethylene glycol (GLYCOLAX) 17 gram PwPk Take 17 g by mouth once daily. (Patient taking differently: Take 17 g by mouth 3 (three) times a week. ) 24 each 0    pravastatin (PRAVACHOL) 40 MG tablet Take 1 tablet (40 mg total) by mouth once daily. " "(Patient taking differently: Take 40 mg by mouth every evening. ) 90 tablet 4    safety needles 22 gauge x 1 1/2" Ndle To be used once a month for testosterone injections 50 each 6    testosterone cypionate (DEPOTESTOTERONE CYPIONATE) 200 mg/mL injection INJECT 1.5 ML IN THE MUSCLE EVERY 28 DAYS 10 mL 0     Current Facility-Administered Medications on File Prior to Visit   Medication Dose Route Frequency Provider Last Rate Last Dose    lactated ringers infusion   Intravenous Continuous Vadim Buchanan MD   Stopped at 08/27/18 0953     Social History     Socioeconomic History    Marital status:      Spouse name: Not on file    Number of children: Not on file    Years of education: Not on file    Highest education level: Not on file   Social Needs    Financial resource strain: Not on file    Food insecurity - worry: Not on file    Food insecurity - inability: Not on file    Transportation needs - medical: Not on file    Transportation needs - non-medical: Not on file   Occupational History    Occupation:      Employer: [employer]   Tobacco Use    Smoking status: Never Smoker    Smokeless tobacco: Never Used   Substance and Sexual Activity    Alcohol use: Yes     Alcohol/week: 0.6 oz     Types: 1 Cans of beer per week     Comment: occasional    Drug use: No    Sexual activity: Yes     Partners: Female   Other Topics Concern    Not on file   Social History Narrative    Not on file     Family History   Problem Relation Age of Onset    COPD Father          ROS:  SKIN: No rashes, itching or changes in color or texture of skin.  EYES: Visual acuity fine. No photophobia, ocular pain or diplopia.EARS: Denies ear pain, discharge or vertigo.NOSE: No loss of smell, no epistaxis some postnasal drip.MOUTH & THROAT: No hoarseness or change in voice. No excessive gum bleeding.CHEST: Denies SHARP, cyanosis, wheezing  CARDIOVASCULAR: Denies chest " pain, PND, orthopnea or reduced exercise tolerance.  ABDOMEN:  No weight loss.No abdominal pain, no hematemesis or blood in stool.  URINARY: No flank pain, dysuria or hematuria.  PERIPHERAL VASCULAR: No claudication or cyanosis.  MUSCULOSKELETAL: Negative   NEUROLOGIC: No history of seizures, paralysis, alteration of gait or coordination.    PE: Vital signs as noted  Heent:Normocephalic with no recent cranial trauma,PERRLA,EOMI,conjunctiva clear,fundi reveal no hemmorhage exudate or papilledema.Otic canals clear, tympanic membranes slightly dull bilaterally.Nasal mucosa slightly red and edematous.Posterior pharynx slightly red but without exudate.  Neck:Supple with minimal anterior cervical adenopathy.  Chest:Clear bilateral breath sounds  Heart:Regular rhthym without murmer  Abdomen:Soft, non tender,no masses, no hepatosplenomegaly  Ext Gen degen changes and myalgia; left foot red swollen w edema to mid pre tibial area. Tender w near ulceration    Impression:Charcot foot  Diabetes  Neuropathy  Hypotestosterone  Obesity BMI 41.7   Anxiety  Insomnia  HLP  Plan:     trazadone Topamax  option for   sleep  Answers for HPI/ROS submitted by the patient on 11/5/2018   activity change: Yes  unexpected weight change: No  neck pain: No  hearing loss: No  rhinorrhea: No  trouble swallowing: No  eye discharge: No  visual disturbance: No  chest tightness: No  wheezing: No  chest pain: No  palpitations: No  blood in stool: No  constipation: No  vomiting: No  diarrhea: No  polydipsia: No  polyuria: No  difficulty urinating: No  urgency: No  hematuria: No  joint swelling: No  arthralgias: Yes  headaches: No  weakness: Yes  confusion: No  dysphoric mood: No

## 2018-11-06 ENCOUNTER — PATIENT MESSAGE (OUTPATIENT)
Dept: FAMILY MEDICINE | Facility: CLINIC | Age: 58
End: 2018-11-06

## 2018-11-06 ENCOUNTER — TELEPHONE (OUTPATIENT)
Dept: FAMILY MEDICINE | Facility: CLINIC | Age: 58
End: 2018-11-06

## 2018-11-06 DIAGNOSIS — E34.9 HYPOTESTOSTERONEMIA: Primary | ICD-10-CM

## 2018-11-07 ENCOUNTER — HOSPITAL ENCOUNTER (OUTPATIENT)
Dept: RADIOLOGY | Facility: HOSPITAL | Age: 58
Discharge: HOME | End: 2018-11-07
Attending: PODIATRIST
Payer: COMMERCIAL

## 2018-11-07 ENCOUNTER — PATIENT MESSAGE (OUTPATIENT)
Dept: FAMILY MEDICINE | Facility: CLINIC | Age: 58
End: 2018-11-07

## 2018-11-07 ENCOUNTER — OFFICE VISIT (OUTPATIENT)
Dept: PODIATRY | Facility: CLINIC | Age: 58
End: 2018-11-07
Payer: COMMERCIAL

## 2018-11-07 VITALS
HEIGHT: 73 IN | HEART RATE: 112 BPM | BODY MASS INDEX: 41.75 KG/M2 | SYSTOLIC BLOOD PRESSURE: 152 MMHG | WEIGHT: 315 LBS | DIASTOLIC BLOOD PRESSURE: 84 MMHG

## 2018-11-07 DIAGNOSIS — Z98.890 POST-OPERATIVE STATE: Primary | ICD-10-CM

## 2018-11-07 DIAGNOSIS — Z98.890 POST-OPERATIVE STATE: ICD-10-CM

## 2018-11-07 PROCEDURE — 99024 POSTOP FOLLOW-UP VISIT: CPT | Mod: S$GLB,,, | Performed by: PODIATRIST

## 2018-11-07 PROCEDURE — 73630 X-RAY EXAM OF FOOT: CPT | Mod: TC,PO,LT

## 2018-11-07 PROCEDURE — 99999 PR PBB SHADOW E&M-EST. PATIENT-LVL III: CPT | Mod: PBBFAC,,, | Performed by: PODIATRIST

## 2018-11-07 PROCEDURE — 73630 X-RAY EXAM OF FOOT: CPT | Mod: 26,LT,, | Performed by: RADIOLOGY

## 2018-11-10 ENCOUNTER — PATIENT MESSAGE (OUTPATIENT)
Dept: PODIATRY | Facility: CLINIC | Age: 58
End: 2018-11-10

## 2018-11-12 ENCOUNTER — PATIENT MESSAGE (OUTPATIENT)
Dept: PODIATRY | Facility: CLINIC | Age: 58
End: 2018-11-12

## 2018-11-13 ENCOUNTER — PATIENT MESSAGE (OUTPATIENT)
Dept: PODIATRY | Facility: CLINIC | Age: 58
End: 2018-11-13

## 2018-11-13 ENCOUNTER — TELEPHONE (OUTPATIENT)
Dept: PODIATRY | Facility: CLINIC | Age: 58
End: 2018-11-13

## 2018-11-13 ENCOUNTER — HOSPITAL ENCOUNTER (OUTPATIENT)
Dept: RADIOLOGY | Facility: HOSPITAL | Age: 58
Discharge: HOME | End: 2018-11-13
Attending: NURSE PRACTITIONER
Payer: COMMERCIAL

## 2018-11-13 ENCOUNTER — OFFICE VISIT (OUTPATIENT)
Dept: FAMILY MEDICINE | Facility: CLINIC | Age: 58
End: 2018-11-13
Payer: COMMERCIAL

## 2018-11-13 ENCOUNTER — PATIENT MESSAGE (OUTPATIENT)
Dept: SURGERY | Facility: HOSPITAL | Age: 58
End: 2018-11-13

## 2018-11-13 ENCOUNTER — PATIENT MESSAGE (OUTPATIENT)
Dept: FAMILY MEDICINE | Facility: CLINIC | Age: 58
End: 2018-11-13

## 2018-11-13 VITALS
HEART RATE: 99 BPM | HEIGHT: 73 IN | DIASTOLIC BLOOD PRESSURE: 79 MMHG | TEMPERATURE: 98 F | BODY MASS INDEX: 41.75 KG/M2 | WEIGHT: 315 LBS | RESPIRATION RATE: 14 BRPM | SYSTOLIC BLOOD PRESSURE: 157 MMHG

## 2018-11-13 DIAGNOSIS — M79.672 LEFT FOOT PAIN: Primary | ICD-10-CM

## 2018-11-13 DIAGNOSIS — M79.672 LEFT FOOT PAIN: ICD-10-CM

## 2018-11-13 DIAGNOSIS — S39.012A BACK STRAIN, INITIAL ENCOUNTER: ICD-10-CM

## 2018-11-13 DIAGNOSIS — T84.84XA PAINFUL ORTHOPAEDIC HARDWARE: Primary | ICD-10-CM

## 2018-11-13 DIAGNOSIS — R10.9 FLANK PAIN: ICD-10-CM

## 2018-11-13 PROCEDURE — 73630 X-RAY EXAM OF FOOT: CPT | Mod: TC,PO,LT

## 2018-11-13 PROCEDURE — 3078F DIAST BP <80 MM HG: CPT | Mod: CPTII,S$GLB,, | Performed by: NURSE PRACTITIONER

## 2018-11-13 PROCEDURE — 99999 PR PBB SHADOW E&M-EST. PATIENT-LVL III: CPT | Mod: PBBFAC,,, | Performed by: NURSE PRACTITIONER

## 2018-11-13 PROCEDURE — 3008F BODY MASS INDEX DOCD: CPT | Mod: CPTII,S$GLB,, | Performed by: NURSE PRACTITIONER

## 2018-11-13 PROCEDURE — 99214 OFFICE O/P EST MOD 30 MIN: CPT | Mod: S$GLB,,, | Performed by: NURSE PRACTITIONER

## 2018-11-13 PROCEDURE — 73630 X-RAY EXAM OF FOOT: CPT | Mod: 26,LT,, | Performed by: RADIOLOGY

## 2018-11-13 PROCEDURE — 3077F SYST BP >= 140 MM HG: CPT | Mod: CPTII,S$GLB,, | Performed by: NURSE PRACTITIONER

## 2018-11-13 RX ORDER — POLYETHYLENE GLYCOL 3350 17 G/17G
17 POWDER, FOR SOLUTION ORAL
Qty: 24 EACH | Refills: 12 | Status: SHIPPED | OUTPATIENT
Start: 2018-11-14 | End: 2019-05-29 | Stop reason: SDUPTHER

## 2018-11-13 RX ORDER — CYCLOBENZAPRINE HCL 10 MG
10 TABLET ORAL 3 TIMES DAILY PRN
Qty: 30 TABLET | Refills: 0 | Status: SHIPPED | OUTPATIENT
Start: 2018-11-13 | End: 2018-11-23

## 2018-11-13 NOTE — PROGRESS NOTES
"Subjective:       Patient ID: Virgilio Acuña is a 58 y.o. male.    Chief Complaint: Back Pain; Flank Pain; Leg Pain; and Foot Pain    Flank Pain   This is a new problem. The current episode started in the past 7 days. The problem occurs constantly. The problem is unchanged. The pain is present in the thoracic spine (right ). The quality of the pain is described as aching. The pain does not radiate. The pain is moderate. The symptoms are aggravated by position. Pertinent negatives include no abdominal pain, chest pain, dysuria, fever or headaches. Risk factors include obesity, sedentary lifestyle and lack of exercise. He has tried analgesics and NSAIDs for the symptoms. The treatment provided no relief.   Foot Injury    The incident occurred more than 1 week ago. The incident occurred at home (got up at night without his walking boot, felt something "twing" in his foot). The pain is moderate. The pain has been constant since onset. The symptoms are aggravated by weight bearing and movement.   He reports a left foot surgery with toe amputation, surgery was done by Dr. Smith  2 months ago. He has been in a walking boot.    Review of Systems   Constitutional: Negative for fatigue, fever and unexpected weight change.   HENT: Negative for ear pain and sore throat.    Eyes: Negative.  Negative for pain and visual disturbance.   Respiratory: Negative for cough and shortness of breath.    Cardiovascular: Negative for chest pain and palpitations.   Gastrointestinal: Negative for abdominal pain, diarrhea, nausea and vomiting.   Genitourinary: Positive for flank pain. Negative for dysuria and frequency.   Musculoskeletal: Negative for arthralgias and myalgias.   Skin: Negative for color change and rash.   Neurological: Negative for dizziness and headaches.   Psychiatric/Behavioral: Negative for sleep disturbance. The patient is not nervous/anxious.        Vitals:    11/13/18 0816   BP: (!) 157/79   Pulse: 99   Resp: 14 " "  Temp: 98.1 °F (36.7 °C)       Objective:     Current Outpatient Medications   Medication Sig Dispense Refill    blood sugar diagnostic Strp 1 strip by Misc.(Non-Drug; Combo Route) route 3 (three) times daily. 360 each 3    enalapril (VASOTEC) 20 MG tablet Take 1 tablet (20 mg total) by mouth once daily. (Patient taking differently: Take 20 mg by mouth every evening. ) 90 tablet 3    gabapentin (NEURONTIN) 600 MG tablet Take 1 tablet (600 mg total) by mouth 3 (three) times daily. 270 tablet 3    gemfibrozil (LOPID) 600 MG tablet Take 1 tablet (600 mg total) by mouth 2 (two) times daily before meals. 180 tablet 3    insulin glargine (LANTUS SOLOSTAR U-100 INSULIN) 100 unit/mL (3 mL) InPn pen Inject 70 Units into the skin once daily. Supply pen needles (Patient taking differently: Inject 70 Units into the skin every evening. Supply pen needles) 63 mL 12    insulin lispro (HUMALOG KWIKPEN INSULIN) 100 unit/mL InPn pen Inject 60 Units into the skin 3 (three) times daily. 162 mL 12    metFORMIN (GLUCOPHAGE-XR) 500 MG 24 hr tablet Take 2 tablets (1,000 mg total) by mouth 2 (two) times daily with meals. 360 tablet 4    pen needle, diabetic (BD ULTRA-FINE WILDA PEN NEEDLES) 32 gauge x 5/32" Ndle INJECT FOUR TIMES DAILY 4 each 3    safety needles 22 gauge x 1 1/2" Ndle To be used once a month for testosterone injections 50 each 6    testosterone cypionate (DEPOTESTOTERONE CYPIONATE) 200 mg/mL injection INJECT 1.5 ML IN THE MUSCLE EVERY 28 DAYS 10 mL 0    traZODone (DESYREL) 150 MG tablet Take 1 tablet (150 mg total) by mouth nightly. 90 tablet 4    traZODone (DESYREL) 150 MG tablet Take 1 tablet (150 mg total) by mouth every evening. 30 tablet 0    cyclobenzaprine (FLEXERIL) 10 MG tablet Take 1 tablet (10 mg total) by mouth 3 (three) times daily as needed for Muscle spasms. 30 tablet 0    [START ON 11/14/2018] polyethylene glycol (GLYCOLAX) 17 gram PwPk Take 17 g by mouth 3 (three) times a week. 24 each 12 "     No current facility-administered medications for this visit.      Facility-Administered Medications Ordered in Other Visits   Medication Dose Route Frequency Provider Last Rate Last Dose    lactated ringers infusion   Intravenous Continuous Vadim Buchanan MD   Stopped at 08/27/18 0953       Physical Exam   Constitutional: He is oriented to person, place, and time. He appears well-developed. No distress.   HENT:   Head: Normocephalic and atraumatic.   Eyes: EOM are normal. Pupils are equal, round, and reactive to light.   Neck: Normal range of motion. Neck supple.   Cardiovascular: Normal rate and regular rhythm.   Pulmonary/Chest: Effort normal and breath sounds normal.   Musculoskeletal: Normal range of motion.        Thoracic back: He exhibits tenderness.        Back:    Left foot in walking boot, 2nd toe amputated, 2+ pulses, no sign of infection   Neurological: He is alert and oriented to person, place, and time.   Skin: Skin is warm and dry. No rash noted.   Psychiatric: He has a normal mood and affect. Thought content normal.   Nursing note and vitals reviewed.      Assessment:       1. Left foot pain    2. Flank pain    3. Back strain, initial encounter        Plan:   Left foot pain  -     X-Ray Foot Complete Left; Future; Expected date: 11/13/2018  -     CBC auto differential; Future; Expected date: 11/13/2018    Flank pain    Back strain, initial encounter    Other orders  -     cyclobenzaprine (FLEXERIL) 10 MG tablet; Take 1 tablet (10 mg total) by mouth 3 (three) times daily as needed for Muscle spasms.  Dispense: 30 tablet; Refill: 0        No Follow-up on file.    There are no Patient Instructions on file for this visit.

## 2018-11-13 NOTE — TELEPHONE ENCOUNTER
Spoke with patient.  Informed him that surgery is scheduled for Monday.  Patient verbalized understanding.

## 2018-11-14 ENCOUNTER — OFFICE VISIT (OUTPATIENT)
Dept: FAMILY MEDICINE | Facility: CLINIC | Age: 58
End: 2018-11-14
Payer: COMMERCIAL

## 2018-11-14 ENCOUNTER — PATIENT MESSAGE (OUTPATIENT)
Dept: FAMILY MEDICINE | Facility: CLINIC | Age: 58
End: 2018-11-14

## 2018-11-14 ENCOUNTER — HOSPITAL ENCOUNTER (OUTPATIENT)
Dept: RADIOLOGY | Facility: HOSPITAL | Age: 58
Discharge: HOME | End: 2018-11-14
Attending: NURSE PRACTITIONER
Payer: COMMERCIAL

## 2018-11-14 VITALS
WEIGHT: 315 LBS | TEMPERATURE: 98 F | DIASTOLIC BLOOD PRESSURE: 73 MMHG | BODY MASS INDEX: 41.75 KG/M2 | SYSTOLIC BLOOD PRESSURE: 134 MMHG | HEART RATE: 100 BPM | HEIGHT: 73 IN

## 2018-11-14 DIAGNOSIS — R07.81 RIB PAIN ON RIGHT SIDE: ICD-10-CM

## 2018-11-14 DIAGNOSIS — Z01.818 PREOPERATIVE CLEARANCE: ICD-10-CM

## 2018-11-14 DIAGNOSIS — Z01.818 PREOPERATIVE CLEARANCE: Primary | ICD-10-CM

## 2018-11-14 PROCEDURE — 71100 X-RAY EXAM RIBS UNI 2 VIEWS: CPT | Mod: 26,,, | Performed by: RADIOLOGY

## 2018-11-14 PROCEDURE — 3008F BODY MASS INDEX DOCD: CPT | Mod: CPTII,S$GLB,, | Performed by: NURSE PRACTITIONER

## 2018-11-14 PROCEDURE — 93005 ELECTROCARDIOGRAM TRACING: CPT | Mod: S$GLB,,, | Performed by: NURSE PRACTITIONER

## 2018-11-14 PROCEDURE — 3075F SYST BP GE 130 - 139MM HG: CPT | Mod: CPTII,S$GLB,, | Performed by: NURSE PRACTITIONER

## 2018-11-14 PROCEDURE — 99214 OFFICE O/P EST MOD 30 MIN: CPT | Mod: S$GLB,,, | Performed by: NURSE PRACTITIONER

## 2018-11-14 PROCEDURE — 71046 X-RAY EXAM CHEST 2 VIEWS: CPT | Mod: TC,PO

## 2018-11-14 PROCEDURE — 3078F DIAST BP <80 MM HG: CPT | Mod: CPTII,S$GLB,, | Performed by: NURSE PRACTITIONER

## 2018-11-14 PROCEDURE — 93010 ELECTROCARDIOGRAM REPORT: CPT | Mod: S$GLB,,, | Performed by: INTERNAL MEDICINE

## 2018-11-14 PROCEDURE — 71046 X-RAY EXAM CHEST 2 VIEWS: CPT | Mod: 26,,, | Performed by: RADIOLOGY

## 2018-11-14 PROCEDURE — 99999 PR PBB SHADOW E&M-EST. PATIENT-LVL III: CPT | Mod: PBBFAC,,, | Performed by: NURSE PRACTITIONER

## 2018-11-14 PROCEDURE — 71100 X-RAY EXAM RIBS UNI 2 VIEWS: CPT | Mod: TC,PO

## 2018-11-14 NOTE — PROGRESS NOTES
Subjective:      Patient ID: Virgilio Acuña is a 58 y.o. male.    Chief Complaint: Post-op Evaluation (Left foot surgery, PCP--11/05/2018) and Diabetes Mellitus (A1c-8.0-11/05/2018)    Virgilio is a 58 y.o. male who presents to the clinic for evaluation and treatment of high risk feet. Virgilio has a past medical history of Cellulitis of left index finger (2/16/2015), Charcot's joint of foot, left (08/2018), Dyslipidemia, Essential hypertension (2/16/2017), Infected finger joint (2/17/2015), Obese, S/P knee surgery, medial/lateral menisectomy (11/4/2013), and Type 2 diabetes mellitus with diabetic polyneuropathy, with long-term current use of insulin (2003).     9/4/18: Patient presents s/p 1 week left foot first ray arthrodesis (MC and NC joints) secondary to charcot. Patient relates pain is 10/10 controlled at times with PO medications. Pain related to the left shoulder since surgery with weakness to the left arm all since waking up from surgery, Orthopedic consult was placed he has not gotten an appointment with them yet.    9/10/18: Patient returns s/p week 2 left foot first ray arthrodesis (MC and NC joints) secondary to charcot. Pain somewhat improved but still 8/10. Non weight bearing to the foot although he relates he sometimes puts weight on the heel for transfers.     9/17/18: patient returns s/p week 3 left foot first ray arthrodesis (MC and NC joints) secondary to charcot. Done with the antibiotics, minimal pain to the foot although the shoulder still hurts him quite a bit. No new pedal complaints.     9/24/18: Patient returns s/p week 4 left foot first ray arthrodesis (MC and NC joints) secondary to charcot for dehiscence wound check and cast change.    10/1/18: Patient presents for follow up s/p 5 weeks first ray arthrodesis, in [facility] non weight bearing. Here for wound check and cast change. No new pedal complaints.     10/10/18: Patient presents for follow up s/p 6 weeks first ray arthrodesis in [facility]  non weight bearing. Here for wound check and cast change again. No new complaints.     10/17/18: No acute changes, walking in the cast, will have x-rays today and transition out of the cast.    10/24/18: Doing well ambulating in the tall boot, no new pedal complaints here for wound care.    11/7/18: Patient returns, ambulating in the tall orthopedic boot no new pedal complaints. Doing well overall    PCP: Juanito Hilton MD    Date Last Seen by PCP: 8/2/18    Current shoe gear:  Affected Foot: Total contact cast     Unaffected Foot: Tennis shoes    History of Trauma: negative      Hemoglobin A1C   Date Value Ref Range Status   11/05/2018 8.0 (H) 4.0 - 5.6 % Final     Comment:     ADA Screening Guidelines:  5.7-6.4%  Consistent with prediabetes  >or=6.5%  Consistent with diabetes  High levels of fetal hemoglobin interfere with the HbA1C  assay. Heterozygous hemoglobin variants (HbS, HgC, etc)do  not significantly interfere with this assay.   However, presence of multiple variants may affect accuracy.     08/27/2018 8.1 (H) 0.0 - 5.6 % Final     Comment:     Reference Interval:  5.0 - 5.6 Normal   5.7 - 6.4 High Risk   > 6.5 Diabetic    Hgb A1c results are standardized based on the (NGSP) National   Glycohemoglobin Standardization Program.    Hemoglobin A1C levels are related to mean serum/plasma glucose   during the preceding 2-3 months.        03/08/2018 8.2 (H) 0.0 - 5.6 % Final     Comment:     Reference Interval:  5.0 - 5.6 Normal   5.7 - 6.4 High Risk   > 6.5 Diabetic    Hgb A1c results are standardized based on the (NGSP) National   Glycohemoglobin Standardization Program.    Hemoglobin A1C levels are related to mean serum/plasma glucose   during the preceding 2-3 months.            Review of Systems   Constitution: Negative for chills and fever.   Cardiovascular: Positive for leg swelling. Negative for claudication.   Respiratory: Negative for shortness of breath.    Skin: Positive for color change, nail changes  and poor wound healing. Negative for itching and rash.   Musculoskeletal: Negative for muscle cramps, muscle weakness and myalgias.   Gastrointestinal: Negative for nausea and vomiting.   Neurological: Positive for numbness and paresthesias. Negative for focal weakness and loss of balance.           Objective:      Physical Exam   Constitutional: He is oriented to person, place, and time. He appears well-developed and well-nourished. No distress.   Cardiovascular:   Pulses:       Dorsalis pedis pulses are 2+ on the right side, and 2+ on the left side.        Posterior tibial pulses are 2+ on the right side, and 2+ on the left side.   < 3 sec capillary refill time to toes 1-5 bilateral. Toes and feet are warm to touch proximally with normal distal cooling b/l. There is no hair growth on the feet and toes b/l. There is moderate edema left foot and mild edema right.      Musculoskeletal:   Left second toe amputation    Left foot now more narrow in appearance with the first ray properly reduced, normal post op edema and tenderness.    Equinus noted b/l ankles with < 10 deg DF noted. MMT 5/5 in DF/PF/Inv/Ev resistance with no reproduction of pain in any direction. Passive range of motion of ankle and pedal joints is painless b/l.     Feet:   Right Foot:   Protective Sensation: 10 sites tested. 0 sites sensed.   Left Foot:   Protective Sensation: 10 sites tested. 0 sites sensed.   Neurological: He is alert and oriented to person, place, and time. He has normal strength. He displays no atrophy and no tremor. A sensory deficit is present. He exhibits normal muscle tone.   Negative tinel sign bilateral.   Skin: Skin is warm. No abrasion, no bruising, no burn, no ecchymosis, no laceration, no lesion, no petechiae and no rash noted. He is not diaphoretic. No cyanosis or erythema. No pallor. Nails show no clubbing.   Incision overlying the left second metatarsal base is well healed    Incision overlying the first ray is now  well healed with 100% epithelial covering, no open wounds noted.        Psychiatric: He has a normal mood and affect. His behavior is normal.             Assessment:       Encounter Diagnosis   Name Primary?    Post-operative state Yes            Plan:       Virgilio was seen today for post-op evaluation and diabetes mellitus.    Diagnoses and all orders for this visit:    Post-operative state      I counseled the patient on his conditions, their implications and medical management.    Discussed importance of healthy diet and weight loss as to his diabetes control and healing potential.     Continue in tall CAM boot waking and weight bearing in the boot only    X-rays reviewed with patient and there is satisfactory healing noted at both arthrodesis sites    Begin the process of getting extra depth custom diabetic shoes with custom supportive inserts for when he comes out of the boot    Reviewed x-rays and there is a distal screw backing out we will plan to go back to the OR next week for a hardware removal to remove that screw.     Return 1 week post hardware removal    Inocente Smith DPM

## 2018-11-14 NOTE — TELEPHONE ENCOUNTER
Called pt to schedule pre op clearance appt. Unable to connect. Left a vm message notifying pt to call our scheduling dept.

## 2018-11-14 NOTE — PROGRESS NOTES
Subjective:       Patient ID: Virgilio Acuña is a 58 y.o. male.    Chief Complaint: Pre-op Exam    HPI     He comes in for preop clearance. He is scheduled to have a pin removed from the left foot on Monday 11/19/18 by Dr Godinez. He is in a walking boot. Xray done earlier this week shows a pin that is backing out. He has continued to have left foot pain since his last surgery.    Review of Systems   Constitutional: Negative for fatigue, fever and unexpected weight change.   HENT: Negative for ear pain and sore throat.    Eyes: Negative.  Negative for pain and visual disturbance.   Respiratory: Negative for cough and shortness of breath.    Cardiovascular: Negative for chest pain and palpitations.   Gastrointestinal: Negative for abdominal pain, diarrhea, nausea and vomiting.   Genitourinary: Negative for dysuria and frequency.   Musculoskeletal: Negative for arthralgias and myalgias.   Skin: Negative for color change and rash.   Neurological: Negative for dizziness and headaches.   Psychiatric/Behavioral: Negative for sleep disturbance. The patient is not nervous/anxious.        Vitals:    11/14/18 1455   BP: 134/73   Pulse: 100   Temp: 98.4 °F (36.9 °C)       Objective:     Current Outpatient Medications   Medication Sig Dispense Refill    blood sugar diagnostic Strp 1 strip by Misc.(Non-Drug; Combo Route) route 3 (three) times daily. 360 each 3    cyclobenzaprine (FLEXERIL) 10 MG tablet Take 1 tablet (10 mg total) by mouth 3 (three) times daily as needed for Muscle spasms. 30 tablet 0    enalapril (VASOTEC) 20 MG tablet Take 1 tablet (20 mg total) by mouth once daily. (Patient taking differently: Take 20 mg by mouth every evening. ) 90 tablet 3    gabapentin (NEURONTIN) 600 MG tablet Take 1 tablet (600 mg total) by mouth 3 (three) times daily. 270 tablet 3    gemfibrozil (LOPID) 600 MG tablet Take 1 tablet (600 mg total) by mouth 2 (two) times daily before meals. 180 tablet 3    insulin glargine (LANTUS  "SOLOSTAR U-100 INSULIN) 100 unit/mL (3 mL) InPn pen Inject 70 Units into the skin once daily. Supply pen needles (Patient taking differently: Inject 70 Units into the skin every evening. Supply pen needles) 63 mL 12    insulin lispro (HUMALOG KWIKPEN INSULIN) 100 unit/mL InPn pen Inject 60 Units into the skin 3 (three) times daily. 162 mL 12    metFORMIN (GLUCOPHAGE-XR) 500 MG 24 hr tablet Take 2 tablets (1,000 mg total) by mouth 2 (two) times daily with meals. 360 tablet 4    pen needle, diabetic (BD ULTRA-FINE WILDA PEN NEEDLES) 32 gauge x 5/32" Ndle INJECT FOUR TIMES DAILY 4 each 3    polyethylene glycol (GLYCOLAX) 17 gram PwPk Take 17 g by mouth 3 (three) times a week. 24 each 12    safety needles 22 gauge x 1 1/2" Ndle To be used once a month for testosterone injections 50 each 6    testosterone cypionate (DEPOTESTOTERONE CYPIONATE) 200 mg/mL injection INJECT 1.5 ML IN THE MUSCLE EVERY 28 DAYS 10 mL 0    traZODone (DESYREL) 150 MG tablet Take 1 tablet (150 mg total) by mouth nightly. 90 tablet 4    traZODone (DESYREL) 150 MG tablet Take 1 tablet (150 mg total) by mouth every evening. 30 tablet 0     No current facility-administered medications for this visit.      Facility-Administered Medications Ordered in Other Visits   Medication Dose Route Frequency Provider Last Rate Last Dose    lactated ringers infusion   Intravenous Continuous Vadim Buchanan MD   Stopped at 08/27/18 0953       Physical Exam   Constitutional: He is oriented to person, place, and time. He appears well-developed. No distress.   HENT:   Head: Normocephalic and atraumatic.   Eyes: EOM are normal. Pupils are equal, round, and reactive to light.   Neck: Normal range of motion. Neck supple.   Cardiovascular: Normal rate and regular rhythm.   Pulmonary/Chest: Effort normal and breath sounds normal.   Musculoskeletal: Normal range of motion.   Neurological: He is alert and oriented to person, place, and time.   Skin: Skin is warm " and dry. No rash noted.   Psychiatric: He has a normal mood and affect. Thought content normal.   Nursing note and vitals reviewed.        EKG shows SR with 1st degree AV bolock. HR 98    Assessment:       1. Preoperative clearance    2. Rib pain on right side        Plan:   Preoperative clearance  -     EKG 12-lead  -     X-Ray Chest PA And Lateral; Future; Expected date: 11/14/2018  -     Comprehensive metabolic panel; Future; Expected date: 11/14/2018    Rib pain on right side  -     X-Ray Ribs 2 View Right; Future; Expected date: 11/14/2018      I have reviewed EKG, chest x-ray, and labs.  Patient is cleared for surgery on Monday  No Follow-up on file.    There are no Patient Instructions on file for this visit.

## 2018-11-15 ENCOUNTER — PATIENT MESSAGE (OUTPATIENT)
Dept: FAMILY MEDICINE | Facility: CLINIC | Age: 58
End: 2018-11-15

## 2018-11-16 ENCOUNTER — PATIENT MESSAGE (OUTPATIENT)
Dept: SURGERY | Facility: HOSPITAL | Age: 58
End: 2018-11-16

## 2018-11-16 NOTE — TELEPHONE ENCOUNTER
Pt has an active MyChart profile and test results are viewable. Awaiting result note. Routing encounter.

## 2018-11-17 ENCOUNTER — PATIENT MESSAGE (OUTPATIENT)
Dept: FAMILY MEDICINE | Facility: CLINIC | Age: 58
End: 2018-11-17

## 2018-11-21 ENCOUNTER — PATIENT MESSAGE (OUTPATIENT)
Dept: FAMILY MEDICINE | Facility: CLINIC | Age: 58
End: 2018-11-21

## 2018-11-21 ENCOUNTER — OFFICE VISIT (OUTPATIENT)
Dept: PODIATRY | Facility: CLINIC | Age: 58
End: 2018-11-21
Payer: COMMERCIAL

## 2018-11-21 VITALS
HEART RATE: 101 BPM | DIASTOLIC BLOOD PRESSURE: 90 MMHG | HEIGHT: 73 IN | SYSTOLIC BLOOD PRESSURE: 138 MMHG | WEIGHT: 315 LBS | BODY MASS INDEX: 41.75 KG/M2

## 2018-11-21 DIAGNOSIS — M14.672 CHARCOT'S JOINT OF FOOT, LEFT: ICD-10-CM

## 2018-11-21 DIAGNOSIS — Z98.890 POST-OPERATIVE STATE: Primary | ICD-10-CM

## 2018-11-21 PROCEDURE — 99999 PR PBB SHADOW E&M-EST. PATIENT-LVL III: CPT | Mod: PBBFAC,,, | Performed by: PODIATRIST

## 2018-11-21 PROCEDURE — 99024 POSTOP FOLLOW-UP VISIT: CPT | Mod: S$GLB,,, | Performed by: PODIATRIST

## 2018-11-21 NOTE — PROGRESS NOTES
Subjective:      Patient ID: Virgilio Acuña is a 58 y.o. male.    Chief Complaint: Post-op Evaluation (left - surgery 8/27) and Diabetes Mellitus (PCP:  Dr Hilton ([facility] NP) 11/14/18; HgbA1c: 11/5/18  8.0)    Virgilio is a 58 y.o. male who presents to the clinic for evaluation and treatment of high risk feet. Virgilio has a past medical history of Cellulitis of left index finger (2/16/2015), Charcot's joint of foot, left (08/2018), Dyslipidemia, Essential hypertension (2/16/2017), Infected finger joint (2/17/2015), Obese, S/P knee surgery, medial/lateral menisectomy (11/4/2013), and Type 2 diabetes mellitus with diabetic polyneuropathy, with long-term current use of insulin (2003).     9/4/18: Patient presents s/p 1 week left foot first ray arthrodesis (MC and NC joints) secondary to charcot. Patient relates pain is 10/10 controlled at times with PO medications. Pain related to the left shoulder since surgery with weakness to the left arm all since waking up from surgery, Orthopedic consult was placed he has not gotten an appointment with them yet.    9/10/18: Patient returns s/p week 2 left foot first ray arthrodesis (MC and NC joints) secondary to charcot. Pain somewhat improved but still 8/10. Non weight bearing to the foot although he relates he sometimes puts weight on the heel for transfers.     9/17/18: patient returns s/p week 3 left foot first ray arthrodesis (MC and NC joints) secondary to charcot. Done with the antibiotics, minimal pain to the foot although the shoulder still hurts him quite a bit. No new pedal complaints.     9/24/18: Patient returns s/p week 4 left foot first ray arthrodesis (MC and NC joints) secondary to charcot for dehiscence wound check and cast change.    10/1/18: Patient presents for follow up s/p 5 weeks first ray arthrodesis, in [facility] non weight bearing. Here for wound check and cast change. No new pedal complaints.     10/10/18: Patient presents for follow up s/p 6 weeks first ray  arthrodesis in [facility] non weight bearing. Here for wound check and cast change again. No new complaints.     10/17/18: No acute changes, walking in the cast, will have x-rays today and transition out of the cast.    10/24/18: Doing well ambulating in the tall boot, no new pedal complaints here for wound care.    11/7/18: Patient returns, ambulating in the tall orthopedic boot no new pedal complaints. Doing well overall    11/21/18: Patient returns for follow up left foot charcot s/p 3 months medial column fusion. Doing well today no pain in the foot and ready to get out of the boot.    PCP: Juanito Hilton MD    Date Last Seen by PCP: 8/2/18    Current shoe gear:  Affected Foot: Total contact cast     Unaffected Foot: Tennis shoes    History of Trauma: negative      Hemoglobin A1C   Date Value Ref Range Status   11/05/2018 8.0 (H) 4.0 - 5.6 % Final     Comment:     ADA Screening Guidelines:  5.7-6.4%  Consistent with prediabetes  >or=6.5%  Consistent with diabetes  High levels of fetal hemoglobin interfere with the HbA1C  assay. Heterozygous hemoglobin variants (HbS, HgC, etc)do  not significantly interfere with this assay.   However, presence of multiple variants may affect accuracy.     08/27/2018 8.1 (H) 0.0 - 5.6 % Final     Comment:     Reference Interval:  5.0 - 5.6 Normal   5.7 - 6.4 High Risk   > 6.5 Diabetic    Hgb A1c results are standardized based on the (NGSP) National   Glycohemoglobin Standardization Program.    Hemoglobin A1C levels are related to mean serum/plasma glucose   during the preceding 2-3 months.        03/08/2018 8.2 (H) 0.0 - 5.6 % Final     Comment:     Reference Interval:  5.0 - 5.6 Normal   5.7 - 6.4 High Risk   > 6.5 Diabetic    Hgb A1c results are standardized based on the (NGSP) National   Glycohemoglobin Standardization Program.    Hemoglobin A1C levels are related to mean serum/plasma glucose   during the preceding 2-3 months.            Review of Systems   Constitution: Negative for  chills and fever.   Cardiovascular: Positive for leg swelling. Negative for claudication.   Respiratory: Negative for shortness of breath.    Skin: Positive for color change, nail changes and poor wound healing. Negative for itching and rash.   Musculoskeletal: Negative for muscle cramps, muscle weakness and myalgias.   Gastrointestinal: Negative for nausea and vomiting.   Neurological: Positive for numbness and paresthesias. Negative for focal weakness and loss of balance.           Objective:      Physical Exam   Constitutional: He is oriented to person, place, and time. He appears well-developed and well-nourished. No distress.   Cardiovascular:   Pulses:       Dorsalis pedis pulses are 2+ on the right side, and 2+ on the left side.        Posterior tibial pulses are 2+ on the right side, and 2+ on the left side.   < 3 sec capillary refill time to toes 1-5 bilateral. Toes and feet are warm to touch proximally with normal distal cooling b/l. There is no hair growth on the feet and toes b/l. There is moderate edema left foot and mild edema right.      Musculoskeletal:   Left second toe amputation    Left foot now more narrow in appearance with the first ray properly reduced, minimal edema to the foot and no pain with palpation throughout the area of surgery.    Equinus noted b/l ankles with < 10 deg DF noted. MMT 5/5 in DF/PF/Inv/Ev resistance with no reproduction of pain in any direction. Passive range of motion of ankle and pedal joints is painless b/l.     Feet:   Right Foot:   Protective Sensation: 10 sites tested. 0 sites sensed.   Left Foot:   Protective Sensation: 10 sites tested. 0 sites sensed.   Neurological: He is alert and oriented to person, place, and time. He has normal strength. He displays no atrophy and no tremor. A sensory deficit is present. He exhibits normal muscle tone.   Negative tinel sign bilateral.   Skin: Skin is warm. No abrasion, no bruising, no burn, no ecchymosis, no laceration, no  lesion, no petechiae and no rash noted. He is not diaphoretic. No cyanosis or erythema. No pallor. Nails show no clubbing.   Incision overlying the left second metatarsal base is well healed    Incision overlying the first ray is now well healed with 100% epithelial covering, no open wounds noted.        Psychiatric: He has a normal mood and affect. His behavior is normal.             Assessment:       Encounter Diagnoses   Name Primary?    Post-operative state Yes    Charcot's joint of foot, left             Plan:       Virgilio was seen today for post-op evaluation and diabetes mellitus.    Diagnoses and all orders for this visit:    Post-operative state  -     X-Ray Foot Complete Left; Future  -     ANKLE FOOT ORTHOSIS FOR HOME USE    Charcot's joint of foot, left  -     X-Ray Foot Complete Left; Future  -     ANKLE FOOT ORTHOSIS FOR HOME USE      I counseled the patient on his conditions, their implications and medical management.    Discussed importance of healthy diet and weight loss as to his diabetes control and healing potential.     Transition slowly to his diabetic shoes, no high impact activity/    AFO written for him to begin filling.     Reviewed x-rays and there is a distal screw backing out will watch for now as there is no pain or issues with the screw at this time. .     X-ray in 1 week to check after he begins weight bearing, follow up in office in 2 weeks.    Inocente Smith DPM

## 2018-11-23 ENCOUNTER — PATIENT MESSAGE (OUTPATIENT)
Dept: PODIATRY | Facility: CLINIC | Age: 58
End: 2018-11-23

## 2018-11-26 ENCOUNTER — PATIENT MESSAGE (OUTPATIENT)
Dept: PODIATRY | Facility: CLINIC | Age: 58
End: 2018-11-26

## 2018-12-05 ENCOUNTER — PATIENT MESSAGE (OUTPATIENT)
Dept: PODIATRY | Facility: CLINIC | Age: 58
End: 2018-12-05

## 2018-12-05 ENCOUNTER — OFFICE VISIT (OUTPATIENT)
Dept: PODIATRY | Facility: CLINIC | Age: 58
End: 2018-12-05
Payer: COMMERCIAL

## 2018-12-05 ENCOUNTER — HOSPITAL ENCOUNTER (OUTPATIENT)
Dept: RADIOLOGY | Facility: HOSPITAL | Age: 58
Discharge: HOME | End: 2018-12-05
Attending: PODIATRIST
Payer: COMMERCIAL

## 2018-12-05 VITALS
HEART RATE: 87 BPM | BODY MASS INDEX: 41.75 KG/M2 | WEIGHT: 315 LBS | DIASTOLIC BLOOD PRESSURE: 79 MMHG | HEIGHT: 73 IN | SYSTOLIC BLOOD PRESSURE: 149 MMHG

## 2018-12-05 DIAGNOSIS — M14.672 CHARCOT'S JOINT OF FOOT, LEFT: ICD-10-CM

## 2018-12-05 DIAGNOSIS — Z98.890 POST-OPERATIVE STATE: Primary | ICD-10-CM

## 2018-12-05 DIAGNOSIS — Z98.890 POST-OPERATIVE STATE: ICD-10-CM

## 2018-12-05 PROCEDURE — 3008F BODY MASS INDEX DOCD: CPT | Mod: CPTII,S$GLB,, | Performed by: PODIATRIST

## 2018-12-05 PROCEDURE — 3078F DIAST BP <80 MM HG: CPT | Mod: CPTII,S$GLB,, | Performed by: PODIATRIST

## 2018-12-05 PROCEDURE — 73630 X-RAY EXAM OF FOOT: CPT | Mod: 26,LT,, | Performed by: RADIOLOGY

## 2018-12-05 PROCEDURE — 99999 PR PBB SHADOW E&M-EST. PATIENT-LVL III: CPT | Mod: PBBFAC,,, | Performed by: PODIATRIST

## 2018-12-05 PROCEDURE — 99213 OFFICE O/P EST LOW 20 MIN: CPT | Mod: S$GLB,,, | Performed by: PODIATRIST

## 2018-12-05 PROCEDURE — 3077F SYST BP >= 140 MM HG: CPT | Mod: CPTII,S$GLB,, | Performed by: PODIATRIST

## 2018-12-05 PROCEDURE — 73630 X-RAY EXAM OF FOOT: CPT | Mod: TC,PO,LT

## 2018-12-09 ENCOUNTER — PATIENT MESSAGE (OUTPATIENT)
Dept: FAMILY MEDICINE | Facility: CLINIC | Age: 58
End: 2018-12-09

## 2018-12-10 RX ORDER — PEN NEEDLE, DIABETIC 30 GX3/16"
NEEDLE, DISPOSABLE MISCELLANEOUS
Qty: 4 EACH | Refills: 12 | Status: SHIPPED | OUTPATIENT
Start: 2018-12-10 | End: 2019-05-29 | Stop reason: SDUPTHER

## 2018-12-10 RX ORDER — SYRINGE,SAFETY WITH NEEDLE,1ML 25GX1"
SYRINGE (EA) MISCELLANEOUS
Qty: 100 EACH | Refills: 12 | Status: SHIPPED | OUTPATIENT
Start: 2018-12-10 | End: 2019-05-29 | Stop reason: SDUPTHER

## 2018-12-17 ENCOUNTER — PATIENT MESSAGE (OUTPATIENT)
Dept: FAMILY MEDICINE | Facility: CLINIC | Age: 58
End: 2018-12-17

## 2018-12-17 NOTE — PROGRESS NOTES
Subjective:      Patient ID: Virgilio Acuña is a 58 y.o. male.    Chief Complaint: Post-op Evaluation (Left foot surgery, PCP-(MINNA Rowell)-11/14/2018) and Diabetes Mellitus (A1c-8.0-11/05/2018)    Virgilio is a 58 y.o. male who presents to the clinic for evaluation and treatment of high risk feet. Virgilio has a past medical history of Cellulitis of left index finger (2/16/2015), Charcot's joint of foot, left (08/2018), Dyslipidemia, Essential hypertension (2/16/2017), Infected finger joint (2/17/2015), Obese, S/P knee surgery, medial/lateral menisectomy (11/4/2013), and Type 2 diabetes mellitus with diabetic polyneuropathy, with long-term current use of insulin (2003).     9/4/18: Patient presents s/p 1 week left foot first ray arthrodesis (MC and NC joints) secondary to charcot. Patient relates pain is 10/10 controlled at times with PO medications. Pain related to the left shoulder since surgery with weakness to the left arm all since waking up from surgery, Orthopedic consult was placed he has not gotten an appointment with them yet.    9/10/18: Patient returns s/p week 2 left foot first ray arthrodesis (MC and NC joints) secondary to charcot. Pain somewhat improved but still 8/10. Non weight bearing to the foot although he relates he sometimes puts weight on the heel for transfers.     9/17/18: patient returns s/p week 3 left foot first ray arthrodesis (MC and NC joints) secondary to charcot. Done with the antibiotics, minimal pain to the foot although the shoulder still hurts him quite a bit. No new pedal complaints.     9/24/18: Patient returns s/p week 4 left foot first ray arthrodesis (MC and NC joints) secondary to charcot for dehiscence wound check and cast change.    10/1/18: Patient presents for follow up s/p 5 weeks first ray arthrodesis, in TCC non weight bearing. Here for wound check and cast change. No new pedal complaints.     10/10/18: Patient presents for follow up s/p 6 weeks first ray  arthrodesis in TCC non weight bearing. Here for wound check and cast change again. No new complaints.     10/17/18: No acute changes, walking in the cast, will have x-rays today and transition out of the cast.    10/24/18: Doing well ambulating in the tall boot, no new pedal complaints here for wound care.    11/7/18: Patient returns, ambulating in the tall orthopedic boot no new pedal complaints. Doing well overall    11/21/18: Patient returns for follow up left foot charcot s/p 3 months medial column fusion. Doing well today no pain in the foot and ready to get out of the boot.    12/5/18: Patient returns for follow up s/p 3.5 months medial column fusion, now ambulating in his diabetic shoes without complaint, doing well. Has not noticed an increase in pain, swelling or redness to the foot.     PCP: Juanito Hilton MD    Date Last Seen by PCP: 8/2/18    Current shoe gear:  Affected Foot: Total contact cast     Unaffected Foot: Tennis shoes    History of Trauma: negative      Hemoglobin A1C   Date Value Ref Range Status   11/05/2018 8.0 (H) 4.0 - 5.6 % Final     Comment:     ADA Screening Guidelines:  5.7-6.4%  Consistent with prediabetes  >or=6.5%  Consistent with diabetes  High levels of fetal hemoglobin interfere with the HbA1C  assay. Heterozygous hemoglobin variants (HbS, HgC, etc)do  not significantly interfere with this assay.   However, presence of multiple variants may affect accuracy.     08/27/2018 8.1 (H) 0.0 - 5.6 % Final     Comment:     Reference Interval:  5.0 - 5.6 Normal   5.7 - 6.4 High Risk   > 6.5 Diabetic    Hgb A1c results are standardized based on the (NGSP) National   Glycohemoglobin Standardization Program.    Hemoglobin A1C levels are related to mean serum/plasma glucose   during the preceding 2-3 months.        03/08/2018 8.2 (H) 0.0 - 5.6 % Final     Comment:     Reference Interval:  5.0 - 5.6 Normal   5.7 - 6.4 High Risk   > 6.5 Diabetic    Hgb A1c results are standardized based on the  (NGSP) National   Glycohemoglobin Standardization Program.    Hemoglobin A1C levels are related to mean serum/plasma glucose   during the preceding 2-3 months.            Review of Systems   Constitution: Negative for chills and fever.   Cardiovascular: Positive for leg swelling. Negative for claudication.   Respiratory: Negative for shortness of breath.    Skin: Positive for color change, nail changes and poor wound healing. Negative for itching and rash.   Musculoskeletal: Negative for muscle cramps, muscle weakness and myalgias.   Gastrointestinal: Negative for nausea and vomiting.   Neurological: Positive for numbness and paresthesias. Negative for focal weakness and loss of balance.           Objective:      Physical Exam   Constitutional: He is oriented to person, place, and time. He appears well-developed and well-nourished. No distress.   Cardiovascular:   Pulses:       Dorsalis pedis pulses are 2+ on the right side, and 2+ on the left side.        Posterior tibial pulses are 2+ on the right side, and 2+ on the left side.   < 3 sec capillary refill time to toes 1-5 bilateral. Toes and feet are warm to touch proximally with normal distal cooling b/l. There is no hair growth on the feet and toes b/l. There is moderate edema left foot and mild edema right.      Musculoskeletal:   Left second toe amputation    Left foot now more narrow in appearance with the first ray properly reduced, minimal edema to the foot and no pain with palpation throughout the area of surgery.    Equinus noted b/l ankles with < 10 deg DF noted. MMT 5/5 in DF/PF/Inv/Ev resistance with no reproduction of pain in any direction. Passive range of motion of ankle and pedal joints is painless b/l.     Feet:   Right Foot:   Protective Sensation: 10 sites tested. 0 sites sensed.   Left Foot:   Protective Sensation: 10 sites tested. 0 sites sensed.   Neurological: He is alert and oriented to person, place, and time. He has normal strength. He  displays no atrophy and no tremor. A sensory deficit is present. He exhibits normal muscle tone.   Negative tinel sign bilateral.   Skin: Skin is warm. No abrasion, no bruising, no burn, no ecchymosis, no laceration, no lesion, no petechiae and no rash noted. He is not diaphoretic. No cyanosis or erythema. No pallor. Nails show no clubbing.   Incision overlying the left second metatarsal base is well healed    Incision overlying the first ray is now well healed with 100% epithelial covering, no open wounds noted.        Psychiatric: He has a normal mood and affect. His behavior is normal.             Assessment:       Encounter Diagnosis   Name Primary?    Post-operative state Yes            Plan:       Virgilio was seen today for post-op evaluation and diabetes mellitus.    Diagnoses and all orders for this visit:    Post-operative state  -     X-Ray Foot Complete Left; Future      I counseled the patient on his conditions, their implications and medical management.    Discussed importance of healthy diet and weight loss as to his diabetes control and healing potential.     X-rays reviewed with patient, no acute changes since he started weight bearing in shoes.    Still no high impact activity/    AFO written for him to begin filling, relates he is working on it    Reviewed x-rays and there is a distal screw backing out will watch for now as there is no pain or issues with the screw at this time. .     Follow up again at the end of December with final x-rays, he starts working beginning of January as long as all goes well between now and January    Inocente Smith DPM

## 2018-12-26 ENCOUNTER — OFFICE VISIT (OUTPATIENT)
Dept: PODIATRY | Facility: CLINIC | Age: 58
End: 2018-12-26
Payer: COMMERCIAL

## 2018-12-26 ENCOUNTER — HOSPITAL ENCOUNTER (OUTPATIENT)
Dept: RADIOLOGY | Facility: HOSPITAL | Age: 58
Discharge: HOME | End: 2018-12-26
Attending: PODIATRIST
Payer: COMMERCIAL

## 2018-12-26 ENCOUNTER — PATIENT MESSAGE (OUTPATIENT)
Dept: PODIATRY | Facility: CLINIC | Age: 58
End: 2018-12-26

## 2018-12-26 VITALS
SYSTOLIC BLOOD PRESSURE: 142 MMHG | HEIGHT: 73 IN | WEIGHT: 315 LBS | HEART RATE: 88 BPM | DIASTOLIC BLOOD PRESSURE: 80 MMHG | BODY MASS INDEX: 41.75 KG/M2

## 2018-12-26 DIAGNOSIS — E11.42 DIABETIC POLYNEUROPATHY ASSOCIATED WITH TYPE 2 DIABETES MELLITUS: ICD-10-CM

## 2018-12-26 DIAGNOSIS — Z98.890 POST-OPERATIVE STATE: ICD-10-CM

## 2018-12-26 DIAGNOSIS — E11.49 TYPE II DIABETES MELLITUS WITH NEUROLOGICAL MANIFESTATIONS: ICD-10-CM

## 2018-12-26 DIAGNOSIS — M14.672 CHARCOT'S JOINT OF FOOT, LEFT: Primary | ICD-10-CM

## 2018-12-26 PROCEDURE — 73630 X-RAY EXAM OF FOOT: CPT | Mod: 26,LT,, | Performed by: RADIOLOGY

## 2018-12-26 PROCEDURE — 3079F DIAST BP 80-89 MM HG: CPT | Mod: CPTII,S$GLB,, | Performed by: PODIATRIST

## 2018-12-26 PROCEDURE — 99213 OFFICE O/P EST LOW 20 MIN: CPT | Mod: S$GLB,,, | Performed by: PODIATRIST

## 2018-12-26 PROCEDURE — 3077F SYST BP >= 140 MM HG: CPT | Mod: CPTII,S$GLB,, | Performed by: PODIATRIST

## 2018-12-26 PROCEDURE — 3008F BODY MASS INDEX DOCD: CPT | Mod: CPTII,S$GLB,, | Performed by: PODIATRIST

## 2018-12-26 PROCEDURE — 3045F PR MOST RECENT HEMOGLOBIN A1C LEVEL 7.0-9.0%: CPT | Mod: CPTII,S$GLB,, | Performed by: PODIATRIST

## 2018-12-26 PROCEDURE — 73630 X-RAY EXAM OF FOOT: CPT | Mod: TC,PO,LT

## 2018-12-26 PROCEDURE — 99999 PR PBB SHADOW E&M-EST. PATIENT-LVL III: CPT | Mod: PBBFAC,,, | Performed by: PODIATRIST

## 2018-12-26 NOTE — PROGRESS NOTES
Subjective:      Patient ID: Virgilio Acuña is a 58 y.o. male.    Chief Complaint: Post-op Evaluation (left foot ) and Diabetes Mellitus (PCP:  Dr Hilton ([facility] NP) 11/14/18; HgbA1c: 11/5/1/  8.0)    Virgilio is a 58 y.o. male who presents to the clinic for evaluation and treatment of high risk feet. Virgilio has a past medical history of Cellulitis of left index finger (2/16/2015), Charcot's joint of foot, left (08/2018), Dyslipidemia, Essential hypertension (2/16/2017), Infected finger joint (2/17/2015), Obese, S/P knee surgery, medial/lateral menisectomy (11/4/2013), and Type 2 diabetes mellitus with diabetic polyneuropathy, with long-term current use of insulin (2003).     9/4/18: Patient presents s/p 1 week left foot first ray arthrodesis (MC and NC joints) secondary to charcot. Patient relates pain is 10/10 controlled at times with PO medications. Pain related to the left shoulder since surgery with weakness to the left arm all since waking up from surgery, Orthopedic consult was placed he has not gotten an appointment with them yet.    9/10/18: Patient returns s/p week 2 left foot first ray arthrodesis (MC and NC joints) secondary to charcot. Pain somewhat improved but still 8/10. Non weight bearing to the foot although he relates he sometimes puts weight on the heel for transfers.     9/17/18: patient returns s/p week 3 left foot first ray arthrodesis (MC and NC joints) secondary to charcot. Done with the antibiotics, minimal pain to the foot although the shoulder still hurts him quite a bit. No new pedal complaints.     9/24/18: Patient returns s/p week 4 left foot first ray arthrodesis (MC and NC joints) secondary to charcot for dehiscence wound check and cast change.    10/1/18: Patient presents for follow up s/p 5 weeks first ray arthrodesis, in TCC non weight bearing. Here for wound check and cast change. No new pedal complaints.     10/10/18: Patient presents for follow up s/p 6 weeks first ray arthrodesis  in TCC non weight bearing. Here for wound check and cast change again. No new complaints.     10/17/18: No acute changes, walking in the cast, will have x-rays today and transition out of the cast.    10/24/18: Doing well ambulating in the tall boot, no new pedal complaints here for wound care.    11/7/18: Patient returns, ambulating in the tall orthopedic boot no new pedal complaints. Doing well overall    11/21/18: Patient returns for follow up left foot charcot s/p 3 months medial column fusion. Doing well today no pain in the foot and ready to get out of the boot.    12/5/18: Patient returns for follow up s/p 3.5 months medial column fusion, now ambulating in his diabetic shoes without complaint, doing well. Has not noticed an increase in pain, swelling or redness to the foot.     12/26/18: Patient returns for follow up s/p 4.5 months medial column fusion for charcot, ambulating fully in his diabetic shoes without pain, swelling or redness.     PCP: Juanito Hilton MD    Date Last Seen by PCP: 8/2/18    Current shoe gear:  Affected Foot: Total contact cast     Unaffected Foot: Tennis shoes    History of Trauma: negative      Hemoglobin A1C   Date Value Ref Range Status   11/05/2018 8.0 (H) 4.0 - 5.6 % Final     Comment:     ADA Screening Guidelines:  5.7-6.4%  Consistent with prediabetes  >or=6.5%  Consistent with diabetes  High levels of fetal hemoglobin interfere with the HbA1C  assay. Heterozygous hemoglobin variants (HbS, HgC, etc)do  not significantly interfere with this assay.   However, presence of multiple variants may affect accuracy.     08/27/2018 8.1 (H) 0.0 - 5.6 % Final     Comment:     Reference Interval:  5.0 - 5.6 Normal   5.7 - 6.4 High Risk   > 6.5 Diabetic    Hgb A1c results are standardized based on the (NGSP) National   Glycohemoglobin Standardization Program.    Hemoglobin A1C levels are related to mean serum/plasma glucose   during the preceding 2-3 months.        03/08/2018 8.2 (H) 0.0 -  5.6 % Final     Comment:     Reference Interval:  5.0 - 5.6 Normal   5.7 - 6.4 High Risk   > 6.5 Diabetic    Hgb A1c results are standardized based on the (NGSP) National   Glycohemoglobin Standardization Program.    Hemoglobin A1C levels are related to mean serum/plasma glucose   during the preceding 2-3 months.            Review of Systems   Constitution: Negative for chills and fever.   Cardiovascular: Positive for leg swelling. Negative for claudication.   Respiratory: Negative for shortness of breath.    Skin: Positive for color change, nail changes and poor wound healing. Negative for itching and rash.   Musculoskeletal: Negative for muscle cramps, muscle weakness and myalgias.   Gastrointestinal: Negative for nausea and vomiting.   Neurological: Positive for numbness and paresthesias. Negative for focal weakness and loss of balance.           Objective:      Physical Exam   Constitutional: He is oriented to person, place, and time. He appears well-developed and well-nourished. No distress.   Cardiovascular:   Pulses:       Dorsalis pedis pulses are 2+ on the right side, and 2+ on the left side.        Posterior tibial pulses are 2+ on the right side, and 2+ on the left side.   < 3 sec capillary refill time to toes 1-5 bilateral. Toes and feet are warm to touch proximally with normal distal cooling b/l. There is no hair growth on the feet and toes b/l. There is moderate edema left foot and mild edema right.      Musculoskeletal:   Left second toe amputation    Left foot now more narrow in appearance with the first ray properly reduced, minimal edema to the foot and no pain with palpation throughout the area of surgery.    Equinus noted b/l ankles with < 10 deg DF noted. MMT 5/5 in DF/PF/Inv/Ev resistance with no reproduction of pain in any direction. Passive range of motion of ankle and pedal joints is painless b/l.     Feet:   Right Foot:   Protective Sensation: 10 sites tested. 0 sites sensed.   Left Foot:    Protective Sensation: 10 sites tested. 0 sites sensed.   Neurological: He is alert and oriented to person, place, and time. He has normal strength. He displays no atrophy and no tremor. A sensory deficit is present. He exhibits normal muscle tone.   Negative tinel sign bilateral.   Skin: Skin is warm. No abrasion, no bruising, no burn, no ecchymosis, no laceration, no lesion, no petechiae and no rash noted. He is not diaphoretic. No cyanosis or erythema. No pallor. Nails show no clubbing.   Incision overlying the left second metatarsal base is well healed    Incision overlying the first ray is now well healed with 100% epithelial covering, no open wounds noted.        Psychiatric: He has a normal mood and affect. His behavior is normal.             Assessment:       Encounter Diagnoses   Name Primary?    Charcot's joint of foot, left Yes    Diabetic polyneuropathy associated with type 2 diabetes mellitus     Type II diabetes mellitus with neurological manifestations             Plan:       Virgilio was seen today for post-op evaluation and diabetes mellitus.    Diagnoses and all orders for this visit:    Charcot's joint of foot, left  -     THC (MARIJUANA), URINE, CONFIRMATION    Diabetic polyneuropathy associated with type 2 diabetes mellitus  -     THC (MARIJUANA), URINE, CONFIRMATION    Type II diabetes mellitus with neurological manifestations  -     THC (MARIJUANA), URINE, CONFIRMATION      I counseled the patient on his conditions, their implications and medical management.    Discussed importance of healthy diet and weight loss as to his diabetes control and healing potential.     X-rays reviewed with patient, no acute changes since he started weight bearing in shoes.    Still no high impact activity/    AFO written for him right now he has been ambulating in the diabetic shoe and doing well    Reviewed x-rays and there is a distal screw backing out will watch for now as there is no pain or issues with the  screw at this time.     Starts back to work beginning of January    Follow up again at the end of January to ensure no complications after starting with work    Inocente Smith DPM

## 2018-12-27 ENCOUNTER — PATIENT MESSAGE (OUTPATIENT)
Dept: PODIATRY | Facility: CLINIC | Age: 58
End: 2018-12-27

## 2018-12-28 ENCOUNTER — PATIENT MESSAGE (OUTPATIENT)
Dept: PODIATRY | Facility: CLINIC | Age: 58
End: 2018-12-28

## 2018-12-30 ENCOUNTER — PATIENT MESSAGE (OUTPATIENT)
Dept: FAMILY MEDICINE | Facility: CLINIC | Age: 58
End: 2018-12-30

## 2018-12-30 ENCOUNTER — PATIENT MESSAGE (OUTPATIENT)
Dept: PODIATRY | Facility: CLINIC | Age: 58
End: 2018-12-30

## 2019-01-07 ENCOUNTER — PATIENT MESSAGE (OUTPATIENT)
Dept: FAMILY MEDICINE | Facility: CLINIC | Age: 59
End: 2019-01-07

## 2019-01-07 DIAGNOSIS — G62.9 NEUROPATHY: ICD-10-CM

## 2019-01-07 RX ORDER — GABAPENTIN 600 MG/1
600 TABLET ORAL 3 TIMES DAILY
Qty: 90 TABLET | Refills: 3 | Status: SHIPPED | OUTPATIENT
Start: 2019-01-07 | End: 2019-02-07 | Stop reason: SDUPTHER

## 2019-01-11 ENCOUNTER — CLINICAL SUPPORT (OUTPATIENT)
Dept: FAMILY MEDICINE | Facility: CLINIC | Age: 59
End: 2019-01-11

## 2019-01-11 DIAGNOSIS — Z00.00 ROUTINE GENERAL MEDICAL EXAMINATION AT A HEALTH CARE FACILITY: Primary | ICD-10-CM

## 2019-01-11 PROCEDURE — 80305 DRUG TEST PRSMV DIR OPT OBS: CPT | Mod: S$GLB,,, | Performed by: FAMILY MEDICINE

## 2019-01-11 PROCEDURE — 80305 PR NON-DOT DRUG SCREENS: ICD-10-PCS | Mod: S$GLB,,, | Performed by: FAMILY MEDICINE

## 2019-01-11 NOTE — PROGRESS NOTES
Virgilio has presented today on behalf of [facility] the [facility]. Virgilio Acuña has completed Non-DOT Drug Screen .  Total charge: $55  Keyonna Rasheed

## 2019-01-24 ENCOUNTER — PATIENT MESSAGE (OUTPATIENT)
Dept: PODIATRY | Facility: CLINIC | Age: 59
End: 2019-01-24

## 2019-01-24 ENCOUNTER — PATIENT MESSAGE (OUTPATIENT)
Dept: FAMILY MEDICINE | Facility: CLINIC | Age: 59
End: 2019-01-24

## 2019-01-30 ENCOUNTER — PATIENT MESSAGE (OUTPATIENT)
Dept: PODIATRY | Facility: CLINIC | Age: 59
End: 2019-01-30

## 2019-01-30 NOTE — TELEPHONE ENCOUNTER
Patient stated that his left foot is extremely painful.  Asking if he may need an xray.  Please advise.

## 2019-01-31 DIAGNOSIS — M79.672 LEFT FOOT PAIN: Primary | ICD-10-CM

## 2019-02-01 DIAGNOSIS — E11.9 TYPE 2 DIABETES MELLITUS WITHOUT COMPLICATION: ICD-10-CM

## 2019-02-05 ENCOUNTER — PATIENT MESSAGE (OUTPATIENT)
Dept: PODIATRY | Facility: CLINIC | Age: 59
End: 2019-02-05

## 2019-02-06 ENCOUNTER — PATIENT MESSAGE (OUTPATIENT)
Dept: PODIATRY | Facility: CLINIC | Age: 59
End: 2019-02-06

## 2019-02-07 DIAGNOSIS — G62.9 NEUROPATHY: ICD-10-CM

## 2019-02-07 RX ORDER — GABAPENTIN 600 MG/1
TABLET ORAL
Qty: 270 TABLET | Refills: 4 | Status: SHIPPED | OUTPATIENT
Start: 2019-02-07 | End: 2019-05-29 | Stop reason: SDUPTHER

## 2019-02-11 ENCOUNTER — PATIENT MESSAGE (OUTPATIENT)
Dept: PODIATRY | Facility: CLINIC | Age: 59
End: 2019-02-11

## 2019-02-11 ENCOUNTER — OFFICE VISIT (OUTPATIENT)
Dept: PODIATRY | Facility: CLINIC | Age: 59
End: 2019-02-11
Payer: COMMERCIAL

## 2019-02-11 ENCOUNTER — HOSPITAL ENCOUNTER (OUTPATIENT)
Dept: RADIOLOGY | Facility: HOSPITAL | Age: 59
Discharge: HOME | End: 2019-02-11
Attending: PODIATRIST
Payer: COMMERCIAL

## 2019-02-11 VITALS
HEART RATE: 81 BPM | BODY MASS INDEX: 41.75 KG/M2 | HEIGHT: 73 IN | WEIGHT: 315 LBS | SYSTOLIC BLOOD PRESSURE: 144 MMHG | DIASTOLIC BLOOD PRESSURE: 71 MMHG

## 2019-02-11 DIAGNOSIS — Z98.890 POST-OPERATIVE STATE: ICD-10-CM

## 2019-02-11 DIAGNOSIS — M14.672 CHARCOT'S JOINT OF FOOT, LEFT: Primary | ICD-10-CM

## 2019-02-11 DIAGNOSIS — M79.672 LEFT FOOT PAIN: ICD-10-CM

## 2019-02-11 PROCEDURE — 3077F SYST BP >= 140 MM HG: CPT | Mod: CPTII,S$GLB,, | Performed by: PODIATRIST

## 2019-02-11 PROCEDURE — 73630 X-RAY EXAM OF FOOT: CPT | Mod: TC,PO,LT

## 2019-02-11 PROCEDURE — 73630 XR FOOT COMPLETE 3 VIEW LEFT: ICD-10-PCS | Mod: 26,LT,, | Performed by: RADIOLOGY

## 2019-02-11 PROCEDURE — 3008F BODY MASS INDEX DOCD: CPT | Mod: CPTII,S$GLB,, | Performed by: PODIATRIST

## 2019-02-11 PROCEDURE — 99213 PR OFFICE/OUTPT VISIT, EST, LEVL III, 20-29 MIN: ICD-10-PCS | Mod: S$GLB,,, | Performed by: PODIATRIST

## 2019-02-11 PROCEDURE — 3077F PR MOST RECENT SYSTOLIC BLOOD PRESSURE >= 140 MM HG: ICD-10-PCS | Mod: CPTII,S$GLB,, | Performed by: PODIATRIST

## 2019-02-11 PROCEDURE — 99999 PR PBB SHADOW E&M-EST. PATIENT-LVL III: CPT | Mod: PBBFAC,,, | Performed by: PODIATRIST

## 2019-02-11 PROCEDURE — 73630 X-RAY EXAM OF FOOT: CPT | Mod: 26,LT,, | Performed by: RADIOLOGY

## 2019-02-11 PROCEDURE — 3008F PR BODY MASS INDEX (BMI) DOCUMENTED: ICD-10-PCS | Mod: CPTII,S$GLB,, | Performed by: PODIATRIST

## 2019-02-11 PROCEDURE — 99999 PR PBB SHADOW E&M-EST. PATIENT-LVL III: ICD-10-PCS | Mod: PBBFAC,,, | Performed by: PODIATRIST

## 2019-02-11 PROCEDURE — 3078F DIAST BP <80 MM HG: CPT | Mod: CPTII,S$GLB,, | Performed by: PODIATRIST

## 2019-02-11 PROCEDURE — 99213 OFFICE O/P EST LOW 20 MIN: CPT | Mod: S$GLB,,, | Performed by: PODIATRIST

## 2019-02-11 PROCEDURE — 3078F PR MOST RECENT DIASTOLIC BLOOD PRESSURE < 80 MM HG: ICD-10-PCS | Mod: CPTII,S$GLB,, | Performed by: PODIATRIST

## 2019-02-11 NOTE — PROGRESS NOTES
Subjective:      Patient ID: Virgilio Acuña is a 58 y.o. male.    Chief Complaint: Post-op Evaluation (Left foot surgery, PCP-(MINNA Rowell)-11/14/2018) and Diabetes Mellitus (A1c-8.0-11/05/2018)    Virgilio is a 58 y.o. male who presents to the clinic for evaluation and treatment of high risk feet. Virgilio has a past medical history of Cellulitis of left index finger (2/16/2015), Charcot's joint of foot, left (08/2018), Dyslipidemia, Essential hypertension (2/16/2017), Infected finger joint (2/17/2015), Obese, S/P knee surgery, medial/lateral menisectomy (11/4/2013), and Type 2 diabetes mellitus with diabetic polyneuropathy, with long-term current use of insulin (2003).     9/4/18: Patient presents s/p 1 week left foot first ray arthrodesis (MC and NC joints) secondary to charcot. Patient relates pain is 10/10 controlled at times with PO medications. Pain related to the left shoulder since surgery with weakness to the left arm all since waking up from surgery, Orthopedic consult was placed he has not gotten an appointment with them yet.    9/10/18: Patient returns s/p week 2 left foot first ray arthrodesis (MC and NC joints) secondary to charcot. Pain somewhat improved but still 8/10. Non weight bearing to the foot although he relates he sometimes puts weight on the heel for transfers.     9/17/18: patient returns s/p week 3 left foot first ray arthrodesis (MC and NC joints) secondary to charcot. Done with the antibiotics, minimal pain to the foot although the shoulder still hurts him quite a bit. No new pedal complaints.     9/24/18: Patient returns s/p week 4 left foot first ray arthrodesis (MC and NC joints) secondary to charcot for dehiscence wound check and cast change.    10/1/18: Patient presents for follow up s/p 5 weeks first ray arthrodesis, in TCC non weight bearing. Here for wound check and cast change. No new pedal complaints.     10/10/18: Patient presents for follow up s/p 6 weeks first ray  arthrodesis in TCC non weight bearing. Here for wound check and cast change again. No new complaints.     10/17/18: No acute changes, walking in the cast, will have x-rays today and transition out of the cast.    10/24/18: Doing well ambulating in the tall boot, no new pedal complaints here for wound care.    11/7/18: Patient returns, ambulating in the tall orthopedic boot no new pedal complaints. Doing well overall    11/21/18: Patient returns for follow up left foot charcot s/p 3 months medial column fusion. Doing well today no pain in the foot and ready to get out of the boot.    12/5/18: Patient returns for follow up s/p 3.5 months medial column fusion, now ambulating in his diabetic shoes without complaint, doing well. Has not noticed an increase in pain, swelling or redness to the foot.     12/26/18: Patient returns for follow up s/p 4.5 months medial column fusion for charcot, ambulating fully in his diabetic shoes without pain, swelling or redness.     2/11/19; patient returns for follow up medial column fusion for charcot, ambulating in his diabetic shoes without pain, minimal swelling.     PCP: Juanito Hilton MD    Date Last Seen by PCP: 8/2/18    Current shoe gear:  Affected Foot: Total contact cast     Unaffected Foot: Tennis shoes    History of Trauma: negative      Hemoglobin A1C   Date Value Ref Range Status   11/05/2018 8.0 (H) 4.0 - 5.6 % Final     Comment:     ADA Screening Guidelines:  5.7-6.4%  Consistent with prediabetes  >or=6.5%  Consistent with diabetes  High levels of fetal hemoglobin interfere with the HbA1C  assay. Heterozygous hemoglobin variants (HbS, HgC, etc)do  not significantly interfere with this assay.   However, presence of multiple variants may affect accuracy.     08/27/2018 8.1 (H) 0.0 - 5.6 % Final     Comment:     Reference Interval:  5.0 - 5.6 Normal   5.7 - 6.4 High Risk   > 6.5 Diabetic    Hgb A1c results are standardized based on the (NGSP) National   Glycohemoglobin  Standardization Program.    Hemoglobin A1C levels are related to mean serum/plasma glucose   during the preceding 2-3 months.        03/08/2018 8.2 (H) 0.0 - 5.6 % Final     Comment:     Reference Interval:  5.0 - 5.6 Normal   5.7 - 6.4 High Risk   > 6.5 Diabetic    Hgb A1c results are standardized based on the (NGSP) National   Glycohemoglobin Standardization Program.    Hemoglobin A1C levels are related to mean serum/plasma glucose   during the preceding 2-3 months.            Review of Systems   Constitution: Negative for chills and fever.   Cardiovascular: Positive for leg swelling. Negative for claudication.   Respiratory: Negative for shortness of breath.    Skin: Positive for color change, nail changes and poor wound healing. Negative for itching and rash.   Musculoskeletal: Negative for muscle cramps, muscle weakness and myalgias.   Gastrointestinal: Negative for nausea and vomiting.   Neurological: Positive for numbness and paresthesias. Negative for focal weakness and loss of balance.           Objective:      Physical Exam   Constitutional: He is oriented to person, place, and time. He appears well-developed and well-nourished. No distress.   Cardiovascular:   Pulses:       Dorsalis pedis pulses are 2+ on the right side, and 2+ on the left side.        Posterior tibial pulses are 2+ on the right side, and 2+ on the left side.   < 3 sec capillary refill time to toes 1-5 bilateral. Toes and feet are warm to touch proximally with normal distal cooling b/l. There is no hair growth on the feet and toes b/l. There is moderate edema left foot and mild edema right.      Musculoskeletal:   Left second toe amputation    Left foot now more narrow in appearance with the first ray properly reduced, minimal edema to the foot and no pain with palpation throughout the area of surgery.    Equinus noted b/l ankles with < 10 deg DF noted. MMT 5/5 in DF/PF/Inv/Ev resistance with no reproduction of pain in any direction.  Passive range of motion of ankle and pedal joints is painless b/l.     Feet:   Right Foot:   Protective Sensation: 10 sites tested. 0 sites sensed.   Left Foot:   Protective Sensation: 10 sites tested. 0 sites sensed.   Neurological: He is alert and oriented to person, place, and time. He has normal strength. He displays no atrophy and no tremor. A sensory deficit is present. He exhibits normal muscle tone.   Negative tinel sign bilateral.   Skin: Skin is warm. No abrasion, no bruising, no burn, no ecchymosis, no laceration, no lesion, no petechiae and no rash noted. He is not diaphoretic. No cyanosis or erythema. No pallor. Nails show no clubbing.   Incision overlying the left second metatarsal base is well healed    Incision overlying the first ray is now well healed with 100% epithelial covering, no open wounds noted.        Psychiatric: He has a normal mood and affect. His behavior is normal.             Assessment:       Encounter Diagnoses   Name Primary?    Charcot's joint of foot, left Yes    Post-operative state             Plan:       Virgilio was seen today for post-op evaluation and diabetes mellitus.    Diagnoses and all orders for this visit:    Charcot's joint of foot, left  -     ANKLE FOOT ORTHOSIS FOR HOME USE  -     X-Ray Foot Complete Left; Future    Post-operative state  -     ANKLE FOOT ORTHOSIS FOR HOME USE  -     X-Ray Foot Complete Left; Future      I counseled the patient on his conditions, their implications and medical management.    Discussed importance of healthy diet and weight loss as to his diabetes control and healing potential.     X-rays reviewed with patient the plate broke over the first metatarsocuneiform joint area, there is concern for motion to this joint again, will watch closely with x-rays in 2 more months to see if there is continued movement, as of now it is asymptomatic.    Still no high impact activity, ambulate in diabetic shoes only, will get AFO for further  support    Inocente Smith DPM

## 2019-02-13 ENCOUNTER — PATIENT MESSAGE (OUTPATIENT)
Dept: PODIATRY | Facility: CLINIC | Age: 59
End: 2019-02-13

## 2019-02-19 ENCOUNTER — PATIENT MESSAGE (OUTPATIENT)
Dept: FAMILY MEDICINE | Facility: CLINIC | Age: 59
End: 2019-02-19

## 2019-02-25 ENCOUNTER — LAB VISIT (OUTPATIENT)
Dept: LAB | Facility: HOSPITAL | Age: 59
End: 2019-02-25
Attending: FAMILY MEDICINE
Payer: COMMERCIAL

## 2019-02-25 DIAGNOSIS — E11.42 TYPE 2 DIABETES MELLITUS WITH DIABETIC POLYNEUROPATHY: ICD-10-CM

## 2019-02-25 LAB
ESTIMATED AVG GLUCOSE: 192 MG/DL
HBA1C MFR BLD HPLC: 8.3 %

## 2019-02-25 PROCEDURE — 36415 COLL VENOUS BLD VENIPUNCTURE: CPT | Mod: PO

## 2019-02-25 PROCEDURE — 83036 HEMOGLOBIN GLYCOSYLATED A1C: CPT

## 2019-02-26 ENCOUNTER — PATIENT MESSAGE (OUTPATIENT)
Dept: FAMILY MEDICINE | Facility: CLINIC | Age: 59
End: 2019-02-26

## 2019-02-26 ENCOUNTER — TELEPHONE (OUTPATIENT)
Dept: FAMILY MEDICINE | Facility: CLINIC | Age: 59
End: 2019-02-26

## 2019-02-26 DIAGNOSIS — Z79.4 TYPE 2 DIABETES MELLITUS WITH DIABETIC POLYNEUROPATHY, WITH LONG-TERM CURRENT USE OF INSULIN: Primary | ICD-10-CM

## 2019-02-26 DIAGNOSIS — E11.42 TYPE 2 DIABETES MELLITUS WITH DIABETIC POLYNEUROPATHY, WITH LONG-TERM CURRENT USE OF INSULIN: Primary | ICD-10-CM

## 2019-03-13 RX ORDER — ENALAPRIL MALEATE 20 MG/1
TABLET ORAL
Qty: 90 TABLET | Refills: 3 | Status: SHIPPED | OUTPATIENT
Start: 2019-03-13 | End: 2019-05-29 | Stop reason: SDUPTHER

## 2019-03-18 DIAGNOSIS — E11.9 TYPE 2 DIABETES MELLITUS WITHOUT COMPLICATION, UNSPECIFIED WHETHER LONG TERM INSULIN USE: ICD-10-CM

## 2019-05-03 ENCOUNTER — PATIENT MESSAGE (OUTPATIENT)
Dept: FAMILY MEDICINE | Facility: CLINIC | Age: 59
End: 2019-05-03

## 2019-05-06 ENCOUNTER — LAB VISIT (OUTPATIENT)
Dept: LAB | Facility: HOSPITAL | Age: 59
End: 2019-05-06
Attending: FAMILY MEDICINE
Payer: COMMERCIAL

## 2019-05-06 DIAGNOSIS — E34.9 HYPOTESTOSTERONEMIA: ICD-10-CM

## 2019-05-06 LAB — TESTOST SERPL-MCNC: 226 NG/DL (ref 304–1227)

## 2019-05-06 PROCEDURE — 84403 ASSAY OF TOTAL TESTOSTERONE: CPT

## 2019-05-06 PROCEDURE — 36415 COLL VENOUS BLD VENIPUNCTURE: CPT | Mod: PO

## 2019-05-15 ENCOUNTER — PATIENT OUTREACH (OUTPATIENT)
Dept: ADMINISTRATIVE | Facility: HOSPITAL | Age: 59
End: 2019-05-15

## 2019-05-15 NOTE — PROGRESS NOTES
Spoke with pt regarding last eye exam.  Pt stated his last eye exam was done at [facility].  Request faxed to office for pt last eye exam at [phone]. Health Maintenance reviewed.

## 2019-05-29 ENCOUNTER — PATIENT MESSAGE (OUTPATIENT)
Dept: PODIATRY | Facility: CLINIC | Age: 59
End: 2019-05-29

## 2019-05-29 ENCOUNTER — PATIENT MESSAGE (OUTPATIENT)
Dept: FAMILY MEDICINE | Facility: CLINIC | Age: 59
End: 2019-05-29

## 2019-05-29 ENCOUNTER — OFFICE VISIT (OUTPATIENT)
Dept: FAMILY MEDICINE | Facility: CLINIC | Age: 59
End: 2019-05-29
Payer: COMMERCIAL

## 2019-05-29 VITALS
HEIGHT: 73 IN | WEIGHT: 315 LBS | HEART RATE: 71 BPM | TEMPERATURE: 98 F | BODY MASS INDEX: 41.75 KG/M2 | DIASTOLIC BLOOD PRESSURE: 72 MMHG | SYSTOLIC BLOOD PRESSURE: 127 MMHG

## 2019-05-29 DIAGNOSIS — E78.5 DYSLIPIDEMIA: ICD-10-CM

## 2019-05-29 DIAGNOSIS — M14.672 CHARCOT'S JOINT OF LEFT FOOT: ICD-10-CM

## 2019-05-29 DIAGNOSIS — I10 ESSENTIAL HYPERTENSION: ICD-10-CM

## 2019-05-29 DIAGNOSIS — Z79.4 TYPE 2 DIABETES MELLITUS WITH DIABETIC POLYNEUROPATHY, WITH LONG-TERM CURRENT USE OF INSULIN: Primary | ICD-10-CM

## 2019-05-29 DIAGNOSIS — E11.49 TYPE II DIABETES MELLITUS WITH NEUROLOGICAL MANIFESTATIONS: ICD-10-CM

## 2019-05-29 DIAGNOSIS — G62.9 NEUROPATHY: ICD-10-CM

## 2019-05-29 DIAGNOSIS — E11.42 TYPE 2 DIABETES MELLITUS WITH DIABETIC POLYNEUROPATHY, WITH LONG-TERM CURRENT USE OF INSULIN: Primary | ICD-10-CM

## 2019-05-29 DIAGNOSIS — E66.01 SEVERE OBESITY (BMI >= 40): ICD-10-CM

## 2019-05-29 PROCEDURE — 3078F DIAST BP <80 MM HG: CPT | Mod: CPTII,S$GLB,, | Performed by: FAMILY MEDICINE

## 2019-05-29 PROCEDURE — 3008F PR BODY MASS INDEX (BMI) DOCUMENTED: ICD-10-PCS | Mod: CPTII,S$GLB,, | Performed by: FAMILY MEDICINE

## 2019-05-29 PROCEDURE — 99999 PR PBB SHADOW E&M-EST. PATIENT-LVL III: CPT | Mod: PBBFAC,,, | Performed by: FAMILY MEDICINE

## 2019-05-29 PROCEDURE — 3008F BODY MASS INDEX DOCD: CPT | Mod: CPTII,S$GLB,, | Performed by: FAMILY MEDICINE

## 2019-05-29 PROCEDURE — 99214 OFFICE O/P EST MOD 30 MIN: CPT | Mod: S$GLB,,, | Performed by: FAMILY MEDICINE

## 2019-05-29 PROCEDURE — 3078F PR MOST RECENT DIASTOLIC BLOOD PRESSURE < 80 MM HG: ICD-10-PCS | Mod: CPTII,S$GLB,, | Performed by: FAMILY MEDICINE

## 2019-05-29 PROCEDURE — 3074F SYST BP LT 130 MM HG: CPT | Mod: CPTII,S$GLB,, | Performed by: FAMILY MEDICINE

## 2019-05-29 PROCEDURE — 99214 PR OFFICE/OUTPT VISIT, EST, LEVL IV, 30-39 MIN: ICD-10-PCS | Mod: S$GLB,,, | Performed by: FAMILY MEDICINE

## 2019-05-29 PROCEDURE — 3074F PR MOST RECENT SYSTOLIC BLOOD PRESSURE < 130 MM HG: ICD-10-PCS | Mod: CPTII,S$GLB,, | Performed by: FAMILY MEDICINE

## 2019-05-29 PROCEDURE — 99999 PR PBB SHADOW E&M-EST. PATIENT-LVL III: ICD-10-PCS | Mod: PBBFAC,,, | Performed by: FAMILY MEDICINE

## 2019-05-29 RX ORDER — INSULIN LISPRO 100 [IU]/ML
60 INJECTION, SOLUTION INTRAVENOUS; SUBCUTANEOUS 3 TIMES DAILY
Qty: 162 ML | Refills: 12 | Status: SHIPPED | OUTPATIENT
Start: 2019-05-29 | End: 2020-03-04 | Stop reason: SDUPTHER

## 2019-05-29 RX ORDER — SYRINGE,SAFETY WITH NEEDLE,1ML 25GX1"
SYRINGE (EA) MISCELLANEOUS
Qty: 100 EACH | Refills: 12 | Status: SHIPPED | OUTPATIENT
Start: 2019-05-29 | End: 2020-06-18 | Stop reason: SDUPTHER

## 2019-05-29 RX ORDER — METFORMIN HYDROCHLORIDE 500 MG/1
1000 TABLET, EXTENDED RELEASE ORAL 2 TIMES DAILY WITH MEALS
Qty: 360 TABLET | Refills: 4 | Status: SHIPPED | OUTPATIENT
Start: 2019-05-29 | End: 2020-03-04 | Stop reason: SDUPTHER

## 2019-05-29 RX ORDER — ENALAPRIL MALEATE 20 MG/1
20 TABLET ORAL DAILY
Qty: 90 TABLET | Refills: 3 | Status: SHIPPED | OUTPATIENT
Start: 2019-05-29 | End: 2020-03-04

## 2019-05-29 RX ORDER — INSULIN GLARGINE 100 [IU]/ML
70 INJECTION, SOLUTION SUBCUTANEOUS NIGHTLY
Qty: 63 ML | Refills: 4 | Status: SHIPPED | OUTPATIENT
Start: 2019-05-29 | End: 2020-03-04 | Stop reason: SDUPTHER

## 2019-05-29 RX ORDER — PEN NEEDLE, DIABETIC 30 GX3/16"
NEEDLE, DISPOSABLE MISCELLANEOUS
Qty: 4 EACH | Refills: 12 | Status: SHIPPED | OUTPATIENT
Start: 2019-05-29 | End: 2022-12-26 | Stop reason: SDUPTHER

## 2019-05-29 RX ORDER — POLYETHYLENE GLYCOL 3350 17 G/17G
17 POWDER, FOR SOLUTION ORAL
Qty: 24 EACH | Refills: 12 | Status: SHIPPED | OUTPATIENT
Start: 2019-05-29 | End: 2020-03-04

## 2019-05-29 RX ORDER — IBUPROFEN 800 MG/1
TABLET ORAL
Refills: 0 | COMMUNITY
Start: 2019-05-10 | End: 2019-05-29

## 2019-05-29 RX ORDER — TESTOSTERONE CYPIONATE 200 MG/ML
INJECTION, SOLUTION INTRAMUSCULAR
Qty: 10 ML | Refills: 0 | Status: SHIPPED | OUTPATIENT
Start: 2019-05-29 | End: 2019-11-06 | Stop reason: SDUPTHER

## 2019-05-29 RX ORDER — TRAZODONE HYDROCHLORIDE 150 MG/1
150 TABLET ORAL NIGHTLY
Qty: 90 TABLET | Refills: 4 | Status: SHIPPED | OUTPATIENT
Start: 2019-05-29 | End: 2020-03-04 | Stop reason: SDUPTHER

## 2019-05-29 RX ORDER — CYCLOBENZAPRINE HCL 5 MG
TABLET ORAL
Refills: 0 | COMMUNITY
Start: 2019-05-10 | End: 2019-05-29

## 2019-05-29 RX ORDER — GEMFIBROZIL 600 MG/1
600 TABLET, FILM COATED ORAL
Qty: 180 TABLET | Refills: 3 | Status: SHIPPED | OUTPATIENT
Start: 2019-05-29 | End: 2020-03-04 | Stop reason: ALTCHOICE

## 2019-05-29 RX ORDER — GABAPENTIN 600 MG/1
600 TABLET ORAL 3 TIMES DAILY
Qty: 270 TABLET | Refills: 4 | Status: SHIPPED | OUTPATIENT
Start: 2019-05-29 | End: 2020-03-04 | Stop reason: SDUPTHER

## 2019-05-29 RX ORDER — PRAVASTATIN SODIUM 40 MG/1
40 TABLET ORAL DAILY
Qty: 90 TABLET | Refills: 3 | Status: SHIPPED | OUTPATIENT
Start: 2019-05-29 | End: 2020-03-04 | Stop reason: SDUPTHER

## 2019-05-29 NOTE — PROGRESS NOTES
Virgilio Acuña presents to [facility]-recently had foot surgery for charcot foot Dr Pierre 9/18. Around Jan/Feb the plate in his foot broke.   DM not completely controlled but better  Hx hypotest currently stopped injections   Past Medical History:   Diagnosis Date    Cellulitis of left index finger 2/16/2015    Charcot's joint of foot, left 08/2018    Dyslipidemia     Essential hypertension 2/16/2017    Infected finger joint 2/17/2015    Obese     S/P knee surgery, medial/lateral menisectomy 11/4/2013    Type 2 diabetes mellitus with diabetic polyneuropathy, with long-term current use of insulin 2003     Past Surgical History:   Procedure Laterality Date    AMPUTATION-TOE with Metatarsal Head - 2nd Toe Left 3/9/2018    Performed by Inocente Smith DPM at [facility] OR    ARTHROSCOPY, KNEE Right 10/21/2013    Performed by Tobias Henderson MD at [facility] OR    ARTHROSCOPY-KNEE DEBRIDEMENT-RIGHT Right 2/17/2017    [facility] by [facility]ilio [facility] 2ND F[facility]    CHONDRECTOMY, KNEE, SEMILUNAR CARTILAGE Right 10/21/2013    Performed by Tobias Henderson MD at [facility] OR    DEBRIDEMENT-FOOT Left 1/15/2018    Performed by Inocente Smith DPM at [facility] OR    ELBOW SURGERY      FOOT SURGERY Left     FUSION, JOINT, MIDFOOT - FIRST METATARSOCUNEIFORM JOINT AND NAVICULOCUNEIFORM JOINT Left 8/27/2018    Performed by [facility]ente Smith D[facility] at [facility] OR    INCISION AND DRAINAGE (I&D), FOOT Left 1/11/2018    Performed by Inocente Smith DPM at [facility] OR    IRRIGATION AND DEBRIDEMENT UPPER EXTREMITY Left 2/18/2015    [facility]az [facility] OR    KNEE CARTILAGE SURGERY  10/21/2013    right knee    KNEE SURGERY Right 02/17/2017    Left index finger surgery  03/2015    LENGTHENING,TENDON,ACHILLES Left 8/27/2018    Performed by Inocente Smith DPM at [facility] OR    PLACEMENT-WOUND VAC Left 1/15/2018    Performed by Inocente Smith DPM at [facility] OR    TOE AMPUTATION  03/2018    left  "great toe     No Known Allergies  Current Outpatient Medications on File Prior to Visit   Medication Sig Dispense Refill    blood sugar diagnostic Strp 1 strip by Misc.(Non-Drug; Combo Route) route 3 (three) times daily. 360 each 3    enalapril (VASOTEC) 20 MG tablet TAKE 1 TABLET DAILY 90 tablet 3    gabapentin (NEURONTIN) 600 MG tablet TAKE 1 TABLET THREE TIMES A  tablet 4    gemfibrozil (LOPID) 600 MG tablet Take 1 tablet (600 mg total) by mouth 2 (two) times daily before meals. 180 tablet 3    insulin glargine (LANTUS SOLOSTAR U-100 INSULIN) 100 unit/mL (3 mL) InPn pen Inject 70 Units into the skin once daily. Supply pen needles (Patient taking differently: Inject 70 Units into the skin every evening. Supply pen needles) 63 mL 12    insulin lispro (HUMALOG KWIKPEN INSULIN) 100 unit/mL InPn pen Inject 60 Units into the skin 3 (three) times daily. 162 mL 12    insulin syringe-needle U-100 (INSULIN SYRINGE) 1 mL 29 gauge x 1/2" Syrg Inject 4 times a day 100 each 12    metFORMIN (GLUCOPHAGE-XR) 500 MG 24 hr tablet Take 2 tablets (1,000 mg total) by mouth 2 (two) times daily with meals. 360 tablet 4    pen needle, diabetic (BD ULTRA-FINE WILDA PEN NEEDLE) 32 gauge x 5/32" Ndle INJECT FOUR TIMES DAILY 4 each 12    polyethylene glycol (GLYCOLAX) 17 gram PwPk Take 17 g by mouth 3 (three) times a week. 24 each 12    safety needles 22 gauge x 1 1/2" Ndle To be used once a month for testosterone injections 50 each 6    testosterone cypionate (DEPOTESTOTERONE CYPIONATE) 200 mg/mL injection INJECT 1.5 ML IN THE MUSCLE EVERY 28 DAYS 10 mL 0    traZODone (DESYREL) 150 MG tablet Take 1 tablet (150 mg total) by mouth nightly. 90 tablet 4    traZODone (DESYREL) 150 MG tablet Take 1 tablet (150 mg total) by mouth every evening. 30 tablet 0     Current Facility-Administered Medications on File Prior to Visit   Medication Dose Route Frequency Provider Last Rate Last Dose    lactated ringers infusion   " Intravenous Continuous Vadim Buchanan MD   Stopped at 08/27/18 0953     Social History     Socioeconomic History    Marital status:      Spouse name: Not on file    Number of children: Not on file    Years of education: Not on file    Highest education level: Not on file   Occupational History    Occupation:      Employer: [employer]   Social Needs    Financial resource strain: Not on file    Food insecurity:     Worry: Not on file     Inability: Not on file    Transportation needs:     Medical: Not on file     Non-medical: Not on file   Tobacco Use    Smoking status: Never Smoker    Smokeless tobacco: Never Used   Substance and Sexual Activity    Alcohol use: Yes     Alcohol/week: 0.6 oz     Types: 1 Cans of beer per week     Comment: occasional    Drug use: No    Sexual activity: Yes     Partners: Female   Lifestyle    Physical activity:     Days per week: Not on file     Minutes per session: Not on file    Stress: Not on file   Relationships    Social connections:     Talks on phone: Not on file     Gets together: Not on file     Attends Sikhism service: Not on file     Active member of club or organization: Not on file     Attends meetings of clubs or organizations: Not on file     Relationship status: Not on file   Other Topics Concern    Not on file   Social History Narrative    Not on file     Family History   Problem Relation Age of Onset    COPD Father          ROS:  SKIN: No rashes, itching or changes in color or texture of skin.  EYES: Visual acuity fine. No photophobia, ocular pain or diplopia.EARS: Denies ear pain, discharge or vertigo.NOSE: No loss of smell, no epistaxis some postnasal drip.MOUTH & THROAT: No hoarseness or change in voice. No excessive gum bleeding.CHEST: Denies SHARP, cyanosis, wheezing  CARDIOVASCULAR: Denies chest pain, PND, orthopnea or reduced exercise tolerance.  ABDOMEN:  No weight loss.No  abdominal pain, no hematemesis or blood in stool.  URINARY: No flank pain, dysuria or hematuria.  PERIPHERAL VASCULAR: No claudication or cyanosis.  MUSCULOSKELETAL: Negative   NEUROLOGIC: No history of seizures, paralysis, alteration of gait or coordination.    PE: Vital signs as noted  Heent:Normocephalic with no recent cranial trauma,PERRLA,EOMI,conjunctiva clear,fundi reveal no hemmorhage exudate or papilledema.Otic canals clear, tympanic membranes slightly dull bilaterally.Nasal mucosa slightly red and edematous.Posterior pharynx slightly red but without exudate.  Neck:Supple with minimal anterior cervical adenopathy.  Chest:Clear bilateral breath sounds  Heart:Regular rhthym without murmer  Abdomen:Soft, non tender,no masses, no hepatosplenomegaly  Ext Gen degen changes and myalgia; left foot red swollen w edema to mid pre tibial area. Tender w near ulceration    Impression:Charcot foot  Diabetes  Neuropathy  Hypotestosterone  Obesity BMI 41.7   Anxiety  Insomnia  HLP  Plan:  Rev lab-a1c 7.7-best in 2y  MAB sl worse CP nl, LDL 99  Testosterone 226   Pt on pravastatin   Dandy a1c 3m

## 2019-05-31 ENCOUNTER — PATIENT MESSAGE (OUTPATIENT)
Dept: FAMILY MEDICINE | Facility: CLINIC | Age: 59
End: 2019-05-31

## 2019-05-31 ENCOUNTER — TELEPHONE (OUTPATIENT)
Dept: FAMILY MEDICINE | Facility: CLINIC | Age: 59
End: 2019-05-31

## 2019-05-31 NOTE — TELEPHONE ENCOUNTER
----- Message from Jazlyn Garcia sent at 5/31/2019  9:27 AM CDT -----  Contact: [facility]  needs to verify that patient is using both insulin syringes & insulin pen needles or just one...[phone] (reference [facility] [id number])

## 2019-06-03 ENCOUNTER — TELEPHONE (OUTPATIENT)
Dept: FAMILY MEDICINE | Facility: CLINIC | Age: 59
End: 2019-06-03

## 2019-06-03 NOTE — TELEPHONE ENCOUNTER
----- Message from Theresa Ayala sent at 6/3/2019  2:32 PM CDT -----  Contact: DELMA WITH [facility]  CALLING CONCERNING SYRINGE NEEDLES FOR PATIENT. NEED TO KNOW THE QUANTITY. PLEASE CALL [phone] AND REF # [id number]. THANKS,. DARLENE

## 2019-06-04 ENCOUNTER — TELEPHONE (OUTPATIENT)
Dept: FAMILY MEDICINE | Facility: CLINIC | Age: 59
End: 2019-06-04

## 2019-06-04 NOTE — TELEPHONE ENCOUNTER
----- Message from Gina Irvin sent at 6/4/2019 10:52 AM CDT -----  Contact: [facility]  Type:  Pharmacy Calling to Clarify an RX    Name of Caller:Kenneth  Pharmacy Name:[facility]  Prescription Name:Syringes/needles  What do they need to clarify?:need to clarify if pt need both  Best Call Back Number:[phone], RF# [id number]  Additional Information: Kenneth left several message, no one call them back

## 2019-06-06 ENCOUNTER — PATIENT MESSAGE (OUTPATIENT)
Dept: PODIATRY | Facility: CLINIC | Age: 59
End: 2019-06-06

## 2019-06-17 ENCOUNTER — PATIENT MESSAGE (OUTPATIENT)
Dept: PODIATRY | Facility: CLINIC | Age: 59
End: 2019-06-17

## 2019-06-18 RX ORDER — SULFAMETHOXAZOLE AND TRIMETHOPRIM 800; 160 MG/1; MG/1
1 TABLET ORAL 2 TIMES DAILY
Qty: 14 TABLET | Refills: 0 | Status: SHIPPED | OUTPATIENT
Start: 2019-06-18 | End: 2019-06-26 | Stop reason: SDUPTHER

## 2019-06-26 ENCOUNTER — OFFICE VISIT (OUTPATIENT)
Dept: PODIATRY | Facility: CLINIC | Age: 59
End: 2019-06-26
Payer: COMMERCIAL

## 2019-06-26 ENCOUNTER — HOSPITAL ENCOUNTER (OUTPATIENT)
Dept: RADIOLOGY | Facility: HOSPITAL | Age: 59
Discharge: HOME | End: 2019-06-26
Attending: PODIATRIST
Payer: COMMERCIAL

## 2019-06-26 ENCOUNTER — PATIENT MESSAGE (OUTPATIENT)
Dept: PODIATRY | Facility: CLINIC | Age: 59
End: 2019-06-26

## 2019-06-26 VITALS
WEIGHT: 315 LBS | HEIGHT: 73 IN | SYSTOLIC BLOOD PRESSURE: 148 MMHG | HEART RATE: 68 BPM | DIASTOLIC BLOOD PRESSURE: 76 MMHG | BODY MASS INDEX: 41.75 KG/M2

## 2019-06-26 DIAGNOSIS — S92.411A DISPLACED FRACTURE OF PROXIMAL PHALANX OF RIGHT GREAT TOE, INITIAL ENCOUNTER FOR CLOSED FRACTURE: ICD-10-CM

## 2019-06-26 DIAGNOSIS — L03.031 CELLULITIS OF GREAT TOE OF RIGHT FOOT: ICD-10-CM

## 2019-06-26 DIAGNOSIS — M14.672 CHARCOT'S JOINT OF LEFT FOOT: ICD-10-CM

## 2019-06-26 DIAGNOSIS — M14.672 CHARCOT'S JOINT OF LEFT FOOT: Primary | ICD-10-CM

## 2019-06-26 PROCEDURE — 73630 XR FOOT COMPLETE 3 VIEW BILATERAL: ICD-10-PCS | Mod: 26,,, | Performed by: RADIOLOGY

## 2019-06-26 PROCEDURE — 99214 PR OFFICE/OUTPT VISIT, EST, LEVL IV, 30-39 MIN: ICD-10-PCS | Mod: S$GLB,,, | Performed by: PODIATRIST

## 2019-06-26 PROCEDURE — 73630 X-RAY EXAM OF FOOT: CPT | Mod: 50,TC,PO

## 2019-06-26 PROCEDURE — 3077F PR MOST RECENT SYSTOLIC BLOOD PRESSURE >= 140 MM HG: ICD-10-PCS | Mod: CPTII,S$GLB,, | Performed by: PODIATRIST

## 2019-06-26 PROCEDURE — 3078F DIAST BP <80 MM HG: CPT | Mod: CPTII,S$GLB,, | Performed by: PODIATRIST

## 2019-06-26 PROCEDURE — 73630 X-RAY EXAM OF FOOT: CPT | Mod: 26,,, | Performed by: RADIOLOGY

## 2019-06-26 PROCEDURE — 99999 PR PBB SHADOW E&M-EST. PATIENT-LVL III: CPT | Mod: PBBFAC,,, | Performed by: PODIATRIST

## 2019-06-26 PROCEDURE — 3008F PR BODY MASS INDEX (BMI) DOCUMENTED: ICD-10-PCS | Mod: CPTII,S$GLB,, | Performed by: PODIATRIST

## 2019-06-26 PROCEDURE — 99214 OFFICE O/P EST MOD 30 MIN: CPT | Mod: S$GLB,,, | Performed by: PODIATRIST

## 2019-06-26 PROCEDURE — 3077F SYST BP >= 140 MM HG: CPT | Mod: CPTII,S$GLB,, | Performed by: PODIATRIST

## 2019-06-26 PROCEDURE — 99999 PR PBB SHADOW E&M-EST. PATIENT-LVL III: ICD-10-PCS | Mod: PBBFAC,,, | Performed by: PODIATRIST

## 2019-06-26 PROCEDURE — 3078F PR MOST RECENT DIASTOLIC BLOOD PRESSURE < 80 MM HG: ICD-10-PCS | Mod: CPTII,S$GLB,, | Performed by: PODIATRIST

## 2019-06-26 PROCEDURE — 3008F BODY MASS INDEX DOCD: CPT | Mod: CPTII,S$GLB,, | Performed by: PODIATRIST

## 2019-06-26 RX ORDER — SULFAMETHOXAZOLE AND TRIMETHOPRIM 800; 160 MG/1; MG/1
1 TABLET ORAL 2 TIMES DAILY
Qty: 10 TABLET | Refills: 0 | Status: SHIPPED | OUTPATIENT
Start: 2019-06-26 | End: 2019-07-01

## 2019-06-26 NOTE — PROGRESS NOTES
Subjective:      Patient ID: Virgilio Acuña is a 59 y.o. male.    Chief Complaint: Foot Problem (right great) and Diabetes Mellitus (PCP:  Dr Hilton 5/29/19; HgbA1c: 5/6/19  7.7)    Virgilio is a 59 y.o. male who presents to the clinic for evaluation and treatment of high risk feet. Virgilio has a past medical history of Cellulitis of left index finger (2/16/2015), Charcot's joint of foot, left (08/2018), Dyslipidemia, Essential hypertension (2/16/2017), Infected finger joint (2/17/2015), Obese, S/P knee surgery, medial/lateral menisectomy (11/4/2013), and Type 2 diabetes mellitus with diabetic polyneuropathy, with long-term current use of insulin (2003).     6/26/19: Patient returns for follow up left foot charcot and right foot hallux dorsal wound. The wound on the toe started as a blister last week with redness and drainage, he was started on Bactrim by me but could not get in to see me until today due to his work. He relates significant improvement on the antibiotic. Left foot has occasional pain but overall is stable and doing well. No other acute pedal complaints.     PCP: Juanito Hilton MD    Date Last Seen by PCP: 8/2/18    Current shoe gear:  Affected Foot: Total contact cast     Unaffected Foot: Tennis shoes    History of Trauma: negative      Hemoglobin A1C   Date Value Ref Range Status   05/06/2019 7.7 (H) 4.0 - 5.6 % Final     Comment:     ADA Screening Guidelines:  5.7-6.4%  Consistent with prediabetes  >or=6.5%  Consistent with diabetes  High levels of fetal hemoglobin interfere with the HbA1C  assay. Heterozygous hemoglobin variants (HbS, HgC, etc)do  not significantly interfere with this assay.   However, presence of multiple variants may affect accuracy.     02/25/2019 8.3 (H) 4.0 - 5.6 % Final     Comment:     ADA Screening Guidelines:  5.7-6.4%  Consistent with prediabetes  >or=6.5%  Consistent with diabetes  High levels of fetal hemoglobin interfere with the HbA1C  assay. Heterozygous hemoglobin variants  (HbS, HgC, etc)do  not significantly interfere with this assay.   However, presence of multiple variants may affect accuracy.     11/05/2018 8.0 (H) 4.0 - 5.6 % Final     Comment:     ADA Screening Guidelines:  5.7-6.4%  Consistent with prediabetes  >or=6.5%  Consistent with diabetes  High levels of fetal hemoglobin interfere with the HbA1C  assay. Heterozygous hemoglobin variants (HbS, HgC, etc)do  not significantly interfere with this assay.   However, presence of multiple variants may affect accuracy.         Review of Systems   Constitution: Negative for chills and fever.   Cardiovascular: Positive for leg swelling. Negative for claudication.   Respiratory: Negative for shortness of breath.    Skin: Positive for color change, nail changes and poor wound healing. Negative for itching and rash.   Musculoskeletal: Negative for muscle cramps, muscle weakness and myalgias.   Gastrointestinal: Negative for nausea and vomiting.   Neurological: Positive for numbness and paresthesias. Negative for focal weakness and loss of balance.           Objective:       Physical Exam   Constitutional: He is oriented to person, place, and time. He appears well-developed and well-nourished. No distress.   Cardiovascular:   Pulses:       Dorsalis pedis pulses are 2+ on the right side, and 2+ on the left side.        Posterior tibial pulses are 2+ on the right side, and 2+ on the left side.   < 3 sec capillary refill time to toes 1-5 bilateral. Toes and feet are warm to touch proximally with normal distal cooling b/l. There is no hair growth on the feet and toes b/l. There is moderate edema left foot and mild edema right.      Musculoskeletal:   Left second toe amputation    Left foot now more narrow in appearance with the first ray properly reduced, minimal edema to the foot and no pain with palpation throughout the area of surgery.    Equinus noted b/l ankles with < 10 deg DF noted. MMT 5/5 in DF/PF/Inv/Ev resistance with no  reproduction of pain in any direction. Passive range of motion of ankle and pedal joints is painless b/l.     Feet:   Right Foot:   Protective Sensation: 10 sites tested. 0 sites sensed.   Left Foot:   Protective Sensation: 10 sites tested. 0 sites sensed.   Neurological: He is alert and oriented to person, place, and time. He has normal strength. He displays no atrophy and no tremor. A sensory deficit is present. He exhibits normal muscle tone.   Negative tinel sign bilateral.   Skin: Skin is warm. No abrasion, no bruising, no burn, no ecchymosis, no laceration, no lesion, no petechiae and no rash noted. He is not diaphoretic. No cyanosis or erythema. No pallor. Nails show no clubbing.   Incision overlying the left second metatarsal base is well healed    Incision overlying the first ray is now well healed with 100% epithelial covering, no open wounds noted.     Right great toe dorsal aspect there is a dry stable eschar of what appears to be a prior blister, there is resolving erythema and edema to the entire toe       Psychiatric: He has a normal mood and affect. His behavior is normal.             Assessment:       Encounter Diagnoses   Name Primary?    Charcot's joint of left foot Yes    Cellulitis of great toe of right foot             Plan:       Virgilio was seen today for foot problem and diabetes mellitus.    Diagnoses and all orders for this visit:    Charcot's joint of left foot  -     X-Ray Foot Complete Bilateral; Future    Cellulitis of great toe of right foot  -     X-Ray Foot Complete Bilateral; Future    Other orders  -     sulfamethoxazole-trimethoprim 800-160mg (BACTRIM DS) 800-160 mg Tab; Take 1 tablet by mouth 2 (two) times daily. for 5 days      I counseled the patient on his conditions, their implications and medical management.    Discussed importance of healthy diet and weight loss as to his diabetes control and healing potential.     X-rays reviewed with patient the plate broke over the  first metatarsocuneiform joint area but the area is stable with bony fusion taking place and minimal displacement from time of surgery, he has a stable foot and is working and ambulating on the foot with minimal discomfort, not wearing the diabetic shoes today.    Still recommend AFO left foot for support    Right foot great toe fracture, I recommend offloading in darco shoe, patient relates he will do this when not at work but unable to take work off to allow the fracture to heal AMA, will have him back in 4 weeks with repeat x-rays     Inocente Smith DPM

## 2019-06-27 ENCOUNTER — TELEPHONE (OUTPATIENT)
Dept: FAMILY MEDICINE | Facility: CLINIC | Age: 59
End: 2019-06-27

## 2019-06-27 NOTE — TELEPHONE ENCOUNTER
----- Message from Inga Johnson sent at 6/27/2019  8:36 AM CDT -----  [facility] states that they need to find out the size and gauge of the needles. Caller states that they was unable to reach pt. Caller can be reached @ [phone]

## 2019-06-28 ENCOUNTER — PATIENT MESSAGE (OUTPATIENT)
Dept: FAMILY MEDICINE | Facility: CLINIC | Age: 59
End: 2019-06-28

## 2019-07-16 ENCOUNTER — PATIENT MESSAGE (OUTPATIENT)
Dept: FAMILY MEDICINE | Facility: CLINIC | Age: 59
End: 2019-07-16

## 2019-07-26 ENCOUNTER — PATIENT MESSAGE (OUTPATIENT)
Dept: FAMILY MEDICINE | Facility: CLINIC | Age: 59
End: 2019-07-26

## 2019-08-06 ENCOUNTER — PATIENT MESSAGE (OUTPATIENT)
Dept: PODIATRY | Facility: CLINIC | Age: 59
End: 2019-08-06

## 2019-08-06 ENCOUNTER — PATIENT MESSAGE (OUTPATIENT)
Dept: FAMILY MEDICINE | Facility: CLINIC | Age: 59
End: 2019-08-06

## 2019-09-02 ENCOUNTER — PATIENT MESSAGE (OUTPATIENT)
Dept: PODIATRY | Facility: CLINIC | Age: 59
End: 2019-09-02

## 2019-09-03 ENCOUNTER — PATIENT MESSAGE (OUTPATIENT)
Dept: PODIATRY | Facility: CLINIC | Age: 59
End: 2019-09-03

## 2019-09-06 ENCOUNTER — PATIENT OUTREACH (OUTPATIENT)
Dept: ADMINISTRATIVE | Facility: HOSPITAL | Age: 59
End: 2019-09-06

## 2019-09-06 ENCOUNTER — PATIENT MESSAGE (OUTPATIENT)
Dept: FAMILY MEDICINE | Facility: CLINIC | Age: 59
End: 2019-09-06

## 2019-09-16 ENCOUNTER — PATIENT MESSAGE (OUTPATIENT)
Dept: PODIATRY | Facility: CLINIC | Age: 59
End: 2019-09-16

## 2019-11-06 ENCOUNTER — PATIENT MESSAGE (OUTPATIENT)
Dept: FAMILY MEDICINE | Facility: CLINIC | Age: 59
End: 2019-11-06

## 2019-11-06 NOTE — TELEPHONE ENCOUNTER
Dr Hilton, I sent and order for them to be done here, please cancel that order and approve this one, patient states he injects himself

## 2019-11-07 DIAGNOSIS — Z12.11 COLON CANCER SCREENING: ICD-10-CM

## 2019-11-07 RX ORDER — TESTOSTERONE CYPIONATE 200 MG/ML
INJECTION, SOLUTION INTRAMUSCULAR
Qty: 10 ML | Refills: 0 | Status: SHIPPED | OUTPATIENT
Start: 2019-11-07 | End: 2020-03-04

## 2019-11-07 RX ORDER — TESTOSTERONE CYPIONATE 200 MG/ML
300 INJECTION, SOLUTION INTRAMUSCULAR
Status: DISCONTINUED | OUTPATIENT
Start: 2019-11-08 | End: 2020-03-04

## 2019-11-11 ENCOUNTER — PATIENT MESSAGE (OUTPATIENT)
Dept: FAMILY MEDICINE | Facility: CLINIC | Age: 59
End: 2019-11-11

## 2019-11-22 ENCOUNTER — PATIENT MESSAGE (OUTPATIENT)
Dept: FAMILY MEDICINE | Facility: CLINIC | Age: 59
End: 2019-11-22

## 2019-12-13 ENCOUNTER — PATIENT MESSAGE (OUTPATIENT)
Dept: FAMILY MEDICINE | Facility: CLINIC | Age: 59
End: 2019-12-13

## 2020-01-10 ENCOUNTER — PATIENT MESSAGE (OUTPATIENT)
Dept: PODIATRY | Facility: CLINIC | Age: 60
End: 2020-01-10

## 2020-01-10 DIAGNOSIS — M14.672 CHARCOT'S JOINT OF LEFT FOOT: Primary | ICD-10-CM

## 2020-01-14 ENCOUNTER — HOSPITAL ENCOUNTER (OUTPATIENT)
Dept: RADIOLOGY | Facility: HOSPITAL | Age: 60
Discharge: HOME | End: 2020-01-14
Attending: PODIATRIST
Payer: COMMERCIAL

## 2020-01-14 ENCOUNTER — OFFICE VISIT (OUTPATIENT)
Dept: PODIATRY | Facility: CLINIC | Age: 60
End: 2020-01-14
Payer: COMMERCIAL

## 2020-01-14 VITALS
HEART RATE: 79 BPM | BODY MASS INDEX: 41.75 KG/M2 | SYSTOLIC BLOOD PRESSURE: 156 MMHG | DIASTOLIC BLOOD PRESSURE: 78 MMHG | HEIGHT: 73 IN | WEIGHT: 315 LBS

## 2020-01-14 DIAGNOSIS — Z89.422 HISTORY OF AMPUTATION OF LESSER TOE, LEFT: ICD-10-CM

## 2020-01-14 DIAGNOSIS — Z86.31 HISTORY OF DIABETIC ULCER OF FOOT: ICD-10-CM

## 2020-01-14 DIAGNOSIS — E11.49 TYPE II DIABETES MELLITUS WITH NEUROLOGICAL MANIFESTATIONS: ICD-10-CM

## 2020-01-14 DIAGNOSIS — M14.672 CHARCOT'S JOINT OF LEFT FOOT: ICD-10-CM

## 2020-01-14 DIAGNOSIS — M14.672 CHARCOT'S JOINT OF LEFT FOOT: Primary | ICD-10-CM

## 2020-01-14 DIAGNOSIS — E11.42 DIABETIC POLYNEUROPATHY ASSOCIATED WITH TYPE 2 DIABETES MELLITUS: ICD-10-CM

## 2020-01-14 PROCEDURE — 73630 XR FOOT COMPLETE 3 VIEW LEFT: ICD-10-PCS | Mod: 26,LT,, | Performed by: RADIOLOGY

## 2020-01-14 PROCEDURE — 99999 PR PBB SHADOW E&M-EST. PATIENT-LVL III: CPT | Mod: PBBFAC,,, | Performed by: PODIATRIST

## 2020-01-14 PROCEDURE — 3008F BODY MASS INDEX DOCD: CPT | Mod: CPTII,S$GLB,, | Performed by: PODIATRIST

## 2020-01-14 PROCEDURE — 3077F SYST BP >= 140 MM HG: CPT | Mod: CPTII,S$GLB,, | Performed by: PODIATRIST

## 2020-01-14 PROCEDURE — 99999 PR PBB SHADOW E&M-EST. PATIENT-LVL III: ICD-10-PCS | Mod: PBBFAC,,, | Performed by: PODIATRIST

## 2020-01-14 PROCEDURE — 3077F PR MOST RECENT SYSTOLIC BLOOD PRESSURE >= 140 MM HG: ICD-10-PCS | Mod: CPTII,S$GLB,, | Performed by: PODIATRIST

## 2020-01-14 PROCEDURE — 99213 OFFICE O/P EST LOW 20 MIN: CPT | Mod: S$GLB,,, | Performed by: PODIATRIST

## 2020-01-14 PROCEDURE — 73630 X-RAY EXAM OF FOOT: CPT | Mod: 26,LT,, | Performed by: RADIOLOGY

## 2020-01-14 PROCEDURE — 73630 X-RAY EXAM OF FOOT: CPT | Mod: TC,PO,LT

## 2020-01-14 PROCEDURE — 3078F PR MOST RECENT DIASTOLIC BLOOD PRESSURE < 80 MM HG: ICD-10-PCS | Mod: CPTII,S$GLB,, | Performed by: PODIATRIST

## 2020-01-14 PROCEDURE — 3008F PR BODY MASS INDEX (BMI) DOCUMENTED: ICD-10-PCS | Mod: CPTII,S$GLB,, | Performed by: PODIATRIST

## 2020-01-14 PROCEDURE — 99213 PR OFFICE/OUTPT VISIT, EST, LEVL III, 20-29 MIN: ICD-10-PCS | Mod: S$GLB,,, | Performed by: PODIATRIST

## 2020-01-14 PROCEDURE — 3078F DIAST BP <80 MM HG: CPT | Mod: CPTII,S$GLB,, | Performed by: PODIATRIST

## 2020-01-14 RX ORDER — CYCLOBENZAPRINE HYDROCHLORIDE 7.5 MG/1
7.5 TABLET, FILM COATED ORAL NIGHTLY
Qty: 30 TABLET | Refills: 0 | Status: SHIPPED | OUTPATIENT
Start: 2020-01-14 | End: 2020-01-20

## 2020-01-14 NOTE — LETTER
January 14, 2020    Virgilio Acuña  [address]  [facility] [address]         [place] [facility]  [address]  [facility] [place] [facility] [address]  Phone: [phone] January 14, 2020     Patient: Virgilio Acuña   YOB: 1960   Date of Visit: 1/14/2020       To Whom It May Concern:    It is my medical opinion that Virgilio Acuña should remain out of work until Monday 1/20/2020 secondary to charcot left foot flare, will need to remain off his foot for the week, then can return without restrictions after that.    If you have any questions or concerns, please don't hesitate to call.    Sincerely,          Inocente Smith DPM

## 2020-01-14 NOTE — PROGRESS NOTES
Subjective:      Patient ID: Virgilio Acuña is a 59 y.o. male.    Chief Complaint: Foot Pain (left) and Diabetes Mellitus (PCP:  Dr Hilton  5/29/19  HgbA1c: 5/6/19  7.7)    Virgilio is a 59 y.o. male who presents to the clinic for evaluation and treatment of high risk feet. Virgilio has a past medical history of Cellulitis of left index finger (2/16/2015), Charcot's joint of foot, left (08/2018), Dyslipidemia, Essential hypertension (2/16/2017), Infected finger joint (2/17/2015), Obese, S/P knee surgery, medial/lateral menisectomy (11/4/2013), and Type 2 diabetes mellitus with diabetic polyneuropathy, with long-term current use of insulin (2003). Here for check up and left ankle pain over the last week.       PCP: Juanito Hilton MD    Date Last Seen by PCP:   Current shoe gear:  Affected Foot: Total contact cast     Unaffected Foot: Tennis shoes    History of Trauma: negative      Hemoglobin A1C   Date Value Ref Range Status   05/06/2019 7.7 (H) 4.0 - 5.6 % Final     Comment:     ADA Screening Guidelines:  5.7-6.4%  Consistent with prediabetes  >or=6.5%  Consistent with diabetes  High levels of fetal hemoglobin interfere with the HbA1C  assay. Heterozygous hemoglobin variants (HbS, HgC, etc)do  not significantly interfere with this assay.   However, presence of multiple variants may affect accuracy.     02/25/2019 8.3 (H) 4.0 - 5.6 % Final     Comment:     ADA Screening Guidelines:  5.7-6.4%  Consistent with prediabetes  >or=6.5%  Consistent with diabetes  High levels of fetal hemoglobin interfere with the HbA1C  assay. Heterozygous hemoglobin variants (HbS, HgC, etc)do  not significantly interfere with this assay.   However, presence of multiple variants may affect accuracy.     11/05/2018 8.0 (H) 4.0 - 5.6 % Final     Comment:     ADA Screening Guidelines:  5.7-6.4%  Consistent with prediabetes  >or=6.5%  Consistent with diabetes  High levels of fetal hemoglobin interfere with the HbA1C  assay. Heterozygous hemoglobin  variants (HbS, HgC, etc)do  not significantly interfere with this assay.   However, presence of multiple variants may affect accuracy.         Review of Systems   Constitution: Negative for chills and fever.   Cardiovascular: Positive for leg swelling. Negative for claudication.   Respiratory: Negative for shortness of breath.    Skin: Positive for color change, nail changes and poor wound healing. Negative for itching and rash.   Musculoskeletal: Negative for muscle cramps, muscle weakness and myalgias.   Gastrointestinal: Negative for nausea and vomiting.   Neurological: Positive for numbness and paresthesias. Negative for focal weakness and loss of balance.           Objective:       Physical Exam   Constitutional: He is oriented to person, place, and time. He appears well-developed and well-nourished. No distress.   Cardiovascular:   Pulses:       Dorsalis pedis pulses are 2+ on the right side, and 2+ on the left side.        Posterior tibial pulses are 2+ on the right side, and 2+ on the left side.   < 3 sec capillary refill time to toes 1-5 bilateral. Toes and feet are warm to touch proximally with normal distal cooling b/l. There is no hair growth on the feet and toes b/l. There is moderate edema left foot and mild edema right.      Musculoskeletal:   Left second toe amputation    Left foot now more narrow in appearance with the first ray properly reduced, minimal edema to the foot and no pain with palpation throughout the area of surgery.    Left ankle there is some pain with palpation to the lateral malleolus and the ATFL area    Equinus noted b/l ankles with < 10 deg DF noted. MMT 5/5 in DF/PF/Inv/Ev resistance with no reproduction of pain in any direction. Passive range of motion of ankle and pedal joints is painless b/l.     Feet:   Right Foot:   Protective Sensation: 10 sites tested. 0 sites sensed.   Left Foot:   Protective Sensation: 10 sites tested. 0 sites sensed.   Neurological: He is alert and  oriented to person, place, and time. He has normal strength. He displays no atrophy and no tremor. A sensory deficit is present. He exhibits normal muscle tone.   Negative tinel sign bilateral.   Skin: Skin is warm. No abrasion, no bruising, no burn, no ecchymosis, no laceration, no lesion, no petechiae and no rash noted. He is not diaphoretic. No cyanosis or erythema. No pallor. Nails show no clubbing.   Incision overlying the left second metatarsal base is well healed    Incision overlying the first ray is now well healed with 100% epithelial covering, no open wounds noted.     Right great toe dorsal aspect there is a dry stable eschar of what appears to be a prior blister, there is resolving erythema and edema to the entire toe       Psychiatric: He has a normal mood and affect. His behavior is normal.             Assessment:       Encounter Diagnoses   Name Primary?    Charcot's joint of left foot Yes    Diabetic polyneuropathy associated with type 2 diabetes mellitus     Type II diabetes mellitus with neurological manifestations     History of amputation of lesser toe, left     History of diabetic ulcer of foot             Plan:       Virgilio was seen today for foot pain and diabetes mellitus.    Diagnoses and all orders for this visit:    Charcot's joint of left foot  -     DIABETIC SHOES FOR HOME USE    Diabetic polyneuropathy associated with type 2 diabetes mellitus  -     DIABETIC SHOES FOR HOME USE    Type II diabetes mellitus with neurological manifestations  -     DIABETIC SHOES FOR HOME USE    History of amputation of lesser toe, left  -     DIABETIC SHOES FOR HOME USE    History of diabetic ulcer of foot  -     DIABETIC SHOES FOR HOME USE      I counseled the patient on his conditions, their implications and medical management.    Discussed importance of healthy diet and weight loss as to his diabetes control and healing potential.     X-rays reviewed and the foot appears to be in the same position  as prior as to the charcot deformity    Dispensed, fitted and gait trained with ankle brace left. Instructed to wear with supportive shoe at all times when placing weight on the foot.    Still recommend AFO left foot for support, will at least get new diabetic shoes    Return at least yearly for annual check up, sooner PRN if ankle pain worsens consider MRI for possible ankle charcot if it does not get better    Inocente Smith DPM

## 2020-01-15 ENCOUNTER — PATIENT MESSAGE (OUTPATIENT)
Dept: PODIATRY | Facility: CLINIC | Age: 60
End: 2020-01-15

## 2020-01-20 RX ORDER — CYCLOBENZAPRINE HCL 10 MG
10 TABLET ORAL NIGHTLY
Qty: 30 TABLET | Refills: 0 | Status: SHIPPED | OUTPATIENT
Start: 2020-01-20 | End: 2020-02-19

## 2020-01-21 ENCOUNTER — PATIENT MESSAGE (OUTPATIENT)
Dept: PODIATRY | Facility: CLINIC | Age: 60
End: 2020-01-21

## 2020-01-23 ENCOUNTER — PATIENT MESSAGE (OUTPATIENT)
Dept: ADMINISTRATIVE | Facility: HOSPITAL | Age: 60
End: 2020-01-23

## 2020-02-26 ENCOUNTER — PATIENT MESSAGE (OUTPATIENT)
Dept: FAMILY MEDICINE | Facility: CLINIC | Age: 60
End: 2020-02-26

## 2020-02-26 NOTE — PLAN OF CARE
[facility]  Neurology  Progress Note    Patient Name: Nena Aaron  MRN: [id number]  Admission Date: 2/18/2020  Hospital Length of Stay: 7 days  Code Status: Full Code   Attending Provider: Jeanine Donovan MD   Consulting Provider: Dr. Hany Neville  Consulting Practitioner: Anastasia Correia [facility]  Primary Care Physician: Areli Sun Put in call to [facility], who stated would be able to see patient tomorrow, am.   Faxed orders and paperwork to [phone].   02/23/2020    HGB 13.2 02/23/2020    HCT 39.2 02/23/2020    MCV 92 02/23/2020     02/23/[facility] at [phone] Anastasia Correia NP  Neurology  [facility]  I, Dr. Hany Neville, discussed care with my advanced practitioner and agree with above. I have reviewed patient clinical presentation, work up, impression and plan.

## 2020-02-28 ENCOUNTER — PATIENT OUTREACH (OUTPATIENT)
Dept: ADMINISTRATIVE | Facility: HOSPITAL | Age: 60
End: 2020-02-28

## 2020-02-28 ENCOUNTER — PATIENT MESSAGE (OUTPATIENT)
Dept: ADMINISTRATIVE | Facility: HOSPITAL | Age: 60
End: 2020-02-28

## 2020-02-28 ENCOUNTER — PATIENT MESSAGE (OUTPATIENT)
Dept: FAMILY MEDICINE | Facility: CLINIC | Age: 60
End: 2020-02-28

## 2020-03-04 ENCOUNTER — OFFICE VISIT (OUTPATIENT)
Dept: FAMILY MEDICINE | Facility: CLINIC | Age: 60
End: 2020-03-04
Payer: COMMERCIAL

## 2020-03-04 ENCOUNTER — LAB VISIT (OUTPATIENT)
Dept: LAB | Facility: HOSPITAL | Age: 60
End: 2020-03-04
Attending: INTERNAL MEDICINE
Payer: COMMERCIAL

## 2020-03-04 VITALS
DIASTOLIC BLOOD PRESSURE: 83 MMHG | HEART RATE: 82 BPM | TEMPERATURE: 98 F | BODY MASS INDEX: 41.75 KG/M2 | HEIGHT: 73 IN | WEIGHT: 315 LBS | SYSTOLIC BLOOD PRESSURE: 139 MMHG

## 2020-03-04 DIAGNOSIS — G62.9 NEUROPATHY: ICD-10-CM

## 2020-03-04 DIAGNOSIS — E11.42 TYPE 2 DIABETES MELLITUS WITH DIABETIC POLYNEUROPATHY, WITH LONG-TERM CURRENT USE OF INSULIN: ICD-10-CM

## 2020-03-04 DIAGNOSIS — Z12.11 COLON CANCER SCREENING: ICD-10-CM

## 2020-03-04 DIAGNOSIS — E78.2 MIXED HYPERLIPIDEMIA: ICD-10-CM

## 2020-03-04 DIAGNOSIS — Z79.4 TYPE 2 DIABETES MELLITUS WITH DIABETIC POLYNEUROPATHY, WITH LONG-TERM CURRENT USE OF INSULIN: ICD-10-CM

## 2020-03-04 DIAGNOSIS — I10 ESSENTIAL HYPERTENSION: Primary | ICD-10-CM

## 2020-03-04 DIAGNOSIS — G47.00 INSOMNIA, UNSPECIFIED TYPE: ICD-10-CM

## 2020-03-04 DIAGNOSIS — Z23 NEED FOR PNEUMOCOCCAL VACCINE: ICD-10-CM

## 2020-03-04 LAB
ESTIMATED AVG GLUCOSE: 186 MG/DL (ref 68–131)
HBA1C MFR BLD HPLC: 8.1 % (ref 4–5.6)

## 2020-03-04 PROCEDURE — 3052F HG A1C>EQUAL 8.0%<EQUAL 9.0%: CPT | Mod: CPTII,S$GLB,, | Performed by: INTERNAL MEDICINE

## 2020-03-04 PROCEDURE — 99999 PR PBB SHADOW E&M-EST. PATIENT-LVL III: CPT | Mod: PBBFAC,,, | Performed by: INTERNAL MEDICINE

## 2020-03-04 PROCEDURE — 99999 PR PBB SHADOW E&M-EST. PATIENT-LVL III: ICD-10-PCS | Mod: PBBFAC,,, | Performed by: INTERNAL MEDICINE

## 2020-03-04 PROCEDURE — 90471 PNEUMOCOCCAL POLYSACCHARIDE VACCINE 23-VALENT =>2YO SQ IM: ICD-10-PCS | Mod: S$GLB,,, | Performed by: INTERNAL MEDICINE

## 2020-03-04 PROCEDURE — 3079F DIAST BP 80-89 MM HG: CPT | Mod: CPTII,S$GLB,, | Performed by: INTERNAL MEDICINE

## 2020-03-04 PROCEDURE — 3075F PR MOST RECENT SYSTOLIC BLOOD PRESS GE 130-139MM HG: ICD-10-PCS | Mod: CPTII,S$GLB,, | Performed by: INTERNAL MEDICINE

## 2020-03-04 PROCEDURE — 83036 HEMOGLOBIN GLYCOSYLATED A1C: CPT

## 2020-03-04 PROCEDURE — 3079F PR MOST RECENT DIASTOLIC BLOOD PRESSURE 80-89 MM HG: ICD-10-PCS | Mod: CPTII,S$GLB,, | Performed by: INTERNAL MEDICINE

## 2020-03-04 PROCEDURE — 99214 PR OFFICE/OUTPT VISIT, EST, LEVL IV, 30-39 MIN: ICD-10-PCS | Mod: 25,S$GLB,, | Performed by: INTERNAL MEDICINE

## 2020-03-04 PROCEDURE — 3008F PR BODY MASS INDEX (BMI) DOCUMENTED: ICD-10-PCS | Mod: CPTII,S$GLB,, | Performed by: INTERNAL MEDICINE

## 2020-03-04 PROCEDURE — 3052F PR MOST RECENT HEMOGLOBIN A1C LEVEL 8.0 - < 9.0%: ICD-10-PCS | Mod: CPTII,S$GLB,, | Performed by: INTERNAL MEDICINE

## 2020-03-04 PROCEDURE — 3075F SYST BP GE 130 - 139MM HG: CPT | Mod: CPTII,S$GLB,, | Performed by: INTERNAL MEDICINE

## 2020-03-04 PROCEDURE — 3008F BODY MASS INDEX DOCD: CPT | Mod: CPTII,S$GLB,, | Performed by: INTERNAL MEDICINE

## 2020-03-04 PROCEDURE — 99214 OFFICE O/P EST MOD 30 MIN: CPT | Mod: 25,S$GLB,, | Performed by: INTERNAL MEDICINE

## 2020-03-04 PROCEDURE — 90471 IMMUNIZATION ADMIN: CPT | Mod: S$GLB,,, | Performed by: INTERNAL MEDICINE

## 2020-03-04 PROCEDURE — 36415 COLL VENOUS BLD VENIPUNCTURE: CPT | Mod: PO

## 2020-03-04 PROCEDURE — 90732 PNEUMOCOCCAL POLYSACCHARIDE VACCINE 23-VALENT =>2YO SQ IM: ICD-10-PCS | Mod: S$GLB,,, | Performed by: INTERNAL MEDICINE

## 2020-03-04 PROCEDURE — 90732 PPSV23 VACC 2 YRS+ SUBQ/IM: CPT | Mod: S$GLB,,, | Performed by: INTERNAL MEDICINE

## 2020-03-04 RX ORDER — GABAPENTIN 600 MG/1
600 TABLET ORAL 3 TIMES DAILY
Qty: 270 TABLET | Refills: 3 | Status: SHIPPED | OUTPATIENT
Start: 2020-03-04 | End: 2020-03-04

## 2020-03-04 RX ORDER — ENALAPRIL MALEATE 20 MG/1
20 TABLET ORAL 2 TIMES DAILY
Qty: 180 TABLET | Refills: 3 | Status: SHIPPED | OUTPATIENT
Start: 2020-03-04 | End: 2020-03-04

## 2020-03-04 RX ORDER — DEXTROSE 4 G
TABLET,CHEWABLE ORAL
Qty: 1 EACH | Refills: 0 | Status: SHIPPED | OUTPATIENT
Start: 2020-03-04 | End: 2020-03-04

## 2020-03-04 RX ORDER — METFORMIN HYDROCHLORIDE 500 MG/1
1000 TABLET, EXTENDED RELEASE ORAL 2 TIMES DAILY WITH MEALS
Qty: 180 TABLET | Refills: 3 | Status: SHIPPED | OUTPATIENT
Start: 2020-03-04 | End: 2020-03-04

## 2020-03-04 RX ORDER — GEMFIBROZIL 600 MG/1
600 TABLET, FILM COATED ORAL
Qty: 180 TABLET | Refills: 3 | Status: SHIPPED | OUTPATIENT
Start: 2020-03-04 | End: 2020-03-04

## 2020-03-04 RX ORDER — TRAZODONE HYDROCHLORIDE 150 MG/1
150 TABLET ORAL NIGHTLY
Qty: 90 TABLET | Refills: 3 | Status: SHIPPED | OUTPATIENT
Start: 2020-03-04 | End: 2020-03-04

## 2020-03-04 RX ORDER — INSULIN LISPRO 100 [IU]/ML
60 INJECTION, SOLUTION INTRAVENOUS; SUBCUTANEOUS 3 TIMES DAILY
Qty: 162 ML | Refills: 12 | Status: SHIPPED | OUTPATIENT
Start: 2020-03-04 | End: 2020-03-04

## 2020-03-04 RX ORDER — LANCETS
EACH MISCELLANEOUS
Qty: 100 EACH | Refills: 11 | Status: SHIPPED | OUTPATIENT
Start: 2020-03-04 | End: 2020-03-04

## 2020-03-04 RX ORDER — PRAVASTATIN SODIUM 40 MG/1
40 TABLET ORAL DAILY
Qty: 90 TABLET | Refills: 3 | Status: SHIPPED | OUTPATIENT
Start: 2020-03-04 | End: 2020-03-04

## 2020-03-04 RX ORDER — INSULIN GLARGINE 100 [IU]/ML
70 INJECTION, SOLUTION SUBCUTANEOUS NIGHTLY
Qty: 63 ML | Refills: 3 | Status: SHIPPED | OUTPATIENT
Start: 2020-03-04 | End: 2020-03-04

## 2020-03-04 NOTE — ASSESSMENT & PLAN NOTE
-Right at goal today but states that he took an extra dose of medication  -Currently on enalapril 20 mg   -Discussed increasing to 20 mg BID for better BP control and also for microalbinuria  -Repeat renal function 2 weeks after starting medication  -Denies adverse effects of medications  -Continue lifestyle modification with low sodium diet and exercise

## 2020-03-05 PROBLEM — A49.1 GROUP B STREPTOCOCCAL INFECTION: Status: RESOLVED | Noted: 2018-01-15 | Resolved: 2020-03-05

## 2020-03-05 PROBLEM — L98.491 INFECTED SKIN ULCER LIMITED TO BREAKDOWN OF SKIN: Status: RESOLVED | Noted: 2018-01-13 | Resolved: 2020-03-05

## 2020-03-05 PROBLEM — L08.9 INFECTED SKIN ULCER LIMITED TO BREAKDOWN OF SKIN: Status: RESOLVED | Noted: 2018-01-13 | Resolved: 2020-03-05

## 2020-03-05 PROBLEM — A49.01 MSSA (METHICILLIN SUSCEPTIBLE STAPHYLOCOCCUS AUREUS): Status: RESOLVED | Noted: 2018-01-13 | Resolved: 2020-03-05

## 2020-03-05 PROBLEM — E34.9 HYPOTESTOSTERONEMIA: Status: RESOLVED | Noted: 2017-07-07 | Resolved: 2020-03-05

## 2020-03-05 RX ORDER — INSULIN LISPRO 100 [IU]/ML
60 INJECTION, SOLUTION INTRAVENOUS; SUBCUTANEOUS 3 TIMES DAILY
Qty: 162 ML | Refills: 12 | Status: SHIPPED | OUTPATIENT
Start: 2020-03-05 | End: 2020-06-10 | Stop reason: SDUPTHER

## 2020-03-05 RX ORDER — GEMFIBROZIL 600 MG/1
600 TABLET, FILM COATED ORAL
Qty: 180 TABLET | Refills: 3 | Status: SHIPPED | OUTPATIENT
Start: 2020-03-05 | End: 2020-06-10

## 2020-03-05 RX ORDER — PRAVASTATIN SODIUM 40 MG/1
40 TABLET ORAL DAILY
Qty: 90 TABLET | Refills: 3 | Status: SHIPPED | OUTPATIENT
Start: 2020-03-05 | End: 2021-04-28

## 2020-03-05 RX ORDER — METFORMIN HYDROCHLORIDE 500 MG/1
1000 TABLET, EXTENDED RELEASE ORAL 2 TIMES DAILY WITH MEALS
Qty: 180 TABLET | Refills: 3 | Status: SHIPPED | OUTPATIENT
Start: 2020-03-05 | End: 2020-10-07

## 2020-03-05 RX ORDER — LANCETS
EACH MISCELLANEOUS
Qty: 100 EACH | Refills: 11 | Status: SHIPPED | OUTPATIENT
Start: 2020-03-05 | End: 2021-03-15

## 2020-03-05 RX ORDER — TRAZODONE HYDROCHLORIDE 150 MG/1
150 TABLET ORAL NIGHTLY
Qty: 90 TABLET | Refills: 3 | Status: SHIPPED | OUTPATIENT
Start: 2020-03-05 | End: 2020-08-24

## 2020-03-05 RX ORDER — DEXTROSE 4 G
TABLET,CHEWABLE ORAL
Qty: 1 EACH | Refills: 0 | Status: SHIPPED | OUTPATIENT
Start: 2020-03-05 | End: 2022-04-04

## 2020-03-05 RX ORDER — GABAPENTIN 600 MG/1
600 TABLET ORAL 3 TIMES DAILY
Qty: 270 TABLET | Refills: 3 | Status: SHIPPED | OUTPATIENT
Start: 2020-03-05 | End: 2020-10-19

## 2020-03-05 RX ORDER — INSULIN GLARGINE 100 [IU]/ML
70 INJECTION, SOLUTION SUBCUTANEOUS NIGHTLY
Qty: 63 ML | Refills: 3 | Status: SHIPPED | OUTPATIENT
Start: 2020-03-05 | End: 2021-04-05 | Stop reason: SDUPTHER

## 2020-03-05 RX ORDER — ENALAPRIL MALEATE 20 MG/1
20 TABLET ORAL 2 TIMES DAILY
Qty: 180 TABLET | Refills: 3 | Status: SHIPPED | OUTPATIENT
Start: 2020-03-05 | End: 2021-03-15

## 2020-03-05 NOTE — PROGRESS NOTES
Assessment/Plan:    Essential hypertension  -Right at goal today but states that he took an extra dose of medication  -Currently on enalapril 20 mg   -Discussed increasing to 20 mg BID for better BP control and also for microalbinuria  -Repeat renal function 2 weeks after starting medication  -Denies adverse effects of medications  -Continue lifestyle modification with low sodium diet and exercise     Mixed hyperlipidemia  -Hx of severely elevated triglycerides in past  -Remains on both fibrate and statin    Type 2 diabetes mellitus with diabetic polyneuropathy, with long-term current use of insulin  Last A1C   Lab Results   Component Value Date    HGBA1C 7.7 (H) 05/06/2019     Current meds: Metformin 1000 mg BID, Lantus 70 units, Humalog 60 units AC  Foot exam completed- Yes  Eye exam completed- Yes  Microalbumin completed- Yes  On statin therapy- yes  On ACEI/ARB- yes  Patient on a very large dose of insulin. Still uncontrolled but actually improved compared to previous A1C. Due for repeat at this time. Patient does not have a meter so I am unsure of where his glucoses have been ranging. Plan to prescribe new meter so that he can start a log and bring to our next visit. Patient is interested in a continuous monitor so he will look to see if his insurance offers this.    _____________________________________________________________________________________________________________________________________________________    Orders this visit:    Essential hypertension  -     Discontinue: enalapril (VASOTEC) 20 MG tablet; Take 1 tablet (20 mg total) by mouth 2 (two) times daily.  Dispense: 180 tablet; Refill: 3  -     Renal function panel; Future; Expected date: 03/04/2020  -     enalapril (VASOTEC) 20 MG tablet; Take 1 tablet (20 mg total) by mouth 2 (two) times daily.  Dispense: 180 tablet; Refill: 3    Type 2 diabetes mellitus with diabetic polyneuropathy, with long-term current use of insulin  -     Hemoglobin A1c;  Standing  -     Discontinue: blood-glucose meter Misc; Use to check glucose three times daily  Dispense: 1 each; Refill: 0  -     Discontinue: blood sugar diagnostic (BLOOD GLUCOSE TEST) Strp; Check glucose 3 times per day before each meal  Dispense: 100 strip; Refill: 11  -     Discontinue: lancets Misc; Use to check glucose three times daily  Dispense: 100 each; Refill: 11  -     Discontinue: insulin (LANTUS SOLOSTAR U-100 INSULIN) glargine 100 units/mL (3mL) SubQ pen; Inject 70 Units into the skin every evening. Supply pen needles  Dispense: 63 mL; Refill: 3  -     Discontinue: insulin lispro (HUMALOG KWIKPEN INSULIN) 100 unit/mL pen; Inject 60 Units into the skin 3 (three) times daily.  Dispense: 162 mL; Refill: 12  -     Discontinue: metFORMIN (GLUCOPHAGE-XR) 500 MG XR 24hr tablet; Take 2 tablets (1,000 mg total) by mouth 2 (two) times daily with meals.  Dispense: 180 tablet; Refill: 3  -     blood sugar diagnostic (BLOOD GLUCOSE TEST) Strp; Check glucose 3 times per day before each meal  Dispense: 100 strip; Refill: 11  -     blood-glucose meter Misc; Use to check glucose three times daily  Dispense: 1 each; Refill: 0  -     insulin (LANTUS SOLOSTAR U-100 INSULIN) glargine 100 units/mL (3mL) SubQ pen; Inject 70 Units into the skin every evening. Supply pen needles  Dispense: 63 mL; Refill: 3  -     insulin lispro (HUMALOG KWIKPEN INSULIN) 100 unit/mL pen; Inject 60 Units into the skin 3 (three) times daily.  Dispense: 162 mL; Refill: 12  -     lancets Misc; Use to check glucose three times daily  Dispense: 100 each; Refill: 11  -     metFORMIN (GLUCOPHAGE-XR) 500 MG XR 24hr tablet; Take 2 tablets (1,000 mg total) by mouth 2 (two) times daily with meals.  Dispense: 180 tablet; Refill: 3    Need for pneumococcal vaccine  -     Pneumococcal Polysaccharide Vaccine (23 Valent) (SQ/IM)    Colon cancer screening  -     Fecal Immunochemical Test (iFOBT); Future; Expected date: 03/04/2020    Neuropathy  -      Discontinue: gabapentin (NEURONTIN) 600 MG tablet; Take 1 tablet (600 mg total) by mouth 3 (three) times daily.  Dispense: 270 tablet; Refill: 3  -     gabapentin (NEURONTIN) 600 MG tablet; Take 1 tablet (600 mg total) by mouth 3 (three) times daily.  Dispense: 270 tablet; Refill: 3    Mixed hyperlipidemia  -     Discontinue: pravastatin (PRAVACHOL) 40 MG tablet; Take 1 tablet (40 mg total) by mouth once daily.  Dispense: 90 tablet; Refill: 3  -     Discontinue: gemfibrozil (LOPID) 600 MG tablet; Take 1 tablet (600 mg total) by mouth 2 (two) times daily before meals.  Dispense: 180 tablet; Refill: 3  -     gemfibrozil (LOPID) 600 MG tablet; Take 1 tablet (600 mg total) by mouth 2 (two) times daily before meals.  Dispense: 180 tablet; Refill: 3  -     pravastatin (PRAVACHOL) 40 MG tablet; Take 1 tablet (40 mg total) by mouth once daily.  Dispense: 90 tablet; Refill: 3    Insomnia, unspecified type  -     Discontinue: traZODone (DESYREL) 150 MG tablet; Take 1 tablet (150 mg total) by mouth nightly.  Dispense: 90 tablet; Refill: 3  -     traZODone (DESYREL) 150 MG tablet; Take 1 tablet (150 mg total) by mouth nightly.  Dispense: 90 tablet; Refill: 3      Follow up in about 3 months (around 6/4/2020).    Anne Wright MD  _____________________________________________________________________________________________________________________________________________________    HPI:    Patient is in clinic today as an established patient, new to me, here to establish care.  Patient is a 59-year-old CM with a past medical history of hypertension, type 2 diabetes with polyneuropathy, diabetic foot status post partial amputation, and hyperlipidemia.    Patient's blood pressure within goal today, however patient did take an extra blood pressure medication.  He does not check his blood pressure regularly at home.  He is currently on enalapril 20 mg once at night.  Denies any adverse effects of this medication.  Hyperlipidemia  appears to be pretty well controlled on current therapy.  It does sound as though he has a family history of hypertriglyceridemia.    As for his diabetes, he is unsure of what his glucose has been ranging due to the fact that [place].  He has not had an A1c in over 6 months.  He has a long history of uncontrolled diabetes.  Last A1c still above goal but slightly improved.  He is followed by Podiatry for his history of multiple foot issues.  Has a history of Charcot foot, as well as diabetic ft infection requiring partial amputation.  He continues to complain of foot pain and neuropathy.    Patient was unfairly passed over for promotion last year and he is still very upset over this.  We spent a good deal of clinic visit discussing this.  This event has made him very discouraged about his future and his current job and has caused him to not care or really want to participate in his health.  He does have a wife who seems to be supportive.  He speaks highly of her.  He does plan on retiring from this job within the next 2-3 years.  He states that he does not really care about his health, however by the end of the visit he did seem willing to start taking more control of his diabetes and is eager to know what his recent A1c is.    No other complaints today.  Routine health maintenance reviewed.  Fit kit provided for colon cancer screening.  Eye and foot exam up-to-date.  Pneumovax today.    Past Medical History:  Past Medical History:   Diagnosis Date    Cellulitis of left index finger 2/16/2015    Charcot's joint of foot, left 08/2018    Dyslipidemia     Essential hypertension 2/16/2017    Group B streptococcal infection 1/15/2018    Hypotestosteronemia 7/7/2017    Infected finger joint 2/17/2015    Infected skin ulcer limited to breakdown of skin 1/13/2018    MSSA (methicillin susceptible Staphylococcus aureus) 1/13/2018    Obese     S/P knee surgery, medial/lateral menisectomy  "11/4/2013    Type 2 diabetes mellitus with diabetic polyneuropathy, with long-term current use of insulin 2003     Past Surgical History:   Procedure Laterality Date    ELBOW SURGERY      FOOT SURGERY Left     KNEE CARTILAGE SURGERY  10/21/2013    right knee    KNEE SURGERY Right 02/17/2017    Left index finger surgery  03/2015    MIDFOOT ARTHRODESIS Left 8/27/2018    Procedure: FUSION, JOINT, MIDFOOT - FIRST METATARSOCUNEIFORM JOINT AND NAVICULOCUNEIFORM JOINT;  Surgeon: Inocente Smith DPM;  Location: [facility];  Service: Podiatry;  Laterality: Left;    TOE AMPUTATION  03/2018    left great toe     Review of patient's allergies indicates:  No Known Allergies  Social History     Tobacco Use    Smoking status: Never Smoker    Smokeless tobacco: Never Used   Substance Use Topics    Alcohol use: Yes     Alcohol/week: 1.0 standard drinks     Types: 1 Cans of beer per week     Comment: occasional    Drug use: No     Family History   Problem Relation Age of Onset    COPD Father      Current Outpatient Medications on File Prior to Visit   Medication Sig Dispense Refill    insulin syringe-needle U-100 (INSULIN SYRINGE) 1 mL 29 gauge x 1/2" Syrg Inject 4 times a day 100 each 12    pen needle, diabetic (BD ULTRA-FINE WILDA PEN NEEDLE) 32 gauge x 5/32" Ndle INJECT FOUR TIMES DAILY 4 each 12    safety needles 22 gauge x 1 1/2" Ndle To be used once a month for testosterone injections 50 each 6    syringe with needle 3 mL 25 gauge x 1" Syrg To be used with monthly testosterone injections 100 Syringe 4     Current Facility-Administered Medications on File Prior to Visit   Medication Dose Route Frequency Provider Last Rate Last Dose    lactated ringers infusion   Intravenous Continuous Vadim Buchanan MD   Stopped at 08/27/18 0953    [DISCONTINUED] testosterone cypionate injection 300 mg  300 mg Intramuscular Q28 Days Juanito Hilton MD           Review of Systems   Constitutional: Positive for fatigue. " "  Cardiovascular: Negative for chest pain.   Endocrine: Negative for polydipsia, polyphagia and polyuria.   Skin: Negative for pallor.   Neurological: Positive for weakness. Negative for dizziness, tremors, seizures, speech difficulty and headaches.   Psychiatric/Behavioral: Negative for confusion. The patient is not nervous/anxious.        Vitals:    03/04/20 0914   BP: 139/83   Pulse: 82   Temp: 98.2 °F (36.8 °C)   Weight: (!) 150.6 kg (332 lb)   Height: 6' 1" (1.854 m)       Wt Readings from Last 3 Encounters:   03/04/20 (!) 150.6 kg (332 lb)   01/14/20 (!) 151.5 kg (334 lb)   06/26/19 (!) 149.1 kg (328 lb 11.3 oz)       Physical Exam   Constitutional: He is oriented to person, place, and time. He appears well-developed and well-nourished. No distress.   HENT:   Head: Normocephalic and atraumatic.   Eyes: Conjunctivae and EOM are normal.   Neck: Normal range of motion.   Cardiovascular: Normal rate, regular rhythm, normal heart sounds and intact distal pulses.   No murmur heard.  Pulmonary/Chest: Effort normal and breath sounds normal. No respiratory distress.   Abdominal: He exhibits no distension.   Musculoskeletal: Normal range of motion.   Neurological: He is alert and oriented to person, place, and time.   Skin: Skin is warm and dry.   Psychiatric: He has a normal mood and affect.       Answers for HPI/ROS submitted by the patient on 3/3/2020   Diabetes problem  Diabetes type: type 2  MedicAlert ID: No  Disease duration: 15 years  blurred vision: No  foot paresthesias: Yes  foot ulcerations: No  visual change: No  weight loss: No  Symptom course: stable  hunger: Yes  mood changes: No  sleepiness: No  sweats: No  blackouts: No  hospitalization: No  nocturnal hypoglycemia: No  required assistance: No  required glucagon: No  CVA: No  heart disease: No  peripheral neuropathy: Yes  retinopathy: No  autonomic neuropathy: Yes  CAD risks: dyslipidemia, family history, hypertension, obesity, stress, diabetes " mellitus  Current treatments: diet, insulin injections, oral agent (dual therapy)  Treatment compliance: most of the time  Dose schedule: pre-breakfast, pre-lunch, pre-dinner  Given by: patient  Injection sites: abdominal wall, arms, thighs  Home blood tests: 1-2 x per day  Monitoring compliance: inadequate  Blood glucose trend: fluctuating minimally  Weight trend: increasing steadily  Current diet: generally unhealthy  Meal planning: none  Exercise: rarely  Dietitian visit: No  Eye exam current: Yes  Sees podiatrist: Yes

## 2020-03-05 NOTE — ASSESSMENT & PLAN NOTE
Last A1C   Lab Results   Component Value Date    HGBA1C 7.7 (H) 05/06/2019     Current meds: Metformin 1000 mg BID, Lantus 70 units, Humalog 60 units AC  Foot exam completed- Yes  Eye exam completed- Yes  Microalbumin completed- Yes  On statin therapy- yes  On ACEI/ARB- yes  Patient on a very large dose of insulin. Still uncontrolled but actually improved compared to previous A1C. Due for repeat at this time. Patient does not have a meter so I am unsure of where his glucoses have been ranging. Plan to prescribe new meter so that he can start a log and bring to our next visit. Patient is interested in a continuous monitor so he will look to see if his insurance offers this.

## 2020-03-06 ENCOUNTER — PATIENT MESSAGE (OUTPATIENT)
Dept: FAMILY MEDICINE | Facility: CLINIC | Age: 60
End: 2020-03-06

## 2020-03-08 ENCOUNTER — LAB VISIT (OUTPATIENT)
Dept: LAB | Facility: HOSPITAL | Age: 60
End: 2020-03-08
Attending: INTERNAL MEDICINE
Payer: COMMERCIAL

## 2020-03-08 DIAGNOSIS — E11.42 TYPE 2 DIABETES MELLITUS WITH DIABETIC POLYNEUROPATHY, WITH LONG-TERM CURRENT USE OF INSULIN: Primary | ICD-10-CM

## 2020-03-08 DIAGNOSIS — Z79.4 TYPE 2 DIABETES MELLITUS WITH DIABETIC POLYNEUROPATHY, WITH LONG-TERM CURRENT USE OF INSULIN: Primary | ICD-10-CM

## 2020-03-08 DIAGNOSIS — Z12.11 COLON CANCER SCREENING: ICD-10-CM

## 2020-03-08 PROCEDURE — 82274 ASSAY TEST FOR BLOOD FECAL: CPT

## 2020-03-09 ENCOUNTER — TELEPHONE (OUTPATIENT)
Dept: FAMILY MEDICINE | Facility: CLINIC | Age: 60
End: 2020-03-09

## 2020-03-09 ENCOUNTER — PATIENT MESSAGE (OUTPATIENT)
Dept: FAMILY MEDICINE | Facility: CLINIC | Age: 60
End: 2020-03-09

## 2020-03-09 NOTE — TELEPHONE ENCOUNTER
Called patient to let him know that Dr. Wright said not to take Victoza patient agreed. Patient stated that Januvia is only $5.01 which he can afford so he will start that. Also stated that enalapril is helping with the swelling in his legs.

## 2020-03-09 NOTE — PROGRESS NOTES
Results have been released via [employer]. Please verify that these have been viewed by patient. If not, please call patient with results.    I have sent a message to them with the following interpretation (see below).    I have reviewed your recent lab work..    Your A1C has slightly increase from 7.7  To 8.1. I received your message about the continuous glucose meters. I am really sorry to hear that! I could imagine how sticking yourself so much can be hard, but if you could start writing down at least a few numbers a week, this could give us some direction for insulin adjustments. If you are ok with it, I would also like to start you on a new medicine in addition to current diabetic meds. It is called victoza. It is a once daily injection but it is not an insulin. Let me know how you feel about this. I will go ahead and send to your pharmacy.    As for the swelling, blood pressure can cause that but there are other causes as well. If this does not improve with the increased dose of enalapril, then please let me know.    Please do not hesitate to call or message with any additional questions or concerns.

## 2020-03-15 ENCOUNTER — PATIENT MESSAGE (OUTPATIENT)
Dept: FAMILY MEDICINE | Facility: CLINIC | Age: 60
End: 2020-03-15

## 2020-03-16 LAB — HEMOCCULT STL QL IA: NEGATIVE

## 2020-03-16 NOTE — PROGRESS NOTES
Fit test negative, repeat in 1 year, results released through [employer]. Please verify that patient has viewed results. If not, please call patient with interpretation below:    I have reviewed the results of your FIT test for colon cancer screening. This test is negative. We will plan to repeat in one year.     Also please see below health maintenance items that are due:    TETANUS VACCINE due on 05/23/1977  Colonoscopy due on 05/23/2009

## 2020-03-16 NOTE — TELEPHONE ENCOUNTER
Patient received a letter stating that he was due for a colonoscopy since 2009 however he stated that he had one at [facility]. Please advise about being due for one.

## 2020-03-17 ENCOUNTER — PATIENT MESSAGE (OUTPATIENT)
Dept: FAMILY MEDICINE | Facility: CLINIC | Age: 60
End: 2020-03-17

## 2020-03-19 ENCOUNTER — PATIENT MESSAGE (OUTPATIENT)
Dept: FAMILY MEDICINE | Facility: CLINIC | Age: 60
End: 2020-03-19

## 2020-03-19 NOTE — TELEPHONE ENCOUNTER
I have drafted a letter for this patient which should be saved to chart. Please fax it to the number he has provided in his last message.

## 2020-03-21 ENCOUNTER — PATIENT MESSAGE (OUTPATIENT)
Dept: FAMILY MEDICINE | Facility: CLINIC | Age: 60
End: 2020-03-21

## 2020-03-22 ENCOUNTER — PATIENT MESSAGE (OUTPATIENT)
Dept: FAMILY MEDICINE | Facility: CLINIC | Age: 60
End: 2020-03-22

## 2020-03-23 ENCOUNTER — PATIENT MESSAGE (OUTPATIENT)
Dept: FAMILY MEDICINE | Facility: CLINIC | Age: 60
End: 2020-03-23

## 2020-03-24 ENCOUNTER — PATIENT MESSAGE (OUTPATIENT)
Dept: FAMILY MEDICINE | Facility: CLINIC | Age: 60
End: 2020-03-24

## 2020-03-24 ENCOUNTER — TELEPHONE (OUTPATIENT)
Dept: FAMILY MEDICINE | Facility: CLINIC | Age: 60
End: 2020-03-24

## 2020-03-24 DIAGNOSIS — G89.29 CHRONIC BILATERAL LOW BACK PAIN WITH SCIATICA, SCIATICA LATERALITY UNSPECIFIED: ICD-10-CM

## 2020-03-24 DIAGNOSIS — G89.29 CHRONIC BILATERAL LOW BACK PAIN WITH SCIATICA, SCIATICA LATERALITY UNSPECIFIED: Primary | ICD-10-CM

## 2020-03-24 DIAGNOSIS — M54.40 CHRONIC BILATERAL LOW BACK PAIN WITH SCIATICA, SCIATICA LATERALITY UNSPECIFIED: Primary | ICD-10-CM

## 2020-03-24 DIAGNOSIS — M54.40 ACUTE BILATERAL LOW BACK PAIN WITH SCIATICA, SCIATICA LATERALITY UNSPECIFIED: Primary | ICD-10-CM

## 2020-03-24 DIAGNOSIS — M54.40 CHRONIC BILATERAL LOW BACK PAIN WITH SCIATICA, SCIATICA LATERALITY UNSPECIFIED: ICD-10-CM

## 2020-03-24 RX ORDER — HYDROCODONE BITARTRATE AND ACETAMINOPHEN 5; 325 MG/1; MG/1
1 TABLET ORAL EVERY 6 HOURS PRN
Qty: 20 TABLET | Refills: 0 | Status: SHIPPED | OUTPATIENT
Start: 2020-03-24 | End: 2020-04-03 | Stop reason: SDUPTHER

## 2020-03-24 NOTE — TELEPHONE ENCOUNTER
Spoke with pt regarding appt, pt was informed that Dr. Wright doesn't do hip injections. Appt for today cancelled.

## 2020-03-24 NOTE — TELEPHONE ENCOUNTER
Called and discussed back pain with patient. Patient reports a chronic hx of lumbar back pain with radiculopathy. Apparently has received injections in the past but his previous doctor is no longer available. Now having bilateral lower back pain with radiation down lower extremity, along with worsening numbness/tingling of R foot. Denies motor weakness. Denies bowel/bladder incontinence. Plan to refer to a different pain management physician but we discussed this could be an issue getting into clinic soon with current COVID-19 situation. In the mean time, plan to prescribed short course of pain reliever, along with encouraging stretching and heat application.     The patient was checked in the [facility] Board of Pharmacy's  Prescription Monitoring Program. There appears to be no incongruities with the patient's verbalized history.

## 2020-03-24 NOTE — TELEPHONE ENCOUNTER
I have signed for the following orders AND/OR meds.  Please call the patient and ask the patient to schedule the testing AND/OR inform about any medications that were sent. Medications have been sent to pharmacy listed below      Orders Placed This Encounter   Procedures    Ambulatory referral/consult to Pain Clinic     Standing Status:   Future     Standing Expiration Date:   4/24/2021     Referral Priority:   Routine     Referral Type:   Consultation     Referral Reason:   Specialty Services Required     Requested Specialty:   Pain Medicine     Number of Visits Requested:   1              [id number] - [place] - [address] [facility] [address]  [address] [facility] [address]  Phone: [phone] Fax: [phone]    [facility] - [place], [place] - [address] [place] [address]  Phone: [phone] Fax: [phone]    [facility] - [place], [place] - [address]  [address]  [address]  [facility] [address]  Phone: [phone] Fax: [phone]

## 2020-03-26 ENCOUNTER — PATIENT MESSAGE (OUTPATIENT)
Dept: FAMILY MEDICINE | Facility: CLINIC | Age: 60
End: 2020-03-26

## 2020-03-26 NOTE — TELEPHONE ENCOUNTER
I can prescribed him Ibuprofen 800mg mg if he would like. He has already been offered an IM injection of an anti-inflammatory and declined.

## 2020-03-26 NOTE — TELEPHONE ENCOUNTER
Spoke with pt, declined to come in for office visit. Pt stated that he didn't want to be evaluated, he only wanted to receive a Cortizone inj, which aren't done at the clinic. Pt advised to call back if he changed his mind about scheduling appt.

## 2020-03-31 ENCOUNTER — TELEPHONE (OUTPATIENT)
Dept: PAIN MEDICINE | Facility: CLINIC | Age: 60
End: 2020-03-31

## 2020-04-01 ENCOUNTER — TELEPHONE (OUTPATIENT)
Dept: PAIN MEDICINE | Facility: CLINIC | Age: 60
End: 2020-04-01

## 2020-04-01 ENCOUNTER — PATIENT MESSAGE (OUTPATIENT)
Dept: FAMILY MEDICINE | Facility: CLINIC | Age: 60
End: 2020-04-01

## 2020-04-01 ENCOUNTER — PATIENT MESSAGE (OUTPATIENT)
Dept: PAIN MEDICINE | Facility: CLINIC | Age: 60
End: 2020-04-01

## 2020-04-01 DIAGNOSIS — G89.29 CHRONIC BILATERAL LOW BACK PAIN WITH SCIATICA, SCIATICA LATERALITY UNSPECIFIED: ICD-10-CM

## 2020-04-01 DIAGNOSIS — M54.40 CHRONIC BILATERAL LOW BACK PAIN WITH SCIATICA, SCIATICA LATERALITY UNSPECIFIED: ICD-10-CM

## 2020-04-03 ENCOUNTER — TELEPHONE (OUTPATIENT)
Dept: PAIN MEDICINE | Facility: CLINIC | Age: 60
End: 2020-04-03

## 2020-04-03 ENCOUNTER — PATIENT MESSAGE (OUTPATIENT)
Dept: FAMILY MEDICINE | Facility: CLINIC | Age: 60
End: 2020-04-03

## 2020-04-03 RX ORDER — HYDROCODONE BITARTRATE AND ACETAMINOPHEN 5; 325 MG/1; MG/1
2 TABLET ORAL EVERY 6 HOURS PRN
Qty: 60 TABLET | Refills: 0 | Status: SHIPPED | OUTPATIENT
Start: 2020-04-03 | End: 2020-04-03 | Stop reason: CLARIF

## 2020-04-03 RX ORDER — HYDROCODONE BITARTRATE AND ACETAMINOPHEN 10; 325 MG/1; MG/1
1 TABLET ORAL EVERY 6 HOURS PRN
Qty: 60 TABLET | Refills: 0 | Status: SHIPPED | OUTPATIENT
Start: 2020-04-03 | End: 2020-04-18

## 2020-04-03 NOTE — TELEPHONE ENCOUNTER
The patient was checked in the [facility] Board of Pharmacy's  Prescription Monitoring Program. There appears to be no incongruities with the patient's verbalized history.     I have signed for the following orders AND/OR meds.  Please call the patient and ask the patient to schedule the testing AND/OR inform about any medications that were sent. Medications have been sent to pharmacy listed below      No orders of the defined types were placed in this encounter.      Medications Ordered This Encounter   Medications    HYDROcodone-acetaminophen (NORCO)  mg per tablet     Sig: Take 1 tablet by mouth every 6 (six) hours as needed for Pain.     Dispense:  60 tablet     Refill:  0     Quantity prescribed more than 7 day supply? Yes, quantity medically necessary         [id number] - [place] - [address] [place] [address] AT [facility] [address]  [address] [place] [address]  [place] [address]  Phone: [phone] Fax: [phone]    [facility] - [place], [place] - [address] [place] [address]  [address] [facility] [address]  Phone: [phone] Fax: [phone]    [facility] - [place], [place] - [address]  [address]  [address]  [facility] [address]  Phone: [phone] Fax: [phone]

## 2020-04-03 NOTE — TELEPHONE ENCOUNTER
Can we please call his pharmacy and make sure that they fill the Norco 10 mg for a total of 60 pills. I made a mistake and sent an Rx for 5 mg first. Then please let patient know

## 2020-04-17 ENCOUNTER — PATIENT MESSAGE (OUTPATIENT)
Dept: PAIN MEDICINE | Facility: CLINIC | Age: 60
End: 2020-04-17

## 2020-04-19 ENCOUNTER — PATIENT OUTREACH (OUTPATIENT)
Dept: ADMINISTRATIVE | Facility: OTHER | Age: 60
End: 2020-04-19

## 2020-04-20 ENCOUNTER — OFFICE VISIT (OUTPATIENT)
Dept: PAIN MEDICINE | Facility: CLINIC | Age: 60
End: 2020-04-20
Payer: COMMERCIAL

## 2020-04-20 DIAGNOSIS — G89.29 CHRONIC BILATERAL LOW BACK PAIN WITH SCIATICA, SCIATICA LATERALITY UNSPECIFIED: ICD-10-CM

## 2020-04-20 DIAGNOSIS — E66.01 SEVERE OBESITY (BMI >= 40): ICD-10-CM

## 2020-04-20 DIAGNOSIS — G89.29 CHRONIC MYOFASCIAL PAIN: ICD-10-CM

## 2020-04-20 DIAGNOSIS — M46.1 SACROILIITIS: Primary | ICD-10-CM

## 2020-04-20 DIAGNOSIS — M54.40 CHRONIC BILATERAL LOW BACK PAIN WITH SCIATICA, SCIATICA LATERALITY UNSPECIFIED: ICD-10-CM

## 2020-04-20 DIAGNOSIS — M79.18 CHRONIC MYOFASCIAL PAIN: ICD-10-CM

## 2020-04-20 PROCEDURE — 99203 OFFICE O/P NEW LOW 30 MIN: CPT | Mod: 95,,, | Performed by: PHYSICAL MEDICINE & REHABILITATION

## 2020-04-20 PROCEDURE — 99203 PR OFFICE/OUTPT VISIT, NEW, LEVL III, 30-44 MIN: ICD-10-PCS | Mod: 95,,, | Performed by: PHYSICAL MEDICINE & REHABILITATION

## 2020-04-20 RX ORDER — CELECOXIB 100 MG/1
100 CAPSULE ORAL 2 TIMES DAILY PRN
Qty: 60 CAPSULE | Refills: 1 | Status: SHIPPED | OUTPATIENT
Start: 2020-04-20 | End: 2020-06-10

## 2020-04-20 NOTE — PATIENT INSTRUCTIONS
Anatomy of the Sacroiliac Joint  There are 2 sacroiliac (SI) joints. They are in the very low back (buttocks area). There is 1 joint on either side of the pelvis. The SI joints link the sacrum to the ilium. The sacrum is a triangular bone at the bottom of the spine. The ilium is part of the pelvis. The SI joints are held in place by ligaments. Ligaments are strong tissue that link bone to bone. The joints do not move very much, but they do help with mobility. They work with your spine and femurs to help you bend and walk. They also help bear the weight of your upper body.      Date Last Reviewed: 9/1/2015  © 3393-8673 [employer]. [address] [place] [address], [place] [address]. All rights reserved. This information is not intended as a substitute for professional medical care. Always follow your healthcare professional's instructions.        Sacroiliitis  The sacrum is the triangle-shaped bone at the base of the spine. It is linked to the other pelvis bones by the sacroiliac joints, also called SI joints. Sacroiliitis is when one or both SI joints are hurt or inflamed. It can make small movements of the lower back and pelvis very painful.        This condition has been linked to other diseases. They include ankylosing spondylitis, rheumatoid arthritis, psoriasis, and Crohns disease or colitis. Symptoms may include pain or stiffness in the hips, lower back, thighs, or buttocks. Pain occurs most often in the morning or after sitting for long periods of time. The pain may get worse when you walk. The swinging motion of the hips strains the SI joints.  Sacroiliitis is caused by many factors such as:  · Heavy lifting, especially if not done the right way  · Severe injury, such as a fall or car accident  · Osteoarthritis  · Pregnancy  · Infection of the joint  This condition is hard to diagnose. It may be confused with other causes of low back pain. To confirm the diagnosis, you may be given a shot of numbing  medicine in the SI joint. Treatment includes rest, physical therapy, and anti-inflammatory medicines. If another health problem is the cause, then that must also be treated. More testing may be needed if your symptoms dont get better.  Home care  · If your healthcare provider has prescribed medicines, take all of them as directed.  · You may use over-the-counter pain medicine to control pain, unless another medicine was prescribed. If prednisone was prescribed, dont use NSAIDs, or nonsteroidal anti-inflammatory drugs, such as ibuprofen or naproxen. Talk with your provider before using this medicine if you have chronic liver or kidney disease or ever had a stomach ulcer or GI bleeding.  · If you were referred to physical therapy, make an appointment. Be sure to do any prescribed exercises.  · Dont smoke. Smoking reduces blood flow to the inflamed area. This makes it harder to treat.  Follow-up care  Follow up with your healthcare provider, or as advised.  If you had an X-ray or an MRI, you will be notified of any new findings that may affect your care.  When to seek medical advice  Contact your healthcare provider right away if any of these occur:  · Increasing low back pain  · Inflammation of the eyes  · Skin rash or redness  · Weakness or numbness in one or both legs  · Loss of bowel or bladder control  · Numbness in the groin area  Date Last Reviewed: 11/24/2015  © 0478-9402 [employer]. [address] [place] [address], [place] [address]. All rights reserved. This information is not intended as a substitute for professional medical care. Always follow your healthcare professional's instructions.        Lumbar Rotation    1. Lie on your back on the floor, with your knees bent and your feet flat on the floor. Dont press your neck or lower back to the floor.  2. Lean both of your knees to one side. Turn your head in the opposite direction. Keep your shoulders flat on the floor. Be gentle and dont push  through pain.  3. Hold for 20 seconds. Then slowly move your knees and head in the other direction.  4. Repeat 2 to 5 times, or as instructed.   Date Last Reviewed: 3/10/2016  © 8792-9088 [employer]. [address] [place] [address], [place] [address]. All rights reserved. This information is not intended as a substitute for professional medical care. Always follow your healthcare professional's instructions.

## 2020-04-20 NOTE — LETTER
April 20, 2020      Anne Wright MD  [address]  [place] [address]           [facility]  [address]  [place] [address]  Phone: [phone]  Fax: [phone]          Patient: Virgilio Acuña   MR Number: [id number]   YOB: 1960   Date of Visit: 4/20/2020       Dear Dr. Anne Wright:    Thank you for referring Virgilio Acuña to me for evaluation. Attached you will find relevant portions of my assessment and plan of care.    If you have questions, please do not hesitate to call me. I look forward to following Virgilio Acuña along with you.    Sincerely,    Nirav Ramon MD    Enclosure  CC:  No Recipients    If you would like to receive this communication electronically, please contact [email or web address] or [phone] to request more information on [employer] access.    For providers and/or their staff who would like to refer a patient to [facility], please contact us through our one-stop-shop provider referral line, [facility] , at [phone].    If you feel you have received this communication in error or would no longer like to receive these types of communications, please e-mail [email or web address]

## 2020-04-20 NOTE — PROGRESS NOTES
Chronic Pain-Tele-Medicine-New Consult    The patient location is:  Place of residence  The chief complaint leading to consultation is:  Lower back pain  Visit type: Virtual visit with synchronous audio and video  Total time spent with patient:  25-30 minutes  Each patient to whom he or she provides medical services by telemedicine is: (1) informed of the relationship between the physician and patient and the respective role of any other health care provider with respect to management of the patient; and (2) notified that he or she may decline to receive medical services by telemedicine and may withdraw from such care at any time.    Notes:    Referring Physician: Anne Wright MD    PCP: Anne Wright MD    Chief Complaint:   Chief Complaint   Patient presents with    Low-back Pain     Right-sided        SUBJECTIVE:    Virgilio Acuña is a 59 y.o. male who presents to tele-medicine clinic for the evaluation of lower back pain.  He was referred by his primary care provider for further evaluation and management of this pain.  Of note, patient has past medical history of anxiety, dyslipidemia, DM2,, obesity, history of diabetic foot ulcers, hypertension,  multiple MSK complaints, and multiple other medical comorbidities as listed below.  The pain started several years ago without any significant injury or trauma.  He reports that his symptoms have been worsening over the past 4-6 weeks.The pain is located in the right lower lumbosacral area and radiates to the right buttock and hip.  The pain is described as aching, dull, sharp and stabbing and is rated as 6/10. The pain is rated with a score of  4/10 on the BEST day and a score of 10/10 on the WORST day.  Symptoms interfere with daily activity, sleeping and work. The pain is exacerbated by prolonged sitting, prolonged standing, and getting up from a seated position.  The pain is mitigated by medications and prior injections. The patient reports spending 2-4 hours  per day reclining. The patient reports 6-8 hours of uninterrupted sleep per night.  He works as a  and does shift work.    Patient denies night fever/night sweats, urinary incontinence, bowel incontinence, significant weight loss, significant motor weakness and loss of sensations.    Non-Pharmacologic Treatments:  Physical Therapy/Home Exercise: yes  Ice/Heat:yes  TENS: yes  Acupuncture: no  Massage: yes  Chiropractic: no    Other: no      Pain Medications:  - Opioids: Norco, Percocet  - Adjuvant Medications: Trazodone, gabapentin  - Anti-Coagulants:  None     report:  Reviewed and consistent with medication use as prescribed.  Last filled Norco 10/325 mg ,# 60 tablets for 15 days supply on 04/03/2020.      Pain Procedures:   -previous sacroiliac joint injections      Imaging:   No pertinent imaging available    Past Medical History:   Diagnosis Date    Cellulitis of left index finger 2/16/2015    Charcot's joint of foot, left 08/2018    Dyslipidemia     Essential hypertension 2/16/2017    Group B streptococcal infection 1/15/2018    Hypotestosteronemia 7/7/2017    Infected finger joint 2/17/2015    Infected skin ulcer limited to breakdown of skin 1/13/2018    MSSA (methicillin susceptible Staphylococcus aureus) 1/13/2018    Obese     S/P knee surgery, medial/lateral menisectomy 11/4/2013    Type 2 diabetes mellitus with diabetic polyneuropathy, with long-term current use of insulin 2003     Past Surgical History:   Procedure Laterality Date    ELBOW SURGERY      FOOT SURGERY Left     KNEE CARTILAGE SURGERY  10/21/2013    right knee    KNEE SURGERY Right 02/17/2017    Left index finger surgery  03/2015    MIDFOOT ARTHRODESIS Left 8/27/2018    Procedure: FUSION, JOINT, MIDFOOT - FIRST METATARSOCUNEIFORM JOINT AND NAVICULOCUNEIFORM JOINT;  Surgeon: Inocente Smith DPM;  Location: [facility];  Service: Podiatry;  Laterality: Left;    TOE AMPUTATION  03/2018    left  great toe     Social History     Socioeconomic History    Marital status:      Spouse name: Not on file    Number of children: Not on file    Years of education: Not on file    Highest education level: Not on file   Occupational History    Occupation:      Employer: [employer]   Social Needs    Financial resource strain: Not on file    Food insecurity:     Worry: Not on file     Inability: Not on file    Transportation needs:     Medical: Not on file     Non-medical: Not on file   Tobacco Use    Smoking status: Never Smoker    Smokeless tobacco: Never Used   Substance and Sexual Activity    Alcohol use: Yes     Alcohol/week: 1.0 standard drinks     Types: 1 Cans of beer per week     Comment: occasional    Drug use: No    Sexual activity: Yes     Partners: Female   Lifestyle    Physical activity:     Days per week: Not on file     Minutes per session: Not on file    Stress: Not on file   Relationships    Social connections:     Talks on phone: Not on file     Gets together: Not on file     Attends Christianity service: Not on file     Active member of club or organization: Not on file     Attends meetings of clubs or organizations: Not on file     Relationship status: Not on file   Other Topics Concern    Not on file   Social History Narrative    Not on file     Family History   Problem Relation Age of Onset    COPD Father        Review of patient's allergies indicates:  No Known Allergies    Current Outpatient Medications   Medication Sig    blood sugar diagnostic (BLOOD GLUCOSE TEST) Strp Check glucose 3 times per day before each meal    blood-glucose meter Misc Use to check glucose three times daily    celecoxib (CELEBREX) 100 MG capsule Take 1 capsule (100 mg total) by mouth 2 (two) times daily as needed for Pain.    enalapril (VASOTEC) 20 MG tablet Take 1 tablet (20 mg total) by mouth 2 (two) times daily.    gabapentin  "(NEURONTIN) 600 MG tablet Take 1 tablet (600 mg total) by mouth 3 (three) times daily.    gemfibrozil (LOPID) 600 MG tablet Take 1 tablet (600 mg total) by mouth 2 (two) times daily before meals.    insulin (LANTUS SOLOSTAR U-100 INSULIN) glargine 100 units/mL (3mL) SubQ pen Inject 70 Units into the skin every evening. Supply pen needles    insulin lispro (HUMALOG KWIKPEN INSULIN) 100 unit/mL pen Inject 60 Units into the skin 3 (three) times daily.    insulin syringe-needle U-100 (INSULIN SYRINGE) 1 mL 29 gauge x 1/2" Syrg Inject 4 times a day    lancets Misc Use to check glucose three times daily    liraglutide 0.6 mg/0.1 mL, 18 mg/3 mL, subq PNIJ (VICTOZA 2-VINCENT) 0.6 mg/0.1 mL (18 mg/3 mL) PnIj Inject 0.6 mg into the skin once daily.    metFORMIN (GLUCOPHAGE-XR) 500 MG XR 24hr tablet Take 2 tablets (1,000 mg total) by mouth 2 (two) times daily with meals.    pen needle, diabetic (BD ULTRA-FINE WILDA PEN NEEDLE) 32 gauge x 5/32" Ndle INJECT FOUR TIMES DAILY    pravastatin (PRAVACHOL) 40 MG tablet Take 1 tablet (40 mg total) by mouth once daily.    safety needles 22 gauge x 1 1/2" Ndle To be used once a month for testosterone injections    syringe with needle 3 mL 25 gauge x 1" Syrg To be used with monthly testosterone injections    traZODone (DESYREL) 150 MG tablet Take 1 tablet (150 mg total) by mouth nightly.     No current facility-administered medications for this visit.      Facility-Administered Medications Ordered in Other Visits   Medication    lactated ringers infusion       REVIEW OF SYSTEMS:    GENERAL:  No weight loss, malaise or fevers.  HEENT:   No recent changes in vision or hearing  NECK:  Negative for lumps, no difficulty with swallowing.  RESPIRATORY:  Negative for cough, wheezing or shortness of breath, patient denies any recent URI.  CARDIOVASCULAR:  Negative for chest pain, leg swelling or palpitations.  GI:  Negative for abdominal discomfort, blood in stools or black stools or " change in bowel habits.  MUSCULOSKELETAL:  See HPI.  SKIN:  Negative for lesions, rash, and itching.  PSYCH:  No mood disorder or recent psychosocial stressors.  Patients sleep is not disturbed secondary to pain.  HEMATOLOGY/LYMPHOLOGY:  Negative for prolonged bleeding, bruising easily or swollen nodes.  Patient is not currently taking any anti-coagulants  NEURO:   No history of headaches, syncope, paralysis, seizures or tremors.  ENDO:  +IDDM  All other reviewed and negative other than HPI.    OBJECTIVE:    Physical exam:  GENERAL: Well appearing, in no acute distress, alert and oriented x3.   morbidly obese  PSYCH:  Mood and affect appropriate.  SKIN: Skin color, texture, turgor normal, no rashes or lesions.  HEAD/FACE:  Normocephalic, atraumatic. Cranial nerves grossly intact.  CV:  No peripheral edema noted  PULM:  No difficulty breathing     Neuro/MSK:  BACK: Straight leg raising in the seated position (self-performed) is negative to radicular pain.  mild pain to palpation over the facet joints of the lumbar spine and lumbar paraspinals.  Limited range of motion secondary to pain reproduction.  EXTREMITIES: Peripheral joint active ROM is full and pain free without obvious instability or laxity in all four extremities. No deformities, edema, or skin discoloration.   MUSCULOSKELETAL:  There is moderate pain with palpation over the sacroiliac joints on the right.  FABERs test is positive on the right  (self-performed).  There is also tenderness to the right greater trochanteric bursa.    No atrophy or tone abnormalities are noted.  NEURO:  Able to toe walk and heel walk with mild difficulty  GAIT: normal.        LABS:  Lab Results   Component Value Date    WBC 8.24 11/13/2018    HGB 15.8 11/13/2018    HCT 49.1 11/13/2018    MCV 91 11/13/2018     11/13/2018       CMP  Sodium   Date Value Ref Range Status   05/06/2019 140 136 - 145 mmol/L Final     Potassium   Date Value Ref Range Status   05/06/2019 4.8 3.5  - 5.1 mmol/L Final     Chloride   Date Value Ref Range Status   05/06/2019 102 95 - 110 mmol/L Final     CO2   Date Value Ref Range Status   05/06/2019 28 23 - 29 mmol/L Final     Glucose   Date Value Ref Range Status   05/06/2019 141 (H) 70 - 110 mg/dL Final     BUN, Bld   Date Value Ref Range Status   05/06/2019 14 6 - 20 mg/dL Final     Creatinine   Date Value Ref Range Status   05/06/2019 0.9 0.5 - 1.4 mg/dL Final     Calcium   Date Value Ref Range Status   05/06/2019 9.5 8.7 - 10.5 mg/dL Final     Total Protein   Date Value Ref Range Status   05/06/2019 6.7 6.0 - 8.4 g/dL Final     Albumin   Date Value Ref Range Status   05/06/2019 3.4 (L) 3.5 - 5.2 g/dL Final     Total Bilirubin   Date Value Ref Range Status   05/06/2019 0.6 0.1 - 1.0 mg/dL Final     Comment:     For infants and newborns, interpretation of results should be based  on gestational age, weight and in agreement with clinical  observations.  Premature Infant recommended reference ranges:  Up to 24 hours.............<8.0 mg/dL  Up to 48 hours............<12.0 mg/dL  3-5 days..................<15.0 mg/dL  6-29 days.................<15.0 mg/dL       Alkaline Phosphatase   Date Value Ref Range Status   05/06/2019 67 55 - 135 U/L Final     AST   Date Value Ref Range Status   05/06/2019 18 10 - 40 U/L Final     ALT   Date Value Ref Range Status   05/06/2019 18 10 - 44 U/L Final     Anion Gap   Date Value Ref Range Status   05/06/2019 10 8 - 16 mmol/L Final     eGFR if    Date Value Ref Range Status   05/06/2019 >60.0 >60 mL/min/1.73 m^2 Final     eGFR if non    Date Value Ref Range Status   05/06/2019 >60.0 >60 mL/min/1.73 m^2 Final     Comment:     Calculation used to obtain the estimated glomerular filtration  rate (eGFR) is the CKD-EPI equation.          Lab Results   Component Value Date    HGBA1C 8.1 (H) 03/04/2020             ASSESSMENT: 59 y.o. year old male with lower back pain, consistent with     1. Sacroiliitis      2. Chronic bilateral low back pain with sciatica, sciatica laterality unspecified  Ambulatory referral/consult to Pain Clinic   3. Chronic myofascial pain     4. Severe obesity (BMI >= 40)           PLAN:   - Interventions: Consider right-sided sacroiliac joint injections under fluoroscopy with or without greater trochanteric bursa injection on the right.  Explained to the patient that I would like to perform a more thorough evaluation and examination in the clinic once available to accurately assess pain source.    - Anticoagulation use: no     - Medications: I have stressed the importance of physical activity and a home exercise plan to help with pain and improve health., Patient can continue with medications for now since they are providing benefits, using them appropriately, and without side effects. and I do not feel this patient is a candidate for long term opioids for this non-cancer pain at this time .  He was recently provided with Norco 10/325 mg.  Advised the patient that he can continue these for when he experiences severe pain.  Expressed that tramadol may be a safer alternative in the future if warranted.  We will provide him with Celebrex 100 mg up to 2 times daily as needed for inflammatory source of pain.  Advised him to stop any other NSAID at this time and use only the Celebrex.  He expressed understanding.    - Therapy:  Advised patient to continue activities and home exercises as tolerated    - Psychological:  Discussed coping mechanisms to help address chronic pain issues    - Labs:  Reviewed in epic    - Imaging:  No pertinent imaging available, none indicated at this time.    - Consults/Referrals:  None at this time    - Records:  Reviewed/Obtain old records from outside physicians and imaging    - Follow up visit: return to clinic in 3-4 weeks    - Counseled patient regarding the importance of activity modification and physical therapy    - This condition does not require this patient to  take time off of work, and the primary goal of our Pain Management services is to improve the patient's functional capacity.    - Patient Questions: Answered all of the patient's questions regarding diagnosis, therapy, and treatment        The above plan and management options were discussed at length with patient. Patient is in agreement with the above and verbalized understanding.    I discussed the goals of interventional chronic pain management with the patient on today's visit.  I explained the utility of injections for diagnostic and therapeutic purposes.  We discussed a multimodal approach to pain including treating the patient's given worst pain at any given time.  We will use a systematic approach to addressing pain.  We will also adopt a multimodal approach that includes injections, adjuvant medications, physical therapy, at times psychiatry.  There may be a limited role for opioid use intermittently in the treatment of pain, more particularly for acute pain although no one approach can be used as a sole treatment modality.    I emphasized the importance of regular exercise, core strengthening and stretching, diet and weight loss as a cornerstone of long-term pain management.      Nirav Ramon MD  Interventional Pain Management  [facility]    Disclaimer:  This note was prepared using voice recognition system and is likely to have sound alike errors that may have been overlooked even after proof reading.  Please call me with any questions

## 2020-04-21 ENCOUNTER — TELEPHONE (OUTPATIENT)
Dept: PAIN MEDICINE | Facility: CLINIC | Age: 60
End: 2020-04-21

## 2020-04-22 ENCOUNTER — PATIENT MESSAGE (OUTPATIENT)
Dept: PAIN MEDICINE | Facility: CLINIC | Age: 60
End: 2020-04-22

## 2020-04-25 ENCOUNTER — PATIENT MESSAGE (OUTPATIENT)
Dept: PAIN MEDICINE | Facility: CLINIC | Age: 60
End: 2020-04-25

## 2020-04-27 ENCOUNTER — PATIENT MESSAGE (OUTPATIENT)
Dept: PAIN MEDICINE | Facility: CLINIC | Age: 60
End: 2020-04-27

## 2020-04-27 ENCOUNTER — PATIENT MESSAGE (OUTPATIENT)
Dept: PODIATRY | Facility: CLINIC | Age: 60
End: 2020-04-27

## 2020-04-28 RX ORDER — TRAMADOL HYDROCHLORIDE 50 MG/1
TABLET ORAL
Qty: 30 TABLET | Refills: 0 | Status: SHIPPED | OUTPATIENT
Start: 2020-04-28 | End: 2020-06-10

## 2020-04-28 NOTE — PROGRESS NOTES
Patient requesting prescription for tramadol.  He was last seen for tele Medicine visit on 04/20/2020 and we discussed Tramadol in lieu of Norco.  I reviewed the  and is consistent with patient's history in there is no aberrant drug behavior.  Therefore, I will provide a prescription for tramadol 50 mg, # 30 tablets, 0 refills.  Instructed patient to take half to 1 tablet to 2 times a day as needed for pain until we may provide him with sacroiliac joint injections.            Nirav Ramon MD  Interventional Pain Medicine  [facility]

## 2020-05-01 ENCOUNTER — PATIENT MESSAGE (OUTPATIENT)
Dept: PAIN MEDICINE | Facility: CLINIC | Age: 60
End: 2020-05-01

## 2020-05-01 NOTE — TELEPHONE ENCOUNTER
Work excuse provided for 5/2/20 and 5/3/20.     Nirav Ramon MD  Interventional Pain Medicine  [facility]

## 2020-05-01 NOTE — LETTER
May 1, 2020      [facility]  [address]  [place] [address]  Phone: [phone]  Fax: [phone]       Patient: Virgilio Acuña   YOB: 1960  Date of Visit: 05/01/2020    To Whom It May Concern:    Juan Acuña has been evaluated at [facility] . He may return to work/school on 05/04/2020 with light duty restrictions. If you have any questions or concerns, or if I can be of further assistance, please do not hesitate to contact me.    Sincerely,    Nirav Ramon MD

## 2020-05-03 ENCOUNTER — PATIENT MESSAGE (OUTPATIENT)
Dept: PAIN MEDICINE | Facility: CLINIC | Age: 60
End: 2020-05-03

## 2020-05-07 ENCOUNTER — PATIENT MESSAGE (OUTPATIENT)
Dept: PAIN MEDICINE | Facility: CLINIC | Age: 60
End: 2020-05-07

## 2020-05-08 DIAGNOSIS — M70.60 GREATER TROCHANTERIC BURSITIS, UNSPECIFIED LATERALITY: ICD-10-CM

## 2020-05-08 DIAGNOSIS — M46.1 SACROILIITIS: Primary | ICD-10-CM

## 2020-05-11 ENCOUNTER — PATIENT MESSAGE (OUTPATIENT)
Dept: PAIN MEDICINE | Facility: CLINIC | Age: 60
End: 2020-05-11

## 2020-05-11 ENCOUNTER — PATIENT MESSAGE (OUTPATIENT)
Dept: PAIN MEDICINE | Facility: HOSPITAL | Age: 60
End: 2020-05-11

## 2020-05-11 NOTE — PRE-PROCEDURE INSTRUCTIONS
Spoke with patient  regarding procedure scheduled on 5/18/2020  Arrival time-pt not yet approved will update   Has patient been sick with fever or on antibiotics within the last 7 days? no  Has patient received a vaccination within the last 7 days? no  Has the patient stopped all medications as directed? Yes (hold DM meds morning of)  Does patient have a pacemaker and or defibrillator? no  Does the patient have a ride to and from procedure and someone reliable to remain with patient? Yes wife [phone]  Is the patient diabetic? yes  Does the patient have sleep apnea? Or use O2 at home? No no  Is the patient receiving sedation? no  Is the patient instructed to remain NPO beginning at midnight the night before their procedure? yes  Procedure location confirmed with patient? yes  Covid test not needed d/t no sedation

## 2020-05-12 DIAGNOSIS — M46.1 SACROILIITIS: Primary | ICD-10-CM

## 2020-05-12 DIAGNOSIS — G89.29 CHRONIC BILATERAL LOW BACK PAIN WITH SCIATICA, SCIATICA LATERALITY UNSPECIFIED: ICD-10-CM

## 2020-05-12 DIAGNOSIS — M70.60 GREATER TROCHANTERIC BURSITIS, UNSPECIFIED LATERALITY: ICD-10-CM

## 2020-05-12 DIAGNOSIS — M54.40 CHRONIC BILATERAL LOW BACK PAIN WITH SCIATICA, SCIATICA LATERALITY UNSPECIFIED: ICD-10-CM

## 2020-05-12 DIAGNOSIS — G89.29 CHRONIC MYOFASCIAL PAIN: ICD-10-CM

## 2020-05-12 DIAGNOSIS — M79.18 CHRONIC MYOFASCIAL PAIN: ICD-10-CM

## 2020-05-12 RX ORDER — HYDROCODONE BITARTRATE AND ACETAMINOPHEN 10; 325 MG/1; MG/1
1 TABLET ORAL EVERY 8 HOURS PRN
Qty: 21 TABLET | Refills: 0 | Status: SHIPPED | OUTPATIENT
Start: 2020-05-12 | End: 2020-05-19

## 2020-05-12 NOTE — PROGRESS NOTES
Patient requesting refill of Norco 10/325 mg.  He reports that the tramadol is ineffective in alleviating his pain.  He is set up for right SI and GTB injection under fluoroscopy on 05/18/2020.  I will provide him with a 7 day supply of Norco 10/325 mg q.8h p.r.n. severe pain, # 21 tablets, 0 refills to bridge him to procedure.  I reviewed the  and it is consistent with patient's history and there is no aberrant drug behavior evident.    Nirav Ramon MD  Interventional Pain Medicine  [facility]

## 2020-05-18 ENCOUNTER — HOSPITAL ENCOUNTER (OUTPATIENT)
Facility: HOSPITAL | Age: 60
Discharge: HOME | End: 2020-05-18
Attending: PHYSICAL MEDICINE & REHABILITATION | Admitting: PHYSICAL MEDICINE & REHABILITATION
Payer: COMMERCIAL

## 2020-05-18 VITALS
TEMPERATURE: 98 F | RESPIRATION RATE: 15 BRPM | WEIGHT: 315 LBS | HEART RATE: 85 BPM | BODY MASS INDEX: 41.75 KG/M2 | DIASTOLIC BLOOD PRESSURE: 77 MMHG | OXYGEN SATURATION: 98 % | HEIGHT: 73 IN | SYSTOLIC BLOOD PRESSURE: 140 MMHG

## 2020-05-18 DIAGNOSIS — M46.1 SACROILIITIS: Primary | ICD-10-CM

## 2020-05-18 DIAGNOSIS — M70.60 GREATER TROCHANTERIC BURSITIS, UNSPECIFIED LATERALITY: ICD-10-CM

## 2020-05-18 LAB
POCT GLUCOSE: 211 MG/DL (ref 70–110)
SARS-COV-2 RDRP RESP QL NAA+PROBE: NEGATIVE

## 2020-05-18 PROCEDURE — 27096 PR INJECTION,SACROILIAC JOINT: ICD-10-PCS | Mod: 59,RT,, | Performed by: PHYSICAL MEDICINE & REHABILITATION

## 2020-05-18 PROCEDURE — 27096 INJECT SACROILIAC JOINT: CPT | Mod: 59,RT,, | Performed by: PHYSICAL MEDICINE & REHABILITATION

## 2020-05-18 PROCEDURE — 20610 PR DRAIN/INJECT LARGE JOINT/BURSA: ICD-10-PCS | Mod: 59,RT,, | Performed by: PHYSICAL MEDICINE & REHABILITATION

## 2020-05-18 PROCEDURE — 20610 DRAIN/INJ JOINT/BURSA W/O US: CPT | Performed by: PHYSICAL MEDICINE & REHABILITATION

## 2020-05-18 PROCEDURE — U0002 COVID-19 LAB TEST NON-CDC: HCPCS

## 2020-05-18 PROCEDURE — 27096 INJECT SACROILIAC JOINT: CPT | Performed by: PHYSICAL MEDICINE & REHABILITATION

## 2020-05-18 PROCEDURE — 25500020 PHARM REV CODE 255: Performed by: PHYSICAL MEDICINE & REHABILITATION

## 2020-05-18 PROCEDURE — 25000003 PHARM REV CODE 250: Performed by: PHYSICAL MEDICINE & REHABILITATION

## 2020-05-18 PROCEDURE — 20610 DRAIN/INJ JOINT/BURSA W/O US: CPT | Mod: 59,RT,, | Performed by: PHYSICAL MEDICINE & REHABILITATION

## 2020-05-18 RX ORDER — METHYLPREDNISOLONE ACETATE 80 MG/ML
INJECTION, SUSPENSION INTRA-ARTICULAR; INTRALESIONAL; INTRAMUSCULAR; SOFT TISSUE
Status: DISCONTINUED | OUTPATIENT
Start: 2020-05-18 | End: 2020-05-18 | Stop reason: HOSPADM

## 2020-05-18 RX ORDER — BUPIVACAINE HYDROCHLORIDE 2.5 MG/ML
INJECTION, SOLUTION EPIDURAL; INFILTRATION; INTRACAUDAL
Status: DISCONTINUED | OUTPATIENT
Start: 2020-05-18 | End: 2020-05-18 | Stop reason: HOSPADM

## 2020-05-18 RX ORDER — ONDANSETRON 2 MG/ML
4 INJECTION INTRAMUSCULAR; INTRAVENOUS ONCE AS NEEDED
Status: DISCONTINUED | OUTPATIENT
Start: 2020-05-18 | End: 2020-05-18 | Stop reason: HOSPADM

## 2020-05-18 NOTE — DISCHARGE INSTRUCTIONS

## 2020-05-18 NOTE — OP NOTE
Time-out taken to identify patient and procedure side prior to starting the procedure.     05/18/2020    PROCEDURE:  1) Right greater trochanteric bursa injection    2) Right sacroiliac joint injection                            REASON FOR PROCEDURE: right Sacroiliitis [M46.1]  Greater trochanteric bursitis, unspecified laterality [M70.60]    PHYSICIAN: Nirav Ramon MD    ASSISTANTS: None    SEDATION: None    MEDICATIONS INJECTED: 1mL 40mg/ml Depo-Medrol and 4mL Bupivacaine 0.25% into each site    LOCAL ANESTHETIC USED: Xylocaine 1% 6ml     ESTIMATED BLOOD LOSS: None.     COMPLICATIONS: None.       TECHNIQUE:   Greater trochanteric bursa injection:  The area overlying the greater trochanteric bursa was identified using fluoroscopy, and the area overlying the skin was prepped and draped in usual sterile fashion. Local Xylocaine was injected by raising a wheel and going down to the periosteum using a 27-gauge hypodermic needle. A 5 inch 22-gauge spinal needle was introduce into the right greater trochanteric bursa Negative pressure applied to confirm no intravascular placement. Omnipaque was injected to confirm placement and to confirm that there was no vascular runoff. 4 mL of 0.25% bupivacaine + 1mL of 40mg/mL depomedrol was then injected slowly.  Displacement of the contrast after injection of the medication confirmed that the medication went into the area of the greater trochanteric bursa    Sacroiliac joint injection:  Laying in the prone position, the patient was prepped and draped in the usual sterile fashion using ChloraPrep and fenestrated drape.  The area was determined under fluoroscopy.  Local Xylocaine was injected by raising a wheel and going down to the periosteum using a 27-gauge hypodermic needle.  The 3.5 inch 22-gauge spinal needle was introduce into the right sacroiliac joint.  Negative pressure applied to confirm no intravascular placement.  Omnipaque was injected to confirm placement and to  confirm that there was no vascular runoff.  The medication was then injected slowly.  The patient tolerated the procedure well.      The patient was monitored for approximately 30 minutes after the procedure.  Patient was given post procedure and discharge instructions to follow at home.  We will see the patient back in two weeks or the patient may call to inform of status. The patient was discharged in a stable condition

## 2020-05-18 NOTE — H&P
"HPI  Patient presenting for Procedure(s) (LRB):  right GT bursa injection (Right)  right Sacroiliac Joint Injection (Right)     Patient on Anti-coagulation No    No health changes since previous encounter    Past Medical History:   Diagnosis Date    Cellulitis of left index finger 2/16/2015    Charcot's joint of foot, left 08/2018    Dyslipidemia     Essential hypertension 2/16/2017    Group B streptococcal infection 1/15/2018    Hypotestosteronemia 7/7/2017    Infected finger joint 2/17/2015    Infected skin ulcer limited to breakdown of skin 1/13/2018    MSSA (methicillin susceptible Staphylococcus aureus) 1/13/2018    Obese     S/P knee surgery, medial/lateral menisectomy 11/4/2013    Type 2 diabetes mellitus with diabetic polyneuropathy, with long-term current use of insulin 2003     Past Surgical History:   Procedure Laterality Date    ELBOW SURGERY      FOOT SURGERY Left     KNEE CARTILAGE SURGERY  10/21/2013    right knee    KNEE SURGERY Right 02/17/2017    Left index finger surgery  03/2015    MIDFOOT ARTHRODESIS Left 8/27/2018    Procedure: FUSION, JOINT, MIDFOOT - FIRST METATARSOCUNEIFORM JOINT AND NAVICULOCUNEIFORM JOINT;  Surgeon: Inocente Smith DPM;  Location: [facility];  Service: Podiatry;  Laterality: Left;    TOE AMPUTATION  03/2018    left great toe     Review of patient's allergies indicates:  No Known Allergies   Current Facility-Administered Medications   Medication    ondansetron injection 4 mg     Facility-Administered Medications Ordered in Other Encounters   Medication    lactated ringers infusion       PMHx, PSHx, Allergies, Medications reviewed in epic    ROS negative except pain complaints in HPI    OBJECTIVE:    BP (!) 169/87   Pulse 86   Temp 97.7 °F (36.5 °C) (Temporal)   Resp 16   Ht 6' 1" (1.854 m)   Wt (!) 148.4 kg (327 lb 0.8 oz)   SpO2 98%   BMI 43.15 kg/m²     PHYSICAL EXAMINATION:    GENERAL: Well appearing, in no acute distress, alert and oriented " x3.  PSYCH:  Mood and affect appropriate.  SKIN: Skin color, texture, turgor normal, no rashes or lesions which will impact the procedure.  CV: RRR with palpation of the radial artery.  PULM: No evidence of respiratory difficulty, symmetric chest rise. Clear to auscultation.  NEURO: Cranial nerves grossly intact.    Plan:    Proceed with procedure as planned Procedure(s) (LRB):  right GT bursa injection (Right)  right Sacroiliac Joint Injection (Right)    Nirav Ramon MD  05/18/2020

## 2020-05-18 NOTE — DISCHARGE SUMMARY
Discharge Note  Short Stay      SUMMARY     Admit Date: 5/18/2020    Attending Physician: Nirav Ramon MD        Discharge Physician: Nirav Ramon MD        Discharge Date: 5/18/2020 9:00 AM    Procedure(s) (LRB):  right GT bursa injection (Right)  right Sacroiliac Joint Injection (Right)    Final Diagnosis: Sacroiliitis [M46.1]  Greater trochanteric bursitis, unspecified laterality [M70.60]    Disposition: Home or self care    Patient Instructions:   Current Discharge Medication List      CONTINUE these medications which have NOT CHANGED    Details   enalapril (VASOTEC) 20 MG tablet Take 1 tablet (20 mg total) by mouth 2 (two) times daily.  Qty: 180 tablet, Refills: 3    Associated Diagnoses: Essential hypertension      gabapentin (NEURONTIN) 600 MG tablet Take 1 tablet (600 mg total) by mouth 3 (three) times daily.  Qty: 270 tablet, Refills: 3    Associated Diagnoses: Neuropathy      gemfibrozil (LOPID) 600 MG tablet Take 1 tablet (600 mg total) by mouth 2 (two) times daily before meals.  Qty: 180 tablet, Refills: 3    Associated Diagnoses: Mixed hyperlipidemia      pravastatin (PRAVACHOL) 40 MG tablet Take 1 tablet (40 mg total) by mouth once daily.  Qty: 90 tablet, Refills: 3    Associated Diagnoses: Mixed hyperlipidemia      traZODone (DESYREL) 150 MG tablet Take 1 tablet (150 mg total) by mouth nightly.  Qty: 90 tablet, Refills: 3    Associated Diagnoses: Insomnia, unspecified type      blood sugar diagnostic (BLOOD GLUCOSE TEST) Strp Check glucose 3 times per day before each meal  Qty: 100 strip, Refills: 11    Associated Diagnoses: Type 2 diabetes mellitus with diabetic polyneuropathy, with long-term current use of insulin      blood-glucose meter Misc Use to check glucose three times daily  Qty: 1 each, Refills: 0    Associated Diagnoses: Type 2 diabetes mellitus with diabetic polyneuropathy, with long-term current use of insulin      celecoxib (CELEBREX) 100 MG capsule Take 1 capsule (100 mg  "total) by mouth 2 (two) times daily as needed for Pain.  Qty: 60 capsule, Refills: 1      HYDROcodone-acetaminophen (NORCO)  mg per tablet Take 1 tablet by mouth every 8 (eight) hours as needed for Pain.  Qty: 21 tablet, Refills: 0    Comments: Quantity prescribed more than 7 day supply? No  Associated Diagnoses: Sacroiliitis; Greater trochanteric bursitis, unspecified laterality; Chronic bilateral low back pain with sciatica, sciatica laterality unspecified; Chronic myofascial pain      insulin (LANTUS SOLOSTAR U-100 INSULIN) glargine 100 units/mL (3mL) SubQ pen Inject 70 Units into the skin every evening. Supply pen needles  Qty: 63 mL, Refills: 3    Associated Diagnoses: Type 2 diabetes mellitus with diabetic polyneuropathy, with long-term current use of insulin      insulin lispro (HUMALOG KWIKPEN INSULIN) 100 unit/mL pen Inject 60 Units into the skin 3 (three) times daily.  Qty: 162 mL, Refills: 12    Associated Diagnoses: Type 2 diabetes mellitus with diabetic polyneuropathy, with long-term current use of insulin      insulin syringe-needle U-100 (INSULIN SYRINGE) 1 mL 29 gauge x 1/2" Syrg Inject 4 times a day  Qty: 100 each, Refills: 12      lancets Misc Use to check glucose three times daily  Qty: 100 each, Refills: 11    Associated Diagnoses: Type 2 diabetes mellitus with diabetic polyneuropathy, with long-term current use of insulin      liraglutide 0.6 mg/0.1 mL, 18 mg/3 mL, subq PNIJ (VICTOZA 2-VINCENT) 0.6 mg/0.1 mL (18 mg/3 mL) PnIj Inject 0.6 mg into the skin once daily.  Qty: 3 mL, Refills: 11    Associated Diagnoses: Type 2 diabetes mellitus with diabetic polyneuropathy, with long-term current use of insulin      metFORMIN (GLUCOPHAGE-XR) 500 MG XR 24hr tablet Take 2 tablets (1,000 mg total) by mouth 2 (two) times daily with meals.  Qty: 180 tablet, Refills: 3    Associated Diagnoses: Type 2 diabetes mellitus with diabetic polyneuropathy, with long-term current use of insulin      pen needle, " "diabetic (BD ULTRA-FINE WILDA PEN NEEDLE) 32 gauge x 5/32" Ndle INJECT FOUR TIMES DAILY  Qty: 4 each, Refills: 12      safety needles 22 gauge x 1 1/2" Ndle To be used once a month for testosterone injections  Qty: 50 each, Refills: 6      syringe with needle 3 mL 25 gauge x 1" Syrg To be used with monthly testosterone injections  Qty: 100 Syringe, Refills: 4      traMADoL (ULTRAM) 50 mg tablet Take 1/2 to 1 tab PO QD to BID PRN pain.  Qty: 30 tablet, Refills: 0    Comments: Quantity prescribed more than 7 day supply? Yes, quantity medically necessary                 Discharge Diagnosis: Sacroiliitis [M46.1]  Greater trochanteric bursitis, unspecified laterality [M70.60]  Condition on Discharge: Stable with no complications to procedure   Diet on Discharge: Same as before.  Activity: as per instruction sheet.  Discharge to: Home with a responsible adult.  Follow up: 2-4 weeks       Please call the office if you experience any weakness or loss of sensation, fever > 101.5, pain uncontrolled with oral medications, persistent nausea/vomiting/or diarrhea, redness or drainage from the incisions, or any other worrisome concerns. If physician on call was not reached or could not communicate with our office for any reason please go to the nearest emergency department      "

## 2020-05-20 ENCOUNTER — PATIENT MESSAGE (OUTPATIENT)
Dept: PAIN MEDICINE | Facility: CLINIC | Age: 60
End: 2020-05-20

## 2020-05-25 ENCOUNTER — PATIENT MESSAGE (OUTPATIENT)
Dept: PAIN MEDICINE | Facility: CLINIC | Age: 60
End: 2020-05-25

## 2020-05-27 ENCOUNTER — PATIENT MESSAGE (OUTPATIENT)
Dept: PAIN MEDICINE | Facility: CLINIC | Age: 60
End: 2020-05-27

## 2020-05-31 ENCOUNTER — NURSE TRIAGE (OUTPATIENT)
Dept: ADMINISTRATIVE | Facility: CLINIC | Age: 60
End: 2020-05-31

## 2020-05-31 NOTE — TELEPHONE ENCOUNTER
Pt contacted through Post Procedural Symptom Tracking. Denies any cough, fever, or difficulty breathing since procedure.  Pt instructed to call back or contact provider with any changes of condition or concerns.         Reason for Disposition   Health Information question, no triage required and triager able to answer question    Additional Information   Negative: [1] Caller is not with the adult (patient) AND [2] reporting urgent symptoms   Negative: Lab result questions   Negative: Medication questions   Negative: Caller can't be reached by phone   Negative: Caller has already spoken to PCP or another triager   Negative: RN needs further essential information from caller in order to complete triage   Negative: Requesting regular office appointment   Negative: [1] Caller requesting NON-URGENT health information AND [2] PCP's office is the best resource    Protocols used: INFORMATION ONLY CALL-A-AH

## 2020-06-10 ENCOUNTER — OFFICE VISIT (OUTPATIENT)
Dept: FAMILY MEDICINE | Facility: CLINIC | Age: 60
End: 2020-06-10
Payer: COMMERCIAL

## 2020-06-10 ENCOUNTER — LAB VISIT (OUTPATIENT)
Dept: LAB | Facility: HOSPITAL | Age: 60
End: 2020-06-10
Attending: INTERNAL MEDICINE
Payer: COMMERCIAL

## 2020-06-10 VITALS
HEART RATE: 83 BPM | SYSTOLIC BLOOD PRESSURE: 134 MMHG | DIASTOLIC BLOOD PRESSURE: 80 MMHG | TEMPERATURE: 98 F | BODY MASS INDEX: 41.75 KG/M2 | WEIGHT: 315 LBS | HEIGHT: 73 IN

## 2020-06-10 DIAGNOSIS — I10 ESSENTIAL HYPERTENSION: Primary | ICD-10-CM

## 2020-06-10 DIAGNOSIS — E78.2 MIXED HYPERLIPIDEMIA: ICD-10-CM

## 2020-06-10 DIAGNOSIS — E11.42 TYPE 2 DIABETES MELLITUS WITH DIABETIC POLYNEUROPATHY, WITH LONG-TERM CURRENT USE OF INSULIN: ICD-10-CM

## 2020-06-10 DIAGNOSIS — M54.40 CHRONIC BILATERAL LOW BACK PAIN WITH SCIATICA, SCIATICA LATERALITY UNSPECIFIED: ICD-10-CM

## 2020-06-10 DIAGNOSIS — Z79.4 TYPE 2 DIABETES MELLITUS WITH DIABETIC POLYNEUROPATHY, WITH LONG-TERM CURRENT USE OF INSULIN: ICD-10-CM

## 2020-06-10 DIAGNOSIS — G89.29 CHRONIC BILATERAL LOW BACK PAIN WITH SCIATICA, SCIATICA LATERALITY UNSPECIFIED: ICD-10-CM

## 2020-06-10 DIAGNOSIS — M79.10 MYALGIA: ICD-10-CM

## 2020-06-10 DIAGNOSIS — R53.83 FATIGUE, UNSPECIFIED TYPE: ICD-10-CM

## 2020-06-10 LAB
ALBUMIN SERPL BCP-MCNC: 4.1 G/DL (ref 3.5–5.2)
ALP SERPL-CCNC: 59 U/L (ref 55–135)
ALT SERPL W/O P-5'-P-CCNC: 27 U/L (ref 10–44)
ANION GAP SERPL CALC-SCNC: 11 MMOL/L (ref 8–16)
AST SERPL-CCNC: 23 U/L (ref 10–40)
BILIRUB SERPL-MCNC: 0.7 MG/DL (ref 0.1–1)
BUN SERPL-MCNC: 17 MG/DL (ref 8–23)
CALCIUM SERPL-MCNC: 9.6 MG/DL (ref 8.7–10.5)
CHLORIDE SERPL-SCNC: 102 MMOL/L (ref 95–110)
CHOLEST SERPL-MCNC: 205 MG/DL (ref 120–199)
CHOLEST/HDLC SERPL: 5.3 {RATIO} (ref 2–5)
CK SERPL-CCNC: 211 U/L (ref 20–200)
CO2 SERPL-SCNC: 25 MMOL/L (ref 23–29)
CREAT SERPL-MCNC: 1 MG/DL (ref 0.5–1.4)
EST. GFR  (AFRICAN AMERICAN): >60 ML/MIN/1.73 M^2
EST. GFR  (NON AFRICAN AMERICAN): >60 ML/MIN/1.73 M^2
GLUCOSE SERPL-MCNC: 100 MG/DL (ref 70–110)
HDLC SERPL-MCNC: 39 MG/DL (ref 40–75)
HDLC SERPL: 19 % (ref 20–50)
LDLC SERPL CALC-MCNC: 89.2 MG/DL (ref 63–159)
MAGNESIUM SERPL-MCNC: 1.3 MG/DL (ref 1.6–2.6)
NONHDLC SERPL-MCNC: 166 MG/DL
PHOSPHATE SERPL-MCNC: 4 MG/DL (ref 2.7–4.5)
POTASSIUM SERPL-SCNC: 4.4 MMOL/L (ref 3.5–5.1)
PROT SERPL-MCNC: 7.6 G/DL (ref 6–8.4)
SODIUM SERPL-SCNC: 138 MMOL/L (ref 136–145)
TRIGL SERPL-MCNC: 384 MG/DL (ref 30–150)
TSH SERPL DL<=0.005 MIU/L-ACNC: 1.99 UIU/ML (ref 0.4–4)

## 2020-06-10 PROCEDURE — 36415 COLL VENOUS BLD VENIPUNCTURE: CPT | Mod: PO

## 2020-06-10 PROCEDURE — 83735 ASSAY OF MAGNESIUM: CPT

## 2020-06-10 PROCEDURE — 3075F SYST BP GE 130 - 139MM HG: CPT | Mod: CPTII,S$GLB,, | Performed by: INTERNAL MEDICINE

## 2020-06-10 PROCEDURE — 99214 PR OFFICE/OUTPT VISIT, EST, LEVL IV, 30-39 MIN: ICD-10-PCS | Mod: S$GLB,,, | Performed by: INTERNAL MEDICINE

## 2020-06-10 PROCEDURE — 99214 OFFICE O/P EST MOD 30 MIN: CPT | Mod: S$GLB,,, | Performed by: INTERNAL MEDICINE

## 2020-06-10 PROCEDURE — 82550 ASSAY OF CK (CPK): CPT

## 2020-06-10 PROCEDURE — 3008F BODY MASS INDEX DOCD: CPT | Mod: CPTII,S$GLB,, | Performed by: INTERNAL MEDICINE

## 2020-06-10 PROCEDURE — 80061 LIPID PANEL: CPT

## 2020-06-10 PROCEDURE — 83036 HEMOGLOBIN GLYCOSYLATED A1C: CPT

## 2020-06-10 PROCEDURE — 3079F PR MOST RECENT DIASTOLIC BLOOD PRESSURE 80-89 MM HG: ICD-10-PCS | Mod: CPTII,S$GLB,, | Performed by: INTERNAL MEDICINE

## 2020-06-10 PROCEDURE — 80053 COMPREHEN METABOLIC PANEL: CPT

## 2020-06-10 PROCEDURE — 99999 PR PBB SHADOW E&M-EST. PATIENT-LVL IV: CPT | Mod: PBBFAC,,, | Performed by: INTERNAL MEDICINE

## 2020-06-10 PROCEDURE — 3008F PR BODY MASS INDEX (BMI) DOCUMENTED: ICD-10-PCS | Mod: CPTII,S$GLB,, | Performed by: INTERNAL MEDICINE

## 2020-06-10 PROCEDURE — 3079F DIAST BP 80-89 MM HG: CPT | Mod: CPTII,S$GLB,, | Performed by: INTERNAL MEDICINE

## 2020-06-10 PROCEDURE — 3075F PR MOST RECENT SYSTOLIC BLOOD PRESS GE 130-139MM HG: ICD-10-PCS | Mod: CPTII,S$GLB,, | Performed by: INTERNAL MEDICINE

## 2020-06-10 PROCEDURE — 84443 ASSAY THYROID STIM HORMONE: CPT

## 2020-06-10 PROCEDURE — 82306 VITAMIN D 25 HYDROXY: CPT

## 2020-06-10 PROCEDURE — 3051F PR MOST RECENT HEMOGLOBIN A1C LEVEL 7.0 - < 8.0%: ICD-10-PCS | Mod: CPTII,S$GLB,, | Performed by: INTERNAL MEDICINE

## 2020-06-10 PROCEDURE — 3051F HG A1C>EQUAL 7.0%<8.0%: CPT | Mod: CPTII,S$GLB,, | Performed by: INTERNAL MEDICINE

## 2020-06-10 PROCEDURE — 99999 PR PBB SHADOW E&M-EST. PATIENT-LVL IV: ICD-10-PCS | Mod: PBBFAC,,, | Performed by: INTERNAL MEDICINE

## 2020-06-10 PROCEDURE — 84100 ASSAY OF PHOSPHORUS: CPT

## 2020-06-10 RX ORDER — INSULIN LISPRO 100 [IU]/ML
40 INJECTION, SOLUTION INTRAVENOUS; SUBCUTANEOUS 3 TIMES DAILY
Qty: 108 ML | Refills: 12 | Status: SHIPPED | OUTPATIENT
Start: 2020-06-10 | End: 2020-06-16 | Stop reason: SDUPTHER

## 2020-06-10 RX ORDER — SITAGLIPTIN 100 MG/1
1 TABLET, FILM COATED ORAL DAILY
COMMUNITY
Start: 2020-03-08 | End: 2020-09-16

## 2020-06-10 RX ORDER — GEMFIBROZIL 600 MG/1
600 TABLET, FILM COATED ORAL DAILY
Qty: 90 TABLET | Refills: 3 | Status: SHIPPED | OUTPATIENT
Start: 2020-06-10 | End: 2021-04-05 | Stop reason: SDUPTHER

## 2020-06-10 RX ORDER — HYDROCODONE BITARTRATE AND ACETAMINOPHEN 10; 325 MG/1; MG/1
1 TABLET ORAL EVERY 6 HOURS PRN
Qty: 28 TABLET | Refills: 0 | Status: SHIPPED | OUTPATIENT
Start: 2020-06-10 | End: 2020-06-30 | Stop reason: SDUPTHER

## 2020-06-10 NOTE — ASSESSMENT & PLAN NOTE
Lab Results   Component Value Date    LDLCALC 99.6 05/06/2019   -hx of severely elevated triglycerides in past  -remains on both fibrate and statin  -due for repeat lipid panel

## 2020-06-10 NOTE — PROGRESS NOTES
Assessment/Plan:    Mixed hyperlipidemia  Lab Results   Component Value Date    LDLCALC 99.6 05/06/2019   -hx of severely elevated triglycerides in past  -remains on both fibrate and statin  -due for repeat lipid panel    Type 2 diabetes mellitus with diabetic polyneuropathy, with long-term current use of insulin  Last A1C   Lab Results   Component Value Date    HGBA1C 7.7 (H) 05/06/2019     Current meds: Metformin 1000 mg BID, Lantus 70 units, Humalog 60 units AC, januvia 100 mg QD  Foot exam completed- Yes  Eye exam completed- Yes  Microalbumin completed- Yes  On statin therapy- yes  On ACEI/ARB- yes  A1c improving.  Continue Lantus 70 units but decrease humalog to 40 units a.c. due to some low readings.  Due for repeat A1c.    Essential hypertension  -at goal today  -currently on enalapril 20 mg BID  -Denies adverse effects of medications  -Continue lifestyle modification with low sodium diet and exercise     Chronic bilateral low back pain with sciatica  -evaluated pain pain management and has received SI joint injection  -continues to have back pain with associated sciatica  -requesting x-ray today.  Discussed that MRI would provide more information than x-ray  -plan to order MRI  -short course of Norco until he is able to get back in with pain management    Fatigue  -complaint of chronic fatigue  -denies any weight loss or night sweats  -plan to check electrolytes, TSH and vitamin-D    _____________________________________________________________________________________________________________________________________________________    Orders this visit:    Essential hypertension    Chronic bilateral low back pain with sciatica, sciatica laterality unspecified  -     MRI Lumbar Spine Without Contrast; Future; Expected date: 06/10/2020  -     HYDROcodone-acetaminophen (NORCO)  mg per tablet; Take 1 tablet by mouth every 6 (six) hours as needed for Pain.  Dispense: 28 tablet; Refill: 0    Myalgia  -      Comprehensive metabolic panel; Standing  -     Magnesium; Future; Expected date: 06/10/2020  -     PHOSPHORUS; Future; Expected date: 06/10/2020  -     CK; Future; Expected date: 06/10/2020    Mixed hyperlipidemia  -     Lipid Panel; Standing  -     gemfibroziL (LOPID) 600 MG tablet; Take 1 tablet (600 mg total) by mouth once daily.  Dispense: 90 tablet; Refill: 3    Type 2 diabetes mellitus with diabetic polyneuropathy, with long-term current use of insulin  -     Discontinue: insulin lispro (HUMALOG KWIKPEN INSULIN) 100 unit/mL pen; Inject 40 Units into the skin 3 (three) times daily.  Dispense: 108 mL; Refill: 12  -     Microalbumin/creatinine urine ratio; Standing  -     insulin aspart U-100 (NOVOLOG FLEXPEN U-100 INSULIN) 100 unit/mL (3 mL) InPn pen; Inject 40 Units into the skin 3 (three) times daily with meals.  Dispense: 30 mL; Refill: 11    Fatigue, unspecified type  -     TSH; Future; Expected date: 06/10/2020  -     Vitamin D; Future; Expected date: 06/10/2020      Follow up in about 3 months (around 9/10/2020).    Anne Wright MD  _____________________________________________________________________________________________________________________________________________________    HPI:    Patient is in clinic today as an established patient here for follow-up of his chronic medical conditions.    DM2: Checking glucose twice daily. Usually averaging around 150. Does reports some episodes of low glucose, down to 70 and symptomatic.  Remains on Lantus 70 units and NovoLog 6 units a.c..  Also on Januvia.  Ft and eye exam up-to-date.    Hypertension:  Improved since starting enalapril twice daily.  Renal function remains stable.  Denies any adverse effects medication.    Patient continues to complain of right-sided lower back pain.  Recently evaluated by pain management with SI joint injection.  Patient reports no improvement of symptoms since that time.  Also remains on gabapentin.  Complaint of  right-sided lower back pain with radiation to his buttocks and right hip.  Worse when standing for long periods of time.  Denies any significant motor weakness or sensory changes.  Denies any bowel or bladder incontinence.  Patient very upset over his pain.  Requesting any as an x-ray of his lower spine.    Also reports chronic fatigue and myalgias.  Patient reports feeling tired all the time.  Denies any weight loss.  Denies any night sweats.  Also reports muscle cramps, worse at night.  Reports good hydration.  Denies any recent changes in medication.    No other complaints today.  Routine health maintenance reviewed.      Past Medical History:  Past Medical History:   Diagnosis Date    Cellulitis of left index finger 2/16/2015    Charcot's joint of foot, left 08/2018    Dyslipidemia     Essential hypertension 2/16/2017    Group B streptococcal infection 1/15/2018    Hypotestosteronemia 7/7/2017    Infected finger joint 2/17/2015    Infected skin ulcer limited to breakdown of skin 1/13/2018    MSSA (methicillin susceptible Staphylococcus aureus) 1/13/2018    Obese     S/P knee surgery, medial/lateral menisectomy 11/4/2013    Type 2 diabetes mellitus with diabetic polyneuropathy, with long-term current use of insulin 2003     Past Surgical History:   Procedure Laterality Date    ELBOW SURGERY      FOOT SURGERY Left     INJECTION OF ANESTHETIC AGENT INTO SACROILIAC JOINT Right 5/18/2020    Procedure: right Sacroiliac Joint Injection;  Surgeon: Nirav Ramon MD;  [facility];  Service: Pain Management;  Laterality: Right;    INJECTION OF JOINT Right 5/18/2020    Procedure: right GT bursa injection;  Surgeon: Nirav Ramon MD;  Location: [facility];  Service: Pain Management;  Laterality: Right;    KNEE CARTILAGE SURGERY  10/21/2013    right knee    KNEE SURGERY Right 02/17/2017    Left index finger surgery  03/2015    MIDFOOT ARTHRODESIS Left 8/27/2018    Procedure: FUSION,  "JOINT, MIDFOOT - FIRST METATARSOCUNEIFORM JOINT AND NAVICULOCUNEIFORM JOINT;  Surgeon: Inocente Smith DPM;  Location: [facility] OR;  Service: Podiatry;  Laterality: Left;    TOE AMPUTATION  03/2018    left great toe     Review of patient's allergies indicates:   Allergen Reactions    Victoza [liraglutide] Swelling     Social History     Tobacco Use    Smoking status: Never Smoker    Smokeless tobacco: Never Used   Substance Use Topics    Alcohol use: Yes     Alcohol/week: 1.0 standard drinks     Types: 1 Cans of beer per week     Comment: occasional    Drug use: No     Family History   Problem Relation Age of Onset    COPD Father      Current Outpatient Medications on File Prior to Visit   Medication Sig Dispense Refill    blood sugar diagnostic (BLOOD GLUCOSE TEST) Strp Check glucose 3 times per day before each meal 100 strip 11    blood-glucose meter Misc Use to check glucose three times daily 1 each 0    enalapril (VASOTEC) 20 MG tablet Take 1 tablet (20 mg total) by mouth 2 (two) times daily. 180 tablet 3    gabapentin (NEURONTIN) 600 MG tablet Take 1 tablet (600 mg total) by mouth 3 (three) times daily. 270 tablet 3    insulin (LANTUS SOLOSTAR U-100 INSULIN) glargine 100 units/mL (3mL) SubQ pen Inject 70 Units into the skin every evening. Supply pen needles 63 mL 3    insulin syringe-needle U-100 (INSULIN SYRINGE) 1 mL 29 gauge x 1/2" Syrg Inject 4 times a day 100 each 12    JANUVIA 100 mg Tab Take 1 tablet by mouth once daily.      lancets Misc Use to check glucose three times daily 100 each 11    metFORMIN (GLUCOPHAGE-XR) 500 MG XR 24hr tablet Take 2 tablets (1,000 mg total) by mouth 2 (two) times daily with meals. 180 tablet 3    pen needle, diabetic (BD ULTRA-FINE WILDA PEN NEEDLE) 32 gauge x 5/32" Ndle INJECT FOUR TIMES DAILY 4 each 12    pravastatin (PRAVACHOL) 40 MG tablet Take 1 tablet (40 mg total) by mouth once daily. 90 tablet 3    safety needles 22 gauge x 1 1/2" Ndle To be used " "once a month for testosterone injections 50 each 6    syringe with needle 3 mL 25 gauge x 1" Syrg To be used with monthly testosterone injections 100 Syringe 4    traZODone (DESYREL) 150 MG tablet Take 1 tablet (150 mg total) by mouth nightly. 90 tablet 3     Current Facility-Administered Medications on File Prior to Visit   Medication Dose Route Frequency Provider Last Rate Last Dose    lactated ringers infusion   Intravenous Continuous Vadim Buchanan MD   Stopped at 08/27/18 0953       Review of Systems   Constitutional: Positive for fatigue. Negative for chills, diaphoresis and fever.   HENT: Negative for congestion, ear pain, postnasal drip, sinus pain and sore throat.    Eyes: Negative for pain and redness.   Respiratory: Negative for cough, chest tightness and shortness of breath.    Cardiovascular: Negative for chest pain and leg swelling.   Gastrointestinal: Negative for abdominal pain, constipation, diarrhea, nausea and vomiting.   Genitourinary: Negative for dysuria and hematuria.   Musculoskeletal: Positive for back pain and myalgias. Negative for arthralgias and joint swelling.   Skin: Negative for rash.   Neurological: Negative for dizziness, syncope and headaches.       Vitals:    06/10/20 1045   BP: 134/80   Pulse: 83   Temp: 98.2 °F (36.8 °C)   Weight: (!) 148.3 kg (327 lb)   Height: 6' 1" (1.854 m)       Wt Readings from Last 3 Encounters:   06/10/20 (!) 148.3 kg (327 lb)   05/18/20 (!) 148.4 kg (327 lb 0.8 oz)   03/04/20 (!) 150.6 kg (332 lb)       Physical Exam  Constitutional:       General: He is not in acute distress.     Appearance: He is well-developed.   HENT:      Head: Normocephalic and atraumatic.   Eyes:      Conjunctiva/sclera: Conjunctivae normal.   Neck:      Musculoskeletal: Normal range of motion and neck supple.   Cardiovascular:      Rate and Rhythm: Normal rate and regular rhythm.      Heart sounds: Normal heart sounds. No murmur.   Pulmonary:      Effort: Pulmonary " effort is normal. No respiratory distress.      Breath sounds: Normal breath sounds.   Abdominal:      General: Bowel sounds are normal. There is no distension.      Palpations: Abdomen is soft.      Tenderness: There is no abdominal tenderness.   Musculoskeletal: Normal range of motion.         General: No tenderness, deformity or signs of injury.      Right lower leg: No edema.      Left lower leg: No edema.   Skin:     General: Skin is warm and dry.      Findings: No rash.   Neurological:      Mental Status: He is alert and oriented to person, place, and time.

## 2020-06-11 LAB
25(OH)D3+25(OH)D2 SERPL-MCNC: 25 NG/ML (ref 30–96)
ESTIMATED AVG GLUCOSE: 163 MG/DL (ref 68–131)
HBA1C MFR BLD HPLC: 7.3 % (ref 4–5.6)

## 2020-06-12 ENCOUNTER — PATIENT MESSAGE (OUTPATIENT)
Dept: FAMILY MEDICINE | Facility: CLINIC | Age: 60
End: 2020-06-12

## 2020-06-16 ENCOUNTER — PATIENT MESSAGE (OUTPATIENT)
Dept: FAMILY MEDICINE | Facility: CLINIC | Age: 60
End: 2020-06-16

## 2020-06-16 DIAGNOSIS — E11.42 TYPE 2 DIABETES MELLITUS WITH DIABETIC POLYNEUROPATHY, WITH LONG-TERM CURRENT USE OF INSULIN: ICD-10-CM

## 2020-06-16 DIAGNOSIS — Z79.4 TYPE 2 DIABETES MELLITUS WITH DIABETIC POLYNEUROPATHY, WITH LONG-TERM CURRENT USE OF INSULIN: ICD-10-CM

## 2020-06-16 RX ORDER — INSULIN LISPRO 100 [IU]/ML
40 INJECTION, SOLUTION INTRAVENOUS; SUBCUTANEOUS 3 TIMES DAILY
Qty: 108 ML | Refills: 12 | Status: SHIPPED | OUTPATIENT
Start: 2020-06-16 | End: 2020-06-18 | Stop reason: ALTCHOICE

## 2020-06-16 NOTE — TELEPHONE ENCOUNTER
----- Message from Geo Youngblood sent at 6/16/2020  4:30 PM CDT -----  Regarding: call back  Pt calling in regards to a call back from nurse please in reference to his prescription         Please advise pt can be contact at [phone]

## 2020-06-16 NOTE — TELEPHONE ENCOUNTER
----- Message from Geo Youngblood sent at 6/16/2020  4:30 PM CDT -----  Regarding: call back  Pt calling in regards to a call back from nurse please in reference to his prescription         Please advise pt can be contact at [phone]

## 2020-06-17 ENCOUNTER — PATIENT MESSAGE (OUTPATIENT)
Dept: FAMILY MEDICINE | Facility: CLINIC | Age: 60
End: 2020-06-17

## 2020-06-17 DIAGNOSIS — K21.9 GASTROESOPHAGEAL REFLUX DISEASE, ESOPHAGITIS PRESENCE NOT SPECIFIED: Primary | ICD-10-CM

## 2020-06-17 RX ORDER — PANTOPRAZOLE SODIUM 40 MG/1
40 TABLET, DELAYED RELEASE ORAL DAILY
Qty: 30 TABLET | Refills: 11 | Status: SHIPPED | OUTPATIENT
Start: 2020-06-17 | End: 2021-06-21

## 2020-06-17 NOTE — TELEPHONE ENCOUNTER
Prescription for GERD sent to pharmacy.  He should take in the morning 30 min prior to eating or drinking.    I have signed for the following orders AND/OR meds.  Please call the patient and ask the patient to schedule the testing AND/OR inform about any medications that were sent. Medications have been sent to pharmacy listed below      No orders of the defined types were placed in this encounter.      Medications Ordered This Encounter   Medications    pantoprazole (PROTONIX) 40 MG tablet     Sig: Take 1 tablet (40 mg total) by mouth once daily.     Dispense:  30 tablet     Refill:  11         [id number] - [place], [place] - [address] [place], [place] [address] [address]  Phone: [phone] Fax: [phone]    [facility] - [place], [place] - [facility] [address] [address]  Phone: [phone] Fax: [phone]    [facility] - [place], [place] - [address]  [address]  [address]  [facility] [address]  Phone: [phone] Fax: [phone]

## 2020-06-18 ENCOUNTER — PATIENT MESSAGE (OUTPATIENT)
Dept: FAMILY MEDICINE | Facility: CLINIC | Age: 60
End: 2020-06-18

## 2020-06-18 ENCOUNTER — TELEPHONE (OUTPATIENT)
Dept: FAMILY MEDICINE | Facility: CLINIC | Age: 60
End: 2020-06-18

## 2020-06-18 DIAGNOSIS — E11.42 TYPE 2 DIABETES MELLITUS WITH DIABETIC POLYNEUROPATHY, WITH LONG-TERM CURRENT USE OF INSULIN: Primary | ICD-10-CM

## 2020-06-18 DIAGNOSIS — Z79.4 TYPE 2 DIABETES MELLITUS WITH DIABETIC POLYNEUROPATHY, WITH LONG-TERM CURRENT USE OF INSULIN: Primary | ICD-10-CM

## 2020-06-18 DIAGNOSIS — E11.42 TYPE 2 DIABETES MELLITUS WITH DIABETIC POLYNEUROPATHY, WITH LONG-TERM CURRENT USE OF INSULIN: ICD-10-CM

## 2020-06-18 DIAGNOSIS — Z79.4 TYPE 2 DIABETES MELLITUS WITH DIABETIC POLYNEUROPATHY, WITH LONG-TERM CURRENT USE OF INSULIN: ICD-10-CM

## 2020-06-18 PROBLEM — M54.40 CHRONIC BILATERAL LOW BACK PAIN WITH SCIATICA: Status: ACTIVE | Noted: 2020-06-18

## 2020-06-18 PROBLEM — M54.42 CHRONIC BILATERAL LOW BACK PAIN WITH SCIATICA: Status: ACTIVE | Noted: 2020-06-18

## 2020-06-18 PROBLEM — G89.29 CHRONIC BILATERAL LOW BACK PAIN WITH SCIATICA: Status: ACTIVE | Noted: 2020-06-18

## 2020-06-18 PROBLEM — M79.10 MYALGIA: Status: ACTIVE | Noted: 2017-07-07

## 2020-06-18 PROBLEM — M54.41 CHRONIC BILATERAL LOW BACK PAIN WITH SCIATICA: Status: ACTIVE | Noted: 2020-06-18

## 2020-06-18 RX ORDER — INSULIN ASPART 100 [IU]/ML
40 INJECTION, SOLUTION INTRAVENOUS; SUBCUTANEOUS
Qty: 30 ML | Refills: 11 | Status: SHIPPED | OUTPATIENT
Start: 2020-06-18 | End: 2020-06-18 | Stop reason: ALTCHOICE

## 2020-06-18 RX ORDER — INSULIN LISPRO 100 [IU]/ML
40 INJECTION, SOLUTION INTRAVENOUS; SUBCUTANEOUS
Qty: 110 ML | Refills: 3 | Status: SHIPPED | OUTPATIENT
Start: 2020-06-18 | End: 2020-06-18 | Stop reason: SDUPTHER

## 2020-06-18 RX ORDER — INSULIN LISPRO 100 [IU]/ML
40 INJECTION, SOLUTION INTRAVENOUS; SUBCUTANEOUS
Qty: 110 ML | Refills: 3 | Status: SHIPPED | OUTPATIENT
Start: 2020-06-18 | End: 2020-07-06 | Stop reason: SDUPTHER

## 2020-06-18 RX ORDER — SYRINGE,SAFETY WITH NEEDLE,1ML 25GX1"
SYRINGE (EA) MISCELLANEOUS
Qty: 100 EACH | Refills: 12 | Status: SHIPPED | OUTPATIENT
Start: 2020-06-18 | End: 2021-04-05 | Stop reason: SDUPTHER

## 2020-06-18 NOTE — TELEPHONE ENCOUNTER
Ninety day supply of Humalog sent to pharmacy.    I have signed for the following orders AND/OR meds.  Please call the patient and ask the patient to schedule the testing AND/OR inform about any medications that were sent. Medications have been sent to pharmacy listed below      No orders of the defined types were placed in this encounter.      Medications Ordered This Encounter   Medications    insulin lispro (HUMALOG U-100 INSULIN) 100 unit/mL injection     Sig: Inject 40 Units into the skin 3 (three) times daily before meals.     Dispense:  110 mL     Refill:  3         [id number] - [place] - [address] [place] [address] [address]  [address] [place] [address]  Phone: [phone] Fax: [phone]    [facility] - [place], [place] - [address] [place] [address]  Phone: [phone] Fax: [phone]    [facility] - [place], [place] - [address] [facility]  [address] [facility]  [address]  [facility] [address]  Phone: [phone] Fax: [phone]

## 2020-06-18 NOTE — ASSESSMENT & PLAN NOTE
-complaint of chronic fatigue  -denies any weight loss or night sweats  -plan to check electrolytes, TSH and vitamin-D

## 2020-06-18 NOTE — ASSESSMENT & PLAN NOTE
-evaluated pain pain management and has received SI joint injection  -continues to have back pain with associated sciatica  -requesting x-ray today.  Discussed that MRI would provide more information than x-ray  -plan to order MRI  -short course of Norco until he is able to get back in with pain management

## 2020-06-18 NOTE — ASSESSMENT & PLAN NOTE
-at goal today  -currently on enalapril 20 mg BID  -Denies adverse effects of medications  -Continue lifestyle modification with low sodium diet and exercise

## 2020-06-18 NOTE — ASSESSMENT & PLAN NOTE
Last A1C   Lab Results   Component Value Date    HGBA1C 7.7 (H) 05/06/2019     Current meds: Metformin 1000 mg BID, Lantus 70 units, Humalog 60 units AC, januvia 100 mg QD  Foot exam completed- Yes  Eye exam completed- Yes  Microalbumin completed- Yes  On statin therapy- yes  On ACEI/ARB- yes  A1c improving.  Continue Lantus 70 units but decrease humalog to 40 units a.c. due to some low readings.  Due for repeat A1c.

## 2020-06-19 ENCOUNTER — PATIENT MESSAGE (OUTPATIENT)
Dept: FAMILY MEDICINE | Facility: CLINIC | Age: 60
End: 2020-06-19

## 2020-06-19 RX ORDER — DIAZEPAM 5 MG/1
5 TABLET ORAL ONCE
Qty: 1 TABLET | Refills: 0 | Status: SHIPPED | OUTPATIENT
Start: 2020-06-19 | End: 2020-06-19

## 2020-06-19 RX ORDER — DIAZEPAM 5 MG/1
5 TABLET ORAL ONCE
Qty: 1 TABLET | Refills: 0 | Status: SHIPPED | OUTPATIENT
Start: 2020-06-19 | End: 2020-06-22

## 2020-06-19 NOTE — TELEPHONE ENCOUNTER
I have signed for the following orders AND/OR meds.  Please call the patient and ask the patient to schedule the testing AND/OR inform about any medications that were sent. Medications have been sent to pharmacy listed below      No orders of the defined types were placed in this encounter.      Medications Ordered This Encounter   Medications    diazePAM (VALIUM) 5 MG tablet     Sig: Take 1 tablet (5 mg total) by mouth once. Take 1 tablet by mouth 30-60 minutes prior to procedure for 1 dose     Dispense:  1 tablet     Refill:  0     [facility] [place], [place] - [facility]  [address] [facility]  [address]  [facility] [address]  Phone: [phone] Fax: [phone]

## 2020-06-19 NOTE — TELEPHONE ENCOUNTER
Spoke with pt regarding MRI. Pt stated that she would like to get a rx sent in prior to the MRI to help calm him down. Pt declined having order sent to open MRI.

## 2020-06-22 ENCOUNTER — TELEPHONE (OUTPATIENT)
Dept: FAMILY MEDICINE | Facility: CLINIC | Age: 60
End: 2020-06-22

## 2020-06-22 ENCOUNTER — TELEPHONE (OUTPATIENT)
Dept: PAIN MEDICINE | Facility: CLINIC | Age: 60
End: 2020-06-22

## 2020-06-22 DIAGNOSIS — M54.40 ACUTE BILATERAL LOW BACK PAIN WITH SCIATICA, SCIATICA LATERALITY UNSPECIFIED: Primary | ICD-10-CM

## 2020-06-22 RX ORDER — DIAZEPAM 5 MG/1
5 TABLET ORAL ONCE
Qty: 1 TABLET | Refills: 0 | Status: SHIPPED | OUTPATIENT
Start: 2020-06-22 | End: 2020-10-19

## 2020-06-22 NOTE — TELEPHONE ENCOUNTER
Pretty sure that the last 1 I prescribed was generic, but  I wrote another.    I have signed for the following orders AND/OR meds.  Please call the patient and ask the patient to schedule the testing AND/OR inform about any medications that were sent. Medications have been sent to pharmacy listed below      No orders of the defined types were placed in this encounter.      Medications Ordered This Encounter   Medications    diazePAM (VALIUM) 5 MG tablet     Sig: Take 1 tablet (5 mg total) by mouth once. Take 30-60 minutes prior to procedure. for 1 dose     Dispense:  1 tablet     Refill:  0         [email or web address] - [place], [place] - [facility] [address]  [address]  [address]  [facility] [address]  Phone: [phone] Fax: [phone]

## 2020-06-22 NOTE — TELEPHONE ENCOUNTER
----- Message from Tony Elias sent at 6/22/2020 10:51 AM CDT -----  Contact: [facility] timdeisy  Requesting call back regarding pt prescription Valium-5mg tablets. 06/19/20 is the date written. Pharmacy is calling to request generic prescription for pt and to verify dosage. Please call back at [phone]. Reference number [id number].

## 2020-06-22 NOTE — TELEPHONE ENCOUNTER
Tried to call to r/s appt on 06/26, due to doctor out of clinic. No answer. Left   Alexis Boyd, MA [facility]   [facility]

## 2020-06-23 ENCOUNTER — TELEPHONE (OUTPATIENT)
Dept: FAMILY MEDICINE | Facility: CLINIC | Age: 60
End: 2020-06-23

## 2020-06-23 NOTE — TELEPHONE ENCOUNTER
----- Message from Kimberly Mclean sent at 6/23/2020  8:04 AM CDT -----  Regarding: Prescitoion  Contact: [facility]  Calling regarding the prescription for Valium, 5 mg, please call them back at [phone], ref# [id number]

## 2020-06-24 ENCOUNTER — TELEPHONE (OUTPATIENT)
Dept: PAIN MEDICINE | Facility: CLINIC | Age: 60
End: 2020-06-24

## 2020-06-24 NOTE — TELEPHONE ENCOUNTER
Tried to call to r/s appt for 06/26, as dr vu will be out . No answer. Left   Scott Wise MA  [facility]   [facility]

## 2020-06-25 ENCOUNTER — HOSPITAL ENCOUNTER (OUTPATIENT)
Dept: RADIOLOGY | Facility: HOSPITAL | Age: 60
Discharge: HOME | End: 2020-06-25
Attending: INTERNAL MEDICINE
Payer: COMMERCIAL

## 2020-06-25 DIAGNOSIS — M54.40 CHRONIC BILATERAL LOW BACK PAIN WITH SCIATICA, SCIATICA LATERALITY UNSPECIFIED: ICD-10-CM

## 2020-06-25 DIAGNOSIS — G89.29 CHRONIC BILATERAL LOW BACK PAIN WITH SCIATICA, SCIATICA LATERALITY UNSPECIFIED: ICD-10-CM

## 2020-06-25 PROCEDURE — 72148 MRI LUMBAR SPINE W/O DYE: CPT | Mod: 26,,, | Performed by: RADIOLOGY

## 2020-06-25 PROCEDURE — 72148 MRI LUMBAR SPINE W/O DYE: CPT | Mod: TC,PO

## 2020-06-25 PROCEDURE — 72148 MRI LUMBAR SPINE WITHOUT CONTRAST: ICD-10-PCS | Mod: 26,,, | Performed by: RADIOLOGY

## 2020-06-26 ENCOUNTER — PATIENT MESSAGE (OUTPATIENT)
Dept: PAIN MEDICINE | Facility: CLINIC | Age: 60
End: 2020-06-26

## 2020-06-26 ENCOUNTER — TELEPHONE (OUTPATIENT)
Dept: PAIN MEDICINE | Facility: CLINIC | Age: 60
End: 2020-06-26

## 2020-06-26 RX ORDER — NABUMETONE 500 MG/1
500 TABLET, FILM COATED ORAL 2 TIMES DAILY PRN
Qty: 60 TABLET | Refills: 0 | Status: SHIPPED | OUTPATIENT
Start: 2020-06-26 | End: 2020-07-26

## 2020-06-26 RX ORDER — NABUMETONE 500 MG/1
500 TABLET, FILM COATED ORAL 2 TIMES DAILY PRN
Qty: 60 TABLET | Refills: 0 | Status: SHIPPED | OUTPATIENT
Start: 2020-06-26 | End: 2020-06-26 | Stop reason: SDUPTHER

## 2020-06-26 NOTE — TELEPHONE ENCOUNTER
Tried to call to r/s appt today . No answer. Left   Scott Wise, MA [facility]   [facility]

## 2020-06-29 ENCOUNTER — TELEPHONE (OUTPATIENT)
Dept: FAMILY MEDICINE | Facility: CLINIC | Age: 60
End: 2020-06-29

## 2020-06-29 DIAGNOSIS — M51.36 DDD (DEGENERATIVE DISC DISEASE), LUMBAR: Primary | ICD-10-CM

## 2020-06-29 NOTE — TELEPHONE ENCOUNTER
Patient would like to see a provider in [place].    I have signed for the following orders AND/OR meds.  Please call the patient and ask the patient to schedule the testing AND/OR inform about any medications that were sent. Medications have been sent to pharmacy listed below      Orders Placed This Encounter   Procedures    Ambulatory referral/consult to Pain Clinic     Standing Status:   Future     Standing Expiration Date:   7/29/2021     Referral Priority:   Routine     Referral Type:   Consultation     Referral Reason:   Specialty Services Required     Requested Specialty:   Pain Medicine     Number of Visits Requested:   1              [facility] - [place], [place] - [address]  [address]  [address]  [facility] [address]  Phone: [phone] Fax: [phone]    [id number] - [place] - [address] AT [facility] [address]  [address] [place] [address]  Phone: [phone] Fax: [phone]

## 2020-06-30 ENCOUNTER — TELEPHONE (OUTPATIENT)
Dept: FAMILY MEDICINE | Facility: CLINIC | Age: 60
End: 2020-06-30

## 2020-06-30 DIAGNOSIS — G89.29 CHRONIC BILATERAL LOW BACK PAIN WITH SCIATICA, SCIATICA LATERALITY UNSPECIFIED: ICD-10-CM

## 2020-06-30 DIAGNOSIS — M54.40 CHRONIC BILATERAL LOW BACK PAIN WITH SCIATICA, SCIATICA LATERALITY UNSPECIFIED: ICD-10-CM

## 2020-06-30 RX ORDER — HYDROCODONE BITARTRATE AND ACETAMINOPHEN 10; 325 MG/1; MG/1
1 TABLET ORAL EVERY 6 HOURS PRN
Qty: 20 TABLET | Refills: 0 | Status: SHIPPED | OUTPATIENT
Start: 2020-06-30 | End: 2020-06-30

## 2020-06-30 RX ORDER — HYDROCODONE BITARTRATE AND ACETAMINOPHEN 10; 325 MG/1; MG/1
1 TABLET ORAL EVERY 6 HOURS PRN
Qty: 20 TABLET | Refills: 0 | Status: SHIPPED | OUTPATIENT
Start: 2020-06-30 | End: 2020-07-08 | Stop reason: ALTCHOICE

## 2020-06-30 NOTE — TELEPHONE ENCOUNTER
The patient was checked in the [facility] Board of Pharmacy's  Prescription Monitoring Program. There appears to be no incongruities with the patient's verbalized history.     I have signed for the following orders AND/OR meds.  Please call the patient and ask the patient to schedule the testing AND/OR inform about any medications that were sent. Medications have been sent to pharmacy listed below      No orders of the defined types were placed in this encounter.      Medications Ordered This Encounter   Medications    HYDROcodone-acetaminophen (NORCO)  mg per tablet     Sig: Take 1 tablet by mouth every 6 (six) hours as needed for Pain.     Dispense:  20 tablet     Refill:  0     Quantity prescribed more than 7 day supply? No       [id number] - [place] - [address] AT [facility] [address] [address]  Phone: [phone] Fax: [phone]

## 2020-07-01 NOTE — PROGRESS NOTES
Attempted to schedule diabetic eye exam and other health maintenance, patient not available, left voicemail.   Varicose veins of bilateral lower extremities with other complications

## 2020-07-04 ENCOUNTER — PATIENT MESSAGE (OUTPATIENT)
Dept: FAMILY MEDICINE | Facility: CLINIC | Age: 60
End: 2020-07-04

## 2020-07-04 DIAGNOSIS — Z79.4 TYPE 2 DIABETES MELLITUS WITH DIABETIC POLYNEUROPATHY, WITH LONG-TERM CURRENT USE OF INSULIN: ICD-10-CM

## 2020-07-04 DIAGNOSIS — E11.42 TYPE 2 DIABETES MELLITUS WITH DIABETIC POLYNEUROPATHY, WITH LONG-TERM CURRENT USE OF INSULIN: ICD-10-CM

## 2020-07-07 ENCOUNTER — PATIENT OUTREACH (OUTPATIENT)
Dept: ADMINISTRATIVE | Facility: OTHER | Age: 60
End: 2020-07-07

## 2020-07-07 RX ORDER — INSULIN LISPRO 100 [IU]/ML
40 INJECTION, SOLUTION INTRAVENOUS; SUBCUTANEOUS
Qty: 110 ML | Refills: 3 | Status: SHIPPED | OUTPATIENT
Start: 2020-07-07 | End: 2020-07-28

## 2020-07-08 ENCOUNTER — OFFICE VISIT (OUTPATIENT)
Dept: PAIN MEDICINE | Facility: CLINIC | Age: 60
End: 2020-07-08
Payer: COMMERCIAL

## 2020-07-08 VITALS
HEIGHT: 73 IN | BODY MASS INDEX: 41.75 KG/M2 | SYSTOLIC BLOOD PRESSURE: 150 MMHG | DIASTOLIC BLOOD PRESSURE: 73 MMHG | RESPIRATION RATE: 18 BRPM | HEART RATE: 72 BPM | WEIGHT: 315 LBS

## 2020-07-08 DIAGNOSIS — Z03.818 ENCOUNTER FOR OBSERVATION FOR SUSPECTED EXPOSURE TO OTHER BIOLOGICAL AGENTS RULED OUT: Primary | ICD-10-CM

## 2020-07-08 DIAGNOSIS — M46.1 SACROILIITIS: ICD-10-CM

## 2020-07-08 DIAGNOSIS — M54.16 LUMBAR RADICULOPATHY: Primary | ICD-10-CM

## 2020-07-08 DIAGNOSIS — M51.36 DDD (DEGENERATIVE DISC DISEASE), LUMBAR: ICD-10-CM

## 2020-07-08 DIAGNOSIS — E66.01 SEVERE OBESITY (BMI >= 40): ICD-10-CM

## 2020-07-08 PROCEDURE — 3008F PR BODY MASS INDEX (BMI) DOCUMENTED: ICD-10-PCS | Mod: CPTII,S$GLB,, | Performed by: PHYSICAL MEDICINE & REHABILITATION

## 2020-07-08 PROCEDURE — 3078F PR MOST RECENT DIASTOLIC BLOOD PRESSURE < 80 MM HG: ICD-10-PCS | Mod: CPTII,S$GLB,, | Performed by: PHYSICAL MEDICINE & REHABILITATION

## 2020-07-08 PROCEDURE — 3008F BODY MASS INDEX DOCD: CPT | Mod: CPTII,S$GLB,, | Performed by: PHYSICAL MEDICINE & REHABILITATION

## 2020-07-08 PROCEDURE — 3077F PR MOST RECENT SYSTOLIC BLOOD PRESSURE >= 140 MM HG: ICD-10-PCS | Mod: CPTII,S$GLB,, | Performed by: PHYSICAL MEDICINE & REHABILITATION

## 2020-07-08 PROCEDURE — 99999 PR PBB SHADOW E&M-EST. PATIENT-LVL V: ICD-10-PCS | Mod: PBBFAC,,, | Performed by: PHYSICAL MEDICINE & REHABILITATION

## 2020-07-08 PROCEDURE — 99214 OFFICE O/P EST MOD 30 MIN: CPT | Mod: S$GLB,,, | Performed by: PHYSICAL MEDICINE & REHABILITATION

## 2020-07-08 PROCEDURE — 3078F DIAST BP <80 MM HG: CPT | Mod: CPTII,S$GLB,, | Performed by: PHYSICAL MEDICINE & REHABILITATION

## 2020-07-08 PROCEDURE — 3077F SYST BP >= 140 MM HG: CPT | Mod: CPTII,S$GLB,, | Performed by: PHYSICAL MEDICINE & REHABILITATION

## 2020-07-08 PROCEDURE — 99999 PR PBB SHADOW E&M-EST. PATIENT-LVL V: CPT | Mod: PBBFAC,,, | Performed by: PHYSICAL MEDICINE & REHABILITATION

## 2020-07-08 PROCEDURE — 99214 PR OFFICE/OUTPT VISIT, EST, LEVL IV, 30-39 MIN: ICD-10-PCS | Mod: S$GLB,,, | Performed by: PHYSICAL MEDICINE & REHABILITATION

## 2020-07-08 RX ORDER — HYDROCODONE BITARTRATE AND ACETAMINOPHEN 10; 325 MG/1; MG/1
1 TABLET ORAL EVERY 8 HOURS PRN
Qty: 42 TABLET | Refills: 0 | Status: SHIPPED | OUTPATIENT
Start: 2020-07-08 | End: 2020-07-22

## 2020-07-08 NOTE — LETTER
July 8, 2020      Anne Wright MD  [address]  [place] [address]           [facility]  [id number] [place] [address]  Phone: [phone]  Fax: [phone]          Patient: Virgilio Acuña   MR Number: [id number]   YOB: 1960   Date of Visit: 7/8/2020       Dear Dr. Anne Wright:    Thank you for referring Virgilio Acuña to me for evaluation. Attached you will find relevant portions of my assessment and plan of care.    If you have questions, please do not hesitate to call me. I look forward to following Virgilio Acuña along with you.    Sincerely,    Nirav Ramon MD    Enclosure  CC:  No Recipients    If you would like to receive this communication electronically, please contact [email or web address] or [phone] to request more information on [employer] Link access.    For providers and/or their staff who would like to refer a patient to [facility], please contact us through our one-stop-shop provider referral line, [facility], at [phone].    If you feel you have received this communication in error or would no longer like to receive these types of communications, please e-mail [email or web address]

## 2020-07-08 NOTE — PATIENT INSTRUCTIONS
Pain Management Pre-Procedure Instructions  (also available in your [employer] account)    Patient Name:___Virgilio Acuña____MRN: [id number] you are scheduled to have the following procedure:__ Epidural Steroid Injection  _with______Nirav Ramon MD on: ____07/14/2020___ at: [facility]    You will be contacted the day before your procedure to be given an arrival and procedure time                                                                                                            Day of Procedure   Ensure you have obtained arrival time from the Pain Management department  o We will call 48 hours in advance with your arrival time. Please check any voicemails you may have  o If you arrive past your scheduled procedure time, you may be asked to reschedule your procedure.   For your safety, ensure you have a  with you to remain present throughout your procedure   o If you arrive without a responsible adult to stay with you and drive you home, you may be asked to reschedule your procedure   Take all of your prescribed medications (exceptions noted below) with a small amount of water  o STOP plavix, aspirin,ibuprofen,motrin,excedrin,aleve,advil,meloxicam,all vitamins, and supplements 7 days prior to procedure  o [x] Nothing by mouth after midnight the night before your procedure.  It is ok to take your regular medications with a small sip of water.     Wear loose, comfortable clothing    You may wear glasses, dentures, contact lenses and/or hearing aids. Please leave all valuable items at home.   Contact the Pain Management department at [phone] or via [employer] if you are:  o Running a fever above 100 degrees  o Feel ill, have any type of infection, or are taking antibiotics now or have in the past 2 weeks  o Have had any outpatient procedures in the past 2 weeks (colonoscopy, major dental work, etc.)  o If you are allergic to iodine, IVP dye or shellfish.      Contact Information:  [phone], ask to speak to the pain management department with any questions or concerns or send a message via [employer]

## 2020-07-08 NOTE — H&P (VIEW-ONLY)
Established Patient Chronic Pain Note (Follow up visit)    Chief Complaint:   Chief Complaint   Patient presents with    Low-back Pain       SUBJECTIVE:    Virgilio Acuña is a 60 y.o. male who presents to the clinic for a follow-up appointment for lower back pain.  He was last seen for procedure on 05/18/2020 where he received right-sided SI joint and GTB injections with limited relief.  Since the last visit, Virgilio Acuña states the pain has been persistant. Current pain intensity is 5/10.    Patient denies night fever/night sweats, urinary incontinence, bowel incontinence, significant weight loss, significant motor weakness and loss of sensations.    Pain Disability Index Review:   No flowsheet data found.     Initial HPI 04/20/2020:  Virgilio Acuña is a 59 y.o. male who presents to tele-medicine clinic for the evaluation of lower back pain.  He was referred by his primary care provider for further evaluation and management of this pain.  Of note, patient has past medical history of anxiety, dyslipidemia, DM2,, obesity, history of diabetic foot ulcers, hypertension,  multiple MSK complaints, and multiple other medical comorbidities as listed below.  The pain started several years ago without any significant injury or trauma.  He reports that his symptoms have been worsening over the past 4-6 weeks.The pain is located in the right lower lumbosacral area and radiates to the right buttock and hip.  The pain is described as aching, dull, sharp and stabbing and is rated as 6/10. The pain is rated with a score of  4/10 on the BEST day and a score of 10/10 on the WORST day.  Symptoms interfere with daily activity, sleeping and work. The pain is exacerbated by prolonged sitting, prolonged standing, and getting up from a seated position.  The pain is mitigated by medications and prior injections. The patient reports spending 2-4 hours per day reclining. The patient reports 6-8 hours of uninterrupted sleep per night.  He  works as a  and does shift work.          Non-Pharmacologic Treatments:  Physical Therapy/Home Exercise: yes  Ice/Heat:yes  TENS: yes  Acupuncture: no  Massage: yes  Chiropractic: no    Other: no        Pain Medications:  - Opioids: Norco, Percocet  - Adjuvant Medications: Trazodone, gabapentin, NSAIDs  - Anti-Coagulants:  None      report:  Reviewed and consistent with medication use as prescribed.         Pain Procedures:   -previous sacroiliac joint injections  -05/18/2020:  Right SI joint and right GT bursa injection, limited relief      Imaging:  MRI lumbar spine 06/25/2020:  Alignment: Slight dextroconvex curvature of the lumbar spine.  Lumbar lordosis is maintained.     Vertebrae: No evidence of an acute fracture or diffuse marrow placement process.     Discs: Multilevel degenerative disc disease with disc desiccation at L3-L4-L5-S1 and disc space narrowing, which is most pronounced at L5-S1 with moderate to severe disc space narrowing.     Cord: Normal.  Conus terminates at L1.     Degenerative findings:     L1-L2: The disc is normal in configuration.  Mild bilateral facet arthropathy and ligamentum flavum infolding.  There is no neuroforaminal stenosis.  There is no spinal canal stenosis.     L2-L3: Mild circumferential disc bulge.  Moderate bilateral facet arthropathy.  Ligamentum flavum infolding.  Mild bilateral neural foraminal stenosis.  There is no spinal canal stenosis.     L3-L4: Mild disc space narrowing.  Circumferential disc bulge, asymmetric to the right, which abuts the descending right L4 nerve root.  Moderate bilateral facet arthropathy.  Ligamentum flavum infolding.  Mild bilateral neural foraminal stenosis.  Posterior epidural lipomatosis.  There is no spinal canal stenosis.     L4-L5: Mild disc space narrowing.  Circumferential disc bulge with superimposed central disc protrusion through an annular fissure.  Moderate bilateral facet arthropathy.  Ligamentum  flavum infolding.  Posterior epidural lipomatosis.  Mild-to-moderate bilateral neural foraminal stenosis.  Moderate spinal canal stenosis and narrowing of lateral recesses bilaterally.     L5-S1: Moderate to severe disc space narrowing.  Circumferential disc bulge with posterior osteophytic ridging and right subarticular disc extrusion, which abuts the descending right S1 nerve root.  Moderate bilateral facet arthropathy.  Ligamentum flavum infolding.  Moderate bilateral neural foraminal stenosis.  There is no spinal canal stenosis.     Paraspinal muscles & soft tissues: Large exophytic T2 hyperintense lesion arising from the lower pole of the right kidney, best seen on  images, probably representing a cyst.  Additional exophytic T2 hyperintense lesion arising from the lower pole of the left kidney on  images, likely an additional cyst.  Correlation with ultrasound may be helpful.      PMHx,PSHx, Social history, and Family history:  I have reviewed the patient's medical, surgical, social, and family history in detail and updated the computerized patient record.      Review of patient's allergies indicates:   Allergen Reactions    Victoza [liraglutide] Swelling       Current Outpatient Medications   Medication Sig    blood sugar diagnostic (BLOOD GLUCOSE TEST) Strp Check glucose 3 times per day before each meal    blood-glucose meter Misc Use to check glucose three times daily    enalapril (VASOTEC) 20 MG tablet Take 1 tablet (20 mg total) by mouth 2 (two) times daily.    gabapentin (NEURONTIN) 600 MG tablet Take 1 tablet (600 mg total) by mouth 3 (three) times daily.    gemfibroziL (LOPID) 600 MG tablet Take 1 tablet (600 mg total) by mouth once daily.    insulin (LANTUS SOLOSTAR U-100 INSULIN) glargine 100 units/mL (3mL) SubQ pen Inject 70 Units into the skin every evening. Supply pen needles    insulin lispro (HUMALOG U-100 INSULIN) 100 unit/mL injection Inject 40 Units into the skin 3 (three)  "times daily before meals.    insulin syringe-needle U-100 (INSULIN SYRINGE) 1 mL 29 gauge x 1/2" Syrg Inject 4 times a day    JANUVIA 100 mg Tab Take 1 tablet by mouth once daily.    lancets Misc Use to check glucose three times daily    metFORMIN (GLUCOPHAGE-XR) 500 MG XR 24hr tablet Take 2 tablets (1,000 mg total) by mouth 2 (two) times daily with meals.    nabumetone (RELAFEN) 500 MG tablet Take 1 tablet (500 mg total) by mouth 2 (two) times daily as needed for Pain.    pantoprazole (PROTONIX) 40 MG tablet Take 1 tablet (40 mg total) by mouth once daily.    pen needle, diabetic (BD ULTRA-FINE WILDA PEN NEEDLE) 32 gauge x 5/32" Ndle INJECT FOUR TIMES DAILY    pravastatin (PRAVACHOL) 40 MG tablet Take 1 tablet (40 mg total) by mouth once daily.    safety needles 22 gauge x 1 1/2" Ndle To be used once a month for testosterone injections    syringe with needle 3 mL 25 gauge x 1" Syrg To be used with monthly testosterone injections    traZODone (DESYREL) 150 MG tablet Take 1 tablet (150 mg total) by mouth nightly.    diazePAM (VALIUM) 5 MG tablet Take 1 tablet (5 mg total) by mouth once. Take 30-60 minutes prior to procedure. for 1 dose    HYDROcodone-acetaminophen (NORCO)  mg per tablet Take 1 tablet by mouth every 8 (eight) hours as needed for Pain.     No current facility-administered medications for this visit.      Facility-Administered Medications Ordered in Other Visits   Medication    lactated ringers infusion         REVIEW OF SYSTEMS:    GENERAL:  No weight loss, malaise or fevers.  HEENT:   No recent changes in vision or hearing  NECK:  Negative for lumps, no difficulty with swallowing.  RESPIRATORY:  Negative for cough, wheezing or shortness of breath, patient denies any recent URI.  CARDIOVASCULAR:  Negative for chest pain, leg swelling or palpitations.  GI:  Negative for abdominal discomfort, blood in stools or black stools or change in bowel habits.  MUSCULOSKELETAL:  See HPI.  SKIN:  " "Negative for lesions, rash, and itching.  PSYCH:  No mood disorder or recent psychosocial stressors.  Patients sleep is not disturbed secondary to pain.  HEMATOLOGY/LYMPHOLOGY:  Negative for prolonged bleeding, bruising easily or swollen nodes.  Patient is not currently taking any anti-coagulants  NEURO:   No history of headaches, syncope, paralysis, seizures or tremors.  ENDO:  +IDDM  All other reviewed and negative other than HPI.    OBJECTIVE:    BP (!) 150/73 (BP Location: Right arm, Patient Position: Sitting, BP Method: Medium (Automatic))   Pulse 72   Resp 18   Ht 6' 1" (1.854 m)   Wt (!) 145.2 kg (320 lb)   BMI 42.22 kg/m²     PHYSICAL EXAMINATION:    GENERAL: Well appearing, in no acute distress, alert and oriented x3.  Obese  PSYCH:  Mood and affect appropriate.  SKIN: Skin color, texture, turgor normal, no rashes or lesions.  HEAD/FACE:  Normocephalic, atraumatic. Cranial nerves grossly intact.   CV: RRR with palpation of the radial artery.  PULM: No evidence of respiratory difficulty, symmetric chest rise.  GI:  Soft and non-tender.  BACK: Straight leg raising in the sitting and supine positions is equivocal to radicular pain on the right.  Mild-to-moderate pain to palpation over the facet joints of the lumbar spine and lumbar paraspinals.  Fair range of motion secondary to pain reproduction.  Pain with milgram's maneuver.  EXTREMITIES: Peripheral joint ROM is full and pain free without obvious instability or laxity in all four extremities. No deformities, edema, or skin discoloration. Good capillary refill.  MUSCULOSKELETAL: Shoulder, hip, and knee provocative maneuvers are negative.  There is no pain with palpation over the sacroiliac joints bilaterally.  FABERs test is negative.  FADIRs test is negative.   Bilateral upper and lower extremity strength is normal and symmetric.  No atrophy or tone abnormalities are noted.  NEURO: Bilateral upper and lower extremity coordination and muscle stretch " reflexes are physiologic and symmetric.  Plantar response are downgoing. No clonus.  No loss of sensation is noted.  GAIT:  Antalgic, slow.      LABS:  Lab Results   Component Value Date    WBC 8.24 11/13/2018    HGB 15.8 11/13/2018    HCT 49.1 11/13/2018    MCV 91 11/13/2018     11/13/2018       CMP  Sodium   Date Value Ref Range Status   06/10/2020 138 136 - 145 mmol/L Final     Potassium   Date Value Ref Range Status   06/10/2020 4.4 3.5 - 5.1 mmol/L Final     Chloride   Date Value Ref Range Status   06/10/2020 102 95 - 110 mmol/L Final     CO2   Date Value Ref Range Status   06/10/2020 25 23 - 29 mmol/L Final     Glucose   Date Value Ref Range Status   06/10/2020 100 70 - 110 mg/dL Final     BUN, Bld   Date Value Ref Range Status   06/10/2020 17 8 - 23 mg/dL Final     Creatinine   Date Value Ref Range Status   06/10/2020 1.0 0.5 - 1.4 mg/dL Final     Calcium   Date Value Ref Range Status   06/10/2020 9.6 8.7 - 10.5 mg/dL Final     Total Protein   Date Value Ref Range Status   06/10/2020 7.6 6.0 - 8.4 g/dL Final     Albumin   Date Value Ref Range Status   06/10/2020 4.1 3.5 - 5.2 g/dL Final     Total Bilirubin   Date Value Ref Range Status   06/10/2020 0.7 0.1 - 1.0 mg/dL Final     Comment:     For infants and newborns, interpretation of results should be based  on gestational age, weight and in agreement with clinical  observations.  Premature Infant recommended reference ranges:  Up to 24 hours.............<8.0 mg/dL  Up to 48 hours............<12.0 mg/dL  3-5 days..................<15.0 mg/dL  6-29 days.................<15.0 mg/dL       Alkaline Phosphatase   Date Value Ref Range Status   06/10/2020 59 55 - 135 U/L Final     AST   Date Value Ref Range Status   06/10/2020 23 10 - 40 U/L Final     ALT   Date Value Ref Range Status   06/10/2020 27 10 - 44 U/L Final     Anion Gap   Date Value Ref Range Status   06/10/2020 11 8 - 16 mmol/L Final     eGFR if    Date Value Ref Range Status    06/10/2020 >60.0 >60 mL/min/1.73 m^2 Final     eGFR if non    Date Value Ref Range Status   06/10/2020 >60.0 >60 mL/min/1.73 m^2 Final     Comment:     Calculation used to obtain the estimated glomerular filtration  rate (eGFR) is the CKD-EPI equation.          Lab Results   Component Value Date    HGBA1C 7.3 (H) 06/10/2020             ASSESSMENT: 60 y.o. year old male with low back pain, consistent with     1. Lumbar radiculopathy     2. DDD (degenerative disc disease), lumbar  Ambulatory referral/consult to Pain Clinic    IR WHITLEY Lumbar w/ Img    Case Request-RAD/Other Procedure Area: Lumbar L5/S1 [place] WHITLEY    HYDROcodone-acetaminophen (NORCO)  mg per tablet   3. Sacroiliitis     4. Severe obesity (BMI >= 40)           PLAN:   - Interventions: Scheduled for L5-S1 [place] WHITLEY under fluoroscopy for diagnostic and therapeutic purposes.. Explained the risks and benefits of the procedure in detail with the patient today in clinic along with alternative treatment options, and the patient elected to pursue the intervention.    - Anticoagulation: no  - Medications: I have stressed the importance of physical activity and a home exercise plan to help with pain and improve health. and Patient can continue with medications for now since they are providing benefits, using them appropriately, and without side effects.  Provide the patient with a 14 day supply of Norco 10/325 mg Q 8 p.r.n. severe pain, # 42 tablets, 0 refills.  I reviewed the  and is consistent patient's history and there is no aberrant drug behavior.  Expressed to the patient that these are not going to be utilized for long-term pain management needs.  - Therapy:  Advised patient continue with activities and exercise as tolerated  - Psychological:  Discussed coping mechanisms to help address chronic pain issues  - Labs:  Reviewed  - Imaging:  Reviewed imaging available, went in great detail the MRI today with the patient   -  Consults/Referrals:  None at this time  - Records:  Reviewed/Obtain old records from outside physicians and imaging  - Follow up visit: return to clinic 4 weeks post procedure  - Counseled patient regarding the importance of activity modification and physical therapy  - This condition does not require this patient to take time off of work, and the primary goal of our Pain Management services is to improve the patient's functional capacity.  - Patient Questions: Answered all of the patient's questions regarding diagnosis, therapy, and treatment    The above plan and management options were discussed at length with patient. Patient is in agreement with the above and verbalized understanding.      Nirav Ramon MD  Interventional Pain Management  [facility]    Disclaimer:  This note was prepared using voice recognition system and is likely to have sound alike errors that may have been overlooked even after proof reading.  Please call me with any questions

## 2020-07-09 NOTE — PRE-PROCEDURE INSTRUCTIONS
Attempted to PAT patient for procedure on 7.13 with Dr Ramon. No answer. LVM with return phone call.

## 2020-07-10 ENCOUNTER — TELEPHONE (OUTPATIENT)
Dept: PAIN MEDICINE | Facility: CLINIC | Age: 60
End: 2020-07-10

## 2020-07-10 NOTE — PRE-PROCEDURE INSTRUCTIONS
Spoke with patient  regarding procedure scheduled on   Arrival time 0650  Has patient been sick with fever or on antibiotics within the last 7 days? no  Has patient received a vaccination within the last 7 days? No   Has the patient stopped all medications as directed? Relafen last dose 7.7 Hold insulin, metformin and januvia am of procedure  Does patient have a pacemaker and or defibrillator? no  Does the patient have a ride to and from procedure and someone reliable to remain with patient? Yes wife   Is the patient diabetic? yes  Does the patient have sleep apnea? Or use O2 at home? No no  Is the patient receiving sedation? yes  Is the patient instructed to remain NPO beginning at midnight the night before their procedure? yes  Procedure location confirmed with patient? Yes directions sent via portal  Covid testin/11 0900 [facility]

## 2020-07-10 NOTE — PRE-PROCEDURE INSTRUCTIONS
Attempted to PAT patient for procedure on 7.14 with Dr Ramon. No answer. LVM with return phone call.

## 2020-07-10 NOTE — TELEPHONE ENCOUNTER
"Informed pt he will need to r/s his procedure due to him not being able to do covid test over the weekend . Pt stated " well this is bullshit , I have to work nights on the weekend and need my fucking sleep" . Advised pt not to curse at me and stated he would callback to re schedule . Canceled procedure. Pt understood. All questions answered.   Scott Wise MA  [facility]  "

## 2020-07-10 NOTE — PRE-PROCEDURE INSTRUCTIONS
"Spoke with patient for PAT for procedure on 7-14. Informed patient he would need a COVID on Saturday. Patient stated "I can't do it I have to work nights. Patient then hung up the phone. No answer on call back. Notified Scott at Dr. Ramon office.    "

## 2020-07-11 ENCOUNTER — LAB VISIT (OUTPATIENT)
Dept: OTOLARYNGOLOGY | Facility: CLINIC | Age: 60
End: 2020-07-11
Payer: COMMERCIAL

## 2020-07-11 DIAGNOSIS — Z03.818 ENCOUNTER FOR OBSERVATION FOR SUSPECTED EXPOSURE TO OTHER BIOLOGICAL AGENTS RULED OUT: ICD-10-CM

## 2020-07-11 PROCEDURE — U0003 INFECTIOUS AGENT DETECTION BY NUCLEIC ACID (DNA OR RNA); SEVERE ACUTE RESPIRATORY SYNDROME CORONAVIRUS 2 (SARS-COV-2) (CORONAVIRUS DISEASE [COVID-19]), AMPLIFIED PROBE TECHNIQUE, MAKING USE OF HIGH THROUGHPUT TECHNOLOGIES AS DESCRIBED BY CMS-2020-01-R: HCPCS

## 2020-07-13 LAB — SARS-COV-2 RNA RESP QL NAA+PROBE: NOT DETECTED

## 2020-07-14 ENCOUNTER — HOSPITAL ENCOUNTER (OUTPATIENT)
Facility: HOSPITAL | Age: 60
Discharge: HOME | End: 2020-07-14
Attending: PHYSICAL MEDICINE & REHABILITATION | Admitting: PHYSICAL MEDICINE & REHABILITATION
Payer: COMMERCIAL

## 2020-07-14 VITALS
RESPIRATION RATE: 20 BRPM | OXYGEN SATURATION: 96 % | WEIGHT: 315 LBS | DIASTOLIC BLOOD PRESSURE: 71 MMHG | TEMPERATURE: 98 F | HEIGHT: 73 IN | SYSTOLIC BLOOD PRESSURE: 152 MMHG | HEART RATE: 68 BPM | BODY MASS INDEX: 41.75 KG/M2

## 2020-07-14 DIAGNOSIS — M54.16 LUMBAR RADICULOPATHY: ICD-10-CM

## 2020-07-14 LAB — POCT GLUCOSE: 193 MG/DL (ref 70–110)

## 2020-07-14 PROCEDURE — 62323 NJX INTERLAMINAR LMBR/SAC: CPT | Performed by: PHYSICAL MEDICINE & REHABILITATION

## 2020-07-14 PROCEDURE — 82962 GLUCOSE BLOOD TEST: CPT | Performed by: PHYSICAL MEDICINE & REHABILITATION

## 2020-07-14 PROCEDURE — 25000003 PHARM REV CODE 250: Performed by: PHYSICAL MEDICINE & REHABILITATION

## 2020-07-14 PROCEDURE — 25500020 PHARM REV CODE 255: Performed by: PHYSICAL MEDICINE & REHABILITATION

## 2020-07-14 PROCEDURE — 63600175 PHARM REV CODE 636 W HCPCS: Performed by: PHYSICAL MEDICINE & REHABILITATION

## 2020-07-14 PROCEDURE — 62323 NJX INTERLAMINAR LMBR/SAC: CPT | Mod: ,,, | Performed by: PHYSICAL MEDICINE & REHABILITATION

## 2020-07-14 PROCEDURE — 62323 PR INJ LUMBAR/SACRAL, W/IMAGING GUIDANCE: ICD-10-PCS | Mod: ,,, | Performed by: PHYSICAL MEDICINE & REHABILITATION

## 2020-07-14 PROCEDURE — 99152 MOD SED SAME PHYS/QHP 5/>YRS: CPT | Performed by: PHYSICAL MEDICINE & REHABILITATION

## 2020-07-14 RX ORDER — MIDAZOLAM HYDROCHLORIDE 1 MG/ML
INJECTION, SOLUTION INTRAMUSCULAR; INTRAVENOUS
Status: DISCONTINUED | OUTPATIENT
Start: 2020-07-14 | End: 2020-07-14 | Stop reason: HOSPADM

## 2020-07-14 RX ORDER — BUPIVACAINE HYDROCHLORIDE 2.5 MG/ML
INJECTION, SOLUTION EPIDURAL; INFILTRATION; INTRACAUDAL
Status: DISCONTINUED | OUTPATIENT
Start: 2020-07-14 | End: 2020-07-14 | Stop reason: HOSPADM

## 2020-07-14 RX ORDER — FENTANYL CITRATE 50 UG/ML
INJECTION, SOLUTION INTRAMUSCULAR; INTRAVENOUS
Status: DISCONTINUED | OUTPATIENT
Start: 2020-07-14 | End: 2020-07-14 | Stop reason: HOSPADM

## 2020-07-14 RX ORDER — ONDANSETRON 2 MG/ML
4 INJECTION INTRAMUSCULAR; INTRAVENOUS ONCE AS NEEDED
Status: DISCONTINUED | OUTPATIENT
Start: 2020-07-14 | End: 2021-10-04

## 2020-07-14 RX ORDER — METHYLPREDNISOLONE ACETATE 40 MG/ML
INJECTION, SUSPENSION INTRA-ARTICULAR; INTRALESIONAL; INTRAMUSCULAR; SOFT TISSUE
Status: DISCONTINUED | OUTPATIENT
Start: 2020-07-14 | End: 2020-07-14 | Stop reason: HOSPADM

## 2020-07-14 NOTE — DISCHARGE SUMMARY
Discharge Note  Short Stay      SUMMARY     Admit Date: 7/14/2020    Attending Physician: Nirav Ramon MD        Discharge Physician: Nirav Ramon MD        Discharge Date: 7/14/2020 7:27 AM    Procedure(s) (LRB):  Lumbar L5/S1 IL WHITLEY (N/A)    Final Diagnosis: DDD (degenerative disc disease), lumbar [M51.36]    Disposition: Home or self care    Patient Instructions:   Current Discharge Medication List      CONTINUE these medications which have NOT CHANGED    Details   enalapril (VASOTEC) 20 MG tablet Take 1 tablet (20 mg total) by mouth 2 (two) times daily.  Qty: 180 tablet, Refills: 3    Associated Diagnoses: Essential hypertension      gabapentin (NEURONTIN) 600 MG tablet Take 1 tablet (600 mg total) by mouth 3 (three) times daily.  Qty: 270 tablet, Refills: 3    Associated Diagnoses: Neuropathy      gemfibroziL (LOPID) 600 MG tablet Take 1 tablet (600 mg total) by mouth once daily.  Qty: 90 tablet, Refills: 3    Associated Diagnoses: Mixed hyperlipidemia      HYDROcodone-acetaminophen (NORCO)  mg per tablet Take 1 tablet by mouth every 8 (eight) hours as needed for Pain.  Qty: 42 tablet, Refills: 0    Comments: Quantity prescribed more than 7 day supply? Yes, quantity medically necessary  Associated Diagnoses: DDD (degenerative disc disease), lumbar      insulin (LANTUS SOLOSTAR U-100 INSULIN) glargine 100 units/mL (3mL) SubQ pen Inject 70 Units into the skin every evening. Supply pen needles  Qty: 63 mL, Refills: 3    Associated Diagnoses: Type 2 diabetes mellitus with diabetic polyneuropathy, with long-term current use of insulin      insulin lispro (HUMALOG U-100 INSULIN) 100 unit/mL injection Inject 40 Units into the skin 3 (three) times daily before meals.  Qty: 110 mL, Refills: 3    Associated Diagnoses: Type 2 diabetes mellitus with diabetic polyneuropathy, with long-term current use of insulin      JANUVIA 100 mg Tab Take 1 tablet by mouth once daily.      metFORMIN (GLUCOPHAGE-XR) 500 MG  "XR 24hr tablet Take 2 tablets (1,000 mg total) by mouth 2 (two) times daily with meals.  Qty: 180 tablet, Refills: 3    Associated Diagnoses: Type 2 diabetes mellitus with diabetic polyneuropathy, with long-term current use of insulin      nabumetone (RELAFEN) 500 MG tablet Take 1 tablet (500 mg total) by mouth 2 (two) times daily as needed for Pain.  Qty: 60 tablet, Refills: 0      pravastatin (PRAVACHOL) 40 MG tablet Take 1 tablet (40 mg total) by mouth once daily.  Qty: 90 tablet, Refills: 3    Associated Diagnoses: Mixed hyperlipidemia      traZODone (DESYREL) 150 MG tablet Take 1 tablet (150 mg total) by mouth nightly.  Qty: 90 tablet, Refills: 3    Associated Diagnoses: Insomnia, unspecified type      blood sugar diagnostic (BLOOD GLUCOSE TEST) Strp Check glucose 3 times per day before each meal  Qty: 100 strip, Refills: 11    Associated Diagnoses: Type 2 diabetes mellitus with diabetic polyneuropathy, with long-term current use of insulin      blood-glucose meter Misc Use to check glucose three times daily  Qty: 1 each, Refills: 0    Associated Diagnoses: Type 2 diabetes mellitus with diabetic polyneuropathy, with long-term current use of insulin      diazePAM (VALIUM) 5 MG tablet Take 1 tablet (5 mg total) by mouth once. Take 30-60 minutes prior to procedure. for 1 dose  Qty: 1 tablet, Refills: 0    Associated Diagnoses: Acute bilateral low back pain with sciatica, sciatica laterality unspecified      insulin syringe-needle U-100 (INSULIN SYRINGE) 1 mL 29 gauge x 1/2" Syrg Inject 4 times a day  Qty: 100 each, Refills: 12      lancets Misc Use to check glucose three times daily  Qty: 100 each, Refills: 11    Associated Diagnoses: Type 2 diabetes mellitus with diabetic polyneuropathy, with long-term current use of insulin      pantoprazole (PROTONIX) 40 MG tablet Take 1 tablet (40 mg total) by mouth once daily.  Qty: 30 tablet, Refills: 11    Associated Diagnoses: Gastroesophageal reflux disease, esophagitis " "presence not specified      pen needle, diabetic (BD ULTRA-FINE WILDA PEN NEEDLE) 32 gauge x 5/32" Ndle INJECT FOUR TIMES DAILY  Qty: 4 each, Refills: 12      safety needles 22 gauge x 1 1/2" Ndle To be used once a month for testosterone injections  Qty: 50 each, Refills: 6      syringe with needle 3 mL 25 gauge x 1" Syrg To be used with monthly testosterone injections  Qty: 100 Syringe, Refills: 4                 Discharge Diagnosis: DDD (degenerative disc disease), lumbar [M51.36]  Condition on Discharge: Stable with no complications to procedure   Diet on Discharge: Same as before.  Activity: as per instruction sheet.  Discharge to: Home with a responsible adult.  Follow up: 2-4 weeks       Please call the office if you experience any weakness or loss of sensation, fever > 101.5, pain uncontrolled with oral medications, persistent nausea/vomiting/or diarrhea, redness or drainage from the incisions, or any other worrisome concerns. If physician on call was not reached or could not communicate with our office for any reason please go to the nearest emergency department      "

## 2020-07-14 NOTE — OP NOTE
Lumbar Interlaminar Epidural Steroid Injection under Fluoroscopic Guidance.   Time-out taken to identify patient and procedure prior to starting the procedure.     07/14/2020    PROCEDURE: Interlaminar epidural steroid injection under fluoroscopic guidance.     Pre-Op diagnosis: DDD (degenerative disc disease), lumbar [M51.36]    Post-Op diagnosis: DDD (degenerative disc disease), lumbar [M51.36]    PHYSICIAN: Nirav Ramon MD    ASSISTANTS: None     SEDATION: Conscious sedation provided by M.D    The patient was monitored with continuous pulse oximetry, EKG, and intermittent blood pressure monitors.  The patient was hemodynamically stable throughout the entire process was responsive to voice, and breathing spontaneously.  Supplemental O2 was provided at 2L/min via nasal cannula.  Patient was comfortable for the duration of the procedure. (See nurse documentation and case log for sedation time)    There was a total of 2mg IV Midazolam and 25mcg Fentanyl titrated for the procedure      ESTIMATED BLOOD LOSS: none.     COMPLICATIONS: none.     SPECIMENS: none    TECHNIQUE: With the patient laying in a prone position, the area was prepped and draped in the usual sterile fashion using ChloraPrep and a fenestrated drape. 1% lidocaine was given using a 27-gauge needle by raising a wheal and going down to the hub of the needle over the L5/S1 interlaminar space.  The interlaminar space was then approached with a 5 inch 18-gauge Touhy needle was introduced under fluoroscopic guidance in the AP and Lateral view. Once the Ligamentum flavum was encountered loss of resistance to saline was used to enter the epidural space. With positive loss of resistance and negative CSF or Blood, 3mL contrast dye Omnipaque (300mg/ml) was injected to confirm placement and there was no vascular runoff. Then 1ml 40mg/ml Depomedrol + 1mL 0.25% Bupivicaine + 8mL preservative free normal saline was injected slowly. Displacement of the radio  opaque contrast after injection of the medication confirmed that the medication went into the area of the epidural space.  The patient tolerated the procedure well.       The patient was monitored after the procedure.   They were given post-procedure and discharge instructions to follow at home.  The patient was discharged in a stable condition.

## 2020-07-14 NOTE — DISCHARGE INSTRUCTIONS

## 2020-07-14 NOTE — PLAN OF CARE
Pt verbalized understanding of discharge instructions. Bandaid x 1 to lower back c/d/i. Patient voiced no complaints, with no further questions at this time. Patient stood at side of bed, walked steps with no new motor deficits. No HA or nausea. VSS on RA. Recovery care complete.

## 2020-07-14 NOTE — INTERVAL H&P NOTE
The patient has been examined and the H&P has been reviewed:    I concur with the findings and no changes have occurred since H&P was written.    Anesthesia/Surgery risks, benefits and alternative options discussed and understood by patient/family.          Active Hospital Problems    Diagnosis  POA    Lumbar radiculopathy [M54.16]  Yes      Resolved Hospital Problems   No resolved problems to display.      General

## 2020-07-20 ENCOUNTER — PATIENT MESSAGE (OUTPATIENT)
Dept: FAMILY MEDICINE | Facility: CLINIC | Age: 60
End: 2020-07-20

## 2020-07-20 DIAGNOSIS — Z79.4 TYPE 2 DIABETES MELLITUS WITH DIABETIC POLYNEUROPATHY, WITH LONG-TERM CURRENT USE OF INSULIN: Primary | ICD-10-CM

## 2020-07-20 DIAGNOSIS — E11.42 TYPE 2 DIABETES MELLITUS WITH DIABETIC POLYNEUROPATHY, WITH LONG-TERM CURRENT USE OF INSULIN: Primary | ICD-10-CM

## 2020-07-21 NOTE — TELEPHONE ENCOUNTER
I'm not sure what to do about this. We have sent this medication multiple times. Last Rx was sent to [facility] on 7/7/20 for a 3 month supply of humalog. E-receipt is on chart

## 2020-07-26 ENCOUNTER — PATIENT MESSAGE (OUTPATIENT)
Dept: PAIN MEDICINE | Facility: CLINIC | Age: 60
End: 2020-07-26

## 2020-07-28 DIAGNOSIS — M54.16 LUMBAR RADICULOPATHY: Primary | ICD-10-CM

## 2020-07-28 RX ORDER — INSULIN LISPRO 100 [IU]/ML
40 INJECTION, SOLUTION INTRAVENOUS; SUBCUTANEOUS
Qty: 108 ML | Refills: 3 | Status: SHIPPED | OUTPATIENT
Start: 2020-07-28 | End: 2020-07-30 | Stop reason: ALTCHOICE

## 2020-07-29 ENCOUNTER — TELEPHONE (OUTPATIENT)
Dept: FAMILY MEDICINE | Facility: CLINIC | Age: 60
End: 2020-07-29

## 2020-07-29 NOTE — TELEPHONE ENCOUNTER
Spoke with [facility] stated that they could change rx to Novalog w/o PA. Stated that they weren't covering Humalog.

## 2020-07-30 ENCOUNTER — TELEPHONE (OUTPATIENT)
Dept: FAMILY MEDICINE | Facility: CLINIC | Age: 60
End: 2020-07-30

## 2020-07-30 RX ORDER — INSULIN ASPART 100 [IU]/ML
40 INJECTION, SOLUTION INTRAVENOUS; SUBCUTANEOUS
Qty: 100 ML | Refills: 3 | Status: SHIPPED | OUTPATIENT
Start: 2020-07-30 | End: 2020-08-31 | Stop reason: ALTCHOICE

## 2020-07-30 NOTE — TELEPHONE ENCOUNTER
I have signed for the following orders AND/OR meds.  Please call the patient and ask the patient to schedule the testing AND/OR inform about any medications that were sent. Medications have been sent to pharmacy listed below      No orders of the defined types were placed in this encounter.      Medications Ordered This Encounter   Medications    insulin aspart U-100 (NOVOLOG FLEXPEN U-100 INSULIN) 100 unit/mL (3 mL) InPn pen     Sig: Inject 40 Units into the skin 3 (three) times daily with meals.     Dispense:  100 mL     Refill:  3         [facility] - [place], [place] - [facility] [address]  [address]  [address]  [facility] [address]  Phone: [phone] Fax: [phone]    [id number] [place] - [address] [facility] [address]  [facility]  [place] [address]  Phone: [phone] Fax: [phone]

## 2020-07-30 NOTE — TELEPHONE ENCOUNTER
Spoke with pt regarding ins not covering Humalog flex pen. Pt was informed that ins would approve Novalog flex pen instead, pt agreed to switch to Novalog.

## 2020-08-17 ENCOUNTER — PATIENT MESSAGE (OUTPATIENT)
Dept: FAMILY MEDICINE | Facility: CLINIC | Age: 60
End: 2020-08-17

## 2020-08-31 ENCOUNTER — TELEPHONE (OUTPATIENT)
Dept: FAMILY MEDICINE | Facility: CLINIC | Age: 60
End: 2020-08-31

## 2020-08-31 DIAGNOSIS — E11.42 TYPE 2 DIABETES MELLITUS WITH DIABETIC POLYNEUROPATHY, WITH LONG-TERM CURRENT USE OF INSULIN: Primary | ICD-10-CM

## 2020-08-31 DIAGNOSIS — Z79.4 TYPE 2 DIABETES MELLITUS WITH DIABETIC POLYNEUROPATHY, WITH LONG-TERM CURRENT USE OF INSULIN: Primary | ICD-10-CM

## 2020-08-31 RX ORDER — INSULIN LISPRO 100 [IU]/ML
40 INJECTION, SOLUTION INTRAVENOUS; SUBCUTANEOUS
Qty: 36 ML | Refills: 11 | Status: SHIPPED | OUTPATIENT
Start: 2020-08-31 | End: 2020-09-16 | Stop reason: SDUPTHER

## 2020-08-31 NOTE — TELEPHONE ENCOUNTER
Spoke with pharmacy.  Pharmacy stated that insurance would cover 30 day supply of Humalog.  This prescription has been sent to [facility] mail pharmacy.    I have signed for the following orders AND/OR meds.  Please call the patient and ask the patient to schedule the testing AND/OR inform about any medications that were sent. Medications have been sent to pharmacy listed below      No orders of the defined types were placed in this encounter.      Medications Ordered This Encounter   Medications    insulin lispro (HUMALOG KWIKPEN INSULIN) 100 unit/mL pen     Sig: Inject 40 Units into the skin 3 (three) times daily before meals.     Dispense:  36 mL     Refill:  11         [facility] - [place], [place] - [facility]  [address]  [address]  [facility] [address]  Phone: [phone] Fax: [phone]    [id number] - [place] - [address] [address] [facility] [address]  [address] [place] [address]  Phone: [phone] Fax: [phone]

## 2020-09-04 ENCOUNTER — TELEPHONE (OUTPATIENT)
Dept: FAMILY MEDICINE | Facility: CLINIC | Age: 60
End: 2020-09-04

## 2020-09-04 NOTE — TELEPHONE ENCOUNTER
Spoke with [facility], the pharmacy technician confirmed that the 90 day prescription for the Humalog Rx will be filled without the need for prior authorization.

## 2020-09-04 NOTE — TELEPHONE ENCOUNTER
----- Message from Chica Larios sent at 9/4/2020  4:05 PM CDT -----  Damien, with [facility],   regarding prior authorization for pt's Humalog medication.  Please call back at [phone] Md

## 2020-09-11 ENCOUNTER — PATIENT MESSAGE (OUTPATIENT)
Dept: FAMILY MEDICINE | Facility: CLINIC | Age: 60
End: 2020-09-11

## 2020-09-11 DIAGNOSIS — E11.42 TYPE 2 DIABETES MELLITUS WITH DIABETIC POLYNEUROPATHY, WITH LONG-TERM CURRENT USE OF INSULIN: ICD-10-CM

## 2020-09-11 DIAGNOSIS — Z79.4 TYPE 2 DIABETES MELLITUS WITH DIABETIC POLYNEUROPATHY, WITH LONG-TERM CURRENT USE OF INSULIN: ICD-10-CM

## 2020-09-16 ENCOUNTER — PATIENT MESSAGE (OUTPATIENT)
Dept: FAMILY MEDICINE | Facility: CLINIC | Age: 60
End: 2020-09-16

## 2020-09-16 RX ORDER — INSULIN LISPRO 100 [IU]/ML
40 INJECTION, SOLUTION INTRAVENOUS; SUBCUTANEOUS
Qty: 90 ML | Refills: 3 | Status: SHIPPED | OUTPATIENT
Start: 2020-09-16 | End: 2020-09-16

## 2020-09-16 RX ORDER — INSULIN LISPRO 100 [IU]/ML
40 INJECTION, SOLUTION INTRAVENOUS; SUBCUTANEOUS
Qty: 90 ML | Refills: 3 | Status: SHIPPED | OUTPATIENT
Start: 2020-09-16 | End: 2021-04-04 | Stop reason: ALTCHOICE

## 2020-09-16 NOTE — TELEPHONE ENCOUNTER
I have signed for the following orders AND/OR meds.  Please call the patient and ask the patient to schedule the testing AND/OR inform about any medications that were sent. Medications have been sent to pharmacy listed below      No orders of the defined types were placed in this encounter.      Medications Ordered This Encounter   Medications    insulin lispro (HUMALOG KWIKPEN INSULIN) 100 unit/mL pen     Sig: Inject 40 Units into the skin 3 (three) times daily before meals.     Dispense:  90 mL     Refill:  3         [facility] - [place], [place] - [facility]  [address]  [address]  [facility] [address]  Phone: [phone] Fax: [phone]    [id number] - [place] [facility] [address] [facility] [address]  [address] [place] [address]  Phone: [phone] Fax: [phone]

## 2020-10-06 ENCOUNTER — PATIENT MESSAGE (OUTPATIENT)
Dept: ADMINISTRATIVE | Facility: HOSPITAL | Age: 60
End: 2020-10-06

## 2020-10-11 ENCOUNTER — PATIENT MESSAGE (OUTPATIENT)
Dept: PAIN MEDICINE | Facility: CLINIC | Age: 60
End: 2020-10-11

## 2020-10-13 ENCOUNTER — PATIENT MESSAGE (OUTPATIENT)
Dept: PAIN MEDICINE | Facility: HOSPITAL | Age: 60
End: 2020-10-13

## 2020-10-13 NOTE — PRE-PROCEDURE INSTRUCTIONS
Spoke with patient regarding procedure scheduled on 10/20    Arrival time 0600    Has patient been sick with fever or on antibiotics within the last 7 days? No    Has patient received a vaccination within the last 7 days? no    Has the patient stopped all medications as directed? Na. Hold blood thinners, vitamins, supplements and other NSAIDS. Hold insulin, metformin and januvia am of procedure.    Does patient have a pacemaker and or defibrillator? no    Does the patient have a ride to and from procedure and someone reliable to remain with patient? Wife Juanita    Is the patient diabetic? yes    Does the patient have sleep apnea? Or use O2 at home? No and no     Is the patient receiving sedation? yes    Is the patient instructed to remain NPO beginning at midnight the night before their procedure? yes    Procedure location confirmed with patient? Yes    Covid- Denies signs/symptoms. Instructed to notify PAT/MD if any changes.

## 2020-10-14 ENCOUNTER — PATIENT MESSAGE (OUTPATIENT)
Dept: FAMILY MEDICINE | Facility: CLINIC | Age: 60
End: 2020-10-14

## 2020-10-19 ENCOUNTER — OFFICE VISIT (OUTPATIENT)
Dept: FAMILY MEDICINE | Facility: CLINIC | Age: 60
End: 2020-10-19
Payer: COMMERCIAL

## 2020-10-19 ENCOUNTER — LAB VISIT (OUTPATIENT)
Dept: LAB | Facility: HOSPITAL | Age: 60
End: 2020-10-19
Attending: INTERNAL MEDICINE
Payer: COMMERCIAL

## 2020-10-19 ENCOUNTER — TELEPHONE (OUTPATIENT)
Dept: PULMONOLOGY | Facility: CLINIC | Age: 60
End: 2020-10-19

## 2020-10-19 VITALS
DIASTOLIC BLOOD PRESSURE: 76 MMHG | WEIGHT: 315 LBS | HEIGHT: 73 IN | BODY MASS INDEX: 41.75 KG/M2 | HEART RATE: 75 BPM | SYSTOLIC BLOOD PRESSURE: 144 MMHG | TEMPERATURE: 98 F

## 2020-10-19 DIAGNOSIS — Z79.4 TYPE 2 DIABETES MELLITUS WITH DIABETIC POLYNEUROPATHY, WITH LONG-TERM CURRENT USE OF INSULIN: ICD-10-CM

## 2020-10-19 DIAGNOSIS — G62.9 NEUROPATHY: ICD-10-CM

## 2020-10-19 DIAGNOSIS — I10 ESSENTIAL HYPERTENSION: Primary | ICD-10-CM

## 2020-10-19 DIAGNOSIS — E11.42 TYPE 2 DIABETES MELLITUS WITH DIABETIC POLYNEUROPATHY, WITH LONG-TERM CURRENT USE OF INSULIN: ICD-10-CM

## 2020-10-19 DIAGNOSIS — R40.0 DAYTIME SOMNOLENCE: ICD-10-CM

## 2020-10-19 DIAGNOSIS — F51.01 PRIMARY INSOMNIA: ICD-10-CM

## 2020-10-19 LAB
ESTIMATED AVG GLUCOSE: 203 MG/DL (ref 68–131)
HBA1C MFR BLD HPLC: 8.7 % (ref 4–5.6)

## 2020-10-19 PROCEDURE — 99213 PR OFFICE/OUTPT VISIT, EST, LEVL III, 20-29 MIN: ICD-10-PCS | Mod: S$GLB,,, | Performed by: INTERNAL MEDICINE

## 2020-10-19 PROCEDURE — 3051F HG A1C>EQUAL 7.0%<8.0%: CPT | Mod: CPTII,S$GLB,, | Performed by: INTERNAL MEDICINE

## 2020-10-19 PROCEDURE — 3008F BODY MASS INDEX DOCD: CPT | Mod: CPTII,S$GLB,, | Performed by: INTERNAL MEDICINE

## 2020-10-19 PROCEDURE — 3051F PR MOST RECENT HEMOGLOBIN A1C LEVEL 7.0 - < 8.0%: ICD-10-PCS | Mod: CPTII,S$GLB,, | Performed by: INTERNAL MEDICINE

## 2020-10-19 PROCEDURE — 3077F SYST BP >= 140 MM HG: CPT | Mod: CPTII,S$GLB,, | Performed by: INTERNAL MEDICINE

## 2020-10-19 PROCEDURE — 3078F DIAST BP <80 MM HG: CPT | Mod: CPTII,S$GLB,, | Performed by: INTERNAL MEDICINE

## 2020-10-19 PROCEDURE — 36415 COLL VENOUS BLD VENIPUNCTURE: CPT | Mod: PO

## 2020-10-19 PROCEDURE — 99999 PR PBB SHADOW E&M-EST. PATIENT-LVL IV: CPT | Mod: PBBFAC,,, | Performed by: INTERNAL MEDICINE

## 2020-10-19 PROCEDURE — 99213 OFFICE O/P EST LOW 20 MIN: CPT | Mod: S$GLB,,, | Performed by: INTERNAL MEDICINE

## 2020-10-19 PROCEDURE — 3077F PR MOST RECENT SYSTOLIC BLOOD PRESSURE >= 140 MM HG: ICD-10-PCS | Mod: CPTII,S$GLB,, | Performed by: INTERNAL MEDICINE

## 2020-10-19 PROCEDURE — 83036 HEMOGLOBIN GLYCOSYLATED A1C: CPT

## 2020-10-19 PROCEDURE — 3078F PR MOST RECENT DIASTOLIC BLOOD PRESSURE < 80 MM HG: ICD-10-PCS | Mod: CPTII,S$GLB,, | Performed by: INTERNAL MEDICINE

## 2020-10-19 PROCEDURE — 99999 PR PBB SHADOW E&M-EST. PATIENT-LVL IV: ICD-10-PCS | Mod: PBBFAC,,, | Performed by: INTERNAL MEDICINE

## 2020-10-19 PROCEDURE — 3008F PR BODY MASS INDEX (BMI) DOCUMENTED: ICD-10-PCS | Mod: CPTII,S$GLB,, | Performed by: INTERNAL MEDICINE

## 2020-10-19 RX ORDER — GABAPENTIN 300 MG/1
900 CAPSULE ORAL 3 TIMES DAILY
Qty: 270 CAPSULE | Refills: 11 | Status: SHIPPED | OUTPATIENT
Start: 2020-10-19 | End: 2021-10-01

## 2020-10-19 RX ORDER — AMLODIPINE BESYLATE 2.5 MG/1
2.5 TABLET ORAL DAILY
Qty: 90 TABLET | Refills: 3 | Status: SHIPPED | OUTPATIENT
Start: 2020-10-19 | End: 2021-07-26

## 2020-10-19 NOTE — ASSESSMENT & PLAN NOTE
-at goal today  -currently on enalapril 20 mg BID  -plan to add amlodipine 2.5 mg QD  -continue lifestyle modification with low sodium diet and exercise   -discussed hypertension disease course and importance of treating high blood pressure  -patient understood and advised of risk of untreated blood pressure.  ER precautions were given   for symptoms of hypertensive urgency and emergency.

## 2020-10-19 NOTE — ASSESSMENT & PLAN NOTE
-condition is currently improving  -current meds:  Metformin 1000 mg BID, Lantus 70 units, Humalog 40 units AC, januvia 100 mg QD  -plan to recheck A1C. Continue current medications  -see diabetic health maintenance listed below  -counseling provided on importance of diabetic diet and medication compliance in order to treat diabetes  -discussed diabetes disease course and potential complications

## 2020-10-19 NOTE — PROGRESS NOTES
Assessment/Plan:    Essential hypertension  -at goal today  -currently on enalapril 20 mg BID  -plan to add amlodipine 2.5 mg QD  -continue lifestyle modification with low sodium diet and exercise   -discussed hypertension disease course and importance of treating high blood pressure  -patient understood and advised of risk of untreated blood pressure.  ER precautions were given   for symptoms of hypertensive urgency and emergency.     Type 2 diabetes mellitus with diabetic polyneuropathy, with long-term current use of insulin  -condition is currently improving  -current meds:  Metformin 1000 mg BID, Lantus 70 units, Humalog 40 units AC, januvia 100 mg QD  -plan to recheck A1C. Continue current medications  -see diabetic health maintenance listed below  -counseling provided on importance of diabetic diet and medication compliance in order to treat diabetes  -discussed diabetes disease course and potential complications    Primary insomnia  -chronic history of insomnia, currently on trazodone  -recent worsening of sleep difficulties  -shift worker  -also having daytime somnolence  -history of snoring  -will order sleep study to check for YELENA    Neuropathy  -hx of DDD and diabetic neuropathy  -currently on gabapentin 600 mg TID but still having symptoms  -has tried lyrica and cymbalta in past with no relief  -plan to increase gabapentin to 900 mg TID  -follow up with pain management for treatment of DDD    _____________________________________________________________________________________________________________________________________________________    Orders this visit:    Essential hypertension  -     amLODIPine (NORVASC) 2.5 MG tablet; Take 1 tablet (2.5 mg total) by mouth once daily.  Dispense: 90 tablet; Refill: 3    Neuropathy  -     gabapentin (NEURONTIN) 300 MG capsule; Take 3 capsules (900 mg total) by mouth 3 (three) times daily.  Dispense: 270 capsule; Refill: 11    Type 2 diabetes mellitus with  diabetic polyneuropathy, with long-term current use of insulin    Primary insomnia    Daytime somnolence  -     Home Sleep Studies; Future      Follow up in about 3 months (around 1/19/2021).    Anne Wright MD  _____________________________________________________________________________________________________________________________________________________    HPI:    Patient is in clinic today as an established patient here follow up of chronic medical conditions.    HTN: The patient has a diagnosis of hypertension and the blood pressure has been high on recent checks.  The patient has been compliant on the medicine listed below and has not had problems with side effects.  The patient has had no headache, vision changes, chest pain, shortness of breath, dizziness, palpitations.  Denies any identifiable exacerbating or relieving factors.    DDD/neuropathy:  Continues to have symptoms of lower back pain and peripheral neuropathy.  Patient currently being followed by pain management.  Has received WHITLEY of lumbar spine earlier this year with some relief, however nonsustained.  Plan for repeat injection tomorrow.  Continues to report severe burning of bilateral ft and sometimes hands.  History of diabetic neuropathy.  Currently on gabapentin 600 mg t.i.d..  Does have some improvement of pain with this, however symptoms have persisted.  Has tried Lyrica in the past, as well as Cymbalta.  He states that he did not have any relief with these medications that he stop.    DM2: Patient presents for follow up of diabetes. Condition is chronic and stable. Patient denies symptoms, including foot ulcerations, hyperglycemia, hypoglycemia , nausea, paresthesia of the feet, polydipsia, polyuria and visual disturbances.  Evaluation to date has been included: fasting blood sugar, fasting lipid panel, hemoglobin A1C and microalbuminuria.  Home fasting sugars: 130-140. Treatment to date:  Metformin, januvia, lantus/lispro. Denies  adverse effects of medications.     Diabetes Management Status    Statin: Taking  ACE/ARB: Taking    Screening or Prevention Patient's value Goal Complete/Controlled?   HgA1C Testing and Control   Lab Results   Component Value Date    HGBA1C 7.3 (H) 06/10/2020      Annually/Less than 8% Yes   Lipid profile : 06/10/2020 Annually Yes   LDL control Lab Results   Component Value Date    LDLCALC 89.2 06/10/2020    Annually/Less than 100 mg/dl  Yes   Nephropathy screening Lab Results   Component Value Date    LABMICR 130.0 06/10/2020     Lab Results   Component Value Date    PROTEINUA Trace (A) 01/10/2018    Annually Yes   Blood pressure BP Readings from Last 1 Encounters:   10/19/20 (!) 144/76    Less than 140/90 No   Dilated retinal exam : 08/26/2019 Annually No   Foot exam   : 01/14/2020 Annually Yes     No other complaints today.  Routine health maintenance reviewed.  Due for diabetic eye exam.    Past Medical History:  Past Medical History:   Diagnosis Date    Cellulitis of left index finger 2/16/2015    Charcot's joint of foot, left 08/2018    Dyslipidemia     Essential hypertension 2/16/2017    Group B streptococcal infection 1/15/2018    Hypotestosteronemia 7/7/2017    Infected finger joint 2/17/2015    Infected skin ulcer limited to breakdown of skin 1/13/2018    MSSA (methicillin susceptible Staphylococcus aureus) 1/13/2018    Obese     S/P knee surgery, medial/lateral menisectomy 11/4/2013    Type 2 diabetes mellitus with diabetic polyneuropathy, with long-term current use of insulin 2003     Past Surgical History:   Procedure Laterality Date    ELBOW SURGERY      EPIDURAL STEROID INJECTION N/A 7/14/2020    Procedure: Lumbar L5/S1 IL WHITLEY;  Surgeon: Nirav Ramon MD;  Location[facility];  Service: Pain Management;  Laterality: N/A;    FOOT SURGERY Left     INJECTION OF ANESTHETIC AGENT INTO SACROILIAC JOINT Right 5/18/2020    Procedure: right Sacroiliac Joint Injection;  Surgeon: Nirav  "ALEXSANDRA Ramon MD;  Location: [facility];  Service: Pain Management;  Laterality: Right;    INJECTION OF JOINT Right 5/18/2020    Procedure: right GT bursa injection;  Surgeon: Nirav Ramon MD;  Location: [facility];  Service: Pain Management;  Laterality: Right;    KNEE CARTILAGE SURGERY  10/21/2013    right knee    KNEE SURGERY Right 02/17/2017    Left index finger surgery  03/2015    MIDFOOT ARTHRODESIS Left 8/27/2018    Procedure: FUSION, JOINT, MIDFOOT - FIRST METATARSOCUNEIFORM JOINT AND NAVICULOCUNEIFORM JOINT;  Surgeon: Inocente Smith DPM;  [facility];  Service: Podiatry;  Laterality: Left;    TOE AMPUTATION  03/2018    left great toe     Review of patient's allergies indicates:   Allergen Reactions    Victoza [liraglutide] Swelling     Social History     Tobacco Use    Smoking status: Never Smoker    Smokeless tobacco: Never Used   Substance Use Topics    Alcohol use: Yes     Alcohol/week: 1.0 standard drinks     Types: 1 Cans of beer per week     Comment: occasional    Drug use: No     Family History   Problem Relation Age of Onset    COPD Father      Current Outpatient Medications on File Prior to Visit   Medication Sig Dispense Refill    blood sugar diagnostic (BLOOD GLUCOSE TEST) Strp Check glucose 3 times per day before each meal 100 strip 11    blood-glucose meter Misc Use to check glucose three times daily 1 each 0    enalapril (VASOTEC) 20 MG tablet Take 1 tablet (20 mg total) by mouth 2 (two) times daily. 180 tablet 3    gemfibroziL (LOPID) 600 MG tablet Take 1 tablet (600 mg total) by mouth once daily. 90 tablet 3    insulin (LANTUS SOLOSTAR U-100 INSULIN) glargine 100 units/mL (3mL) SubQ pen Inject 70 Units into the skin every evening. Supply pen needles 63 mL 3    insulin lispro 100 unit/mL pen Inject 40 Units into the skin 3 (three) times daily before meals. 90 mL 3    insulin syringe-needle U-100 (INSULIN SYRINGE) 1 mL 29 gauge x 1/2" Syrg Inject 4 times a " "day 100 each 12    JANUVIA 100 mg Tab TAKE 1 TABLET(100 MG) BY MOUTH EVERY DAY 90 tablet 3    lancets Misc Use to check glucose three times daily 100 each 11    metFORMIN (GLUCOPHAGE-XR) 500 MG ER 24hr tablet TAKE 2 TABLETS (1,000 MG   TOTAL) TWO TIMES A DAY WITHMEALS 180 tablet 3    pantoprazole (PROTONIX) 40 MG tablet Take 1 tablet (40 mg total) by mouth once daily. 30 tablet 11    pen needle, diabetic (BD ULTRA-FINE WILDA PEN NEEDLE) 32 gauge x 5/32" Ndle INJECT FOUR TIMES DAILY 4 each 12    pravastatin (PRAVACHOL) 40 MG tablet Take 1 tablet (40 mg total) by mouth once daily. 90 tablet 3    safety needles 22 gauge x 1 1/2" Ndle To be used once a month for testosterone injections 50 each 6    syringe with needle 3 mL 25 gauge x 1" Syrg To be used with monthly testosterone injections 100 Syringe 4    traZODone (DESYREL) 150 MG tablet TAKE 1 TABLET BY MOUTH     NIGHTLY 90 tablet 3    [DISCONTINUED] gabapentin (NEURONTIN) 600 MG tablet Take 1 tablet (600 mg total) by mouth 3 (three) times daily. 270 tablet 3    [DISCONTINUED] diazePAM (VALIUM) 5 MG tablet Take 1 tablet (5 mg total) by mouth once. Take 30-60 minutes prior to procedure. for 1 dose 1 tablet 0     Current Facility-Administered Medications on File Prior to Visit   Medication Dose Route Frequency Provider Last Rate Last Dose    lactated ringers infusion   Intravenous Continuous Vadim Buchanan MD   Stopped at 08/27/18 0953    ondansetron injection 4 mg  4 mg Intravenous Once PRN Nirav Ramon MD           Review of Systems   Constitutional: Negative for chills, diaphoresis, fatigue and fever.   HENT: Negative for congestion, ear pain, postnasal drip, sinus pain and sore throat.    Eyes: Negative for pain and redness.   Respiratory: Negative for cough, chest tightness and shortness of breath.    Cardiovascular: Negative for chest pain and leg swelling.   Gastrointestinal: Negative for abdominal pain, constipation, diarrhea, nausea and " "vomiting.   Genitourinary: Negative for dysuria and hematuria.   Musculoskeletal: Positive for arthralgias and back pain. Negative for joint swelling.   Skin: Negative for rash.   Neurological: Negative for dizziness, syncope and headaches.       Vitals:    10/19/20 0737 10/19/20 0738   BP: (!) 154/81 (!) 144/76   Pulse: 75    Temp: 97.9 °F (36.6 °C)    TempSrc: Temporal    Weight: (!) 146.5 kg (323 lb)    Height: 6' 1" (1.854 m)        Wt Readings from Last 3 Encounters:   10/19/20 (!) 146.5 kg (323 lb)   07/14/20 (!) 146.7 kg (323 lb 6.6 oz)   07/08/20 (!) 145.2 kg (320 lb)       Physical Exam  Constitutional:       General: He is not in acute distress.     Appearance: Normal appearance. He is well-developed. He is obese.   HENT:      Head: Normocephalic and atraumatic.   Eyes:      Conjunctiva/sclera: Conjunctivae normal.   Neck:      Musculoskeletal: Normal range of motion and neck supple.   Cardiovascular:      Rate and Rhythm: Normal rate and regular rhythm.      Pulses: Normal pulses.      Heart sounds: Normal heart sounds. No murmur.   Pulmonary:      Effort: Pulmonary effort is normal. No respiratory distress.      Breath sounds: Normal breath sounds.   Abdominal:      General: Bowel sounds are normal. There is no distension.      Palpations: Abdomen is soft.      Tenderness: There is no abdominal tenderness.   Musculoskeletal: Normal range of motion.   Skin:     General: Skin is warm and dry.      Findings: No rash.   Neurological:      General: No focal deficit present.      Mental Status: He is alert and oriented to person, place, and time.         Health Maintenance   Topic Date Due    TETANUS VACCINE  05/23/1978    Eye Exam  08/26/2020    Hemoglobin A1c  12/10/2020    Foot Exam  01/14/2021    Lipid Panel  06/10/2021    Hepatitis C Screening  Completed    Pneumococcal Vaccine (Medium Risk)  Completed       "

## 2020-10-19 NOTE — ASSESSMENT & PLAN NOTE
-hx of DDD and diabetic neuropathy  -currently on gabapentin 600 mg TID but still having symptoms  -has tried lyrica and cymbalta in past with no relief  -plan to increase gabapentin to 900 mg TID  -follow up with pain management for treatment of DDD

## 2020-10-19 NOTE — ASSESSMENT & PLAN NOTE
-chronic history of insomnia, currently on trazodone  -recent worsening of sleep difficulties  -shift worker  -also having daytime somnolence  -history of snoring  -will order sleep study to check for YELENA

## 2020-10-20 ENCOUNTER — HOSPITAL ENCOUNTER (OUTPATIENT)
Facility: HOSPITAL | Age: 60
Discharge: HOME | End: 2020-10-20
Attending: PHYSICAL MEDICINE & REHABILITATION | Admitting: PHYSICAL MEDICINE & REHABILITATION
Payer: COMMERCIAL

## 2020-10-20 ENCOUNTER — PATIENT MESSAGE (OUTPATIENT)
Dept: FAMILY MEDICINE | Facility: CLINIC | Age: 60
End: 2020-10-20

## 2020-10-20 VITALS
OXYGEN SATURATION: 95 % | TEMPERATURE: 98 F | SYSTOLIC BLOOD PRESSURE: 139 MMHG | HEART RATE: 77 BPM | HEIGHT: 73 IN | RESPIRATION RATE: 18 BRPM | BODY MASS INDEX: 41.75 KG/M2 | WEIGHT: 315 LBS | DIASTOLIC BLOOD PRESSURE: 68 MMHG

## 2020-10-20 DIAGNOSIS — M54.16 LUMBAR RADICULOPATHY: ICD-10-CM

## 2020-10-20 LAB — POCT GLUCOSE: 176 MG/DL (ref 70–110)

## 2020-10-20 PROCEDURE — 62323 PR INJ LUMBAR/SACRAL, W/IMAGING GUIDANCE: ICD-10-PCS | Mod: ,,, | Performed by: PHYSICAL MEDICINE & REHABILITATION

## 2020-10-20 PROCEDURE — 25500020 PHARM REV CODE 255: Performed by: PHYSICAL MEDICINE & REHABILITATION

## 2020-10-20 PROCEDURE — 63600175 PHARM REV CODE 636 W HCPCS: Performed by: PHYSICAL MEDICINE & REHABILITATION

## 2020-10-20 PROCEDURE — 62323 NJX INTERLAMINAR LMBR/SAC: CPT | Performed by: PHYSICAL MEDICINE & REHABILITATION

## 2020-10-20 PROCEDURE — 62323 NJX INTERLAMINAR LMBR/SAC: CPT | Mod: ,,, | Performed by: PHYSICAL MEDICINE & REHABILITATION

## 2020-10-20 PROCEDURE — 82962 GLUCOSE BLOOD TEST: CPT | Performed by: PHYSICAL MEDICINE & REHABILITATION

## 2020-10-20 PROCEDURE — 25000003 PHARM REV CODE 250: Performed by: PHYSICAL MEDICINE & REHABILITATION

## 2020-10-20 RX ORDER — METHYLPREDNISOLONE ACETATE 40 MG/ML
INJECTION, SUSPENSION INTRA-ARTICULAR; INTRALESIONAL; INTRAMUSCULAR; SOFT TISSUE
Status: DISCONTINUED | OUTPATIENT
Start: 2020-10-20 | End: 2020-10-20 | Stop reason: HOSPADM

## 2020-10-20 RX ORDER — ONDANSETRON 2 MG/ML
4 INJECTION INTRAMUSCULAR; INTRAVENOUS ONCE AS NEEDED
Status: DISCONTINUED | OUTPATIENT
Start: 2020-10-20 | End: 2021-10-04

## 2020-10-20 RX ORDER — BUPIVACAINE HYDROCHLORIDE 2.5 MG/ML
INJECTION, SOLUTION EPIDURAL; INFILTRATION; INTRACAUDAL
Status: DISCONTINUED | OUTPATIENT
Start: 2020-10-20 | End: 2020-10-20 | Stop reason: HOSPADM

## 2020-10-20 NOTE — DISCHARGE SUMMARY
"Discharge Note  Short Stay      SUMMARY     Admit Date: 10/20/2020    Attending Physician: Nirav Ramon MD        Discharge Physician: Nirav Ramon MD        Discharge Date: 10/20/2020 7:19 AM    Procedure(s) (LRB):  Lumbar L5/S1 IL WHITLEY (N/A)    Final Diagnosis: Lumbar radiculopathy [M54.16]    Disposition: Home or self care    Patient Instructions:   Current Discharge Medication List      CONTINUE these medications which have NOT CHANGED    Details   amLODIPine (NORVASC) 2.5 MG tablet Take 1 tablet (2.5 mg total) by mouth once daily.  Qty: 90 tablet, Refills: 3    Comments: .  Associated Diagnoses: Essential hypertension      enalapril (VASOTEC) 20 MG tablet Take 1 tablet (20 mg total) by mouth 2 (two) times daily.  Qty: 180 tablet, Refills: 3    Associated Diagnoses: Essential hypertension      gemfibroziL (LOPID) 600 MG tablet Take 1 tablet (600 mg total) by mouth once daily.  Qty: 90 tablet, Refills: 3    Associated Diagnoses: Mixed hyperlipidemia      insulin (LANTUS SOLOSTAR U-100 INSULIN) glargine 100 units/mL (3mL) SubQ pen Inject 70 Units into the skin every evening. Supply pen needles  Qty: 63 mL, Refills: 3    Associated Diagnoses: Type 2 diabetes mellitus with diabetic polyneuropathy, with long-term current use of insulin      insulin lispro 100 unit/mL pen Inject 40 Units into the skin 3 (three) times daily before meals.  Qty: 90 mL, Refills: 3      insulin syringe-needle U-100 (INSULIN SYRINGE) 1 mL 29 gauge x 1/2" Syrg Inject 4 times a day  Qty: 100 each, Refills: 12      metFORMIN (GLUCOPHAGE-XR) 500 MG ER 24hr tablet TAKE 2 TABLETS (1,000 MG   TOTAL) TWO TIMES A DAY WITHMEALS  Qty: 180 tablet, Refills: 3    Associated Diagnoses: Type 2 diabetes mellitus with diabetic polyneuropathy, with long-term current use of insulin      pantoprazole (PROTONIX) 40 MG tablet Take 1 tablet (40 mg total) by mouth once daily.  Qty: 30 tablet, Refills: 11    Associated Diagnoses: Gastroesophageal reflux " "disease, esophagitis presence not specified      pravastatin (PRAVACHOL) 40 MG tablet Take 1 tablet (40 mg total) by mouth once daily.  Qty: 90 tablet, Refills: 3    Associated Diagnoses: Mixed hyperlipidemia      traZODone (DESYREL) 150 MG tablet TAKE 1 TABLET BY MOUTH     NIGHTLY  Qty: 90 tablet, Refills: 3    Associated Diagnoses: Insomnia, unspecified type      blood sugar diagnostic (BLOOD GLUCOSE TEST) Strp Check glucose 3 times per day before each meal  Qty: 100 strip, Refills: 11    Associated Diagnoses: Type 2 diabetes mellitus with diabetic polyneuropathy, with long-term current use of insulin      blood-glucose meter Misc Use to check glucose three times daily  Qty: 1 each, Refills: 0    Associated Diagnoses: Type 2 diabetes mellitus with diabetic polyneuropathy, with long-term current use of insulin      gabapentin (NEURONTIN) 300 MG capsule Take 3 capsules (900 mg total) by mouth 3 (three) times daily.  Qty: 270 capsule, Refills: 11    Associated Diagnoses: Neuropathy      JANUVIA 100 mg Tab TAKE 1 TABLET(100 MG) BY MOUTH EVERY DAY  Qty: 90 tablet, Refills: 3      lancets Misc Use to check glucose three times daily  Qty: 100 each, Refills: 11    Associated Diagnoses: Type 2 diabetes mellitus with diabetic polyneuropathy, with long-term current use of insulin      pen needle, diabetic (BD ULTRA-FINE WILDA PEN NEEDLE) 32 gauge x 5/32" Ndle INJECT FOUR TIMES DAILY  Qty: 4 each, Refills: 12      safety needles 22 gauge x 1 1/2" Ndle To be used once a month for testosterone injections  Qty: 50 each, Refills: 6      syringe with needle 3 mL 25 gauge x 1" Syrg To be used with monthly testosterone injections  Qty: 100 Syringe, Refills: 4                 Discharge Diagnosis: Lumbar radiculopathy [M54.16]  Condition on Discharge: Stable with no complications to procedure   Diet on Discharge: Same as before.  Activity: as per instruction sheet.  Discharge to: Home with a responsible adult.  Follow up: 2-4 weeks     "   Please call the office if you experience any weakness or loss of sensation, fever > 101.5, pain uncontrolled with oral medications, persistent nausea/vomiting/or diarrhea, redness or drainage from the incisions, or any other worrisome concerns. If physician on call was not reached or could not communicate with our office for any reason please go to the nearest emergency department

## 2020-10-20 NOTE — H&P
HPI  Patient presenting for Procedure(s) (LRB):  Lumbar L5/S1 IL WHITLEY (N/A)     Patient on Anti-coagulation No    No health changes since previous encounter    Past Medical History:   Diagnosis Date    Cellulitis of left index finger 2/16/2015    Charcot's joint of foot, left 08/2018    Dyslipidemia     Essential hypertension 2/16/2017    Group B streptococcal infection 1/15/2018    Hypotestosteronemia 7/7/2017    Infected finger joint 2/17/2015    Infected skin ulcer limited to breakdown of skin 1/13/2018    MSSA (methicillin susceptible Staphylococcus aureus) 1/13/2018    Obese     S/P knee surgery, medial/lateral menisectomy 11/4/2013    Type 2 diabetes mellitus with diabetic polyneuropathy, with long-term current use of insulin 2003     Past Surgical History:   Procedure Laterality Date    ELBOW SURGERY      EPIDURAL STEROID INJECTION N/A 7/14/2020    Procedure: Lumbar L5/S1 IL WHITLEY;  Surgeon: Nriav Ramon MD;  Location: [facility];  Service: Pain Management;  Laterality: N/A;    FOOT SURGERY Left     INJECTION OF ANESTHETIC AGENT INTO SACROILIAC JOINT Right 5/18/2020    Procedure: right Sacroiliac Joint Injection;  Surgeon: Nirav Ramon MD;  Location: [facility];  Service: Pain Management;  Laterality: Right;    INJECTION OF JOINT Right 5/18/2020    Procedure: right GT bursa injection;  Surgeon: Nirav Ramon MD;  Location: [facility];  Service: Pain Management;  Laterality: Right;    KNEE CARTILAGE SURGERY  10/21/2013    right knee    KNEE SURGERY Right 02/17/2017    Left index finger surgery  03/2015    MIDFOOT ARTHRODESIS Left 8/27/2018    Procedure: FUSION, JOINT, MIDFOOT - FIRST METATARSOCUNEIFORM JOINT AND NAVICULOCUNEIFORM JOINT;  Surgeon: Inocente Smith DPM;  Location: [facility] OR;  Service: Podiatry;  Laterality: Left;    TOE AMPUTATION  03/2018    left great toe     Review of patient's allergies indicates:   Allergen Reactions    Victoza [liraglutide]  "Swelling      No current facility-administered medications for this encounter.      Facility-Administered Medications Ordered in Other Encounters   Medication    lactated ringers infusion    ondansetron injection 4 mg       PMHx, PSHx, Allergies, Medications reviewed in epic    ROS negative except pain complaints in HPI    OBJECTIVE:    BP (!) 153/74 (BP Location: Right arm, Patient Position: Sitting)   Pulse 75   Temp 97.5 °F (36.4 °C) (Temporal)   Resp 18   Ht 6' 1" (1.854 m)   Wt (!) 144.1 kg (317 lb 10.9 oz)   SpO2 95%   BMI 41.91 kg/m²     PHYSICAL EXAMINATION:    GENERAL: Well appearing, in no acute distress, alert and oriented x3.  PSYCH:  Mood and affect appropriate.  SKIN: Skin color, texture, turgor normal, no rashes or lesions which will impact the procedure.  CV: RRR with palpation of the radial artery.  PULM: No evidence of respiratory difficulty, symmetric chest rise. Clear to auscultation.  NEURO: Cranial nerves grossly intact.    Plan:    Proceed with procedure as planned Procedure(s) (LRB):  Lumbar L5/S1 IL WHITLEY (N/A)    Nirav Ramon MD  10/20/2020            "

## 2020-10-20 NOTE — OP NOTE
Lumbar Interlaminar Epidural Steroid Injection under Fluoroscopic Guidance.   Time-out taken to identify patient and procedure prior to starting the procedure.     10/20/2020    PROCEDURE: Interlaminar epidural steroid injection under fluoroscopic guidance.     Pre-Op diagnosis: Lumbar radiculopathy [M54.16]    Post-Op diagnosis: Lumbar radiculopathy [M54.16]    PHYSICIAN: Nirav Ramon MD    ASSISTANTS: None     SEDATION: None    ESTIMATED BLOOD LOSS: none.     COMPLICATIONS: none.     SPECIMENS: none    TECHNIQUE: With the patient laying in a prone position, the area was prepped and draped in the usual sterile fashion using ChloraPrep and a fenestrated drape. 1% lidocaine was given using a 27-gauge needle by raising a wheal and going down to the hub of the needle over the L5/S1 interlaminar space.  The interlaminar space was then approached with a 3.5 inch 18-gauge Touhy needle was introduced under fluoroscopic guidance in the AP and Lateral view. Once the Ligamentum flavum was encountered loss of resistance to saline was used to enter the epidural space. With positive loss of resistance and negative CSF or Blood, 3mL contrast dye Omnipaque (300mg/ml) was injected to confirm placement and there was no vascular runoff. Then 1ml 40mg/ml Depomedrol + 1mL 0.25% Bupivicaine + 8mL preservative free normal saline was injected slowly. Displacement of the radio opaque contrast after injection of the medication confirmed that the medication went into the area of the epidural space.  The patient tolerated the procedure well.         The patient was monitored after the procedure.   They were given post-procedure and discharge instructions to follow at home.  The patient was discharged in a stable condition.

## 2020-10-20 NOTE — DISCHARGE INSTRUCTIONS

## 2020-10-21 ENCOUNTER — PATIENT MESSAGE (OUTPATIENT)
Dept: FAMILY MEDICINE | Facility: CLINIC | Age: 60
End: 2020-10-21

## 2020-10-22 ENCOUNTER — PATIENT MESSAGE (OUTPATIENT)
Dept: PAIN MEDICINE | Facility: CLINIC | Age: 60
End: 2020-10-22

## 2020-10-29 ENCOUNTER — PATIENT MESSAGE (OUTPATIENT)
Dept: PAIN MEDICINE | Facility: CLINIC | Age: 60
End: 2020-10-29

## 2020-11-09 ENCOUNTER — PATIENT MESSAGE (OUTPATIENT)
Dept: FAMILY MEDICINE | Facility: CLINIC | Age: 60
End: 2020-11-09

## 2020-11-11 ENCOUNTER — PATIENT MESSAGE (OUTPATIENT)
Dept: FAMILY MEDICINE | Facility: CLINIC | Age: 60
End: 2020-11-11

## 2020-11-11 DIAGNOSIS — E11.42 TYPE 2 DIABETES MELLITUS WITH DIABETIC POLYNEUROPATHY, WITH LONG-TERM CURRENT USE OF INSULIN: Primary | ICD-10-CM

## 2020-11-11 DIAGNOSIS — Z79.4 TYPE 2 DIABETES MELLITUS WITH DIABETIC POLYNEUROPATHY, WITH LONG-TERM CURRENT USE OF INSULIN: Primary | ICD-10-CM

## 2020-11-11 RX ORDER — FLASH GLUCOSE SENSOR
KIT MISCELLANEOUS
Qty: 1 KIT | Refills: 11 | Status: SHIPPED | OUTPATIENT
Start: 2020-11-11 | End: 2021-07-26

## 2020-11-11 RX ORDER — FLASH GLUCOSE SCANNING READER
EACH MISCELLANEOUS
Qty: 1 EACH | Refills: 11 | Status: SHIPPED | OUTPATIENT
Start: 2020-11-11 | End: 2021-07-26

## 2020-11-11 NOTE — TELEPHONE ENCOUNTER
I have signed for the following orders AND/OR meds.  Please call the patient and ask the patient to schedule the testing AND/OR inform about any medications that were sent. Medications have been sent to pharmacy listed below      No orders of the defined types were placed in this encounter.      Medications Ordered This Encounter   Medications    flash glucose scanning reader (FREESTYLE TYLER 14 DAY READER) Misc     Sig: Use to check glucose 3 times daily     Dispense:  1 each     Refill:  11    flash glucose sensor (FREESTYLE TYLER 14 DAY SENSOR) Kit     Sig: Use to check glucose 3 times daily     Dispense:  1 kit     Refill:  11         [facility] - [place], [place] - [facility] [address]  [address]  [address]  [facility] [address]  Phone: [phone] Fax: [phone]    [id number] - [place] [facility] [address]  [address] [address]  Phone: [phone] Fax: [phone]

## 2020-11-13 ENCOUNTER — PATIENT MESSAGE (OUTPATIENT)
Dept: FAMILY MEDICINE | Facility: CLINIC | Age: 60
End: 2020-11-13

## 2020-11-18 ENCOUNTER — PATIENT OUTREACH (OUTPATIENT)
Dept: ADMINISTRATIVE | Facility: HOSPITAL | Age: 60
End: 2020-11-18

## 2020-11-18 NOTE — PROGRESS NOTES
HTN Report: Attempted to reach out to Pt about blood pressure & to schedule Nurse Visit. Pt did not answer left voicemail with callback number. Portal msg sent.

## 2020-12-21 ENCOUNTER — PATIENT MESSAGE (OUTPATIENT)
Dept: FAMILY MEDICINE | Facility: CLINIC | Age: 60
End: 2020-12-21

## 2020-12-30 ENCOUNTER — PATIENT MESSAGE (OUTPATIENT)
Dept: FAMILY MEDICINE | Facility: CLINIC | Age: 60
End: 2020-12-30

## 2021-01-25 ENCOUNTER — PATIENT MESSAGE (OUTPATIENT)
Dept: FAMILY MEDICINE | Facility: CLINIC | Age: 61
End: 2021-01-25

## 2021-01-29 ENCOUNTER — PATIENT MESSAGE (OUTPATIENT)
Dept: ADMINISTRATIVE | Facility: HOSPITAL | Age: 61
End: 2021-01-29

## 2021-02-07 ENCOUNTER — PATIENT MESSAGE (OUTPATIENT)
Dept: PAIN MEDICINE | Facility: CLINIC | Age: 61
End: 2021-02-07

## 2021-02-11 ENCOUNTER — PATIENT MESSAGE (OUTPATIENT)
Dept: PAIN MEDICINE | Facility: CLINIC | Age: 61
End: 2021-02-11

## 2021-02-12 ENCOUNTER — PATIENT MESSAGE (OUTPATIENT)
Dept: PAIN MEDICINE | Facility: CLINIC | Age: 61
End: 2021-02-12

## 2021-02-24 ENCOUNTER — PATIENT MESSAGE (OUTPATIENT)
Dept: PAIN MEDICINE | Facility: CLINIC | Age: 61
End: 2021-02-24

## 2021-03-05 ENCOUNTER — PATIENT MESSAGE (OUTPATIENT)
Dept: PAIN MEDICINE | Facility: CLINIC | Age: 61
End: 2021-03-05

## 2021-03-05 ENCOUNTER — PATIENT OUTREACH (OUTPATIENT)
Dept: ADMINISTRATIVE | Facility: HOSPITAL | Age: 61
End: 2021-03-05

## 2021-03-07 ENCOUNTER — PATIENT MESSAGE (OUTPATIENT)
Dept: FAMILY MEDICINE | Facility: CLINIC | Age: 61
End: 2021-03-07

## 2021-03-14 ENCOUNTER — PATIENT MESSAGE (OUTPATIENT)
Dept: FAMILY MEDICINE | Facility: CLINIC | Age: 61
End: 2021-03-14

## 2021-03-15 ENCOUNTER — PATIENT MESSAGE (OUTPATIENT)
Dept: FAMILY MEDICINE | Facility: CLINIC | Age: 61
End: 2021-03-15

## 2021-03-15 DIAGNOSIS — Z20.822 EXPOSURE TO COVID-19 VIRUS: Primary | ICD-10-CM

## 2021-03-16 ENCOUNTER — CLINICAL SUPPORT (OUTPATIENT)
Dept: FAMILY MEDICINE | Facility: CLINIC | Age: 61
End: 2021-03-16
Payer: COMMERCIAL

## 2021-03-16 ENCOUNTER — LAB VISIT (OUTPATIENT)
Dept: LAB | Facility: HOSPITAL | Age: 61
End: 2021-03-16
Attending: INTERNAL MEDICINE
Payer: COMMERCIAL

## 2021-03-16 ENCOUNTER — PATIENT MESSAGE (OUTPATIENT)
Dept: FAMILY MEDICINE | Facility: CLINIC | Age: 61
End: 2021-03-16

## 2021-03-16 DIAGNOSIS — Z79.4 TYPE 2 DIABETES MELLITUS WITH DIABETIC POLYNEUROPATHY, WITH LONG-TERM CURRENT USE OF INSULIN: ICD-10-CM

## 2021-03-16 DIAGNOSIS — E78.2 MIXED HYPERLIPIDEMIA: ICD-10-CM

## 2021-03-16 DIAGNOSIS — Z20.822 EXPOSURE TO COVID-19 VIRUS: ICD-10-CM

## 2021-03-16 DIAGNOSIS — I10 ESSENTIAL HYPERTENSION: ICD-10-CM

## 2021-03-16 DIAGNOSIS — E11.42 TYPE 2 DIABETES MELLITUS WITH DIABETIC POLYNEUROPATHY, WITH LONG-TERM CURRENT USE OF INSULIN: ICD-10-CM

## 2021-03-16 DIAGNOSIS — M79.10 MYALGIA: ICD-10-CM

## 2021-03-16 DIAGNOSIS — E11.8 DIABETIC FOOT: Primary | ICD-10-CM

## 2021-03-16 LAB
ALBUMIN SERPL BCP-MCNC: 3.8 G/DL (ref 3.5–5.2)
ALBUMIN SERPL BCP-MCNC: 3.8 G/DL (ref 3.5–5.2)
ALP SERPL-CCNC: 68 U/L (ref 55–135)
ALT SERPL W/O P-5'-P-CCNC: 24 U/L (ref 10–44)
ANION GAP SERPL CALC-SCNC: 13 MMOL/L (ref 8–16)
ANION GAP SERPL CALC-SCNC: 13 MMOL/L (ref 8–16)
AST SERPL-CCNC: 21 U/L (ref 10–40)
BILIRUB SERPL-MCNC: 0.7 MG/DL (ref 0.1–1)
BUN SERPL-MCNC: 16 MG/DL (ref 8–23)
BUN SERPL-MCNC: 16 MG/DL (ref 8–23)
CALCIUM SERPL-MCNC: 10 MG/DL (ref 8.7–10.5)
CALCIUM SERPL-MCNC: 10 MG/DL (ref 8.7–10.5)
CHLORIDE SERPL-SCNC: 98 MMOL/L (ref 95–110)
CHLORIDE SERPL-SCNC: 98 MMOL/L (ref 95–110)
CHOLEST SERPL-MCNC: 193 MG/DL (ref 120–199)
CHOLEST/HDLC SERPL: 5.5 {RATIO} (ref 2–5)
CO2 SERPL-SCNC: 25 MMOL/L (ref 23–29)
CO2 SERPL-SCNC: 25 MMOL/L (ref 23–29)
CREAT SERPL-MCNC: 1 MG/DL (ref 0.5–1.4)
CREAT SERPL-MCNC: 1 MG/DL (ref 0.5–1.4)
EST. GFR  (AFRICAN AMERICAN): >60 ML/MIN/1.73 M^2
EST. GFR  (AFRICAN AMERICAN): >60 ML/MIN/1.73 M^2
EST. GFR  (NON AFRICAN AMERICAN): >60 ML/MIN/1.73 M^2
EST. GFR  (NON AFRICAN AMERICAN): >60 ML/MIN/1.73 M^2
GLUCOSE SERPL-MCNC: 190 MG/DL (ref 70–110)
GLUCOSE SERPL-MCNC: 190 MG/DL (ref 70–110)
HDLC SERPL-MCNC: 35 MG/DL (ref 40–75)
HDLC SERPL: 18.1 % (ref 20–50)
LDLC SERPL CALC-MCNC: 81.8 MG/DL (ref 63–159)
NONHDLC SERPL-MCNC: 158 MG/DL
PHOSPHATE SERPL-MCNC: 4.2 MG/DL (ref 2.7–4.5)
POTASSIUM SERPL-SCNC: 4.6 MMOL/L (ref 3.5–5.1)
POTASSIUM SERPL-SCNC: 4.6 MMOL/L (ref 3.5–5.1)
PROT SERPL-MCNC: 7.6 G/DL (ref 6–8.4)
SODIUM SERPL-SCNC: 136 MMOL/L (ref 136–145)
SODIUM SERPL-SCNC: 136 MMOL/L (ref 136–145)
TRIGL SERPL-MCNC: 381 MG/DL (ref 30–150)

## 2021-03-16 PROCEDURE — 80061 LIPID PANEL: CPT | Performed by: INTERNAL MEDICINE

## 2021-03-16 PROCEDURE — U0005 INFEC AGEN DETEC AMPLI PROBE: HCPCS | Performed by: INTERNAL MEDICINE

## 2021-03-16 PROCEDURE — U0003 INFECTIOUS AGENT DETECTION BY NUCLEIC ACID (DNA OR RNA); SEVERE ACUTE RESPIRATORY SYNDROME CORONAVIRUS 2 (SARS-COV-2) (CORONAVIRUS DISEASE [COVID-19]), AMPLIFIED PROBE TECHNIQUE, MAKING USE OF HIGH THROUGHPUT TECHNOLOGIES AS DESCRIBED BY CMS-2020-01-R: HCPCS | Performed by: INTERNAL MEDICINE

## 2021-03-16 PROCEDURE — 83036 HEMOGLOBIN GLYCOSYLATED A1C: CPT | Performed by: INTERNAL MEDICINE

## 2021-03-16 PROCEDURE — 36415 COLL VENOUS BLD VENIPUNCTURE: CPT | Mod: PO | Performed by: INTERNAL MEDICINE

## 2021-03-16 PROCEDURE — 80053 COMPREHEN METABOLIC PANEL: CPT | Performed by: INTERNAL MEDICINE

## 2021-03-16 PROCEDURE — 84100 ASSAY OF PHOSPHORUS: CPT | Performed by: INTERNAL MEDICINE

## 2021-03-17 ENCOUNTER — PATIENT MESSAGE (OUTPATIENT)
Dept: PODIATRY | Facility: CLINIC | Age: 61
End: 2021-03-17

## 2021-03-17 ENCOUNTER — PATIENT MESSAGE (OUTPATIENT)
Dept: FAMILY MEDICINE | Facility: CLINIC | Age: 61
End: 2021-03-17

## 2021-03-17 ENCOUNTER — TELEPHONE (OUTPATIENT)
Dept: FAMILY MEDICINE | Facility: CLINIC | Age: 61
End: 2021-03-17

## 2021-03-17 ENCOUNTER — OFFICE VISIT (OUTPATIENT)
Dept: PODIATRY | Facility: CLINIC | Age: 61
End: 2021-03-17
Payer: COMMERCIAL

## 2021-03-17 VITALS — HEIGHT: 73 IN | BODY MASS INDEX: 41.75 KG/M2 | WEIGHT: 315 LBS

## 2021-03-17 DIAGNOSIS — Z89.422 HISTORY OF AMPUTATION OF LESSER TOE, LEFT: ICD-10-CM

## 2021-03-17 DIAGNOSIS — E11.42 DIABETIC POLYNEUROPATHY ASSOCIATED WITH TYPE 2 DIABETES MELLITUS: ICD-10-CM

## 2021-03-17 DIAGNOSIS — E11.8 DIABETIC FOOT: ICD-10-CM

## 2021-03-17 DIAGNOSIS — E11.49 TYPE II DIABETES MELLITUS WITH NEUROLOGICAL MANIFESTATIONS: ICD-10-CM

## 2021-03-17 DIAGNOSIS — L97.522 FOOT ULCER WITH FAT LAYER EXPOSED, LEFT: Primary | ICD-10-CM

## 2021-03-17 DIAGNOSIS — M14.672 CHARCOT'S JOINT OF LEFT FOOT: ICD-10-CM

## 2021-03-17 LAB
ESTIMATED AVG GLUCOSE: 214 MG/DL (ref 68–131)
HBA1C MFR BLD: 9.1 % (ref 4–5.6)

## 2021-03-17 PROCEDURE — 3046F PR MOST RECENT HEMOGLOBIN A1C LEVEL > 9.0%: ICD-10-PCS | Mod: CPTII,S$GLB,, | Performed by: PODIATRIST

## 2021-03-17 PROCEDURE — 99999 PR PBB SHADOW E&M-EST. PATIENT-LVL V: CPT | Mod: PBBFAC,,, | Performed by: PODIATRIST

## 2021-03-17 PROCEDURE — 3008F BODY MASS INDEX DOCD: CPT | Mod: CPTII,S$GLB,, | Performed by: PODIATRIST

## 2021-03-17 PROCEDURE — 99999 PR PBB SHADOW E&M-EST. PATIENT-LVL V: ICD-10-PCS | Mod: PBBFAC,,, | Performed by: PODIATRIST

## 2021-03-17 PROCEDURE — 99213 OFFICE O/P EST LOW 20 MIN: CPT | Mod: 25,S$GLB,, | Performed by: PODIATRIST

## 2021-03-17 PROCEDURE — 3008F PR BODY MASS INDEX (BMI) DOCUMENTED: ICD-10-PCS | Mod: CPTII,S$GLB,, | Performed by: PODIATRIST

## 2021-03-17 PROCEDURE — 99213 PR OFFICE/OUTPT VISIT, EST, LEVL III, 20-29 MIN: ICD-10-PCS | Mod: 25,S$GLB,, | Performed by: PODIATRIST

## 2021-03-17 PROCEDURE — 11042 DBRDMT SUBQ TIS 1ST 20SQCM/<: CPT | Mod: LT,S$GLB,, | Performed by: PODIATRIST

## 2021-03-17 PROCEDURE — 11042 WOUND DEBRIDEMENT: ICD-10-PCS | Mod: LT,S$GLB,, | Performed by: PODIATRIST

## 2021-03-17 PROCEDURE — 1125F AMNT PAIN NOTED PAIN PRSNT: CPT | Mod: S$GLB,,, | Performed by: PODIATRIST

## 2021-03-17 PROCEDURE — 1125F PR PAIN SEVERITY QUANTIFIED, PAIN PRESENT: ICD-10-PCS | Mod: S$GLB,,, | Performed by: PODIATRIST

## 2021-03-17 PROCEDURE — 3046F HEMOGLOBIN A1C LEVEL >9.0%: CPT | Mod: CPTII,S$GLB,, | Performed by: PODIATRIST

## 2021-03-17 RX ORDER — INSULIN ASPART 100 [IU]/ML
INJECTION, SOLUTION INTRAVENOUS; SUBCUTANEOUS
COMMUNITY
Start: 2021-02-27 | End: 2021-04-04

## 2021-03-17 RX ORDER — DOXYCYCLINE 100 MG/1
100 CAPSULE ORAL 2 TIMES DAILY
Qty: 14 CAPSULE | Refills: 0 | Status: SHIPPED | OUTPATIENT
Start: 2021-03-17 | End: 2021-03-17

## 2021-03-17 RX ORDER — DOXYCYCLINE 100 MG/1
100 CAPSULE ORAL 2 TIMES DAILY
Qty: 14 CAPSULE | Refills: 0 | Status: SHIPPED | OUTPATIENT
Start: 2021-03-17 | End: 2021-03-24

## 2021-03-18 LAB — SARS-COV-2 RNA RESP QL NAA+PROBE: NOT DETECTED

## 2021-03-22 ENCOUNTER — PATIENT OUTREACH (OUTPATIENT)
Dept: ADMINISTRATIVE | Facility: OTHER | Age: 61
End: 2021-03-22

## 2021-03-22 DIAGNOSIS — E78.1 HYPERTRIGLYCERIDEMIA: Primary | ICD-10-CM

## 2021-03-22 RX ORDER — ICOSAPENT ETHYL 1000 MG/1
2 CAPSULE ORAL 2 TIMES DAILY
Qty: 120 CAPSULE | Refills: 11 | Status: SHIPPED | OUTPATIENT
Start: 2021-03-22 | End: 2021-03-24 | Stop reason: CLARIF

## 2021-03-23 ENCOUNTER — LAB VISIT (OUTPATIENT)
Dept: LAB | Facility: HOSPITAL | Age: 61
End: 2021-03-23
Attending: INTERNAL MEDICINE
Payer: COMMERCIAL

## 2021-03-23 ENCOUNTER — OFFICE VISIT (OUTPATIENT)
Dept: PODIATRY | Facility: CLINIC | Age: 61
End: 2021-03-23
Payer: COMMERCIAL

## 2021-03-23 ENCOUNTER — PATIENT MESSAGE (OUTPATIENT)
Dept: FAMILY MEDICINE | Facility: CLINIC | Age: 61
End: 2021-03-23

## 2021-03-23 VITALS — BODY MASS INDEX: 41.75 KG/M2 | HEIGHT: 73 IN | WEIGHT: 315 LBS

## 2021-03-23 DIAGNOSIS — E11.8 DIABETIC FOOT: ICD-10-CM

## 2021-03-23 DIAGNOSIS — M14.672 CHARCOT'S JOINT OF LEFT FOOT: Primary | ICD-10-CM

## 2021-03-23 DIAGNOSIS — E11.42 DIABETIC POLYNEUROPATHY ASSOCIATED WITH TYPE 2 DIABETES MELLITUS: ICD-10-CM

## 2021-03-23 DIAGNOSIS — L97.522 FOOT ULCER WITH FAT LAYER EXPOSED, LEFT: ICD-10-CM

## 2021-03-23 DIAGNOSIS — E11.49 TYPE II DIABETES MELLITUS WITH NEUROLOGICAL MANIFESTATIONS: ICD-10-CM

## 2021-03-23 LAB
BASOPHILS # BLD AUTO: 0.08 K/UL (ref 0–0.2)
BASOPHILS NFR BLD: 1.1 % (ref 0–1.9)
CRP SERPL-MCNC: 7.6 MG/L (ref 0–8.2)
DIFFERENTIAL METHOD: ABNORMAL
EOSINOPHIL # BLD AUTO: 0.1 K/UL (ref 0–0.5)
EOSINOPHIL NFR BLD: 1.6 % (ref 0–8)
ERYTHROCYTE [DISTWIDTH] IN BLOOD BY AUTOMATED COUNT: 13 % (ref 11.5–14.5)
ERYTHROCYTE [SEDIMENTATION RATE] IN BLOOD BY WESTERGREN METHOD: 11 MM/HR (ref 0–10)
HCT VFR BLD AUTO: 49.1 % (ref 40–54)
HGB BLD-MCNC: 15.8 G/DL (ref 14–18)
IMM GRANULOCYTES # BLD AUTO: 0.12 K/UL (ref 0–0.04)
IMM GRANULOCYTES NFR BLD AUTO: 1.6 % (ref 0–0.5)
LYMPHOCYTES # BLD AUTO: 1.8 K/UL (ref 1–4.8)
LYMPHOCYTES NFR BLD: 23.8 % (ref 18–48)
MCH RBC QN AUTO: 29.6 PG (ref 27–31)
MCHC RBC AUTO-ENTMCNC: 32.2 G/DL (ref 32–36)
MCV RBC AUTO: 92 FL (ref 82–98)
MONOCYTES # BLD AUTO: 0.6 K/UL (ref 0.3–1)
MONOCYTES NFR BLD: 7.4 % (ref 4–15)
NEUTROPHILS # BLD AUTO: 4.8 K/UL (ref 1.8–7.7)
NEUTROPHILS NFR BLD: 64.5 % (ref 38–73)
NRBC BLD-RTO: 0 /100 WBC
PLATELET # BLD AUTO: 331 K/UL (ref 150–350)
PMV BLD AUTO: 9.9 FL (ref 9.2–12.9)
RBC # BLD AUTO: 5.33 M/UL (ref 4.6–6.2)
WBC # BLD AUTO: 7.39 K/UL (ref 3.9–12.7)

## 2021-03-23 PROCEDURE — 85025 COMPLETE CBC W/AUTO DIFF WBC: CPT | Performed by: INTERNAL MEDICINE

## 2021-03-23 PROCEDURE — 99499 NO LOS: ICD-10-PCS | Mod: S$GLB,,, | Performed by: PODIATRIST

## 2021-03-23 PROCEDURE — 1125F AMNT PAIN NOTED PAIN PRSNT: CPT | Mod: S$GLB,,, | Performed by: PODIATRIST

## 2021-03-23 PROCEDURE — 3008F PR BODY MASS INDEX (BMI) DOCUMENTED: ICD-10-PCS | Mod: CPTII,S$GLB,, | Performed by: PODIATRIST

## 2021-03-23 PROCEDURE — 99499 UNLISTED E&M SERVICE: CPT | Mod: S$GLB,,, | Performed by: PODIATRIST

## 2021-03-23 PROCEDURE — 99999 PR PBB SHADOW E&M-EST. PATIENT-LVL IV: CPT | Mod: PBBFAC,,, | Performed by: PODIATRIST

## 2021-03-23 PROCEDURE — 11042 DBRDMT SUBQ TIS 1ST 20SQCM/<: CPT | Mod: LT,S$GLB,, | Performed by: PODIATRIST

## 2021-03-23 PROCEDURE — 86140 C-REACTIVE PROTEIN: CPT | Performed by: INTERNAL MEDICINE

## 2021-03-23 PROCEDURE — 1125F PR PAIN SEVERITY QUANTIFIED, PAIN PRESENT: ICD-10-PCS | Mod: S$GLB,,, | Performed by: PODIATRIST

## 2021-03-23 PROCEDURE — 3008F BODY MASS INDEX DOCD: CPT | Mod: CPTII,S$GLB,, | Performed by: PODIATRIST

## 2021-03-23 PROCEDURE — 36415 COLL VENOUS BLD VENIPUNCTURE: CPT | Mod: PO | Performed by: INTERNAL MEDICINE

## 2021-03-23 PROCEDURE — 11042 WOUND DEBRIDEMENT: ICD-10-PCS | Mod: LT,S$GLB,, | Performed by: PODIATRIST

## 2021-03-23 PROCEDURE — 99999 PR PBB SHADOW E&M-EST. PATIENT-LVL IV: ICD-10-PCS | Mod: PBBFAC,,, | Performed by: PODIATRIST

## 2021-03-23 PROCEDURE — 85651 RBC SED RATE NONAUTOMATED: CPT | Mod: PO | Performed by: INTERNAL MEDICINE

## 2021-03-24 ENCOUNTER — TELEPHONE (OUTPATIENT)
Dept: FAMILY MEDICINE | Facility: CLINIC | Age: 61
End: 2021-03-24

## 2021-03-25 ENCOUNTER — TELEPHONE (OUTPATIENT)
Dept: FAMILY MEDICINE | Facility: CLINIC | Age: 61
End: 2021-03-25

## 2021-03-29 ENCOUNTER — OFFICE VISIT (OUTPATIENT)
Dept: PODIATRY | Facility: CLINIC | Age: 61
End: 2021-03-29
Payer: COMMERCIAL

## 2021-03-29 VITALS — BODY MASS INDEX: 41.75 KG/M2 | WEIGHT: 315 LBS | HEIGHT: 73 IN

## 2021-03-29 DIAGNOSIS — E11.42 DIABETIC POLYNEUROPATHY ASSOCIATED WITH TYPE 2 DIABETES MELLITUS: ICD-10-CM

## 2021-03-29 DIAGNOSIS — L97.522 FOOT ULCER WITH FAT LAYER EXPOSED, LEFT: ICD-10-CM

## 2021-03-29 DIAGNOSIS — M14.672 CHARCOT'S JOINT OF LEFT FOOT: Primary | ICD-10-CM

## 2021-03-29 DIAGNOSIS — E11.49 TYPE II DIABETES MELLITUS WITH NEUROLOGICAL MANIFESTATIONS: ICD-10-CM

## 2021-03-29 PROCEDURE — 99499 UNLISTED E&M SERVICE: CPT | Mod: S$GLB,,, | Performed by: PODIATRIST

## 2021-03-29 PROCEDURE — 3008F BODY MASS INDEX DOCD: CPT | Mod: CPTII,S$GLB,, | Performed by: PODIATRIST

## 2021-03-29 PROCEDURE — 1125F PR PAIN SEVERITY QUANTIFIED, PAIN PRESENT: ICD-10-PCS | Mod: S$GLB,,, | Performed by: PODIATRIST

## 2021-03-29 PROCEDURE — 3008F PR BODY MASS INDEX (BMI) DOCUMENTED: ICD-10-PCS | Mod: CPTII,S$GLB,, | Performed by: PODIATRIST

## 2021-03-29 PROCEDURE — 1125F AMNT PAIN NOTED PAIN PRSNT: CPT | Mod: S$GLB,,, | Performed by: PODIATRIST

## 2021-03-29 PROCEDURE — 99999 PR PBB SHADOW E&M-EST. PATIENT-LVL IV: CPT | Mod: PBBFAC,,, | Performed by: PODIATRIST

## 2021-03-29 PROCEDURE — 11042 DBRDMT SUBQ TIS 1ST 20SQCM/<: CPT | Mod: LT,S$GLB,, | Performed by: PODIATRIST

## 2021-03-29 PROCEDURE — 99499 NO LOS: ICD-10-PCS | Mod: S$GLB,,, | Performed by: PODIATRIST

## 2021-03-29 PROCEDURE — 11042 WOUND DEBRIDEMENT: ICD-10-PCS | Mod: LT,S$GLB,, | Performed by: PODIATRIST

## 2021-03-29 PROCEDURE — 99999 PR PBB SHADOW E&M-EST. PATIENT-LVL IV: ICD-10-PCS | Mod: PBBFAC,,, | Performed by: PODIATRIST

## 2021-04-05 ENCOUNTER — OFFICE VISIT (OUTPATIENT)
Dept: PODIATRY | Facility: CLINIC | Age: 61
End: 2021-04-05
Payer: COMMERCIAL

## 2021-04-05 DIAGNOSIS — Z79.4 TYPE 2 DIABETES MELLITUS WITH DIABETIC POLYNEUROPATHY, WITH LONG-TERM CURRENT USE OF INSULIN: ICD-10-CM

## 2021-04-05 DIAGNOSIS — M14.672 CHARCOT'S JOINT OF LEFT FOOT: Primary | ICD-10-CM

## 2021-04-05 DIAGNOSIS — E11.42 DIABETIC POLYNEUROPATHY ASSOCIATED WITH TYPE 2 DIABETES MELLITUS: ICD-10-CM

## 2021-04-05 DIAGNOSIS — E11.42 TYPE 2 DIABETES MELLITUS WITH DIABETIC POLYNEUROPATHY, WITH LONG-TERM CURRENT USE OF INSULIN: ICD-10-CM

## 2021-04-05 DIAGNOSIS — E78.2 MIXED HYPERLIPIDEMIA: ICD-10-CM

## 2021-04-05 DIAGNOSIS — Z87.2 HEALED ULCER OF LEFT FOOT ON EXAMINATION: ICD-10-CM

## 2021-04-05 DIAGNOSIS — E11.49 TYPE II DIABETES MELLITUS WITH NEUROLOGICAL MANIFESTATIONS: ICD-10-CM

## 2021-04-05 PROCEDURE — 99212 PR OFFICE/OUTPT VISIT, EST, LEVL II, 10-19 MIN: ICD-10-PCS | Mod: S$GLB,,, | Performed by: PODIATRIST

## 2021-04-05 PROCEDURE — 1126F AMNT PAIN NOTED NONE PRSNT: CPT | Mod: S$GLB,,, | Performed by: PODIATRIST

## 2021-04-05 PROCEDURE — 3046F HEMOGLOBIN A1C LEVEL >9.0%: CPT | Mod: CPTII,S$GLB,, | Performed by: PODIATRIST

## 2021-04-05 PROCEDURE — 99212 OFFICE O/P EST SF 10 MIN: CPT | Mod: S$GLB,,, | Performed by: PODIATRIST

## 2021-04-05 PROCEDURE — 99999 PR PBB SHADOW E&M-EST. PATIENT-LVL III: ICD-10-PCS | Mod: PBBFAC,,, | Performed by: PODIATRIST

## 2021-04-05 PROCEDURE — 3046F PR MOST RECENT HEMOGLOBIN A1C LEVEL > 9.0%: ICD-10-PCS | Mod: CPTII,S$GLB,, | Performed by: PODIATRIST

## 2021-04-05 PROCEDURE — 1126F PR PAIN SEVERITY QUANTIFIED, NO PAIN PRESENT: ICD-10-PCS | Mod: S$GLB,,, | Performed by: PODIATRIST

## 2021-04-05 PROCEDURE — 99999 PR PBB SHADOW E&M-EST. PATIENT-LVL III: CPT | Mod: PBBFAC,,, | Performed by: PODIATRIST

## 2021-04-05 RX ORDER — METFORMIN HYDROCHLORIDE 500 MG/1
TABLET, EXTENDED RELEASE ORAL
Qty: 180 TABLET | Refills: 3 | Status: SHIPPED | OUTPATIENT
Start: 2021-04-05 | End: 2021-09-22

## 2021-04-05 RX ORDER — SYRINGE,SAFETY WITH NEEDLE,1ML 25GX1"
SYRINGE (EA) MISCELLANEOUS
Qty: 100 EACH | Refills: 12 | Status: SHIPPED | OUTPATIENT
Start: 2021-04-05

## 2021-04-05 RX ORDER — INSULIN GLARGINE 100 [IU]/ML
70 INJECTION, SOLUTION SUBCUTANEOUS NIGHTLY
Qty: 63 ML | Refills: 3 | Status: SHIPPED | OUTPATIENT
Start: 2021-04-05 | End: 2021-12-30 | Stop reason: SDUPTHER

## 2021-04-05 RX ORDER — GEMFIBROZIL 600 MG/1
600 TABLET, FILM COATED ORAL DAILY
Qty: 90 TABLET | Refills: 3 | Status: SHIPPED | OUTPATIENT
Start: 2021-04-05 | End: 2022-02-14 | Stop reason: SDUPTHER

## 2021-04-08 ENCOUNTER — PATIENT MESSAGE (OUTPATIENT)
Dept: FAMILY MEDICINE | Facility: CLINIC | Age: 61
End: 2021-04-08

## 2021-04-15 ENCOUNTER — PATIENT MESSAGE (OUTPATIENT)
Dept: ADMINISTRATIVE | Facility: HOSPITAL | Age: 61
End: 2021-04-15

## 2021-04-15 ENCOUNTER — PATIENT OUTREACH (OUTPATIENT)
Dept: ADMINISTRATIVE | Facility: HOSPITAL | Age: 61
End: 2021-04-15

## 2021-04-23 NOTE — PATIENT INSTRUCTIONS
Exercises to Strengthen Your Lower Back  Strong lower back and abdominal muscles work together to support your spine. The exercises below will help strengthen the lower back. It is important that you begin exercising slowly and increase levels gradually.  Always begin any exercise program with stretching. If you feel pain while doing any of these exercises, stop and talk to your doctor about a more specific exercise program that better suits your condition.   Low back stretch  The point of stretching is to make you more flexible and increase your range of motion. Stretch only as much as you are able. Stretch slowly. Do not push your stretch to the limit. If at any point you feel pain while stretching, this is your (temporary) limit.  · Lie on your back with your knees bent and both feet on the ground.  · Slowly raise your left knee to your chest as you flatten your lower back against the floor. Hold for 5 seconds.  · Relax and repeat the exercise with your right knee.  · Do 10 of these exercises for each leg.  · Repeat hugging both knees to your chest at the same time.  Building lower back strength  Start your exercise routine with 10 to 30 minutes a day, 1 to 3 times a day.  Initial exercises  Lying on your back:  1. Ankle pumps: Move your foot up and down, towards your head, and then away. Repeat 10 times with each foot.  2. Heel slides: Slowly bend your knee, drawing the heel of your foot towards you. Then slide your heel/foot from you, straightening your knee. Do not lift your foot off the floor (this is not a leg lift).  3. Abdominal contraction: Bend your knees and put your hands on your stomach. Tighten your stomach muscles. Hold for 5 seconds, then relax. Repeat 10 times.  4. Straight leg raise: Bend one leg at the knee and keep the other leg straight. Tighten your stomach muscles. Slowly lift your straight leg 6 to 12 inches off the floor and hold for up to 5 seconds. Repeat 10 times on each  side.  Standin. Wall squats: Stand with your back against the wall. Move your feet about 12 inches away from the wall. Tighten your stomach muscles, and slowly bend your knees until they are at about a 45 degree angle. Do not go down too far. Hold about 5 seconds. Then slowly return to your starting position. Repeat 10 times.  2. Heel raises: Stand facing the wall. Slowly raise the heels of your feet up and down, while keeping your toes on the floor. If you have trouble balancing, you can touch the wall with your hands. Repeat 10 times.  More advanced exercises  When you feel comfortable enough, try these exercises.  1. Kneeling lumbar extension: Begin on your hands and knees. At the same time, raise and straighten your right arm and left leg until they are parallel to the ground. Hold for 2 seconds and come back slowly to a starting position. Repeat with left arm and right leg, alternating 10 times.  2. Prone lumbar extension: Lie face down, arms extended overhead, palms on the floor. At the same time, raise your right arm and left leg as high as comfortably possible. Hold for 10 seconds and slowly return to start. Repeat with left arm and right leg, alternating 10 times. Gradually build up to 20 times. (Advanced: Repeat this exercise raising both arms and both legs a few inches off the floor at the same time. Hold for 5 seconds and release.)  3. Pelvic tilt: Lie on the floor on your back with your knees bent at 90 degrees. Your feet should be flat on the floor. Inhale, exhale, then slowly contract your abdominal muscles bringing your navel toward your spine. Let your pelvis rock back until your lower back is flat on the floor. Hold for 10 seconds while breathing smoothly.  4. Abdominal crunch: Perform a pelvic tilt (above) flattening your lower back against the floor. Holding the tension in your abdominal muscles, take another breath and raise your shoulder blades off the ground (this is not a full sit-up).  Keep your head in line with your body (dont bend your neck forward). Hold for 2 seconds, then slowly lower.  Date Last Reviewed: 6/1/2016  © 9338-3860 [employer]. [address] [place] [address], [place] [address]. All rights reserved. This information is not intended as a substitute for professional medical care. Always follow your healthcare professional's instructions.         no

## 2021-05-10 ENCOUNTER — PATIENT MESSAGE (OUTPATIENT)
Dept: RESEARCH | Facility: HOSPITAL | Age: 61
End: 2021-05-10

## 2021-05-28 ENCOUNTER — PATIENT MESSAGE (OUTPATIENT)
Dept: FAMILY MEDICINE | Facility: CLINIC | Age: 61
End: 2021-05-28

## 2021-05-28 ENCOUNTER — PATIENT MESSAGE (OUTPATIENT)
Dept: PAIN MEDICINE | Facility: CLINIC | Age: 61
End: 2021-05-28

## 2021-05-31 ENCOUNTER — TELEPHONE (OUTPATIENT)
Dept: PAIN MEDICINE | Facility: CLINIC | Age: 61
End: 2021-05-31

## 2021-05-31 ENCOUNTER — PATIENT MESSAGE (OUTPATIENT)
Dept: PAIN MEDICINE | Facility: CLINIC | Age: 61
End: 2021-05-31

## 2021-05-31 RX ORDER — MELOXICAM 7.5 MG/1
7.5 TABLET ORAL 2 TIMES DAILY WITH MEALS
Qty: 60 TABLET | Refills: 0 | Status: SHIPPED | OUTPATIENT
Start: 2021-05-31 | End: 2021-06-30

## 2021-06-02 ENCOUNTER — PATIENT OUTREACH (OUTPATIENT)
Dept: ADMINISTRATIVE | Facility: HOSPITAL | Age: 61
End: 2021-06-02

## 2021-06-16 ENCOUNTER — PATIENT OUTREACH (OUTPATIENT)
Dept: ADMINISTRATIVE | Facility: HOSPITAL | Age: 61
End: 2021-06-16

## 2021-06-17 ENCOUNTER — PATIENT MESSAGE (OUTPATIENT)
Dept: FAMILY MEDICINE | Facility: CLINIC | Age: 61
End: 2021-06-17

## 2021-06-17 ENCOUNTER — PATIENT OUTREACH (OUTPATIENT)
Dept: ADMINISTRATIVE | Facility: HOSPITAL | Age: 61
End: 2021-06-17

## 2021-06-18 ENCOUNTER — PATIENT MESSAGE (OUTPATIENT)
Dept: FAMILY MEDICINE | Facility: CLINIC | Age: 61
End: 2021-06-18

## 2021-06-21 ENCOUNTER — PATIENT MESSAGE (OUTPATIENT)
Dept: PAIN MEDICINE | Facility: CLINIC | Age: 61
End: 2021-06-21

## 2021-06-23 ENCOUNTER — LAB VISIT (OUTPATIENT)
Dept: LAB | Facility: HOSPITAL | Age: 61
End: 2021-06-23
Attending: INTERNAL MEDICINE
Payer: COMMERCIAL

## 2021-06-23 DIAGNOSIS — Z79.4 TYPE 2 DIABETES MELLITUS WITH DIABETIC POLYNEUROPATHY, WITH LONG-TERM CURRENT USE OF INSULIN: ICD-10-CM

## 2021-06-23 DIAGNOSIS — E11.42 TYPE 2 DIABETES MELLITUS WITH DIABETIC POLYNEUROPATHY, WITH LONG-TERM CURRENT USE OF INSULIN: ICD-10-CM

## 2021-06-23 DIAGNOSIS — E78.2 MIXED HYPERLIPIDEMIA: ICD-10-CM

## 2021-06-23 LAB
CHOLEST SERPL-MCNC: 194 MG/DL (ref 120–199)
CHOLEST/HDLC SERPL: 5.2 {RATIO} (ref 2–5)
ESTIMATED AVG GLUCOSE: 217 MG/DL (ref 68–131)
HBA1C MFR BLD: 9.2 % (ref 4–5.6)
HDLC SERPL-MCNC: 37 MG/DL (ref 40–75)
HDLC SERPL: 19.1 % (ref 20–50)
LDLC SERPL CALC-MCNC: 84 MG/DL (ref 63–159)
NONHDLC SERPL-MCNC: 157 MG/DL
TRIGL SERPL-MCNC: 365 MG/DL (ref 30–150)

## 2021-06-23 PROCEDURE — 83036 HEMOGLOBIN GLYCOSYLATED A1C: CPT | Performed by: INTERNAL MEDICINE

## 2021-06-23 PROCEDURE — 80061 LIPID PANEL: CPT | Performed by: INTERNAL MEDICINE

## 2021-06-23 PROCEDURE — 36415 COLL VENOUS BLD VENIPUNCTURE: CPT | Mod: PO | Performed by: INTERNAL MEDICINE

## 2021-06-24 ENCOUNTER — PATIENT OUTREACH (OUTPATIENT)
Dept: ADMINISTRATIVE | Facility: HOSPITAL | Age: 61
End: 2021-06-24

## 2021-06-24 DIAGNOSIS — Z12.11 COLON CANCER SCREENING: Primary | ICD-10-CM

## 2021-06-29 ENCOUNTER — PATIENT OUTREACH (OUTPATIENT)
Dept: ADMINISTRATIVE | Facility: HOSPITAL | Age: 61
End: 2021-06-29

## 2021-07-02 ENCOUNTER — PATIENT MESSAGE (OUTPATIENT)
Dept: FAMILY MEDICINE | Facility: CLINIC | Age: 61
End: 2021-07-02

## 2021-07-26 ENCOUNTER — OFFICE VISIT (OUTPATIENT)
Dept: FAMILY MEDICINE | Facility: CLINIC | Age: 61
End: 2021-07-26
Payer: COMMERCIAL

## 2021-07-26 VITALS
TEMPERATURE: 98 F | HEART RATE: 93 BPM | WEIGHT: 315 LBS | SYSTOLIC BLOOD PRESSURE: 132 MMHG | DIASTOLIC BLOOD PRESSURE: 74 MMHG | HEIGHT: 73 IN | BODY MASS INDEX: 41.75 KG/M2

## 2021-07-26 DIAGNOSIS — E11.42 TYPE 2 DIABETES MELLITUS WITH DIABETIC POLYNEUROPATHY, WITH LONG-TERM CURRENT USE OF INSULIN: ICD-10-CM

## 2021-07-26 DIAGNOSIS — Z79.4 TYPE 2 DIABETES MELLITUS WITH DIABETIC POLYNEUROPATHY, WITH LONG-TERM CURRENT USE OF INSULIN: ICD-10-CM

## 2021-07-26 DIAGNOSIS — I10 ESSENTIAL HYPERTENSION: Primary | ICD-10-CM

## 2021-07-26 PROCEDURE — 3008F BODY MASS INDEX DOCD: CPT | Mod: CPTII,S$GLB,, | Performed by: INTERNAL MEDICINE

## 2021-07-26 PROCEDURE — 3008F PR BODY MASS INDEX (BMI) DOCUMENTED: ICD-10-PCS | Mod: CPTII,S$GLB,, | Performed by: INTERNAL MEDICINE

## 2021-07-26 PROCEDURE — 3046F HEMOGLOBIN A1C LEVEL >9.0%: CPT | Mod: CPTII,S$GLB,, | Performed by: INTERNAL MEDICINE

## 2021-07-26 PROCEDURE — 99214 OFFICE O/P EST MOD 30 MIN: CPT | Mod: S$GLB,,, | Performed by: INTERNAL MEDICINE

## 2021-07-26 PROCEDURE — 1159F MED LIST DOCD IN RCRD: CPT | Mod: CPTII,S$GLB,, | Performed by: INTERNAL MEDICINE

## 2021-07-26 PROCEDURE — 99999 PR PBB SHADOW E&M-EST. PATIENT-LVL IV: CPT | Mod: PBBFAC,,, | Performed by: INTERNAL MEDICINE

## 2021-07-26 PROCEDURE — 3075F SYST BP GE 130 - 139MM HG: CPT | Mod: CPTII,S$GLB,, | Performed by: INTERNAL MEDICINE

## 2021-07-26 PROCEDURE — 1126F AMNT PAIN NOTED NONE PRSNT: CPT | Mod: CPTII,S$GLB,, | Performed by: INTERNAL MEDICINE

## 2021-07-26 PROCEDURE — 3046F PR MOST RECENT HEMOGLOBIN A1C LEVEL > 9.0%: ICD-10-PCS | Mod: CPTII,S$GLB,, | Performed by: INTERNAL MEDICINE

## 2021-07-26 PROCEDURE — 3075F PR MOST RECENT SYSTOLIC BLOOD PRESS GE 130-139MM HG: ICD-10-PCS | Mod: CPTII,S$GLB,, | Performed by: INTERNAL MEDICINE

## 2021-07-26 PROCEDURE — 1126F PR PAIN SEVERITY QUANTIFIED, NO PAIN PRESENT: ICD-10-PCS | Mod: CPTII,S$GLB,, | Performed by: INTERNAL MEDICINE

## 2021-07-26 PROCEDURE — 3078F DIAST BP <80 MM HG: CPT | Mod: CPTII,S$GLB,, | Performed by: INTERNAL MEDICINE

## 2021-07-26 PROCEDURE — 1159F PR MEDICATION LIST DOCUMENTED IN MEDICAL RECORD: ICD-10-PCS | Mod: CPTII,S$GLB,, | Performed by: INTERNAL MEDICINE

## 2021-07-26 PROCEDURE — 3078F PR MOST RECENT DIASTOLIC BLOOD PRESSURE < 80 MM HG: ICD-10-PCS | Mod: CPTII,S$GLB,, | Performed by: INTERNAL MEDICINE

## 2021-07-26 PROCEDURE — 99214 PR OFFICE/OUTPT VISIT, EST, LEVL IV, 30-39 MIN: ICD-10-PCS | Mod: S$GLB,,, | Performed by: INTERNAL MEDICINE

## 2021-07-26 PROCEDURE — 99999 PR PBB SHADOW E&M-EST. PATIENT-LVL IV: ICD-10-PCS | Mod: PBBFAC,,, | Performed by: INTERNAL MEDICINE

## 2021-07-26 RX ORDER — AMLODIPINE BESYLATE 5 MG/1
5 TABLET ORAL DAILY
Qty: 90 TABLET | Refills: 3 | Status: SHIPPED | OUTPATIENT
Start: 2021-07-26 | End: 2021-10-08 | Stop reason: SDUPTHER

## 2021-07-26 RX ORDER — DAPAGLIFLOZIN 10 MG/1
10 TABLET, FILM COATED ORAL DAILY
Qty: 90 TABLET | Refills: 3 | Status: SHIPPED | OUTPATIENT
Start: 2021-07-26 | End: 2022-04-04 | Stop reason: SDUPTHER

## 2021-07-30 ENCOUNTER — OFFICE VISIT (OUTPATIENT)
Dept: PAIN MEDICINE | Facility: CLINIC | Age: 61
End: 2021-07-30
Payer: COMMERCIAL

## 2021-07-30 VITALS
HEIGHT: 73 IN | SYSTOLIC BLOOD PRESSURE: 146 MMHG | DIASTOLIC BLOOD PRESSURE: 72 MMHG | WEIGHT: 315 LBS | BODY MASS INDEX: 41.75 KG/M2 | HEART RATE: 84 BPM

## 2021-07-30 DIAGNOSIS — E66.01 SEVERE OBESITY (BMI >= 40): ICD-10-CM

## 2021-07-30 DIAGNOSIS — M51.36 DDD (DEGENERATIVE DISC DISEASE), LUMBAR: ICD-10-CM

## 2021-07-30 DIAGNOSIS — M54.16 LUMBAR RADICULOPATHY: Primary | ICD-10-CM

## 2021-07-30 PROCEDURE — 1159F PR MEDICATION LIST DOCUMENTED IN MEDICAL RECORD: ICD-10-PCS | Mod: CPTII,S$GLB,, | Performed by: PHYSICAL MEDICINE & REHABILITATION

## 2021-07-30 PROCEDURE — 1125F AMNT PAIN NOTED PAIN PRSNT: CPT | Mod: CPTII,S$GLB,, | Performed by: PHYSICAL MEDICINE & REHABILITATION

## 2021-07-30 PROCEDURE — 1160F RVW MEDS BY RX/DR IN RCRD: CPT | Mod: CPTII,S$GLB,, | Performed by: PHYSICAL MEDICINE & REHABILITATION

## 2021-07-30 PROCEDURE — 99999 PR PBB SHADOW E&M-EST. PATIENT-LVL III: ICD-10-PCS | Mod: PBBFAC,,, | Performed by: PHYSICAL MEDICINE & REHABILITATION

## 2021-07-30 PROCEDURE — 1160F PR REVIEW ALL MEDS BY PRESCRIBER/CLIN PHARMACIST DOCUMENTED: ICD-10-PCS | Mod: CPTII,S$GLB,, | Performed by: PHYSICAL MEDICINE & REHABILITATION

## 2021-07-30 PROCEDURE — 3046F HEMOGLOBIN A1C LEVEL >9.0%: CPT | Mod: CPTII,S$GLB,, | Performed by: PHYSICAL MEDICINE & REHABILITATION

## 2021-07-30 PROCEDURE — 3078F DIAST BP <80 MM HG: CPT | Mod: CPTII,S$GLB,, | Performed by: PHYSICAL MEDICINE & REHABILITATION

## 2021-07-30 PROCEDURE — 3077F PR MOST RECENT SYSTOLIC BLOOD PRESSURE >= 140 MM HG: ICD-10-PCS | Mod: CPTII,S$GLB,, | Performed by: PHYSICAL MEDICINE & REHABILITATION

## 2021-07-30 PROCEDURE — 3078F PR MOST RECENT DIASTOLIC BLOOD PRESSURE < 80 MM HG: ICD-10-PCS | Mod: CPTII,S$GLB,, | Performed by: PHYSICAL MEDICINE & REHABILITATION

## 2021-07-30 PROCEDURE — 99999 PR PBB SHADOW E&M-EST. PATIENT-LVL III: CPT | Mod: PBBFAC,,, | Performed by: PHYSICAL MEDICINE & REHABILITATION

## 2021-07-30 PROCEDURE — 99214 PR OFFICE/OUTPT VISIT, EST, LEVL IV, 30-39 MIN: ICD-10-PCS | Mod: S$GLB,,, | Performed by: PHYSICAL MEDICINE & REHABILITATION

## 2021-07-30 PROCEDURE — 3008F BODY MASS INDEX DOCD: CPT | Mod: CPTII,S$GLB,, | Performed by: PHYSICAL MEDICINE & REHABILITATION

## 2021-07-30 PROCEDURE — 3046F PR MOST RECENT HEMOGLOBIN A1C LEVEL > 9.0%: ICD-10-PCS | Mod: CPTII,S$GLB,, | Performed by: PHYSICAL MEDICINE & REHABILITATION

## 2021-07-30 PROCEDURE — 1159F MED LIST DOCD IN RCRD: CPT | Mod: CPTII,S$GLB,, | Performed by: PHYSICAL MEDICINE & REHABILITATION

## 2021-07-30 PROCEDURE — 99214 OFFICE O/P EST MOD 30 MIN: CPT | Mod: S$GLB,,, | Performed by: PHYSICAL MEDICINE & REHABILITATION

## 2021-07-30 PROCEDURE — 1125F PR PAIN SEVERITY QUANTIFIED, PAIN PRESENT: ICD-10-PCS | Mod: CPTII,S$GLB,, | Performed by: PHYSICAL MEDICINE & REHABILITATION

## 2021-07-30 PROCEDURE — 3077F SYST BP >= 140 MM HG: CPT | Mod: CPTII,S$GLB,, | Performed by: PHYSICAL MEDICINE & REHABILITATION

## 2021-07-30 PROCEDURE — 3008F PR BODY MASS INDEX (BMI) DOCUMENTED: ICD-10-PCS | Mod: CPTII,S$GLB,, | Performed by: PHYSICAL MEDICINE & REHABILITATION

## 2021-07-30 RX ORDER — HYDROCODONE BITARTRATE AND ACETAMINOPHEN 10; 325 MG/1; MG/1
1 TABLET ORAL EVERY 6 HOURS PRN
Qty: 28 TABLET | Refills: 0 | Status: SHIPPED | OUTPATIENT
Start: 2021-07-30 | End: 2021-08-06

## 2021-08-03 ENCOUNTER — PATIENT MESSAGE (OUTPATIENT)
Dept: PAIN MEDICINE | Facility: HOSPITAL | Age: 61
End: 2021-08-03

## 2021-08-08 ENCOUNTER — PATIENT MESSAGE (OUTPATIENT)
Dept: FAMILY MEDICINE | Facility: CLINIC | Age: 61
End: 2021-08-08

## 2021-08-08 DIAGNOSIS — Z79.4 TYPE 2 DIABETES MELLITUS WITH DIABETIC POLYNEUROPATHY, WITH LONG-TERM CURRENT USE OF INSULIN: Primary | ICD-10-CM

## 2021-08-08 DIAGNOSIS — E11.42 TYPE 2 DIABETES MELLITUS WITH DIABETIC POLYNEUROPATHY, WITH LONG-TERM CURRENT USE OF INSULIN: Primary | ICD-10-CM

## 2021-08-09 RX ORDER — INSULIN ASPART 100 [IU]/ML
INJECTION, SOLUTION INTRAVENOUS; SUBCUTANEOUS
Qty: 30 ML | Refills: 5 | Status: SHIPPED | OUTPATIENT
Start: 2021-08-09 | End: 2021-09-13 | Stop reason: SDUPTHER

## 2021-08-09 RX ORDER — INSULIN ASPART 100 [IU]/ML
INJECTION, SOLUTION INTRAVENOUS; SUBCUTANEOUS
Qty: 30 ML | Refills: 5 | Status: SHIPPED | OUTPATIENT
Start: 2021-08-09 | End: 2021-08-09 | Stop reason: SDUPTHER

## 2021-08-11 ENCOUNTER — PATIENT MESSAGE (OUTPATIENT)
Dept: PAIN MEDICINE | Facility: CLINIC | Age: 61
End: 2021-08-11

## 2021-08-15 ENCOUNTER — PATIENT MESSAGE (OUTPATIENT)
Dept: FAMILY MEDICINE | Facility: CLINIC | Age: 61
End: 2021-08-15

## 2021-08-15 ENCOUNTER — PATIENT MESSAGE (OUTPATIENT)
Dept: PAIN MEDICINE | Facility: CLINIC | Age: 61
End: 2021-08-15

## 2021-08-15 DIAGNOSIS — U07.1 COVID-19: Primary | ICD-10-CM

## 2021-08-16 ENCOUNTER — CLINICAL SUPPORT (OUTPATIENT)
Dept: FAMILY MEDICINE | Facility: CLINIC | Age: 61
End: 2021-08-16
Payer: COMMERCIAL

## 2021-08-16 ENCOUNTER — PATIENT MESSAGE (OUTPATIENT)
Dept: FAMILY MEDICINE | Facility: CLINIC | Age: 61
End: 2021-08-16

## 2021-08-16 DIAGNOSIS — U07.1 COVID-19: ICD-10-CM

## 2021-08-16 PROCEDURE — U0005 INFEC AGEN DETEC AMPLI PROBE: HCPCS | Performed by: INTERNAL MEDICINE

## 2021-08-16 PROCEDURE — U0003 INFECTIOUS AGENT DETECTION BY NUCLEIC ACID (DNA OR RNA); SEVERE ACUTE RESPIRATORY SYNDROME CORONAVIRUS 2 (SARS-COV-2) (CORONAVIRUS DISEASE [COVID-19]), AMPLIFIED PROBE TECHNIQUE, MAKING USE OF HIGH THROUGHPUT TECHNOLOGIES AS DESCRIBED BY CMS-2020-01-R: HCPCS | Performed by: INTERNAL MEDICINE

## 2021-08-17 ENCOUNTER — PATIENT MESSAGE (OUTPATIENT)
Dept: FAMILY MEDICINE | Facility: CLINIC | Age: 61
End: 2021-08-17

## 2021-08-17 ENCOUNTER — TELEPHONE (OUTPATIENT)
Dept: FAMILY MEDICINE | Facility: CLINIC | Age: 61
End: 2021-08-17

## 2021-08-17 DIAGNOSIS — U07.1 COVID-19: Primary | ICD-10-CM

## 2021-08-17 LAB
SARS-COV-2 RNA RESP QL NAA+PROBE: DETECTED
SARS-COV-2- CYCLE NUMBER: 11.4

## 2021-08-18 ENCOUNTER — NURSE TRIAGE (OUTPATIENT)
Dept: ADMINISTRATIVE | Facility: CLINIC | Age: 61
End: 2021-08-18

## 2021-08-18 ENCOUNTER — PATIENT MESSAGE (OUTPATIENT)
Dept: PAIN MEDICINE | Facility: CLINIC | Age: 61
End: 2021-08-18

## 2021-08-18 RX ORDER — HYDROCODONE BITARTRATE AND ACETAMINOPHEN 10; 325 MG/1; MG/1
1 TABLET ORAL EVERY 8 HOURS PRN
Qty: 21 TABLET | Refills: 0 | Status: SHIPPED | OUTPATIENT
Start: 2021-08-18 | End: 2021-08-25

## 2021-08-19 ENCOUNTER — PATIENT MESSAGE (OUTPATIENT)
Dept: INFECTIOUS DISEASES | Facility: HOSPITAL | Age: 61
End: 2021-08-19

## 2021-08-19 ENCOUNTER — PATIENT MESSAGE (OUTPATIENT)
Dept: FAMILY MEDICINE | Facility: CLINIC | Age: 61
End: 2021-08-19

## 2021-08-20 ENCOUNTER — INFUSION (OUTPATIENT)
Dept: INFECTIOUS DISEASES | Facility: HOSPITAL | Age: 61
End: 2021-08-20
Attending: INTERNAL MEDICINE
Payer: COMMERCIAL

## 2021-08-20 ENCOUNTER — PATIENT MESSAGE (OUTPATIENT)
Dept: ADMINISTRATIVE | Facility: CLINIC | Age: 61
End: 2021-08-20

## 2021-08-20 VITALS
RESPIRATION RATE: 18 BRPM | DIASTOLIC BLOOD PRESSURE: 59 MMHG | HEART RATE: 90 BPM | OXYGEN SATURATION: 98 % | SYSTOLIC BLOOD PRESSURE: 121 MMHG | TEMPERATURE: 98 F

## 2021-08-20 DIAGNOSIS — U07.1 COVID-19: Primary | ICD-10-CM

## 2021-08-20 PROCEDURE — 25000003 PHARM REV CODE 250: Performed by: INTERNAL MEDICINE

## 2021-08-20 PROCEDURE — 63600175 PHARM REV CODE 636 W HCPCS: Performed by: INTERNAL MEDICINE

## 2021-08-20 PROCEDURE — M0243 CASIRIVI AND IMDEVI INFUSION: HCPCS | Performed by: INTERNAL MEDICINE

## 2021-08-20 RX ORDER — EPINEPHRINE 0.3 MG/.3ML
0.3 INJECTION SUBCUTANEOUS
Status: ACTIVE | OUTPATIENT
Start: 2021-08-20

## 2021-08-20 RX ORDER — DIPHENHYDRAMINE HYDROCHLORIDE 50 MG/ML
25 INJECTION INTRAMUSCULAR; INTRAVENOUS ONCE AS NEEDED
Status: ACTIVE | OUTPATIENT
Start: 2021-08-20 | End: 2033-01-16

## 2021-08-20 RX ORDER — ACETAMINOPHEN 325 MG/1
650 TABLET ORAL ONCE AS NEEDED
Status: ACTIVE | OUTPATIENT
Start: 2021-08-20 | End: 2033-01-16

## 2021-08-20 RX ORDER — ONDANSETRON 4 MG/1
4 TABLET, ORALLY DISINTEGRATING ORAL ONCE AS NEEDED
Status: ACTIVE | OUTPATIENT
Start: 2021-08-20 | End: 2033-01-16

## 2021-08-20 RX ORDER — SODIUM CHLORIDE 0.9 % (FLUSH) 0.9 %
10 SYRINGE (ML) INJECTION
Status: ACTIVE | OUTPATIENT
Start: 2021-08-20

## 2021-08-20 RX ORDER — ALBUTEROL SULFATE 90 UG/1
2 AEROSOL, METERED RESPIRATORY (INHALATION)
Status: ACTIVE | OUTPATIENT
Start: 2021-08-20

## 2021-08-20 RX ADMIN — CASIRIVIMAB AND IMDEVIMAB 600 MG: 600; 600 INJECTION, SOLUTION, CONCENTRATE INTRAVENOUS at 02:08

## 2021-08-21 ENCOUNTER — TELEPHONE (OUTPATIENT)
Dept: ADMINISTRATIVE | Facility: CLINIC | Age: 61
End: 2021-08-21

## 2021-08-21 ENCOUNTER — PATIENT MESSAGE (OUTPATIENT)
Dept: ADMINISTRATIVE | Facility: OTHER | Age: 61
End: 2021-08-21

## 2021-08-21 ENCOUNTER — NURSE TRIAGE (OUTPATIENT)
Dept: ADMINISTRATIVE | Facility: CLINIC | Age: 61
End: 2021-08-21

## 2021-08-22 ENCOUNTER — PATIENT MESSAGE (OUTPATIENT)
Dept: ADMINISTRATIVE | Facility: CLINIC | Age: 61
End: 2021-08-22

## 2021-08-22 ENCOUNTER — PATIENT MESSAGE (OUTPATIENT)
Dept: ADMINISTRATIVE | Facility: OTHER | Age: 61
End: 2021-08-22

## 2021-08-22 ENCOUNTER — TELEPHONE (OUTPATIENT)
Dept: HOME HEALTH SERVICES | Facility: CLINIC | Age: 61
End: 2021-08-22

## 2021-08-22 ENCOUNTER — NURSE TRIAGE (OUTPATIENT)
Dept: ADMINISTRATIVE | Facility: CLINIC | Age: 61
End: 2021-08-22

## 2021-08-23 ENCOUNTER — PATIENT MESSAGE (OUTPATIENT)
Dept: ADMINISTRATIVE | Facility: OTHER | Age: 61
End: 2021-08-23

## 2021-08-23 ENCOUNTER — PATIENT MESSAGE (OUTPATIENT)
Dept: FAMILY MEDICINE | Facility: CLINIC | Age: 61
End: 2021-08-23

## 2021-08-23 ENCOUNTER — NURSE TRIAGE (OUTPATIENT)
Dept: ADMINISTRATIVE | Facility: CLINIC | Age: 61
End: 2021-08-23

## 2021-08-24 ENCOUNTER — PATIENT MESSAGE (OUTPATIENT)
Dept: ADMINISTRATIVE | Facility: OTHER | Age: 61
End: 2021-08-24

## 2021-08-24 ENCOUNTER — NURSE TRIAGE (OUTPATIENT)
Dept: ADMINISTRATIVE | Facility: CLINIC | Age: 61
End: 2021-08-24

## 2021-08-24 ENCOUNTER — PATIENT MESSAGE (OUTPATIENT)
Dept: ADMINISTRATIVE | Facility: CLINIC | Age: 61
End: 2021-08-24

## 2021-08-24 ENCOUNTER — TELEPHONE (OUTPATIENT)
Dept: ADMINISTRATIVE | Facility: CLINIC | Age: 61
End: 2021-08-24

## 2021-08-25 ENCOUNTER — PATIENT MESSAGE (OUTPATIENT)
Dept: ADMINISTRATIVE | Facility: OTHER | Age: 61
End: 2021-08-25

## 2021-08-26 ENCOUNTER — PATIENT MESSAGE (OUTPATIENT)
Dept: ADMINISTRATIVE | Facility: CLINIC | Age: 61
End: 2021-08-26

## 2021-08-26 ENCOUNTER — PATIENT MESSAGE (OUTPATIENT)
Dept: ADMINISTRATIVE | Facility: OTHER | Age: 61
End: 2021-08-26

## 2021-08-27 ENCOUNTER — PATIENT MESSAGE (OUTPATIENT)
Dept: ADMINISTRATIVE | Facility: OTHER | Age: 61
End: 2021-08-27

## 2021-08-28 ENCOUNTER — PATIENT MESSAGE (OUTPATIENT)
Dept: ADMINISTRATIVE | Facility: OTHER | Age: 61
End: 2021-08-28

## 2021-08-28 ENCOUNTER — PATIENT MESSAGE (OUTPATIENT)
Dept: ADMINISTRATIVE | Facility: CLINIC | Age: 61
End: 2021-08-28

## 2021-08-29 ENCOUNTER — PATIENT MESSAGE (OUTPATIENT)
Dept: FAMILY MEDICINE | Facility: CLINIC | Age: 61
End: 2021-08-29

## 2021-08-29 ENCOUNTER — PATIENT MESSAGE (OUTPATIENT)
Dept: ADMINISTRATIVE | Facility: OTHER | Age: 61
End: 2021-08-29

## 2021-08-30 ENCOUNTER — PATIENT MESSAGE (OUTPATIENT)
Dept: ADMINISTRATIVE | Facility: CLINIC | Age: 61
End: 2021-08-30

## 2021-08-31 ENCOUNTER — PATIENT MESSAGE (OUTPATIENT)
Dept: ADMINISTRATIVE | Facility: CLINIC | Age: 61
End: 2021-08-31

## 2021-08-31 ENCOUNTER — NURSE TRIAGE (OUTPATIENT)
Dept: ADMINISTRATIVE | Facility: CLINIC | Age: 61
End: 2021-08-31

## 2021-09-01 ENCOUNTER — NURSE TRIAGE (OUTPATIENT)
Dept: ADMINISTRATIVE | Facility: CLINIC | Age: 61
End: 2021-09-01

## 2021-09-11 ENCOUNTER — PATIENT MESSAGE (OUTPATIENT)
Dept: FAMILY MEDICINE | Facility: CLINIC | Age: 61
End: 2021-09-11

## 2021-09-13 ENCOUNTER — PATIENT MESSAGE (OUTPATIENT)
Dept: FAMILY MEDICINE | Facility: CLINIC | Age: 61
End: 2021-09-13

## 2021-09-13 DIAGNOSIS — E11.42 TYPE 2 DIABETES MELLITUS WITH DIABETIC POLYNEUROPATHY, WITH LONG-TERM CURRENT USE OF INSULIN: ICD-10-CM

## 2021-09-13 DIAGNOSIS — Z79.4 TYPE 2 DIABETES MELLITUS WITH DIABETIC POLYNEUROPATHY, WITH LONG-TERM CURRENT USE OF INSULIN: ICD-10-CM

## 2021-09-13 RX ORDER — INSULIN ASPART 100 [IU]/ML
INJECTION, SOLUTION INTRAVENOUS; SUBCUTANEOUS
Qty: 30 ML | Refills: 5 | Status: SHIPPED | OUTPATIENT
Start: 2021-09-13 | End: 2022-01-26 | Stop reason: SDUPTHER

## 2021-09-27 ENCOUNTER — PATIENT MESSAGE (OUTPATIENT)
Dept: PAIN MEDICINE | Facility: CLINIC | Age: 61
End: 2021-09-27

## 2021-10-04 ENCOUNTER — PATIENT MESSAGE (OUTPATIENT)
Dept: PAIN MEDICINE | Facility: HOSPITAL | Age: 61
End: 2021-10-04

## 2021-10-04 ENCOUNTER — HOSPITAL ENCOUNTER (OUTPATIENT)
Facility: HOSPITAL | Age: 61
Discharge: HOME | End: 2021-10-04
Attending: PHYSICAL MEDICINE & REHABILITATION | Admitting: PHYSICAL MEDICINE & REHABILITATION
Payer: COMMERCIAL

## 2021-10-04 VITALS
HEIGHT: 73 IN | OXYGEN SATURATION: 96 % | DIASTOLIC BLOOD PRESSURE: 72 MMHG | TEMPERATURE: 98 F | RESPIRATION RATE: 16 BRPM | BODY MASS INDEX: 41.49 KG/M2 | WEIGHT: 313.06 LBS | HEART RATE: 72 BPM | SYSTOLIC BLOOD PRESSURE: 159 MMHG

## 2021-10-04 DIAGNOSIS — M54.16 LUMBAR RADICULOPATHY: ICD-10-CM

## 2021-10-04 LAB
POCT GLUCOSE: 277 MG/DL (ref 70–110)
POCT GLUCOSE: 295 MG/DL (ref 70–110)

## 2021-10-04 PROCEDURE — 64483 NJX AA&/STRD TFRM EPI L/S 1: CPT | Mod: 50,,, | Performed by: PHYSICAL MEDICINE & REHABILITATION

## 2021-10-04 PROCEDURE — 25500020 PHARM REV CODE 255: Performed by: PHYSICAL MEDICINE & REHABILITATION

## 2021-10-04 PROCEDURE — 64483 NJX AA&/STRD TFRM EPI L/S 1: CPT | Mod: 50 | Performed by: PHYSICAL MEDICINE & REHABILITATION

## 2021-10-04 PROCEDURE — 25000003 PHARM REV CODE 250: Performed by: PHYSICAL MEDICINE & REHABILITATION

## 2021-10-04 PROCEDURE — 63600175 PHARM REV CODE 636 W HCPCS: Performed by: PHYSICAL MEDICINE & REHABILITATION

## 2021-10-04 PROCEDURE — 64483 PR EPIDURAL INJ, ANES/STEROID, TRANSFORAMINAL, LUMB/SACR, SNGL LEVL: ICD-10-PCS | Mod: 50,,, | Performed by: PHYSICAL MEDICINE & REHABILITATION

## 2021-10-04 RX ORDER — DIPHENHYDRAMINE HYDROCHLORIDE 50 MG/ML
25 INJECTION INTRAMUSCULAR; INTRAVENOUS ONCE
Status: COMPLETED | OUTPATIENT
Start: 2021-10-04 | End: 2021-10-04

## 2021-10-04 RX ORDER — METHYLPREDNISOLONE ACETATE 40 MG/ML
INJECTION, SUSPENSION INTRA-ARTICULAR; INTRALESIONAL; INTRAMUSCULAR; SOFT TISSUE
Status: DISCONTINUED | OUTPATIENT
Start: 2021-10-04 | End: 2021-10-04 | Stop reason: HOSPADM

## 2021-10-04 RX ORDER — BUPIVACAINE HYDROCHLORIDE 2.5 MG/ML
INJECTION, SOLUTION EPIDURAL; INFILTRATION; INTRACAUDAL
Status: DISCONTINUED | OUTPATIENT
Start: 2021-10-04 | End: 2021-10-04 | Stop reason: HOSPADM

## 2021-10-04 RX ORDER — ONDANSETRON 2 MG/ML
4 INJECTION INTRAMUSCULAR; INTRAVENOUS ONCE AS NEEDED
Status: DISCONTINUED | OUTPATIENT
Start: 2021-10-04 | End: 2021-10-04 | Stop reason: HOSPADM

## 2021-10-04 RX ORDER — FENTANYL CITRATE 50 UG/ML
INJECTION, SOLUTION INTRAMUSCULAR; INTRAVENOUS
Status: DISCONTINUED | OUTPATIENT
Start: 2021-10-04 | End: 2021-10-04 | Stop reason: HOSPADM

## 2021-10-04 RX ORDER — MIDAZOLAM HYDROCHLORIDE 1 MG/ML
INJECTION, SOLUTION INTRAMUSCULAR; INTRAVENOUS
Status: DISCONTINUED | OUTPATIENT
Start: 2021-10-04 | End: 2021-10-04 | Stop reason: HOSPADM

## 2021-10-04 RX ADMIN — DIPHENHYDRAMINE HYDROCHLORIDE 25 MG: 50 INJECTION INTRAMUSCULAR; INTRAVENOUS at 12:10

## 2021-10-06 ENCOUNTER — PATIENT MESSAGE (OUTPATIENT)
Dept: PAIN MEDICINE | Facility: CLINIC | Age: 61
End: 2021-10-06

## 2021-10-06 NOTE — LETTER
March 19, 2020    Virgilio Acuña  [address]  [facility] [address]             [facility]  [address] [facility] [address]  Phone: [phone]  Fax: [phone] To whom it may concern:    Virgilio Acuña is currently under my care as his primary care physician. Given Mr. Acuña's age and chronic medical conditions, I recommend that he avoid as much community exposure as possible to limit his potential exposure to COVID-19, as he is at higher risk of complications from this virus. Please call my office with any questions/concerns.    Sincerely,    Anne Wright MD        No Residual Tumor Seen Histology Text: There were no malignant cells seen in the sections examined.

## 2021-10-22 ENCOUNTER — PATIENT OUTREACH (OUTPATIENT)
Dept: ADMINISTRATIVE | Facility: HOSPITAL | Age: 61
End: 2021-10-22
Payer: COMMERCIAL

## 2021-10-26 ENCOUNTER — PATIENT MESSAGE (OUTPATIENT)
Dept: ADMINISTRATIVE | Facility: HOSPITAL | Age: 61
End: 2021-10-26
Payer: COMMERCIAL

## 2021-10-26 ENCOUNTER — PATIENT OUTREACH (OUTPATIENT)
Dept: ADMINISTRATIVE | Facility: HOSPITAL | Age: 61
End: 2021-10-26
Payer: COMMERCIAL

## 2021-11-12 ENCOUNTER — PATIENT MESSAGE (OUTPATIENT)
Dept: PAIN MEDICINE | Facility: CLINIC | Age: 61
End: 2021-11-12
Payer: COMMERCIAL

## 2021-12-21 ENCOUNTER — PATIENT MESSAGE (OUTPATIENT)
Dept: FAMILY MEDICINE | Facility: CLINIC | Age: 61
End: 2021-12-21
Payer: COMMERCIAL

## 2021-12-21 DIAGNOSIS — Z01.84 ANTIBODY RESPONSE EXAM: Primary | ICD-10-CM

## 2021-12-22 ENCOUNTER — PATIENT MESSAGE (OUTPATIENT)
Dept: ADMINISTRATIVE | Facility: HOSPITAL | Age: 61
End: 2021-12-22
Payer: COMMERCIAL

## 2021-12-30 ENCOUNTER — PATIENT MESSAGE (OUTPATIENT)
Dept: PAIN MEDICINE | Facility: CLINIC | Age: 61
End: 2021-12-30
Payer: COMMERCIAL

## 2021-12-30 ENCOUNTER — PATIENT MESSAGE (OUTPATIENT)
Dept: FAMILY MEDICINE | Facility: CLINIC | Age: 61
End: 2021-12-30
Payer: COMMERCIAL

## 2021-12-30 DIAGNOSIS — Z79.4 TYPE 2 DIABETES MELLITUS WITH DIABETIC POLYNEUROPATHY, WITH LONG-TERM CURRENT USE OF INSULIN: ICD-10-CM

## 2021-12-30 DIAGNOSIS — E11.42 TYPE 2 DIABETES MELLITUS WITH DIABETIC POLYNEUROPATHY, WITH LONG-TERM CURRENT USE OF INSULIN: ICD-10-CM

## 2021-12-30 RX ORDER — INSULIN GLARGINE 100 [IU]/ML
70 INJECTION, SOLUTION SUBCUTANEOUS NIGHTLY
Qty: 63 ML | Refills: 3 | Status: SHIPPED | OUTPATIENT
Start: 2021-12-30 | End: 2022-04-04

## 2021-12-30 RX ORDER — MELOXICAM 7.5 MG/1
7.5 TABLET ORAL 2 TIMES DAILY WITH MEALS
Qty: 60 TABLET | Refills: 0 | Status: SHIPPED | OUTPATIENT
Start: 2021-12-30 | End: 2022-01-29

## 2021-12-30 NOTE — TELEPHONE ENCOUNTER
Care Due:                  Date            Visit Type   Department     Provider  --------------------------------------------------------------------------------                                             [facility] FAMILY  Last Visit: 07-      None         ESTEFANIA Wright  Next Visit: None Scheduled  None         None Found                                                            Last  Test          Frequency    Reason                     Performed    Due Date  --------------------------------------------------------------------------------    CBC.........  12 months..  gemfibroziL..............  03- 03-    CMP.........  12 months..  JANUVIA, dapagliflozin,    Not Found    Overdue                             enalapril, gemfibroziL,                             insulin, metFORMIN,                             pravastatin..............    HBA1C.......  6 months...  JANUVIA, dapagliflozin,    06- 12-                             insulin, metFORMIN.......    Powered by [employer]. Reference number: [id number].   12/30/2021 2:12:30 PM CST

## 2022-01-06 ENCOUNTER — PATIENT MESSAGE (OUTPATIENT)
Dept: PAIN MEDICINE | Facility: CLINIC | Age: 62
End: 2022-01-06
Payer: COMMERCIAL

## 2022-01-10 ENCOUNTER — PATIENT MESSAGE (OUTPATIENT)
Dept: PAIN MEDICINE | Facility: CLINIC | Age: 62
End: 2022-01-10
Payer: COMMERCIAL

## 2022-01-21 ENCOUNTER — PATIENT MESSAGE (OUTPATIENT)
Dept: ADMINISTRATIVE | Facility: HOSPITAL | Age: 62
End: 2022-01-21
Payer: COMMERCIAL

## 2022-01-26 ENCOUNTER — PATIENT MESSAGE (OUTPATIENT)
Dept: FAMILY MEDICINE | Facility: CLINIC | Age: 62
End: 2022-01-26
Payer: COMMERCIAL

## 2022-01-26 DIAGNOSIS — E11.42 TYPE 2 DIABETES MELLITUS WITH DIABETIC POLYNEUROPATHY, WITH LONG-TERM CURRENT USE OF INSULIN: ICD-10-CM

## 2022-01-26 DIAGNOSIS — Z79.4 TYPE 2 DIABETES MELLITUS WITH DIABETIC POLYNEUROPATHY, WITH LONG-TERM CURRENT USE OF INSULIN: ICD-10-CM

## 2022-01-26 RX ORDER — INSULIN ASPART 100 [IU]/ML
INJECTION, SOLUTION INTRAVENOUS; SUBCUTANEOUS
Qty: 30 ML | Refills: 5 | Status: SHIPPED | OUTPATIENT
Start: 2022-01-26 | End: 2022-02-11 | Stop reason: SDUPTHER

## 2022-01-26 NOTE — TELEPHONE ENCOUNTER
No new care gaps identified.  Powered by [employer] by [employer] Reference number: [id number].   1/26/2022 9:50:59 AM CST

## 2022-02-11 ENCOUNTER — PATIENT MESSAGE (OUTPATIENT)
Dept: PODIATRY | Facility: CLINIC | Age: 62
End: 2022-02-11
Payer: COMMERCIAL

## 2022-02-11 ENCOUNTER — PATIENT MESSAGE (OUTPATIENT)
Dept: FAMILY MEDICINE | Facility: CLINIC | Age: 62
End: 2022-02-11
Payer: COMMERCIAL

## 2022-02-11 DIAGNOSIS — Z79.4 TYPE 2 DIABETES MELLITUS WITH DIABETIC POLYNEUROPATHY, WITH LONG-TERM CURRENT USE OF INSULIN: ICD-10-CM

## 2022-02-11 DIAGNOSIS — E11.42 TYPE 2 DIABETES MELLITUS WITH DIABETIC POLYNEUROPATHY, WITH LONG-TERM CURRENT USE OF INSULIN: ICD-10-CM

## 2022-02-11 RX ORDER — INSULIN ASPART 100 [IU]/ML
INJECTION, SOLUTION INTRAVENOUS; SUBCUTANEOUS
Qty: 30 ML | Refills: 5 | Status: SHIPPED | OUTPATIENT
Start: 2022-02-11 | End: 2022-06-13 | Stop reason: SDUPTHER

## 2022-02-11 NOTE — TELEPHONE ENCOUNTER
Care Due:                  Date            Visit Type   Department     Provider  --------------------------------------------------------------------------------                                EP -                              PRIMARY      [facility] FAMILY  Last Visit: 07-      CARE ([facility])   MEDICINE       Anne Wright  Next Visit: None Scheduled  None         None Found                                                            Last  Test          Frequency    Reason                     Performed    Due Date  --------------------------------------------------------------------------------    CMP.........  12 months..  DEBBIEUVIA, dapagliflozin,    03- 03-                             enalapril, gemfibroziL,                             insulin, metFORMIN,                             pravastatin..............    Powered by [employer]. Reference number: [id number].   2/11/2022 9:23:06 AM CST

## 2022-02-12 ENCOUNTER — PATIENT MESSAGE (OUTPATIENT)
Dept: FAMILY MEDICINE | Facility: CLINIC | Age: 62
End: 2022-02-12
Payer: COMMERCIAL

## 2022-02-12 DIAGNOSIS — E78.2 MIXED HYPERLIPIDEMIA: ICD-10-CM

## 2022-02-12 DIAGNOSIS — E11.42 TYPE 2 DIABETES MELLITUS WITH DIABETIC POLYNEUROPATHY, WITH LONG-TERM CURRENT USE OF INSULIN: ICD-10-CM

## 2022-02-12 DIAGNOSIS — G62.9 NEUROPATHY: ICD-10-CM

## 2022-02-12 DIAGNOSIS — I10 ESSENTIAL HYPERTENSION: ICD-10-CM

## 2022-02-12 DIAGNOSIS — Z79.4 TYPE 2 DIABETES MELLITUS WITH DIABETIC POLYNEUROPATHY, WITH LONG-TERM CURRENT USE OF INSULIN: ICD-10-CM

## 2022-02-13 DIAGNOSIS — E11.49 TYPE II DIABETES MELLITUS WITH NEUROLOGICAL MANIFESTATIONS: ICD-10-CM

## 2022-02-13 DIAGNOSIS — M14.672 CHARCOT'S JOINT OF LEFT FOOT: Primary | ICD-10-CM

## 2022-02-13 DIAGNOSIS — E11.42 DIABETIC POLYNEUROPATHY ASSOCIATED WITH TYPE 2 DIABETES MELLITUS: ICD-10-CM

## 2022-02-13 RX ORDER — SULFAMETHOXAZOLE AND TRIMETHOPRIM 800; 160 MG/1; MG/1
1 TABLET ORAL 2 TIMES DAILY
Qty: 14 TABLET | Refills: 0 | Status: SHIPPED | OUTPATIENT
Start: 2022-02-13 | End: 2022-02-20

## 2022-02-14 ENCOUNTER — PATIENT MESSAGE (OUTPATIENT)
Dept: FAMILY MEDICINE | Facility: CLINIC | Age: 62
End: 2022-02-14
Payer: COMMERCIAL

## 2022-02-14 RX ORDER — GEMFIBROZIL 600 MG/1
600 TABLET, FILM COATED ORAL 2 TIMES DAILY
Qty: 180 TABLET | Refills: 0 | Status: SHIPPED | OUTPATIENT
Start: 2022-02-14 | End: 2022-03-23

## 2022-02-14 RX ORDER — ENALAPRIL MALEATE 20 MG/1
20 TABLET ORAL 2 TIMES DAILY
Qty: 180 TABLET | Refills: 0 | Status: SHIPPED | OUTPATIENT
Start: 2022-02-14 | End: 2022-05-16 | Stop reason: SDUPTHER

## 2022-02-14 RX ORDER — METFORMIN HYDROCHLORIDE 500 MG/1
TABLET, EXTENDED RELEASE ORAL
Qty: 180 TABLET | Refills: 0 | Status: SHIPPED | OUTPATIENT
Start: 2022-02-14 | End: 2022-03-21

## 2022-02-14 RX ORDER — PRAVASTATIN SODIUM 40 MG/1
40 TABLET ORAL DAILY
Qty: 90 TABLET | Refills: 0 | Status: SHIPPED | OUTPATIENT
Start: 2022-02-14 | End: 2022-04-11

## 2022-02-14 RX ORDER — GABAPENTIN 300 MG/1
CAPSULE ORAL
Qty: 270 CAPSULE | Refills: 0 | Status: SHIPPED | OUTPATIENT
Start: 2022-02-14 | End: 2022-04-04 | Stop reason: SDUPTHER

## 2022-02-14 NOTE — TELEPHONE ENCOUNTER
No new care gaps identified.  Powered by [employer]. Reference number: [id number].   2/14/2022 10:59:50 AM CST

## 2022-02-15 NOTE — TELEPHONE ENCOUNTER
The patient requested medicine refills and I did refill it once.  Message the patient to notify of any health maintenance care gaps that need to be arranged.   Health Maintenance Due   Topic Date Due    HIV Screening  Never done    TETANUS VACCINE  Never done    Eye Exam  08/26/2020    Shingles Vaccine (2 of 2) 11/25/2020    Foot Exam  01/14/2021    Colorectal Cancer Screening  03/16/2021    COVID-19 Vaccine (2 - Booster for Jamie series) 07/19/2021    Hemoglobin A1c  09/23/2021    Diabetes Urine Screening  03/16/2022     BP Readings from Last 3 Encounters:   10/04/21 (!) 159/72   08/20/21 (!) 121/59   07/30/21 (!) 146/72     [unfilled]

## 2022-02-16 ENCOUNTER — LAB VISIT (OUTPATIENT)
Dept: LAB | Facility: HOSPITAL | Age: 62
End: 2022-02-16
Attending: INTERNAL MEDICINE
Payer: COMMERCIAL

## 2022-02-16 DIAGNOSIS — E78.2 MIXED HYPERLIPIDEMIA: ICD-10-CM

## 2022-02-16 DIAGNOSIS — M79.10 MYALGIA: ICD-10-CM

## 2022-02-16 DIAGNOSIS — Z79.4 TYPE 2 DIABETES MELLITUS WITH DIABETIC POLYNEUROPATHY, WITH LONG-TERM CURRENT USE OF INSULIN: ICD-10-CM

## 2022-02-16 DIAGNOSIS — E11.42 TYPE 2 DIABETES MELLITUS WITH DIABETIC POLYNEUROPATHY, WITH LONG-TERM CURRENT USE OF INSULIN: ICD-10-CM

## 2022-02-16 LAB
ALBUMIN SERPL BCP-MCNC: 3.9 G/DL (ref 3.5–5.2)
ALBUMIN/CREAT UR: 177.5 UG/MG (ref 0–30)
ALP SERPL-CCNC: 57 U/L (ref 55–135)
ALT SERPL W/O P-5'-P-CCNC: 23 U/L (ref 10–44)
ANION GAP SERPL CALC-SCNC: 11 MMOL/L (ref 8–16)
AST SERPL-CCNC: 20 U/L (ref 10–40)
BILIRUB SERPL-MCNC: 0.5 MG/DL (ref 0.1–1)
BUN SERPL-MCNC: 13 MG/DL (ref 8–23)
CALCIUM SERPL-MCNC: 10.4 MG/DL (ref 8.7–10.5)
CHLORIDE SERPL-SCNC: 100 MMOL/L (ref 95–110)
CHOLEST SERPL-MCNC: 198 MG/DL (ref 120–199)
CHOLEST/HDLC SERPL: 5.8 {RATIO} (ref 2–5)
CO2 SERPL-SCNC: 26 MMOL/L (ref 23–29)
CREAT SERPL-MCNC: 0.9 MG/DL (ref 0.5–1.4)
CREAT UR-MCNC: 173 MG/DL (ref 23–375)
EST. GFR  (AFRICAN AMERICAN): >60 ML/MIN/1.73 M^2
EST. GFR  (NON AFRICAN AMERICAN): >60 ML/MIN/1.73 M^2
ESTIMATED AVG GLUCOSE: 246 MG/DL (ref 68–131)
GLUCOSE SERPL-MCNC: 88 MG/DL (ref 70–110)
HBA1C MFR BLD: 10.2 % (ref 4–5.6)
HDLC SERPL-MCNC: 34 MG/DL (ref 40–75)
HDLC SERPL: 17.2 % (ref 20–50)
LDLC SERPL CALC-MCNC: ABNORMAL MG/DL (ref 63–159)
MICROALBUMIN UR DL<=1MG/L-MCNC: 307 UG/ML
NONHDLC SERPL-MCNC: 164 MG/DL
POTASSIUM SERPL-SCNC: 4 MMOL/L (ref 3.5–5.1)
PROT SERPL-MCNC: 7.7 G/DL (ref 6–8.4)
SODIUM SERPL-SCNC: 137 MMOL/L (ref 136–145)
TRIGL SERPL-MCNC: 414 MG/DL (ref 30–150)

## 2022-02-16 PROCEDURE — 80053 COMPREHEN METABOLIC PANEL: CPT | Performed by: INTERNAL MEDICINE

## 2022-02-16 PROCEDURE — 82043 UR ALBUMIN QUANTITATIVE: CPT | Performed by: INTERNAL MEDICINE

## 2022-02-16 PROCEDURE — 80061 LIPID PANEL: CPT | Performed by: INTERNAL MEDICINE

## 2022-02-16 PROCEDURE — 82570 ASSAY OF URINE CREATININE: CPT | Performed by: INTERNAL MEDICINE

## 2022-02-16 PROCEDURE — 83036 HEMOGLOBIN GLYCOSYLATED A1C: CPT | Performed by: INTERNAL MEDICINE

## 2022-02-16 PROCEDURE — 36415 COLL VENOUS BLD VENIPUNCTURE: CPT | Mod: PO | Performed by: INTERNAL MEDICINE

## 2022-03-08 ENCOUNTER — PATIENT MESSAGE (OUTPATIENT)
Dept: FAMILY MEDICINE | Facility: CLINIC | Age: 62
End: 2022-03-08
Payer: COMMERCIAL

## 2022-03-09 ENCOUNTER — TELEPHONE (OUTPATIENT)
Dept: FAMILY MEDICINE | Facility: CLINIC | Age: 62
End: 2022-03-09
Payer: COMMERCIAL

## 2022-03-10 ENCOUNTER — PATIENT MESSAGE (OUTPATIENT)
Dept: FAMILY MEDICINE | Facility: CLINIC | Age: 62
End: 2022-03-10
Payer: COMMERCIAL

## 2022-03-21 ENCOUNTER — PATIENT MESSAGE (OUTPATIENT)
Dept: ADMINISTRATIVE | Facility: HOSPITAL | Age: 62
End: 2022-03-21
Payer: COMMERCIAL

## 2022-03-21 ENCOUNTER — PATIENT MESSAGE (OUTPATIENT)
Dept: FAMILY MEDICINE | Facility: CLINIC | Age: 62
End: 2022-03-21
Payer: COMMERCIAL

## 2022-03-21 DIAGNOSIS — Z79.4 TYPE 2 DIABETES MELLITUS WITH DIABETIC POLYNEUROPATHY, WITH LONG-TERM CURRENT USE OF INSULIN: ICD-10-CM

## 2022-03-21 DIAGNOSIS — E11.42 TYPE 2 DIABETES MELLITUS WITH DIABETIC POLYNEUROPATHY, WITH LONG-TERM CURRENT USE OF INSULIN: ICD-10-CM

## 2022-03-21 RX ORDER — METFORMIN HYDROCHLORIDE 500 MG/1
TABLET, EXTENDED RELEASE ORAL
Qty: 180 TABLET | Refills: 0 | Status: SHIPPED | OUTPATIENT
Start: 2022-03-21 | End: 2022-08-29 | Stop reason: SDUPTHER

## 2022-03-21 NOTE — TELEPHONE ENCOUNTER
No new care gaps identified.  Powered by [employer] by [employer]. Reference number: [id number].   3/21/2022 1:32:32 AM CDT

## 2022-04-02 ENCOUNTER — PATIENT MESSAGE (OUTPATIENT)
Dept: FAMILY MEDICINE | Facility: CLINIC | Age: 62
End: 2022-04-02
Payer: COMMERCIAL

## 2022-04-04 ENCOUNTER — OFFICE VISIT (OUTPATIENT)
Dept: FAMILY MEDICINE | Facility: CLINIC | Age: 62
End: 2022-04-04
Payer: COMMERCIAL

## 2022-04-04 ENCOUNTER — PATIENT MESSAGE (OUTPATIENT)
Dept: FAMILY MEDICINE | Facility: CLINIC | Age: 62
End: 2022-04-04

## 2022-04-04 VITALS
DIASTOLIC BLOOD PRESSURE: 73 MMHG | TEMPERATURE: 97 F | SYSTOLIC BLOOD PRESSURE: 139 MMHG | HEART RATE: 60 BPM | BODY MASS INDEX: 41.75 KG/M2 | WEIGHT: 315 LBS | HEIGHT: 73 IN

## 2022-04-04 DIAGNOSIS — G62.9 NEUROPATHY: ICD-10-CM

## 2022-04-04 DIAGNOSIS — I10 ESSENTIAL HYPERTENSION: Primary | ICD-10-CM

## 2022-04-04 DIAGNOSIS — Z79.4 TYPE 2 DIABETES MELLITUS WITH DIABETIC POLYNEUROPATHY, WITH LONG-TERM CURRENT USE OF INSULIN: ICD-10-CM

## 2022-04-04 DIAGNOSIS — Z12.11 COLON CANCER SCREENING: ICD-10-CM

## 2022-04-04 DIAGNOSIS — G89.29 CHRONIC BILATERAL LOW BACK PAIN WITH SCIATICA, SCIATICA LATERALITY UNSPECIFIED: ICD-10-CM

## 2022-04-04 DIAGNOSIS — E78.2 MIXED HYPERLIPIDEMIA: ICD-10-CM

## 2022-04-04 DIAGNOSIS — M54.16 LUMBAR RADICULOPATHY: ICD-10-CM

## 2022-04-04 DIAGNOSIS — J30.9 ALLERGIC RHINITIS, UNSPECIFIED SEASONALITY, UNSPECIFIED TRIGGER: ICD-10-CM

## 2022-04-04 DIAGNOSIS — E66.01 SEVERE OBESITY (BMI >= 40): ICD-10-CM

## 2022-04-04 DIAGNOSIS — M54.40 CHRONIC BILATERAL LOW BACK PAIN WITH SCIATICA, SCIATICA LATERALITY UNSPECIFIED: ICD-10-CM

## 2022-04-04 DIAGNOSIS — E11.42 TYPE 2 DIABETES MELLITUS WITH DIABETIC POLYNEUROPATHY, WITH LONG-TERM CURRENT USE OF INSULIN: ICD-10-CM

## 2022-04-04 PROCEDURE — 99999 PR PBB SHADOW E&M-EST. PATIENT-LVL III: CPT | Mod: PBBFAC,,, | Performed by: INTERNAL MEDICINE

## 2022-04-04 PROCEDURE — 3008F PR BODY MASS INDEX (BMI) DOCUMENTED: ICD-10-PCS | Mod: CPTII,S$GLB,, | Performed by: INTERNAL MEDICINE

## 2022-04-04 PROCEDURE — 1160F RVW MEDS BY RX/DR IN RCRD: CPT | Mod: CPTII,S$GLB,, | Performed by: INTERNAL MEDICINE

## 2022-04-04 PROCEDURE — 4010F ACE/ARB THERAPY RXD/TAKEN: CPT | Mod: CPTII,S$GLB,, | Performed by: INTERNAL MEDICINE

## 2022-04-04 PROCEDURE — 4010F PR ACE/ARB THEARPY RXD/TAKEN: ICD-10-PCS | Mod: CPTII,S$GLB,, | Performed by: INTERNAL MEDICINE

## 2022-04-04 PROCEDURE — 3066F NEPHROPATHY DOC TX: CPT | Mod: CPTII,S$GLB,, | Performed by: INTERNAL MEDICINE

## 2022-04-04 PROCEDURE — 1160F PR REVIEW ALL MEDS BY PRESCRIBER/CLIN PHARMACIST DOCUMENTED: ICD-10-PCS | Mod: CPTII,S$GLB,, | Performed by: INTERNAL MEDICINE

## 2022-04-04 PROCEDURE — 3046F PR MOST RECENT HEMOGLOBIN A1C LEVEL > 9.0%: ICD-10-PCS | Mod: CPTII,S$GLB,, | Performed by: INTERNAL MEDICINE

## 2022-04-04 PROCEDURE — 3078F PR MOST RECENT DIASTOLIC BLOOD PRESSURE < 80 MM HG: ICD-10-PCS | Mod: CPTII,S$GLB,, | Performed by: INTERNAL MEDICINE

## 2022-04-04 PROCEDURE — 3066F PR DOCUMENTATION OF TREATMENT FOR NEPHROPATHY: ICD-10-PCS | Mod: CPTII,S$GLB,, | Performed by: INTERNAL MEDICINE

## 2022-04-04 PROCEDURE — 3060F PR POS MICROALBUMINURIA RESULT DOCUMENTED/REVIEW: ICD-10-PCS | Mod: CPTII,S$GLB,, | Performed by: INTERNAL MEDICINE

## 2022-04-04 PROCEDURE — 3008F BODY MASS INDEX DOCD: CPT | Mod: CPTII,S$GLB,, | Performed by: INTERNAL MEDICINE

## 2022-04-04 PROCEDURE — 3060F POS MICROALBUMINURIA REV: CPT | Mod: CPTII,S$GLB,, | Performed by: INTERNAL MEDICINE

## 2022-04-04 PROCEDURE — 1159F PR MEDICATION LIST DOCUMENTED IN MEDICAL RECORD: ICD-10-PCS | Mod: CPTII,S$GLB,, | Performed by: INTERNAL MEDICINE

## 2022-04-04 PROCEDURE — 3075F SYST BP GE 130 - 139MM HG: CPT | Mod: CPTII,S$GLB,, | Performed by: INTERNAL MEDICINE

## 2022-04-04 PROCEDURE — 99214 OFFICE O/P EST MOD 30 MIN: CPT | Mod: S$GLB,,, | Performed by: INTERNAL MEDICINE

## 2022-04-04 PROCEDURE — 1159F MED LIST DOCD IN RCRD: CPT | Mod: CPTII,S$GLB,, | Performed by: INTERNAL MEDICINE

## 2022-04-04 PROCEDURE — 3075F PR MOST RECENT SYSTOLIC BLOOD PRESS GE 130-139MM HG: ICD-10-PCS | Mod: CPTII,S$GLB,, | Performed by: INTERNAL MEDICINE

## 2022-04-04 PROCEDURE — 3078F DIAST BP <80 MM HG: CPT | Mod: CPTII,S$GLB,, | Performed by: INTERNAL MEDICINE

## 2022-04-04 PROCEDURE — 99214 PR OFFICE/OUTPT VISIT, EST, LEVL IV, 30-39 MIN: ICD-10-PCS | Mod: S$GLB,,, | Performed by: INTERNAL MEDICINE

## 2022-04-04 PROCEDURE — 3046F HEMOGLOBIN A1C LEVEL >9.0%: CPT | Mod: CPTII,S$GLB,, | Performed by: INTERNAL MEDICINE

## 2022-04-04 PROCEDURE — 99999 PR PBB SHADOW E&M-EST. PATIENT-LVL III: ICD-10-PCS | Mod: PBBFAC,,, | Performed by: INTERNAL MEDICINE

## 2022-04-04 RX ORDER — AZELASTINE 1 MG/ML
2 SPRAY, METERED NASAL DAILY
Qty: 30 ML | Refills: 2 | Status: SHIPPED | OUTPATIENT
Start: 2022-04-04 | End: 2022-10-20

## 2022-04-04 RX ORDER — INSULIN PUMP SYRINGE, 3 ML
EACH MISCELLANEOUS
Qty: 1 EACH | Refills: 0 | Status: SHIPPED | OUTPATIENT
Start: 2022-04-04 | End: 2022-05-27 | Stop reason: SDUPTHER

## 2022-04-04 RX ORDER — LANCETS
1 EACH MISCELLANEOUS DAILY PRN
Qty: 100 EACH | Refills: 11 | Status: SHIPPED | OUTPATIENT
Start: 2022-04-04

## 2022-04-04 RX ORDER — GABAPENTIN 300 MG/1
CAPSULE ORAL
Qty: 270 CAPSULE | Refills: 5 | Status: SHIPPED | OUTPATIENT
Start: 2022-04-04 | End: 2022-08-29 | Stop reason: SDUPTHER

## 2022-04-04 RX ORDER — INSULIN GLARGINE 100 [IU]/ML
40 INJECTION, SOLUTION SUBCUTANEOUS 2 TIMES DAILY
Qty: 72 ML | Refills: 1 | Status: SHIPPED | OUTPATIENT
Start: 2022-04-04 | End: 2022-08-29 | Stop reason: SDUPTHER

## 2022-04-04 RX ORDER — DAPAGLIFLOZIN 10 MG/1
10 TABLET, FILM COATED ORAL DAILY
Qty: 90 TABLET | Refills: 3 | Status: SHIPPED | OUTPATIENT
Start: 2022-04-04 | End: 2023-09-26

## 2022-04-04 RX ORDER — INSULIN GLARGINE 100 [IU]/ML
40 INJECTION, SOLUTION SUBCUTANEOUS 2 TIMES DAILY
Qty: 72 ML | Refills: 1 | Status: SHIPPED | OUTPATIENT
Start: 2022-04-04 | End: 2022-04-04

## 2022-04-04 NOTE — PATIENT INSTRUCTIONS
-increase lantus to 40 units twice daily  -add asteline nose spray to flonase and zyrtec. Also consider adding plain mucinex  -due for eye exam

## 2022-04-04 NOTE — PROGRESS NOTES
Assessment/Plan:    Problem List Items Addressed This Visit        Neuro    Lumbar radiculopathy    Neuropathy    Overview     -hx of DDD and diabetic neuropathy  -currently on gabapentin 900 mg TID but still having symptoms  -follow up with pain management for treatment of DDD           Relevant Medications    gabapentin (NEURONTIN) 300 MG capsule       Cardiac/Vascular    Mixed hyperlipidemia    Overview     -chronic condition. Currently stable.    -restart medications  -denies any known adverse effects of medications in the past  -most recent labs listed below:  Lab Results   Component Value Date    CHOL 198 02/16/2022     Lab Results   Component Value Date    HDL 34 (L) 02/16/2022     Lab Results   Component Value Date    LDLCALC Invalid, Trig>400.0 02/16/2022     Lab Results   Component Value Date    TRIG 414 (H) 02/16/2022     Lab Results   Component Value Date    ALT 23 02/16/2022    AST 20 02/16/2022    ALKPHOS 57 02/16/2022    BILITOT 0.5 02/16/2022              Essential hypertension - Primary    Overview     - goal today  -currently on enalapril 20 mg BID and amlodipine 5 mg QD  -continue lifestyle modification with low sodium diet and exercise   -discussed hypertension disease course and importance of treating high blood pressure  -patient understood and advised of risk of untreated blood pressure.  ER precautions were given   for symptoms of hypertensive urgency and emergency.                 Endocrine    Type 2 diabetes mellitus with diabetic polyneuropathy, with long-term current use of insulin    Overview     -condition is currently uncontrolled  -current meds:  Metformin 1000 mg BID, Lantus 70 units, Humalog 40 units AC, januvia 100 mg QD  -will try to add SLGT2 inh   -may need to increase lantus  -plan to recheck A1C. Continue current medications  -see diabetic health maintenance listed below  -counseling provided on importance of diabetic diet and medication compliance in order to treat  diabetes  -discussed diabetes disease course and potential complications           Relevant Medications    dapagliflozin (FARXIGA) 10 mg tablet    blood sugar diagnostic (BLOOD GLUCOSE TEST) Strp    blood-glucose meter kit    lancets Misc    insulin (LANTUS SOLOSTAR U-100 INSULIN) glargine 100 units/mL (3mL) SubQ pen    Severe obesity (BMI >= 40)    Overview     General weight loss/lifestyle modification strategies discussed (elicit support from others; identify saboteurs; non-food rewards, etc).  Informal exercise measures discussed, e.g. taking stairs instead of elevator.  Regular aerobic exercise program discussed.                Orthopedic    Chronic bilateral low back pain with sciatica    Overview     -evaluated pain pain management and has received SI joint injection  -continues to have back pain with associated sciatica  -needs to follow up with pain management to discuss ongoing pain             Other Visit Diagnoses     Colon cancer screening        Relevant Orders    Cologuard Screening (Multitarget Stool DNA)    Allergic rhinitis, unspecified seasonality, unspecified trigger        Relevant Medications    azelastine (ASTELIN) 137 mcg (0.1 %) nasal spray          Follow up in about 3 months (around 7/4/2022) for a1c one week prior to follow up; please see if Dr. Nirav Ramon has any upcoming appts.    Anne Wright MD  _____________________________________________________________________________________________________________________________________________________    CC: follow up of chronic medical conditions     HPI:    Patient is in clinic today as an established patient here for follow up of chronic medical conditions.    HTN: The patient is currently being treated for essential hypertension. This condition is chronic and stable. The patient is tolerating their medication well with good compliance.  Denies any adverse effects of medications.  Counseling was offered regarding low sodium diet.  The  patient has a reduced salt intake. Routine exercise recommended. The patient denies headache, vision changes, chest pain, palpitations, shortness of breath, or lower extremity edema.    DM2: Patient presents for follow up of diabetes. Condition is chronic and uncontrolled. Patient denies symptoms, including foot ulcerations, hypoglycemia , nausea, paresthesia of the feet, polydipsia, polyuria and visual disturbances.  Evaluation to date has been included: fasting blood sugar, fasting lipid panel, hemoglobin A1C and microalbuminuria.  Home fasting sugars: not checking. Treatment to date:  Insulin,januvia,metformin. Denies adverse effects of medications.     Diabetes Management Status    Statin: Taking  ACE/ARB: Taking    Screening or Prevention Patient's value Goal Complete/Controlled?   HgA1C Testing and Control   Lab Results   Component Value Date    HGBA1C 10.2 (H) 02/16/2022      Annually/Less than 8% No   Lipid profile : 02/16/2022 Annually Yes   LDL control Lab Results   Component Value Date    LDLCALC Invalid, Trig>400.0 02/16/2022    Annually/Less than 100 mg/dl  Yes   Nephropathy screening Lab Results   Component Value Date    LABMICR 307.0 02/16/2022     Lab Results   Component Value Date    PROTEINUA Trace (A) 01/10/2018    Annually Yes   Blood pressure BP Readings from Last 1 Encounters:   04/04/22 139/73    Less than 140/90 Yes   Dilated retinal exam : 08/26/2019 Annually Yes   Foot exam   : 01/14/2020 Annually No     Back pain: chronic. Continues to have severe back pain. Has completed injections but with no improvement of symptoms. He is very discouraged about treatment not helping. I have urged him to follow back up with pain management.    No other complaints today.      Past Medical History:  Past Medical History:   Diagnosis Date    Cellulitis of left index finger 2/16/2015    Charcot's joint of foot, left 08/2018    Dyslipidemia     Essential hypertension 2/16/2017    Group B streptococcal  infection 1/15/2018    Hypotestosteronemia 7/7/2017    Infected finger joint 2/17/2015    Infected skin ulcer limited to breakdown of skin 1/13/2018    MSSA (methicillin susceptible Staphylococcus aureus) 1/13/2018    Obese     S/P knee surgery, medial/lateral menisectomy 11/4/2013    Type 2 diabetes mellitus with diabetic polyneuropathy, with long-term current use of insulin 2003     Past Surgical History:   Procedure Laterality Date    ELBOW SURGERY      EPIDURAL STEROID INJECTION N/A 7/14/2020    Procedure: Lumbar L5/S1 IL WHTILEY;  Surgeon: Nirav Ramon MD;  Location: [facility];  Service: Pain Management;  Laterality: N/A;    EPIDURAL STEROID INJECTION N/A 10/20/2020    Procedure: Lumbar L5/S1 IL WHITLEY;  Surgeon: Nirav Ramon MD;  Location: [facility];  Service: Pain Management;  Laterality: N/A;    FOOT SURGERY Left     INJECTION OF ANESTHETIC AGENT INTO SACROILIAC JOINT Right 5/18/2020    Procedure: right Sacroiliac Joint Injection;  Surgeon: Nirav Ramon MD;  Location: [facility];  Service: Pain Management;  Laterality: Right;    INJECTION OF JOINT Right 5/18/2020    Procedure: right GT bursa injection;  Surgeon: Nirav Ramon MD;  Location: [facility];  Service: Pain Management;  Laterality: Right;    KNEE CARTILAGE SURGERY  10/21/2013    right knee    KNEE SURGERY Right 02/17/2017    Left index finger surgery  03/2015    MIDFOOT ARTHRODESIS Left 8/27/2018    Procedure: FUSION, JOINT, MIDFOOT - FIRST METATARSOCUNEIFORM JOINT AND NAVICULOCUNEIFORM JOINT;  Surgeon: Inocente Smith DPM;  [facility] OR;  Service: Podiatry;  Laterality: Left;    SELECTIVE INJECTION OF ANESTHETIC AGENT AROUND LUMBAR SPINAL NERVE ROOT BY TRANSFORAMINAL APPROACH Bilateral 10/4/2021    Procedure: Bilateral L4/5 TF WHITLEY//wants latest time if poss;  Surgeon: Nirav Ramon MD;  Location: [facility];  Service: Pain Management;  Laterality: Bilateral;    TOE AMPUTATION  03/2018     "left great toe     Review of patient's allergies indicates:   Allergen Reactions    Victoza [liraglutide] Swelling     Social History     Tobacco Use    Smoking status: Never Smoker    Smokeless tobacco: Never Used   Substance Use Topics    Alcohol use: Yes     Alcohol/week: 1.0 standard drink     Types: 1 Cans of beer per week     Comment: Huh?    Drug use: No     Family History   Problem Relation Age of Onset    COPD Father      Current Outpatient Medications on File Prior to Visit   Medication Sig Dispense Refill    amLODIPine (NORVASC) 5 MG tablet Take 1 tablet (5 mg total) by mouth once daily. 90 tablet 3    BD ULTRA-FINE ORIG PEN NEEDLE 29 gauge x 1/2" Ndle USE WITH LANTUS SOLOSTAR   PEN 90 each 3    enalapril (VASOTEC) 20 MG tablet Take 1 tablet (20 mg total) by mouth 2 (two) times daily. 180 tablet 0    gemfibroziL (LOPID) 600 MG tablet TAKE 1 TABLET ONCE DAILY 90 tablet 3    insulin aspart U-100 (NOVOLOG FLEXPEN U-100 INSULIN) 100 unit/mL (3 mL) InPn pen ADMINISTER 40 UNITS UNDER THE SKIN THREE TIMES DAILY WITH MEALS 30 mL 5    insulin syringe-needle U-100 (INSULIN SYRINGE) 1 mL 29 gauge x 1/2" Syrg Inject 4 times a day 100 each 12    metFORMIN (GLUCOPHAGE-XR) 500 MG ER 24hr tablet TAKE 2 TABLETS 2 TIMES     DAILY WITH MEALS 180 tablet 0    pantoprazole (PROTONIX) 40 MG tablet TAKE 1 TABLET(40 MG) BY MOUTH EVERY DAY 30 tablet 11    pen needle, diabetic (BD ULTRA-FINE WILDA PEN NEEDLE) 32 gauge x 5/32" Ndle INJECT FOUR TIMES DAILY 4 each 12    pravastatin (PRAVACHOL) 40 MG tablet Take 1 tablet (40 mg total) by mouth once daily. 90 tablet 0    pulse oximeter (PULSE OXIMETER) device by Apply Externally route 2 (two) times a day. Use twice daily at 8 AM and 3 PM and [facility] as directed. 1 each 0    safety needles 22 gauge x 1 1/2" Ndle To be used once a month for testosterone injections 50 each 6    SITagliptin (JANUVIA) 100 MG Tab Take 1 tablet (100 mg total) by mouth once " "daily. 90 tablet 0    syringe with needle 3 mL 25 gauge x 1" Syrg To be used with monthly testosterone injections 100 Syringe 4    traZODone (DESYREL) 150 MG tablet TAKE 1 TABLET NIGHTLY 90 tablet 3     Current Facility-Administered Medications on File Prior to Visit   Medication Dose Route Frequency Provider Last Rate Last Admin    acetaminophen tablet 650 mg  650 mg Oral Once PRN Virgilio Avilez MD        albuterol inhaler 2 puff  2 puff Inhalation Q20 Min PRN Virgilio Avilez MD        diphenhydrAMINE injection 25 mg  25 mg Intravenous Once PRN Virgilio Avilez MD        EPINEPHrine (EPIPEN) 0.3 mg/0.3 mL pen injection 0.3 mg  0.3 mg Intramuscular PRN Virgilio Avilez MD        lactated ringers infusion   Intravenous Continuous Vadim Buchanan MD   Stopped at 08/27/18 0953    methylPREDNISolone sodium succinate injection 40 mg  40 mg Intravenous Once PRN Virgilio Avilez MD        ondansetron disintegrating tablet 4 mg  4 mg Oral Once PRN Virgilio Avilez MD        sodium chloride 0.9% 500 mL flush bag   Intravenous PRN Virgilio Avilez MD        sodium chloride 0.9% flush 10 mL  10 mL Intravenous PRN Virgilio Avilez MD           Review of Systems   Constitutional: Negative for chills, diaphoresis, fatigue and fever.   HENT: Negative for congestion, ear pain, postnasal drip, sinus pain and sore throat.    Eyes: Negative for pain and redness.   Respiratory: Negative for cough, chest tightness and shortness of breath.    Cardiovascular: Negative for chest pain and leg swelling.   Gastrointestinal: Negative for abdominal pain, constipation, diarrhea, nausea and vomiting.   Genitourinary: Negative for dysuria and hematuria.   Musculoskeletal: Positive for back pain. Negative for arthralgias and joint swelling.   Skin: Negative for rash.   Neurological: Negative for dizziness, syncope and headaches.       Vitals:    04/04/22 0830   BP: 139/73   Pulse: 60   Temp: 97 °F (36.1 °C)   Weight: (!) " "148.4 kg (327 lb 3.2 oz)   Height: 6' 1" (1.854 m)       Wt Readings from Last 3 Encounters:   04/04/22 (!) 148.4 kg (327 lb 3.2 oz)   10/04/21 (!) 142 kg (313 lb 0.9 oz)   07/30/21 (!) 150.4 kg (331 lb 7.4 oz)       Physical Exam  Constitutional:       General: He is not in acute distress.     Appearance: Normal appearance. He is well-developed. He is obese.   HENT:      Head: Normocephalic and atraumatic.   Eyes:      Conjunctiva/sclera: Conjunctivae normal.   Cardiovascular:      Rate and Rhythm: Normal rate and regular rhythm.      Pulses: Normal pulses.      Heart sounds: Normal heart sounds. No murmur heard.  Pulmonary:      Effort: Pulmonary effort is normal. No respiratory distress.      Breath sounds: Normal breath sounds.   Abdominal:      General: Bowel sounds are normal. There is no distension.      Palpations: Abdomen is soft.      Tenderness: There is no abdominal tenderness.   Musculoskeletal:         General: Normal range of motion.      Cervical back: Normal range of motion and neck supple.   Skin:     General: Skin is warm and dry.      Findings: No rash.   Neurological:      General: No focal deficit present.      Mental Status: He is alert and oriented to person, place, and time.         Health Maintenance   Topic Date Due    TETANUS VACCINE  Never done    Eye Exam  08/26/2020    Foot Exam  01/14/2021    Hemoglobin A1c  05/16/2022    Lipid Panel  02/16/2023    Hepatitis C Screening  Completed       "

## 2022-04-09 ENCOUNTER — PATIENT MESSAGE (OUTPATIENT)
Dept: FAMILY MEDICINE | Facility: CLINIC | Age: 62
End: 2022-04-09
Payer: COMMERCIAL

## 2022-04-09 DIAGNOSIS — Z79.4 TYPE 2 DIABETES MELLITUS WITH DIABETIC POLYNEUROPATHY, WITH LONG-TERM CURRENT USE OF INSULIN: Primary | ICD-10-CM

## 2022-04-09 DIAGNOSIS — E11.42 TYPE 2 DIABETES MELLITUS WITH DIABETIC POLYNEUROPATHY, WITH LONG-TERM CURRENT USE OF INSULIN: Primary | ICD-10-CM

## 2022-04-09 DIAGNOSIS — E78.2 MIXED HYPERLIPIDEMIA: ICD-10-CM

## 2022-04-09 NOTE — TELEPHONE ENCOUNTER
No new care gaps identified.  Powered by [employer]. Reference number: [id number].   4/09/2022 7:17:53 AM CDT

## 2022-04-11 ENCOUNTER — PATIENT MESSAGE (OUTPATIENT)
Dept: FAMILY MEDICINE | Facility: CLINIC | Age: 62
End: 2022-04-11
Payer: COMMERCIAL

## 2022-04-11 RX ORDER — PRAVASTATIN SODIUM 40 MG/1
TABLET ORAL
Qty: 90 TABLET | Refills: 3 | Status: SHIPPED | OUTPATIENT
Start: 2022-04-11 | End: 2023-06-09

## 2022-04-11 NOTE — TELEPHONE ENCOUNTER
Refill Routing Note   Medication(s) are not appropriate for processing by [facility] for the following reason(s):      - Drug-Drug Interaction (vastatin and gemfibroziL)    [facility] action(s):  Defer Medication-related problems identified: Drug-drug interaction        Medication reconciliation completed: No     Appointments  past 12m or future 3m with PCP    Date Provider   Last Visit   4/4/2022 Anne Wright MD   Next Visit   Visit date not found Anne Wright MD   ED visits in past 90 days: 0        Note composed:11:21 AM 04/11/2022

## 2022-04-11 NOTE — TELEPHONE ENCOUNTER
Referral to pharmacy to help obtain affordable medication.    I have signed for the following orders AND/OR meds.  Please call the patient and ask the patient to schedule the testing AND/OR inform about any medications that were sent. Medications have been sent to pharmacy listed below      Orders Placed This Encounter   Procedures    Ambulatory referral/consult to Pharmacy Assistance     Standing Status:   Future     Standing Expiration Date:   5/11/2023     Referral Priority:   Routine     Referral Type:   Consultation     Referral Reason:   Specialty Services Required     Number of Visits Requested:   1              [facility] - [place], [place] - [address]  [address]  [address]  [facility] [address]  Phone: [phone] Fax: [phone]    [id number] - [place] - [facility] [facility] [address]  [address] [address]  Phone: [phone] Fax: [phone]    [facility] [address] [place] [facility] [place] [address]  Phone: [phone] Fax: [phone]

## 2022-04-12 ENCOUNTER — TELEPHONE (OUTPATIENT)
Dept: PHARMACY | Facility: AMBULARY SURGERY CENTER | Age: 62
End: 2022-04-12
Payer: COMMERCIAL

## 2022-04-17 ENCOUNTER — PATIENT MESSAGE (OUTPATIENT)
Dept: FAMILY MEDICINE | Facility: CLINIC | Age: 62
End: 2022-04-17
Payer: COMMERCIAL

## 2022-04-17 DIAGNOSIS — Z79.4 TYPE 2 DIABETES MELLITUS WITH DIABETIC POLYNEUROPATHY, WITH LONG-TERM CURRENT USE OF INSULIN: ICD-10-CM

## 2022-04-17 DIAGNOSIS — E11.42 TYPE 2 DIABETES MELLITUS WITH DIABETIC POLYNEUROPATHY, WITH LONG-TERM CURRENT USE OF INSULIN: ICD-10-CM

## 2022-04-18 ENCOUNTER — PATIENT MESSAGE (OUTPATIENT)
Dept: FAMILY MEDICINE | Facility: CLINIC | Age: 62
End: 2022-04-18
Payer: COMMERCIAL

## 2022-04-18 NOTE — TELEPHONE ENCOUNTER
No new care gaps identified.  Powered by [employer]. Reference number: [id number].   4/18/2022 9:07:50 AM CDT

## 2022-04-26 ENCOUNTER — PATIENT MESSAGE (OUTPATIENT)
Dept: ADMINISTRATIVE | Facility: HOSPITAL | Age: 62
End: 2022-04-26
Payer: COMMERCIAL

## 2022-05-13 ENCOUNTER — PATIENT MESSAGE (OUTPATIENT)
Dept: PODIATRY | Facility: CLINIC | Age: 62
End: 2022-05-13
Payer: COMMERCIAL

## 2022-05-15 ENCOUNTER — PATIENT MESSAGE (OUTPATIENT)
Dept: FAMILY MEDICINE | Facility: CLINIC | Age: 62
End: 2022-05-15
Payer: COMMERCIAL

## 2022-05-15 DIAGNOSIS — I10 ESSENTIAL HYPERTENSION: ICD-10-CM

## 2022-05-16 ENCOUNTER — PATIENT MESSAGE (OUTPATIENT)
Dept: PODIATRY | Facility: CLINIC | Age: 62
End: 2022-05-16
Payer: COMMERCIAL

## 2022-05-16 DIAGNOSIS — I10 ESSENTIAL HYPERTENSION: ICD-10-CM

## 2022-05-16 RX ORDER — ENALAPRIL MALEATE 20 MG/1
TABLET ORAL
Qty: 180 TABLET | OUTPATIENT
Start: 2022-05-16

## 2022-05-16 RX ORDER — ENALAPRIL MALEATE 20 MG/1
20 TABLET ORAL 2 TIMES DAILY
Qty: 60 TABLET | Refills: 0 | Status: SHIPPED | OUTPATIENT
Start: 2022-05-16 | End: 2022-08-29 | Stop reason: SDUPTHER

## 2022-05-16 NOTE — TELEPHONE ENCOUNTER
No new care gaps identified.  [facility] Embedded Care Gaps. Reference number: [id number]. 5/16/2022   1:27:17 PM CDT

## 2022-05-16 NOTE — TELEPHONE ENCOUNTER
Care Due:                  Date            Visit Type   Department     Provider  --------------------------------------------------------------------------------                                EP -                              PRIMARY      [facility] FAM[facility]  Last Visit: 04-      CARE ([facility])   MEDICINE       Anne Wright  Next Visit: None Scheduled  None         None Found                                                            Last  Test          Frequency    Reason                     Performed    Due Date  --------------------------------------------------------------------------------    CBC.........  12 months..  gemfibroziL..............  03- 03-    [facility] Embedded Care Gaps. Reference number: [id number]. 5/16/2022   8:09:13 AM CDT

## 2022-05-17 ENCOUNTER — PATIENT MESSAGE (OUTPATIENT)
Dept: FAMILY MEDICINE | Facility: CLINIC | Age: 62
End: 2022-05-17
Payer: COMMERCIAL

## 2022-05-17 ENCOUNTER — PATIENT MESSAGE (OUTPATIENT)
Dept: PODIATRY | Facility: CLINIC | Age: 62
End: 2022-05-17
Payer: COMMERCIAL

## 2022-05-17 NOTE — TELEPHONE ENCOUNTER
Refill Authorization Note   Virgilio Acuña  is requesting a refill authorization.  Brief Assessment and Rationale for Refill:  Quick Discontinue     Medication Therapy Plan:       Medication Reconciliation Completed: No   Comments:     No Care Gaps recommended.     Note composed:7:48 PM 05/16/2022

## 2022-05-19 ENCOUNTER — PATIENT MESSAGE (OUTPATIENT)
Dept: PODIATRY | Facility: CLINIC | Age: 62
End: 2022-05-19
Payer: COMMERCIAL

## 2022-05-19 RX ORDER — SULFAMETHOXAZOLE AND TRIMETHOPRIM 800; 160 MG/1; MG/1
1 TABLET ORAL 2 TIMES DAILY
Qty: 14 TABLET | Refills: 0 | Status: SHIPPED | OUTPATIENT
Start: 2022-05-19 | End: 2022-05-26

## 2022-05-25 ENCOUNTER — PATIENT MESSAGE (OUTPATIENT)
Dept: PODIATRY | Facility: CLINIC | Age: 62
End: 2022-05-25
Payer: COMMERCIAL

## 2022-05-27 ENCOUNTER — PATIENT MESSAGE (OUTPATIENT)
Dept: FAMILY MEDICINE | Facility: CLINIC | Age: 62
End: 2022-05-27
Payer: COMMERCIAL

## 2022-05-27 DIAGNOSIS — Z79.4 TYPE 2 DIABETES MELLITUS WITH DIABETIC POLYNEUROPATHY, WITH LONG-TERM CURRENT USE OF INSULIN: ICD-10-CM

## 2022-05-27 DIAGNOSIS — E11.42 TYPE 2 DIABETES MELLITUS WITH DIABETIC POLYNEUROPATHY, WITH LONG-TERM CURRENT USE OF INSULIN: ICD-10-CM

## 2022-05-27 RX ORDER — INSULIN PUMP SYRINGE, 3 ML
EACH MISCELLANEOUS
Qty: 1 EACH | Refills: 0 | Status: SHIPPED | OUTPATIENT
Start: 2022-05-27 | End: 2022-09-30

## 2022-05-27 NOTE — TELEPHONE ENCOUNTER
Care Due:                  Date            Visit Type   Department     Provider  --------------------------------------------------------------------------------                                EP -                              PRIMARY      [facility] FAMILY  Last Visit: 04-      CARE ([facility])   MEDICINE       Anne Wright  Next Visit: None Scheduled  None         None Found                                                            Last  Test          Frequency    Reason                     Performed    Due Date  --------------------------------------------------------------------------------    HBA1C.......  6 months...  SITagliptin,               02- 08-                             dapagliflozin, insulin,                             metFORMIN................    [employer] Embedded Care Gaps. Reference number: [id number]. 5/27/2022   1:20:37 PM CDT

## 2022-05-30 ENCOUNTER — OFFICE VISIT (OUTPATIENT)
Dept: PODIATRY | Facility: CLINIC | Age: 62
End: 2022-05-30
Payer: COMMERCIAL

## 2022-05-30 ENCOUNTER — PATIENT MESSAGE (OUTPATIENT)
Dept: PODIATRY | Facility: CLINIC | Age: 62
End: 2022-05-30
Payer: COMMERCIAL

## 2022-05-30 ENCOUNTER — PATIENT MESSAGE (OUTPATIENT)
Dept: FAMILY MEDICINE | Facility: CLINIC | Age: 62
End: 2022-05-30
Payer: COMMERCIAL

## 2022-05-30 ENCOUNTER — HOSPITAL ENCOUNTER (OUTPATIENT)
Dept: RADIOLOGY | Facility: HOSPITAL | Age: 62
Discharge: HOME | End: 2022-05-30
Attending: PODIATRIST
Payer: COMMERCIAL

## 2022-05-30 DIAGNOSIS — L97.529 DIABETIC ULCER OF LEFT GREAT TOE: Primary | ICD-10-CM

## 2022-05-30 DIAGNOSIS — E11.621 DIABETIC ULCER OF LEFT GREAT TOE: ICD-10-CM

## 2022-05-30 DIAGNOSIS — M14.672 CHARCOT'S JOINT OF LEFT FOOT: ICD-10-CM

## 2022-05-30 DIAGNOSIS — E11.621 DIABETIC ULCER OF LEFT GREAT TOE: Primary | ICD-10-CM

## 2022-05-30 DIAGNOSIS — L97.529 DIABETIC ULCER OF LEFT GREAT TOE: ICD-10-CM

## 2022-05-30 PROCEDURE — 87070 CULTURE OTHR SPECIMN AEROBIC: CPT | Performed by: PODIATRIST

## 2022-05-30 PROCEDURE — 3046F PR MOST RECENT HEMOGLOBIN A1C LEVEL > 9.0%: ICD-10-PCS | Mod: CPTII,S$GLB,, | Performed by: PODIATRIST

## 2022-05-30 PROCEDURE — 3046F HEMOGLOBIN A1C LEVEL >9.0%: CPT | Mod: CPTII,S$GLB,, | Performed by: PODIATRIST

## 2022-05-30 PROCEDURE — 87075 CULTR BACTERIA EXCEPT BLOOD: CPT | Performed by: PODIATRIST

## 2022-05-30 PROCEDURE — 99214 OFFICE O/P EST MOD 30 MIN: CPT | Mod: S$GLB,,, | Performed by: PODIATRIST

## 2022-05-30 PROCEDURE — 1160F RVW MEDS BY RX/DR IN RCRD: CPT | Mod: CPTII,S$GLB,, | Performed by: PODIATRIST

## 2022-05-30 PROCEDURE — 3066F NEPHROPATHY DOC TX: CPT | Mod: CPTII,S$GLB,, | Performed by: PODIATRIST

## 2022-05-30 PROCEDURE — 4010F PR ACE/ARB THEARPY RXD/TAKEN: ICD-10-PCS | Mod: CPTII,S$GLB,, | Performed by: PODIATRIST

## 2022-05-30 PROCEDURE — 99999 PR PBB SHADOW E&M-EST. PATIENT-LVL IV: CPT | Mod: PBBFAC,,, | Performed by: PODIATRIST

## 2022-05-30 PROCEDURE — 1160F PR REVIEW ALL MEDS BY PRESCRIBER/CLIN PHARMACIST DOCUMENTED: ICD-10-PCS | Mod: CPTII,S$GLB,, | Performed by: PODIATRIST

## 2022-05-30 PROCEDURE — 3060F POS MICROALBUMINURIA REV: CPT | Mod: CPTII,S$GLB,, | Performed by: PODIATRIST

## 2022-05-30 PROCEDURE — 1159F PR MEDICATION LIST DOCUMENTED IN MEDICAL RECORD: ICD-10-PCS | Mod: CPTII,S$GLB,, | Performed by: PODIATRIST

## 2022-05-30 PROCEDURE — 4010F ACE/ARB THERAPY RXD/TAKEN: CPT | Mod: CPTII,S$GLB,, | Performed by: PODIATRIST

## 2022-05-30 PROCEDURE — 99214 PR OFFICE/OUTPT VISIT, EST, LEVL IV, 30-39 MIN: ICD-10-PCS | Mod: S$GLB,,, | Performed by: PODIATRIST

## 2022-05-30 PROCEDURE — 3066F PR DOCUMENTATION OF TREATMENT FOR NEPHROPATHY: ICD-10-PCS | Mod: CPTII,S$GLB,, | Performed by: PODIATRIST

## 2022-05-30 PROCEDURE — 1159F MED LIST DOCD IN RCRD: CPT | Mod: CPTII,S$GLB,, | Performed by: PODIATRIST

## 2022-05-30 PROCEDURE — 99999 PR PBB SHADOW E&M-EST. PATIENT-LVL IV: ICD-10-PCS | Mod: PBBFAC,,, | Performed by: PODIATRIST

## 2022-05-30 PROCEDURE — 3060F PR POS MICROALBUMINURIA RESULT DOCUMENTED/REVIEW: ICD-10-PCS | Mod: CPTII,S$GLB,, | Performed by: PODIATRIST

## 2022-05-31 NOTE — PROGRESS NOTES
Subjective:      Patient ID: Virgilio Acuña is a 62 y.o. male.    Chief Complaint: Diabetes Mellitus and Wound Care (Lg toe)    Virgilio is a 62 y.o. male who presents to the clinic for evaluation and treatment of high risk feet. Virgilio has a past medical history of Cellulitis of left index finger (2/16/2015), Charcot's joint of foot, left (08/2018), Dyslipidemia, Essential hypertension (2/16/2017), Group B streptococcal infection (1/15/2018), Hypotestosteronemia (7/7/2017), Infected finger joint (2/17/2015), Infected skin ulcer limited to breakdown of skin (1/13/2018), MSSA (methicillin susceptible Staphylococcus aureus) (1/13/2018), Obese, S/P knee surgery, medial/lateral menisectomy (11/4/2013), and Type 2 diabetes mellitus with diabetic polyneuropathy, with long-term current use of insulin (2003). Here for concern over a new ulcer left foot great toe, this has progressed over the last 2-3 weeks, there has been some drainage from the area and discoloration to the skin, has charcot with reconstruction to the left side and is high risk. Denies f/c/n/v      PCP: Anne Wright MD    Date Last Seen by PCP:   Current shoe gear:  Affected Foot: Tennis shoes     Unaffected Foot: Tennis shoes    History of Trauma: negative      Hemoglobin A1C   Date Value Ref Range Status   02/16/2022 10.2 (H) 4.0 - 5.6 % Final     Comment:     ADA Screening Guidelines:  5.7-6.4%  Consistent with prediabetes  >or=6.5%  Consistent with diabetes    High levels of fetal hemoglobin interfere with the HbA1C  assay. Heterozygous hemoglobin variants (HbS, HgC, etc)do  not significantly interfere with this assay.   However, presence of multiple variants may affect accuracy.     06/23/2021 9.2 (H) 4.0 - 5.6 % Final     Comment:     ADA Screening Guidelines:  5.7-6.4%  Consistent with prediabetes  >or=6.5%  Consistent with diabetes    High levels of fetal hemoglobin interfere with the HbA1C  assay. Heterozygous hemoglobin variants (HbS, HgC,  etc)do  not significantly interfere with this assay.   However, presence of multiple variants may affect accuracy.     03/16/2021 9.1 (H) 4.0 - 5.6 % Final     Comment:     ADA Screening Guidelines:  5.7-6.4%  Consistent with prediabetes  >or=6.5%  Consistent with diabetes    High levels of fetal hemoglobin interfere with the HbA1C  assay. Heterozygous hemoglobin variants (HbS, HgC, etc)do  not significantly interfere with this assay.   However, presence of multiple variants may affect accuracy.         Review of Systems   Constitutional: Negative for chills and fever.   Cardiovascular: Positive for leg swelling. Negative for claudication.   Respiratory: Negative for shortness of breath.    Skin: Positive for color change, nail changes and poor wound healing. Negative for itching and rash.   Musculoskeletal: Negative for muscle cramps, muscle weakness and myalgias.   Gastrointestinal: Negative for nausea and vomiting.   Neurological: Positive for numbness and paresthesias. Negative for focal weakness and loss of balance.           Objective:       Physical Exam  Constitutional:       General: He is not in acute distress.     Appearance: He is well-developed. He is not diaphoretic.   Cardiovascular:      Pulses:           Dorsalis pedis pulses are 2+ on the right side and 2+ on the left side.        Posterior tibial pulses are 2+ on the right side and 2+ on the left side.      Comments: < 3 sec capillary refill time to toes 1-5 bilateral. Toes and feet are warm to touch proximally with normal distal cooling b/l. There is no hair growth on the feet and toes b/l. There is moderate edema left foot and mild edema right.     Musculoskeletal:      Comments: Left second toe amputation    Left foot now more narrow in appearance with the first ray properly reduced, minimal edema to the foot and no pain with palpation throughout the area of surgery.    Left ankle there is some pain with palpation to the lateral malleolus and  the ATFL area    Equinus noted b/l ankles with < 10 deg DF noted. MMT 5/5 in DF/PF/Inv/Ev resistance with no reproduction of pain in any direction. Passive range of motion of ankle and pedal joints is painless b/l.     Feet:      Right foot:      Protective Sensation: 10 sites tested. 0 sites sensed.      Left foot:      Protective Sensation: 10 sites tested. 0 sites sensed.   Skin:     General: Skin is warm.      Coloration: Skin is not pale.      Findings: No abrasion, bruising, burn, ecchymosis, erythema, laceration, lesion, petechiae or rash.      Nails: There is no clubbing.      Comments: Incision overlying the left second metatarsal base is well healed      Ulcer Location: left plantar hallux  Measurements: 5.5x3x0.1 cm  Periwound: Intact  Drainage: serosanguinous.  Pus: None.  Malodor: None.  Base:  100% granular  Signs of infection: Drainage and some erythema     Neurological:      Mental Status: He is alert and oriented to person, place, and time.      Sensory: Sensory deficit present.      Motor: No tremor, atrophy or abnormal muscle tone.      Comments: Negative tinel sign bilateral.   Psychiatric:         Behavior: Behavior normal.               Assessment:       Encounter Diagnoses   Name Primary?    Charcot's joint of left foot     Diabetic ulcer of left great toe Yes            Plan:       Virgilio was seen today for diabetes mellitus and wound care.    Diagnoses and all orders for this visit:    Diabetic ulcer of left great toe  -     X-Ray Foot Complete Left; Future  -     Aerobic culture  -     Culture, Anaerobic    Charcot's joint of left foot  -     X-Ray Foot Complete Left; Future  -     X-Ray Ankle Complete Left; Future      I counseled the patient on his conditions, their implications and medical management.    Shoe inspection. Diabetic Foot Education. Patient reminded of the importance of good nutrition and blood sugar control to help prevent podiatric complications of diabetes. Patient  instructed on proper foot hygeine. We discussed wearing proper shoe gear, daily foot inspections, never walking without protective shoe gear, never putting sharp instruments to feet    Cleansed ulcer, no debridement necessary    Cultures taken just finished bactrim will wait for cultures to start another antibiotic if necessary    X-rays to be done today, ordered    Dressed in iodosorb and football with darco to offload    Discussed the need to offload and dress it properly he relates he only has a week to be able to take off work for sick time, will reevaluate next week but I have high concerns that if he returns to work before it is healed it will only worsen and end in possible amputation.    Inocente Smith, NATALIAM

## 2022-06-01 LAB — BACTERIA SPEC AEROBE CULT: NORMAL

## 2022-06-03 LAB — BACTERIA SPEC ANAEROBE CULT: NORMAL

## 2022-06-06 ENCOUNTER — PATIENT MESSAGE (OUTPATIENT)
Dept: PODIATRY | Facility: CLINIC | Age: 62
End: 2022-06-06
Payer: COMMERCIAL

## 2022-06-07 ENCOUNTER — HOSPITAL ENCOUNTER (OUTPATIENT)
Dept: RADIOLOGY | Facility: HOSPITAL | Age: 62
Discharge: HOME | End: 2022-06-07
Attending: PODIATRIST
Payer: COMMERCIAL

## 2022-06-07 ENCOUNTER — PATIENT MESSAGE (OUTPATIENT)
Dept: PODIATRY | Facility: CLINIC | Age: 62
End: 2022-06-07
Payer: COMMERCIAL

## 2022-06-07 ENCOUNTER — OFFICE VISIT (OUTPATIENT)
Dept: PODIATRY | Facility: CLINIC | Age: 62
End: 2022-06-07
Payer: COMMERCIAL

## 2022-06-07 DIAGNOSIS — E11.42 DIABETIC POLYNEUROPATHY ASSOCIATED WITH TYPE 2 DIABETES MELLITUS: ICD-10-CM

## 2022-06-07 DIAGNOSIS — M14.672 CHARCOT'S JOINT OF LEFT FOOT: ICD-10-CM

## 2022-06-07 DIAGNOSIS — M14.672 CHARCOT'S JOINT OF LEFT FOOT: Primary | ICD-10-CM

## 2022-06-07 DIAGNOSIS — E11.621 DIABETIC ULCER OF LEFT GREAT TOE: ICD-10-CM

## 2022-06-07 DIAGNOSIS — E11.49 TYPE II DIABETES MELLITUS WITH NEUROLOGICAL MANIFESTATIONS: ICD-10-CM

## 2022-06-07 DIAGNOSIS — L97.529 DIABETIC ULCER OF LEFT GREAT TOE: ICD-10-CM

## 2022-06-07 PROCEDURE — 73630 X-RAY EXAM OF FOOT: CPT | Mod: TC,PO,LT

## 2022-06-07 PROCEDURE — 3060F PR POS MICROALBUMINURIA RESULT DOCUMENTED/REVIEW: ICD-10-PCS | Mod: CPTII,S$GLB,, | Performed by: PODIATRIST

## 2022-06-07 PROCEDURE — 1160F RVW MEDS BY RX/DR IN RCRD: CPT | Mod: CPTII,S$GLB,, | Performed by: PODIATRIST

## 2022-06-07 PROCEDURE — 73610 XR ANKLE COMPLETE 3 VIEW LEFT: ICD-10-PCS | Mod: 26,LT,, | Performed by: RADIOLOGY

## 2022-06-07 PROCEDURE — 73630 X-RAY EXAM OF FOOT: CPT | Mod: 26,LT,, | Performed by: RADIOLOGY

## 2022-06-07 PROCEDURE — 1160F PR REVIEW ALL MEDS BY PRESCRIBER/CLIN PHARMACIST DOCUMENTED: ICD-10-PCS | Mod: CPTII,S$GLB,, | Performed by: PODIATRIST

## 2022-06-07 PROCEDURE — 3046F HEMOGLOBIN A1C LEVEL >9.0%: CPT | Mod: CPTII,S$GLB,, | Performed by: PODIATRIST

## 2022-06-07 PROCEDURE — 99499 NO LOS: ICD-10-PCS | Mod: S$GLB,,, | Performed by: PODIATRIST

## 2022-06-07 PROCEDURE — 11042 DBRDMT SUBQ TIS 1ST 20SQCM/<: CPT | Mod: S$GLB,,, | Performed by: PODIATRIST

## 2022-06-07 PROCEDURE — 3066F PR DOCUMENTATION OF TREATMENT FOR NEPHROPATHY: ICD-10-PCS | Mod: CPTII,S$GLB,, | Performed by: PODIATRIST

## 2022-06-07 PROCEDURE — 3066F NEPHROPATHY DOC TX: CPT | Mod: CPTII,S$GLB,, | Performed by: PODIATRIST

## 2022-06-07 PROCEDURE — 1159F PR MEDICATION LIST DOCUMENTED IN MEDICAL RECORD: ICD-10-PCS | Mod: CPTII,S$GLB,, | Performed by: PODIATRIST

## 2022-06-07 PROCEDURE — 73630 XR FOOT COMPLETE 3 VIEW LEFT: ICD-10-PCS | Mod: 26,LT,, | Performed by: RADIOLOGY

## 2022-06-07 PROCEDURE — 99999 PR PBB SHADOW E&M-EST. PATIENT-LVL IV: CPT | Mod: PBBFAC,,, | Performed by: PODIATRIST

## 2022-06-07 PROCEDURE — 11042 WOUND DEBRIDEMENT: ICD-10-PCS | Mod: S$GLB,,, | Performed by: PODIATRIST

## 2022-06-07 PROCEDURE — 73610 X-RAY EXAM OF ANKLE: CPT | Mod: TC,PO,LT

## 2022-06-07 PROCEDURE — 1159F MED LIST DOCD IN RCRD: CPT | Mod: CPTII,S$GLB,, | Performed by: PODIATRIST

## 2022-06-07 PROCEDURE — 99499 UNLISTED E&M SERVICE: CPT | Mod: S$GLB,,, | Performed by: PODIATRIST

## 2022-06-07 PROCEDURE — 3060F POS MICROALBUMINURIA REV: CPT | Mod: CPTII,S$GLB,, | Performed by: PODIATRIST

## 2022-06-07 PROCEDURE — 4010F ACE/ARB THERAPY RXD/TAKEN: CPT | Mod: CPTII,S$GLB,, | Performed by: PODIATRIST

## 2022-06-07 PROCEDURE — 4010F PR ACE/ARB THEARPY RXD/TAKEN: ICD-10-PCS | Mod: CPTII,S$GLB,, | Performed by: PODIATRIST

## 2022-06-07 PROCEDURE — 3046F PR MOST RECENT HEMOGLOBIN A1C LEVEL > 9.0%: ICD-10-PCS | Mod: CPTII,S$GLB,, | Performed by: PODIATRIST

## 2022-06-07 PROCEDURE — 99999 PR PBB SHADOW E&M-EST. PATIENT-LVL IV: ICD-10-PCS | Mod: PBBFAC,,, | Performed by: PODIATRIST

## 2022-06-07 PROCEDURE — 73610 X-RAY EXAM OF ANKLE: CPT | Mod: 26,LT,, | Performed by: RADIOLOGY

## 2022-06-07 NOTE — PROCEDURES
"Wound Debridement    Date/Time: 6/7/2022 10:15 AM  Performed by: Inocente Smith DPM  Authorized by: Inocente Smith DPM     Time out: Immediately prior to procedure a "time out" was called to verify the correct patient, procedure, equipment, support staff and site/side marked as required.    Consent Done?:  Yes (Verbal)  Local anesthesia used?: No      Wound Details:    Location:  Left foot    Location:  Left 1st Toe    Type of Debridement:  Excisional       Length (cm):  5       Area (sq cm):  15       Width (cm):  3       Percent Debrided (%):  100       Depth (cm):  0.1       Total Area Debrided (sq cm):  15    Depth of debridement:  Subcutaneous tissue    Devitalized tissue debrided:  Biofilm and Slough    Instruments:  Curette    Bleeding:  Minimal  Hemostasis Achieved: Yes    Method Used:  Pressure and Silver Nitrate  Patient tolerance:  Patient tolerated the procedure well with no immediate complications      "

## 2022-06-07 NOTE — PROGRESS NOTES
Subjective:      Patient ID: Virgilio Acuña is a 62 y.o. male.    Chief Complaint: Diabetic Foot Ulcer (Left great toe )    Virgilio is a 62 y.o. male who presents to the clinic for evaluation and treatment of high risk feet. Virgilio has a past medical history of Cellulitis of left index finger (2/16/2015), Charcot's joint of foot, left (08/2018), Dyslipidemia, Essential hypertension (2/16/2017), Group B streptococcal infection (1/15/2018), Hypotestosteronemia (7/7/2017), Infected finger joint (2/17/2015), Infected skin ulcer limited to breakdown of skin (1/13/2018), MSSA (methicillin susceptible Staphylococcus aureus) (1/13/2018), Obese, S/P knee surgery, medial/lateral menisectomy (11/4/2013), and Type 2 diabetes mellitus with diabetic polyneuropathy, with long-term current use of insulin (2003). Here for concern over a new ulcer left foot great toe, this has progressed over the last 2-3 weeks, there has been some drainage from the area and discoloration to the skin, has charcot with reconstruction to the left side and is high risk. Denies f/c/n/v    6/7/22: Patient returns for follow up left great toe ulcer ambulating in darco and football no new complaints.      PCP: Anne Wright MD    Date Last Seen by PCP:   Current shoe gear:  Affected Foot: Tennis shoes     Unaffected Foot: Tennis shoes    History of Trauma: negative      Hemoglobin A1C   Date Value Ref Range Status   02/16/2022 10.2 (H) 4.0 - 5.6 % Final     Comment:     ADA Screening Guidelines:  5.7-6.4%  Consistent with prediabetes  >or=6.5%  Consistent with diabetes    High levels of fetal hemoglobin interfere with the HbA1C  assay. Heterozygous hemoglobin variants (HbS, HgC, etc)do  not significantly interfere with this assay.   However, presence of multiple variants may affect accuracy.     06/23/2021 9.2 (H) 4.0 - 5.6 % Final     Comment:     ADA Screening Guidelines:  5.7-6.4%  Consistent with prediabetes  >or=6.5%  Consistent with diabetes    High  levels of fetal hemoglobin interfere with the HbA1C  assay. Heterozygous hemoglobin variants (HbS, HgC, etc)do  not significantly interfere with this assay.   However, presence of multiple variants may affect accuracy.     03/16/2021 9.1 (H) 4.0 - 5.6 % Final     Comment:     ADA Screening Guidelines:  5.7-6.4%  Consistent with prediabetes  >or=6.5%  Consistent with diabetes    High levels of fetal hemoglobin interfere with the HbA1C  assay. Heterozygous hemoglobin variants (HbS, HgC, etc)do  not significantly interfere with this assay.   However, presence of multiple variants may affect accuracy.         Review of Systems   Constitutional: Negative for chills and fever.   Cardiovascular: Positive for leg swelling. Negative for claudication.   Respiratory: Negative for shortness of breath.    Skin: Positive for color change, nail changes and poor wound healing. Negative for itching and rash.   Musculoskeletal: Negative for muscle cramps, muscle weakness and myalgias.   Gastrointestinal: Negative for nausea and vomiting.   Neurological: Positive for numbness and paresthesias. Negative for focal weakness and loss of balance.           Objective:       Physical Exam  Constitutional:       General: He is not in acute distress.     Appearance: He is well-developed. He is not diaphoretic.   Cardiovascular:      Pulses:           Dorsalis pedis pulses are 2+ on the right side and 2+ on the left side.        Posterior tibial pulses are 2+ on the right side and 2+ on the left side.      Comments: < 3 sec capillary refill time to toes 1-5 bilateral. Toes and feet are warm to touch proximally with normal distal cooling b/l. There is no hair growth on the feet and toes b/l. There is moderate edema left foot and mild edema right.     Musculoskeletal:      Comments: Left second toe amputation    Left foot now more narrow in appearance with the first ray properly reduced, minimal edema to the foot and no pain with palpation  throughout the area of surgery.    Left ankle there is some pain with palpation to the lateral malleolus and the ATFL area    Equinus noted b/l ankles with < 10 deg DF noted. MMT 5/5 in DF/PF/Inv/Ev resistance with no reproduction of pain in any direction. Passive range of motion of ankle and pedal joints is painless b/l.     Feet:      Right foot:      Protective Sensation: 10 sites tested. 0 sites sensed.      Left foot:      Protective Sensation: 10 sites tested. 0 sites sensed.   Skin:     General: Skin is warm.      Coloration: Skin is not pale.      Findings: No abrasion, bruising, burn, ecchymosis, erythema, laceration, lesion, petechiae or rash.      Nails: There is no clubbing.      Comments: Incision overlying the left second metatarsal base is well healed      Ulcer Location: left plantar hallux  Measurements: 5.0x3x0.1 cm  Periwound: Intact  Drainage: serosanguinous.  Pus: None.  Malodor: None.  Base:  100% granular  Signs of infection: None     Neurological:      Mental Status: He is alert and oriented to person, place, and time.      Sensory: Sensory deficit present.      Motor: No tremor, atrophy or abnormal muscle tone.      Comments: Negative tinel sign bilateral.   Psychiatric:         Behavior: Behavior normal.               Assessment:       Encounter Diagnoses   Name Primary?    Charcot's joint of left foot Yes    Diabetic ulcer of left great toe     Diabetic polyneuropathy associated with type 2 diabetes mellitus     Type II diabetes mellitus with neurological manifestations             Plan:       Virgilio was seen today for diabetic foot ulcer.    Diagnoses and all orders for this visit:    Charcot's joint of left foot    Diabetic ulcer of left great toe    Diabetic polyneuropathy associated with type 2 diabetes mellitus    Type II diabetes mellitus with neurological manifestations      I counseled the patient on his conditions, their implications and medical management.    Shoe inspection.  Diabetic Foot Education. Patient reminded of the importance of good nutrition and blood sugar control to help prevent podiatric complications of diabetes. Patient instructed on proper foot hygeine. We discussed wearing proper shoe gear, daily foot inspections, never walking without protective shoe gear, never putting sharp instruments to feet    Ulcer debrided see attached note    Cultures negative    Dressed in iodosorb and football with darco to offload    Discussed the need to offload and dress it properly he relates he only has a week to be able to take off work for sick time, will reevaluate next week but I have high concerns that if he returns to work before it is healed it will only worsen and end in possible amputation. May need to consider disability    Inocente Smith DPM

## 2022-06-08 ENCOUNTER — PATIENT MESSAGE (OUTPATIENT)
Dept: PODIATRY | Facility: CLINIC | Age: 62
End: 2022-06-08
Payer: COMMERCIAL

## 2022-06-10 ENCOUNTER — PATIENT MESSAGE (OUTPATIENT)
Dept: PODIATRY | Facility: CLINIC | Age: 62
End: 2022-06-10
Payer: COMMERCIAL

## 2022-06-13 ENCOUNTER — PATIENT MESSAGE (OUTPATIENT)
Dept: FAMILY MEDICINE | Facility: CLINIC | Age: 62
End: 2022-06-13
Payer: COMMERCIAL

## 2022-06-13 ENCOUNTER — PATIENT MESSAGE (OUTPATIENT)
Dept: PODIATRY | Facility: CLINIC | Age: 62
End: 2022-06-13
Payer: COMMERCIAL

## 2022-06-13 DIAGNOSIS — E11.42 TYPE 2 DIABETES MELLITUS WITH DIABETIC POLYNEUROPATHY, WITH LONG-TERM CURRENT USE OF INSULIN: ICD-10-CM

## 2022-06-13 DIAGNOSIS — Z79.4 TYPE 2 DIABETES MELLITUS WITH DIABETIC POLYNEUROPATHY, WITH LONG-TERM CURRENT USE OF INSULIN: ICD-10-CM

## 2022-06-13 RX ORDER — INSULIN ASPART 100 [IU]/ML
INJECTION, SOLUTION INTRAVENOUS; SUBCUTANEOUS
Qty: 30 ML | Refills: 5 | Status: SHIPPED | OUTPATIENT
Start: 2022-06-13 | End: 2022-06-14 | Stop reason: SDUPTHER

## 2022-06-13 NOTE — TELEPHONE ENCOUNTER
No new care gaps identified.  [facility] Embedded Care Gaps. Reference number: [id number]. 6/13/2022   11:19:12 AM CDT

## 2022-06-14 ENCOUNTER — OFFICE VISIT (OUTPATIENT)
Dept: PODIATRY | Facility: CLINIC | Age: 62
End: 2022-06-14
Payer: COMMERCIAL

## 2022-06-14 ENCOUNTER — PATIENT MESSAGE (OUTPATIENT)
Dept: FAMILY MEDICINE | Facility: CLINIC | Age: 62
End: 2022-06-14
Payer: COMMERCIAL

## 2022-06-14 DIAGNOSIS — L97.529 DIABETIC ULCER OF LEFT GREAT TOE: ICD-10-CM

## 2022-06-14 DIAGNOSIS — E11.621 DIABETIC ULCER OF LEFT GREAT TOE: ICD-10-CM

## 2022-06-14 DIAGNOSIS — E11.49 TYPE II DIABETES MELLITUS WITH NEUROLOGICAL MANIFESTATIONS: ICD-10-CM

## 2022-06-14 DIAGNOSIS — M14.672 CHARCOT'S JOINT OF LEFT FOOT: Primary | ICD-10-CM

## 2022-06-14 DIAGNOSIS — E11.42 DIABETIC POLYNEUROPATHY ASSOCIATED WITH TYPE 2 DIABETES MELLITUS: ICD-10-CM

## 2022-06-14 PROCEDURE — 1160F PR REVIEW ALL MEDS BY PRESCRIBER/CLIN PHARMACIST DOCUMENTED: ICD-10-PCS | Mod: CPTII,S$GLB,, | Performed by: PODIATRIST

## 2022-06-14 PROCEDURE — 99499 UNLISTED E&M SERVICE: CPT | Mod: S$GLB,,, | Performed by: PODIATRIST

## 2022-06-14 PROCEDURE — 1160F RVW MEDS BY RX/DR IN RCRD: CPT | Mod: CPTII,S$GLB,, | Performed by: PODIATRIST

## 2022-06-14 PROCEDURE — 3066F PR DOCUMENTATION OF TREATMENT FOR NEPHROPATHY: ICD-10-PCS | Mod: CPTII,S$GLB,, | Performed by: PODIATRIST

## 2022-06-14 PROCEDURE — 11042 DBRDMT SUBQ TIS 1ST 20SQCM/<: CPT | Mod: TA,S$GLB,, | Performed by: PODIATRIST

## 2022-06-14 PROCEDURE — 3046F PR MOST RECENT HEMOGLOBIN A1C LEVEL > 9.0%: ICD-10-PCS | Mod: CPTII,S$GLB,, | Performed by: PODIATRIST

## 2022-06-14 PROCEDURE — 99999 PR PBB SHADOW E&M-EST. PATIENT-LVL IV: CPT | Mod: PBBFAC,,, | Performed by: PODIATRIST

## 2022-06-14 PROCEDURE — 99999 PR PBB SHADOW E&M-EST. PATIENT-LVL IV: ICD-10-PCS | Mod: PBBFAC,,, | Performed by: PODIATRIST

## 2022-06-14 PROCEDURE — 99499 NO LOS: ICD-10-PCS | Mod: S$GLB,,, | Performed by: PODIATRIST

## 2022-06-14 PROCEDURE — 3046F HEMOGLOBIN A1C LEVEL >9.0%: CPT | Mod: CPTII,S$GLB,, | Performed by: PODIATRIST

## 2022-06-14 PROCEDURE — 1159F PR MEDICATION LIST DOCUMENTED IN MEDICAL RECORD: ICD-10-PCS | Mod: CPTII,S$GLB,, | Performed by: PODIATRIST

## 2022-06-14 PROCEDURE — 3060F POS MICROALBUMINURIA REV: CPT | Mod: CPTII,S$GLB,, | Performed by: PODIATRIST

## 2022-06-14 PROCEDURE — 4010F PR ACE/ARB THEARPY RXD/TAKEN: ICD-10-PCS | Mod: CPTII,S$GLB,, | Performed by: PODIATRIST

## 2022-06-14 PROCEDURE — 11042 WOUND DEBRIDEMENT: ICD-10-PCS | Mod: TA,S$GLB,, | Performed by: PODIATRIST

## 2022-06-14 PROCEDURE — 3066F NEPHROPATHY DOC TX: CPT | Mod: CPTII,S$GLB,, | Performed by: PODIATRIST

## 2022-06-14 PROCEDURE — 4010F ACE/ARB THERAPY RXD/TAKEN: CPT | Mod: CPTII,S$GLB,, | Performed by: PODIATRIST

## 2022-06-14 PROCEDURE — 1159F MED LIST DOCD IN RCRD: CPT | Mod: CPTII,S$GLB,, | Performed by: PODIATRIST

## 2022-06-14 PROCEDURE — 3060F PR POS MICROALBUMINURIA RESULT DOCUMENTED/REVIEW: ICD-10-PCS | Mod: CPTII,S$GLB,, | Performed by: PODIATRIST

## 2022-06-14 RX ORDER — INSULIN ASPART 100 [IU]/ML
INJECTION, SOLUTION INTRAVENOUS; SUBCUTANEOUS
Qty: 30 ML | Refills: 5 | Status: SHIPPED | OUTPATIENT
Start: 2022-06-14 | End: 2022-08-29 | Stop reason: SDUPTHER

## 2022-06-14 NOTE — TELEPHONE ENCOUNTER
No new care gaps identified.  [facility] Embedded Care Gaps. Reference number: [id number]. 6/14/2022   7:35:28 AM CDT

## 2022-06-14 NOTE — PROGRESS NOTES
Subjective:      Patient ID: Virgilio Acuña is a 62 y.o. male.    Chief Complaint: Diabetic Foot Ulcer (Left great toe)    Virgilio is a 62 y.o. male who presents to the clinic for evaluation and treatment of high risk feet. Virgilio has a past medical history of Cellulitis of left index finger (2/16/2015), Charcot's joint of foot, left (08/2018), Dyslipidemia, Essential hypertension (2/16/2017), Group B streptococcal infection (1/15/2018), Hypotestosteronemia (7/7/2017), Infected finger joint (2/17/2015), Infected skin ulcer limited to breakdown of skin (1/13/2018), MSSA (methicillin susceptible Staphylococcus aureus) (1/13/2018), Obese, S/P knee surgery, medial/lateral menisectomy (11/4/2013), and Type 2 diabetes mellitus with diabetic polyneuropathy, with long-term current use of insulin (2003). Here for concern over a new ulcer left foot great toe, this has progressed over the last 2-3 weeks, there has been some drainage from the area and discoloration to the skin, has charcot with reconstruction to the left side and is high risk. Denies f/c/n/v    6/7/22: Patient returns for follow up left great toe ulcer ambulating in darco and football no new complaints.    6/14/22: Patient returns for continued wound care left great toe ulcer no new complaints.       PCP: Anne Wright MD    Date Last Seen by PCP:   Current shoe gear:  Affected Foot: Tennis shoes     Unaffected Foot: Tennis shoes    History of Trauma: negative      Hemoglobin A1C   Date Value Ref Range Status   02/16/2022 10.2 (H) 4.0 - 5.6 % Final     Comment:     ADA Screening Guidelines:  5.7-6.4%  Consistent with prediabetes  >or=6.5%  Consistent with diabetes    High levels of fetal hemoglobin interfere with the HbA1C  assay. Heterozygous hemoglobin variants (HbS, HgC, etc)do  not significantly interfere with this assay.   However, presence of multiple variants may affect accuracy.     06/23/2021 9.2 (H) 4.0 - 5.6 % Final     Comment:     ADA Screening  Guidelines:  5.7-6.4%  Consistent with prediabetes  >or=6.5%  Consistent with diabetes    High levels of fetal hemoglobin interfere with the HbA1C  assay. Heterozygous hemoglobin variants (HbS, HgC, etc)do  not significantly interfere with this assay.   However, presence of multiple variants may affect accuracy.     03/16/2021 9.1 (H) 4.0 - 5.6 % Final     Comment:     ADA Screening Guidelines:  5.7-6.4%  Consistent with prediabetes  >or=6.5%  Consistent with diabetes    High levels of fetal hemoglobin interfere with the HbA1C  assay. Heterozygous hemoglobin variants (HbS, HgC, etc)do  not significantly interfere with this assay.   However, presence of multiple variants may affect accuracy.         Review of Systems   Constitutional: Negative for chills and fever.   Cardiovascular: Positive for leg swelling. Negative for claudication.   Respiratory: Negative for shortness of breath.    Skin: Positive for color change, nail changes and poor wound healing. Negative for itching and rash.   Musculoskeletal: Negative for muscle cramps, muscle weakness and myalgias.   Gastrointestinal: Negative for nausea and vomiting.   Neurological: Positive for numbness and paresthesias. Negative for focal weakness and loss of balance.           Objective:       Physical Exam  Constitutional:       General: He is not in acute distress.     Appearance: He is well-developed. He is not diaphoretic.   Cardiovascular:      Pulses:           Dorsalis pedis pulses are 2+ on the right side and 2+ on the left side.        Posterior tibial pulses are 2+ on the right side and 2+ on the left side.      Comments: < 3 sec capillary refill time to toes 1-5 bilateral. Toes and feet are warm to touch proximally with normal distal cooling b/l. There is no hair growth on the feet and toes b/l. There is moderate edema left foot and mild edema right.     Musculoskeletal:      Comments: Left second toe amputation    Left foot now more narrow in appearance  with the first ray properly reduced, minimal edema to the foot and no pain with palpation throughout the area of surgery.    Left ankle there is some pain with palpation to the lateral malleolus and the ATFL area    Equinus noted b/l ankles with < 10 deg DF noted. MMT 5/5 in DF/PF/Inv/Ev resistance with no reproduction of pain in any direction. Passive range of motion of ankle and pedal joints is painless b/l.     Feet:      Right foot:      Protective Sensation: 10 sites tested. 0 sites sensed.      Left foot:      Protective Sensation: 10 sites tested. 0 sites sensed.   Skin:     General: Skin is warm.      Coloration: Skin is not pale.      Findings: No abrasion, bruising, burn, ecchymosis, erythema, laceration, lesion, petechiae or rash.      Nails: There is no clubbing.      Comments: Incision overlying the left second metatarsal base is well healed      Ulcer Location: left plantar hallux  Measurements: pre 4.5x2.8x0.1 post: 4.7x3.0x0.1 cm  Periwound: Intact  Drainage: serosanguinous.  Pus: None.  Malodor: None.  Base:  100% granular  Signs of infection: None     Neurological:      Mental Status: He is alert and oriented to person, place, and time.      Sensory: Sensory deficit present.      Motor: No tremor, atrophy or abnormal muscle tone.      Comments: Negative tinel sign bilateral.   Psychiatric:         Behavior: Behavior normal.               Assessment:       Encounter Diagnoses   Name Primary?    Charcot's joint of left foot Yes    Diabetic ulcer of left great toe     Diabetic polyneuropathy associated with type 2 diabetes mellitus     Type II diabetes mellitus with neurological manifestations             Plan:       Virgilio was seen today for diabetic foot ulcer.    Diagnoses and all orders for this visit:    Charcot's joint of left foot  -     Wound Debridement    Diabetic ulcer of left great toe  -     Wound Debridement    Diabetic polyneuropathy associated with type 2 diabetes mellitus    Type  II diabetes mellitus with neurological manifestations      I counseled the patient on his conditions, their implications and medical management.    Shoe inspection. Diabetic Foot Education. Patient reminded of the importance of good nutrition and blood sugar control to help prevent podiatric complications of diabetes. Patient instructed on proper foot hygeine. We discussed wearing proper shoe gear, daily foot inspections, never walking without protective shoe gear, never putting sharp instruments to feet    Ulcer debrided see attached note    Dressed in iodosorb and football with darco to offload    Discussed the need to offload and dress it properly he relates he only has a week to be able to take off work for sick time, will reevaluate next week but I have high concerns that if he returns to work before it is healed it will only worsen and end in possible amputation. May need to consider disability    Inocente Smith DPM

## 2022-06-14 NOTE — PROCEDURES
"Wound Debridement    Date/Time: 6/14/2022 9:00 AM  Performed by: Inocente Smith DPM  Authorized by: Inocente Smith DPM     Time out: Immediately prior to procedure a "time out" was called to verify the correct patient, procedure, equipment, support staff and site/side marked as required.    Consent Done?:  Yes (Verbal)  Local anesthesia used?: No      Wound Details:    Location:  Left foot    Location:  Left 1st Toe    Type of Debridement:  Excisional       Length (cm):  4.7       Area (sq cm):  14.1       Width (cm):  3       Percent Debrided (%):  100       Depth (cm):  0.1       Total Area Debrided (sq cm):  14.1    Depth of debridement:  Subcutaneous tissue    Devitalized tissue debrided:  Biofilm and Slough    Instruments:  Curette    Bleeding:  Minimal  Hemostasis Achieved: Yes    Method Used:  Pressure  Patient tolerance:  Patient tolerated the procedure well with no immediate complications      "

## 2022-06-21 ENCOUNTER — OFFICE VISIT (OUTPATIENT)
Dept: PODIATRY | Facility: CLINIC | Age: 62
End: 2022-06-21
Payer: COMMERCIAL

## 2022-06-21 ENCOUNTER — PATIENT MESSAGE (OUTPATIENT)
Dept: FAMILY MEDICINE | Facility: CLINIC | Age: 62
End: 2022-06-21
Payer: COMMERCIAL

## 2022-06-21 DIAGNOSIS — E11.49 TYPE II DIABETES MELLITUS WITH NEUROLOGICAL MANIFESTATIONS: ICD-10-CM

## 2022-06-21 DIAGNOSIS — M14.672 CHARCOT'S JOINT OF LEFT FOOT: ICD-10-CM

## 2022-06-21 DIAGNOSIS — E11.42 DIABETIC POLYNEUROPATHY ASSOCIATED WITH TYPE 2 DIABETES MELLITUS: ICD-10-CM

## 2022-06-21 DIAGNOSIS — L97.529 DIABETIC ULCER OF LEFT GREAT TOE: Primary | ICD-10-CM

## 2022-06-21 DIAGNOSIS — K21.9 GASTROESOPHAGEAL REFLUX DISEASE: ICD-10-CM

## 2022-06-21 DIAGNOSIS — E11.621 DIABETIC ULCER OF LEFT GREAT TOE: Primary | ICD-10-CM

## 2022-06-21 PROCEDURE — 99213 PR OFFICE/OUTPT VISIT, EST, LEVL III, 20-29 MIN: ICD-10-PCS | Mod: 25,S$GLB,, | Performed by: PODIATRIST

## 2022-06-21 PROCEDURE — 3066F PR DOCUMENTATION OF TREATMENT FOR NEPHROPATHY: ICD-10-PCS | Mod: CPTII,S$GLB,, | Performed by: PODIATRIST

## 2022-06-21 PROCEDURE — 1160F PR REVIEW ALL MEDS BY PRESCRIBER/CLIN PHARMACIST DOCUMENTED: ICD-10-PCS | Mod: CPTII,S$GLB,, | Performed by: PODIATRIST

## 2022-06-21 PROCEDURE — 3046F HEMOGLOBIN A1C LEVEL >9.0%: CPT | Mod: CPTII,S$GLB,, | Performed by: PODIATRIST

## 2022-06-21 PROCEDURE — 11042 WOUND DEBRIDEMENT: ICD-10-PCS | Mod: TA,S$GLB,, | Performed by: PODIATRIST

## 2022-06-21 PROCEDURE — 87186 SC STD MICRODIL/AGAR DIL: CPT | Performed by: PODIATRIST

## 2022-06-21 PROCEDURE — 4010F PR ACE/ARB THEARPY RXD/TAKEN: ICD-10-PCS | Mod: CPTII,S$GLB,, | Performed by: PODIATRIST

## 2022-06-21 PROCEDURE — 3060F PR POS MICROALBUMINURIA RESULT DOCUMENTED/REVIEW: ICD-10-PCS | Mod: CPTII,S$GLB,, | Performed by: PODIATRIST

## 2022-06-21 PROCEDURE — 1159F MED LIST DOCD IN RCRD: CPT | Mod: CPTII,S$GLB,, | Performed by: PODIATRIST

## 2022-06-21 PROCEDURE — 3046F PR MOST RECENT HEMOGLOBIN A1C LEVEL > 9.0%: ICD-10-PCS | Mod: CPTII,S$GLB,, | Performed by: PODIATRIST

## 2022-06-21 PROCEDURE — 87075 CULTR BACTERIA EXCEPT BLOOD: CPT | Performed by: PODIATRIST

## 2022-06-21 PROCEDURE — 1160F RVW MEDS BY RX/DR IN RCRD: CPT | Mod: CPTII,S$GLB,, | Performed by: PODIATRIST

## 2022-06-21 PROCEDURE — 99213 OFFICE O/P EST LOW 20 MIN: CPT | Mod: 25,S$GLB,, | Performed by: PODIATRIST

## 2022-06-21 PROCEDURE — 1159F PR MEDICATION LIST DOCUMENTED IN MEDICAL RECORD: ICD-10-PCS | Mod: CPTII,S$GLB,, | Performed by: PODIATRIST

## 2022-06-21 PROCEDURE — 87077 CULTURE AEROBIC IDENTIFY: CPT | Mod: 59 | Performed by: PODIATRIST

## 2022-06-21 PROCEDURE — 3066F NEPHROPATHY DOC TX: CPT | Mod: CPTII,S$GLB,, | Performed by: PODIATRIST

## 2022-06-21 PROCEDURE — 4010F ACE/ARB THERAPY RXD/TAKEN: CPT | Mod: CPTII,S$GLB,, | Performed by: PODIATRIST

## 2022-06-21 PROCEDURE — 87070 CULTURE OTHR SPECIMN AEROBIC: CPT | Performed by: PODIATRIST

## 2022-06-21 PROCEDURE — 99999 PR PBB SHADOW E&M-EST. PATIENT-LVL II: CPT | Mod: PBBFAC,,, | Performed by: PODIATRIST

## 2022-06-21 PROCEDURE — 3060F POS MICROALBUMINURIA REV: CPT | Mod: CPTII,S$GLB,, | Performed by: PODIATRIST

## 2022-06-21 PROCEDURE — 99999 PR PBB SHADOW E&M-EST. PATIENT-LVL II: ICD-10-PCS | Mod: PBBFAC,,, | Performed by: PODIATRIST

## 2022-06-21 PROCEDURE — 11042 DBRDMT SUBQ TIS 1ST 20SQCM/<: CPT | Mod: TA,S$GLB,, | Performed by: PODIATRIST

## 2022-06-21 RX ORDER — DOXYCYCLINE HYCLATE 100 MG
100 TABLET ORAL 2 TIMES DAILY
Qty: 14 TABLET | Refills: 0 | Status: SHIPPED | OUTPATIENT
Start: 2022-06-21 | End: 2022-06-28

## 2022-06-21 RX ORDER — PANTOPRAZOLE SODIUM 40 MG/1
40 TABLET, DELAYED RELEASE ORAL DAILY
Qty: 90 TABLET | Refills: 3 | Status: SHIPPED | OUTPATIENT
Start: 2022-06-21 | End: 2022-08-29 | Stop reason: SDUPTHER

## 2022-06-21 RX ORDER — PANTOPRAZOLE SODIUM 40 MG/1
TABLET, DELAYED RELEASE ORAL
Qty: 90 TABLET | Refills: 3 | Status: SHIPPED | OUTPATIENT
Start: 2022-06-21 | End: 2022-06-21 | Stop reason: SDUPTHER

## 2022-06-21 NOTE — TELEPHONE ENCOUNTER
No new care gaps identified.  [facility] Embedded Care Gaps. Reference number: [id number]. 6/21/2022   1:20:14 PM CDT

## 2022-06-21 NOTE — PROCEDURES
"Wound Debridement    Date/Time: 6/21/2022 9:30 AM  Performed by: Inocente Smith DPM  Authorized by: Inocente Smith DPM     Time out: Immediately prior to procedure a "time out" was called to verify the correct patient, procedure, equipment, support staff and site/side marked as required.    Consent Done?:  Yes (Verbal)  Local anesthesia used?: No      Wound Details:    Location:  Left foot    Location:  Left 1st Toe    Type of Debridement:  Excisional       Length (cm):  4.7       Area (sq cm):  11.75       Width (cm):  2.5       Percent Debrided (%):  100       Depth (cm):  0.1       Total Area Debrided (sq cm):  11.75    Depth of debridement:  Subcutaneous tissue    Devitalized tissue debrided:  Slough and Biofilm    Instruments:  Curette    Bleeding:  Minimal  Hemostasis Achieved: Yes    Method Used:  Pressure  Patient tolerance:  Patient tolerated the procedure well with no immediate complications      "

## 2022-06-21 NOTE — PROGRESS NOTES
Subjective:      Patient ID: Virgilio Acuña is a 62 y.o. male.    Chief Complaint: Diabetic Foot Ulcer (Left foot)    Virgilio is a 62 y.o. male who presents to the clinic for evaluation and treatment of high risk feet. Virgilio has a past medical history of Cellulitis of left index finger (2/16/2015), Charcot's joint of foot, left (08/2018), Dyslipidemia, Essential hypertension (2/16/2017), Group B streptococcal infection (1/15/2018), Hypotestosteronemia (7/7/2017), Infected finger joint (2/17/2015), Infected skin ulcer limited to breakdown of skin (1/13/2018), MSSA (methicillin susceptible Staphylococcus aureus) (1/13/2018), Obese, S/P knee surgery, medial/lateral menisectomy (11/4/2013), and Type 2 diabetes mellitus with diabetic polyneuropathy, with long-term current use of insulin (2003). Here for concern over a new ulcer left foot great toe, this has progressed over the last 2-3 weeks, there has been some drainage from the area and discoloration to the skin, has charcot with reconstruction to the left side and is high risk. Denies f/c/n/v    6/7/22: Patient returns for follow up left great toe ulcer ambulating in darco and football no new complaints.    6/14/22: Patient returns for continued wound care left great toe ulcer no new complaints.     6/21/22: Patient returns for wound care left great toe, ambulating in football and darco no new complaints. He feels there is a malodor to the wound      PCP: Anne Wright MD    Date Last Seen by PCP:   Current shoe gear:  Affected Foot: Tennis shoes     Unaffected Foot: Tennis shoes    History of Trauma: negative      Hemoglobin A1C   Date Value Ref Range Status   02/16/2022 10.2 (H) 4.0 - 5.6 % Final     Comment:     ADA Screening Guidelines:  5.7-6.4%  Consistent with prediabetes  >or=6.5%  Consistent with diabetes    High levels of fetal hemoglobin interfere with the HbA1C  assay. Heterozygous hemoglobin variants (HbS, HgC, etc)do  not significantly interfere with  this assay.   However, presence of multiple variants may affect accuracy.     06/23/2021 9.2 (H) 4.0 - 5.6 % Final     Comment:     ADA Screening Guidelines:  5.7-6.4%  Consistent with prediabetes  >or=6.5%  Consistent with diabetes    High levels of fetal hemoglobin interfere with the HbA1C  assay. Heterozygous hemoglobin variants (HbS, HgC, etc)do  not significantly interfere with this assay.   However, presence of multiple variants may affect accuracy.     03/16/2021 9.1 (H) 4.0 - 5.6 % Final     Comment:     ADA Screening Guidelines:  5.7-6.4%  Consistent with prediabetes  >or=6.5%  Consistent with diabetes    High levels of fetal hemoglobin interfere with the HbA1C  assay. Heterozygous hemoglobin variants (HbS, HgC, etc)do  not significantly interfere with this assay.   However, presence of multiple variants may affect accuracy.         Review of Systems   Constitutional: Negative for chills and fever.   Cardiovascular: Positive for leg swelling. Negative for claudication.   Respiratory: Negative for shortness of breath.    Skin: Positive for color change, nail changes and poor wound healing. Negative for itching and rash.   Musculoskeletal: Negative for muscle cramps, muscle weakness and myalgias.   Gastrointestinal: Negative for nausea and vomiting.   Neurological: Positive for numbness and paresthesias. Negative for focal weakness and loss of balance.           Objective:       Physical Exam  Constitutional:       General: He is not in acute distress.     Appearance: He is well-developed. He is not diaphoretic.   Cardiovascular:      Pulses:           Dorsalis pedis pulses are 2+ on the right side and 2+ on the left side.        Posterior tibial pulses are 2+ on the right side and 2+ on the left side.      Comments: < 3 sec capillary refill time to toes 1-5 bilateral. Toes and feet are warm to touch proximally with normal distal cooling b/l. There is no hair growth on the feet and toes b/l. There is moderate  edema left foot and mild edema right.     Musculoskeletal:      Comments: Left second toe amputation    Left foot now more narrow in appearance with the first ray properly reduced, minimal edema to the foot and no pain with palpation throughout the area of surgery.    Left ankle there is some pain with palpation to the lateral malleolus and the ATFL area    Equinus noted b/l ankles with < 10 deg DF noted. MMT 5/5 in DF/PF/Inv/Ev resistance with no reproduction of pain in any direction. Passive range of motion of ankle and pedal joints is painless b/l.     Feet:      Right foot:      Protective Sensation: 10 sites tested. 0 sites sensed.      Left foot:      Protective Sensation: 10 sites tested. 0 sites sensed.   Skin:     General: Skin is warm.      Coloration: Skin is not pale.      Findings: No abrasion, bruising, burn, ecchymosis, erythema, laceration, lesion, petechiae or rash.      Nails: There is no clubbing.      Comments: Incision overlying the left second metatarsal base is well healed      Ulcer Location: left plantar hallux  Measurements: pre 4.5x2.3x0.1 post: 4.7x2.5x0.1 cm  Periwound: Intact  Drainage: serosanguinous.  Pus: None.  Malodor: None.  Base:  100% granular  Signs of infection: None     Neurological:      Mental Status: He is alert and oriented to person, place, and time.      Sensory: Sensory deficit present.      Motor: No tremor, atrophy or abnormal muscle tone.      Comments: Negative tinel sign bilateral.   Psychiatric:         Behavior: Behavior normal.               Assessment:       Encounter Diagnoses   Name Primary?    Diabetic ulcer of left great toe Yes    Type II diabetes mellitus with neurological manifestations     Charcot's joint of left foot     Diabetic polyneuropathy associated with type 2 diabetes mellitus             Plan:       Virgilio was seen today for diabetic foot ulcer.    Diagnoses and all orders for this visit:    Diabetic ulcer of left great toe  -     Aerobic  culture  -     Culture, Anaerobic  -     Skin Substitute Authorization  -     Wound Debridement    Type II diabetes mellitus with neurological manifestations    Charcot's joint of left foot    Diabetic polyneuropathy associated with type 2 diabetes mellitus    Other orders  -     doxycycline (VIBRA-TABS) 100 MG tablet; Take 1 tablet (100 mg total) by mouth 2 (two) times daily. for 7 days      I counseled the patient on his conditions, their implications and medical management.    Shoe inspection. Diabetic Foot Education. Patient reminded of the importance of good nutrition and blood sugar control to help prevent podiatric complications of diabetes. Patient instructed on proper foot hygeine. We discussed wearing proper shoe gear, daily foot inspections, never walking without protective shoe gear, never putting sharp instruments to feet    Ulcer debrided see attached note    Dressed in iodosorb and football with darco to offload    I will order a wound graft to assist in healing of this wound, put in for authorization today once it is authorized by his insurance the graft will be ordered    Inocente Smith DPM

## 2022-06-23 ENCOUNTER — PATIENT MESSAGE (OUTPATIENT)
Dept: PODIATRY | Facility: CLINIC | Age: 62
End: 2022-06-23
Payer: COMMERCIAL

## 2022-06-24 ENCOUNTER — PATIENT MESSAGE (OUTPATIENT)
Dept: PODIATRY | Facility: CLINIC | Age: 62
End: 2022-06-24
Payer: COMMERCIAL

## 2022-06-24 LAB
BACTERIA SPEC AEROBE CULT: ABNORMAL
BACTERIA SPEC AEROBE CULT: ABNORMAL

## 2022-06-27 LAB — BACTERIA SPEC ANAEROBE CULT: NORMAL

## 2022-06-28 ENCOUNTER — OFFICE VISIT (OUTPATIENT)
Dept: PODIATRY | Facility: CLINIC | Age: 62
End: 2022-06-28
Payer: COMMERCIAL

## 2022-06-28 DIAGNOSIS — E11.621 DIABETIC ULCER OF LEFT GREAT TOE: Primary | ICD-10-CM

## 2022-06-28 DIAGNOSIS — M14.672 CHARCOT'S JOINT OF LEFT FOOT: ICD-10-CM

## 2022-06-28 DIAGNOSIS — E11.49 TYPE II DIABETES MELLITUS WITH NEUROLOGICAL MANIFESTATIONS: ICD-10-CM

## 2022-06-28 DIAGNOSIS — L97.529 DIABETIC ULCER OF LEFT GREAT TOE: Primary | ICD-10-CM

## 2022-06-28 PROCEDURE — 3066F NEPHROPATHY DOC TX: CPT | Mod: CPTII,S$GLB,, | Performed by: PODIATRIST

## 2022-06-28 PROCEDURE — 99999 PR PBB SHADOW E&M-EST. PATIENT-LVL IV: CPT | Mod: PBBFAC,,, | Performed by: PODIATRIST

## 2022-06-28 PROCEDURE — 99499 NO LOS: ICD-10-PCS | Mod: S$GLB,,, | Performed by: PODIATRIST

## 2022-06-28 PROCEDURE — 3046F HEMOGLOBIN A1C LEVEL >9.0%: CPT | Mod: CPTII,S$GLB,, | Performed by: PODIATRIST

## 2022-06-28 PROCEDURE — 4010F PR ACE/ARB THEARPY RXD/TAKEN: ICD-10-PCS | Mod: CPTII,S$GLB,, | Performed by: PODIATRIST

## 2022-06-28 PROCEDURE — 1160F PR REVIEW ALL MEDS BY PRESCRIBER/CLIN PHARMACIST DOCUMENTED: ICD-10-PCS | Mod: CPTII,S$GLB,, | Performed by: PODIATRIST

## 2022-06-28 PROCEDURE — 3060F POS MICROALBUMINURIA REV: CPT | Mod: CPTII,S$GLB,, | Performed by: PODIATRIST

## 2022-06-28 PROCEDURE — 3046F PR MOST RECENT HEMOGLOBIN A1C LEVEL > 9.0%: ICD-10-PCS | Mod: CPTII,S$GLB,, | Performed by: PODIATRIST

## 2022-06-28 PROCEDURE — 4010F ACE/ARB THERAPY RXD/TAKEN: CPT | Mod: CPTII,S$GLB,, | Performed by: PODIATRIST

## 2022-06-28 PROCEDURE — 15275 PR SKIN SUB GRAFT FACE/NK/HF/G UP TO 100 SQCM: ICD-10-PCS | Mod: S$GLB,,, | Performed by: PODIATRIST

## 2022-06-28 PROCEDURE — 99999 PR PBB SHADOW E&M-EST. PATIENT-LVL IV: ICD-10-PCS | Mod: PBBFAC,,, | Performed by: PODIATRIST

## 2022-06-28 PROCEDURE — 99499 UNLISTED E&M SERVICE: CPT | Mod: S$GLB,,, | Performed by: PODIATRIST

## 2022-06-28 PROCEDURE — 1159F MED LIST DOCD IN RCRD: CPT | Mod: CPTII,S$GLB,, | Performed by: PODIATRIST

## 2022-06-28 PROCEDURE — 1159F PR MEDICATION LIST DOCUMENTED IN MEDICAL RECORD: ICD-10-PCS | Mod: CPTII,S$GLB,, | Performed by: PODIATRIST

## 2022-06-28 PROCEDURE — 3066F PR DOCUMENTATION OF TREATMENT FOR NEPHROPATHY: ICD-10-PCS | Mod: CPTII,S$GLB,, | Performed by: PODIATRIST

## 2022-06-28 PROCEDURE — 15275 SKIN SUB GRAFT FACE/NK/HF/G: CPT | Mod: S$GLB,,, | Performed by: PODIATRIST

## 2022-06-28 PROCEDURE — 3060F PR POS MICROALBUMINURIA RESULT DOCUMENTED/REVIEW: ICD-10-PCS | Mod: CPTII,S$GLB,, | Performed by: PODIATRIST

## 2022-06-28 PROCEDURE — 1160F RVW MEDS BY RX/DR IN RCRD: CPT | Mod: CPTII,S$GLB,, | Performed by: PODIATRIST

## 2022-06-30 ENCOUNTER — PATIENT MESSAGE (OUTPATIENT)
Dept: ADMINISTRATIVE | Facility: HOSPITAL | Age: 62
End: 2022-06-30
Payer: COMMERCIAL

## 2022-06-30 ENCOUNTER — PATIENT OUTREACH (OUTPATIENT)
Dept: ADMINISTRATIVE | Facility: HOSPITAL | Age: 62
End: 2022-06-30
Payer: COMMERCIAL

## 2022-06-30 NOTE — PROGRESS NOTES
Subjective:      Patient ID: Virgilio Acuña is a 62 y.o. male.    Chief Complaint: Diabetic Foot Ulcer (Left great toe)    Virgilio is a 62 y.o. male who presents to the clinic for evaluation and treatment of high risk feet. Virgilio has a past medical history of Cellulitis of left index finger (2/16/2015), Charcot's joint of foot, left (08/2018), Dyslipidemia, Essential hypertension (2/16/2017), Group B streptococcal infection (1/15/2018), Hypotestosteronemia (7/7/2017), Infected finger joint (2/17/2015), Infected skin ulcer limited to breakdown of skin (1/13/2018), MSSA (methicillin susceptible Staphylococcus aureus) (1/13/2018), Obese, S/P knee surgery, medial/lateral menisectomy (11/4/2013), and Type 2 diabetes mellitus with diabetic polyneuropathy, with long-term current use of insulin (2003). Here for concern over a new ulcer left foot great toe, this has progressed over the last 2-3 weeks, there has been some drainage from the area and discoloration to the skin, has charcot with reconstruction to the left side and is high risk. Denies f/c/n/v    6/7/22: Patient returns for follow up left great toe ulcer ambulating in darco and football no new complaints.    6/14/22: Patient returns for continued wound care left great toe ulcer no new complaints.     6/21/22: Patient returns for wound care left great toe, ambulating in football and darco no new complaints. He feels there is a malodor to the wound    6/28/22: Patient returns for wound care left great toe ambulating I darco no new complaints, graft is here today for application      PCP: Anne Wright MD    Date Last Seen by PCP:   Current shoe gear:  Affected Foot: Tennis shoes     Unaffected Foot: Tennis shoes    History of Trauma: negative      Hemoglobin A1C   Date Value Ref Range Status   02/16/2022 10.2 (H) 4.0 - 5.6 % Final     Comment:     ADA Screening Guidelines:  5.7-6.4%  Consistent with prediabetes  >or=6.5%  Consistent with diabetes    High levels  of fetal hemoglobin interfere with the HbA1C  assay. Heterozygous hemoglobin variants (HbS, HgC, etc)do  not significantly interfere with this assay.   However, presence of multiple variants may affect accuracy.     06/23/2021 9.2 (H) 4.0 - 5.6 % Final     Comment:     ADA Screening Guidelines:  5.7-6.4%  Consistent with prediabetes  >or=6.5%  Consistent with diabetes    High levels of fetal hemoglobin interfere with the HbA1C  assay. Heterozygous hemoglobin variants (HbS, HgC, etc)do  not significantly interfere with this assay.   However, presence of multiple variants may affect accuracy.     03/16/2021 9.1 (H) 4.0 - 5.6 % Final     Comment:     ADA Screening Guidelines:  5.7-6.4%  Consistent with prediabetes  >or=6.5%  Consistent with diabetes    High levels of fetal hemoglobin interfere with the HbA1C  assay. Heterozygous hemoglobin variants (HbS, HgC, etc)do  not significantly interfere with this assay.   However, presence of multiple variants may affect accuracy.         Review of Systems   Constitutional: Negative for chills and fever.   Cardiovascular: Positive for leg swelling. Negative for claudication.   Respiratory: Negative for shortness of breath.    Skin: Positive for color change, nail changes and poor wound healing. Negative for itching and rash.   Musculoskeletal: Negative for muscle cramps, muscle weakness and myalgias.   Gastrointestinal: Negative for nausea and vomiting.   Neurological: Positive for numbness and paresthesias. Negative for focal weakness and loss of balance.           Objective:       Physical Exam  Constitutional:       General: He is not in acute distress.     Appearance: He is well-developed. He is not diaphoretic.   Cardiovascular:      Pulses:           Dorsalis pedis pulses are 2+ on the right side and 2+ on the left side.        Posterior tibial pulses are 2+ on the right side and 2+ on the left side.      Comments: < 3 sec capillary refill time to toes 1-5 bilateral. Toes  and feet are warm to touch proximally with normal distal cooling b/l. There is no hair growth on the feet and toes b/l. There is moderate edema left foot and mild edema right.     Musculoskeletal:      Comments: Left second toe amputation    Left foot now more narrow in appearance with the first ray properly reduced, minimal edema to the foot and no pain with palpation throughout the area of surgery.    Left ankle there is some pain with palpation to the lateral malleolus and the ATFL area    Equinus noted b/l ankles with < 10 deg DF noted. MMT 5/5 in DF/PF/Inv/Ev resistance with no reproduction of pain in any direction. Passive range of motion of ankle and pedal joints is painless b/l.     Feet:      Right foot:      Protective Sensation: 10 sites tested. 0 sites sensed.      Left foot:      Protective Sensation: 10 sites tested. 0 sites sensed.   Skin:     General: Skin is warm.      Coloration: Skin is not pale.      Findings: No abrasion, bruising, burn, ecchymosis, erythema, laceration, lesion, petechiae or rash.      Nails: There is no clubbing.      Comments: Incision overlying the left second metatarsal base is well healed      Ulcer Location: left plantar hallux  Measurements: pre 4.1x2.1x0.1 post: 4.3x2.2x0.1 cm  Periwound: Intact  Drainage: serosanguinous.  Pus: None.  Malodor: None.  Base:  100% granular  Signs of infection: None     Neurological:      Mental Status: He is alert and oriented to person, place, and time.      Sensory: Sensory deficit present.      Motor: No tremor, atrophy or abnormal muscle tone.      Comments: Negative tinel sign bilateral.   Psychiatric:         Behavior: Behavior normal.               Assessment:       Encounter Diagnoses   Name Primary?    Diabetic ulcer of left great toe Yes    Type II diabetes mellitus with neurological manifestations     Charcot's joint of left foot             Plan:       Virgilio was seen today for diabetic foot ulcer.    Diagnoses and all orders  for this visit:    Diabetic ulcer of left great toe    Type II diabetes mellitus with neurological manifestations    Charcot's joint of left foot      I counseled the patient on his conditions, their implications and medical management.    Shoe inspection. Diabetic Foot Education. Patient reminded of the importance of good nutrition and blood sugar control to help prevent podiatric complications of diabetes. Patient instructed on proper foot hygeine. We discussed wearing proper shoe gear, daily foot inspections, never walking without protective shoe gear, never putting sharp instruments to feet    [facility] GRAFT OP REPORT     SURGEON: Inocente Smith DPM  ASSISTANT: None  PRE-OP DX: Surgical dehiscence left lateral foot.  POST-OP DX: same.  ANESTHESIA: Pt. has loss of sensation and therefore no anesthesia was required.  HEMOSTASIS: None  EBL: less than 1 cc.        Total Size of Graft : 2.25 cm²   Total amount used:   2.25 cm²             Time Out performed to verify patient and site and verbal consent obtained.     Procedure: The patient was seen in the clinic room and placed in supine position on the treatment table.  Attention was directed to the ulcer which was located on the left great toe, where an ulceration measuring 4.3x2.2x0.1 cm is noted.  The ulceration was covered with 100% granular tissue under a layer of biofilm and slough.  No acute signs of infection were noted.  The wound is nontender.  Utilizing a sterile curette, I debrided the wound full-thickness through subcutaneous tissue to excise biofilm and slough.  Patient tolerated well.  The wound was flushed with sterile saline.  There was minimal bleeding with debridement which was well controlled with direct pressure.  The  [employer] graft was then inspected and noted to be  acceptable.  It was then removed sterilely from its packaging.     The [facility] Graft  was adhered down to the wound ensuring proper placement with correct side down and then  secured in place with 1/16 inch Steri-Strips.  It was then covered with a non-adherent layer followed by a silver foam dressing and covered in a football wrap and offloaded in darco.  The patient having tolerated the procedure well left the clinic with all vital signs stable and vascular status intact to all digits of both feet.      Short-term goals including promoting granulation and epithelialization of the wound. Long term goals include maintaining a healed wound with appropriate offloading and good medical management per internist.    Return in 1 week for follow up, will order a second graft for application    Inocente Smith DPM

## 2022-06-30 NOTE — PROGRESS NOTES
DM Report: Attempted to contact the patient to schedule recheck of Hemoglobin A1c, no answer, no voicemail, portal message sent.

## 2022-07-06 ENCOUNTER — OFFICE VISIT (OUTPATIENT)
Dept: PODIATRY | Facility: CLINIC | Age: 62
End: 2022-07-06
Payer: COMMERCIAL

## 2022-07-06 DIAGNOSIS — E11.621 DIABETIC ULCER OF LEFT GREAT TOE: Primary | ICD-10-CM

## 2022-07-06 DIAGNOSIS — E11.49 TYPE II DIABETES MELLITUS WITH NEUROLOGICAL MANIFESTATIONS: ICD-10-CM

## 2022-07-06 DIAGNOSIS — L97.529 DIABETIC ULCER OF LEFT GREAT TOE: Primary | ICD-10-CM

## 2022-07-06 DIAGNOSIS — M14.672 CHARCOT'S JOINT OF LEFT FOOT: ICD-10-CM

## 2022-07-06 PROCEDURE — 15275 PR SKIN SUB GRAFT FACE/NK/HF/G UP TO 100 SQCM: ICD-10-PCS | Mod: S$GLB,,, | Performed by: PODIATRIST

## 2022-07-06 PROCEDURE — 3046F PR MOST RECENT HEMOGLOBIN A1C LEVEL > 9.0%: ICD-10-PCS | Mod: CPTII,S$GLB,, | Performed by: PODIATRIST

## 2022-07-06 PROCEDURE — 3046F HEMOGLOBIN A1C LEVEL >9.0%: CPT | Mod: CPTII,S$GLB,, | Performed by: PODIATRIST

## 2022-07-06 PROCEDURE — 1160F PR REVIEW ALL MEDS BY PRESCRIBER/CLIN PHARMACIST DOCUMENTED: ICD-10-PCS | Mod: CPTII,S$GLB,, | Performed by: PODIATRIST

## 2022-07-06 PROCEDURE — 4010F PR ACE/ARB THEARPY RXD/TAKEN: ICD-10-PCS | Mod: CPTII,S$GLB,, | Performed by: PODIATRIST

## 2022-07-06 PROCEDURE — 99999 PR PBB SHADOW E&M-EST. PATIENT-LVL IV: CPT | Mod: PBBFAC,,, | Performed by: PODIATRIST

## 2022-07-06 PROCEDURE — 3060F PR POS MICROALBUMINURIA RESULT DOCUMENTED/REVIEW: ICD-10-PCS | Mod: CPTII,S$GLB,, | Performed by: PODIATRIST

## 2022-07-06 PROCEDURE — 15275 SKIN SUB GRAFT FACE/NK/HF/G: CPT | Mod: S$GLB,,, | Performed by: PODIATRIST

## 2022-07-06 PROCEDURE — 99999 PR PBB SHADOW E&M-EST. PATIENT-LVL IV: ICD-10-PCS | Mod: PBBFAC,,, | Performed by: PODIATRIST

## 2022-07-06 PROCEDURE — 99499 NO LOS: ICD-10-PCS | Mod: S$GLB,,, | Performed by: PODIATRIST

## 2022-07-06 PROCEDURE — 99499 UNLISTED E&M SERVICE: CPT | Mod: S$GLB,,, | Performed by: PODIATRIST

## 2022-07-06 PROCEDURE — 4010F ACE/ARB THERAPY RXD/TAKEN: CPT | Mod: CPTII,S$GLB,, | Performed by: PODIATRIST

## 2022-07-06 PROCEDURE — 1159F PR MEDICATION LIST DOCUMENTED IN MEDICAL RECORD: ICD-10-PCS | Mod: CPTII,S$GLB,, | Performed by: PODIATRIST

## 2022-07-06 PROCEDURE — 3066F NEPHROPATHY DOC TX: CPT | Mod: CPTII,S$GLB,, | Performed by: PODIATRIST

## 2022-07-06 PROCEDURE — 1160F RVW MEDS BY RX/DR IN RCRD: CPT | Mod: CPTII,S$GLB,, | Performed by: PODIATRIST

## 2022-07-06 PROCEDURE — 3060F POS MICROALBUMINURIA REV: CPT | Mod: CPTII,S$GLB,, | Performed by: PODIATRIST

## 2022-07-06 PROCEDURE — 1159F MED LIST DOCD IN RCRD: CPT | Mod: CPTII,S$GLB,, | Performed by: PODIATRIST

## 2022-07-06 PROCEDURE — 3066F PR DOCUMENTATION OF TREATMENT FOR NEPHROPATHY: ICD-10-PCS | Mod: CPTII,S$GLB,, | Performed by: PODIATRIST

## 2022-07-06 NOTE — PROGRESS NOTES
Subjective:      Patient ID: Virgilio Acuña is a 62 y.o. male.    Chief Complaint: Wound Care    Virgilio is a 62 y.o. male who presents to the clinic for evaluation and treatment of high risk feet. Virgilio has a past medical history of Cellulitis of left index finger (2/16/2015), Charcot's joint of foot, left (08/2018), Dyslipidemia, Essential hypertension (2/16/2017), Group B streptococcal infection (1/15/2018), Hypotestosteronemia (7/7/2017), Infected finger joint (2/17/2015), Infected skin ulcer limited to breakdown of skin (1/13/2018), MSSA (methicillin susceptible Staphylococcus aureus) (1/13/2018), Obese, S/P knee surgery, medial/lateral menisectomy (11/4/2013), and Type 2 diabetes mellitus with diabetic polyneuropathy, with long-term current use of insulin (2003). Here for concern over a new ulcer left foot great toe, this has progressed over the last 2-3 weeks, there has been some drainage from the area and discoloration to the skin, has charcot with reconstruction to the left side and is high risk. Denies f/c/n/v    6/7/22: Patient returns for follow up left great toe ulcer ambulating in darco and football no new complaints.    6/14/22: Patient returns for continued wound care left great toe ulcer no new complaints.     6/21/22: Patient returns for wound care left great toe, ambulating in football and darco no new complaints. He feels there is a malodor to the wound    6/28/22: Patient returns for wound care left great toe ambulating I darco no new complaints, graft is here today for application    7/6/22: Patient returns for wound care and grafting of the left great toe no new complaints      PCP: Anne Wright MD    Date Last Seen by PCP:   Current shoe gear:  Affected Foot: Tennis shoes     Unaffected Foot: Tennis shoes    History of Trauma: negative      Hemoglobin A1C   Date Value Ref Range Status   02/16/2022 10.2 (H) 4.0 - 5.6 % Final     Comment:     ADA Screening Guidelines:  5.7-6.4%  Consistent  with prediabetes  >or=6.5%  Consistent with diabetes    High levels of fetal hemoglobin interfere with the HbA1C  assay. Heterozygous hemoglobin variants (HbS, HgC, etc)do  not significantly interfere with this assay.   However, presence of multiple variants may affect accuracy.     06/23/2021 9.2 (H) 4.0 - 5.6 % Final     Comment:     ADA Screening Guidelines:  5.7-6.4%  Consistent with prediabetes  >or=6.5%  Consistent with diabetes    High levels of fetal hemoglobin interfere with the HbA1C  assay. Heterozygous hemoglobin variants (HbS, HgC, etc)do  not significantly interfere with this assay.   However, presence of multiple variants may affect accuracy.     03/16/2021 9.1 (H) 4.0 - 5.6 % Final     Comment:     ADA Screening Guidelines:  5.7-6.4%  Consistent with prediabetes  >or=6.5%  Consistent with diabetes    High levels of fetal hemoglobin interfere with the HbA1C  assay. Heterozygous hemoglobin variants (HbS, HgC, etc)do  not significantly interfere with this assay.   However, presence of multiple variants may affect accuracy.         Review of Systems   Constitutional: Negative for chills and fever.   Cardiovascular: Positive for leg swelling. Negative for claudication.   Respiratory: Negative for shortness of breath.    Skin: Positive for color change, nail changes and poor wound healing. Negative for itching and rash.   Musculoskeletal: Negative for muscle cramps, muscle weakness and myalgias.   Gastrointestinal: Negative for nausea and vomiting.   Neurological: Positive for numbness and paresthesias. Negative for focal weakness and loss of balance.           Objective:       Physical Exam  Constitutional:       General: He is not in acute distress.     Appearance: He is well-developed. He is not diaphoretic.   Cardiovascular:      Pulses:           Dorsalis pedis pulses are 2+ on the right side and 2+ on the left side.        Posterior tibial pulses are 2+ on the right side and 2+ on the left side.       Comments: < 3 sec capillary refill time to toes 1-5 bilateral. Toes and feet are warm to touch proximally with normal distal cooling b/l. There is no hair growth on the feet and toes b/l. There is moderate edema left foot and mild edema right.     Musculoskeletal:      Comments: Left second toe amputation    Left foot now more narrow in appearance with the first ray properly reduced, minimal edema to the foot and no pain with palpation throughout the area of surgery.    Left ankle there is some pain with palpation to the lateral malleolus and the ATFL area    Equinus noted b/l ankles with < 10 deg DF noted. MMT 5/5 in DF/PF/Inv/Ev resistance with no reproduction of pain in any direction. Passive range of motion of ankle and pedal joints is painless b/l.     Feet:      Right foot:      Protective Sensation: 10 sites tested. 0 sites sensed.      Left foot:      Protective Sensation: 10 sites tested. 0 sites sensed.   Skin:     General: Skin is warm.      Coloration: Skin is not pale.      Findings: No abrasion, bruising, burn, ecchymosis, erythema, laceration, lesion, petechiae or rash.      Nails: There is no clubbing.      Comments: Incision overlying the left second metatarsal base is well healed      Ulcer Location: left plantar hallux  Measurements: now 2 separate ulcers 1.0x1.0x0.1 cm and the other 0.9x0.6x0.1 cm  Periwound: Intact  Drainage: serosanguinous.  Pus: None.  Malodor: None.  Base:  100% granular  Signs of infection: None     Neurological:      Mental Status: He is alert and oriented to person, place, and time.      Sensory: Sensory deficit present.      Motor: No tremor, atrophy or abnormal muscle tone.      Comments: Negative tinel sign bilateral.   Psychiatric:         Behavior: Behavior normal.               Assessment:       Encounter Diagnoses   Name Primary?    Diabetic ulcer of left great toe Yes    Type II diabetes mellitus with neurological manifestations     Charcot's joint of left foot              Plan:       Virgilio was seen today for wound care.    Diagnoses and all orders for this visit:    Diabetic ulcer of left great toe    Type II diabetes mellitus with neurological manifestations    Charcot's joint of left foot      I counseled the patient on his conditions, their implications and medical management.    Shoe inspection. Diabetic Foot Education. Patient reminded of the importance of good nutrition and blood sugar control to help prevent podiatric complications of diabetes. Patient instructed on proper foot hygeine. We discussed wearing proper shoe gear, daily foot inspections, never walking without protective shoe gear, never putting sharp instruments to feet    [facility] GRAFT OP REPORT     SURGEON: Inocente Smith DPM  ASSISTANT: None  PRE-OP DX: Surgical dehiscence left lateral foot.  POST-OP DX: same.  ANESTHESIA: Pt. has loss of sensation and therefore no anesthesia was required.  HEMOSTASIS: None  EBL: less than 1 cc.        Total Size of Graft : 2.5 cm²   Total amount used:   2.5 cm²                 Time Out performed to verify patient and site and verbal consent obtained.     Procedure: The patient was seen in the clinic room and placed in supine position on the treatment table.  Attention was directed to the ulcer which was located on the left great toe, where an ulceration measuring 1.0x1.0x0.1 and 0.9x0.6x0.1 cm is noted.  The ulceration was covered with 100% granular tissue under a layer of biofilm and slough.  No acute signs of infection were noted.  The wound is nontender.  Utilizing a sterile curette, I debrided the wound full-thickness through subcutaneous tissue to excise biofilm and slough.  Patient tolerated well.  The wound was flushed with sterile saline.  There was minimal bleeding with debridement which was well controlled with direct pressure.  The  [facility] graft was then inspected and noted to be  acceptable.  It was then removed sterilely from its packaging.     The  [facility] Graft  was adhered down to the wound ensuring proper placement with correct side down and then secured in place with 1/16 inch Steri-Strips.  It was then covered with a non-adherent layer followed by a silver foam dressing and covered in a football wrap and offloaded in darco.  The patient having tolerated the procedure well left the clinic with all vital signs stable and vascular status intact to all digits of both feet.      Short-term goals including promoting granulation and epithelialization of the wound. Long term goals include maintaining a healed wound with appropriate offloading and good medical management per internist.    Return in 1 week for follow up, will order a second graft for application    Inocente Smith DPM

## 2022-07-12 ENCOUNTER — OFFICE VISIT (OUTPATIENT)
Dept: PODIATRY | Facility: CLINIC | Age: 62
End: 2022-07-12
Payer: COMMERCIAL

## 2022-07-12 VITALS — HEIGHT: 73 IN | BODY MASS INDEX: 43.17 KG/M2

## 2022-07-12 DIAGNOSIS — L97.529 DIABETIC ULCER OF LEFT GREAT TOE: Primary | ICD-10-CM

## 2022-07-12 DIAGNOSIS — E11.49 TYPE II DIABETES MELLITUS WITH NEUROLOGICAL MANIFESTATIONS: ICD-10-CM

## 2022-07-12 DIAGNOSIS — M14.672 CHARCOT'S JOINT OF LEFT FOOT: ICD-10-CM

## 2022-07-12 DIAGNOSIS — E11.621 DIABETIC ULCER OF LEFT GREAT TOE: Primary | ICD-10-CM

## 2022-07-12 PROCEDURE — 99499 UNLISTED E&M SERVICE: CPT | Mod: S$GLB,,, | Performed by: PODIATRIST

## 2022-07-12 PROCEDURE — 3008F PR BODY MASS INDEX (BMI) DOCUMENTED: ICD-10-PCS | Mod: CPTII,S$GLB,, | Performed by: PODIATRIST

## 2022-07-12 PROCEDURE — 3046F HEMOGLOBIN A1C LEVEL >9.0%: CPT | Mod: CPTII,S$GLB,, | Performed by: PODIATRIST

## 2022-07-12 PROCEDURE — 1159F MED LIST DOCD IN RCRD: CPT | Mod: CPTII,S$GLB,, | Performed by: PODIATRIST

## 2022-07-12 PROCEDURE — 4010F PR ACE/ARB THEARPY RXD/TAKEN: ICD-10-PCS | Mod: CPTII,S$GLB,, | Performed by: PODIATRIST

## 2022-07-12 PROCEDURE — 99999 PR PBB SHADOW E&M-EST. PATIENT-LVL IV: CPT | Mod: PBBFAC,,, | Performed by: PODIATRIST

## 2022-07-12 PROCEDURE — 99499 NO LOS: ICD-10-PCS | Mod: S$GLB,,, | Performed by: PODIATRIST

## 2022-07-12 PROCEDURE — 3066F NEPHROPATHY DOC TX: CPT | Mod: CPTII,S$GLB,, | Performed by: PODIATRIST

## 2022-07-12 PROCEDURE — 1160F RVW MEDS BY RX/DR IN RCRD: CPT | Mod: CPTII,S$GLB,, | Performed by: PODIATRIST

## 2022-07-12 PROCEDURE — 1159F PR MEDICATION LIST DOCUMENTED IN MEDICAL RECORD: ICD-10-PCS | Mod: CPTII,S$GLB,, | Performed by: PODIATRIST

## 2022-07-12 PROCEDURE — 3066F PR DOCUMENTATION OF TREATMENT FOR NEPHROPATHY: ICD-10-PCS | Mod: CPTII,S$GLB,, | Performed by: PODIATRIST

## 2022-07-12 PROCEDURE — 3046F PR MOST RECENT HEMOGLOBIN A1C LEVEL > 9.0%: ICD-10-PCS | Mod: CPTII,S$GLB,, | Performed by: PODIATRIST

## 2022-07-12 PROCEDURE — 3060F POS MICROALBUMINURIA REV: CPT | Mod: CPTII,S$GLB,, | Performed by: PODIATRIST

## 2022-07-12 PROCEDURE — 3008F BODY MASS INDEX DOCD: CPT | Mod: CPTII,S$GLB,, | Performed by: PODIATRIST

## 2022-07-12 PROCEDURE — 15275 PR SKIN SUB GRAFT FACE/NK/HF/G UP TO 100 SQCM: ICD-10-PCS | Mod: S$GLB,,, | Performed by: PODIATRIST

## 2022-07-12 PROCEDURE — 4010F ACE/ARB THERAPY RXD/TAKEN: CPT | Mod: CPTII,S$GLB,, | Performed by: PODIATRIST

## 2022-07-12 PROCEDURE — 1160F PR REVIEW ALL MEDS BY PRESCRIBER/CLIN PHARMACIST DOCUMENTED: ICD-10-PCS | Mod: CPTII,S$GLB,, | Performed by: PODIATRIST

## 2022-07-12 PROCEDURE — 15275 SKIN SUB GRAFT FACE/NK/HF/G: CPT | Mod: S$GLB,,, | Performed by: PODIATRIST

## 2022-07-12 PROCEDURE — 99999 PR PBB SHADOW E&M-EST. PATIENT-LVL IV: ICD-10-PCS | Mod: PBBFAC,,, | Performed by: PODIATRIST

## 2022-07-12 PROCEDURE — 3060F PR POS MICROALBUMINURIA RESULT DOCUMENTED/REVIEW: ICD-10-PCS | Mod: CPTII,S$GLB,, | Performed by: PODIATRIST

## 2022-07-12 RX ORDER — LANCETS 33 GAUGE
EACH MISCELLANEOUS 3 TIMES DAILY
COMMUNITY
Start: 2022-06-14 | End: 2023-03-02

## 2022-07-12 RX ORDER — LANCETS 30 GAUGE
EACH MISCELLANEOUS
COMMUNITY
Start: 2022-05-27 | End: 2022-09-30 | Stop reason: SDUPTHER

## 2022-07-12 NOTE — PROGRESS NOTES
Subjective:      Patient ID: Virgilio Acuña is a 62 y.o. male.    Chief Complaint: Foot Ulcer (Left great toe)    Virgilio is a 62 y.o. male who presents to the clinic for evaluation and treatment of high risk feet. Virgilio has a past medical history of Cellulitis of left index finger (2/16/2015), Charcot's joint of foot, left (08/2018), Dyslipidemia, Essential hypertension (2/16/2017), Group B streptococcal infection (1/15/2018), Hypotestosteronemia (7/7/2017), Infected finger joint (2/17/2015), Infected skin ulcer limited to breakdown of skin (1/13/2018), MSSA (methicillin susceptible Staphylococcus aureus) (1/13/2018), Obese, S/P knee surgery, medial/lateral menisectomy (11/4/2013), and Type 2 diabetes mellitus with diabetic polyneuropathy, with long-term current use of insulin (2003). Here for concern over a new ulcer left foot great toe, this has progressed over the last 2-3 weeks, there has been some drainage from the area and discoloration to the skin, has charcot with reconstruction to the left side and is high risk. Denies f/c/n/v    6/7/22: Patient returns for follow up left great toe ulcer ambulating in darco and football no new complaints.    6/14/22: Patient returns for continued wound care left great toe ulcer no new complaints.     6/21/22: Patient returns for wound care left great toe, ambulating in football and darco no new complaints. He feels there is a malodor to the wound    6/28/22: Patient returns for wound care left great toe ambulating I darco no new complaints, graft is here today for application    7/6/22: Patient returns for wound care and grafting of the left great toe no new complaints    7/12/22: Patient returns for follow up left great toe wound care no new complaints      PCP: Anne Wright MD    Date Last Seen by PCP:   Current shoe gear:  Affected Foot: Tennis shoes     Unaffected Foot: Tennis shoes    History of Trauma: negative      Hemoglobin A1C   Date Value Ref Range Status    02/16/2022 10.2 (H) 4.0 - 5.6 % Final     Comment:     ADA Screening Guidelines:  5.7-6.4%  Consistent with prediabetes  >or=6.5%  Consistent with diabetes    High levels of fetal hemoglobin interfere with the HbA1C  assay. Heterozygous hemoglobin variants (HbS, HgC, etc)do  not significantly interfere with this assay.   However, presence of multiple variants may affect accuracy.     06/23/2021 9.2 (H) 4.0 - 5.6 % Final     Comment:     ADA Screening Guidelines:  5.7-6.4%  Consistent with prediabetes  >or=6.5%  Consistent with diabetes    High levels of fetal hemoglobin interfere with the HbA1C  assay. Heterozygous hemoglobin variants (HbS, HgC, etc)do  not significantly interfere with this assay.   However, presence of multiple variants may affect accuracy.     03/16/2021 9.1 (H) 4.0 - 5.6 % Final     Comment:     ADA Screening Guidelines:  5.7-6.4%  Consistent with prediabetes  >or=6.5%  Consistent with diabetes    High levels of fetal hemoglobin interfere with the HbA1C  assay. Heterozygous hemoglobin variants (HbS, HgC, etc)do  not significantly interfere with this assay.   However, presence of multiple variants may affect accuracy.         Review of Systems   Constitutional: Negative for chills and fever.   Cardiovascular: Positive for leg swelling. Negative for claudication.   Respiratory: Negative for shortness of breath.    Skin: Positive for color change, nail changes and poor wound healing. Negative for itching and rash.   Musculoskeletal: Negative for muscle cramps, muscle weakness and myalgias.   Gastrointestinal: Negative for nausea and vomiting.   Neurological: Positive for numbness and paresthesias. Negative for focal weakness and loss of balance.           Objective:       Physical Exam  Constitutional:       General: He is not in acute distress.     Appearance: He is well-developed. He is not diaphoretic.   Cardiovascular:      Pulses:           Dorsalis pedis pulses are 2+ on the right side and  2+ on the left side.        Posterior tibial pulses are 2+ on the right side and 2+ on the left side.      Comments: < 3 sec capillary refill time to toes 1-5 bilateral. Toes and feet are warm to touch proximally with normal distal cooling b/l. There is no hair growth on the feet and toes b/l. There is moderate edema left foot and mild edema right.     Musculoskeletal:      Comments: Left second toe amputation    Left foot now more narrow in appearance with the first ray properly reduced, minimal edema to the foot and no pain with palpation throughout the area of surgery.    Left ankle there is some pain with palpation to the lateral malleolus and the ATFL area    Equinus noted b/l ankles with < 10 deg DF noted. MMT 5/5 in DF/PF/Inv/Ev resistance with no reproduction of pain in any direction. Passive range of motion of ankle and pedal joints is painless b/l.     Feet:      Right foot:      Protective Sensation: 10 sites tested. 0 sites sensed.      Left foot:      Protective Sensation: 10 sites tested. 0 sites sensed.   Skin:     General: Skin is warm.      Coloration: Skin is not pale.      Findings: No abrasion, bruising, burn, ecchymosis, erythema, laceration, lesion, petechiae or rash.      Nails: There is no clubbing.      Comments: Incision overlying the left second metatarsal base is well healed      Ulcer Location: left plantar hallux  Measurements: now 2 separate ulcers 0.8x0.8x0.1 cm and the other 0.7x0.3x0.1 cm  Periwound: Intact  Drainage: serosanguinous.  Pus: None.  Malodor: None.  Base:  100% granular  Signs of infection: None     Neurological:      Mental Status: He is alert and oriented to person, place, and time.      Sensory: Sensory deficit present.      Motor: No tremor, atrophy or abnormal muscle tone.      Comments: Negative tinel sign bilateral.   Psychiatric:         Behavior: Behavior normal.               Assessment:       Encounter Diagnoses   Name Primary?    Diabetic ulcer of left  great toe Yes    Type II diabetes mellitus with neurological manifestations     Charcot's joint of left foot             Plan:       Virgilio was seen today for foot ulcer.    Diagnoses and all orders for this visit:    Diabetic ulcer of left great toe    Type II diabetes mellitus with neurological manifestations    Charcot's joint of left foot      I counseled the patient on his conditions, their implications and medical management.    Shoe inspection. Diabetic Foot Education. Patient reminded of the importance of good nutrition and blood sugar control to help prevent podiatric complications of diabetes. Patient instructed on proper foot hygeine. We discussed wearing proper shoe gear, daily foot inspections, never walking without protective shoe gear, never putting sharp instruments to feet    [facility] GRAFT OP REPORT     SURGEON: Inocente Smith DPM  ASSISTANT: None  PRE-OP DX: Surgical dehiscence left lateral foot.  POST-OP DX: same.  ANESTHESIA: Pt. has loss of sensation and therefore no anesthesia was required.  HEMOSTASIS: None  EBL: less than 1 cc.        Total Size of Graft : 2.25 cm²   Total amount used:   2.25 cm²                     Time Out performed to verify patient and site and verbal consent obtained.     Procedure: The patient was seen in the clinic room and placed in supine position on the treatment table.  Attention was directed to the ulcer which was located on the left great toe, where an ulceration measuring 0.8x0.8x0.1 and 0.7x0.3x0.1 cm is noted.  The ulceration was covered with 100% granular tissue under a layer of biofilm and slough.  No acute signs of infection were noted.  The wound is nontender.  Utilizing a sterile curette, I debrided the wound full-thickness through subcutaneous tissue to excise biofilm and slough.  Patient tolerated well.  The wound was flushed with sterile saline.  There was minimal bleeding with debridement which was well controlled with direct pressure.  The   [facility] graft was then inspected and noted to be  acceptable.  It was then removed sterilely from its packaging.     The [facility] Graft  was adhered down to the wound ensuring proper placement with correct side down and then secured in place with 1/16 inch Steri-Strips.  It was then covered with a non-adherent layer followed by a silver foam dressing and covered in a football wrap and offloaded in darco.  The patient having tolerated the procedure well left the clinic with all vital signs stable and vascular status intact to all digits of both feet.      Short-term goals including promoting granulation and epithelialization of the wound. Long term goals include maintaining a healed wound with appropriate offloading and good medical management per internist.    Return in 1 week for follow up, will order a second graft for application    Inocente Smith DPM

## 2022-07-18 ENCOUNTER — OFFICE VISIT (OUTPATIENT)
Dept: PODIATRY | Facility: CLINIC | Age: 62
End: 2022-07-18
Payer: COMMERCIAL

## 2022-07-18 DIAGNOSIS — E11.621 DIABETIC ULCER OF LEFT GREAT TOE: Primary | ICD-10-CM

## 2022-07-18 DIAGNOSIS — L97.529 DIABETIC ULCER OF LEFT GREAT TOE: Primary | ICD-10-CM

## 2022-07-18 DIAGNOSIS — E11.49 TYPE II DIABETES MELLITUS WITH NEUROLOGICAL MANIFESTATIONS: ICD-10-CM

## 2022-07-18 DIAGNOSIS — M14.672 CHARCOT'S JOINT OF LEFT FOOT: ICD-10-CM

## 2022-07-18 PROCEDURE — 1160F PR REVIEW ALL MEDS BY PRESCRIBER/CLIN PHARMACIST DOCUMENTED: ICD-10-PCS | Mod: CPTII,S$GLB,, | Performed by: PODIATRIST

## 2022-07-18 PROCEDURE — 3060F PR POS MICROALBUMINURIA RESULT DOCUMENTED/REVIEW: ICD-10-PCS | Mod: CPTII,S$GLB,, | Performed by: PODIATRIST

## 2022-07-18 PROCEDURE — 3046F PR MOST RECENT HEMOGLOBIN A1C LEVEL > 9.0%: ICD-10-PCS | Mod: CPTII,S$GLB,, | Performed by: PODIATRIST

## 2022-07-18 PROCEDURE — 1160F RVW MEDS BY RX/DR IN RCRD: CPT | Mod: CPTII,S$GLB,, | Performed by: PODIATRIST

## 2022-07-18 PROCEDURE — 3060F POS MICROALBUMINURIA REV: CPT | Mod: CPTII,S$GLB,, | Performed by: PODIATRIST

## 2022-07-18 PROCEDURE — 99499 UNLISTED E&M SERVICE: CPT | Mod: S$GLB,,, | Performed by: PODIATRIST

## 2022-07-18 PROCEDURE — 15275 PR SKIN SUB GRAFT FACE/NK/HF/G UP TO 100 SQCM: ICD-10-PCS | Mod: S$GLB,,, | Performed by: PODIATRIST

## 2022-07-18 PROCEDURE — 4010F PR ACE/ARB THEARPY RXD/TAKEN: ICD-10-PCS | Mod: CPTII,S$GLB,, | Performed by: PODIATRIST

## 2022-07-18 PROCEDURE — 1159F PR MEDICATION LIST DOCUMENTED IN MEDICAL RECORD: ICD-10-PCS | Mod: CPTII,S$GLB,, | Performed by: PODIATRIST

## 2022-07-18 PROCEDURE — 15275 SKIN SUB GRAFT FACE/NK/HF/G: CPT | Mod: S$GLB,,, | Performed by: PODIATRIST

## 2022-07-18 PROCEDURE — 99999 PR PBB SHADOW E&M-EST. PATIENT-LVL IV: CPT | Mod: PBBFAC,,, | Performed by: PODIATRIST

## 2022-07-18 PROCEDURE — 1159F MED LIST DOCD IN RCRD: CPT | Mod: CPTII,S$GLB,, | Performed by: PODIATRIST

## 2022-07-18 PROCEDURE — 3046F HEMOGLOBIN A1C LEVEL >9.0%: CPT | Mod: CPTII,S$GLB,, | Performed by: PODIATRIST

## 2022-07-18 PROCEDURE — 3066F PR DOCUMENTATION OF TREATMENT FOR NEPHROPATHY: ICD-10-PCS | Mod: CPTII,S$GLB,, | Performed by: PODIATRIST

## 2022-07-18 PROCEDURE — 99999 PR PBB SHADOW E&M-EST. PATIENT-LVL IV: ICD-10-PCS | Mod: PBBFAC,,, | Performed by: PODIATRIST

## 2022-07-18 PROCEDURE — 3066F NEPHROPATHY DOC TX: CPT | Mod: CPTII,S$GLB,, | Performed by: PODIATRIST

## 2022-07-18 PROCEDURE — 4010F ACE/ARB THERAPY RXD/TAKEN: CPT | Mod: CPTII,S$GLB,, | Performed by: PODIATRIST

## 2022-07-18 PROCEDURE — 99499 NO LOS: ICD-10-PCS | Mod: S$GLB,,, | Performed by: PODIATRIST

## 2022-07-18 NOTE — PROGRESS NOTES
Subjective:      Patient ID: Virgilio Acuña is a 62 y.o. male.    Chief Complaint: Wound Care    Virgilio is a 62 y.o. male who presents to the clinic for evaluation and treatment of high risk feet. Virgilio has a past medical history of Cellulitis of left index finger (2/16/2015), Charcot's joint of foot, left (08/2018), Dyslipidemia, Essential hypertension (2/16/2017), Group B streptococcal infection (1/15/2018), Hypotestosteronemia (7/7/2017), Infected finger joint (2/17/2015), Infected skin ulcer limited to breakdown of skin (1/13/2018), MSSA (methicillin susceptible Staphylococcus aureus) (1/13/2018), Obese, S/P knee surgery, medial/lateral menisectomy (11/4/2013), and Type 2 diabetes mellitus with diabetic polyneuropathy, with long-term current use of insulin (2003). Here for concern over a new ulcer left foot great toe, this has progressed over the last 2-3 weeks, there has been some drainage from the area and discoloration to the skin, has charcot with reconstruction to the left side and is high risk. Denies f/c/n/v    6/7/22: Patient returns for follow up left great toe ulcer ambulating in darco and football no new complaints.    6/14/22: Patient returns for continued wound care left great toe ulcer no new complaints.     6/21/22: Patient returns for wound care left great toe, ambulating in football and darco no new complaints. He feels there is a malodor to the wound    6/28/22: Patient returns for wound care left great toe ambulating I darco no new complaints, graft is here today for application    7/6/22: Patient returns for wound care and grafting of the left great toe no new complaints    7/12/22: Patient returns for follow up left great toe wound care no new complaints    7/18/22: Patient returns for follow up left great toe ulcer care, here for wound care and graft application      PCP: Anne Wright MD    Date Last Seen by PCP:   Current shoe gear:  Affected Foot: Tennis shoes     Unaffected Foot:  Tennis shoes    History of Trauma: negative      Hemoglobin A1C   Date Value Ref Range Status   02/16/2022 10.2 (H) 4.0 - 5.6 % Final     Comment:     ADA Screening Guidelines:  5.7-6.4%  Consistent with prediabetes  >or=6.5%  Consistent with diabetes    High levels of fetal hemoglobin interfere with the HbA1C  assay. Heterozygous hemoglobin variants (HbS, HgC, etc)do  not significantly interfere with this assay.   However, presence of multiple variants may affect accuracy.     06/23/2021 9.2 (H) 4.0 - 5.6 % Final     Comment:     ADA Screening Guidelines:  5.7-6.4%  Consistent with prediabetes  >or=6.5%  Consistent with diabetes    High levels of fetal hemoglobin interfere with the HbA1C  assay. Heterozygous hemoglobin variants (HbS, HgC, etc)do  not significantly interfere with this assay.   However, presence of multiple variants may affect accuracy.     03/16/2021 9.1 (H) 4.0 - 5.6 % Final     Comment:     ADA Screening Guidelines:  5.7-6.4%  Consistent with prediabetes  >or=6.5%  Consistent with diabetes    High levels of fetal hemoglobin interfere with the HbA1C  assay. Heterozygous hemoglobin variants (HbS, HgC, etc)do  not significantly interfere with this assay.   However, presence of multiple variants may affect accuracy.         Review of Systems   Constitutional: Negative for chills and fever.   Cardiovascular: Positive for leg swelling. Negative for claudication.   Respiratory: Negative for shortness of breath.    Skin: Positive for color change, nail changes and poor wound healing. Negative for itching and rash.   Musculoskeletal: Negative for muscle cramps, muscle weakness and myalgias.   Gastrointestinal: Negative for nausea and vomiting.   Neurological: Positive for numbness and paresthesias. Negative for focal weakness and loss of balance.           Objective:       Physical Exam  Constitutional:       General: He is not in acute distress.     Appearance: He is well-developed. He is not diaphoretic.    Cardiovascular:      Pulses:           Dorsalis pedis pulses are 2+ on the right side and 2+ on the left side.        Posterior tibial pulses are 2+ on the right side and 2+ on the left side.      Comments: < 3 sec capillary refill time to toes 1-5 bilateral. Toes and feet are warm to touch proximally with normal distal cooling b/l. There is no hair growth on the feet and toes b/l. There is moderate edema left foot and mild edema right.     Musculoskeletal:      Comments: Left second toe amputation    Left foot now more narrow in appearance with the first ray properly reduced, minimal edema to the foot and no pain with palpation throughout the area of surgery.    Left ankle there is some pain with palpation to the lateral malleolus and the ATFL area    Equinus noted b/l ankles with < 10 deg DF noted. MMT 5/5 in DF/PF/Inv/Ev resistance with no reproduction of pain in any direction. Passive range of motion of ankle and pedal joints is painless b/l.     Feet:      Right foot:      Protective Sensation: 10 sites tested. 0 sites sensed.      Left foot:      Protective Sensation: 10 sites tested. 0 sites sensed.   Skin:     General: Skin is warm.      Coloration: Skin is not pale.      Findings: No abrasion, bruising, burn, ecchymosis, erythema, laceration, lesion, petechiae or rash.      Nails: There is no clubbing.      Comments: Incision overlying the left second metatarsal base is well healed      Ulcer Location: left plantar hallux  Measurements: now 2 separate ulcers 0.6x0.5x0.1 cm (more plantar and distal) and the other 0.3x0.2x0.1 cm more medial and proximal)  Periwound: Intact  Drainage: serosanguinous.  Pus: None.  Malodor: None.  Base:  100% granular  Signs of infection: None     Neurological:      Mental Status: He is alert and oriented to person, place, and time.      Sensory: Sensory deficit present.      Motor: No tremor, atrophy or abnormal muscle tone.      Comments: Negative tinel sign bilateral.    Psychiatric:         Behavior: Behavior normal.               Assessment:       Encounter Diagnoses   Name Primary?    Diabetic ulcer of left great toe Yes    Type II diabetes mellitus with neurological manifestations     Charcot's joint of left foot             Plan:       Virgilio was seen today for wound care.    Diagnoses and all orders for this visit:    Diabetic ulcer of left great toe    Type II diabetes mellitus with neurological manifestations    Charcot's joint of left foot      I counseled the patient on his conditions, their implications and medical management.    Shoe inspection. Diabetic Foot Education. Patient reminded of the importance of good nutrition and blood sugar control to help prevent podiatric complications of diabetes. Patient instructed on proper foot hygeine. We discussed wearing proper shoe gear, daily foot inspections, never walking without protective shoe gear, never putting sharp instruments to feet    [facility] GRAFT OP REPORT     SURGEON: Inocente Smith DPM  ASSISTANT: None  PRE-OP DX: Surgical dehiscence left lateral foot.  POST-OP DX: same.  ANESTHESIA: Pt. has loss of sensation and therefore no anesthesia was required.  HEMOSTASIS: None  EBL: less than 1 cc.        Total Size of Graft : 2.5 cm²   Total amount used:   2.5 cm²                         Time Out performed to verify patient and site and verbal consent obtained.     Procedure: The patient was seen in the clinic room and placed in supine position on the treatment table.  Attention was directed to the ulcer which was located on the left great toe, where an ulceration measuring 0.5x0.6x0.1 and 0.3x0.2x0.1 cm is noted.  The ulceration was covered with 100% granular tissue under a layer of biofilm and slough.  No acute signs of infection were noted.  The wound is nontender.  Utilizing a sterile curette, I debrided the wound full-thickness through subcutaneous tissue to excise biofilm and slough.  Patient tolerated well.   The wound was flushed with sterile saline.  There was minimal bleeding with debridement which was well controlled with direct pressure.  The  [facility] graft was then inspected and noted to be  acceptable.  It was then removed sterilely from its packaging.     The [facility] Graft  was adhered down to the wound ensuring proper placement with correct side down and then secured in place with 1/16 inch Steri-Strips.  It was then covered with a non-adherent layer followed by a silver foam dressing and covered in a football wrap and offloaded in darco.  The patient having tolerated the procedure well left the clinic with all vital signs stable and vascular status intact to all digits of both feet.      Short-term goals including promoting granulation and epithelialization of the wound. Long term goals include maintaining a healed wound with appropriate offloading and good medical management per internist.    Return in 1 week for follow up with Dr. Mcnulty as I will be out of town, will order another graft application he will follow up with me in 2 weeks when I return    Inocente Smith DPM

## 2022-07-20 ENCOUNTER — PATIENT MESSAGE (OUTPATIENT)
Dept: PODIATRY | Facility: CLINIC | Age: 62
End: 2022-07-20
Payer: COMMERCIAL

## 2022-07-27 ENCOUNTER — PATIENT MESSAGE (OUTPATIENT)
Dept: PODIATRY | Facility: CLINIC | Age: 62
End: 2022-07-27
Payer: COMMERCIAL

## 2022-07-27 ENCOUNTER — OFFICE VISIT (OUTPATIENT)
Dept: PODIATRY | Facility: CLINIC | Age: 62
End: 2022-07-27
Payer: COMMERCIAL

## 2022-07-27 DIAGNOSIS — E11.49 TYPE II DIABETES MELLITUS WITH NEUROLOGICAL MANIFESTATIONS: ICD-10-CM

## 2022-07-27 DIAGNOSIS — E11.621 DIABETIC ULCER OF LEFT GREAT TOE: Primary | ICD-10-CM

## 2022-07-27 DIAGNOSIS — L84 PRE-ULCERATIVE CALLUSES: ICD-10-CM

## 2022-07-27 DIAGNOSIS — L97.529 DIABETIC ULCER OF LEFT GREAT TOE: Primary | ICD-10-CM

## 2022-07-27 PROCEDURE — 11042 DBRDMT SUBQ TIS 1ST 20SQCM/<: CPT | Mod: S$GLB,,, | Performed by: STUDENT IN AN ORGANIZED HEALTH CARE EDUCATION/TRAINING PROGRAM

## 2022-07-27 PROCEDURE — 1159F PR MEDICATION LIST DOCUMENTED IN MEDICAL RECORD: ICD-10-PCS | Mod: CPTII,S$GLB,, | Performed by: STUDENT IN AN ORGANIZED HEALTH CARE EDUCATION/TRAINING PROGRAM

## 2022-07-27 PROCEDURE — 3046F PR MOST RECENT HEMOGLOBIN A1C LEVEL > 9.0%: ICD-10-PCS | Mod: CPTII,S$GLB,, | Performed by: STUDENT IN AN ORGANIZED HEALTH CARE EDUCATION/TRAINING PROGRAM

## 2022-07-27 PROCEDURE — 4010F PR ACE/ARB THEARPY RXD/TAKEN: ICD-10-PCS | Mod: CPTII,S$GLB,, | Performed by: STUDENT IN AN ORGANIZED HEALTH CARE EDUCATION/TRAINING PROGRAM

## 2022-07-27 PROCEDURE — 4010F ACE/ARB THERAPY RXD/TAKEN: CPT | Mod: CPTII,S$GLB,, | Performed by: STUDENT IN AN ORGANIZED HEALTH CARE EDUCATION/TRAINING PROGRAM

## 2022-07-27 PROCEDURE — 99999 PR PBB SHADOW E&M-EST. PATIENT-LVL IV: ICD-10-PCS | Mod: PBBFAC,,, | Performed by: STUDENT IN AN ORGANIZED HEALTH CARE EDUCATION/TRAINING PROGRAM

## 2022-07-27 PROCEDURE — 3066F PR DOCUMENTATION OF TREATMENT FOR NEPHROPATHY: ICD-10-PCS | Mod: CPTII,S$GLB,, | Performed by: STUDENT IN AN ORGANIZED HEALTH CARE EDUCATION/TRAINING PROGRAM

## 2022-07-27 PROCEDURE — 1159F MED LIST DOCD IN RCRD: CPT | Mod: CPTII,S$GLB,, | Performed by: STUDENT IN AN ORGANIZED HEALTH CARE EDUCATION/TRAINING PROGRAM

## 2022-07-27 PROCEDURE — 99213 OFFICE O/P EST LOW 20 MIN: CPT | Mod: 25,S$GLB,, | Performed by: STUDENT IN AN ORGANIZED HEALTH CARE EDUCATION/TRAINING PROGRAM

## 2022-07-27 PROCEDURE — 1160F PR REVIEW ALL MEDS BY PRESCRIBER/CLIN PHARMACIST DOCUMENTED: ICD-10-PCS | Mod: CPTII,S$GLB,, | Performed by: STUDENT IN AN ORGANIZED HEALTH CARE EDUCATION/TRAINING PROGRAM

## 2022-07-27 PROCEDURE — 99213 PR OFFICE/OUTPT VISIT, EST, LEVL III, 20-29 MIN: ICD-10-PCS | Mod: 25,S$GLB,, | Performed by: STUDENT IN AN ORGANIZED HEALTH CARE EDUCATION/TRAINING PROGRAM

## 2022-07-27 PROCEDURE — 99999 PR PBB SHADOW E&M-EST. PATIENT-LVL IV: CPT | Mod: PBBFAC,,, | Performed by: STUDENT IN AN ORGANIZED HEALTH CARE EDUCATION/TRAINING PROGRAM

## 2022-07-27 PROCEDURE — 3060F PR POS MICROALBUMINURIA RESULT DOCUMENTED/REVIEW: ICD-10-PCS | Mod: CPTII,S$GLB,, | Performed by: STUDENT IN AN ORGANIZED HEALTH CARE EDUCATION/TRAINING PROGRAM

## 2022-07-27 PROCEDURE — 3066F NEPHROPATHY DOC TX: CPT | Mod: CPTII,S$GLB,, | Performed by: STUDENT IN AN ORGANIZED HEALTH CARE EDUCATION/TRAINING PROGRAM

## 2022-07-27 PROCEDURE — 3046F HEMOGLOBIN A1C LEVEL >9.0%: CPT | Mod: CPTII,S$GLB,, | Performed by: STUDENT IN AN ORGANIZED HEALTH CARE EDUCATION/TRAINING PROGRAM

## 2022-07-27 PROCEDURE — 11042 WOUND DEBRIDEMENT: ICD-10-PCS | Mod: S$GLB,,, | Performed by: STUDENT IN AN ORGANIZED HEALTH CARE EDUCATION/TRAINING PROGRAM

## 2022-07-27 PROCEDURE — 1160F RVW MEDS BY RX/DR IN RCRD: CPT | Mod: CPTII,S$GLB,, | Performed by: STUDENT IN AN ORGANIZED HEALTH CARE EDUCATION/TRAINING PROGRAM

## 2022-07-27 PROCEDURE — 3060F POS MICROALBUMINURIA REV: CPT | Mod: CPTII,S$GLB,, | Performed by: STUDENT IN AN ORGANIZED HEALTH CARE EDUCATION/TRAINING PROGRAM

## 2022-07-27 NOTE — PROGRESS NOTES
Subjective:      Patient ID: Virgilio Acuña is a 62 y.o. male.    Chief Complaint: Wound Care (Lt. Foot now rt, foot has issues)    Virgilio is a 62 y.o. male who presents to the clinic for evaluation and treatment of high risk feet. Virgilio has a past medical history of Cellulitis of left index finger (2/16/2015), Charcot's joint of foot, left (08/2018), Dyslipidemia, Essential hypertension (2/16/2017), Group B streptococcal infection (1/15/2018), Hypotestosteronemia (7/7/2017), Infected finger joint (2/17/2015), Infected skin ulcer limited to breakdown of skin (1/13/2018), MSSA (methicillin susceptible Staphylococcus aureus) (1/13/2018), Obese, S/P knee surgery, medial/lateral menisectomy (11/4/2013), and Type 2 diabetes mellitus with diabetic polyneuropathy, with long-term current use of insulin (2003). Here for concern over a new ulcer left foot great toe, this has progressed over the last 2-3 weeks, there has been some drainage from the area and discoloration to the skin, has charcot with reconstruction to the left side and is high risk. Denies f/c/n/v    6/7/22: Patient returns for follow up left great toe ulcer ambulating in darco and football no new complaints.    6/14/22: Patient returns for continued wound care left great toe ulcer no new complaints.     6/21/22: Patient returns for wound care left great toe, ambulating in football and darco no new complaints. He feels there is a malodor to the wound    6/28/22: Patient returns for wound care left great toe ambulating I darco no new complaints, graft is here today for application    7/6/22: Patient returns for wound care and grafting of the left great toe no new complaints    7/12/22: Patient returns for follow up left great toe wound care no new complaints    7/18/22: Patient returns for follow up left great toe ulcer care, here for wound care and graft application    7/27/22: Patient r/u left great toe ulcer. Patient has concerns of skin break down on the  RLE and would like it evaluated.       PCP: Anne Wright MD    Date Last Seen by PCP:   Current shoe gear:  Affected Foot: Tennis shoes     Unaffected Foot: Tennis shoes    History of Trauma: negative      Hemoglobin A1C   Date Value Ref Range Status   02/16/2022 10.2 (H) 4.0 - 5.6 % Final     Comment:     ADA Screening Guidelines:  5.7-6.4%  Consistent with prediabetes  >or=6.5%  Consistent with diabetes    High levels of fetal hemoglobin interfere with the HbA1C  assay. Heterozygous hemoglobin variants (HbS, HgC, etc)do  not significantly interfere with this assay.   However, presence of multiple variants may affect accuracy.     06/23/2021 9.2 (H) 4.0 - 5.6 % Final     Comment:     ADA Screening Guidelines:  5.7-6.4%  Consistent with prediabetes  >or=6.5%  Consistent with diabetes    High levels of fetal hemoglobin interfere with the HbA1C  assay. Heterozygous hemoglobin variants (HbS, HgC, etc)do  not significantly interfere with this assay.   However, presence of multiple variants may affect accuracy.     03/16/2021 9.1 (H) 4.0 - 5.6 % Final     Comment:     ADA Screening Guidelines:  5.7-6.4%  Consistent with prediabetes  >or=6.5%  Consistent with diabetes    High levels of fetal hemoglobin interfere with the HbA1C  assay. Heterozygous hemoglobin variants (HbS, HgC, etc)do  not significantly interfere with this assay.   However, presence of multiple variants may affect accuracy.         Review of Systems   Constitutional: Negative for chills and fever.   Cardiovascular: Positive for leg swelling. Negative for claudication.   Respiratory: Negative for shortness of breath.    Skin: Positive for color change, nail changes and poor wound healing. Negative for itching and rash.   Musculoskeletal: Negative for muscle cramps, muscle weakness and myalgias.   Gastrointestinal: Negative for nausea and vomiting.   Neurological: Positive for numbness and paresthesias. Negative for focal weakness and loss of balance.            Objective:       Physical Exam  Constitutional:       General: He is not in acute distress.     Appearance: He is well-developed. He is not diaphoretic.   Cardiovascular:      Pulses:           Dorsalis pedis pulses are 2+ on the right side and 2+ on the left side.        Posterior tibial pulses are 2+ on the right side and 2+ on the left side.      Comments: < 3 sec capillary refill time to toes 1-5 bilateral. Toes and feet are warm to touch proximally with normal distal cooling b/l. There is no hair growth on the feet and toes b/l. There is moderate edema left foot and mild edema right.     Musculoskeletal:      Comments: Left second toe amputation    Left foot now more narrow in appearance with the first ray properly reduced, minimal edema to the foot and no pain with palpation throughout the area of surgery.    Left ankle there is some pain with palpation to the lateral malleolus and the ATFL area    Equinus noted b/l ankles with < 10 deg DF noted. MMT 5/5 in DF/PF/Inv/Ev resistance with no reproduction of pain in any direction. Passive range of motion of ankle and pedal joints is painless b/l.     Feet:      Right foot:      Protective Sensation: 10 sites tested. 0 sites sensed.      Left foot:      Protective Sensation: 10 sites tested. 0 sites sensed.   Skin:     General: Skin is warm.      Coloration: Skin is not pale.      Findings: No abrasion, bruising, burn, ecchymosis, erythema, laceration, lesion, petechiae or rash.      Nails: There is no clubbing.      Comments: Incision overlying the left second metatarsal base is well healed      Ulcer Location: left plantar hallux  Measurements: now 2 separate ulcers 0.6x0.5x0.1 cm (more plantar and distal) and the other 0.3x0.2x0.1 cm more medial and proximal)  Periwound: Intact  Drainage: serosanguinous.  Pus: None.  Malodor: None.  Base:  100% granular  Signs of infection: None      Plantar R foot with some areas of pressure but no distinct skin break  down.    Neurological:      Mental Status: He is alert and oriented to person, place, and time.      Sensory: Sensory deficit present.      Motor: No tremor, atrophy or abnormal muscle tone.      Comments: Negative tinel sign bilateral.   Psychiatric:         Behavior: Behavior normal.               Assessment:       Encounter Diagnoses   Name Primary?    Diabetic ulcer of left great toe Yes    Type II diabetes mellitus with neurological manifestations     Pre-ulcerative calluses             Plan:       Virgilio was seen today for wound care.    Diagnoses and all orders for this visit:    Diabetic ulcer of left great toe    Type II diabetes mellitus with neurological manifestations    Pre-ulcerative calluses      I counseled the patient on his conditions, their implications and medical management.    Shoe inspection. Diabetic Foot Education. Patient reminded of the importance of good nutrition and blood sugar control to help prevent podiatric complications of diabetes. Patient instructed on proper foot hygeine. We discussed wearing proper shoe gear, daily foot inspections, never walking without protective shoe gear, never putting sharp instruments to feet    No graft was ordered for this visit.    Wound was debrided and dressing was reapplied.     No wound on the RLE. Aperture pads applied to the RLE areas of pressure. Patient is not wearing his DM shoes w/ inserts. He states that he has those at home. He'll need to wear them to offload his R foot.     F/u 1 week with Dr. Smith. Will have graft available for that visit.      Vinicius Mcnulty DPM      Wound Debridement    Date/Time: 7/27/2022 10:40 AM  Performed by: Vinicius Mcnulty DPM  Authorized by: Vinicius Mcnulty DPM     Consent Done?:  Yes (Verbal)    Wound Details:    Location:  Left foot    Location:  Left 1st Toe    Type of Debridement:  Excisional       Length (cm):  0.5       Area (sq cm):  0.15       Width (cm):  0.3       Percent Debrided (%):  100        Depth (cm):  0.2       Total Area Debrided (sq cm):  0.15    Depth of debridement:  Subcutaneous tissue    Devitalized tissue debrided:  Callus and Biofilm    Instruments:  Curette    Bleeding:  Minimal  Hemostasis Achieved: Yes    Method Used:  Pressure  Patient tolerance:  Patient tolerated the procedure well with no immediate complications

## 2022-08-03 ENCOUNTER — OFFICE VISIT (OUTPATIENT)
Dept: PODIATRY | Facility: CLINIC | Age: 62
End: 2022-08-03
Payer: COMMERCIAL

## 2022-08-03 VITALS — WEIGHT: 315 LBS | BODY MASS INDEX: 41.75 KG/M2 | HEIGHT: 73 IN

## 2022-08-03 DIAGNOSIS — L97.529 DIABETIC ULCER OF LEFT GREAT TOE: Primary | ICD-10-CM

## 2022-08-03 DIAGNOSIS — E11.49 TYPE II DIABETES MELLITUS WITH NEUROLOGICAL MANIFESTATIONS: ICD-10-CM

## 2022-08-03 DIAGNOSIS — E11.621 DIABETIC ULCER OF LEFT GREAT TOE: Primary | ICD-10-CM

## 2022-08-03 PROCEDURE — 99499 UNLISTED E&M SERVICE: CPT | Mod: S$GLB,,, | Performed by: PODIATRIST

## 2022-08-03 PROCEDURE — 99999 PR PBB SHADOW E&M-EST. PATIENT-LVL III: ICD-10-PCS | Mod: PBBFAC,,, | Performed by: PODIATRIST

## 2022-08-03 PROCEDURE — 99999 PR PBB SHADOW E&M-EST. PATIENT-LVL III: CPT | Mod: PBBFAC,,, | Performed by: PODIATRIST

## 2022-08-03 PROCEDURE — 15275 SKIN SUB GRAFT FACE/NK/HF/G: CPT | Mod: S$GLB,,, | Performed by: PODIATRIST

## 2022-08-03 PROCEDURE — 3066F NEPHROPATHY DOC TX: CPT | Mod: CPTII,S$GLB,, | Performed by: PODIATRIST

## 2022-08-03 PROCEDURE — 3046F PR MOST RECENT HEMOGLOBIN A1C LEVEL > 9.0%: ICD-10-PCS | Mod: CPTII,S$GLB,, | Performed by: PODIATRIST

## 2022-08-03 PROCEDURE — 1159F PR MEDICATION LIST DOCUMENTED IN MEDICAL RECORD: ICD-10-PCS | Mod: CPTII,S$GLB,, | Performed by: PODIATRIST

## 2022-08-03 PROCEDURE — 1160F PR REVIEW ALL MEDS BY PRESCRIBER/CLIN PHARMACIST DOCUMENTED: ICD-10-PCS | Mod: CPTII,S$GLB,, | Performed by: PODIATRIST

## 2022-08-03 PROCEDURE — 99499 NO LOS: ICD-10-PCS | Mod: S$GLB,,, | Performed by: PODIATRIST

## 2022-08-03 PROCEDURE — 1160F RVW MEDS BY RX/DR IN RCRD: CPT | Mod: CPTII,S$GLB,, | Performed by: PODIATRIST

## 2022-08-03 PROCEDURE — 15275 PR SKIN SUB GRAFT FACE/NK/HF/G UP TO 100 SQCM: ICD-10-PCS | Mod: S$GLB,,, | Performed by: PODIATRIST

## 2022-08-03 PROCEDURE — 3008F PR BODY MASS INDEX (BMI) DOCUMENTED: ICD-10-PCS | Mod: CPTII,S$GLB,, | Performed by: PODIATRIST

## 2022-08-03 PROCEDURE — 4010F ACE/ARB THERAPY RXD/TAKEN: CPT | Mod: CPTII,S$GLB,, | Performed by: PODIATRIST

## 2022-08-03 PROCEDURE — 3060F PR POS MICROALBUMINURIA RESULT DOCUMENTED/REVIEW: ICD-10-PCS | Mod: CPTII,S$GLB,, | Performed by: PODIATRIST

## 2022-08-03 PROCEDURE — 3008F BODY MASS INDEX DOCD: CPT | Mod: CPTII,S$GLB,, | Performed by: PODIATRIST

## 2022-08-03 PROCEDURE — 3060F POS MICROALBUMINURIA REV: CPT | Mod: CPTII,S$GLB,, | Performed by: PODIATRIST

## 2022-08-03 PROCEDURE — 3066F PR DOCUMENTATION OF TREATMENT FOR NEPHROPATHY: ICD-10-PCS | Mod: CPTII,S$GLB,, | Performed by: PODIATRIST

## 2022-08-03 PROCEDURE — 1159F MED LIST DOCD IN RCRD: CPT | Mod: CPTII,S$GLB,, | Performed by: PODIATRIST

## 2022-08-03 PROCEDURE — 3046F HEMOGLOBIN A1C LEVEL >9.0%: CPT | Mod: CPTII,S$GLB,, | Performed by: PODIATRIST

## 2022-08-03 PROCEDURE — 4010F PR ACE/ARB THEARPY RXD/TAKEN: ICD-10-PCS | Mod: CPTII,S$GLB,, | Performed by: PODIATRIST

## 2022-08-03 NOTE — PROGRESS NOTES
Subjective:      Patient ID: Virgilio Acuña is a 62 y.o. male.    Chief Complaint: Wound Care (Lt great toe)    Virgilio is a 62 y.o. male who presents to the clinic for evaluation and treatment of high risk feet. Virgilio has a past medical history of Cellulitis of left index finger (2/16/2015), Charcot's joint of foot, left (08/2018), Dyslipidemia, Essential hypertension (2/16/2017), Group B streptococcal infection (1/15/2018), Hypotestosteronemia (7/7/2017), Infected finger joint (2/17/2015), Infected skin ulcer limited to breakdown of skin (1/13/2018), MSSA (methicillin susceptible Staphylococcus aureus) (1/13/2018), Obese, S/P knee surgery, medial/lateral menisectomy (11/4/2013), and Type 2 diabetes mellitus with diabetic polyneuropathy, with long-term current use of insulin (2003). Here for concern over a new ulcer left foot great toe, this has progressed over the last 2-3 weeks, there has been some drainage from the area and discoloration to the skin, has charcot with reconstruction to the left side and is high risk. Denies f/c/n/v    6/7/22: Patient returns for follow up left great toe ulcer ambulating in darco and football no new complaints.    6/14/22: Patient returns for continued wound care left great toe ulcer no new complaints.     6/21/22: Patient returns for wound care left great toe, ambulating in football and darco no new complaints. He feels there is a malodor to the wound    6/28/22: Patient returns for wound care left great toe ambulating I darco no new complaints, graft is here today for application    7/6/22: Patient returns for wound care and grafting of the left great toe no new complaints    7/12/22: Patient returns for follow up left great toe wound care no new complaints    7/18/22: Patient returns for follow up left great toe ulcer care, here for wound care and graft application    8/3/22: Patient returns for follow up left great toe ulcer care here for graft application      PCP: Anne CARUSO  MD Adriana    Date Last Seen by PCP:   Current shoe gear:  Affected Foot: Tennis shoes     Unaffected Foot: Tennis shoes    History of Trauma: negative      Hemoglobin A1C   Date Value Ref Range Status   02/16/2022 10.2 (H) 4.0 - 5.6 % Final     Comment:     ADA Screening Guidelines:  5.7-6.4%  Consistent with prediabetes  >or=6.5%  Consistent with diabetes    High levels of fetal hemoglobin interfere with the HbA1C  assay. Heterozygous hemoglobin variants (HbS, HgC, etc)do  not significantly interfere with this assay.   However, presence of multiple variants may affect accuracy.     06/23/2021 9.2 (H) 4.0 - 5.6 % Final     Comment:     ADA Screening Guidelines:  5.7-6.4%  Consistent with prediabetes  >or=6.5%  Consistent with diabetes    High levels of fetal hemoglobin interfere with the HbA1C  assay. Heterozygous hemoglobin variants (HbS, HgC, etc)do  not significantly interfere with this assay.   However, presence of multiple variants may affect accuracy.     03/16/2021 9.1 (H) 4.0 - 5.6 % Final     Comment:     ADA Screening Guidelines:  5.7-6.4%  Consistent with prediabetes  >or=6.5%  Consistent with diabetes    High levels of fetal hemoglobin interfere with the HbA1C  assay. Heterozygous hemoglobin variants (HbS, HgC, etc)do  not significantly interfere with this assay.   However, presence of multiple variants may affect accuracy.         Review of Systems   Constitutional: Negative for chills and fever.   Cardiovascular: Positive for leg swelling. Negative for claudication.   Respiratory: Negative for shortness of breath.    Skin: Positive for color change, nail changes and poor wound healing. Negative for itching and rash.   Musculoskeletal: Negative for muscle cramps, muscle weakness and myalgias.   Gastrointestinal: Negative for nausea and vomiting.   Neurological: Positive for numbness and paresthesias. Negative for focal weakness and loss of balance.           Objective:       Physical  Exam  Constitutional:       General: He is not in acute distress.     Appearance: He is well-developed. He is not diaphoretic.   Cardiovascular:      Pulses:           Dorsalis pedis pulses are 2+ on the right side and 2+ on the left side.        Posterior tibial pulses are 2+ on the right side and 2+ on the left side.      Comments: < 3 sec capillary refill time to toes 1-5 bilateral. Toes and feet are warm to touch proximally with normal distal cooling b/l. There is no hair growth on the feet and toes b/l. There is moderate edema left foot and mild edema right.     Musculoskeletal:      Comments: Left second toe amputation    Left foot now more narrow in appearance with the first ray properly reduced, minimal edema to the foot and no pain with palpation throughout the area of surgery.    Left ankle there is some pain with palpation to the lateral malleolus and the ATFL area    Equinus noted b/l ankles with < 10 deg DF noted. MMT 5/5 in DF/PF/Inv/Ev resistance with no reproduction of pain in any direction. Passive range of motion of ankle and pedal joints is painless b/l.     Feet:      Right foot:      Protective Sensation: 10 sites tested. 0 sites sensed.      Left foot:      Protective Sensation: 10 sites tested. 0 sites sensed.   Skin:     General: Skin is warm.      Coloration: Skin is not pale.      Findings: No abrasion, bruising, burn, ecchymosis, erythema, laceration, lesion, petechiae or rash.      Nails: There is no clubbing.      Comments: Incision overlying the left second metatarsal base is well healed      Ulcer Location: left plantar hallux  Measurements: pre covered in slough and callus post:  0.3x0.2x0.1   Periwound: Intact  Drainage: serosanguinous.  Pus: None.  Malodor: None.  Base:  100% granular  Signs of infection: None     Neurological:      Mental Status: He is alert and oriented to person, place, and time.      Sensory: Sensory deficit present.      Motor: No tremor, atrophy or abnormal  muscle tone.      Comments: Negative tinel sign bilateral.   Psychiatric:         Behavior: Behavior normal.               Assessment:       Encounter Diagnoses   Name Primary?    Diabetic ulcer of left great toe Yes    Type II diabetes mellitus with neurological manifestations             Plan:       Virgilio was seen today for wound care.    Diagnoses and all orders for this visit:    Diabetic ulcer of left great toe    Type II diabetes mellitus with neurological manifestations      I counseled the patient on his conditions, their implications and medical management.    Shoe inspection. Diabetic Foot Education. Patient reminded of the importance of good nutrition and blood sugar control to help prevent podiatric complications of diabetes. Patient instructed on proper foot hygeine. We discussed wearing proper shoe gear, daily foot inspections, never walking without protective shoe gear, never putting sharp instruments to feet    [facility] GRAFT OP REPORT     SURGEON: Inocente Smith DPM  ASSISTANT: None  PRE-OP DX: Surgical dehiscence left lateral foot.  POST-OP DX: same.  ANESTHESIA: Pt. has loss of sensation and therefore no anesthesia was required.  HEMOSTASIS: None  EBL: less than 1 cc.        Total Size of Graft : 1.5x1.5 cm   Total amount used:  1.5x1.5 cm                    Time Out performed to verify patient and site and verbal consent obtained.     Procedure: The patient was seen in the clinic room and placed in supine position on the treatment table.  Attention was directed to the ulcer which was located on the left great toe, where an ulceration measuring 0.3x0.2x0.1 cm is noted.  The ulceration was covered with 100% granular tissue under a layer of biofilm and slough.  No acute signs of infection were noted.  The wound is nontender.  Utilizing a sterile curette, I debrided the wound full-thickness through subcutaneous tissue to excise biofilm and slough.  Patient tolerated well.  The wound was flushed  with sterile saline.  There was minimal bleeding with debridement which was well controlled with direct pressure.  The  [facility] graft was then inspected and noted to be  acceptable.  It was then removed sterilely from its packaging.     The [facility] Graft  was adhered down to the wound ensuring proper placement with correct side down and then secured in place with 1/16 inch Steri-Strips.  It was then covered with a non-adherent layer followed by a silver foam dressing and covered in a football wrap and offloaded in darco.  The patient having tolerated the procedure well left the clinic with all vital signs stable and vascular status intact to all digits of both feet.      Short-term goals including promoting granulation and epithelialization of the wound. Long term goals include maintaining a healed wound with appropriate offloading and good medical management per internist.    Return in 1 week for follow up     Inocente Smith DPM

## 2022-08-10 ENCOUNTER — OFFICE VISIT (OUTPATIENT)
Dept: PODIATRY | Facility: CLINIC | Age: 62
End: 2022-08-10
Payer: COMMERCIAL

## 2022-08-10 DIAGNOSIS — L97.529 DIABETIC ULCER OF LEFT GREAT TOE: Primary | ICD-10-CM

## 2022-08-10 DIAGNOSIS — E11.621 DIABETIC ULCER OF LEFT GREAT TOE: Primary | ICD-10-CM

## 2022-08-10 DIAGNOSIS — E11.49 TYPE II DIABETES MELLITUS WITH NEUROLOGICAL MANIFESTATIONS: ICD-10-CM

## 2022-08-10 PROCEDURE — 99999 PR PBB SHADOW E&M-EST. PATIENT-LVL IV: ICD-10-PCS | Mod: PBBFAC,,, | Performed by: PODIATRIST

## 2022-08-10 PROCEDURE — 4010F ACE/ARB THERAPY RXD/TAKEN: CPT | Mod: CPTII,S$GLB,, | Performed by: PODIATRIST

## 2022-08-10 PROCEDURE — 3060F POS MICROALBUMINURIA REV: CPT | Mod: CPTII,S$GLB,, | Performed by: PODIATRIST

## 2022-08-10 PROCEDURE — 3046F HEMOGLOBIN A1C LEVEL >9.0%: CPT | Mod: CPTII,S$GLB,, | Performed by: PODIATRIST

## 2022-08-10 PROCEDURE — 4010F PR ACE/ARB THEARPY RXD/TAKEN: ICD-10-PCS | Mod: CPTII,S$GLB,, | Performed by: PODIATRIST

## 2022-08-10 PROCEDURE — 1160F RVW MEDS BY RX/DR IN RCRD: CPT | Mod: CPTII,S$GLB,, | Performed by: PODIATRIST

## 2022-08-10 PROCEDURE — 3066F NEPHROPATHY DOC TX: CPT | Mod: CPTII,S$GLB,, | Performed by: PODIATRIST

## 2022-08-10 PROCEDURE — 15275 SKIN SUB GRAFT FACE/NK/HF/G: CPT | Mod: S$GLB,,, | Performed by: PODIATRIST

## 2022-08-10 PROCEDURE — 1160F PR REVIEW ALL MEDS BY PRESCRIBER/CLIN PHARMACIST DOCUMENTED: ICD-10-PCS | Mod: CPTII,S$GLB,, | Performed by: PODIATRIST

## 2022-08-10 PROCEDURE — 15275 PR SKIN SUB GRAFT FACE/NK/HF/G UP TO 100 SQCM: ICD-10-PCS | Mod: S$GLB,,, | Performed by: PODIATRIST

## 2022-08-10 PROCEDURE — 3046F PR MOST RECENT HEMOGLOBIN A1C LEVEL > 9.0%: ICD-10-PCS | Mod: CPTII,S$GLB,, | Performed by: PODIATRIST

## 2022-08-10 PROCEDURE — 1159F PR MEDICATION LIST DOCUMENTED IN MEDICAL RECORD: ICD-10-PCS | Mod: CPTII,S$GLB,, | Performed by: PODIATRIST

## 2022-08-10 PROCEDURE — 3060F PR POS MICROALBUMINURIA RESULT DOCUMENTED/REVIEW: ICD-10-PCS | Mod: CPTII,S$GLB,, | Performed by: PODIATRIST

## 2022-08-10 PROCEDURE — 1159F MED LIST DOCD IN RCRD: CPT | Mod: CPTII,S$GLB,, | Performed by: PODIATRIST

## 2022-08-10 PROCEDURE — 99999 PR PBB SHADOW E&M-EST. PATIENT-LVL IV: CPT | Mod: PBBFAC,,, | Performed by: PODIATRIST

## 2022-08-10 PROCEDURE — 99499 NO LOS: ICD-10-PCS | Mod: S$GLB,,, | Performed by: PODIATRIST

## 2022-08-10 PROCEDURE — 99499 UNLISTED E&M SERVICE: CPT | Mod: S$GLB,,, | Performed by: PODIATRIST

## 2022-08-10 PROCEDURE — 3066F PR DOCUMENTATION OF TREATMENT FOR NEPHROPATHY: ICD-10-PCS | Mod: CPTII,S$GLB,, | Performed by: PODIATRIST

## 2022-08-10 NOTE — PROGRESS NOTES
Subjective:      Patient ID: Virgilio Acuña is a 62 y.o. male.    Chief Complaint: Wound Care and Diabetes Mellitus    Virgilio is a 62 y.o. male who presents to the clinic for evaluation and treatment of high risk feet. Virgilio has a past medical history of Cellulitis of left index finger (2/16/2015), Charcot's joint of foot, left (08/2018), Dyslipidemia, Essential hypertension (2/16/2017), Group B streptococcal infection (1/15/2018), Hypotestosteronemia (7/7/2017), Infected finger joint (2/17/2015), Infected skin ulcer limited to breakdown of skin (1/13/2018), MSSA (methicillin susceptible Staphylococcus aureus) (1/13/2018), Obese, S/P knee surgery, medial/lateral menisectomy (11/4/2013), and Type 2 diabetes mellitus with diabetic polyneuropathy, with long-term current use of insulin (2003). Here for concern over a new ulcer left foot great toe, this has progressed over the last 2-3 weeks, there has been some drainage from the area and discoloration to the skin, has charcot with reconstruction to the left side and is high risk. Denies f/c/n/v    6/7/22: Patient returns for follow up left great toe ulcer ambulating in darco and football no new complaints.    6/14/22: Patient returns for continued wound care left great toe ulcer no new complaints.     6/21/22: Patient returns for wound care left great toe, ambulating in football and darco no new complaints. He feels there is a malodor to the wound    6/28/22: Patient returns for wound care left great toe ambulating I darco no new complaints, graft is here today for application    7/6/22: Patient returns for wound care and grafting of the left great toe no new complaints    7/12/22: Patient returns for follow up left great toe wound care no new complaints    7/18/22: Patient returns for follow up left great toe ulcer care, here for wound care and graft application    8/3/22: Patient returns for follow up left great toe ulcer care here for graft application    8/10/22:  patient returns for follow up left great toe ulcer and for graft application      PCP: Anne Wright MD    Date Last Seen by PCP:   Current shoe gear:  Affected Foot: Tennis shoes     Unaffected Foot: Tennis shoes    History of Trauma: negative      Hemoglobin A1C   Date Value Ref Range Status   02/16/2022 10.2 (H) 4.0 - 5.6 % Final     Comment:     ADA Screening Guidelines:  5.7-6.4%  Consistent with prediabetes  >or=6.5%  Consistent with diabetes    High levels of fetal hemoglobin interfere with the HbA1C  assay. Heterozygous hemoglobin variants (HbS, HgC, etc)do  not significantly interfere with this assay.   However, presence of multiple variants may affect accuracy.     06/23/2021 9.2 (H) 4.0 - 5.6 % Final     Comment:     ADA Screening Guidelines:  5.7-6.4%  Consistent with prediabetes  >or=6.5%  Consistent with diabetes    High levels of fetal hemoglobin interfere with the HbA1C  assay. Heterozygous hemoglobin variants (HbS, HgC, etc)do  not significantly interfere with this assay.   However, presence of multiple variants may affect accuracy.     03/16/2021 9.1 (H) 4.0 - 5.6 % Final     Comment:     ADA Screening Guidelines:  5.7-6.4%  Consistent with prediabetes  >or=6.5%  Consistent with diabetes    High levels of fetal hemoglobin interfere with the HbA1C  assay. Heterozygous hemoglobin variants (HbS, HgC, etc)do  not significantly interfere with this assay.   However, presence of multiple variants may affect accuracy.         Review of Systems   Constitutional: Negative for chills and fever.   Cardiovascular: Positive for leg swelling. Negative for claudication.   Respiratory: Negative for shortness of breath.    Skin: Positive for color change, nail changes and poor wound healing. Negative for itching and rash.   Musculoskeletal: Negative for muscle cramps, muscle weakness and myalgias.   Gastrointestinal: Negative for nausea and vomiting.   Neurological: Positive for numbness and paresthesias. Negative  for focal weakness and loss of balance.           Objective:       Physical Exam  Constitutional:       General: He is not in acute distress.     Appearance: He is well-developed. He is not diaphoretic.   Cardiovascular:      Pulses:           Dorsalis pedis pulses are 2+ on the right side and 2+ on the left side.        Posterior tibial pulses are 2+ on the right side and 2+ on the left side.      Comments: < 3 sec capillary refill time to toes 1-5 bilateral. Toes and feet are warm to touch proximally with normal distal cooling b/l. There is no hair growth on the feet and toes b/l. There is moderate edema left foot and mild edema right.     Musculoskeletal:      Comments: Left second toe amputation    Left foot now more narrow in appearance with the first ray properly reduced, minimal edema to the foot and no pain with palpation throughout the area of surgery.    Left ankle there is some pain with palpation to the lateral malleolus and the ATFL area    Equinus noted b/l ankles with < 10 deg DF noted. MMT 5/5 in DF/PF/Inv/Ev resistance with no reproduction of pain in any direction. Passive range of motion of ankle and pedal joints is painless b/l.     Feet:      Right foot:      Protective Sensation: 10 sites tested. 0 sites sensed.      Left foot:      Protective Sensation: 10 sites tested. 0 sites sensed.   Skin:     General: Skin is warm.      Coloration: Skin is not pale.      Findings: No abrasion, bruising, burn, ecchymosis, erythema, laceration, lesion, petechiae or rash.      Nails: There is no clubbing.      Comments: Incision overlying the left second metatarsal base is well healed      Ulcer Location: left plantar hallux  Measurements: pre covered in slough and callus post:  0.3x0.2x0.1   Periwound: Intact  Drainage: serosanguinous.  Pus: None.  Malodor: None.  Base:  100% granular  Signs of infection: None     Neurological:      Mental Status: He is alert and oriented to person, place, and time.       Sensory: Sensory deficit present.      Motor: No tremor, atrophy or abnormal muscle tone.      Comments: Negative tinel sign bilateral.   Psychiatric:         Behavior: Behavior normal.               Assessment:       Encounter Diagnoses   Name Primary?    Diabetic ulcer of left great toe Yes    Type II diabetes mellitus with neurological manifestations             Plan:       Virgilio was seen today for wound care and diabetes mellitus.    Diagnoses and all orders for this visit:    Diabetic ulcer of left great toe    Type II diabetes mellitus with neurological manifestations      I counseled the patient on his conditions, their implications and medical management.    Shoe inspection. Diabetic Foot Education. Patient reminded of the importance of good nutrition and blood sugar control to help prevent podiatric complications of diabetes. Patient instructed on proper foot hygeine. We discussed wearing proper shoe gear, daily foot inspections, never walking without protective shoe gear, never putting sharp instruments to feet    [facility] GRAFT OP REPORT     SURGEON: Inocente Smith DPM  ASSISTANT: None  PRE-OP DX: Surgical dehiscence left lateral foot.  POST-OP DX: same.  ANESTHESIA: Pt. has loss of sensation and therefore no anesthesia was required.  HEMOSTASIS: None  EBL: less than 1 cc.        Total Size of Graft : 1.5x1.5 cm   Total amount used:  1.5x1.5 cm                        Time Out performed to verify patient and site and verbal consent obtained.     Procedure: The patient was seen in the clinic room and placed in supine position on the treatment table.  Attention was directed to the ulcer which was located on the left great toe, where an ulceration measuring 0.3x0.2x0.1 cm is noted.  The ulceration was covered with 100% granular tissue under a layer of biofilm and slough.  No acute signs of infection were noted.  The wound is nontender.  Utilizing a sterile curette, I debrided the wound full-thickness  through subcutaneous tissue to excise biofilm and slough.  Patient tolerated well.  The wound was flushed with sterile saline.  There was minimal bleeding with debridement which was well controlled with direct pressure.  The  [facility] graft was then inspected and noted to be  acceptable.  It was then removed sterilely from its packaging.     The [facility] Graft  was adhered down to the wound ensuring proper placement with correct side down and then secured in place with 1/16 inch Steri-Strips.  It was then covered with a non-adherent layer followed by a silver foam dressing and covered in a football wrap and offloaded in darco.  The patient having tolerated the procedure well left the clinic with all vital signs stable and vascular status intact to all digits of both feet.      Short-term goals including promoting granulation and epithelialization of the wound. Long term goals include maintaining a healed wound with appropriate offloading and good medical management per internist.    Return in 1 week for follow up     Inocente Smith DPM

## 2022-08-17 ENCOUNTER — PATIENT MESSAGE (OUTPATIENT)
Dept: PODIATRY | Facility: CLINIC | Age: 62
End: 2022-08-17

## 2022-08-17 ENCOUNTER — OFFICE VISIT (OUTPATIENT)
Dept: PODIATRY | Facility: CLINIC | Age: 62
End: 2022-08-17
Payer: COMMERCIAL

## 2022-08-17 VITALS — HEIGHT: 73 IN | WEIGHT: 315 LBS | BODY MASS INDEX: 41.75 KG/M2

## 2022-08-17 DIAGNOSIS — E11.621 DIABETIC ULCER OF LEFT GREAT TOE: Primary | ICD-10-CM

## 2022-08-17 DIAGNOSIS — L97.529 DIABETIC ULCER OF LEFT GREAT TOE: Primary | ICD-10-CM

## 2022-08-17 DIAGNOSIS — E11.49 TYPE II DIABETES MELLITUS WITH NEUROLOGICAL MANIFESTATIONS: ICD-10-CM

## 2022-08-17 PROCEDURE — 3066F PR DOCUMENTATION OF TREATMENT FOR NEPHROPATHY: ICD-10-PCS | Mod: CPTII,S$GLB,, | Performed by: PODIATRIST

## 2022-08-17 PROCEDURE — 3060F POS MICROALBUMINURIA REV: CPT | Mod: CPTII,S$GLB,, | Performed by: PODIATRIST

## 2022-08-17 PROCEDURE — 3008F PR BODY MASS INDEX (BMI) DOCUMENTED: ICD-10-PCS | Mod: CPTII,S$GLB,, | Performed by: PODIATRIST

## 2022-08-17 PROCEDURE — 11042 WOUND DEBRIDEMENT: ICD-10-PCS | Mod: TA,S$GLB,, | Performed by: PODIATRIST

## 2022-08-17 PROCEDURE — 3066F NEPHROPATHY DOC TX: CPT | Mod: CPTII,S$GLB,, | Performed by: PODIATRIST

## 2022-08-17 PROCEDURE — 1160F PR REVIEW ALL MEDS BY PRESCRIBER/CLIN PHARMACIST DOCUMENTED: ICD-10-PCS | Mod: CPTII,S$GLB,, | Performed by: PODIATRIST

## 2022-08-17 PROCEDURE — 3060F PR POS MICROALBUMINURIA RESULT DOCUMENTED/REVIEW: ICD-10-PCS | Mod: CPTII,S$GLB,, | Performed by: PODIATRIST

## 2022-08-17 PROCEDURE — 3046F HEMOGLOBIN A1C LEVEL >9.0%: CPT | Mod: CPTII,S$GLB,, | Performed by: PODIATRIST

## 2022-08-17 PROCEDURE — 99499 NO LOS: ICD-10-PCS | Mod: S$GLB,,, | Performed by: PODIATRIST

## 2022-08-17 PROCEDURE — 4010F ACE/ARB THERAPY RXD/TAKEN: CPT | Mod: CPTII,S$GLB,, | Performed by: PODIATRIST

## 2022-08-17 PROCEDURE — 4010F PR ACE/ARB THEARPY RXD/TAKEN: ICD-10-PCS | Mod: CPTII,S$GLB,, | Performed by: PODIATRIST

## 2022-08-17 PROCEDURE — 1160F RVW MEDS BY RX/DR IN RCRD: CPT | Mod: CPTII,S$GLB,, | Performed by: PODIATRIST

## 2022-08-17 PROCEDURE — 99999 PR PBB SHADOW E&M-EST. PATIENT-LVL III: ICD-10-PCS | Mod: PBBFAC,,, | Performed by: PODIATRIST

## 2022-08-17 PROCEDURE — 99999 PR PBB SHADOW E&M-EST. PATIENT-LVL III: CPT | Mod: PBBFAC,,, | Performed by: PODIATRIST

## 2022-08-17 PROCEDURE — 3008F BODY MASS INDEX DOCD: CPT | Mod: CPTII,S$GLB,, | Performed by: PODIATRIST

## 2022-08-17 PROCEDURE — 1159F MED LIST DOCD IN RCRD: CPT | Mod: CPTII,S$GLB,, | Performed by: PODIATRIST

## 2022-08-17 PROCEDURE — 3046F PR MOST RECENT HEMOGLOBIN A1C LEVEL > 9.0%: ICD-10-PCS | Mod: CPTII,S$GLB,, | Performed by: PODIATRIST

## 2022-08-17 PROCEDURE — 1159F PR MEDICATION LIST DOCUMENTED IN MEDICAL RECORD: ICD-10-PCS | Mod: CPTII,S$GLB,, | Performed by: PODIATRIST

## 2022-08-17 PROCEDURE — 99499 UNLISTED E&M SERVICE: CPT | Mod: S$GLB,,, | Performed by: PODIATRIST

## 2022-08-17 PROCEDURE — 11042 DBRDMT SUBQ TIS 1ST 20SQCM/<: CPT | Mod: TA,S$GLB,, | Performed by: PODIATRIST

## 2022-08-17 NOTE — PROCEDURES
"Wound Debridement    Date/Time: 8/17/2022 10:45 AM  Performed by: Inocente Smith DPM  Authorized by: Inocente Smith DPM     Time out: Immediately prior to procedure a "time out" was called to verify the correct patient, procedure, equipment, support staff and site/side marked as required.    Consent Done?:  Yes (Verbal)  Local anesthesia used?: No      Wound Details:    Location:  Left foot    Location:  Left 1st Toe    Type of Debridement:  Excisional       Length (cm):  0.3       Area (sq cm):  0.06       Width (cm):  0.2       Percent Debrided (%):  100       Depth (cm):  0.1       Total Area Debrided (sq cm):  0.06    Depth of debridement:  Subcutaneous tissue    Devitalized tissue debrided:  Biofilm, Slough and Necrotic/Eschar    Instruments:  Curette    Bleeding:  Minimal  Hemostasis Achieved: Yes    Method Used:  Pressure  Patient tolerance:  Patient tolerated the procedure well with no immediate complications      "

## 2022-08-17 NOTE — PROGRESS NOTES
Subjective:      Patient ID: Virgilio Acuña is a 62 y.o. male.    Chief Complaint: Foot Ulcer (DFU left great toe)    Virgilio is a 62 y.o. male who presents to the clinic for evaluation and treatment of high risk feet. Virgilio has a past medical history of Cellulitis of left index finger (2/16/2015), Charcot's joint of foot, left (08/2018), Dyslipidemia, Essential hypertension (2/16/2017), Group B streptococcal infection (1/15/2018), Hypotestosteronemia (7/7/2017), Infected finger joint (2/17/2015), Infected skin ulcer limited to breakdown of skin (1/13/2018), MSSA (methicillin susceptible Staphylococcus aureus) (1/13/2018), Obese, S/P knee surgery, medial/lateral menisectomy (11/4/2013), and Type 2 diabetes mellitus with diabetic polyneuropathy, with long-term current use of insulin (2003). Here for concern over a new ulcer left foot great toe, this has progressed over the last 2-3 weeks, there has been some drainage from the area and discoloration to the skin, has charcot with reconstruction to the left side and is high risk. Denies f/c/n/v    6/7/22: Patient returns for follow up left great toe ulcer ambulating in darco and football no new complaints.    6/14/22: Patient returns for continued wound care left great toe ulcer no new complaints.     6/21/22: Patient returns for wound care left great toe, ambulating in football and darco no new complaints. He feels there is a malodor to the wound    6/28/22: Patient returns for wound care left great toe ambulating I darco no new complaints, graft is here today for application    7/6/22: Patient returns for wound care and grafting of the left great toe no new complaints    7/12/22: Patient returns for follow up left great toe wound care no new complaints    7/18/22: Patient returns for follow up left great toe ulcer care, here for wound care and graft application    8/3/22: Patient returns for follow up left great toe ulcer care here for graft application    8/10/22:  patient returns for follow up left great toe ulcer and for graft application    8/17/22: patient returns for follow up wound care left great toe ulcer no new complaints walking in the darco shoe.    PCP: Anne Wright MD    Date Last Seen by PCP:   Current shoe gear:  Affected Foot: Tennis shoes     Unaffected Foot: Tennis shoes    History of Trauma: negative      Hemoglobin A1C   Date Value Ref Range Status   02/16/2022 10.2 (H) 4.0 - 5.6 % Final     Comment:     ADA Screening Guidelines:  5.7-6.4%  Consistent with prediabetes  >or=6.5%  Consistent with diabetes    High levels of fetal hemoglobin interfere with the HbA1C  assay. Heterozygous hemoglobin variants (HbS, HgC, etc)do  not significantly interfere with this assay.   However, presence of multiple variants may affect accuracy.     06/23/2021 9.2 (H) 4.0 - 5.6 % Final     Comment:     ADA Screening Guidelines:  5.7-6.4%  Consistent with prediabetes  >or=6.5%  Consistent with diabetes    High levels of fetal hemoglobin interfere with the HbA1C  assay. Heterozygous hemoglobin variants (HbS, HgC, etc)do  not significantly interfere with this assay.   However, presence of multiple variants may affect accuracy.     03/16/2021 9.1 (H) 4.0 - 5.6 % Final     Comment:     ADA Screening Guidelines:  5.7-6.4%  Consistent with prediabetes  >or=6.5%  Consistent with diabetes    High levels of fetal hemoglobin interfere with the HbA1C  assay. Heterozygous hemoglobin variants (HbS, HgC, etc)do  not significantly interfere with this assay.   However, presence of multiple variants may affect accuracy.         Review of Systems   Constitutional: Negative for chills and fever.   Cardiovascular: Positive for leg swelling. Negative for claudication.   Respiratory: Negative for shortness of breath.    Skin: Positive for color change, nail changes and poor wound healing. Negative for itching and rash.   Musculoskeletal: Negative for muscle cramps, muscle weakness and myalgias.    Gastrointestinal: Negative for nausea and vomiting.   Neurological: Positive for numbness and paresthesias. Negative for focal weakness and loss of balance.           Objective:       Physical Exam  Constitutional:       General: He is not in acute distress.     Appearance: He is well-developed. He is not diaphoretic.   Cardiovascular:      Pulses:           Dorsalis pedis pulses are 2+ on the right side and 2+ on the left side.        Posterior tibial pulses are 2+ on the right side and 2+ on the left side.      Comments: < 3 sec capillary refill time to toes 1-5 bilateral. Toes and feet are warm to touch proximally with normal distal cooling b/l. There is no hair growth on the feet and toes b/l. There is moderate edema left foot and mild edema right.     Musculoskeletal:      Comments: Left second toe amputation    Left foot now more narrow in appearance with the first ray properly reduced, minimal edema to the foot and no pain with palpation throughout the area of surgery.    Left ankle there is some pain with palpation to the lateral malleolus and the ATFL area    Equinus noted b/l ankles with < 10 deg DF noted. MMT 5/5 in DF/PF/Inv/Ev resistance with no reproduction of pain in any direction. Passive range of motion of ankle and pedal joints is painless b/l.     Feet:      Right foot:      Protective Sensation: 10 sites tested. 0 sites sensed.      Left foot:      Protective Sensation: 10 sites tested. 0 sites sensed.   Skin:     General: Skin is warm.      Coloration: Skin is not pale.      Findings: No abrasion, bruising, burn, ecchymosis, erythema, laceration, lesion, petechiae or rash.      Nails: There is no clubbing.      Comments: Incision overlying the left second metatarsal base is well healed      Ulcer Location: left plantar hallux  Measurements: pre covered in slough and callus post:  0.3x0.2x0.1   Periwound: Intact  Drainage: serosanguinous.  Pus: None.  Malodor: None.  Base:  100% granular  Signs  of infection: None     Neurological:      Mental Status: He is alert and oriented to person, place, and time.      Sensory: Sensory deficit present.      Motor: No tremor, atrophy or abnormal muscle tone.      Comments: Negative tinel sign bilateral.   Psychiatric:         Behavior: Behavior normal.               Assessment:       Encounter Diagnoses   Name Primary?    Diabetic ulcer of left great toe Yes    Type II diabetes mellitus with neurological manifestations             Plan:       Virgilio was seen today for foot ulcer.    Diagnoses and all orders for this visit:    Diabetic ulcer of left great toe    Type II diabetes mellitus with neurological manifestations      I counseled the patient on his conditions, their implications and medical management.    Shoe inspection. Diabetic Foot Education. Patient reminded of the importance of good nutrition and blood sugar control to help prevent podiatric complications of diabetes. Patient instructed on proper foot hygeine. We discussed wearing proper shoe gear, daily foot inspections, never walking without protective shoe gear, never putting sharp instruments to feet    See attached procedure note for debridement    Dressed with aperture, football and darco for offloading    Return in 1 week for follow up     Inocente Smith DPM

## 2022-08-23 ENCOUNTER — PATIENT MESSAGE (OUTPATIENT)
Dept: PODIATRY | Facility: CLINIC | Age: 62
End: 2022-08-23
Payer: COMMERCIAL

## 2022-08-24 ENCOUNTER — OFFICE VISIT (OUTPATIENT)
Dept: PODIATRY | Facility: CLINIC | Age: 62
End: 2022-08-24
Payer: COMMERCIAL

## 2022-08-24 VITALS — HEIGHT: 73 IN | BODY MASS INDEX: 41.75 KG/M2 | WEIGHT: 315 LBS

## 2022-08-24 DIAGNOSIS — E11.621 DIABETIC ULCER OF LEFT GREAT TOE: Primary | ICD-10-CM

## 2022-08-24 DIAGNOSIS — E11.49 TYPE II DIABETES MELLITUS WITH NEUROLOGICAL MANIFESTATIONS: ICD-10-CM

## 2022-08-24 DIAGNOSIS — L97.529 DIABETIC ULCER OF LEFT GREAT TOE: Primary | ICD-10-CM

## 2022-08-24 PROCEDURE — 1159F MED LIST DOCD IN RCRD: CPT | Mod: CPTII,S$GLB,, | Performed by: PODIATRIST

## 2022-08-24 PROCEDURE — 99999 PR PBB SHADOW E&M-EST. PATIENT-LVL III: ICD-10-PCS | Mod: PBBFAC,,, | Performed by: PODIATRIST

## 2022-08-24 PROCEDURE — 3066F PR DOCUMENTATION OF TREATMENT FOR NEPHROPATHY: ICD-10-PCS | Mod: CPTII,S$GLB,, | Performed by: PODIATRIST

## 2022-08-24 PROCEDURE — 3046F HEMOGLOBIN A1C LEVEL >9.0%: CPT | Mod: CPTII,S$GLB,, | Performed by: PODIATRIST

## 2022-08-24 PROCEDURE — 3008F PR BODY MASS INDEX (BMI) DOCUMENTED: ICD-10-PCS | Mod: CPTII,S$GLB,, | Performed by: PODIATRIST

## 2022-08-24 PROCEDURE — 99999 PR PBB SHADOW E&M-EST. PATIENT-LVL III: CPT | Mod: PBBFAC,,, | Performed by: PODIATRIST

## 2022-08-24 PROCEDURE — 4010F ACE/ARB THERAPY RXD/TAKEN: CPT | Mod: CPTII,S$GLB,, | Performed by: PODIATRIST

## 2022-08-24 PROCEDURE — 3046F PR MOST RECENT HEMOGLOBIN A1C LEVEL > 9.0%: ICD-10-PCS | Mod: CPTII,S$GLB,, | Performed by: PODIATRIST

## 2022-08-24 PROCEDURE — 3066F NEPHROPATHY DOC TX: CPT | Mod: CPTII,S$GLB,, | Performed by: PODIATRIST

## 2022-08-24 PROCEDURE — 1160F PR REVIEW ALL MEDS BY PRESCRIBER/CLIN PHARMACIST DOCUMENTED: ICD-10-PCS | Mod: CPTII,S$GLB,, | Performed by: PODIATRIST

## 2022-08-24 PROCEDURE — 11042 WOUND DEBRIDEMENT: ICD-10-PCS | Mod: TA,S$GLB,, | Performed by: PODIATRIST

## 2022-08-24 PROCEDURE — 1160F RVW MEDS BY RX/DR IN RCRD: CPT | Mod: CPTII,S$GLB,, | Performed by: PODIATRIST

## 2022-08-24 PROCEDURE — 3008F BODY MASS INDEX DOCD: CPT | Mod: CPTII,S$GLB,, | Performed by: PODIATRIST

## 2022-08-24 PROCEDURE — 3060F POS MICROALBUMINURIA REV: CPT | Mod: CPTII,S$GLB,, | Performed by: PODIATRIST

## 2022-08-24 PROCEDURE — 99499 UNLISTED E&M SERVICE: CPT | Mod: S$GLB,,, | Performed by: PODIATRIST

## 2022-08-24 PROCEDURE — 1159F PR MEDICATION LIST DOCUMENTED IN MEDICAL RECORD: ICD-10-PCS | Mod: CPTII,S$GLB,, | Performed by: PODIATRIST

## 2022-08-24 PROCEDURE — 11042 DBRDMT SUBQ TIS 1ST 20SQCM/<: CPT | Mod: TA,S$GLB,, | Performed by: PODIATRIST

## 2022-08-24 PROCEDURE — 99499 NO LOS: ICD-10-PCS | Mod: S$GLB,,, | Performed by: PODIATRIST

## 2022-08-24 PROCEDURE — 4010F PR ACE/ARB THEARPY RXD/TAKEN: ICD-10-PCS | Mod: CPTII,S$GLB,, | Performed by: PODIATRIST

## 2022-08-24 PROCEDURE — 3060F PR POS MICROALBUMINURIA RESULT DOCUMENTED/REVIEW: ICD-10-PCS | Mod: CPTII,S$GLB,, | Performed by: PODIATRIST

## 2022-08-24 NOTE — PROCEDURES
"Wound Debridement    Date/Time: 8/24/2022 10:15 AM  Performed by: Inocente Smith DPM  Authorized by: Inocente Smith DPM     Time out: Immediately prior to procedure a "time out" was called to verify the correct patient, procedure, equipment, support staff and site/side marked as required.    Consent Done?:  Yes (Verbal)  Local anesthesia used?: No      Wound Details:    Location:  Left foot    Location:  Left 1st Toe    Type of Debridement:  Excisional       Length (cm):  0.1       Area (sq cm):  0.01       Width (cm):  0.1       Percent Debrided (%):  100       Depth (cm):  0.1       Total Area Debrided (sq cm):  0.01    Depth of debridement:  Subcutaneous tissue    Devitalized tissue debrided:  Slough and Necrotic/Eschar    Instruments:  Curette    Bleeding:  Minimal  Hemostasis Achieved: Yes    Method Used:  Pressure and Silver Nitrate  Patient tolerance:  Patient tolerated the procedure well with no immediate complications      "

## 2022-08-24 NOTE — PROGRESS NOTES
Subjective:      Patient ID: Virgilio Acuña is a 62 y.o. male.    Chief Complaint: Follow-up (Left great toe)    Virgilio is a 62 y.o. male who presents to the clinic for evaluation and treatment of high risk feet. Virgilio has a past medical history of Cellulitis of left index finger (2/16/2015), Charcot's joint of foot, left (08/2018), Dyslipidemia, Essential hypertension (2/16/2017), Group B streptococcal infection (1/15/2018), Hypotestosteronemia (7/7/2017), Infected finger joint (2/17/2015), Infected skin ulcer limited to breakdown of skin (1/13/2018), MSSA (methicillin susceptible Staphylococcus aureus) (1/13/2018), Obese, S/P knee surgery, medial/lateral menisectomy (11/4/2013), and Type 2 diabetes mellitus with diabetic polyneuropathy, with long-term current use of insulin (2003). Here for concern over a new ulcer left foot great toe, this has progressed over the last 2-3 weeks, there has been some drainage from the area and discoloration to the skin, has charcot with reconstruction to the left side and is high risk. Denies f/c/n/v    6/7/22: Patient returns for follow up left great toe ulcer ambulating in darco and football no new complaints.    6/14/22: Patient returns for continued wound care left great toe ulcer no new complaints.     6/21/22: Patient returns for wound care left great toe, ambulating in football and darco no new complaints. He feels there is a malodor to the wound    6/28/22: Patient returns for wound care left great toe ambulating I darco no new complaints, graft is here today for application    7/6/22: Patient returns for wound care and grafting of the left great toe no new complaints    7/12/22: Patient returns for follow up left great toe wound care no new complaints    7/18/22: Patient returns for follow up left great toe ulcer care, here for wound care and graft application    8/3/22: Patient returns for follow up left great toe ulcer care here for graft application    8/10/22:  patient returns for follow up left great toe ulcer and for graft application    8/17/22: patient returns for follow up wound care left great toe ulcer no new complaints walking in the darco shoe.    8/24/22: Patient returns for follow up wound care left great toe ulcer ambulating in darco and football    PCP: Anne Wright MD    Date Last Seen by PCP:   Current shoe gear:  Affected Foot: Football with darco     Unaffected Foot: Tennis shoes    History of Trauma: negative      Hemoglobin A1C   Date Value Ref Range Status   02/16/2022 10.2 (H) 4.0 - 5.6 % Final     Comment:     ADA Screening Guidelines:  5.7-6.4%  Consistent with prediabetes  >or=6.5%  Consistent with diabetes    High levels of fetal hemoglobin interfere with the HbA1C  assay. Heterozygous hemoglobin variants (HbS, HgC, etc)do  not significantly interfere with this assay.   However, presence of multiple variants may affect accuracy.     06/23/2021 9.2 (H) 4.0 - 5.6 % Final     Comment:     ADA Screening Guidelines:  5.7-6.4%  Consistent with prediabetes  >or=6.5%  Consistent with diabetes    High levels of fetal hemoglobin interfere with the HbA1C  assay. Heterozygous hemoglobin variants (HbS, HgC, etc)do  not significantly interfere with this assay.   However, presence of multiple variants may affect accuracy.     03/16/2021 9.1 (H) 4.0 - 5.6 % Final     Comment:     ADA Screening Guidelines:  5.7-6.4%  Consistent with prediabetes  >or=6.5%  Consistent with diabetes    High levels of fetal hemoglobin interfere with the HbA1C  assay. Heterozygous hemoglobin variants (HbS, HgC, etc)do  not significantly interfere with this assay.   However, presence of multiple variants may affect accuracy.         Review of Systems   Constitutional: Negative for chills and fever.   Cardiovascular: Positive for leg swelling. Negative for claudication.   Respiratory: Negative for shortness of breath.    Skin: Positive for color change, nail changes and poor wound  healing. Negative for itching and rash.   Musculoskeletal: Negative for muscle cramps, muscle weakness and myalgias.   Gastrointestinal: Negative for nausea and vomiting.   Neurological: Positive for numbness and paresthesias. Negative for focal weakness and loss of balance.           Objective:       Physical Exam  Constitutional:       General: He is not in acute distress.     Appearance: He is well-developed. He is not diaphoretic.   Cardiovascular:      Pulses:           Dorsalis pedis pulses are 2+ on the right side and 2+ on the left side.        Posterior tibial pulses are 2+ on the right side and 2+ on the left side.      Comments: < 3 sec capillary refill time to toes 1-5 bilateral. Toes and feet are warm to touch proximally with normal distal cooling b/l. There is no hair growth on the feet and toes b/l. There is moderate edema left foot and mild edema right.     Musculoskeletal:      Comments: Left second toe amputation    Left foot now more narrow in appearance with the first ray properly reduced, minimal edema to the foot and no pain with palpation throughout the area of surgery.    Left ankle there is some pain with palpation to the lateral malleolus and the ATFL area    Equinus noted b/l ankles with < 10 deg DF noted. MMT 5/5 in DF/PF/Inv/Ev resistance with no reproduction of pain in any direction. Passive range of motion of ankle and pedal joints is painless b/l.     Feet:      Right foot:      Protective Sensation: 10 sites tested. 0 sites sensed.      Left foot:      Protective Sensation: 10 sites tested. 0 sites sensed.   Skin:     General: Skin is warm.      Coloration: Skin is not pale.      Findings: No abrasion, bruising, burn, ecchymosis, erythema, laceration, lesion, petechiae or rash.      Nails: There is no clubbing.      Comments: Incision overlying the left second metatarsal base is well healed      Ulcer Location: left plantar hallux  Measurements: pre covered in slough and callus post:   0.1x0.1x0.1   Periwound: Intact  Drainage: serosanguinous.  Pus: None.  Malodor: None.  Base:  100% granular  Signs of infection: None     Neurological:      Mental Status: He is alert and oriented to person, place, and time.      Sensory: Sensory deficit present.      Motor: No tremor, atrophy or abnormal muscle tone.      Comments: Negative tinel sign bilateral.   Psychiatric:         Behavior: Behavior normal.               Assessment:       Encounter Diagnoses   Name Primary?    Diabetic ulcer of left great toe Yes    Type II diabetes mellitus with neurological manifestations             Plan:       Virgilio was seen today for follow-up.    Diagnoses and all orders for this visit:    Diabetic ulcer of left great toe    Type II diabetes mellitus with neurological manifestations      I counseled the patient on his conditions, their implications and medical management.    Shoe inspection. Diabetic Foot Education. Patient reminded of the importance of good nutrition and blood sugar control to help prevent podiatric complications of diabetes. Patient instructed on proper foot hygeine. We discussed wearing proper shoe gear, daily foot inspections, never walking without protective shoe gear, never putting sharp instruments to feet    See attached procedure note for debridement    Dressed with aperture, football and darco for offloading    Estimated return to work date 9/12/22    Return in 1 week for follow up     Inocente Smith DPM

## 2022-08-24 NOTE — TELEPHONE ENCOUNTER
I put a letter in his chart stating he is cleared to return to work on 9/12/22, please fax the letter to the number he provided thanks

## 2022-08-29 ENCOUNTER — PATIENT MESSAGE (OUTPATIENT)
Dept: PODIATRY | Facility: CLINIC | Age: 62
End: 2022-08-29
Payer: COMMERCIAL

## 2022-08-29 ENCOUNTER — PATIENT MESSAGE (OUTPATIENT)
Dept: FAMILY MEDICINE | Facility: CLINIC | Age: 62
End: 2022-08-29
Payer: COMMERCIAL

## 2022-08-29 DIAGNOSIS — K21.9 GASTROESOPHAGEAL REFLUX DISEASE: ICD-10-CM

## 2022-08-29 DIAGNOSIS — E11.42 TYPE 2 DIABETES MELLITUS WITH DIABETIC POLYNEUROPATHY, WITH LONG-TERM CURRENT USE OF INSULIN: ICD-10-CM

## 2022-08-29 DIAGNOSIS — G62.9 NEUROPATHY: ICD-10-CM

## 2022-08-29 DIAGNOSIS — Z79.4 TYPE 2 DIABETES MELLITUS WITH DIABETIC POLYNEUROPATHY, WITH LONG-TERM CURRENT USE OF INSULIN: ICD-10-CM

## 2022-08-29 DIAGNOSIS — I10 ESSENTIAL HYPERTENSION: ICD-10-CM

## 2022-08-29 RX ORDER — INSULIN GLARGINE 100 [IU]/ML
40 INJECTION, SOLUTION SUBCUTANEOUS 2 TIMES DAILY
Qty: 72 ML | Refills: 1 | Status: SHIPPED | OUTPATIENT
Start: 2022-08-29 | End: 2022-12-26

## 2022-08-29 RX ORDER — AMLODIPINE BESYLATE 5 MG/1
5 TABLET ORAL DAILY
Qty: 90 TABLET | Refills: 3 | Status: SHIPPED | OUTPATIENT
Start: 2022-08-29 | End: 2022-10-04 | Stop reason: SDUPTHER

## 2022-08-29 RX ORDER — PANTOPRAZOLE SODIUM 40 MG/1
40 TABLET, DELAYED RELEASE ORAL DAILY
Qty: 90 TABLET | Refills: 3 | Status: SHIPPED | OUTPATIENT
Start: 2022-08-29 | End: 2023-03-28

## 2022-08-29 RX ORDER — INSULIN ASPART 100 [IU]/ML
INJECTION, SOLUTION INTRAVENOUS; SUBCUTANEOUS
Qty: 30 ML | Refills: 5 | Status: SHIPPED | OUTPATIENT
Start: 2022-08-29 | End: 2022-11-11

## 2022-08-29 RX ORDER — ENALAPRIL MALEATE 20 MG/1
20 TABLET ORAL 2 TIMES DAILY
Qty: 180 TABLET | Refills: 3 | Status: SHIPPED | OUTPATIENT
Start: 2022-08-29 | End: 2023-06-13 | Stop reason: SDUPTHER

## 2022-08-29 RX ORDER — METFORMIN HYDROCHLORIDE 500 MG/1
1000 TABLET, EXTENDED RELEASE ORAL 2 TIMES DAILY WITH MEALS
Qty: 180 TABLET | Refills: 0 | Status: SHIPPED | OUTPATIENT
Start: 2022-08-29 | End: 2022-10-01

## 2022-08-29 RX ORDER — GABAPENTIN 300 MG/1
CAPSULE ORAL
Qty: 270 CAPSULE | Refills: 5 | Status: SHIPPED | OUTPATIENT
Start: 2022-08-29 | End: 2023-03-05

## 2022-08-29 NOTE — TELEPHONE ENCOUNTER
Care Due:                  Date            Visit Type   Department     Provider  --------------------------------------------------------------------------------                                EP -                              PRIMARY      [facility] FAM[facility]  Last Visit: 04-      CARE ([facility])   MEDICINE       Anne Wright  Next Visit: None Scheduled  None         None Found                                                            Last  Test          Frequency    Reason                     Performed    Due Date  --------------------------------------------------------------------------------    HBA1C.......  6 months...  SITagliptin,               02- 08-                             dapagliflozin, insulin,                             metFORMIN................    [employer] Embedded Care Gaps. Reference number: [id number]. 8/29/2022   11:07:46 AM CDT

## 2022-08-31 ENCOUNTER — OFFICE VISIT (OUTPATIENT)
Dept: PODIATRY | Facility: CLINIC | Age: 62
End: 2022-08-31
Payer: COMMERCIAL

## 2022-08-31 ENCOUNTER — PATIENT MESSAGE (OUTPATIENT)
Dept: PODIATRY | Facility: CLINIC | Age: 62
End: 2022-08-31

## 2022-08-31 VITALS — WEIGHT: 315 LBS | HEIGHT: 73 IN | BODY MASS INDEX: 41.75 KG/M2

## 2022-08-31 DIAGNOSIS — E11.49 TYPE II DIABETES MELLITUS WITH NEUROLOGICAL MANIFESTATIONS: Primary | ICD-10-CM

## 2022-08-31 DIAGNOSIS — Z87.2 HEALED ULCER OF LEFT FOOT: ICD-10-CM

## 2022-08-31 PROCEDURE — 4010F PR ACE/ARB THEARPY RXD/TAKEN: ICD-10-PCS | Mod: CPTII,S$GLB,, | Performed by: PODIATRIST

## 2022-08-31 PROCEDURE — 1159F PR MEDICATION LIST DOCUMENTED IN MEDICAL RECORD: ICD-10-PCS | Mod: CPTII,S$GLB,, | Performed by: PODIATRIST

## 2022-08-31 PROCEDURE — 99212 OFFICE O/P EST SF 10 MIN: CPT | Mod: S$GLB,,, | Performed by: PODIATRIST

## 2022-08-31 PROCEDURE — 99999 PR PBB SHADOW E&M-EST. PATIENT-LVL III: ICD-10-PCS | Mod: PBBFAC,,, | Performed by: PODIATRIST

## 2022-08-31 PROCEDURE — 3046F PR MOST RECENT HEMOGLOBIN A1C LEVEL > 9.0%: ICD-10-PCS | Mod: CPTII,S$GLB,, | Performed by: PODIATRIST

## 2022-08-31 PROCEDURE — 3046F HEMOGLOBIN A1C LEVEL >9.0%: CPT | Mod: CPTII,S$GLB,, | Performed by: PODIATRIST

## 2022-08-31 PROCEDURE — 3060F PR POS MICROALBUMINURIA RESULT DOCUMENTED/REVIEW: ICD-10-PCS | Mod: CPTII,S$GLB,, | Performed by: PODIATRIST

## 2022-08-31 PROCEDURE — 4010F ACE/ARB THERAPY RXD/TAKEN: CPT | Mod: CPTII,S$GLB,, | Performed by: PODIATRIST

## 2022-08-31 PROCEDURE — 1159F MED LIST DOCD IN RCRD: CPT | Mod: CPTII,S$GLB,, | Performed by: PODIATRIST

## 2022-08-31 PROCEDURE — 3008F BODY MASS INDEX DOCD: CPT | Mod: CPTII,S$GLB,, | Performed by: PODIATRIST

## 2022-08-31 PROCEDURE — 1160F RVW MEDS BY RX/DR IN RCRD: CPT | Mod: CPTII,S$GLB,, | Performed by: PODIATRIST

## 2022-08-31 PROCEDURE — 99212 PR OFFICE/OUTPT VISIT, EST, LEVL II, 10-19 MIN: ICD-10-PCS | Mod: S$GLB,,, | Performed by: PODIATRIST

## 2022-08-31 PROCEDURE — 3008F PR BODY MASS INDEX (BMI) DOCUMENTED: ICD-10-PCS | Mod: CPTII,S$GLB,, | Performed by: PODIATRIST

## 2022-08-31 PROCEDURE — 1160F PR REVIEW ALL MEDS BY PRESCRIBER/CLIN PHARMACIST DOCUMENTED: ICD-10-PCS | Mod: CPTII,S$GLB,, | Performed by: PODIATRIST

## 2022-08-31 PROCEDURE — 3066F PR DOCUMENTATION OF TREATMENT FOR NEPHROPATHY: ICD-10-PCS | Mod: CPTII,S$GLB,, | Performed by: PODIATRIST

## 2022-08-31 PROCEDURE — 3060F POS MICROALBUMINURIA REV: CPT | Mod: CPTII,S$GLB,, | Performed by: PODIATRIST

## 2022-08-31 PROCEDURE — 3066F NEPHROPATHY DOC TX: CPT | Mod: CPTII,S$GLB,, | Performed by: PODIATRIST

## 2022-08-31 PROCEDURE — 99999 PR PBB SHADOW E&M-EST. PATIENT-LVL III: CPT | Mod: PBBFAC,,, | Performed by: PODIATRIST

## 2022-08-31 NOTE — PROGRESS NOTES
Subjective:      Patient ID: Virgilio Acuña is a 62 y.o. male.    Chief Complaint: Follow-up (Left foot) and Foot Ulcer    Virgilio is a 62 y.o. male who presents to the clinic for evaluation and treatment of high risk feet. Virgilio has a past medical history of Cellulitis of left index finger (2/16/2015), Charcot's joint of foot, left (08/2018), Dyslipidemia, Essential hypertension (2/16/2017), Group B streptococcal infection (1/15/2018), Hypotestosteronemia (7/7/2017), Infected finger joint (2/17/2015), Infected skin ulcer limited to breakdown of skin (1/13/2018), MSSA (methicillin susceptible Staphylococcus aureus) (1/13/2018), Obese, S/P knee surgery, medial/lateral menisectomy (11/4/2013), and Type 2 diabetes mellitus with diabetic polyneuropathy, with long-term current use of insulin (2003). Here for concern over a new ulcer left foot great toe, this has progressed over the last 2-3 weeks, there has been some drainage from the area and discoloration to the skin, has charcot with reconstruction to the left side and is high risk. Denies f/c/n/v    6/7/22: Patient returns for follow up left great toe ulcer ambulating in darco and football no new complaints.    6/14/22: Patient returns for continued wound care left great toe ulcer no new complaints.     6/21/22: Patient returns for wound care left great toe, ambulating in football and darco no new complaints. He feels there is a malodor to the wound    6/28/22: Patient returns for wound care left great toe ambulating I darco no new complaints, graft is here today for application    7/6/22: Patient returns for wound care and grafting of the left great toe no new complaints    7/12/22: Patient returns for follow up left great toe wound care no new complaints    7/18/22: Patient returns for follow up left great toe ulcer care, here for wound care and graft application    8/3/22: Patient returns for follow up left great toe ulcer care here for graft  application    8/10/22: patient returns for follow up left great toe ulcer and for graft application    8/17/22: patient returns for follow up wound care left great toe ulcer no new complaints walking in the darco shoe.    8/24/22: Patient returns for follow up wound care left great toe ulcer ambulating in darco and football    8/31/22: patient returns for left great toe ulcer in darco shoe and football    PCP: Anne Wright MD    Date Last Seen by PCP:   Current shoe gear:  Affected Foot: Football with darco     Unaffected Foot: Tennis shoes    History of Trauma: negative      Hemoglobin A1C   Date Value Ref Range Status   02/16/2022 10.2 (H) 4.0 - 5.6 % Final     Comment:     ADA Screening Guidelines:  5.7-6.4%  Consistent with prediabetes  >or=6.5%  Consistent with diabetes    High levels of fetal hemoglobin interfere with the HbA1C  assay. Heterozygous hemoglobin variants (HbS, HgC, etc)do  not significantly interfere with this assay.   However, presence of multiple variants may affect accuracy.     06/23/2021 9.2 (H) 4.0 - 5.6 % Final     Comment:     ADA Screening Guidelines:  5.7-6.4%  Consistent with prediabetes  >or=6.5%  Consistent with diabetes    High levels of fetal hemoglobin interfere with the HbA1C  assay. Heterozygous hemoglobin variants (HbS, HgC, etc)do  not significantly interfere with this assay.   However, presence of multiple variants may affect accuracy.     03/16/2021 9.1 (H) 4.0 - 5.6 % Final     Comment:     ADA Screening Guidelines:  5.7-6.4%  Consistent with prediabetes  >or=6.5%  Consistent with diabetes    High levels of fetal hemoglobin interfere with the HbA1C  assay. Heterozygous hemoglobin variants (HbS, HgC, etc)do  not significantly interfere with this assay.   However, presence of multiple variants may affect accuracy.         Review of Systems   Constitutional: Negative for chills and fever.   Cardiovascular:  Positive for leg swelling. Negative for claudication.    Respiratory:  Negative for shortness of breath.    Skin:  Positive for color change, nail changes and poor wound healing. Negative for itching and rash.   Musculoskeletal:  Negative for muscle cramps, muscle weakness and myalgias.   Gastrointestinal:  Negative for nausea and vomiting.   Neurological:  Positive for numbness and paresthesias. Negative for focal weakness and loss of balance.         Objective:       Physical Exam  Constitutional:       General: He is not in acute distress.     Appearance: He is well-developed. He is not diaphoretic.   Cardiovascular:      Pulses:           Dorsalis pedis pulses are 2+ on the right side and 2+ on the left side.        Posterior tibial pulses are 2+ on the right side and 2+ on the left side.      Comments: < 3 sec capillary refill time to toes 1-5 bilateral. Toes and feet are warm to touch proximally with normal distal cooling b/l. There is no hair growth on the feet and toes b/l. There is moderate edema left foot and mild edema right.     Musculoskeletal:      Comments: Left second toe amputation    Left foot now more narrow in appearance with the first ray properly reduced, minimal edema to the foot and no pain with palpation throughout the area of surgery.    Left ankle there is some pain with palpation to the lateral malleolus and the ATFL area    Equinus noted b/l ankles with < 10 deg DF noted. MMT 5/5 in DF/PF/Inv/Ev resistance with no reproduction of pain in any direction. Passive range of motion of ankle and pedal joints is painless b/l.     Feet:      Right foot:      Protective Sensation: 10 sites tested.  0 sites sensed.      Left foot:      Protective Sensation: 10 sites tested.  0 sites sensed.   Skin:     General: Skin is warm.      Coloration: Skin is not pale.      Findings: No abrasion, bruising, burn, ecchymosis, erythema, laceration, lesion, petechiae or rash.      Nails: There is no clubbing.      Comments: Incision overlying the left second  metatarsal base is well healed      Ulcer Location: left plantar hallux\  Measurements: 0 x 0 x 0 cm  Periwound: Intact  Drainage: None.  Pus: None.  Malodor: None.  Base:  100% epithelial tissue.  Signs of infection: None.       Neurological:      Mental Status: He is alert and oriented to person, place, and time.      Sensory: Sensory deficit present.      Motor: No tremor, atrophy or abnormal muscle tone.      Comments: Negative tinel sign bilateral.   Psychiatric:         Behavior: Behavior normal.             Assessment:       Encounter Diagnoses   Name Primary?    Type II diabetes mellitus with neurological manifestations Yes    Healed ulcer of left foot               Plan:       Virgilio was seen today for follow-up and foot ulcer.    Diagnoses and all orders for this visit:    Type II diabetes mellitus with neurological manifestations    Healed ulcer of left foot      I counseled the patient on his conditions, their implications and medical management.    Shoe inspection. Diabetic Foot Education. Patient reminded of the importance of good nutrition and blood sugar control to help prevent podiatric complications of diabetes. Patient instructed on proper foot hygeine. We discussed wearing proper shoe gear, daily foot inspections, never walking without protective shoe gear, never putting sharp instruments to feet    Wound healed but will offload one more week in [facility] and football    Return next week to return to normal shoe wear    Inocente Smith DPM

## 2022-09-02 ENCOUNTER — PATIENT MESSAGE (OUTPATIENT)
Dept: FAMILY MEDICINE | Facility: CLINIC | Age: 62
End: 2022-09-02
Payer: COMMERCIAL

## 2022-09-06 ENCOUNTER — PATIENT MESSAGE (OUTPATIENT)
Dept: PODIATRY | Facility: CLINIC | Age: 62
End: 2022-09-06

## 2022-09-06 ENCOUNTER — OFFICE VISIT (OUTPATIENT)
Dept: PODIATRY | Facility: CLINIC | Age: 62
End: 2022-09-06
Payer: COMMERCIAL

## 2022-09-06 DIAGNOSIS — Z87.2 HEALED ULCER OF LEFT FOOT: ICD-10-CM

## 2022-09-06 DIAGNOSIS — E11.49 TYPE II DIABETES MELLITUS WITH NEUROLOGICAL MANIFESTATIONS: Primary | ICD-10-CM

## 2022-09-06 PROCEDURE — 3046F PR MOST RECENT HEMOGLOBIN A1C LEVEL > 9.0%: ICD-10-PCS | Mod: CPTII,S$GLB,, | Performed by: PODIATRIST

## 2022-09-06 PROCEDURE — 1159F MED LIST DOCD IN RCRD: CPT | Mod: CPTII,S$GLB,, | Performed by: PODIATRIST

## 2022-09-06 PROCEDURE — 1160F RVW MEDS BY RX/DR IN RCRD: CPT | Mod: CPTII,S$GLB,, | Performed by: PODIATRIST

## 2022-09-06 PROCEDURE — 1160F PR REVIEW ALL MEDS BY PRESCRIBER/CLIN PHARMACIST DOCUMENTED: ICD-10-PCS | Mod: CPTII,S$GLB,, | Performed by: PODIATRIST

## 2022-09-06 PROCEDURE — 99212 OFFICE O/P EST SF 10 MIN: CPT | Mod: S$GLB,,, | Performed by: PODIATRIST

## 2022-09-06 PROCEDURE — 99999 PR PBB SHADOW E&M-EST. PATIENT-LVL IV: ICD-10-PCS | Mod: PBBFAC,,, | Performed by: PODIATRIST

## 2022-09-06 PROCEDURE — 3066F NEPHROPATHY DOC TX: CPT | Mod: CPTII,S$GLB,, | Performed by: PODIATRIST

## 2022-09-06 PROCEDURE — 3060F POS MICROALBUMINURIA REV: CPT | Mod: CPTII,S$GLB,, | Performed by: PODIATRIST

## 2022-09-06 PROCEDURE — 3046F HEMOGLOBIN A1C LEVEL >9.0%: CPT | Mod: CPTII,S$GLB,, | Performed by: PODIATRIST

## 2022-09-06 PROCEDURE — 99999 PR PBB SHADOW E&M-EST. PATIENT-LVL IV: CPT | Mod: PBBFAC,,, | Performed by: PODIATRIST

## 2022-09-06 PROCEDURE — 4010F PR ACE/ARB THEARPY RXD/TAKEN: ICD-10-PCS | Mod: CPTII,S$GLB,, | Performed by: PODIATRIST

## 2022-09-06 PROCEDURE — 1159F PR MEDICATION LIST DOCUMENTED IN MEDICAL RECORD: ICD-10-PCS | Mod: CPTII,S$GLB,, | Performed by: PODIATRIST

## 2022-09-06 PROCEDURE — 3066F PR DOCUMENTATION OF TREATMENT FOR NEPHROPATHY: ICD-10-PCS | Mod: CPTII,S$GLB,, | Performed by: PODIATRIST

## 2022-09-06 PROCEDURE — 3060F PR POS MICROALBUMINURIA RESULT DOCUMENTED/REVIEW: ICD-10-PCS | Mod: CPTII,S$GLB,, | Performed by: PODIATRIST

## 2022-09-06 PROCEDURE — 99212 PR OFFICE/OUTPT VISIT, EST, LEVL II, 10-19 MIN: ICD-10-PCS | Mod: S$GLB,,, | Performed by: PODIATRIST

## 2022-09-06 PROCEDURE — 4010F ACE/ARB THERAPY RXD/TAKEN: CPT | Mod: CPTII,S$GLB,, | Performed by: PODIATRIST

## 2022-09-06 NOTE — LETTER
September 6, 2022    Virgilio Acuña  [address]  [facility] [address]         [facility]  [address]  [facility] [address]  Phone: [phone] September 6, 2022     Patient: Virgilio Acuña   YOB: 1960   Date of Visit: 9/6/2022       To Whom It May Concern:    It is my medical opinion that Virgilio Acuña may return to work on 9/14/22 with no restrictions. He will need the next week for the wound to finish healing to be sure he is ready to be on his feet all day.    If you have any questions or concerns, please don't hesitate to call.    Sincerely,          Inocente Smith DPM

## 2022-09-06 NOTE — PROGRESS NOTES
Subjective:      Patient ID: Virgilio Acuña is a 62 y.o. male.    Chief Complaint: Wound Care    Virgilio is a 62 y.o. male who presents to the clinic for evaluation and treatment of high risk feet. Virgilio has a past medical history of Cellulitis of left index finger (2/16/2015), Charcot's joint of foot, left (08/2018), Dyslipidemia, Essential hypertension (2/16/2017), Group B streptococcal infection (1/15/2018), Hypotestosteronemia (7/7/2017), Infected finger joint (2/17/2015), Infected skin ulcer limited to breakdown of skin (1/13/2018), MSSA (methicillin susceptible Staphylococcus aureus) (1/13/2018), Obese, S/P knee surgery, medial/lateral menisectomy (11/4/2013), and Type 2 diabetes mellitus with diabetic polyneuropathy, with long-term current use of insulin (2003). Here for concern over a new ulcer left foot great toe, this has progressed over the last 2-3 weeks, there has been some drainage from the area and discoloration to the skin, has charcot with reconstruction to the left side and is high risk. Denies f/c/n/v    6/7/22: Patient returns for follow up left great toe ulcer ambulating in darco and football no new complaints.    6/14/22: Patient returns for continued wound care left great toe ulcer no new complaints.     6/21/22: Patient returns for wound care left great toe, ambulating in football and darco no new complaints. He feels there is a malodor to the wound    6/28/22: Patient returns for wound care left great toe ambulating I darco no new complaints, graft is here today for application    7/6/22: Patient returns for wound care and grafting of the left great toe no new complaints    7/12/22: Patient returns for follow up left great toe wound care no new complaints    7/18/22: Patient returns for follow up left great toe ulcer care, here for wound care and graft application    8/3/22: Patient returns for follow up left great toe ulcer care here for graft application    8/10/22: patient returns for  follow up left great toe ulcer and for graft application    8/17/22: patient returns for follow up wound care left great toe ulcer no new complaints walking in the darco shoe.    8/24/22: Patient returns for follow up wound care left great toe ulcer ambulating in darco and football    8/31/22: patient returns for left great toe ulcer in darco shoe and football    9/6/22: Patient returns for left great toe ulcer follow up ambulating in darco shoe and football    PCP: Anne Wright MD    Date Last Seen by PCP:   Current shoe gear:  Affected Foot: Football with darco     Unaffected Foot: Tennis shoes    History of Trauma: negative      Hemoglobin A1C   Date Value Ref Range Status   02/16/2022 10.2 (H) 4.0 - 5.6 % Final     Comment:     ADA Screening Guidelines:  5.7-6.4%  Consistent with prediabetes  >or=6.5%  Consistent with diabetes    High levels of fetal hemoglobin interfere with the HbA1C  assay. Heterozygous hemoglobin variants (HbS, HgC, etc)do  not significantly interfere with this assay.   However, presence of multiple variants may affect accuracy.     06/23/2021 9.2 (H) 4.0 - 5.6 % Final     Comment:     ADA Screening Guidelines:  5.7-6.4%  Consistent with prediabetes  >or=6.5%  Consistent with diabetes    High levels of fetal hemoglobin interfere with the HbA1C  assay. Heterozygous hemoglobin variants (HbS, HgC, etc)do  not significantly interfere with this assay.   However, presence of multiple variants may affect accuracy.     03/16/2021 9.1 (H) 4.0 - 5.6 % Final     Comment:     ADA Screening Guidelines:  5.7-6.4%  Consistent with prediabetes  >or=6.5%  Consistent with diabetes    High levels of fetal hemoglobin interfere with the HbA1C  assay. Heterozygous hemoglobin variants (HbS, HgC, etc)do  not significantly interfere with this assay.   However, presence of multiple variants may affect accuracy.         Review of Systems   Constitutional: Negative for chills and fever.   Cardiovascular:  Positive  for leg swelling. Negative for claudication.   Respiratory:  Negative for shortness of breath.    Skin:  Positive for color change, nail changes and poor wound healing. Negative for itching and rash.   Musculoskeletal:  Negative for muscle cramps, muscle weakness and myalgias.   Gastrointestinal:  Negative for nausea and vomiting.   Neurological:  Positive for numbness and paresthesias. Negative for focal weakness and loss of balance.         Objective:       Physical Exam  Constitutional:       General: He is not in acute distress.     Appearance: He is well-developed. He is not diaphoretic.   Cardiovascular:      Pulses:           Dorsalis pedis pulses are 2+ on the right side and 2+ on the left side.        Posterior tibial pulses are 2+ on the right side and 2+ on the left side.      Comments: < 3 sec capillary refill time to toes 1-5 bilateral. Toes and feet are warm to touch proximally with normal distal cooling b/l. There is no hair growth on the feet and toes b/l. There is moderate edema left foot and mild edema right.     Musculoskeletal:      Comments: Left second toe amputation    Left foot now more narrow in appearance with the first ray properly reduced, minimal edema to the foot and no pain with palpation throughout the area of surgery.    Left ankle there is some pain with palpation to the lateral malleolus and the ATFL area    Equinus noted b/l ankles with < 10 deg DF noted. MMT 5/5 in DF/PF/Inv/Ev resistance with no reproduction of pain in any direction. Passive range of motion of ankle and pedal joints is painless b/l.     Feet:      Right foot:      Protective Sensation: 10 sites tested.  0 sites sensed.      Left foot:      Protective Sensation: 10 sites tested.  0 sites sensed.   Skin:     General: Skin is warm.      Coloration: Skin is not pale.      Findings: No abrasion, bruising, burn, ecchymosis, erythema, laceration, lesion, petechiae or rash.      Nails: There is no clubbing.       Comments: Incision overlying the left second metatarsal base is well healed      Ulcer Location: left plantar hallux\  Measurements: 0 x 0 x 0 cm  Periwound: Intact  Drainage: None.  Pus: None.  Malodor: None.  Base:  100% epithelial tissue.  Signs of infection: None.       Neurological:      Mental Status: He is alert and oriented to person, place, and time.      Sensory: Sensory deficit present.      Motor: No tremor, atrophy or abnormal muscle tone.      Comments: Negative tinel sign bilateral.   Psychiatric:         Behavior: Behavior normal.             Assessment:       Encounter Diagnoses   Name Primary?    Type II diabetes mellitus with neurological manifestations Yes    Healed ulcer of left foot                 Plan:       Virgilio was seen today for wound care.    Diagnoses and all orders for this visit:    Type II diabetes mellitus with neurological manifestations    Healed ulcer of left foot        I counseled the patient on his conditions, their implications and medical management.    Shoe inspection. Diabetic Foot Education. Patient reminded of the importance of good nutrition and blood sugar control to help prevent podiatric complications of diabetes. Patient instructed on proper foot hygeine. We discussed wearing proper shoe gear, daily foot inspections, never walking without protective shoe gear, never putting sharp instruments to feet    Wound healed can return to normal shoe wear but I recommend making sure he can ambulate in the normal shoes without recurrence of ulceration for 1 more week before returning to work, cleared for work 9/14/22    Return next week to be sure he is not starting a new ulcer in the area before going back to work    Inocente Smith DPM

## 2022-09-07 ENCOUNTER — PATIENT MESSAGE (OUTPATIENT)
Dept: PODIATRY | Facility: CLINIC | Age: 62
End: 2022-09-07
Payer: COMMERCIAL

## 2022-09-08 ENCOUNTER — PATIENT MESSAGE (OUTPATIENT)
Dept: PODIATRY | Facility: CLINIC | Age: 62
End: 2022-09-08
Payer: COMMERCIAL

## 2022-09-09 ENCOUNTER — PATIENT MESSAGE (OUTPATIENT)
Dept: PODIATRY | Facility: CLINIC | Age: 62
End: 2022-09-09
Payer: COMMERCIAL

## 2022-09-10 ENCOUNTER — PATIENT MESSAGE (OUTPATIENT)
Dept: PODIATRY | Facility: CLINIC | Age: 62
End: 2022-09-10
Payer: COMMERCIAL

## 2022-09-11 ENCOUNTER — PATIENT MESSAGE (OUTPATIENT)
Dept: PODIATRY | Facility: CLINIC | Age: 62
End: 2022-09-11
Payer: COMMERCIAL

## 2022-09-12 ENCOUNTER — PATIENT MESSAGE (OUTPATIENT)
Dept: PODIATRY | Facility: CLINIC | Age: 62
End: 2022-09-12
Payer: COMMERCIAL

## 2022-09-16 ENCOUNTER — PATIENT MESSAGE (OUTPATIENT)
Dept: PODIATRY | Facility: CLINIC | Age: 62
End: 2022-09-16

## 2022-09-16 ENCOUNTER — HOSPITAL ENCOUNTER (OUTPATIENT)
Dept: RADIOLOGY | Facility: HOSPITAL | Age: 62
Discharge: HOME | End: 2022-09-16
Attending: PODIATRIST
Payer: COMMERCIAL

## 2022-09-16 ENCOUNTER — OFFICE VISIT (OUTPATIENT)
Dept: PODIATRY | Facility: CLINIC | Age: 62
End: 2022-09-16
Payer: COMMERCIAL

## 2022-09-16 ENCOUNTER — PATIENT MESSAGE (OUTPATIENT)
Dept: PODIATRY | Facility: CLINIC | Age: 62
End: 2022-09-16
Payer: COMMERCIAL

## 2022-09-16 VITALS — BODY MASS INDEX: 41.75 KG/M2 | WEIGHT: 315 LBS | HEIGHT: 73 IN

## 2022-09-16 DIAGNOSIS — E11.621 DIABETIC ULCER OF LEFT GREAT TOE: ICD-10-CM

## 2022-09-16 DIAGNOSIS — E11.49 TYPE II DIABETES MELLITUS WITH NEUROLOGICAL MANIFESTATIONS: ICD-10-CM

## 2022-09-16 DIAGNOSIS — L97.529 DIABETIC ULCER OF LEFT GREAT TOE: ICD-10-CM

## 2022-09-16 DIAGNOSIS — E11.49 TYPE II DIABETES MELLITUS WITH NEUROLOGICAL MANIFESTATIONS: Primary | ICD-10-CM

## 2022-09-16 PROCEDURE — 99214 OFFICE O/P EST MOD 30 MIN: CPT | Mod: 25,S$GLB,, | Performed by: PODIATRIST

## 2022-09-16 PROCEDURE — 11042 WOUND DEBRIDEMENT: ICD-10-PCS | Mod: TA,S$GLB,, | Performed by: PODIATRIST

## 2022-09-16 PROCEDURE — 3060F PR POS MICROALBUMINURIA RESULT DOCUMENTED/REVIEW: ICD-10-PCS | Mod: CPTII,S$GLB,, | Performed by: PODIATRIST

## 2022-09-16 PROCEDURE — 87070 CULTURE OTHR SPECIMN AEROBIC: CPT | Performed by: PODIATRIST

## 2022-09-16 PROCEDURE — 99999 PR PBB SHADOW E&M-EST. PATIENT-LVL IV: ICD-10-PCS | Mod: PBBFAC,,, | Performed by: PODIATRIST

## 2022-09-16 PROCEDURE — 99214 PR OFFICE/OUTPT VISIT, EST, LEVL IV, 30-39 MIN: ICD-10-PCS | Mod: 25,S$GLB,, | Performed by: PODIATRIST

## 2022-09-16 PROCEDURE — 11042 DBRDMT SUBQ TIS 1ST 20SQCM/<: CPT | Mod: TA,S$GLB,, | Performed by: PODIATRIST

## 2022-09-16 PROCEDURE — 4010F ACE/ARB THERAPY RXD/TAKEN: CPT | Mod: CPTII,S$GLB,, | Performed by: PODIATRIST

## 2022-09-16 PROCEDURE — 1160F RVW MEDS BY RX/DR IN RCRD: CPT | Mod: CPTII,S$GLB,, | Performed by: PODIATRIST

## 2022-09-16 PROCEDURE — 1159F MED LIST DOCD IN RCRD: CPT | Mod: CPTII,S$GLB,, | Performed by: PODIATRIST

## 2022-09-16 PROCEDURE — 4010F PR ACE/ARB THEARPY RXD/TAKEN: ICD-10-PCS | Mod: CPTII,S$GLB,, | Performed by: PODIATRIST

## 2022-09-16 PROCEDURE — 3008F BODY MASS INDEX DOCD: CPT | Mod: CPTII,S$GLB,, | Performed by: PODIATRIST

## 2022-09-16 PROCEDURE — 87077 CULTURE AEROBIC IDENTIFY: CPT | Performed by: PODIATRIST

## 2022-09-16 PROCEDURE — 87186 SC STD MICRODIL/AGAR DIL: CPT | Performed by: PODIATRIST

## 2022-09-16 PROCEDURE — 1159F PR MEDICATION LIST DOCUMENTED IN MEDICAL RECORD: ICD-10-PCS | Mod: CPTII,S$GLB,, | Performed by: PODIATRIST

## 2022-09-16 PROCEDURE — 3046F PR MOST RECENT HEMOGLOBIN A1C LEVEL > 9.0%: ICD-10-PCS | Mod: CPTII,S$GLB,, | Performed by: PODIATRIST

## 2022-09-16 PROCEDURE — 73630 XR FOOT COMPLETE 3 VIEW LEFT: ICD-10-PCS | Mod: 26,LT,, | Performed by: RADIOLOGY

## 2022-09-16 PROCEDURE — 73630 X-RAY EXAM OF FOOT: CPT | Mod: TC,PO,LT

## 2022-09-16 PROCEDURE — 3046F HEMOGLOBIN A1C LEVEL >9.0%: CPT | Mod: CPTII,S$GLB,, | Performed by: PODIATRIST

## 2022-09-16 PROCEDURE — 87075 CULTR BACTERIA EXCEPT BLOOD: CPT | Performed by: PODIATRIST

## 2022-09-16 PROCEDURE — 3060F POS MICROALBUMINURIA REV: CPT | Mod: CPTII,S$GLB,, | Performed by: PODIATRIST

## 2022-09-16 PROCEDURE — 3008F PR BODY MASS INDEX (BMI) DOCUMENTED: ICD-10-PCS | Mod: CPTII,S$GLB,, | Performed by: PODIATRIST

## 2022-09-16 PROCEDURE — 3066F NEPHROPATHY DOC TX: CPT | Mod: CPTII,S$GLB,, | Performed by: PODIATRIST

## 2022-09-16 PROCEDURE — 3066F PR DOCUMENTATION OF TREATMENT FOR NEPHROPATHY: ICD-10-PCS | Mod: CPTII,S$GLB,, | Performed by: PODIATRIST

## 2022-09-16 PROCEDURE — 99999 PR PBB SHADOW E&M-EST. PATIENT-LVL IV: CPT | Mod: PBBFAC,,, | Performed by: PODIATRIST

## 2022-09-16 PROCEDURE — 73630 X-RAY EXAM OF FOOT: CPT | Mod: 26,LT,, | Performed by: RADIOLOGY

## 2022-09-16 PROCEDURE — 1160F PR REVIEW ALL MEDS BY PRESCRIBER/CLIN PHARMACIST DOCUMENTED: ICD-10-PCS | Mod: CPTII,S$GLB,, | Performed by: PODIATRIST

## 2022-09-16 RX ORDER — SULFAMETHOXAZOLE AND TRIMETHOPRIM 800; 160 MG/1; MG/1
1 TABLET ORAL 2 TIMES DAILY
Qty: 14 TABLET | Refills: 0 | Status: SHIPPED | OUTPATIENT
Start: 2022-09-16 | End: 2022-09-23

## 2022-09-16 NOTE — TELEPHONE ENCOUNTER
Pt state he is feeling a little light headed, patient denies fever, nausea, vomiting or chills. Please advise

## 2022-09-16 NOTE — LETTER
September 16, 2022    Virgilio Acuña  [address]  [facility] [address]         [facility]  [address]  [facility] [address]  Phone: [phone] September 16, 2022     Patient: Virgilio Acuña   YOB: 1960   Date of Visit: 9/16/2022       To Whom It May Concern:    It is my medical opinion that Virgilio Acuña should remain out of work until 11-16-22. There was a complication to the left foot that necessitates remaining in the wound wraps and post op shoe for longer periods of time, he may need to consider long term disability due to his charcot foot and recurrent ulcers he may not be able to be as active on the foot without continuing to have these ulcerations.    If you have any questions or concerns, please don't hesitate to call.    Sincerely,        Inocente Smith DPM

## 2022-09-16 NOTE — PROGRESS NOTES
Subjective:      Patient ID: Virgilio Acuña is a 62 y.o. male.    Chief Complaint: No chief complaint on file.    Virgilio is a 62 y.o. male who presents to the clinic for evaluation and treatment of high risk feet. Virgilio has a past medical history of Cellulitis of left index finger (2/16/2015), Charcot's joint of foot, left (08/2018), Dyslipidemia, Essential hypertension (2/16/2017), Group B streptococcal infection (1/15/2018), Hypotestosteronemia (7/7/2017), Infected finger joint (2/17/2015), Infected skin ulcer limited to breakdown of skin (1/13/2018), MSSA (methicillin susceptible Staphylococcus aureus) (1/13/2018), Obese, S/P knee surgery, medial/lateral menisectomy (11/4/2013), and Type 2 diabetes mellitus with diabetic polyneuropathy, with long-term current use of insulin (2003). Here for concern over a new ulcer left foot great toe, this has progressed over the last 2-3 weeks, there has been some drainage from the area and discoloration to the skin, has charcot with reconstruction to the left side and is high risk. Denies f/c/n/v    6/7/22: Patient returns for follow up left great toe ulcer ambulating in darco and football no new complaints.    6/14/22: Patient returns for continued wound care left great toe ulcer no new complaints.     6/21/22: Patient returns for wound care left great toe, ambulating in football and darco no new complaints. He feels there is a malodor to the wound    6/28/22: Patient returns for wound care left great toe ambulating I darco no new complaints, graft is here today for application    7/6/22: Patient returns for wound care and grafting of the left great toe no new complaints    7/12/22: Patient returns for follow up left great toe wound care no new complaints    7/18/22: Patient returns for follow up left great toe ulcer care, here for wound care and graft application    8/3/22: Patient returns for follow up left great toe ulcer care here for graft application    8/10/22:  patient returns for follow up left great toe ulcer and for graft application    8/17/22: patient returns for follow up wound care left great toe ulcer no new complaints walking in the darco shoe.    8/24/22: Patient returns for follow up wound care left great toe ulcer ambulating in darco and football    8/31/22: patient returns for left great toe ulcer in darco shoe and football    9/6/22: Patient returns for left great toe ulcer follow up ambulating in darco shoe and football    9/14/22: Patient returns for left great toe ulcer recurrence came back within a day of it healing last appt.    PCP: Anne Wright MD    Date Last Seen by PCP:   Current shoe gear:  Affected Foot: Football with darco     Unaffected Foot: Tennis shoes    History of Trauma: negative      Hemoglobin A1C   Date Value Ref Range Status   09/20/2022 9.9 (H) 4.0 - 5.6 % Final     Comment:     ADA Screening Guidelines:  5.7-6.4%  Consistent with prediabetes  >or=6.5%  Consistent with diabetes    High levels of fetal hemoglobin interfere with the HbA1C  assay. Heterozygous hemoglobin variants (HbS, HgC, etc)do  not significantly interfere with this assay.   However, presence of multiple variants may affect accuracy.     02/16/2022 10.2 (H) 4.0 - 5.6 % Final     Comment:     ADA Screening Guidelines:  5.7-6.4%  Consistent with prediabetes  >or=6.5%  Consistent with diabetes    High levels of fetal hemoglobin interfere with the HbA1C  assay. Heterozygous hemoglobin variants (HbS, HgC, etc)do  not significantly interfere with this assay.   However, presence of multiple variants may affect accuracy.     06/23/2021 9.2 (H) 4.0 - 5.6 % Final     Comment:     ADA Screening Guidelines:  5.7-6.4%  Consistent with prediabetes  >or=6.5%  Consistent with diabetes    High levels of fetal hemoglobin interfere with the HbA1C  assay. Heterozygous hemoglobin variants (HbS, HgC, etc)do  not significantly interfere with this assay.   However, presence of multiple  variants may affect accuracy.         Review of Systems   Constitutional: Negative for chills and fever.   Cardiovascular:  Positive for leg swelling. Negative for claudication.   Respiratory:  Negative for shortness of breath.    Skin:  Positive for color change, nail changes and poor wound healing. Negative for itching and rash.   Musculoskeletal:  Negative for muscle cramps, muscle weakness and myalgias.   Gastrointestinal:  Negative for nausea and vomiting.   Neurological:  Positive for numbness and paresthesias. Negative for focal weakness and loss of balance.         Objective:       Physical Exam  Constitutional:       General: He is not in acute distress.     Appearance: He is well-developed. He is not diaphoretic.   Cardiovascular:      Pulses:           Dorsalis pedis pulses are 2+ on the right side and 2+ on the left side.        Posterior tibial pulses are 2+ on the right side and 2+ on the left side.      Comments: < 3 sec capillary refill time to toes 1-5 bilateral. Toes and feet are warm to touch proximally with normal distal cooling b/l. There is no hair growth on the feet and toes b/l. There is moderate edema left foot and mild edema right.     Musculoskeletal:      Comments: Left second toe amputation    Left foot now more narrow in appearance with the first ray properly reduced, minimal edema to the foot and no pain with palpation throughout the area of surgery.    Left ankle there is some pain with palpation to the lateral malleolus and the ATFL area    Equinus noted b/l ankles with < 10 deg DF noted. MMT 5/5 in DF/PF/Inv/Ev resistance with no reproduction of pain in any direction. Passive range of motion of ankle and pedal joints is painless b/l.     Feet:      Right foot:      Protective Sensation: 10 sites tested.  0 sites sensed.      Left foot:      Protective Sensation: 10 sites tested.  0 sites sensed.   Skin:     General: Skin is warm.      Coloration: Skin is not pale.      Findings: No  abrasion, bruising, burn, ecchymosis, erythema, laceration, lesion, petechiae or rash.      Nails: There is no clubbing.      Comments: Incision overlying the left second metatarsal base is well healed      Ulcer Location: left plantar hallux  Measurements: Pre covered in bulla callus and eschar, post: 0.4x0.4x0.2 cm, also more superficial medial 0.9x0.6x0.1 cm  Periwound: Intact  Drainage: Serosanguinous   Pus: None.  Malodor: None.  Base:  100% granular  Signs of infection: Mild erythema and drainage       Neurological:      Mental Status: He is alert and oriented to person, place, and time.      Sensory: Sensory deficit present.      Motor: No tremor, atrophy or abnormal muscle tone.      Comments: Negative tinel sign bilateral.   Psychiatric:         Behavior: Behavior normal.             Assessment:       Encounter Diagnoses   Name Primary?    Type II diabetes mellitus with neurological manifestations Yes    Diabetic ulcer of left great toe                 Plan:       Diagnoses and all orders for this visit:    Type II diabetes mellitus with neurological manifestations  -     Aerobic culture  -     Culture, Anaerobic  -     X-Ray Foot Complete Left; Future    Diabetic ulcer of left great toe  -     Aerobic culture  -     Culture, Anaerobic  -     X-Ray Foot Complete Left; Future    Other orders  -     sulfamethoxazole-trimethoprim 800-160mg (BACTRIM DS) 800-160 mg Tab; Take 1 tablet by mouth 2 (two) times daily. for 7 days        I counseled the patient on his conditions, their implications and medical management.    Shoe inspection. Diabetic Foot Education. Patient reminded of the importance of good nutrition and blood sugar control to help prevent podiatric complications of diabetes. Patient instructed on proper foot hygeine. We discussed wearing proper shoe gear, daily foot inspections, never walking without protective shoe gear, never putting sharp instruments to feet    Wound returned with some signs of  infection, see attached debridement note. Cultures taken, will get new x-rays and started on bactrim    Return next week for wound care, ambulate in [facility] and football only    Inocente Smith DPM

## 2022-09-19 ENCOUNTER — PATIENT MESSAGE (OUTPATIENT)
Dept: PODIATRY | Facility: CLINIC | Age: 62
End: 2022-09-19
Payer: COMMERCIAL

## 2022-09-19 LAB — BACTERIA SPEC AEROBE CULT: ABNORMAL

## 2022-09-20 ENCOUNTER — PATIENT MESSAGE (OUTPATIENT)
Dept: PODIATRY | Facility: CLINIC | Age: 62
End: 2022-09-20
Payer: COMMERCIAL

## 2022-09-20 ENCOUNTER — OFFICE VISIT (OUTPATIENT)
Dept: PODIATRY | Facility: CLINIC | Age: 62
End: 2022-09-20
Payer: COMMERCIAL

## 2022-09-20 ENCOUNTER — LAB VISIT (OUTPATIENT)
Dept: LAB | Facility: HOSPITAL | Age: 62
End: 2022-09-20
Attending: INTERNAL MEDICINE
Payer: COMMERCIAL

## 2022-09-20 ENCOUNTER — PATIENT MESSAGE (OUTPATIENT)
Dept: PAIN MEDICINE | Facility: CLINIC | Age: 62
End: 2022-09-20
Payer: COMMERCIAL

## 2022-09-20 DIAGNOSIS — E11.42 TYPE 2 DIABETES MELLITUS WITH DIABETIC POLYNEUROPATHY, WITH LONG-TERM CURRENT USE OF INSULIN: ICD-10-CM

## 2022-09-20 DIAGNOSIS — E11.621 DIABETIC ULCER OF LEFT GREAT TOE: ICD-10-CM

## 2022-09-20 DIAGNOSIS — Z79.4 TYPE 2 DIABETES MELLITUS WITH DIABETIC POLYNEUROPATHY, WITH LONG-TERM CURRENT USE OF INSULIN: ICD-10-CM

## 2022-09-20 DIAGNOSIS — M14.672 CHARCOT'S JOINT OF LEFT FOOT: ICD-10-CM

## 2022-09-20 DIAGNOSIS — L97.529 DIABETIC ULCER OF LEFT GREAT TOE: ICD-10-CM

## 2022-09-20 DIAGNOSIS — M79.10 MYALGIA: ICD-10-CM

## 2022-09-20 DIAGNOSIS — E11.49 TYPE II DIABETES MELLITUS WITH NEUROLOGICAL MANIFESTATIONS: Primary | ICD-10-CM

## 2022-09-20 LAB
ALBUMIN SERPL BCP-MCNC: 3.8 G/DL (ref 3.5–5.2)
ALP SERPL-CCNC: 67 U/L (ref 55–135)
ALT SERPL W/O P-5'-P-CCNC: 18 U/L (ref 10–44)
ANION GAP SERPL CALC-SCNC: 11 MMOL/L (ref 8–16)
AST SERPL-CCNC: 16 U/L (ref 10–40)
BILIRUB SERPL-MCNC: 0.5 MG/DL (ref 0.1–1)
BUN SERPL-MCNC: 17 MG/DL (ref 8–23)
CALCIUM SERPL-MCNC: 9.5 MG/DL (ref 8.7–10.5)
CHLORIDE SERPL-SCNC: 101 MMOL/L (ref 95–110)
CO2 SERPL-SCNC: 23 MMOL/L (ref 23–29)
CREAT SERPL-MCNC: 0.9 MG/DL (ref 0.5–1.4)
EST. GFR  (NO RACE VARIABLE): >60 ML/MIN/1.73 M^2
ESTIMATED AVG GLUCOSE: 237 MG/DL (ref 68–131)
GLUCOSE SERPL-MCNC: 129 MG/DL (ref 70–110)
HBA1C MFR BLD: 9.9 % (ref 4–5.6)
POTASSIUM SERPL-SCNC: 4.5 MMOL/L (ref 3.5–5.1)
PROT SERPL-MCNC: 6.8 G/DL (ref 6–8.4)
SODIUM SERPL-SCNC: 135 MMOL/L (ref 136–145)

## 2022-09-20 PROCEDURE — 1160F PR REVIEW ALL MEDS BY PRESCRIBER/CLIN PHARMACIST DOCUMENTED: ICD-10-PCS | Mod: CPTII,S$GLB,, | Performed by: PODIATRIST

## 2022-09-20 PROCEDURE — 3046F HEMOGLOBIN A1C LEVEL >9.0%: CPT | Mod: CPTII,S$GLB,, | Performed by: PODIATRIST

## 2022-09-20 PROCEDURE — 80053 COMPREHEN METABOLIC PANEL: CPT | Performed by: INTERNAL MEDICINE

## 2022-09-20 PROCEDURE — 3066F NEPHROPATHY DOC TX: CPT | Mod: CPTII,S$GLB,, | Performed by: PODIATRIST

## 2022-09-20 PROCEDURE — 3060F POS MICROALBUMINURIA REV: CPT | Mod: CPTII,S$GLB,, | Performed by: PODIATRIST

## 2022-09-20 PROCEDURE — 4010F PR ACE/ARB THEARPY RXD/TAKEN: ICD-10-PCS | Mod: CPTII,S$GLB,, | Performed by: PODIATRIST

## 2022-09-20 PROCEDURE — 99999 PR PBB SHADOW E&M-EST. PATIENT-LVL IV: ICD-10-PCS | Mod: PBBFAC,,, | Performed by: PODIATRIST

## 2022-09-20 PROCEDURE — 99499 NO LOS: ICD-10-PCS | Mod: S$GLB,,, | Performed by: PODIATRIST

## 2022-09-20 PROCEDURE — 4010F ACE/ARB THERAPY RXD/TAKEN: CPT | Mod: CPTII,S$GLB,, | Performed by: PODIATRIST

## 2022-09-20 PROCEDURE — 3046F PR MOST RECENT HEMOGLOBIN A1C LEVEL > 9.0%: ICD-10-PCS | Mod: CPTII,S$GLB,, | Performed by: PODIATRIST

## 2022-09-20 PROCEDURE — 99499 UNLISTED E&M SERVICE: CPT | Mod: S$GLB,,, | Performed by: PODIATRIST

## 2022-09-20 PROCEDURE — 1159F MED LIST DOCD IN RCRD: CPT | Mod: CPTII,S$GLB,, | Performed by: PODIATRIST

## 2022-09-20 PROCEDURE — 99999 PR PBB SHADOW E&M-EST. PATIENT-LVL IV: CPT | Mod: PBBFAC,,, | Performed by: PODIATRIST

## 2022-09-20 PROCEDURE — 11042 WOUND DEBRIDEMENT: ICD-10-PCS | Mod: S$GLB,,, | Performed by: PODIATRIST

## 2022-09-20 PROCEDURE — 36415 COLL VENOUS BLD VENIPUNCTURE: CPT | Mod: PO | Performed by: INTERNAL MEDICINE

## 2022-09-20 PROCEDURE — 83036 HEMOGLOBIN GLYCOSYLATED A1C: CPT | Performed by: INTERNAL MEDICINE

## 2022-09-20 PROCEDURE — 3060F PR POS MICROALBUMINURIA RESULT DOCUMENTED/REVIEW: ICD-10-PCS | Mod: CPTII,S$GLB,, | Performed by: PODIATRIST

## 2022-09-20 PROCEDURE — 3066F PR DOCUMENTATION OF TREATMENT FOR NEPHROPATHY: ICD-10-PCS | Mod: CPTII,S$GLB,, | Performed by: PODIATRIST

## 2022-09-20 PROCEDURE — 1159F PR MEDICATION LIST DOCUMENTED IN MEDICAL RECORD: ICD-10-PCS | Mod: CPTII,S$GLB,, | Performed by: PODIATRIST

## 2022-09-20 PROCEDURE — 11042 DBRDMT SUBQ TIS 1ST 20SQCM/<: CPT | Mod: S$GLB,,, | Performed by: PODIATRIST

## 2022-09-20 PROCEDURE — 1160F RVW MEDS BY RX/DR IN RCRD: CPT | Mod: CPTII,S$GLB,, | Performed by: PODIATRIST

## 2022-09-20 NOTE — PROGRESS NOTES
Subjective:      Patient ID: Virgilio Acuña is a 62 y.o. male.    Chief Complaint: Wound Care    Virgilio is a 62 y.o. male who presents to the clinic for evaluation and treatment of high risk feet. Virgilio has a past medical history of Cellulitis of left index finger (2/16/2015), Charcot's joint of foot, left (08/2018), Dyslipidemia, Essential hypertension (2/16/2017), Group B streptococcal infection (1/15/2018), Hypotestosteronemia (7/7/2017), Infected finger joint (2/17/2015), Infected skin ulcer limited to breakdown of skin (1/13/2018), MSSA (methicillin susceptible Staphylococcus aureus) (1/13/2018), Obese, S/P knee surgery, medial/lateral menisectomy (11/4/2013), and Type 2 diabetes mellitus with diabetic polyneuropathy, with long-term current use of insulin (2003). Here for concern over a new ulcer left foot great toe, this has progressed over the last 2-3 weeks, there has been some drainage from the area and discoloration to the skin, has charcot with reconstruction to the left side and is high risk. Denies f/c/n/v    6/7/22: Patient returns for follow up left great toe ulcer ambulating in darco and football no new complaints.    6/14/22: Patient returns for continued wound care left great toe ulcer no new complaints.     6/21/22: Patient returns for wound care left great toe, ambulating in football and darco no new complaints. He feels there is a malodor to the wound    6/28/22: Patient returns for wound care left great toe ambulating I darco no new complaints, graft is here today for application    7/6/22: Patient returns for wound care and grafting of the left great toe no new complaints    7/12/22: Patient returns for follow up left great toe wound care no new complaints    7/18/22: Patient returns for follow up left great toe ulcer care, here for wound care and graft application    8/3/22: Patient returns for follow up left great toe ulcer care here for graft application    8/10/22: patient returns for  follow up left great toe ulcer and for graft application    8/17/22: patient returns for follow up wound care left great toe ulcer no new complaints walking in the darco shoe.    8/24/22: Patient returns for follow up wound care left great toe ulcer ambulating in dar[place] and football    8/31/22: patient returns for left great toe ulcer in darco shoe and football    9/6/22: Patient returns for left great toe ulcer follow up ambulating in darco shoe and football    9/14/22: Patient returns for left great toe ulcer recurrence came back within a day of it healing last appt.    9/20/22: Patient returns for left great toe ulcer ambulating in dar[employer] and football    PCP: Anne Wright MD    Date Last Seen by PCP:   Current shoe gear:  Affected Foot: Football with darco     Unaffected Foot: Tennis shoes    History of Trauma: negative      Hemoglobin A1C   Date Value Ref Range Status   09/20/2022 9.9 (H) 4.0 - 5.6 % Final     Comment:     ADA Screening Guidelines:  5.7-6.4%  Consistent with prediabetes  >or=6.5%  Consistent with diabetes    High levels of fetal hemoglobin interfere with the HbA1C  assay. Heterozygous hemoglobin variants (HbS, HgC, etc)do  not significantly interfere with this assay.   However, presence of multiple variants may affect accuracy.     02/16/2022 10.2 (H) 4.0 - 5.6 % Final     Comment:     ADA Screening Guidelines:  5.7-6.4%  Consistent with prediabetes  >or=6.5%  Consistent with diabetes    High levels of fetal hemoglobin interfere with the HbA1C  assay. Heterozygous hemoglobin variants (HbS, HgC, etc)do  not significantly interfere with this assay.   However, presence of multiple variants may affect accuracy.     06/23/2021 9.2 (H) 4.0 - 5.6 % Final     Comment:     ADA Screening Guidelines:  5.7-6.4%  Consistent with prediabetes  >or=6.5%  Consistent with diabetes    High levels of fetal hemoglobin interfere with the HbA1C  assay. Heterozygous hemoglobin variants (HbS, HgC, etc)do  not  significantly interfere with this assay.   However, presence of multiple variants may affect accuracy.         Review of Systems   Constitutional: Negative for chills and fever.   Cardiovascular:  Positive for leg swelling. Negative for claudication.   Respiratory:  Negative for shortness of breath.    Skin:  Positive for color change, nail changes and poor wound healing. Negative for itching and rash.   Musculoskeletal:  Negative for muscle cramps, muscle weakness and myalgias.   Gastrointestinal:  Negative for nausea and vomiting.   Neurological:  Positive for numbness and paresthesias. Negative for focal weakness and loss of balance.         Objective:       Physical Exam  Constitutional:       General: He is not in acute distress.     Appearance: He is well-developed. He is not diaphoretic.   Cardiovascular:      Pulses:           Dorsalis pedis pulses are 2+ on the right side and 2+ on the left side.        Posterior tibial pulses are 2+ on the right side and 2+ on the left side.      Comments: < 3 sec capillary refill time to toes 1-5 bilateral. Toes and feet are warm to touch proximally with normal distal cooling b/l. There is no hair growth on the feet and toes b/l. There is moderate edema left foot and mild edema right.     Musculoskeletal:      Comments: Left second toe amputation    Left foot now more narrow in appearance with the first ray properly reduced, minimal edema to the foot and no pain with palpation throughout the area of surgery.    Left ankle there is some pain with palpation to the lateral malleolus and the ATFL area    Equinus noted b/l ankles with < 10 deg DF noted. MMT 5/5 in DF/PF/Inv/Ev resistance with no reproduction of pain in any direction. Passive range of motion of ankle and pedal joints is painless b/l.     Feet:      Right foot:      Protective Sensation: 10 sites tested.  0 sites sensed.      Left foot:      Protective Sensation: 10 sites tested.  0 sites sensed.   Skin:      General: Skin is warm.      Coloration: Skin is not pale.      Findings: No abrasion, bruising, burn, ecchymosis, erythema, laceration, lesion, petechiae or rash.      Nails: There is no clubbing.      Comments: Incision overlying the left second metatarsal base is well healed      Ulcer Location: left plantar hallux  Measurements: Pre covered in eschar post 0.4x0.3x0.2 cm  Periwound: Intact  Drainage: Serosanguinous   Pus: None.  Malodor: None.  Base:  100% granular  Signs of infection: Mild erythema and drainage       Neurological:      Mental Status: He is alert and oriented to person, place, and time.      Sensory: Sensory deficit present.      Motor: No tremor, atrophy or abnormal muscle tone.      Comments: Negative tinel sign bilateral.   Psychiatric:         Behavior: Behavior normal.             Assessment:       Encounter Diagnoses   Name Primary?    Type II diabetes mellitus with neurological manifestations Yes    Diabetic ulcer of left great toe     Charcot's joint of left foot                 Plan:       Virgilio was seen today for wound care.    Diagnoses and all orders for this visit:    Type II diabetes mellitus with neurological manifestations    Diabetic ulcer of left great toe    Charcot's joint of left foot        I counseled the patient on his conditions, their implications and medical management.    Shoe inspection. Diabetic Foot Education. Patient reminded of the importance of good nutrition and blood sugar control to help prevent podiatric complications of diabetes. Patient instructed on proper foot hygeine. We discussed wearing proper shoe gear, daily foot inspections, never walking without protective shoe gear, never putting sharp instruments to feet    Wound debrided see attached procedure note    Return next week for wound care, ambulate in [facility] and football only    Inocente Smith DPM

## 2022-09-21 LAB — BACTERIA SPEC ANAEROBE CULT: NORMAL

## 2022-09-22 ENCOUNTER — PATIENT MESSAGE (OUTPATIENT)
Dept: PODIATRY | Facility: CLINIC | Age: 62
End: 2022-09-22
Payer: COMMERCIAL

## 2022-09-22 ENCOUNTER — PATIENT MESSAGE (OUTPATIENT)
Dept: FAMILY MEDICINE | Facility: CLINIC | Age: 62
End: 2022-09-22
Payer: COMMERCIAL

## 2022-09-23 ENCOUNTER — PATIENT MESSAGE (OUTPATIENT)
Dept: PODIATRY | Facility: CLINIC | Age: 62
End: 2022-09-23
Payer: COMMERCIAL

## 2022-09-23 ENCOUNTER — PATIENT MESSAGE (OUTPATIENT)
Dept: ADMINISTRATIVE | Facility: HOSPITAL | Age: 62
End: 2022-09-23
Payer: COMMERCIAL

## 2022-09-23 ENCOUNTER — PATIENT OUTREACH (OUTPATIENT)
Dept: ADMINISTRATIVE | Facility: HOSPITAL | Age: 62
End: 2022-09-23
Payer: COMMERCIAL

## 2022-09-23 DIAGNOSIS — M54.40 CHRONIC LOW BACK PAIN WITH SCIATICA, SCIATICA LATERALITY UNSPECIFIED, UNSPECIFIED BACK PAIN LATERALITY: Primary | ICD-10-CM

## 2022-09-23 DIAGNOSIS — G89.29 CHRONIC LOW BACK PAIN WITH SCIATICA, SCIATICA LATERALITY UNSPECIFIED, UNSPECIFIED BACK PAIN LATERALITY: Primary | ICD-10-CM

## 2022-09-26 ENCOUNTER — PATIENT MESSAGE (OUTPATIENT)
Dept: PODIATRY | Facility: CLINIC | Age: 62
End: 2022-09-26
Payer: COMMERCIAL

## 2022-09-27 ENCOUNTER — PATIENT MESSAGE (OUTPATIENT)
Dept: PODIATRY | Facility: CLINIC | Age: 62
End: 2022-09-27
Payer: COMMERCIAL

## 2022-09-27 ENCOUNTER — PATIENT MESSAGE (OUTPATIENT)
Dept: PAIN MEDICINE | Facility: CLINIC | Age: 62
End: 2022-09-27

## 2022-09-27 ENCOUNTER — OFFICE VISIT (OUTPATIENT)
Dept: PAIN MEDICINE | Facility: CLINIC | Age: 62
End: 2022-09-27
Payer: COMMERCIAL

## 2022-09-27 VITALS
DIASTOLIC BLOOD PRESSURE: 69 MMHG | HEART RATE: 84 BPM | HEIGHT: 73 IN | SYSTOLIC BLOOD PRESSURE: 159 MMHG | WEIGHT: 315 LBS | OXYGEN SATURATION: 94 % | BODY MASS INDEX: 41.75 KG/M2

## 2022-09-27 DIAGNOSIS — Z53.20 PROCEDURE NOT CARRIED OUT BECAUSE OF PATIENT'S DECISION: Primary | ICD-10-CM

## 2022-09-27 DIAGNOSIS — M54.16 LUMBAR RADICULOPATHY: ICD-10-CM

## 2022-09-27 DIAGNOSIS — M54.9 DORSALGIA, UNSPECIFIED: Primary | ICD-10-CM

## 2022-09-27 PROCEDURE — 4010F PR ACE/ARB THEARPY RXD/TAKEN: ICD-10-PCS | Mod: CPTII,95,, | Performed by: ANESTHESIOLOGY

## 2022-09-27 PROCEDURE — 3008F PR BODY MASS INDEX (BMI) DOCUMENTED: ICD-10-PCS | Mod: CPTII,S$GLB,, | Performed by: ANESTHESIOLOGY

## 2022-09-27 PROCEDURE — 3060F POS MICROALBUMINURIA REV: CPT | Mod: CPTII,S$GLB,, | Performed by: ANESTHESIOLOGY

## 2022-09-27 PROCEDURE — 99499 UNLISTED E&M SERVICE: CPT | Mod: S$GLB,,, | Performed by: ANESTHESIOLOGY

## 2022-09-27 PROCEDURE — 3066F NEPHROPATHY DOC TX: CPT | Mod: CPTII,S$GLB,, | Performed by: ANESTHESIOLOGY

## 2022-09-27 PROCEDURE — 3008F BODY MASS INDEX DOCD: CPT | Mod: CPTII,S$GLB,, | Performed by: ANESTHESIOLOGY

## 2022-09-27 PROCEDURE — 3077F PR MOST RECENT SYSTOLIC BLOOD PRESSURE >= 140 MM HG: ICD-10-PCS | Mod: CPTII,S$GLB,, | Performed by: ANESTHESIOLOGY

## 2022-09-27 PROCEDURE — 3066F PR DOCUMENTATION OF TREATMENT FOR NEPHROPATHY: ICD-10-PCS | Mod: CPTII,S$GLB,, | Performed by: ANESTHESIOLOGY

## 2022-09-27 PROCEDURE — 99212 PR OFFICE/OUTPT VISIT, EST, LEVL II, 10-19 MIN: ICD-10-PCS | Mod: 95,,, | Performed by: ANESTHESIOLOGY

## 2022-09-27 PROCEDURE — 3060F PR POS MICROALBUMINURIA RESULT DOCUMENTED/REVIEW: ICD-10-PCS | Mod: CPTII,95,, | Performed by: ANESTHESIOLOGY

## 2022-09-27 PROCEDURE — 3046F HEMOGLOBIN A1C LEVEL >9.0%: CPT | Mod: CPTII,95,, | Performed by: ANESTHESIOLOGY

## 2022-09-27 PROCEDURE — 1160F RVW MEDS BY RX/DR IN RCRD: CPT | Mod: CPTII,95,, | Performed by: ANESTHESIOLOGY

## 2022-09-27 PROCEDURE — 4010F ACE/ARB THERAPY RXD/TAKEN: CPT | Mod: CPTII,S$GLB,, | Performed by: ANESTHESIOLOGY

## 2022-09-27 PROCEDURE — 1159F MED LIST DOCD IN RCRD: CPT | Mod: CPTII,95,, | Performed by: ANESTHESIOLOGY

## 2022-09-27 PROCEDURE — 3078F DIAST BP <80 MM HG: CPT | Mod: CPTII,S$GLB,, | Performed by: ANESTHESIOLOGY

## 2022-09-27 PROCEDURE — 3060F PR POS MICROALBUMINURIA RESULT DOCUMENTED/REVIEW: ICD-10-PCS | Mod: CPTII,S$GLB,, | Performed by: ANESTHESIOLOGY

## 2022-09-27 PROCEDURE — 3046F PR MOST RECENT HEMOGLOBIN A1C LEVEL > 9.0%: ICD-10-PCS | Mod: CPTII,95,, | Performed by: ANESTHESIOLOGY

## 2022-09-27 PROCEDURE — 1160F PR REVIEW ALL MEDS BY PRESCRIBER/CLIN PHARMACIST DOCUMENTED: ICD-10-PCS | Mod: CPTII,95,, | Performed by: ANESTHESIOLOGY

## 2022-09-27 PROCEDURE — 3066F PR DOCUMENTATION OF TREATMENT FOR NEPHROPATHY: ICD-10-PCS | Mod: CPTII,95,, | Performed by: ANESTHESIOLOGY

## 2022-09-27 PROCEDURE — 4010F PR ACE/ARB THEARPY RXD/TAKEN: ICD-10-PCS | Mod: CPTII,S$GLB,, | Performed by: ANESTHESIOLOGY

## 2022-09-27 PROCEDURE — 99212 OFFICE O/P EST SF 10 MIN: CPT | Mod: 95,,, | Performed by: ANESTHESIOLOGY

## 2022-09-27 PROCEDURE — 3046F PR MOST RECENT HEMOGLOBIN A1C LEVEL > 9.0%: ICD-10-PCS | Mod: CPTII,S$GLB,, | Performed by: ANESTHESIOLOGY

## 2022-09-27 PROCEDURE — 1159F PR MEDICATION LIST DOCUMENTED IN MEDICAL RECORD: ICD-10-PCS | Mod: CPTII,95,, | Performed by: ANESTHESIOLOGY

## 2022-09-27 PROCEDURE — 4010F ACE/ARB THERAPY RXD/TAKEN: CPT | Mod: CPTII,95,, | Performed by: ANESTHESIOLOGY

## 2022-09-27 PROCEDURE — 3078F PR MOST RECENT DIASTOLIC BLOOD PRESSURE < 80 MM HG: ICD-10-PCS | Mod: CPTII,S$GLB,, | Performed by: ANESTHESIOLOGY

## 2022-09-27 PROCEDURE — 99499 NO LOS: ICD-10-PCS | Mod: S$GLB,,, | Performed by: ANESTHESIOLOGY

## 2022-09-27 PROCEDURE — 3046F HEMOGLOBIN A1C LEVEL >9.0%: CPT | Mod: CPTII,S$GLB,, | Performed by: ANESTHESIOLOGY

## 2022-09-27 PROCEDURE — 3060F POS MICROALBUMINURIA REV: CPT | Mod: CPTII,95,, | Performed by: ANESTHESIOLOGY

## 2022-09-27 PROCEDURE — 3077F SYST BP >= 140 MM HG: CPT | Mod: CPTII,S$GLB,, | Performed by: ANESTHESIOLOGY

## 2022-09-27 PROCEDURE — 3066F NEPHROPATHY DOC TX: CPT | Mod: CPTII,95,, | Performed by: ANESTHESIOLOGY

## 2022-09-27 RX ORDER — DIAZEPAM 10 MG/1
10 TABLET ORAL ONCE AS NEEDED
Qty: 1 TABLET | Refills: 0 | Status: SHIPPED | OUTPATIENT
Start: 2022-09-27 | End: 2022-10-11

## 2022-09-27 RX ORDER — TRAMADOL HYDROCHLORIDE 50 MG/1
50 TABLET ORAL EVERY 8 HOURS PRN
Qty: 21 TABLET | Refills: 0 | Status: SHIPPED | OUTPATIENT
Start: 2022-09-27 | End: 2022-09-28

## 2022-09-27 NOTE — H&P (VIEW-ONLY)
The patient location is: [place]  The chief complaint leading to consultation is: back pain    Visit type: audiovisual    Face to Face time with patient: 5  11 minutes of total time spent on the encounter, which includes face to face time and non-face to face time preparing to see the patient (eg, review of tests), Obtaining and/or reviewing separately obtained history, Documenting clinical information in the electronic or other health record, Independently interpreting results (not separately reported) and communicating results to the patient/family/caregiver, or Care coordination (not separately reported).         Each patient to whom he or she provides medical services by telemedicine is:  (1) informed of the relationship between the physician and patient and the respective role of any other health care provider with respect to management of the patient; and (2) notified that he or she may decline to receive medical services by telemedicine and may withdraw from such care at any time.    Notes:     [facility] Follow Up Evaluation    Referred by: Dr. Smith  Reason for referral: back pain    CC: No chief complaint on file.     No flowsheet data found.    HPI:   Virgilio Acuña is a 62 y.o. male who presents with back pain.   The patient has had chronic back pain for the past couple years that has been gradually worsening.  Today he reports primarily right-sided lower back pain that goes into the right buttock and down the back of the right leg.  His pain is worse when he has some prolonged sitting.    Pain intervention history:  - s/p right SI joint injection with Dr. Ramon  - s/p L5/S1 WHITLEY with Dr. Ramon  - s/p L5/S1 WHITLEY with Dr. Ramon  - s/p bilateral L4/5 TFESI with Dr. Ramon    History:    Current Outpatient Medications:     amLODIPine (NORVASC) 5 MG tablet, Take 1 tablet (5 mg total) by mouth once daily., Disp: 90 tablet, Rfl: 3    azelastine (ASTELIN) 137 mcg (0.1 %) nasal spray, 2 sprays (274  "mcg total) by Nasal route once daily., Disp: 30 mL, Rfl: 2    BD ULTRA-FINE ORIG PEN NEEDLE 29 gauge x 1/2" Ndle, USE WITH LANTUS SOLOSTAR   PEN, Disp: 90 each, Rfl: 3    blood sugar diagnostic (BLOOD GLUCOSE TEST) Strp, Use 1-2 x a day to check glucoses before meals., Disp: 100 strip, Rfl: 11    blood-glucose meter kit, Use as instructed, Disp: 1 each, Rfl: 0    dapagliflozin (FARXIGA) 10 mg tablet, Take 1 tablet (10 mg total) by mouth once daily., Disp: 90 tablet, Rfl: 3    enalapril (VASOTEC) 20 MG tablet, Take 1 tablet (20 mg total) by mouth 2 (two) times daily., Disp: 180 tablet, Rfl: 3    gabapentin (NEURONTIN) 300 MG capsule, TAKE 3 CAPSULES (900MG     TOTAL) 3 TIMES A DAY, Disp: 270 capsule, Rfl: 5    gemfibroziL (LOPID) 600 MG tablet, TAKE 1 TABLET ONCE DAILY, Disp: 90 tablet, Rfl: 3    insulin (LANTUS SOLOSTAR U-100 INSULIN) glargine 100 units/mL SubQ pen, Inject 40 Units into the skin 2 (two) times a day. Supply pen needles, Disp: 72 mL, Rfl: 1    insulin aspart U-100 (NOVOLOG FLEXPEN U-100 INSULIN) 100 unit/mL (3 mL) InPn pen, ADMINISTER 40 UNITS UNDER THE SKIN THREE TIMES DAILY WITH MEALS, Disp: 30 mL, Rfl: 5    insulin syringe-needle U-100 (INSULIN SYRINGE) 1 mL 29 gauge x 1/2" Syrg, Inject 4 times a day, Disp: 100 each, Rfl: 12    lancets Misc, 1 Units by Misc.(Non-Drug; Combo Route) route daily as needed., Disp: 100 each, Rfl: 11    metFORMIN (GLUCOPHAGE-XR) 500 MG ER 24hr tablet, Take 2 tablets (1,000 mg total) by mouth 2 (two) times daily with meals., Disp: 180 tablet, Rfl: 0    ONETOUCH DELICA PLUS LANCET 33 gauge Misc, Apply topically 3 (three) times daily., Disp: , Rfl:     ONETOUCH VERIO REFLECT METER Misc, , Disp: , Rfl:     pantoprazole (PROTONIX) 40 MG tablet, Take 1 tablet (40 mg total) by mouth once daily., Disp: 90 tablet, Rfl: 3    pen needle, diabetic (BD ULTRA-FINE WILDA PEN NEEDLE) 32 gauge x 5/32" Ndle, INJECT FOUR TIMES DAILY, Disp: 4 each, Rfl: 12    pravastatin (PRAVACHOL) 40 MG " "tablet, TAKE 1 TABLET ONCE DAILY, Disp: 90 tablet, Rfl: 3    pulse oximeter (PULSE OXIMETER) device, by Apply Externally route 2 (two) times a day. Use twice daily at 8 AM and 3 PM and [facility]., Disp: 1 each, Rfl: 0    safety needles 22 gauge x 1 1/2" Ndle, To be used once a month for testosterone injections, Disp: 50 each, Rfl: 6    SITagliptin (JANUVIA) 100 MG Tab, Take 1 tablet (100 mg total) by mouth once daily., Disp: 90 tablet, Rfl: 3    syringe with needle 3 mL 25 gauge x 1" Syrg, To be used with monthly testosterone injections, Disp: 100 Syringe, Rfl: 4    traMADoL (ULTRAM) 50 mg tablet, Take 1 tablet (50 mg total) by mouth every 8 (eight) hours as needed for Pain., Disp: 21 tablet, Rfl: 0    traZODone (DESYREL) 150 MG tablet, TAKE 1 TABLET NIGHTLY, Disp: 90 tablet, Rfl: 3    Current Facility-Administered Medications:     acetaminophen tablet 650 mg, 650 mg, Oral, Once PRN, Virgilio Avilez MD    albuterol inhaler 2 puff, 2 puff, Inhalation, Q20 Min PRN, Virgilio Avilez MD    diphenhydrAMINE injection 25 mg, 25 mg, Intravenous, Once PRN, Virgilio Avilez MD    EPINEPHrine (EPIPEN) 0.3 mg/0.3 mL pen injection 0.3 mg, 0.3 mg, Intramuscular, PRN, Virgilio Avilez MD    methylPREDNISolone sodium succinate injection 40 mg, 40 mg, Intravenous, Once PRN, Virgilio Avilez MD    ondansetron disintegrating tablet 4 mg, 4 mg, Oral, Once PRN, Virgilio Avilez MD    sodium chloride 0.9% 500 mL flush bag, , Intravenous, PRN, Virgilio Avilez MD    sodium chloride 0.9% flush 10 mL, 10 mL, Intravenous, PRN, Virgilio Avilez MD    Facility-Administered Medications Ordered in Other Visits:     lactated ringers infusion, , Intravenous, Continuous, Vadim Buchanan MD, Stopped at 08/27/18 0953    Past Medical History:   Diagnosis Date    Cellulitis of left index finger 2/16/2015    Charcot's joint of foot, left 08/2018    Dyslipidemia     Essential hypertension 2/16/2017    Group B " streptococcal infection 1/15/2018    Hypotestosteronemia 7/7/2017    Infected finger joint 2/17/2015    Infected skin ulcer limited to breakdown of skin 1/13/2018    MSSA (methicillin susceptible Staphylococcus aureus) 1/13/2018    Obese     S/P knee surgery, medial/lateral menisectomy 11/4/2013    Type 2 diabetes mellitus with diabetic polyneuropathy, with long-term current use of insulin 2003       Past Surgical History:   Procedure Laterality Date    ELBOW SURGERY      EPIDURAL STEROID INJECTION N/A 7/14/2020    Procedure: Lumbar L5/S1 IL WHITLEY;  Surgeon: Nirav Ramon MD;  Location: [facility] PAIN [facility]GT;  Service: Pain Management;  Laterality: N/A;    EPIDURAL STEROID INJECTION N/A 10/20/2020    Procedure: Lumbar L5/S1 IL WHITLEY;  Surgeon: Nirav Ramon MD;  Location: [facility]IN [facility];  Service: Pain Management;  Laterality: N/A;    FOOT SURGERY Left     INJECTION OF ANESTHETIC AGENT INTO SACROILIAC JOINT Right 5/18/2020    Procedure: right Sacroiliac Joint Injection;  Surgeon: Nirav Ramon MD;  Location: [facility]IN [facility]GT;  Service: Pain Management;  Laterality: Right;    INJECTION OF JOINT Right 5/18/2020    Procedure: right GT bursa injection;  Surgeon: Nirav Ramon MD;  Location: [facility]IN [facility]GT;  Service: Pain Management;  Laterality: Right;    KNEE CARTILAGE SURGERY  10/21/2013    right knee    KNEE SURGERY Right 02/17/2017    Left index finger surgery  03/2015    MIDFOOT ARTHRODESIS Left 8/27/2018    Procedure: FUSION, JOINT, MIDFOOT - FIRST METATARSOCUNEIFORM JOINT AND NAVICULOCUNEIFORM JOINT;  Surgeon: Inocente Smith DPM;  Location: [facility] OR;  Service: Podiatry;  Laterality: Left;    SELECTIVE INJECTION OF ANESTHETIC AGENT AROUND LUMBAR SPINAL NERVE ROOT BY TRANSFORAMINAL APPROACH Bilateral 10/4/2021    Procedure: Bilateral L4/5 TF WHITLEY//wants latest time if poss;  Surgeon: Nirav Ramon MD;  Location: [facility]IN [facility]GT;  Service: Pain Management;  Laterality: Bilateral;    TOE AMPUTATION  03/2018     left great toe       Family History   Problem Relation Age of Onset    COPD Father        Social History     Socioeconomic History    Marital status:    Occupational History    Occupation:      Employer: [employer]   Tobacco Use    Smoking status: Never    Smokeless tobacco: Never   Substance and Sexual Activity    Alcohol use: Yes     Alcohol/week: 1.0 standard drink     Types: 1 Cans of beer per week     Comment: Huh?    Drug use: No    Sexual activity: Yes     Partners: Female       Review of patient's allergies indicates:   Allergen Reactions    Victoza [liraglutide] Swelling       Review of Systems:  General ROS: negative for - fever  Psychological ROS: negative for - hostility  Hematological and Lymphatic ROS: negative for - bleeding problems  Endocrine ROS: negative for - unexpected weight changes  Respiratory ROS: no cough, shortness of breath, or wheezing  Cardiovascular ROS: no chest pain or dyspnea on exertion  Gastrointestinal ROS: no abdominal pain, change in bowel habits, or black or bloody stools  Musculoskeletal ROS: negative for - muscular weakness  Neurological ROS: negative for - numbness/tingling  Dermatological ROS: negative for rash    Physical Exam:  There were no vitals filed for this visit.  There is no height or weight on file to calculate BMI.    Gen: NAD  Psych:  Mood appropriate for given condition    Imaging:  MRI lumbar spine 6/25/2020  FINDINGS:  Alignment: Slight dextroconvex curvature of the lumbar spine.  Lumbar lordosis is maintained.     Vertebrae: No evidence of an acute fracture or diffuse marrow placement process.     Discs: Multilevel degenerative disc disease with disc desiccation at L3-L4-L5-S1 and disc space narrowing, which is most pronounced at L5-S1 with moderate to severe disc space narrowing.     Cord: Normal.  Conus terminates at L1.     Degenerative findings:     L1-L2: The disc is normal in  configuration.  Mild bilateral facet arthropathy and ligamentum flavum infolding.  There is no neuroforaminal stenosis.  There is no spinal canal stenosis.  L2-L3: Mild circumferential disc bulge.  Moderate bilateral facet arthropathy.  Ligamentum flavum infolding.  Mild bilateral neural foraminal stenosis.  There is no spinal canal stenosis.  L3-L4: Mild disc space narrowing.  Circumferential disc bulge, asymmetric to the right, which abuts the descending right L4 nerve root.  Moderate bilateral facet arthropathy.  Ligamentum flavum infolding.  Mild bilateral neural foraminal stenosis.  Posterior epidural lipomatosis.  There is no spinal canal stenosis.  L4-L5: Mild disc space narrowing.  Circumferential disc bulge with superimposed central disc protrusion through an annular fissure.  Moderate bilateral facet arthropathy.  Ligamentum flavum infolding.  Posterior epidural lipomatosis.  Mild-to-moderate bilateral neural foraminal stenosis.  Moderate spinal canal stenosis and narrowing of lateral recesses bilaterally.  L5-S1: Moderate to severe disc space narrowing.  Circumferential disc bulge with posterior osteophytic ridging and right subarticular disc extrusion, which abuts the descending right S1 nerve root.  Moderate bilateral facet arthropathy.  Ligamentum flavum infolding.  Moderate bilateral neural foraminal stenosis.  There is no spinal canal stenosis.  Paraspinal muscles & soft tissues: Large exophytic T2 hyperintense lesion arising from the lower pole of the right kidney, best seen on  images, probably representing a cyst.  Additional exophytic T2 hyperintense lesion arising from the lower pole of the left kidney on  images, likely an additional cyst.  Correlation with ultrasound may be helpful.    Labs:  BMP  Lab Results   Component Value Date     (L) 09/20/2022    K 4.5 09/20/2022     09/20/2022    CO2 23 09/20/2022    BUN 17 09/20/2022    CREATININE 0.9 09/20/2022    CALCIUM 9.5  09/20/2022    ANIONGAP 11 09/20/2022    ESTGFRAFRICA >60.0 02/16/2022    EGFRNONAA >60.0 02/16/2022     Lab Results   Component Value Date    ALT 18 09/20/2022    AST 16 09/20/2022    ALKPHOS 67 09/20/2022    BILITOT 0.5 09/20/2022       Assessment:   Problem List Items Addressed This Visit          Neuro    Lumbar radiculopathy     Other Visit Diagnoses       Dorsalgia, unspecified    -  Primary    Relevant Orders    MRI Lumbar Spine Without Contrast    X-Ray Lumbar Complete Including Flex And Ext    X-Ray Hip 2 or 3 views Right (with Pelvis when performed)            62 y.o. year old male  with PMH HTN, DM II, GERD who presents with back pain.   The patient has had chronic back pain for the past couple years that has been gradually worsening.  Today he reports primarily right-sided lower back pain that goes into the right buttock and down the back of the right leg.  His pain is worse when he has some prolonged sitting.    -  he has undergone a right SI joint injection and 3 lumbar epidural steroid injections and reports that they only provided maybe 10% relief of his pain  -  he continues have pain on the right side of his back into the buttock and down the back of his leg  -  I independently reviewed his lumbar MRI from 2020 and is consistent with multilevel bilateral facet arthropathy, L3-L4 circumferential disc bulge, asymmetric to the right, which abuts the descending right L4 nerve root, no spinal canal stenosis, L4-L5 circumferential disc bulge with superimposed central disc protrusion through an annular fissure, mild-to-moderate bilateral neural foraminal stenosis, moderate spinal canal stenosis and narrowing of lateral recesses bilaterally, and L5-S1 right subarticular disc extrusion, which abuts the descending right S1 nerve root and moderate bilateral neural foraminal stenosis.   -  reports his pain is too severe to be doing physical therapy  -  his pain is limiting his  mobility  -  I have ordered an  updated lumbar MRI to evaluate for interval changes in his neural anatomy.  I have also ordered x-rays of his lumbar spine and right hip to look for any changes in his bony anatomy  -  he is going to  continue to take gabapentin as prescribed.  He also takes Mobic without much relief.  I have prescribed him some tramadol to use as needed for severe pain while we get more imaging  -  we will call him once imaging complete to discuss further treatment option      : Reviewed    Lavell Monsalve M.D.  Interventional Pain Medicine / Anesthesiology    This note was completed with dictation software and grammatical errors may exist.

## 2022-09-27 NOTE — PROGRESS NOTES
"[facility] New Patient Evaluation    Referred by: Dr. Smith   Reason for referral: back pain    CC:   Chief Complaint   Patient presents with    Back Pain    Hip Pain        No flowsheet data found.    HPI:   Virgilio Acuña is a 62 y.o. male who presents with back pain.      Onset: ***  Inciting Event: {MS FPA PMS INCITING EVENT:57746} If yes, Date of injury: ***  Progression: since onset, pain is {Desc; clinical progression:}  Current Pain Score: {Numbers; 1-10:10623}/10  Typical Range: {Numbers; 1-10:88231}-{Numbers; 1-10:35396}/10  Timing: constant/frequent/intermittent/rarely  Quality: {Pain Description:76170}  Radiation: {YES/NO:}  Associated numbness or weakness: {YES/NO:} {Numbness:70214} {Weakness:86314}  Exacerbated by: {aggravators:97938}  Allievated by: {MITIGATIN}  Is Pain Level Acceptable?: No    Previous Therapies:  PT/OT:   HEP:   Interventions:   Surgery:  Medications:   - NSAIDS:   - MSK Relaxants:   - TCAs:   - SNRIs:   - Topicals:   - Anticonvulsants:  - Opioids:     Current Pain Medications:  ***     History:    Current Outpatient Medications:     amLODIPine (NORVASC) 5 MG tablet, Take 1 tablet (5 mg total) by mouth once daily., Disp: 90 tablet, Rfl: 3    azelastine (ASTELIN) 137 mcg (0.1 %) nasal spray, 2 sprays (274 mcg total) by Nasal route once daily., Disp: 30 mL, Rfl: 2    BD ULTRA-FINE ORIG PEN NEEDLE 29 gauge x 1/2" Ndle, USE WITH LANTUS SOLOSTAR   PEN, Disp: 90 each, Rfl: 3    blood sugar diagnostic (BLOOD GLUCOSE TEST) Strp, Use 1-2 x a day to check glucoses before meals., Disp: 100 strip, Rfl: 11    dapagliflozin (FARXIGA) 10 mg tablet, Take 1 tablet (10 mg total) by mouth once daily., Disp: 90 tablet, Rfl: 3    enalapril (VASOTEC) 20 MG tablet, Take 1 tablet (20 mg total) by mouth 2 (two) times daily., Disp: 180 tablet, Rfl: 3    gabapentin (NEURONTIN) 300 MG capsule, TAKE 3 CAPSULES (900MG     TOTAL) 3 TIMES A DAY, Disp: 270 capsule, Rfl: " "5    gemfibroziL (LOPID) 600 MG tablet, TAKE 1 TABLET ONCE DAILY, Disp: 90 tablet, Rfl: 3    insulin (LANTUS SOLOSTAR U-100 INSULIN) glargine 100 units/mL SubQ pen, Inject 40 Units into the skin 2 (two) times a day. Supply pen needles, Disp: 72 mL, Rfl: 1    insulin aspart U-100 (NOVOLOG FLEXPEN U-100 INSULIN) 100 unit/mL (3 mL) InPn pen, ADMINISTER 40 UNITS UNDER THE SKIN THREE TIMES DAILY WITH MEALS, Disp: 30 mL, Rfl: 5    insulin syringe-needle U-100 (INSULIN SYRINGE) 1 mL 29 gauge x 1/2" Syrg, Inject 4 times a day, Disp: 100 each, Rfl: 12    lancets Misc, 1 Units by Misc.(Non-Drug; Combo Route) route daily as needed., Disp: 100 each, Rfl: 11    metFORMIN (GLUCOPHAGE-XR) 500 MG ER 24hr tablet, Take 2 tablets (1,000 mg total) by mouth 2 (two) times daily with meals., Disp: 180 tablet, Rfl: 0    ONETOUCH DELICA PLUS LANCET 33 gauge Misc, Apply topically 3 (three) times daily., Disp: , Rfl:     pantoprazole (PROTONIX) 40 MG tablet, Take 1 tablet (40 mg total) by mouth once daily., Disp: 90 tablet, Rfl: 3    pen needle, diabetic (BD ULTRA-FINE WILDA PEN NEEDLE) 32 gauge x 5/32" Ndle, INJECT FOUR TIMES DAILY, Disp: 4 each, Rfl: 12    pravastatin (PRAVACHOL) 40 MG tablet, TAKE 1 TABLET ONCE DAILY, Disp: 90 tablet, Rfl: 3    pulse oximeter (PULSE OXIMETER) device, by Apply Externally route 2 (two) times a day. Use twice daily at 8 AM and 3 PM and record the value in [facility] as directed., Disp: 1 each, Rfl: 0    safety needles 22 gauge x 1 1/2" Ndle, To be used once a month for testosterone injections, Disp: 50 each, Rfl: 6    SITagliptin (JANUVIA) 100 MG Tab, Take 1 tablet (100 mg total) by mouth once daily., Disp: 90 tablet, Rfl: 3    syringe with needle 3 mL 25 gauge x 1" Syrg, To be used with monthly testosterone injections, Disp: 100 Syringe, Rfl: 4    traZODone (DESYREL) 150 MG tablet, TAKE 1 TABLET NIGHTLY, Disp: 90 tablet, Rfl: 3    blood-glucose meter (ONETOUCH VERIO REFLECT METER) [facility], USE AS " INSTRUCTED., Disp: 1 each, Rfl: 0    diazePAM (VALIUM) 10 MG Tab, Take 1 tablet (10 mg total) by mouth once as needed (15-30 minutes prior to MRI.  needs a  for MRI)., Disp: 1 tablet, Rfl: 0    HYDROcodone-acetaminophen (NORCO) 5-325 mg per tablet, Take 1 tablet by mouth every 8 (eight) hours as needed for Pain., Disp: 21 tablet, Rfl: 0    Current Facility-Administered Medications:     acetaminophen tablet 650 mg, 650 mg, Oral, Once PRN, Virgilio Avilez MD    albuterol inhaler 2 puff, 2 puff, Inhalation, Q20 Min PRN, Virgilio Avilez MD    diphenhydrAMINE injection 25 mg, 25 mg, Intravenous, Once PRN, Virgilio Avilez MD    EPINEPHrine (EPIPEN) 0.3 mg/0.3 mL pen injection 0.3 mg, 0.3 mg, Intramuscular, PRN, Virgilio Avilez MD    methylPREDNISolone sodium succinate injection 40 mg, 40 mg, Intravenous, Once PRN, Virgilio Avilez MD    ondansetron disintegrating tablet 4 mg, 4 mg, Oral, Once PRN, Virgilio Avilez MD    sodium chloride 0.9% 500 mL flush bag, , Intravenous, PRN, Virgilio Avilez MD    sodium chloride 0.9% flush 10 mL, 10 mL, Intravenous, PRN, Virgilio Avilez MD    Facility-Administered Medications Ordered in Other Visits:     lactated ringers infusion, , Intravenous, Continuous, Vadim Buchanan MD, Stopped at 08/27/18 0953    Past Medical History:   Diagnosis Date    Cellulitis of left index finger 2/16/2015    Charcot's joint of foot, left 08/2018    Dyslipidemia     Essential hypertension 2/16/2017    Group B streptococcal infection 1/15/2018    Hypotestosteronemia 7/7/2017    Infected finger joint 2/17/2015    Infected skin ulcer limited to breakdown of skin 1/13/2018    MSSA (methicillin susceptible Staphylococcus aureus) 1/13/2018    Obese     S/P knee surgery, medial/lateral menisectomy 11/4/2013    Type 2 diabetes mellitus with diabetic polyneuropathy, with long-term current use of insulin 2003       Past Surgical History:   Procedure Laterality Date     ELBOW SURGERY      EPIDURAL STEROID INJECTION N/A 7/14/2020    Procedure: Lumbar L5/S1 IL WHITLEY;  Surgeon: Nirav Ramon MD;  Location: [facility];  Service: Pain Management;  Laterality: N/A;    EPIDURAL STEROID INJECTION N/A 10/20/2020    Procedure: Lumbar L5/S1 IL WHITLEY;  Surgeon: Nirav Ramon MD;  Location: [facility];  Service: Pain Management;  Laterality: N/A;    FOOT SURGERY Left     INJECTION OF ANESTHETIC AGENT INTO SACROILIAC JOINT Right 5/18/2020    Procedure: right Sacroiliac Joint Injection;  Surgeon: Nirav Ramon MD;  Location: [facility];  Service: Pain Management;  Laterality: Right;    INJECTION OF JOINT Right 5/18/2020    Procedure: right GT bursa injection;  Surgeon: Nirav Ramon MD;  Location: [facility];  Service: Pain Management;  Laterality: Right;    KNEE CARTILAGE SURGERY  10/21/2013    right knee    KNEE SURGERY Right 02/17/2017    Left index finger surgery  03/2015    MIDFOOT ARTHRODESIS Left 8/27/2018    Procedure: FUSION, JOINT, MIDFOOT - FIRST METATARSOCUNEIFORM JOINT AND NAVICULOCUNEIFORM JOINT;  Surgeon: Inocente Smith DPM;  Location: [facility];  Service: Podiatry;  Laterality: Left;    SELECTIVE INJECTION OF ANESTHETIC AGENT AROUND LUMBAR SPINAL NERVE ROOT BY TRANSFORAMINAL APPROACH Bilateral 10/4/2021    Procedure: Bilateral L4/5 TF WHITLEY//wants latest time if poss;  Surgeon: Nirav Ramon MD;  Location: [facility];  Service: Pain Management;  Laterality: Bilateral;    TOE AMPUTATION  03/2018    left great toe       Family History   Problem Relation Age of Onset    COPD Father        Social History     Socioeconomic History    Marital status:    Occupational History    Occupation:      Employer: [employer]   Tobacco Use    Smoking status: Never    Smokeless tobacco: Never   Substance and Sexual Activity    Alcohol use: Yes     Alcohol/week: 1.0 standard drink      "Types: 1 Cans of beer per week     Comment: Huh?    Drug use: No    Sexual activity: Yes     Partners: Female       Review of patient's allergies indicates:   Allergen Reactions    Victoza [liraglutide] Swelling       Review of Systems:  {ros master:571983}    Physical Exam:  Vitals:    09/27/22 0858   BP: (!) 159/69   Pulse: 84   SpO2: (!) 94%   Weight: (!) 149.2 kg (329 lb 0.6 oz)   Height: 6' 1" (1.854 m)   PainSc: 10-Worst pain ever   PainLoc: Back     Body mass index is 43.41 kg/m².    Gen: NAD  Psych: mood appropriate for given condition  CV: 2+ radial pulse  HEENT: anicteric   Respiratory: non labored  Abd: soft nt, nd  Skin: intact  Sensation: intact to lt touch bilaterally in c4-t1   Reflexes: 2+ b/l Bicep, tricep, BR and patella Veloz negative  ROM: Cervical ROM full, shoulder, elbow and wrist ROM full  Tone:  Normal at elbow, wrist and shoulder   Inspection: no atrophy of bicep, FDI or APB noted  Special tests: Rosales's negative bilaterally, Hawkin's negative bilaterally, neg Empty can, neg Scarf, neg Neer's Tinnel's negative at the wrist over median nerve.   Palpation: tender cervical paraspinals, levator scapula and trapezius    Motor:    Right Left   C4 Shoulder Abduction  5  5   C5 Elbow Flexion    5  5   C6 Wrist Extension  5  5   C7 Elbow Extension   5  5   C8/T1 Hand Intrinsics   5  5   C8 First Dorsal Interosseus  5  5   C8 Abductor Pollicus Brevis  5  5     Gen: NAD  Gait: gait intact  Psych:  Mood appropriate for given condition  HEENT: eyes anicteric   GI: Abd soft  CV: RRR  Lungs: breathing unlabored   ROM: limited AROM of the L spine in all planes, full ROM at ankles, knees and hips  *** Lumbar flexion 90 degrees, extension 50 degrees, side bending 30 degrees.    Sensation: intact to light touch in all dermatomes tested from L2-S1 bilaterally  Reflexes: 2+ b/l patella and achilles, biceps and triceps, plantar response down going   Palpation: Diffusely tender over lumbar paraspinals  -TTP " over the b/l greater trochanters and bilateral SI joint  Tone: normal in the b/l knees and hips   Skin: intact  Extremities: No edema in b/l ankles or hands  Provacative tests: - SLR, seated root (slump test), Rosales, BECKY testing, FADIR testing, Ely's test to *** degrees, Berto's negative bilaterally       Right Left   L2/3 Iliacus Hip flexion  5  5   L3/4 Qudratus Femoris Knee Extension  5  5   L4/5 Tib Anterior Ankle Dorsiflexion   5  5   L5/S1 Extensor Hallicus Longus Great toe extension  5  5   L4/5 Tib Anterior/Posterior Inversion  5  5   L5/S1 Extensor Digitorum Longus, Peronues Eversion  5  5   S1/S2 Gastroc/Soleus Plantar Flexion  5  5       Imaging:  MRI lumbar spine 6/25/2020  FINDINGS:  Alignment: Slight dextroconvex curvature of the lumbar spine.  Lumbar lordosis is maintained.     Vertebrae: No evidence of an acute fracture or diffuse marrow placement process.     Discs: Multilevel degenerative disc disease with disc desiccation at L3-L4-L5-S1 and disc space narrowing, which is most pronounced at L5-S1 with moderate to severe disc space narrowing.     Cord: Normal.  Conus terminates at L1.     Degenerative findings:     L1-L2: The disc is normal in configuration.  Mild bilateral facet arthropathy and ligamentum flavum infolding.  There is no neuroforaminal stenosis.  There is no spinal canal stenosis.  L2-L3: Mild circumferential disc bulge.  Moderate bilateral facet arthropathy.  Ligamentum flavum infolding.  Mild bilateral neural foraminal stenosis.  There is no spinal canal stenosis.  L3-L4: Mild disc space narrowing.  Circumferential disc bulge, asymmetric to the right, which abuts the descending right L4 nerve root.  Moderate bilateral facet arthropathy.  Ligamentum flavum infolding.  Mild bilateral neural foraminal stenosis.  Posterior epidural lipomatosis.  There is no spinal canal stenosis.  L4-L5: Mild disc space narrowing.  Circumferential disc bulge with superimposed central disc protrusion  through an annular fissure.  Moderate bilateral facet arthropathy.  Ligamentum flavum infolding.  Posterior epidural lipomatosis.  Mild-to-moderate bilateral neural foraminal stenosis.  Moderate spinal canal stenosis and narrowing of lateral recesses bilaterally.  L5-S1: Moderate to severe disc space narrowing.  Circumferential disc bulge with posterior osteophytic ridging and right subarticular disc extrusion, which abuts the descending right S1 nerve root.  Moderate bilateral facet arthropathy.  Ligamentum flavum infolding.  Moderate bilateral neural foraminal stenosis.  There is no spinal canal stenosis.     Paraspinal muscles & soft tissues: Large exophytic T2 hyperintense lesion arising from the lower pole of the right kidney, best seen on  images, probably representing a cyst.  Additional exophytic T2 hyperintense lesion arising from the lower pole of the left kidney on  images, likely an additional cyst.  Correlation with ultrasound may be helpful.    Labs:  BMP  Lab Results   Component Value Date     (L) 09/20/2022    K 4.5 09/20/2022     09/20/2022    CO2 23 09/20/2022    BUN 17 09/20/2022    CREATININE 0.9 09/20/2022    CALCIUM 9.5 09/20/2022    ANIONGAP 11 09/20/2022    ESTGFRAFRICA >60.0 02/16/2022    EGFRNONAA >60.0 02/16/2022     Lab Results   Component Value Date    ALT 18 09/20/2022    AST 16 09/20/2022    ALKPHOS 67 09/20/2022    BILITOT 0.5 09/20/2022       Assessment:   Problem List Items Addressed This Visit    None  Visit Diagnoses     Procedure not carried out because of patient's decision    -  Primary              62 y.o. year old male       Treatment Plan:     ***    Procedures: ***  PT/OT/HEP: ***  Medications: ***  Labs: Reviewed and medications are appropriately dosed for current hepatorenal function.  Imaging: No additional recommended at this time.    Follow Up: RTC in {NUMBER 1-5:65429}  {TIME:23805}    : {:20878}    Lavell Monsalve M.D.  Interventional Pain  Medicine / Anesthesiology    This note was completed with dictation software and grammatical errors may exist.  The patient left the office before the visit was finished.

## 2022-09-27 NOTE — TELEPHONE ENCOUNTER
Pt MRI scheduled for 10/8. He is requesting something to take for claustrophobia prior to the MRI. Please advise.

## 2022-09-27 NOTE — PROGRESS NOTES
The patient location is: [place]  The chief complaint leading to consultation is: back pain    Visit type: audiovisual    Face to Face time with patient: 5  11 minutes of total time spent on the encounter, which includes face to face time and non-face to face time preparing to see the patient (eg, review of tests), Obtaining and/or reviewing separately obtained history, Documenting clinical information in the electronic or other health record, Independently interpreting results (not separately reported) and communicating results to the patient/family/caregiver, or Care coordination (not separately reported).         Each patient to whom he or she provides medical services by telemedicine is:  (1) informed of the relationship between the physician and patient and the respective role of any other health care provider with respect to management of the patient; and (2) notified that he or she may decline to receive medical services by telemedicine and may withdraw from such care at any time.    Notes:     [facility] Follow Up Evaluation    Referred by: Dr. Smith  Reason for referral: back pain    CC: No chief complaint on file.     No flowsheet data found.    HPI:   Virgilio Acuña is a 62 y.o. male who presents with back pain.   The patient has had chronic back pain for the past couple years that has been gradually worsening.  Today he reports primarily right-sided lower back pain that goes into the right buttock and down the back of the right leg.  His pain is worse when he has some prolonged sitting.    Pain intervention history:  - s/p right SI joint injection with Dr. Ramon  - s/p L5/S1 WHITLEY with Dr. Ramon  - s/p L5/S1 WHITLEY with Dr. Ramon  - s/p bilateral L4/5 TFESI with Dr. Ramon    History:    Current Outpatient Medications:     amLODIPine (NORVASC) 5 MG tablet, Take 1 tablet (5 mg total) by mouth once daily., Disp: 90 tablet, Rfl: 3    azelastine (ASTELIN) 137 mcg (0.1 %) nasal spray, 2 sprays (274  "mcg total) by Nasal route once daily., Disp: 30 mL, Rfl: 2    BD ULTRA-FINE ORIG PEN NEEDLE 29 gauge x 1/2" Ndle, USE WITH LANTUS SOLOSTAR   PEN, Disp: 90 each, Rfl: 3    blood sugar diagnostic (BLOOD GLUCOSE TEST) Strp, Use 1-2 x a day to check glucoses before meals., Disp: 100 strip, Rfl: 11    blood-glucose meter kit, Use as instructed, Disp: 1 each, Rfl: 0    dapagliflozin (FARXIGA) 10 mg tablet, Take 1 tablet (10 mg total) by mouth once daily., Disp: 90 tablet, Rfl: 3    enalapril (VASOTEC) 20 MG tablet, Take 1 tablet (20 mg total) by mouth 2 (two) times daily., Disp: 180 tablet, Rfl: 3    gabapentin (NEURONTIN) 300 MG capsule, TAKE 3 CAPSULES (900MG     TOTAL) 3 TIMES A DAY, Disp: 270 capsule, Rfl: 5    gemfibroziL (LOPID) 600 MG tablet, TAKE 1 TABLET ONCE DAILY, Disp: 90 tablet, Rfl: 3    insulin (LANTUS SOLOSTAR U-100 INSULIN) glargine 100 units/mL SubQ pen, Inject 40 Units into the skin 2 (two) times a day. Supply pen needles, Disp: 72 mL, Rfl: 1    insulin aspart U-100 (NOVOLOG FLEXPEN U-100 INSULIN) 100 unit/mL (3 mL) InPn pen, ADMINISTER 40 UNITS UNDER THE SKIN THREE TIMES DAILY WITH MEALS, Disp: 30 mL, Rfl: 5    insulin syringe-needle U-100 (INSULIN SYRINGE) 1 mL 29 gauge x 1/2" Syrg, Inject 4 times a day, Disp: 100 each, Rfl: 12    lancets Misc, 1 Units by Misc.(Non-Drug; Combo Route) route daily as needed., Disp: 100 each, Rfl: 11    metFORMIN (GLUCOPHAGE-XR) 500 MG ER 24hr tablet, Take 2 tablets (1,000 mg total) by mouth 2 (two) times daily with meals., Disp: 180 tablet, Rfl: 0    ONETOUCH DELICA PLUS LANCET 33 gauge Misc, Apply topically 3 (three) times daily., Disp: , Rfl:     ONETOUCH VERIO REFLECT METER Misc, , Disp: , Rfl:     pantoprazole (PROTONIX) 40 MG tablet, Take 1 tablet (40 mg total) by mouth once daily., Disp: 90 tablet, Rfl: 3    pen needle, diabetic (BD ULTRA-FINE WILDA PEN NEEDLE) 32 gauge x 5/32" Ndle, INJECT FOUR TIMES DAILY, Disp: 4 each, Rfl: 12    pravastatin (PRAVACHOL) 40 MG " "tablet, TAKE 1 TABLET ONCE DAILY, Disp: 90 tablet, Rfl: 3    pulse oximeter (PULSE OXIMETER) device, by Apply Externally route 2 (two) times a day. Use twice daily at 8 AM and 3 PM and [facility]., Disp: 1 each, Rfl: 0    safety needles 22 gauge x 1 1/2" Ndle, To be used once a month for testosterone injections, Disp: 50 each, Rfl: 6    SITagliptin (JANUVIA) 100 MG Tab, Take 1 tablet (100 mg total) by mouth once daily., Disp: 90 tablet, Rfl: 3    syringe with needle 3 mL 25 gauge x 1" Syrg, To be used with monthly testosterone injections, Disp: 100 Syringe, Rfl: 4    traMADoL (ULTRAM) 50 mg tablet, Take 1 tablet (50 mg total) by mouth every 8 (eight) hours as needed for Pain., Disp: 21 tablet, Rfl: 0    traZODone (DESYREL) 150 MG tablet, TAKE 1 TABLET NIGHTLY, Disp: 90 tablet, Rfl: 3    Current Facility-Administered Medications:     acetaminophen tablet 650 mg, 650 mg, Oral, Once PRN, Virgilio Avilez MD    albuterol inhaler 2 puff, 2 puff, Inhalation, Q20 Min PRN, Virgilio Avilez MD    diphenhydrAMINE injection 25 mg, 25 mg, Intravenous, Once PRN, Virgilio Avilez MD    EPINEPHrine (EPIPEN) 0.3 mg/0.3 mL pen injection 0.3 mg, 0.3 mg, Intramuscular, PRN, Virgilio Avilez MD    methylPREDNISolone sodium succinate injection 40 mg, 40 mg, Intravenous, Once PRN, Virgilio Avilez MD    ondansetron disintegrating tablet 4 mg, 4 mg, Oral, Once PRN, Virgilio Avilez MD    sodium chloride 0.9% 500 mL flush bag, , Intravenous, PRN, Virgilio Avilez MD    sodium chloride 0.9% flush 10 mL, 10 mL, Intravenous, PRN, Virgilio Avilez MD    Facility-Administered Medications Ordered in Other Visits:     lactated ringers infusion, , Intravenous, Continuous, Vadim Buchanan MD, Stopped at 08/27/18 0953    Past Medical History:   Diagnosis Date    Cellulitis of left index finger 2/16/2015    Charcot's joint of foot, left 08/2018    Dyslipidemia     Essential hypertension 2/16/2017    Group B " streptococcal infection 1/15/2018    Hypotestosteronemia 7/7/2017    Infected finger joint 2/17/2015    Infected skin ulcer limited to breakdown of skin 1/13/2018    MSSA (methicillin susceptible Staphylococcus aureus) 1/13/2018    Obese     S/P knee surgery, medial/lateral menisectomy 11/4/2013    Type 2 diabetes mellitus with diabetic polyneuropathy, with long-term current use of insulin 2003       Past Surgical History:   Procedure Laterality Date    ELBOW SURGERY      EPIDURAL STEROID INJECTION N/A 7/14/2020    Procedure: Lumbar L5/S1 IL WHITLEY;  Surgeon: Nirav Ramon MD;  Location: [facility];  Service: Pain Management;  Laterality: N/A;    EPIDURAL STEROID INJECTION N/A 10/20/2020    Procedure: Lumbar L5/S1 IL WHITLEY;  Surgeon: Nirav Ramon MD;  Location: [facility];  Service: Pain Management;  Laterality: N/A;    FOOT SURGERY Left     INJECTION OF ANESTHETIC AGENT INTO SACROILIAC JOINT Right 5/18/2020    Procedure: right Sacroiliac Joint Injection;  Surgeon: Nirav Ramon MD;  Location: [facility];  Service: Pain Management;  Laterality: Right;    INJECTION OF JOINT Right 5/18/2020    Procedure: right GT bursa injection;  Surgeon: Nirav Ramon MD;  Location: [facility];  Service: Pain Management;  Laterality: Right;    KNEE CARTILAGE SURGERY  10/21/2013    right knee    KNEE SURGERY Right 02/17/2017    Left index finger surgery  03/2015    MIDFOOT ARTHRODESIS Left 8/27/2018    Procedure: FUSION, JOINT, MIDFOOT - FIRST METATARSOCUNEIFORM JOINT AND NAVICULOCUNEIFORM JOINT;  Surgeon: Inocente Smith DPM;  Location: [facility] OR;  Service: Podiatry;  Laterality: Left;    SELECTIVE INJECTION OF ANESTHETIC AGENT AROUND LUMBAR SPINAL NERVE ROOT BY TRANSFORAMINAL APPROACH Bilateral 10/4/2021    Procedure: Bilateral L4/5 TF WHITLEY//wants latest time if poss;  Surgeon: Nirav Ramon MD;  Location: [facility];  Service: Pain Management;  Laterality: Bilateral;    TOE AMPUTATION  03/2018     left great toe       Family History   Problem Relation Age of Onset    COPD Father        Social History     Socioeconomic History    Marital status:    Occupational History    Occupation:      [employer]er: [employer]   Tobacco Use    Smoking status: Never    Smokeless tobacco: Never   Substance and Sexual Activity    Alcohol use: Yes     Alcohol/week: 1.0 standard drink     Types: 1 Cans of beer per week     Comment: Huh?    Drug use: No    Sexual activity: Yes     Partners: Female       Review of patient's allergies indicates:   Allergen Reactions    Victoza [liraglutide] Swelling       Review of Systems:  General ROS: negative for - fever  Psychological ROS: negative for - hostility  Hematological and Lymphatic ROS: negative for - bleeding problems  Endocrine ROS: negative for - unexpected weight changes  Respiratory ROS: no cough, shortness of breath, or wheezing  Cardiovascular ROS: no chest pain or dyspnea on exertion  Gastrointestinal ROS: no abdominal pain, change in bowel habits, or black or bloody stools  Musculoskeletal ROS: negative for - muscular weakness  Neurological ROS: negative for - numbness/tingling  Dermatological ROS: negative for rash    Physical Exam:  There were no vitals filed for this visit.  There is no height or weight on file to calculate BMI.    Gen: NAD  Psych:  Mood appropriate for given condition    Imaging:  MRI lumbar spine 6/25/2020  FINDINGS:  Alignment: Slight dextroconvex curvature of the lumbar spine.  Lumbar lordosis is maintained.     Vertebrae: No evidence of an acute fracture or diffuse marrow placement process.     Discs: Multilevel degenerative disc disease with disc desiccation at L3-L4-L5-S1 and disc space narrowing, which is most pronounced at L5-S1 with moderate to severe disc space narrowing.     Cord: Normal.  Conus terminates at L1.     Degenerative findings:     L1-L2: The disc is normal in  configuration.  Mild bilateral facet arthropathy and ligamentum flavum infolding.  There is no neuroforaminal stenosis.  There is no spinal canal stenosis.  L2-L3: Mild circumferential disc bulge.  Moderate bilateral facet arthropathy.  Ligamentum flavum infolding.  Mild bilateral neural foraminal stenosis.  There is no spinal canal stenosis.  L3-L4: Mild disc space narrowing.  Circumferential disc bulge, asymmetric to the right, which abuts the descending right L4 nerve root.  Moderate bilateral facet arthropathy.  Ligamentum flavum infolding.  Mild bilateral neural foraminal stenosis.  Posterior epidural lipomatosis.  There is no spinal canal stenosis.  L4-L5: Mild disc space narrowing.  Circumferential disc bulge with superimposed central disc protrusion through an annular fissure.  Moderate bilateral facet arthropathy.  Ligamentum flavum infolding.  Posterior epidural lipomatosis.  Mild-to-moderate bilateral neural foraminal stenosis.  Moderate spinal canal stenosis and narrowing of lateral recesses bilaterally.  L5-S1: Moderate to severe disc space narrowing.  Circumferential disc bulge with posterior osteophytic ridging and right subarticular disc extrusion, which abuts the descending right S1 nerve root.  Moderate bilateral facet arthropathy.  Ligamentum flavum infolding.  Moderate bilateral neural foraminal stenosis.  There is no spinal canal stenosis.  Paraspinal muscles & soft tissues: Large exophytic T2 hyperintense lesion arising from the lower pole of the right kidney, best seen on  images, probably representing a cyst.  Additional exophytic T2 hyperintense lesion arising from the lower pole of the left kidney on  images, likely an additional cyst.  Correlation with ultrasound may be helpful.    Labs:  BMP  Lab Results   Component Value Date     (L) 09/20/2022    K 4.5 09/20/2022     09/20/2022    CO2 23 09/20/2022    BUN 17 09/20/2022    CREATININE 0.9 09/20/2022    CALCIUM 9.5  09/20/2022    ANIONGAP 11 09/20/2022    ESTGFRAFRICA >60.0 02/16/2022    EGFRNONAA >60.0 02/16/2022     Lab Results   Component Value Date    ALT 18 09/20/2022    AST 16 09/20/2022    ALKPHOS 67 09/20/2022    BILITOT 0.5 09/20/2022       Assessment:   Problem List Items Addressed This Visit          Neuro    Lumbar radiculopathy     Other Visit Diagnoses       Dorsalgia, unspecified    -  Primary    Relevant Orders    MRI Lumbar Spine Without Contrast    X-Ray Lumbar Complete Including Flex And Ext    X-Ray Hip 2 or 3 views Right (with Pelvis when performed)            62 y.o. year old male  with PMH HTN, DM II, GERD who presents with back pain.   The patient has had chronic back pain for the past couple years that has been gradually worsening.  Today he reports primarily right-sided lower back pain that goes into the right buttock and down the back of the right leg.  His pain is worse when he has some prolonged sitting.    -  he has undergone a right SI joint injection and 3 lumbar epidural steroid injections and reports that they only provided maybe 10% relief of his pain  -  he continues have pain on the right side of his back into the buttock and down the back of his leg  -  I independently reviewed his lumbar MRI from 2020 and is consistent with multilevel bilateral facet arthropathy, L3-L4 circumferential disc bulge, asymmetric to the right, which abuts the descending right L4 nerve root, no spinal canal stenosis, L4-L5 circumferential disc bulge with superimposed central disc protrusion through an annular fissure, mild-to-moderate bilateral neural foraminal stenosis, moderate spinal canal stenosis and narrowing of lateral recesses bilaterally, and L5-S1 right subarticular disc extrusion, which abuts the descending right S1 nerve root and moderate bilateral neural foraminal stenosis.   -  reports his pain is too severe to be doing physical therapy  -  his pain is limiting his  mobility  -  I have ordered an  updated lumbar MRI to evaluate for interval changes in his neural anatomy.  I have also ordered x-rays of his lumbar spine and right hip to look for any changes in his bony anatomy  -  he is going to  continue to take gabapentin as prescribed.  He also takes Mobic without much relief.  I have prescribed him some tramadol to use as needed for severe pain while we get more imaging  -  we will call him once imaging complete to discuss further treatment option      : Reviewed    Lavell Monsalve M.D.  Interventional Pain Medicine / Anesthesiology    This note was completed with dictation software and grammatical errors may exist.

## 2022-09-28 ENCOUNTER — HOSPITAL ENCOUNTER (OUTPATIENT)
Dept: RADIOLOGY | Facility: HOSPITAL | Age: 62
Discharge: HOME | End: 2022-09-28
Attending: ANESTHESIOLOGY
Payer: COMMERCIAL

## 2022-09-28 ENCOUNTER — PATIENT MESSAGE (OUTPATIENT)
Dept: PAIN MEDICINE | Facility: CLINIC | Age: 62
End: 2022-09-28
Payer: COMMERCIAL

## 2022-09-28 ENCOUNTER — OFFICE VISIT (OUTPATIENT)
Dept: PODIATRY | Facility: CLINIC | Age: 62
End: 2022-09-28
Payer: COMMERCIAL

## 2022-09-28 ENCOUNTER — PATIENT MESSAGE (OUTPATIENT)
Dept: FAMILY MEDICINE | Facility: CLINIC | Age: 62
End: 2022-09-28
Payer: COMMERCIAL

## 2022-09-28 ENCOUNTER — PATIENT MESSAGE (OUTPATIENT)
Dept: PODIATRY | Facility: CLINIC | Age: 62
End: 2022-09-28

## 2022-09-28 DIAGNOSIS — L97.529 DIABETIC ULCER OF LEFT GREAT TOE: ICD-10-CM

## 2022-09-28 DIAGNOSIS — E11.49 TYPE II DIABETES MELLITUS WITH NEUROLOGICAL MANIFESTATIONS: Primary | ICD-10-CM

## 2022-09-28 DIAGNOSIS — M54.9 DORSALGIA, UNSPECIFIED: ICD-10-CM

## 2022-09-28 DIAGNOSIS — E11.621 DIABETIC ULCER OF LEFT GREAT TOE: ICD-10-CM

## 2022-09-28 PROCEDURE — 3046F HEMOGLOBIN A1C LEVEL >9.0%: CPT | Mod: CPTII,S$GLB,, | Performed by: PODIATRIST

## 2022-09-28 PROCEDURE — 1159F MED LIST DOCD IN RCRD: CPT | Mod: CPTII,S$GLB,, | Performed by: PODIATRIST

## 2022-09-28 PROCEDURE — 72114 X-RAY EXAM L-S SPINE BENDING: CPT | Mod: 26,,, | Performed by: RADIOLOGY

## 2022-09-28 PROCEDURE — 3066F NEPHROPATHY DOC TX: CPT | Mod: CPTII,S$GLB,, | Performed by: PODIATRIST

## 2022-09-28 PROCEDURE — 99999 PR PBB SHADOW E&M-EST. PATIENT-LVL IV: ICD-10-PCS | Mod: PBBFAC,,, | Performed by: PODIATRIST

## 2022-09-28 PROCEDURE — 3060F PR POS MICROALBUMINURIA RESULT DOCUMENTED/REVIEW: ICD-10-PCS | Mod: CPTII,S$GLB,, | Performed by: PODIATRIST

## 2022-09-28 PROCEDURE — 1160F PR REVIEW ALL MEDS BY PRESCRIBER/CLIN PHARMACIST DOCUMENTED: ICD-10-PCS | Mod: CPTII,S$GLB,, | Performed by: PODIATRIST

## 2022-09-28 PROCEDURE — 73502 X-RAY EXAM HIP UNI 2-3 VIEWS: CPT | Mod: 26,RT,, | Performed by: RADIOLOGY

## 2022-09-28 PROCEDURE — 11042 DBRDMT SUBQ TIS 1ST 20SQCM/<: CPT | Mod: S$GLB,,, | Performed by: PODIATRIST

## 2022-09-28 PROCEDURE — 3066F PR DOCUMENTATION OF TREATMENT FOR NEPHROPATHY: ICD-10-PCS | Mod: CPTII,S$GLB,, | Performed by: PODIATRIST

## 2022-09-28 PROCEDURE — 3060F POS MICROALBUMINURIA REV: CPT | Mod: CPTII,S$GLB,, | Performed by: PODIATRIST

## 2022-09-28 PROCEDURE — 1160F RVW MEDS BY RX/DR IN RCRD: CPT | Mod: CPTII,S$GLB,, | Performed by: PODIATRIST

## 2022-09-28 PROCEDURE — 1159F PR MEDICATION LIST DOCUMENTED IN MEDICAL RECORD: ICD-10-PCS | Mod: CPTII,S$GLB,, | Performed by: PODIATRIST

## 2022-09-28 PROCEDURE — 3046F PR MOST RECENT HEMOGLOBIN A1C LEVEL > 9.0%: ICD-10-PCS | Mod: CPTII,S$GLB,, | Performed by: PODIATRIST

## 2022-09-28 PROCEDURE — 4010F ACE/ARB THERAPY RXD/TAKEN: CPT | Mod: CPTII,S$GLB,, | Performed by: PODIATRIST

## 2022-09-28 PROCEDURE — 73502 X-RAY EXAM HIP UNI 2-3 VIEWS: CPT | Mod: TC,FY,PO,RT

## 2022-09-28 PROCEDURE — 4010F PR ACE/ARB THEARPY RXD/TAKEN: ICD-10-PCS | Mod: CPTII,S$GLB,, | Performed by: PODIATRIST

## 2022-09-28 PROCEDURE — 99499 NO LOS: ICD-10-PCS | Mod: S$GLB,,, | Performed by: PODIATRIST

## 2022-09-28 PROCEDURE — 99999 PR PBB SHADOW E&M-EST. PATIENT-LVL IV: CPT | Mod: PBBFAC,,, | Performed by: PODIATRIST

## 2022-09-28 PROCEDURE — 11042 WOUND DEBRIDEMENT: ICD-10-PCS | Mod: S$GLB,,, | Performed by: PODIATRIST

## 2022-09-28 PROCEDURE — 73502 XR HIP WITH PELVIS WHEN PERFORMED, 2 OR 3  VIEWS RIGHT: ICD-10-PCS | Mod: 26,RT,, | Performed by: RADIOLOGY

## 2022-09-28 PROCEDURE — 99499 UNLISTED E&M SERVICE: CPT | Mod: S$GLB,,, | Performed by: PODIATRIST

## 2022-09-28 PROCEDURE — 72114 X-RAY EXAM L-S SPINE BENDING: CPT | Mod: TC,FY,PO

## 2022-09-28 PROCEDURE — 72114 XR LUMBAR SPINE 5 VIEW WITH FLEX AND EXT: ICD-10-PCS | Mod: 26,,, | Performed by: RADIOLOGY

## 2022-09-28 RX ORDER — HYDROCODONE BITARTRATE AND ACETAMINOPHEN 5; 325 MG/1; MG/1
1 TABLET ORAL EVERY 8 HOURS PRN
Qty: 21 TABLET | Refills: 0 | Status: SHIPPED | OUTPATIENT
Start: 2022-09-28 | End: 2022-10-07 | Stop reason: SDUPTHER

## 2022-09-28 NOTE — PROGRESS NOTES
Results have been released via [employer]. Please verify that these have been viewed by patient. If not, please call patient with results.    Please schedule the following orders:  Follow up with me  I have sent a message to them with the following interpretation (see below).      I have reviewed your recent blood work.     Your metabolic panel which shows your electrolytes, glucose, kidney and liver function is within normal limits.    Your hemoglobin A1c has improved some to 9.9 but still far above goal.    Please do not hesitate to call or message with any additional questions or concerns.    Anne Wright MD

## 2022-09-28 NOTE — TELEPHONE ENCOUNTER
I have sent him in a short course of hydrocodone.    MRI will give us a better idea of what is going on

## 2022-09-29 ENCOUNTER — PATIENT OUTREACH (OUTPATIENT)
Dept: ADMINISTRATIVE | Facility: HOSPITAL | Age: 62
End: 2022-09-29
Payer: COMMERCIAL

## 2022-09-29 NOTE — PROGRESS NOTES
[facility] eye report: Attempted to contact the patient to discuss overdue eye exam, no answer. Per chart review, VANESSA left a voicemail and sent a portal message on 9/23/22.

## 2022-10-02 ENCOUNTER — PATIENT MESSAGE (OUTPATIENT)
Dept: PAIN MEDICINE | Facility: CLINIC | Age: 62
End: 2022-10-02
Payer: COMMERCIAL

## 2022-10-03 ENCOUNTER — PATIENT MESSAGE (OUTPATIENT)
Dept: RADIOLOGY | Facility: HOSPITAL | Age: 62
End: 2022-10-03
Payer: COMMERCIAL

## 2022-10-03 ENCOUNTER — PATIENT MESSAGE (OUTPATIENT)
Dept: FAMILY MEDICINE | Facility: CLINIC | Age: 62
End: 2022-10-03
Payer: COMMERCIAL

## 2022-10-03 DIAGNOSIS — G47.30 SLEEP APNEA, UNSPECIFIED TYPE: Primary | ICD-10-CM

## 2022-10-03 DIAGNOSIS — I10 ESSENTIAL HYPERTENSION: ICD-10-CM

## 2022-10-03 NOTE — PROCEDURES
"Wound Debridement    Date/Time: 9/16/2022 8:30 AM  Performed by: Inocente Smith DPM  Authorized by: Inocente Smith DPM     Time out: Immediately prior to procedure a "time out" was called to verify the correct patient, procedure, equipment, support staff and site/side marked as required.    Consent Done?:  Yes (Verbal)  Local anesthesia used?: No      Wound Details:    Location:  Left foot    Location:  Left 1st Toe    Type of Debridement:  Excisional       Length (cm):  0.4       Area (sq cm):  0.16       Width (cm):  0.4       Percent Debrided (%):  100       Depth (cm):  0.2       Total Area Debrided (sq cm):  0.16    Depth of debridement:  Subcutaneous tissue    Devitalized tissue debrided:  Callus, Necrotic/Eschar and Slough    Instruments:  Curette, Blade and Forceps    Bleeding:  Minimal  Hemostasis Achieved: Yes    Method Used:  Pressure  Patient tolerance:  Patient tolerated the procedure well with no immediate complications  "

## 2022-10-03 NOTE — PROCEDURES
"Wound Debridement    Date/Time: 9/20/2022 9:15 AM  Performed by: Inocente Smith DPM  Authorized by: Inocente Smith DPM     Time out: Immediately prior to procedure a "time out" was called to verify the correct patient, procedure, equipment, support staff and site/side marked as required.    Consent Done?:  Yes (Verbal)  Local anesthesia used?: No      Wound Details:    Location:  Left foot    Location:  Left 1st Toe    Type of Debridement:  Excisional       Length (cm):  0.4       Area (sq cm):  0.12       Width (cm):  0.3       Percent Debrided (%):  100       Depth (cm):  0.2       Total Area Debrided (sq cm):  0.12    Depth of debridement:  Subcutaneous tissue    Devitalized tissue debrided:  Biofilm, Callus and Necrotic/Eschar    Instruments:  Curette    Bleeding:  Minimal  Hemostasis Achieved: Yes    Patient tolerance:  Patient tolerated the procedure well with no immediate complications  "

## 2022-10-03 NOTE — TELEPHONE ENCOUNTER
I have signed for the following orders AND/OR meds.  Please call the patient and ask the patient to schedule the testing AND/OR inform about any medications that were sent. Medications have been sent to pharmacy listed below      Orders Placed This Encounter   Procedures    Home Sleep Studies     Standing Status:   Future     Standing Expiration Date:   10/3/2023              [id number] - [place] - [address] [facility] [address]  [address] [address]  Phone: [phone] Fax: [phone]    [facility] - [place], [place] - [address]  [address]  [address]  [facility] [address]  Phone: [phone] Fax: [phone]

## 2022-10-04 ENCOUNTER — PATIENT MESSAGE (OUTPATIENT)
Dept: PRIMARY CARE CLINIC | Facility: CLINIC | Age: 62
End: 2022-10-04
Payer: COMMERCIAL

## 2022-10-04 ENCOUNTER — PATIENT MESSAGE (OUTPATIENT)
Dept: PODIATRY | Facility: CLINIC | Age: 62
End: 2022-10-04

## 2022-10-04 ENCOUNTER — OFFICE VISIT (OUTPATIENT)
Dept: PODIATRY | Facility: CLINIC | Age: 62
End: 2022-10-04
Payer: COMMERCIAL

## 2022-10-04 ENCOUNTER — HOSPITAL ENCOUNTER (OUTPATIENT)
Dept: RADIOLOGY | Facility: HOSPITAL | Age: 62
Discharge: HOME | End: 2022-10-04
Attending: ANESTHESIOLOGY
Payer: COMMERCIAL

## 2022-10-04 ENCOUNTER — TELEPHONE (OUTPATIENT)
Dept: PAIN MEDICINE | Facility: CLINIC | Age: 62
End: 2022-10-04
Payer: COMMERCIAL

## 2022-10-04 ENCOUNTER — PATIENT MESSAGE (OUTPATIENT)
Dept: PAIN MEDICINE | Facility: CLINIC | Age: 62
End: 2022-10-04
Payer: COMMERCIAL

## 2022-10-04 ENCOUNTER — PATIENT MESSAGE (OUTPATIENT)
Dept: FAMILY MEDICINE | Facility: CLINIC | Age: 62
End: 2022-10-04
Payer: COMMERCIAL

## 2022-10-04 DIAGNOSIS — E11.621 DIABETIC ULCER OF LEFT GREAT TOE: ICD-10-CM

## 2022-10-04 DIAGNOSIS — L97.529 DIABETIC ULCER OF LEFT GREAT TOE: ICD-10-CM

## 2022-10-04 DIAGNOSIS — M54.9 DORSALGIA, UNSPECIFIED: ICD-10-CM

## 2022-10-04 DIAGNOSIS — E11.49 TYPE II DIABETES MELLITUS WITH NEUROLOGICAL MANIFESTATIONS: Primary | ICD-10-CM

## 2022-10-04 PROCEDURE — 99999 PR PBB SHADOW E&M-EST. PATIENT-LVL IV: CPT | Mod: PBBFAC,,, | Performed by: PODIATRIST

## 2022-10-04 PROCEDURE — 4010F ACE/ARB THERAPY RXD/TAKEN: CPT | Mod: CPTII,S$GLB,, | Performed by: PODIATRIST

## 2022-10-04 PROCEDURE — 1160F PR REVIEW ALL MEDS BY PRESCRIBER/CLIN PHARMACIST DOCUMENTED: ICD-10-PCS | Mod: CPTII,S$GLB,, | Performed by: PODIATRIST

## 2022-10-04 PROCEDURE — 11042 DBRDMT SUBQ TIS 1ST 20SQCM/<: CPT | Mod: TA,S$GLB,, | Performed by: PODIATRIST

## 2022-10-04 PROCEDURE — 11042 WOUND DEBRIDEMENT: ICD-10-PCS | Mod: TA,S$GLB,, | Performed by: PODIATRIST

## 2022-10-04 PROCEDURE — 3066F PR DOCUMENTATION OF TREATMENT FOR NEPHROPATHY: ICD-10-PCS | Mod: CPTII,S$GLB,, | Performed by: PODIATRIST

## 2022-10-04 PROCEDURE — 72148 MRI LUMBAR SPINE WITHOUT CONTRAST: ICD-10-PCS | Mod: 26,,, | Performed by: RADIOLOGY

## 2022-10-04 PROCEDURE — 1159F PR MEDICATION LIST DOCUMENTED IN MEDICAL RECORD: ICD-10-PCS | Mod: CPTII,S$GLB,, | Performed by: PODIATRIST

## 2022-10-04 PROCEDURE — 99499 NO LOS: ICD-10-PCS | Mod: S$GLB,,, | Performed by: PODIATRIST

## 2022-10-04 PROCEDURE — 3046F PR MOST RECENT HEMOGLOBIN A1C LEVEL > 9.0%: ICD-10-PCS | Mod: CPTII,S$GLB,, | Performed by: PODIATRIST

## 2022-10-04 PROCEDURE — 3060F PR POS MICROALBUMINURIA RESULT DOCUMENTED/REVIEW: ICD-10-PCS | Mod: CPTII,S$GLB,, | Performed by: PODIATRIST

## 2022-10-04 PROCEDURE — 99499 UNLISTED E&M SERVICE: CPT | Mod: S$GLB,,, | Performed by: PODIATRIST

## 2022-10-04 PROCEDURE — 1159F MED LIST DOCD IN RCRD: CPT | Mod: CPTII,S$GLB,, | Performed by: PODIATRIST

## 2022-10-04 PROCEDURE — 1160F RVW MEDS BY RX/DR IN RCRD: CPT | Mod: CPTII,S$GLB,, | Performed by: PODIATRIST

## 2022-10-04 PROCEDURE — 3066F NEPHROPATHY DOC TX: CPT | Mod: CPTII,S$GLB,, | Performed by: PODIATRIST

## 2022-10-04 PROCEDURE — 3046F HEMOGLOBIN A1C LEVEL >9.0%: CPT | Mod: CPTII,S$GLB,, | Performed by: PODIATRIST

## 2022-10-04 PROCEDURE — 4010F PR ACE/ARB THEARPY RXD/TAKEN: ICD-10-PCS | Mod: CPTII,S$GLB,, | Performed by: PODIATRIST

## 2022-10-04 PROCEDURE — 72148 MRI LUMBAR SPINE W/O DYE: CPT | Mod: 26,,, | Performed by: RADIOLOGY

## 2022-10-04 PROCEDURE — 99999 PR PBB SHADOW E&M-EST. PATIENT-LVL IV: ICD-10-PCS | Mod: PBBFAC,,, | Performed by: PODIATRIST

## 2022-10-04 PROCEDURE — 3060F POS MICROALBUMINURIA REV: CPT | Mod: CPTII,S$GLB,, | Performed by: PODIATRIST

## 2022-10-04 PROCEDURE — 72148 MRI LUMBAR SPINE W/O DYE: CPT | Mod: TC,PO

## 2022-10-04 RX ORDER — AMLODIPINE BESYLATE 10 MG/1
10 TABLET ORAL DAILY
Qty: 90 TABLET | Refills: 3 | Status: SHIPPED | OUTPATIENT
Start: 2022-10-04 | End: 2023-12-07 | Stop reason: SDUPTHER

## 2022-10-04 NOTE — TELEPHONE ENCOUNTER
He can increase his amlodipine dose to 10 mg daily to help with BP    I have signed for the following orders AND/OR meds.  Please call the patient and ask the patient to schedule the testing AND/OR inform about any medications that were sent. Medications have been sent to pharmacy listed below      No orders of the defined types were placed in this encounter.      Medications Ordered This Encounter   Medications    amLODIPine (NORVASC) 10 MG tablet     Sig: Take 1 tablet (10 mg total) by mouth once daily.     Dispense:  90 tablet     Refill:  3     .         [id number] - [place] - [facility] [address] [facility] [address]  [address] [facility]  [place] [address]  Phone: [phone] Fax: [phone]    [facility] - [place], [place] - [address]  [address]  [address]  [facility] [address]  Phone: [phone] Fax: [phone]

## 2022-10-04 NOTE — TELEPHONE ENCOUNTER
Please let the patient know I've reviewed his MRI.    He has some narrowing at L5-S1 on the right side.    It doesn't appear significantly worse than his prior MRI but it may be slightly worse.    He has had epidurals in the past without relief most recent 10/2021 b/l L4/5 TFESI.    We can try one more WHITLEY at L5/S1 to the right.  No guarantee it will help.  If it doesn't help then would recommend consultation with neurosurgery.       Type of Procedure/Injection - Lumbar Epidural  L5/S1         Laterality - Right    Type of Sedation - Local    Need to hold medication - no    N/A    Clearance needed - no    Follow up - 2 week

## 2022-10-04 NOTE — PROGRESS NOTES
Subjective:      Patient ID: Virgilio Acuña is a 62 y.o. male.    Chief Complaint: Wound Care (Pt. Unwrap wound)    Virgilio is a 62 y.o. male    10/4/22: Patient returns for wound care left great toe, ambulating in the darco and football although took if off yesterday as it was hurting him. In a lot of back pain, seeing someone for this just had an MRI    PCP: Anne Wright MD    Date Last Seen by PCP:   Current shoe gear:  Affected Foot: Football with darco     Unaffected Foot: Tennis shoes    History of Trauma: negative      Hemoglobin A1C   Date Value Ref Range Status   09/20/2022 9.9 (H) 4.0 - 5.6 % Final     Comment:     ADA Screening Guidelines:  5.7-6.4%  Consistent with prediabetes  >or=6.5%  Consistent with diabetes    High levels of fetal hemoglobin interfere with the HbA1C  assay. Heterozygous hemoglobin variants (HbS, HgC, etc)do  not significantly interfere with this assay.   However, presence of multiple variants may affect accuracy.     02/16/2022 10.2 (H) 4.0 - 5.6 % Final     Comment:     ADA Screening Guidelines:  5.7-6.4%  Consistent with prediabetes  >or=6.5%  Consistent with diabetes    High levels of fetal hemoglobin interfere with the HbA1C  assay. Heterozygous hemoglobin variants (HbS, HgC, etc)do  not significantly interfere with this assay.   However, presence of multiple variants may affect accuracy.     06/23/2021 9.2 (H) 4.0 - 5.6 % Final     Comment:     ADA Screening Guidelines:  5.7-6.4%  Consistent with prediabetes  >or=6.5%  Consistent with diabetes    High levels of fetal hemoglobin interfere with the HbA1C  assay. Heterozygous hemoglobin variants (HbS, HgC, etc)do  not significantly interfere with this assay.   However, presence of multiple variants may affect accuracy.         Review of Systems   Constitutional: Negative for chills and fever.   Cardiovascular:  Positive for leg swelling. Negative for claudication.   Respiratory:  Negative for shortness of breath.    Skin:   Positive for color change, nail changes and poor wound healing. Negative for itching and rash.   Musculoskeletal:  Negative for muscle cramps, muscle weakness and myalgias.   Gastrointestinal:  Negative for nausea and vomiting.   Neurological:  Positive for numbness and paresthesias. Negative for focal weakness and loss of balance.         Objective:       Physical Exam  Constitutional:       General: He is not in acute distress.     Appearance: He is well-developed. He is not diaphoretic.   Cardiovascular:      Pulses:           Dorsalis pedis pulses are 2+ on the right side and 2+ on the left side.        Posterior tibial pulses are 2+ on the right side and 2+ on the left side.      Comments: < 3 sec capillary refill time to toes 1-5 bilateral. Toes and feet are warm to touch proximally with normal distal cooling b/l. There is no hair growth on the feet and toes b/l. There is moderate edema left foot and mild edema right.     Musculoskeletal:      Comments: Left second toe amputation    Left foot now more narrow in appearance with the first ray properly reduced, minimal edema to the foot and no pain with palpation throughout the area of surgery.    Left ankle there is some pain with palpation to the lateral malleolus and the ATFL area    Equinus noted b/l ankles with < 10 deg DF noted. MMT 5/5 in DF/PF/Inv/Ev resistance with no reproduction of pain in any direction. Passive range of motion of ankle and pedal joints is painless b/l.     Feet:      Right foot:      Protective Sensation: 10 sites tested.  0 sites sensed.      Left foot:      Protective Sensation: 10 sites tested.  0 sites sensed.   Skin:     General: Skin is warm.      Coloration: Skin is not pale.      Findings: No abrasion, bruising, burn, ecchymosis, erythema, laceration, lesion, petechiae or rash.      Nails: There is no clubbing.      Comments: Incision overlying the left second metatarsal base is well healed      Ulcer Location: left plantar  hallux  Measurements: Pre covered in eschar post 0.3x0.2x0.1 cm  Periwound: Intact  Drainage: Serosanguinous   Pus: None.  Malodor: None.  Base:  100% granular  Signs of infection: None       Neurological:      Mental Status: He is alert and oriented to person, place, and time.      Sensory: Sensory deficit present.      Motor: No tremor, atrophy or abnormal muscle tone.      Comments: Negative tinel sign bilateral.   Psychiatric:         Behavior: Behavior normal.             Assessment:       Encounter Diagnoses   Name Primary?    Type II diabetes mellitus with neurological manifestations Yes    Diabetic ulcer of left great toe                   Plan:       Virgilio was seen today for wound care.    Diagnoses and all orders for this visit:    Type II diabetes mellitus with neurological manifestations    Diabetic ulcer of left great toe  -     Wound Debridement          I counseled the patient on his conditions, their implications and medical management.    Shoe inspection. Diabetic Foot Education. Patient reminded of the importance of good nutrition and blood sugar control to help prevent podiatric complications of diabetes. Patient instructed on proper foot hygeine. We discussed wearing proper shoe gear, daily foot inspections, never walking without protective shoe gear, never putting sharp instruments to feet    Wound debrided see attached procedure note dressed in football and offloading in darco    Return next week for wound care, ambulate in darco and football only    Inocente Smith DPM

## 2022-10-04 NOTE — PROCEDURES
"Wound Debridement    Date/Time: 10/4/2022 1:15 PM  Performed by: Inocente Smith DPM  Authorized by: Inocente Smith DPM     Time out: Immediately prior to procedure a "time out" was called to verify the correct patient, procedure, equipment, support staff and site/side marked as required.    Consent Done?:  Yes (Verbal)  Local anesthesia used?: No      Wound Details:    Location:  Left foot    Location:  Left 1st Toe    Type of Debridement:  Excisional       Length (cm):  0.3       Area (sq cm):  0.06       Width (cm):  0.2       Percent Debrided (%):  100       Depth (cm):  0.1       Total Area Debrided (sq cm):  0.06    Depth of debridement:  Subcutaneous tissue    Devitalized tissue debrided:  Biofilm, Necrotic/Eschar and Slough    Instruments:  Curette    Bleeding:  Minimal  Hemostasis Achieved: Yes    Method Used:  Pressure  Patient tolerance:  Patient tolerated the procedure well with no immediate complications  "

## 2022-10-05 ENCOUNTER — TELEPHONE (OUTPATIENT)
Dept: SLEEP MEDICINE | Facility: HOSPITAL | Age: 62
End: 2022-10-05
Payer: COMMERCIAL

## 2022-10-05 ENCOUNTER — TELEPHONE (OUTPATIENT)
Dept: SLEEP MEDICINE | Facility: CLINIC | Age: 62
End: 2022-10-05
Payer: COMMERCIAL

## 2022-10-07 DIAGNOSIS — M54.16 LUMBAR RADICULOPATHY: Primary | ICD-10-CM

## 2022-10-07 RX ORDER — HYDROCODONE BITARTRATE AND ACETAMINOPHEN 5; 325 MG/1; MG/1
1 TABLET ORAL EVERY 12 HOURS PRN
Qty: 14 TABLET | Refills: 0 | Status: SHIPPED | OUTPATIENT
Start: 2022-10-07 | End: 2023-10-12

## 2022-10-07 RX ORDER — ALPRAZOLAM 0.5 MG/1
1 TABLET, ORALLY DISINTEGRATING ORAL ONCE AS NEEDED
Status: CANCELLED | OUTPATIENT
Start: 2022-10-25 | End: 2034-03-22

## 2022-10-07 NOTE — TELEPHONE ENCOUNTER
Are we changing him to 10/18 like the notes in the mychart say?    I'd like to get him in earlier than 10/25

## 2022-10-07 NOTE — TELEPHONE ENCOUNTER
Spoke with patient. Scheduled procedure and follow up. Patient is requesting pain medication until he is able to have procedure on 10/25.

## 2022-10-10 ENCOUNTER — PATIENT MESSAGE (OUTPATIENT)
Dept: PRIMARY CARE CLINIC | Facility: CLINIC | Age: 62
End: 2022-10-10
Payer: COMMERCIAL

## 2022-10-10 ENCOUNTER — PATIENT MESSAGE (OUTPATIENT)
Dept: PODIATRY | Facility: CLINIC | Age: 62
End: 2022-10-10
Payer: COMMERCIAL

## 2022-10-11 ENCOUNTER — OFFICE VISIT (OUTPATIENT)
Dept: PODIATRY | Facility: CLINIC | Age: 62
End: 2022-10-11
Payer: COMMERCIAL

## 2022-10-11 ENCOUNTER — PATIENT MESSAGE (OUTPATIENT)
Dept: PODIATRY | Facility: CLINIC | Age: 62
End: 2022-10-11
Payer: COMMERCIAL

## 2022-10-11 ENCOUNTER — PATIENT MESSAGE (OUTPATIENT)
Dept: FAMILY MEDICINE | Facility: CLINIC | Age: 62
End: 2022-10-11
Payer: COMMERCIAL

## 2022-10-11 ENCOUNTER — HOSPITAL ENCOUNTER (OUTPATIENT)
Dept: RADIOLOGY | Facility: HOSPITAL | Age: 62
Discharge: HOME | End: 2022-10-11
Attending: PODIATRIST
Payer: COMMERCIAL

## 2022-10-11 ENCOUNTER — OFFICE VISIT (OUTPATIENT)
Dept: PRIMARY CARE CLINIC | Facility: CLINIC | Age: 62
End: 2022-10-11
Payer: COMMERCIAL

## 2022-10-11 ENCOUNTER — PATIENT MESSAGE (OUTPATIENT)
Dept: PODIATRY | Facility: CLINIC | Age: 62
End: 2022-10-11

## 2022-10-11 ENCOUNTER — PATIENT MESSAGE (OUTPATIENT)
Dept: PRIMARY CARE CLINIC | Facility: CLINIC | Age: 62
End: 2022-10-11

## 2022-10-11 VITALS
SYSTOLIC BLOOD PRESSURE: 144 MMHG | DIASTOLIC BLOOD PRESSURE: 68 MMHG | WEIGHT: 315 LBS | HEIGHT: 73 IN | BODY MASS INDEX: 41.75 KG/M2 | HEART RATE: 75 BPM | OXYGEN SATURATION: 98 % | RESPIRATION RATE: 18 BRPM

## 2022-10-11 DIAGNOSIS — L97.529 DIABETIC ULCER OF LEFT GREAT TOE: ICD-10-CM

## 2022-10-11 DIAGNOSIS — R60.0 EDEMA OF RIGHT FOOT: Primary | ICD-10-CM

## 2022-10-11 DIAGNOSIS — Z79.4 TYPE 2 DIABETES MELLITUS WITH DIABETIC POLYNEUROPATHY, WITH LONG-TERM CURRENT USE OF INSULIN: ICD-10-CM

## 2022-10-11 DIAGNOSIS — J30.9 CHRONIC ALLERGIC RHINITIS: ICD-10-CM

## 2022-10-11 DIAGNOSIS — E11.621 DIABETIC ULCER OF LEFT GREAT TOE: ICD-10-CM

## 2022-10-11 DIAGNOSIS — R60.0 EDEMA OF RIGHT FOOT: ICD-10-CM

## 2022-10-11 DIAGNOSIS — F41.1 GAD (GENERALIZED ANXIETY DISORDER): ICD-10-CM

## 2022-10-11 DIAGNOSIS — E11.42 TYPE 2 DIABETES MELLITUS WITH DIABETIC POLYNEUROPATHY, WITH LONG-TERM CURRENT USE OF INSULIN: ICD-10-CM

## 2022-10-11 DIAGNOSIS — B96.89 ACUTE BACTERIAL SINUSITIS: Primary | ICD-10-CM

## 2022-10-11 DIAGNOSIS — E11.49 TYPE II DIABETES MELLITUS WITH NEUROLOGICAL MANIFESTATIONS: ICD-10-CM

## 2022-10-11 DIAGNOSIS — Z89.422 HISTORY OF AMPUTATION OF LESSER TOE, LEFT: ICD-10-CM

## 2022-10-11 DIAGNOSIS — J01.90 ACUTE BACTERIAL SINUSITIS: Primary | ICD-10-CM

## 2022-10-11 DIAGNOSIS — I10 ESSENTIAL HYPERTENSION: ICD-10-CM

## 2022-10-11 DIAGNOSIS — E78.2 MIXED HYPERLIPIDEMIA: ICD-10-CM

## 2022-10-11 PROCEDURE — 3066F NEPHROPATHY DOC TX: CPT | Mod: CPTII,S$GLB,, | Performed by: PODIATRIST

## 2022-10-11 PROCEDURE — 3078F DIAST BP <80 MM HG: CPT | Mod: CPTII,S$GLB,, | Performed by: INTERNAL MEDICINE

## 2022-10-11 PROCEDURE — 99999 PR PBB SHADOW E&M-EST. PATIENT-LVL V: CPT | Mod: PBBFAC,,, | Performed by: INTERNAL MEDICINE

## 2022-10-11 PROCEDURE — 3077F SYST BP >= 140 MM HG: CPT | Mod: CPTII,S$GLB,, | Performed by: INTERNAL MEDICINE

## 2022-10-11 PROCEDURE — 4010F PR ACE/ARB THEARPY RXD/TAKEN: ICD-10-PCS | Mod: CPTII,S$GLB,, | Performed by: PODIATRIST

## 2022-10-11 PROCEDURE — 3077F PR MOST RECENT SYSTOLIC BLOOD PRESSURE >= 140 MM HG: ICD-10-PCS | Mod: CPTII,S$GLB,, | Performed by: INTERNAL MEDICINE

## 2022-10-11 PROCEDURE — 11042 DBRDMT SUBQ TIS 1ST 20SQCM/<: CPT | Mod: TA,S$GLB,, | Performed by: PODIATRIST

## 2022-10-11 PROCEDURE — 99999 PR PBB SHADOW E&M-EST. PATIENT-LVL IV: CPT | Mod: PBBFAC,,, | Performed by: PODIATRIST

## 2022-10-11 PROCEDURE — 3008F PR BODY MASS INDEX (BMI) DOCUMENTED: ICD-10-PCS | Mod: CPTII,S$GLB,, | Performed by: INTERNAL MEDICINE

## 2022-10-11 PROCEDURE — 1159F PR MEDICATION LIST DOCUMENTED IN MEDICAL RECORD: ICD-10-PCS | Mod: CPTII,S$GLB,, | Performed by: PODIATRIST

## 2022-10-11 PROCEDURE — 99499 UNLISTED E&M SERVICE: CPT | Mod: S$GLB,,, | Performed by: PODIATRIST

## 2022-10-11 PROCEDURE — 3008F BODY MASS INDEX DOCD: CPT | Mod: CPTII,S$GLB,, | Performed by: INTERNAL MEDICINE

## 2022-10-11 PROCEDURE — 3066F PR DOCUMENTATION OF TREATMENT FOR NEPHROPATHY: ICD-10-PCS | Mod: CPTII,S$GLB,, | Performed by: PODIATRIST

## 2022-10-11 PROCEDURE — 3066F PR DOCUMENTATION OF TREATMENT FOR NEPHROPATHY: ICD-10-PCS | Mod: CPTII,S$GLB,, | Performed by: INTERNAL MEDICINE

## 2022-10-11 PROCEDURE — 99214 PR OFFICE/OUTPT VISIT, EST, LEVL IV, 30-39 MIN: ICD-10-PCS | Mod: S$GLB,,, | Performed by: INTERNAL MEDICINE

## 2022-10-11 PROCEDURE — 3060F POS MICROALBUMINURIA REV: CPT | Mod: CPTII,S$GLB,, | Performed by: PODIATRIST

## 2022-10-11 PROCEDURE — 3060F PR POS MICROALBUMINURIA RESULT DOCUMENTED/REVIEW: ICD-10-PCS | Mod: CPTII,S$GLB,, | Performed by: PODIATRIST

## 2022-10-11 PROCEDURE — 3046F HEMOGLOBIN A1C LEVEL >9.0%: CPT | Mod: CPTII,S$GLB,, | Performed by: INTERNAL MEDICINE

## 2022-10-11 PROCEDURE — 3046F PR MOST RECENT HEMOGLOBIN A1C LEVEL > 9.0%: ICD-10-PCS | Mod: CPTII,S$GLB,, | Performed by: INTERNAL MEDICINE

## 2022-10-11 PROCEDURE — 3046F HEMOGLOBIN A1C LEVEL >9.0%: CPT | Mod: CPTII,S$GLB,, | Performed by: PODIATRIST

## 2022-10-11 PROCEDURE — 4010F PR ACE/ARB THEARPY RXD/TAKEN: ICD-10-PCS | Mod: CPTII,S$GLB,, | Performed by: INTERNAL MEDICINE

## 2022-10-11 PROCEDURE — 4010F ACE/ARB THERAPY RXD/TAKEN: CPT | Mod: CPTII,S$GLB,, | Performed by: INTERNAL MEDICINE

## 2022-10-11 PROCEDURE — 3060F POS MICROALBUMINURIA REV: CPT | Mod: CPTII,S$GLB,, | Performed by: INTERNAL MEDICINE

## 2022-10-11 PROCEDURE — 3060F PR POS MICROALBUMINURIA RESULT DOCUMENTED/REVIEW: ICD-10-PCS | Mod: CPTII,S$GLB,, | Performed by: INTERNAL MEDICINE

## 2022-10-11 PROCEDURE — 99999 PR PBB SHADOW E&M-EST. PATIENT-LVL V: ICD-10-PCS | Mod: PBBFAC,,, | Performed by: INTERNAL MEDICINE

## 2022-10-11 PROCEDURE — 3078F PR MOST RECENT DIASTOLIC BLOOD PRESSURE < 80 MM HG: ICD-10-PCS | Mod: CPTII,S$GLB,, | Performed by: INTERNAL MEDICINE

## 2022-10-11 PROCEDURE — 99499 NO LOS: ICD-10-PCS | Mod: S$GLB,,, | Performed by: PODIATRIST

## 2022-10-11 PROCEDURE — 99999 PR PBB SHADOW E&M-EST. PATIENT-LVL IV: ICD-10-PCS | Mod: PBBFAC,,, | Performed by: PODIATRIST

## 2022-10-11 PROCEDURE — 1160F PR REVIEW ALL MEDS BY PRESCRIBER/CLIN PHARMACIST DOCUMENTED: ICD-10-PCS | Mod: CPTII,S$GLB,, | Performed by: PODIATRIST

## 2022-10-11 PROCEDURE — 4010F ACE/ARB THERAPY RXD/TAKEN: CPT | Mod: CPTII,S$GLB,, | Performed by: PODIATRIST

## 2022-10-11 PROCEDURE — 3066F NEPHROPATHY DOC TX: CPT | Mod: CPTII,S$GLB,, | Performed by: INTERNAL MEDICINE

## 2022-10-11 PROCEDURE — 99214 OFFICE O/P EST MOD 30 MIN: CPT | Mod: S$GLB,,, | Performed by: INTERNAL MEDICINE

## 2022-10-11 PROCEDURE — 1160F RVW MEDS BY RX/DR IN RCRD: CPT | Mod: CPTII,S$GLB,, | Performed by: PODIATRIST

## 2022-10-11 PROCEDURE — 1159F MED LIST DOCD IN RCRD: CPT | Mod: CPTII,S$GLB,, | Performed by: PODIATRIST

## 2022-10-11 PROCEDURE — 3046F PR MOST RECENT HEMOGLOBIN A1C LEVEL > 9.0%: ICD-10-PCS | Mod: CPTII,S$GLB,, | Performed by: PODIATRIST

## 2022-10-11 PROCEDURE — 11042 WOUND DEBRIDEMENT: ICD-10-PCS | Mod: TA,S$GLB,, | Performed by: PODIATRIST

## 2022-10-11 RX ORDER — MONTELUKAST SODIUM 10 MG/1
10 TABLET ORAL NIGHTLY
Qty: 30 TABLET | Refills: 11 | Status: SHIPPED | OUTPATIENT
Start: 2022-10-11 | End: 2022-11-10

## 2022-10-11 RX ORDER — AMOXICILLIN 875 MG/1
875 TABLET, FILM COATED ORAL 2 TIMES DAILY
Qty: 14 TABLET | Refills: 0 | Status: SHIPPED | OUTPATIENT
Start: 2022-10-11 | End: 2022-10-18

## 2022-10-11 RX ORDER — DULOXETIN HYDROCHLORIDE 30 MG/1
30 CAPSULE, DELAYED RELEASE ORAL DAILY
Qty: 30 CAPSULE | Refills: 11 | Status: SHIPPED | OUTPATIENT
Start: 2022-10-11 | End: 2023-10-12

## 2022-10-11 NOTE — PATIENT INSTRUCTIONS
-Start Singulair daily and restart flonase twice daily  -Start doing a sinus rinse daily for the next 1-2 weeks. Use either [facility] sinus rinses or the Navage   -start cymbalta daily for anxiety and pain  -increase Lantus to 45 units in morning and evening and I sent a new Rx in for Januvia

## 2022-10-11 NOTE — PROGRESS NOTES
Assessment/Plan:    Problem List Items Addressed This Visit          Psychiatric    EFRAIN (generalized anxiety disorder)    Overview     -discussed anxiety condition course  -discussed SSRI/SNRI as first-line treatment for this condition  -plan to start trial of cymbalta with hope to improve both anxiety, along with chronic pain  -discussed risk of discontinuing this medication without tapering  -patient was educated, advised of side effects, and all questions were answered.  Patient voiced understanding  -patient will follow up routinely and notify us if having any side effects or worsening or persistent symptoms.  ER precautions were given.           Relevant Medications    DULoxetine (CYMBALTA) 30 MG capsule       Cardiac/Vascular    Essential hypertension    Overview     Hypertension Medications               amLODIPine (NORVASC) 10 MG tablet Take 1 tablet (10 mg total) by mouth once daily.    enalapril (VASOTEC) 20 MG tablet Take 1 tablet (20 mg total) by mouth 2 (two) times daily.   -slightly above goal today  -continue to monitor but may need to add another agent  -continue lifestyle modification with low sodium diet and exercise   -discussed hypertension disease course and importance of treating high blood pressure  -patient understood and advised of risk of untreated blood pressure.  ER precautions were given   for symptoms of hypertensive urgency and emergency.           Mixed hyperlipidemia    Overview     Hyperlipidemia Medications               gemfibroziL (LOPID) 600 MG tablet TAKE 1 TABLET ONCE DAILY    pravastatin (PRAVACHOL) 40 MG tablet TAKE 1 TABLET ONCE DAILY   -chronic condition. Currently stable.    -reports compliance with hyperlipidemia treatment as prescribed  -denies any known adverse effects of medications  -most recent labs listed below:  Lab Results   Component Value Date    CHOL 198 02/16/2022     Lab Results   Component Value Date    HDL 34 (L) 02/16/2022     Lab Results   Component  Value Date    LDLCALC Invalid, Trig>400.0 02/16/2022     Lab Results   Component Value Date    TRIG 414 (H) 02/16/2022     Lab Results   Component Value Date    ALT 18 09/20/2022    AST 16 09/20/2022    ALKPHOS 67 09/20/2022    BILITOT 0.5 09/20/2022                Endocrine    Type 2 diabetes mellitus with diabetic polyneuropathy, with long-term current use of insulin    Overview     Diabetes Medications               dapagliflozin (FARXIGA) 10 mg tablet Take 1 tablet (10 mg total) by mouth once daily.    insulin (LANTUS SOLOSTAR U-100 INSULIN) glargine 100 units/mL SubQ pen Inject 40 Units into the skin 2 (two) times a day. Supply pen needles    insulin aspart U-100 (NOVOLOG FLEXPEN U-100 INSULIN) 100 unit/mL (3 mL) InPn pen ADMINISTER 40 UNITS UNDER THE SKIN THREE TIMES DAILY WITH MEALS    metFORMIN (GLUCOPHAGE-XR) 500 MG ER 24hr tablet Take 2 tablets (1,000 mg total) by mouth 2 (two) times daily with meals.    SITagliptin (JANUVIA) 100 MG Tab Take 1 tablet (100 mg total) by mouth once daily.   -condition is currently uncontrolled  -plan to start back januvia. Increase lantus to 45 units BID  -see diabetic health maintenance listed below  -on statin: Yes  -on ACE-I/ARB: Yes  -counseling provided on importance of diabetic diet and medication compliance in order to treat diabetes  -discussed diabetes disease course and potential complications          Relevant Medications    SITagliptin (JANUVIA) 100 MG Tab       Orthopedic    History of amputation of lesser toe, left    Overview     -followed closely by podiatry for diabetic foot ulcer management          Other Visit Diagnoses       Acute bacterial sinusitis    -  Primary    Chronic allergic rhinitis      -acute on chronic sinus congestion/pain  -start on PO abx for bacterial sinusitis  -for chronic symptoms, switch to new antihistamine  -start sinus rinses daily x 1 week, then continue once weekly            Follow up in about 4 weeks (around 11/8/2022).    Anne  ZAYRA Wright MD  _____________________________________________________________________________________________________________________________________________________    CC: follow up of chronic medical conditions     HPI:    Patient is in clinic today as an established patient.    HTN: The patient has a diagnosis of hypertension and the blood pressure has been high on recent checks.  The patient has been compliant on the medicine listed below and has not had problems with side effects.  The patient has had no headache, vision changes, chest pain, shortness of breath, dizziness, palpitations.  Denies any identifiable exacerbating or relieving factors.    DM2: Patient presents for follow up of diabetes. Condition is chronic and uncontrolled. Patient denies symptoms, including foot ulcerations, hypoglycemia , nausea, paresthesia of the feet, polydipsia, polyuria and visual disturbances.  Evaluation to date has been included: fasting blood sugar, fasting lipid panel, hemoglobin A1C and microalbuminuria. Denies adverse effects of current medications.     Diabetes Management Status    Statin: Taking  ACE/ARB: Taking    Screening or Prevention Patient's value Goal Complete/Controlled?   HgA1C Testing and Control   Lab Results   Component Value Date    HGBA1C 9.9 (H) 09/20/2022      Annually/Less than 8% No     Lipid profile : 02/16/2022 Annually Yes     LDL control Lab Results   Component Value Date    LDLCALC Invalid, Trig>400.0 02/16/2022    Annually/Less than 100 mg/dl  Yes     Nephropathy screening Lab Results   Component Value Date    LABMICR 307.0 02/16/2022     Lab Results   Component Value Date    PROTEINUA Trace (A) 01/10/2018    Annually Yes     Blood pressure BP Readings from Last 1 Encounters:   10/25/22 (!) 161/70    Less than 140/90 No     Dilated retinal exam : 08/26/2019 Annually Yes     Foot exam   : 01/14/2020 Annually No       Sinusitis: chronic hx of allergic rhinitis and sinusitis. Recent worsening of  "sinus congestion over the past week. Now having significant sinus pain, mostly in maxillary region. Denies fever. Denies sore throat, cough or SOB. He has been taking an antihistamine, flonase, asteline and singulair chronically. Symptoms continue to worse over the past week.     No other new complaints today.  Remaining chronic conditions have been reviewed and remain stable. Further detail as stated above.       Current Outpatient Medications on File Prior to Visit   Medication Sig Dispense Refill    amLODIPine (NORVASC) 10 MG tablet Take 1 tablet (10 mg total) by mouth once daily. 90 tablet 3    BD ULTRA-FINE ORIG PEN NEEDLE 29 gauge x 1/2" Ndle USE WITH LANTUS SOLOSTAR   PEN 90 each 3    blood sugar diagnostic (BLOOD GLUCOSE TEST) Strp Use 1-2 x a day to check glucoses before meals. 100 strip 11    blood-glucose meter (ONETOUCH VERIO REFLECT METER[place] [facility] [place]. 1 each 0    dapagliflozin (FARXIGA) 10 mg tablet Take 1 tablet (10 mg total) by mouth once daily. 90 tablet 3    enalapril (VASOTEC) 20 MG tablet Take 1 tablet (20 mg total) by mouth 2 (two) times daily. 180 tablet 3    gabapentin (NEURONTIN) 300 MG capsule TAKE 3 CAPSULES (900MG     TOTAL) 3 TIMES A  capsule 5    gemfibroziL (LOPID) 600 MG tablet TAKE 1 TABLET ONCE DAILY 90 tablet 3    HYDROcodone-acetaminophen (NORCO) 5-325 mg per tablet Take 1 tablet by mouth every 12 (twelve) hours as needed for Pain. 14 tablet 0    insulin (LANTUS SOLOSTAR U-100 INSULIN) glargine 100 units/mL SubQ pen Inject 40 Units into the skin 2 (two) times a day. Supply pen needles 72 mL 1    insulin syringe-needle U-100 (INSULIN SYRINGE) 1 mL 29 gauge x 1/2" Syrg Inject 4 times a day 100 each 12    lancets Misc 1 Units by Misc.(Non-Drug; Combo Route) route daily as needed. 100 each 11    metFORMIN (GLUCOPHAGE-XR) 500 MG ER 24hr tablet Take 2 tablets (1,000 mg total) by mouth 2 (two) times daily with meals. 360 tablet 1    ONETOUCH DELICA PLUS LANCET 33 gauge " "Misc Apply topically 3 (three) times daily.      pantoprazole (PROTONIX) 40 MG tablet Take 1 tablet (40 mg total) by mouth once daily. 90 tablet 3    pen needle, diabetic (BD ULTRA-FINE WILDA PEN NEEDLE) 32 gauge x 5/32" Ndle INJECT FOUR TIMES DAILY 4 each 12    pravastatin (PRAVACHOL) 40 MG tablet TAKE 1 TABLET ONCE DAILY 90 tablet 3    pulse oximeter (PULSE OXIMETER) device by Apply Externally route 2 (two) times a day. Use twice daily at 8 AM and 3 PM and record the value in [facility] as directed. 1 each 0    safety needles 22 gauge x 1 1/2" Ndle To be used once a month for testosterone injections 50 each 6    syringe with needle 3 mL 25 gauge x 1" Syrg To be used with monthly testosterone injections 100 Syringe 4    traZODone (DESYREL) 150 MG tablet TAKE 1 TABLET NIGHTLY 90 tablet 3     Current Facility-Administered Medications on File Prior to Visit   Medication Dose Route Frequency Provider Last Rate Last Admin    acetaminophen tablet 650 mg  650 mg Oral Once PRN Virgilio Avilez MD        albuterol inhaler 2 puff  2 puff Inhalation Q20 Min PRN Virgilio Avilez MD        diphenhydrAMINE injection 25 mg  25 mg Intravenous Once PRN Virgilio Avilez MD        EPINEPHrine (EPIPEN) 0.3 mg/0.3 mL pen injection 0.3 mg  0.3 mg Intramuscular PRN Virgilio Avilez MD        lactated ringers infusion   Intravenous Continuous Vadim Buchanan MD   Stopped at 08/27/18 0953    methylPREDNISolone sodium succinate injection 40 mg  40 mg Intravenous Once PRN Virgilio Avilez MD        ondansetron disintegrating tablet 4 mg  4 mg Oral Once PRN Virgilio Avilez MD        sodium chloride 0.9% 500 mL flush bag   Intravenous PRN Virgilio Avilez MD        sodium chloride 0.9% flush 10 mL  10 mL Intravenous PRN Virgilio Avilez MD           Review of Systems    Vitals:    10/11/22 0739   BP: (!) 144/68   BP Location: Left arm   Patient Position: Sitting   BP Method: Large (Manual)   Pulse: 75   Resp: 18   SpO2: 98%   Weight: " "(!) 155 kg (341 lb 11.4 oz)   Height: 6' 1" (1.854 m)       Wt Readings from Last 3 Encounters:   10/20/22 (!) 154.7 kg (341 lb)   10/11/22 (!) 155 kg (341 lb 11.4 oz)   09/27/22 (!) 149.2 kg (329 lb 0.6 oz)       Physical Exam    Health Maintenance   Topic Date Due    TETANUS VACCINE  Never done    Eye Exam  08/26/2020    Foot Exam  01/14/2021    Hemoglobin A1c  12/20/2022    Lipid Panel  02/16/2023    Hepatitis C Screening  Completed       "

## 2022-10-12 ENCOUNTER — PATIENT MESSAGE (OUTPATIENT)
Dept: PODIATRY | Facility: CLINIC | Age: 62
End: 2022-10-12
Payer: COMMERCIAL

## 2022-10-12 ENCOUNTER — PATIENT MESSAGE (OUTPATIENT)
Dept: FAMILY MEDICINE | Facility: CLINIC | Age: 62
End: 2022-10-12
Payer: COMMERCIAL

## 2022-10-13 ENCOUNTER — PROCEDURE VISIT (OUTPATIENT)
Dept: SLEEP MEDICINE | Facility: CLINIC | Age: 62
End: 2022-10-13
Payer: COMMERCIAL

## 2022-10-13 ENCOUNTER — PATIENT MESSAGE (OUTPATIENT)
Dept: FAMILY MEDICINE | Facility: CLINIC | Age: 62
End: 2022-10-13
Payer: COMMERCIAL

## 2022-10-13 DIAGNOSIS — G47.30 SLEEP APNEA, UNSPECIFIED TYPE: ICD-10-CM

## 2022-10-13 PROCEDURE — 95800 SLP STDY UNATTENDED: CPT

## 2022-10-13 NOTE — Clinical Note
1 night study MODERATE OBSTRUCTIVE SLEEP APNEA with overall AHI 24.0/hr ( 128 events): night #1 Oxygen desaturation: 87%. SpO2 between 89% to 85% for 4 min. Patient snored 98% time above 50 . Heart rate range: 64 bpm - 95 bpm REC's: Please refer to sleep disorders clinic for management Therapy with APAP at 4-20 cm WP using mask of choice with heated humidification is an option. Weight loss/management. with regular exercise per direction of physician. Avoid drowsy driving. Follow up in sleep clinic to maximize adherence and ensure resolution of symptoms.

## 2022-10-17 ENCOUNTER — PATIENT MESSAGE (OUTPATIENT)
Dept: FAMILY MEDICINE | Facility: CLINIC | Age: 62
End: 2022-10-17
Payer: COMMERCIAL

## 2022-10-17 PROCEDURE — 95806 PR SLEEP STUDY, UNATTENDED, SIMUL RECORD HR/O2 SAT/RESP FLOW/RESP EFFT: ICD-10-PCS | Mod: 26,52,S$GLB, | Performed by: INTERNAL MEDICINE

## 2022-10-17 PROCEDURE — 95806 SLEEP STUDY UNATT&RESP EFFT: CPT | Mod: 26,52,S$GLB, | Performed by: INTERNAL MEDICINE

## 2022-10-17 NOTE — PROCEDURES
"Wound Debridement    Date/Time: 9/28/2022 11:30 AM  Performed by: Inocente Smith DPM  Authorized by: Inocente Smith DPM     Time out: Immediately prior to procedure a "time out" was called to verify the correct patient, procedure, equipment, support staff and site/side marked as required.    Consent Done?:  Yes (Verbal)  Local anesthesia used?: No      Wound Details:    Location:  Left foot    Location:  Left 1st Toe    Type of Debridement:  Excisional       Length (cm):  0.3       Area (sq cm):  0.09       Width (cm):  0.3       Percent Debrided (%):  100       Depth (cm):  0.2       Total Area Debrided (sq cm):  0.09    Depth of debridement:  Subcutaneous tissue    Tissue debrided:  Subcutaneous    Devitalized tissue debrided:  Callus, Necrotic/Eschar and Slough    Instruments:  Curette  "

## 2022-10-17 NOTE — PROCEDURES
Home Sleep Studies    Date/Time: 10/13/2022 8:00 AM  Performed by: Martin Nicolas MD  Authorized by: Anne Wright MD     1 night study  MODERATE OBSTRUCTIVE SLEEP APNEA with overall AHI 24.0/hr ( 128 events): night #1  Oxygen desaturation: 87%. SpO2 between 89% to 85% for 4 min.  Patient snored 98% time above 50 .  Heart rate range: 64 bpm - 95 bpm  REC's:  Please refer to sleep disorders clinic for management  Therapy with APAP at 4-20 cm WP using mask of choice with heated humidification is an option.  Weight loss/management. with regular exercise per direction of physician.  Avoid drowsy driving.  Follow up in sleep clinic to maximize adherence and ensure resolution of symptoms.

## 2022-10-17 NOTE — PROGRESS NOTES
Subjective:      Patient ID: Virgilio Acuña is a 62 y.o. male.    Chief Complaint: Follow-up and Wound Care    Virgilio is a 62 y.o. male who presents to the clinic for evaluation and treatment of high risk feet. Virgilio has a past medical history of Cellulitis of left index finger (2/16/2015), Charcot's joint of foot, left (08/2018), Dyslipidemia, Essential hypertension (2/16/2017), Group B streptococcal infection (1/15/2018), Hypotestosteronemia (7/7/2017), Infected finger joint (2/17/2015), Infected skin ulcer limited to breakdown of skin (1/13/2018), MSSA (methicillin susceptible Staphylococcus aureus) (1/13/2018), Obese, S/P knee surgery, medial/lateral menisectomy (11/4/2013), and Type 2 diabetes mellitus with diabetic polyneuropathy, with long-term current use of insulin (2003). Here for concern over a new ulcer left foot great toe, this has progressed over the last 2-3 weeks, there has been some drainage from the area and discoloration to the skin, has charcot with reconstruction to the left side and is high risk. Denies f/c/n/v    6/7/22: Patient returns for follow up left great toe ulcer ambulating in darco and football no new complaints.    6/14/22: Patient returns for continued wound care left great toe ulcer no new complaints.     6/21/22: Patient returns for wound care left great toe, ambulating in football and darco no new complaints. He feels there is a malodor to the wound    6/28/22: Patient returns for wound care left great toe ambulating I darco no new complaints, graft is here today for application    7/6/22: Patient returns for wound care and grafting of the left great toe no new complaints    7/12/22: Patient returns for follow up left great toe wound care no new complaints    7/18/22: Patient returns for follow up left great toe ulcer care, here for wound care and graft application    8/3/22: Patient returns for follow up left great toe ulcer care here for graft application    8/10/22: patient  returns for follow up left great toe ulcer and for graft application    8/17/22: patient returns for follow up wound care left great toe ulcer no new complaints walking in the darco shoe.    8/24/22: Patient returns for follow up wound care left great toe ulcer ambulating in darco and football    8/31/22: patient returns for left great toe ulcer in darco shoe and football    9/6/22: Patient returns for left great toe ulcer follow up ambulating in darco shoe and football    9/14/22: Patient returns for left great toe ulcer recurrence came back within a day of it healing last appt.    9/20/22: Patient returns for left great toe ulcer ambulating in darco and football    9/28/22: Patient returns for left great toe ulcer follow up, ambulating in darco and football    PCP: Anne Wright MD    Date Last Seen by PCP:   Current shoe gear:  Affected Foot: Football with darco     Unaffected Foot: Tennis shoes    History of Trauma: negative      Hemoglobin A1C   Date Value Ref Range Status   09/20/2022 9.9 (H) 4.0 - 5.6 % Final     Comment:     ADA Screening Guidelines:  5.7-6.4%  Consistent with prediabetes  >or=6.5%  Consistent with diabetes    High levels of fetal hemoglobin interfere with the HbA1C  assay. Heterozygous hemoglobin variants (HbS, HgC, etc)do  not significantly interfere with this assay.   However, presence of multiple variants may affect accuracy.     02/16/2022 10.2 (H) 4.0 - 5.6 % Final     Comment:     ADA Screening Guidelines:  5.7-6.4%  Consistent with prediabetes  >or=6.5%  Consistent with diabetes    High levels of fetal hemoglobin interfere with the HbA1C  assay. Heterozygous hemoglobin variants (HbS, HgC, etc)do  not significantly interfere with this assay.   However, presence of multiple variants may affect accuracy.     06/23/2021 9.2 (H) 4.0 - 5.6 % Final     Comment:     ADA Screening Guidelines:  5.7-6.4%  Consistent with prediabetes  >or=6.5%  Consistent with diabetes    High levels of fetal  hemoglobin interfere with the HbA1C  assay. Heterozygous hemoglobin variants (HbS, HgC, etc)do  not significantly interfere with this assay.   However, presence of multiple variants may affect accuracy.         Review of Systems   Constitutional: Negative for chills and fever.   Cardiovascular:  Positive for leg swelling. Negative for claudication.   Respiratory:  Negative for shortness of breath.    Skin:  Positive for color change, nail changes and poor wound healing. Negative for itching and rash.   Musculoskeletal:  Negative for muscle cramps, muscle weakness and myalgias.   Gastrointestinal:  Negative for nausea and vomiting.   Neurological:  Positive for numbness and paresthesias. Negative for focal weakness and loss of balance.         Objective:       Physical Exam  Constitutional:       General: He is not in acute distress.     Appearance: He is well-developed. He is not diaphoretic.   Cardiovascular:      Pulses:           Dorsalis pedis pulses are 2+ on the right side and 2+ on the left side.        Posterior tibial pulses are 2+ on the right side and 2+ on the left side.      Comments: < 3 sec capillary refill time to toes 1-5 bilateral. Toes and feet are warm to touch proximally with normal distal cooling b/l. There is no hair growth on the feet and toes b/l. There is moderate edema left foot and mild edema right.     Musculoskeletal:      Comments: Left second toe amputation    Left foot now more narrow in appearance with the first ray properly reduced, minimal edema to the foot and no pain with palpation throughout the area of surgery.    Left ankle there is some pain with palpation to the lateral malleolus and the ATFL area    Equinus noted b/l ankles with < 10 deg DF noted. MMT 5/5 in DF/PF/Inv/Ev resistance with no reproduction of pain in any direction. Passive range of motion of ankle and pedal joints is painless b/l.     Feet:      Right foot:      Protective Sensation: 10 sites tested.  0 sites  sensed.      Left foot:      Protective Sensation: 10 sites tested.  0 sites sensed.   Skin:     General: Skin is warm.      Coloration: Skin is not pale.      Findings: No abrasion, bruising, burn, ecchymosis, erythema, laceration, lesion, petechiae or rash.      Nails: There is no clubbing.      Comments: Incision overlying the left second metatarsal base is well healed      Ulcer Location: left plantar hallux  Measurements: Pre covered in eschar post 0.3x0.3x0.2 cm  Periwound: Intact  Drainage: Serosanguinous   Pus: None.  Malodor: None.  Base:  100% granular  Signs of infection: none       Neurological:      Mental Status: He is alert and oriented to person, place, and time.      Sensory: Sensory deficit present.      Motor: No tremor, atrophy or abnormal muscle tone.      Comments: Negative tinel sign bilateral.   Psychiatric:         Behavior: Behavior normal.             Assessment:       Encounter Diagnoses   Name Primary?    Type II diabetes mellitus with neurological manifestations Yes    Diabetic ulcer of left great toe                   Plan:       Virgilio was seen today for follow-up and wound care.    Diagnoses and all orders for this visit:    Type II diabetes mellitus with neurological manifestations    Diabetic ulcer of left great toe          I counseled the patient on his conditions, their implications and medical management.    Shoe inspection. Diabetic Foot Education. Patient reminded of the importance of good nutrition and blood sugar control to help prevent podiatric complications of diabetes. Patient instructed on proper foot hygeine. We discussed wearing proper shoe gear, daily foot inspections, never walking without protective shoe gear, never putting sharp instruments to feet    Wound debrided see attached procedure note    Return next week for wound care, ambulate in darco and football only    Inocente Smith DPM

## 2022-10-18 ENCOUNTER — PATIENT MESSAGE (OUTPATIENT)
Dept: ADMINISTRATIVE | Facility: HOSPITAL | Age: 62
End: 2022-10-18
Payer: COMMERCIAL

## 2022-10-18 ENCOUNTER — TELEPHONE (OUTPATIENT)
Dept: FAMILY MEDICINE | Facility: CLINIC | Age: 62
End: 2022-10-18
Payer: COMMERCIAL

## 2022-10-18 ENCOUNTER — PATIENT MESSAGE (OUTPATIENT)
Dept: FAMILY MEDICINE | Facility: CLINIC | Age: 62
End: 2022-10-18
Payer: COMMERCIAL

## 2022-10-18 DIAGNOSIS — G47.33 OSA (OBSTRUCTIVE SLEEP APNEA): Primary | ICD-10-CM

## 2022-10-18 NOTE — TELEPHONE ENCOUNTER
Sleep study showing moderate sleep apnea. Referral placed for sleep disorder clinic to discuss treatment.    I have signed for the following orders AND/OR meds.  Please call the patient and ask the patient to schedule the testing AND/OR inform about any medications that were sent. Medications have been sent to pharmacy listed below      Orders Placed This Encounter   Procedures    Ambulatory referral/consult to Sleep Disorders     Standing Status:   Future     Standing Expiration Date:   11/18/2023     Referral Priority:   Routine     Referral Type:   Consultation     Requested Specialty:   Sleep Medicine     Number of Visits Requested:   1              [id number] [place] [facility]  [address] [facility] [address]  [facility]  [place] [address]  Phone: [phone] Fax: [phone]    [facility] - [place], [place] - [address]  [address]  [address]  [facility] [address]  Phone: [phone] Fax: [phone]

## 2022-10-18 NOTE — LETTER
AUTHORIZATION FOR RELEASE OF   CONFIDENTIAL INFORMATION        We are seeing Virgilio Acuña, date of birth 1960, in the clinic at [facility]. Anne Wright MD is the patient's PCP. Virgilio Acuña has an outstanding lab/procedure at the time we reviewed his chart. In order to help keep his health information updated, he has authorized us to request the following medical record(s):        (  )  MAMMOGRAM                                      (  )  COLONOSCOPY      (  )  PAP SMEAR                                          (  )  OUTSIDE LAB RESULTS     (  )  DEXA SCAN                                          ( X ) DIABETIC EYE EXAM            (  )  FOOT EXAM                                          (  )  ENTIRE RECORD     (  )  OUTSIDE IMMUNIZATIONS                 (  )  _______________         Please fax records to [facility], [phone]     If you have any questions, please contact Meena Hurley           Patient Name: Virgilio Acuña  : 1960  Patient Phone #: [phone]

## 2022-10-19 ENCOUNTER — OFFICE VISIT (OUTPATIENT)
Dept: PODIATRY | Facility: CLINIC | Age: 62
End: 2022-10-19
Payer: COMMERCIAL

## 2022-10-19 ENCOUNTER — PATIENT MESSAGE (OUTPATIENT)
Dept: PULMONOLOGY | Facility: CLINIC | Age: 62
End: 2022-10-19
Payer: COMMERCIAL

## 2022-10-19 DIAGNOSIS — Z87.2 HEALED ULCER OF LEFT FOOT ON EXAMINATION: ICD-10-CM

## 2022-10-19 DIAGNOSIS — E11.49 TYPE II DIABETES MELLITUS WITH NEUROLOGICAL MANIFESTATIONS: Primary | ICD-10-CM

## 2022-10-19 PROCEDURE — 3046F HEMOGLOBIN A1C LEVEL >9.0%: CPT | Mod: CPTII,S$GLB,, | Performed by: PODIATRIST

## 2022-10-19 PROCEDURE — 4010F PR ACE/ARB THEARPY RXD/TAKEN: ICD-10-PCS | Mod: CPTII,S$GLB,, | Performed by: PODIATRIST

## 2022-10-19 PROCEDURE — 1160F RVW MEDS BY RX/DR IN RCRD: CPT | Mod: CPTII,S$GLB,, | Performed by: PODIATRIST

## 2022-10-19 PROCEDURE — 1159F MED LIST DOCD IN RCRD: CPT | Mod: CPTII,S$GLB,, | Performed by: PODIATRIST

## 2022-10-19 PROCEDURE — 99212 OFFICE O/P EST SF 10 MIN: CPT | Mod: S$GLB,,, | Performed by: PODIATRIST

## 2022-10-19 PROCEDURE — 3066F PR DOCUMENTATION OF TREATMENT FOR NEPHROPATHY: ICD-10-PCS | Mod: CPTII,S$GLB,, | Performed by: PODIATRIST

## 2022-10-19 PROCEDURE — 99212 PR OFFICE/OUTPT VISIT, EST, LEVL II, 10-19 MIN: ICD-10-PCS | Mod: S$GLB,,, | Performed by: PODIATRIST

## 2022-10-19 PROCEDURE — 1160F PR REVIEW ALL MEDS BY PRESCRIBER/CLIN PHARMACIST DOCUMENTED: ICD-10-PCS | Mod: CPTII,S$GLB,, | Performed by: PODIATRIST

## 2022-10-19 PROCEDURE — 3060F PR POS MICROALBUMINURIA RESULT DOCUMENTED/REVIEW: ICD-10-PCS | Mod: CPTII,S$GLB,, | Performed by: PODIATRIST

## 2022-10-19 PROCEDURE — 4010F ACE/ARB THERAPY RXD/TAKEN: CPT | Mod: CPTII,S$GLB,, | Performed by: PODIATRIST

## 2022-10-19 PROCEDURE — 1159F PR MEDICATION LIST DOCUMENTED IN MEDICAL RECORD: ICD-10-PCS | Mod: CPTII,S$GLB,, | Performed by: PODIATRIST

## 2022-10-19 PROCEDURE — 3060F POS MICROALBUMINURIA REV: CPT | Mod: CPTII,S$GLB,, | Performed by: PODIATRIST

## 2022-10-19 PROCEDURE — 99999 PR PBB SHADOW E&M-EST. PATIENT-LVL IV: ICD-10-PCS | Mod: PBBFAC,,, | Performed by: PODIATRIST

## 2022-10-19 PROCEDURE — 99999 PR PBB SHADOW E&M-EST. PATIENT-LVL IV: CPT | Mod: PBBFAC,,, | Performed by: PODIATRIST

## 2022-10-19 PROCEDURE — 3046F PR MOST RECENT HEMOGLOBIN A1C LEVEL > 9.0%: ICD-10-PCS | Mod: CPTII,S$GLB,, | Performed by: PODIATRIST

## 2022-10-19 PROCEDURE — 3066F NEPHROPATHY DOC TX: CPT | Mod: CPTII,S$GLB,, | Performed by: PODIATRIST

## 2022-10-19 NOTE — PROGRESS NOTES
Subjective:      Patient ID: Virgilio Acuña is a 62 y.o. male.    Chief Complaint: Wound Care    Virgilio is a 62 y.o. male    10/19/22: Patient returns for left hallux ulcer, no new complaints ambulating in darco and football    PCP: Anne Wright MD    Date Last Seen by PCP:   Current shoe gear:  Affected Foot: Football with darco     Unaffected Foot: Tennis shoes    History of Trauma: negative      Hemoglobin A1C   Date Value Ref Range Status   09/20/2022 9.9 (H) 4.0 - 5.6 % Final     Comment:     ADA Screening Guidelines:  5.7-6.4%  Consistent with prediabetes  >or=6.5%  Consistent with diabetes    High levels of fetal hemoglobin interfere with the HbA1C  assay. Heterozygous hemoglobin variants (HbS, HgC, etc)do  not significantly interfere with this assay.   However, presence of multiple variants may affect accuracy.     02/16/2022 10.2 (H) 4.0 - 5.6 % Final     Comment:     ADA Screening Guidelines:  5.7-6.4%  Consistent with prediabetes  >or=6.5%  Consistent with diabetes    High levels of fetal hemoglobin interfere with the HbA1C  assay. Heterozygous hemoglobin variants (HbS, HgC, etc)do  not significantly interfere with this assay.   However, presence of multiple variants may affect accuracy.     06/23/2021 9.2 (H) 4.0 - 5.6 % Final     Comment:     ADA Screening Guidelines:  5.7-6.4%  Consistent with prediabetes  >or=6.5%  Consistent with diabetes    High levels of fetal hemoglobin interfere with the HbA1C  assay. Heterozygous hemoglobin variants (HbS, HgC, etc)do  not significantly interfere with this assay.   However, presence of multiple variants may affect accuracy.         Review of Systems   Constitutional: Negative for chills and fever.   Cardiovascular:  Positive for leg swelling. Negative for claudication.   Respiratory:  Negative for shortness of breath.    Skin:  Positive for color change, nail changes and poor wound healing. Negative for itching and rash.   Musculoskeletal:  Negative for  muscle cramps, muscle weakness and myalgias.   Gastrointestinal:  Negative for nausea and vomiting.   Neurological:  Positive for numbness and paresthesias. Negative for focal weakness and loss of balance.         Objective:       Physical Exam  Constitutional:       General: He is not in acute distress.     Appearance: He is well-developed. He is not diaphoretic.   Cardiovascular:      Pulses:           Dorsalis pedis pulses are 2+ on the right side and 2+ on the left side.        Posterior tibial pulses are 2+ on the right side and 2+ on the left side.      Comments: < 3 sec capillary refill time to toes 1-5 bilateral. Toes and feet are warm to touch proximally with normal distal cooling b/l. There is no hair growth on the feet and toes b/l. There is moderate edema left foot and mild edema right.     Musculoskeletal:      Comments: Left second toe amputation    Left foot now more narrow in appearance with the first ray properly reduced, minimal edema to the foot and no pain with palpation throughout the area of surgery.    Left ankle there is some pain with palpation to the lateral malleolus and the ATFL area    Equinus noted b/l ankles with < 10 deg DF noted. MMT 5/5 in DF/PF/Inv/Ev resistance with no reproduction of pain in any direction. Passive range of motion of ankle and pedal joints is painless b/l.     Feet:      Right foot:      Protective Sensation: 10 sites tested.  0 sites sensed.      Left foot:      Protective Sensation: 10 sites tested.  0 sites sensed.   Skin:     General: Skin is warm.      Coloration: Skin is not pale.      Findings: No abrasion, bruising, burn, ecchymosis, erythema, laceration, lesion, petechiae or rash.      Nails: There is no clubbing.      Comments: Incision overlying the left second metatarsal base is well healed      Ulcer Location: left plantar hallux  Measurements: 0 x 0 x 0 cm  Periwound: Intact  Drainage: None.  Pus: None.  Malodor: None.  Base:  100% epithelial  tissue.  Signs of infection: None.       Neurological:      Mental Status: He is alert and oriented to person, place, and time.      Sensory: Sensory deficit present.      Motor: No tremor, atrophy or abnormal muscle tone.      Comments: Negative tinel sign bilateral.   Psychiatric:         Behavior: Behavior normal.             Assessment:       Encounter Diagnoses   Name Primary?    Type II diabetes mellitus with neurological manifestations Yes    Healed ulcer of left foot on examination                     Plan:       Virgilio was seen today for wound care.    Diagnoses and all orders for this visit:    Type II diabetes mellitus with neurological manifestations    Healed ulcer of left foot on examination            I counseled the patient on his conditions, their implications and medical management.    Shoe inspection. Diabetic Foot Education. Patient reminded of the importance of good nutrition and blood sugar control to help prevent podiatric complications of diabetes. Patient instructed on proper foot hygeine. We discussed wearing proper shoe gear, daily foot inspections, never walking without protective shoe gear, never putting sharp instruments to feet    As he broke down and re ulcerated so quickly last time after getting into his diabetic shoes, I am wrapping him up in a football one more week and will consider letting him back into a shoe next week    Return in 1 week    Inocente Smith DPM

## 2022-10-20 ENCOUNTER — PATIENT MESSAGE (OUTPATIENT)
Dept: PODIATRY | Facility: CLINIC | Age: 62
End: 2022-10-20
Payer: COMMERCIAL

## 2022-10-25 ENCOUNTER — HOSPITAL ENCOUNTER (OUTPATIENT)
Facility: HOSPITAL | Age: 62
Discharge: HOME | End: 2022-10-25
Attending: ANESTHESIOLOGY | Admitting: ANESTHESIOLOGY
Payer: COMMERCIAL

## 2022-10-25 ENCOUNTER — HOSPITAL ENCOUNTER (OUTPATIENT)
Dept: RADIOLOGY | Facility: HOSPITAL | Age: 62
Discharge: HOME | End: 2022-10-25
Attending: ANESTHESIOLOGY | Admitting: ANESTHESIOLOGY
Payer: COMMERCIAL

## 2022-10-25 DIAGNOSIS — M54.50 LOWER BACK PAIN: ICD-10-CM

## 2022-10-25 DIAGNOSIS — M54.16 LUMBAR RADICULOPATHY: Primary | ICD-10-CM

## 2022-10-25 LAB — GLUCOSE SERPL-MCNC: 99 MG/DL (ref 70–110)

## 2022-10-25 PROCEDURE — 62323 NJX INTERLAMINAR LMBR/SAC: CPT | Mod: PO | Performed by: ANESTHESIOLOGY

## 2022-10-25 PROCEDURE — 82962 GLUCOSE BLOOD TEST: CPT | Mod: PO | Performed by: ANESTHESIOLOGY

## 2022-10-25 PROCEDURE — 63600175 PHARM REV CODE 636 W HCPCS: Mod: PO | Performed by: ANESTHESIOLOGY

## 2022-10-25 PROCEDURE — 62323 NJX INTERLAMINAR LMBR/SAC: CPT | Mod: ,,, | Performed by: ANESTHESIOLOGY

## 2022-10-25 PROCEDURE — 76000 FLUOROSCOPY <1 HR PHYS/QHP: CPT | Mod: TC,PO

## 2022-10-25 PROCEDURE — 62323 PR INJ LUMBAR/SACRAL, W/IMAGING GUIDANCE: ICD-10-PCS | Mod: ,,, | Performed by: ANESTHESIOLOGY

## 2022-10-25 PROCEDURE — 25500020 PHARM REV CODE 255: Mod: PO | Performed by: ANESTHESIOLOGY

## 2022-10-25 PROCEDURE — 25000003 PHARM REV CODE 250: Mod: PO | Performed by: ANESTHESIOLOGY

## 2022-10-25 RX ORDER — LIDOCAINE HYDROCHLORIDE 10 MG/ML
INJECTION, SOLUTION EPIDURAL; INFILTRATION; INTRACAUDAL; PERINEURAL
Status: DISCONTINUED | OUTPATIENT
Start: 2022-10-25 | End: 2022-10-25 | Stop reason: HOSPADM

## 2022-10-25 RX ORDER — ALPRAZOLAM 0.5 MG/1
1 TABLET, ORALLY DISINTEGRATING ORAL ONCE AS NEEDED
Status: DISCONTINUED | OUTPATIENT
Start: 2022-10-25 | End: 2022-10-25 | Stop reason: HOSPADM

## 2022-10-25 RX ORDER — METHYLPREDNISOLONE ACETATE 80 MG/ML
INJECTION, SUSPENSION INTRA-ARTICULAR; INTRALESIONAL; INTRAMUSCULAR; SOFT TISSUE
Status: DISCONTINUED | OUTPATIENT
Start: 2022-10-25 | End: 2022-10-25 | Stop reason: HOSPADM

## 2022-10-25 RX ORDER — SODIUM CHLORIDE 9 MG/ML
INJECTION, SOLUTION INTRAVENOUS
Status: DISCONTINUED | OUTPATIENT
Start: 2022-10-25 | End: 2022-10-25 | Stop reason: HOSPADM

## 2022-10-25 NOTE — INTERVAL H&P NOTE
The patient has been examined and the H&P has been reviewed:    I concur with the findings and no changes have occurred since H&P was written.  MRI reviewed and c/w L4-5 moderate spinal canal narrowing as well as mild right and moderate left neuroforaminal narrowing and L5-S1 near complete effacement the right lateral recess along with severe bilateral neuroforaminal narrowing.  On exam full strength.  Decreased sensation to light touch in non-dermatomal distribution in b/l LE's.  We will proceed with L5/S1 WHITLEY. The risks and benefits of this intervention, and alternative therapies were discussed with the patient.  The discussion of risks included infection, bleeding, need for additional procedures or surgery, nerve damage.  Questions regarding the procedure, risks, expected outcome, and possible side effects were solicited and answered to the patient's satisfaction.  Virgilio Acuña wishes to proceed with the injection or procedure.  Written consent was obtained.        There are no hospital problems to display for this patient.

## 2022-10-25 NOTE — OP NOTE
"Procedure Note    Procedure Date: 10/25/2022    Procedure Performed:  L5/S1 lumbar interlaminar epidural steroid injection under fluoroscopy.    Indications: Patient has failed conservative therapy.      Pre-op diagnosis: Lumbar Radiculopathy    Post-op diagnosis: same    Physician: Lavell Monsalve MD    IV anxiolysis medications: none    Medications injected: depomedrol 80mg, 1% Lidocaine 1ml, 2 mL sterile, preservative-free normal saline.    Local anesthetic used: 1% Lidocaine, 1 ml, 8.4% sodium bicarbonate 0.25ml    Estimated Blood Loss: none    Complications:  none    Technique:  The patient was interviewed in the holding area and Risks/Benefits were discussed, including, but not limited to, the possibility of new or different pain, bleeding or infection.   All questions were answered.  The patient understood and accepted risks.  Consent was verfied.  A time-out was taken to identify patient and procedure prior to starting the procedure. The patient was placed in the prone position on the fluoroscopy table. The area of the lumbar spine was prepped with Chloraprep and draped in a sterile manner. The L5/S1 interspace was identified and marked under AP fluoroscopy. The skin and subcutaneous tissues overlying the targeted interspace were anesthetized with 3-5 mL of 1% lidocaine using a 25G, 1.5" needle.  A 18G, 6" Tuohy epidural needle was directed toward the interspace under fluoroscopic guidance until the ligamentum flavum was engaged. From this point, a loss of resistance technique with a glass syringe and saline was used to identify entrance of the needle into the epidural space. Once loss of resistance was observed 1 mL of contrast solution was injected. An appropriate epidurogram was noted.  A 4 mL mixture consisting of saline, 1 mL 1% Lidocaine and 80 mg of depomedrol was injected slowly and without resistance.  The needle was flushed with normal saline and removed. The contrast was seen to be displaced after " injection. Patient was awake/responsive during all injections.  The patient tolerated the procedure well and was transferred to the [facility] in stable condition.  The patient was monitored after the procedure and was given post-procedure and discharge instructions to follow at home. The patient was discharged in a stable condition.

## 2022-10-25 NOTE — PROCEDURES
"Wound Debridement    Date/Time: 10/11/2022 9:45 AM  Performed by: Inocente Smith DPM  Authorized by: Inocente Smith DPM     Time out: Immediately prior to procedure a "time out" was called to verify the correct patient, procedure, equipment, support staff and site/side marked as required.    Consent Done?:  Yes (Verbal)  Local anesthesia used?: No      Wound Details:    Location:  Left foot    Location:  Left 1st Toe    Type of Debridement:  Excisional       Length (cm):  0.2       Area (sq cm):  0.02       Width (cm):  0.1       Percent Debrided (%):  100       Depth (cm):  0.1       Total Area Debrided (sq cm):  0.02    Depth of debridement:  Subcutaneous tissue    Devitalized tissue debrided:  Biofilm and Necrotic/Eschar    Instruments:  Curette    Bleeding:  Minimal  Hemostasis Achieved: Yes    Method Used:  Pressure  Patient tolerance:  Patient tolerated the procedure well with no immediate complications  "

## 2022-10-25 NOTE — PROGRESS NOTES
Subjective:      Patient ID: Virgilio Acuña is a 62 y.o. male.    Chief Complaint: Wound Care    Virgilio is a 62 y.o. male    10/4/22: Patient returns for wound care left great toe, ambulating in the darco and football although took if off yesterday as it was hurting him. In a lot of back pain, seeing someone for this just had an MRI    10/11/22: Patient returns for wound care left great toe ambulating in dar[place] and football no new complaints    PCP: Anne Wright MD    Date Last Seen by PCP:   Current shoe gear:  Affected Foot: Football with darco     Unaffected Foot: Tennis shoes    History of Trauma: negative      Hemoglobin A1C   Date Value Ref Range Status   09/20/2022 9.9 (H) 4.0 - 5.6 % Final     Comment:     ADA Screening Guidelines:  5.7-6.4%  Consistent with prediabetes  >or=6.5%  Consistent with diabetes    High levels of fetal hemoglobin interfere with the HbA1C  assay. Heterozygous hemoglobin variants (HbS, HgC, etc)do  not significantly interfere with this assay.   However, presence of multiple variants may affect accuracy.     02/16/2022 10.2 (H) 4.0 - 5.6 % Final     Comment:     ADA Screening Guidelines:  5.7-6.4%  Consistent with prediabetes  >or=6.5%  Consistent with diabetes    High levels of fetal hemoglobin interfere with the HbA1C  assay. Heterozygous hemoglobin variants (HbS, HgC, etc)do  not significantly interfere with this assay.   However, presence of multiple variants may affect accuracy.     06/23/2021 9.2 (H) 4.0 - 5.6 % Final     Comment:     ADA Screening Guidelines:  5.7-6.4%  Consistent with prediabetes  >or=6.5%  Consistent with diabetes    High levels of fetal hemoglobin interfere with the HbA1C  assay. Heterozygous hemoglobin variants (HbS, HgC, etc)do  not significantly interfere with this assay.   However, presence of multiple variants may affect accuracy.         Review of Systems   Constitutional: Negative for chills and fever.   Cardiovascular:  Positive for leg swelling.  Negative for claudication.   Respiratory:  Negative for shortness of breath.    Skin:  Positive for color change, nail changes and poor wound healing. Negative for itching and rash.   Musculoskeletal:  Negative for muscle cramps, muscle weakness and myalgias.   Gastrointestinal:  Negative for nausea and vomiting.   Neurological:  Positive for numbness and paresthesias. Negative for focal weakness and loss of balance.         Objective:       Physical Exam  Constitutional:       General: He is not in acute distress.     Appearance: He is well-developed. He is not diaphoretic.   Cardiovascular:      Pulses:           Dorsalis pedis pulses are 2+ on the right side and 2+ on the left side.        Posterior tibial pulses are 2+ on the right side and 2+ on the left side.      Comments: < 3 sec capillary refill time to toes 1-5 bilateral. Toes and feet are warm to touch proximally with normal distal cooling b/l. There is no hair growth on the feet and toes b/l. There is moderate edema left foot and mild edema right.     Musculoskeletal:      Comments: Left second toe amputation    Left foot now more narrow in appearance with the first ray properly reduced, minimal edema to the foot and no pain with palpation throughout the area of surgery.    Left ankle there is some pain with palpation to the lateral malleolus and the ATFL area    Equinus noted b/l ankles with < 10 deg DF noted. MMT 5/5 in DF/PF/Inv/Ev resistance with no reproduction of pain in any direction. Passive range of motion of ankle and pedal joints is painless b/l.     Feet:      Right foot:      Protective Sensation: 10 sites tested.  0 sites sensed.      Left foot:      Protective Sensation: 10 sites tested.  0 sites sensed.   Skin:     General: Skin is warm.      Coloration: Skin is not pale.      Findings: No abrasion, bruising, burn, ecchymosis, erythema, laceration, lesion, petechiae or rash.      Nails: There is no clubbing.      Comments: Incision  overlying the left second metatarsal base is well healed      Ulcer Location: left plantar hallux  Measurements: Pre covered in eschar post 0.2x0.1x0.1 cm  Periwound: Intact  Drainage: Serosanguinous   Pus: None.  Malodor: None.  Base:  100% granular  Signs of infection: None       Neurological:      Mental Status: He is alert and oriented to person, place, and time.      Sensory: Sensory deficit present.      Motor: No tremor, atrophy or abnormal muscle tone.      Comments: Negative tinel sign bilateral.   Psychiatric:         Behavior: Behavior normal.             Assessment:       Encounter Diagnoses   Name Primary?    Edema of right foot Yes    Type II diabetes mellitus with neurological manifestations     Diabetic ulcer of left great toe                   Plan:       Virgilio was seen today for wound care.    Diagnoses and all orders for this visit:    Edema of right foot  -     X-Ray Foot Complete Right; Future    Type II diabetes mellitus with neurological manifestations  -     Wound Debridement    Diabetic ulcer of left great toe  -     Wound Debridement          I counseled the patient on his conditions, their implications and medical management.    Shoe inspection. Diabetic Foot Education. Patient reminded of the importance of good nutrition and blood sugar control to help prevent podiatric complications of diabetes. Patient instructed on proper foot hygeine. We discussed wearing proper shoe gear, daily foot inspections, never walking without protective shoe gear, never putting sharp instruments to feet    Wound debrided see attached procedure note dressed in football and offloading in darco    Return next week for wound care, ambulate in [facility] and football only    Inocente Smith DPM

## 2022-10-25 NOTE — DISCHARGE SUMMARY
[facility]  Discharge Note  Short Stay    Admit Date: 10/25/2022    Discharge Date: 10/25/2022    Attending Physician: Lavell Monsalve     Discharge Provider: Lavell Monsalve    Diagnoses:  There are no hospital problems to display for this patient.      Discharged Condition: Good    Final Diagnoses: Lumbar radiculopathy [M54.16]    Disposition: Home or Self Care    Hospital Course: No complications, uneventful    Outcome of Hospitalization, Treatment, Procedure, or Surgery:  Patient was admitted for outpatient interventional pain management procedure. The patient tolerated the procedure well with no complications.    Follow up/Patient Instructions:  Follow up as scheduled in Pain Management office in 2-3 weeks.  Patient has received instructions and follow up date and time.    Medications:  Continue previous medications    Discharge Procedure Orders   Notify your health care provider if you experience any of the following:  temperature >100.4     Notify your health care provider if you experience any of the following:  persistent nausea and vomiting or diarrhea     Notify your health care provider if you experience any of the following:  severe uncontrolled pain     Notify your health care provider if you experience any of the following:  redness, tenderness, or signs of infection (pain, swelling, redness, odor or green/yellow discharge around incision site)     Notify your health care provider if you experience any of the following:  difficulty breathing or increased cough     Notify your health care provider if you experience any of the following:  severe persistent headache     Notify your health care provider if you experience any of the following:  worsening rash     Notify your health care provider if you experience any of the following:  persistent dizziness, light-headedness, or visual disturbances     Notify your health care provider if you experience any of the following:  increased confusion or  weakness     Activity as tolerated

## 2022-10-26 VITALS
RESPIRATION RATE: 17 BRPM | WEIGHT: 315 LBS | TEMPERATURE: 98 F | HEIGHT: 73 IN | HEART RATE: 70 BPM | SYSTOLIC BLOOD PRESSURE: 161 MMHG | BODY MASS INDEX: 41.75 KG/M2 | OXYGEN SATURATION: 98 % | DIASTOLIC BLOOD PRESSURE: 70 MMHG

## 2022-11-02 ENCOUNTER — PATIENT MESSAGE (OUTPATIENT)
Dept: PODIATRY | Facility: CLINIC | Age: 62
End: 2022-11-02
Payer: COMMERCIAL

## 2022-11-03 ENCOUNTER — PATIENT MESSAGE (OUTPATIENT)
Dept: PAIN MEDICINE | Facility: CLINIC | Age: 62
End: 2022-11-03
Payer: COMMERCIAL

## 2022-11-11 DIAGNOSIS — Z79.4 TYPE 2 DIABETES MELLITUS WITH DIABETIC POLYNEUROPATHY, WITH LONG-TERM CURRENT USE OF INSULIN: ICD-10-CM

## 2022-11-11 DIAGNOSIS — E11.42 TYPE 2 DIABETES MELLITUS WITH DIABETIC POLYNEUROPATHY, WITH LONG-TERM CURRENT USE OF INSULIN: ICD-10-CM

## 2022-11-11 RX ORDER — INSULIN ASPART 100 [IU]/ML
40 INJECTION, SOLUTION INTRAVENOUS; SUBCUTANEOUS
Qty: 90 ML | Refills: 1 | Status: SHIPPED | OUTPATIENT
Start: 2022-11-11 | End: 2022-11-25 | Stop reason: SDUPTHER

## 2022-11-11 NOTE — TELEPHONE ENCOUNTER
Refill Decision Note   Virgilio Kraftod  is requesting a refill authorization.  Brief Assessment and Rationale for Refill:  Approve     Medication Therapy Plan:       Medication Reconciliation Completed: No   Comments:     No Care Gaps recommended.     Note composed:12:12 PM 11/11/2022

## 2022-11-11 NOTE — TELEPHONE ENCOUNTER
No new care gaps identified.  [facility] Embedded Care Gaps. Reference number: [id number]. 11/11/2022   3:57:07 AM CST

## 2022-11-15 PROBLEM — F41.1 GAD (GENERALIZED ANXIETY DISORDER): Status: ACTIVE | Noted: 2022-11-15

## 2022-11-15 PROBLEM — Z89.422 HISTORY OF AMPUTATION OF LESSER TOE, LEFT: Status: ACTIVE | Noted: 2022-11-15

## 2022-11-15 PROBLEM — F41.9 ANXIETY: Status: RESOLVED | Noted: 2018-03-21 | Resolved: 2022-11-15

## 2022-11-21 ENCOUNTER — PATIENT MESSAGE (OUTPATIENT)
Dept: PODIATRY | Facility: CLINIC | Age: 62
End: 2022-11-21
Payer: COMMERCIAL

## 2022-11-24 ENCOUNTER — PATIENT MESSAGE (OUTPATIENT)
Dept: PRIMARY CARE CLINIC | Facility: CLINIC | Age: 62
End: 2022-11-24
Payer: COMMERCIAL

## 2022-11-24 DIAGNOSIS — Z79.4 TYPE 2 DIABETES MELLITUS WITH DIABETIC POLYNEUROPATHY, WITH LONG-TERM CURRENT USE OF INSULIN: ICD-10-CM

## 2022-11-24 DIAGNOSIS — E11.42 TYPE 2 DIABETES MELLITUS WITH DIABETIC POLYNEUROPATHY, WITH LONG-TERM CURRENT USE OF INSULIN: ICD-10-CM

## 2022-11-25 RX ORDER — INSULIN ASPART 100 [IU]/ML
40 INJECTION, SOLUTION INTRAVENOUS; SUBCUTANEOUS
Qty: 90 ML | Refills: 0 | Status: SHIPPED | OUTPATIENT
Start: 2022-11-25 | End: 2023-03-22 | Stop reason: SDUPTHER

## 2022-12-06 ENCOUNTER — OFFICE VISIT (OUTPATIENT)
Dept: PAIN MEDICINE | Facility: CLINIC | Age: 62
End: 2022-12-06
Payer: COMMERCIAL

## 2022-12-06 VITALS
SYSTOLIC BLOOD PRESSURE: 128 MMHG | DIASTOLIC BLOOD PRESSURE: 62 MMHG | HEIGHT: 73 IN | HEART RATE: 77 BPM | OXYGEN SATURATION: 96 % | BODY MASS INDEX: 41.75 KG/M2 | WEIGHT: 315 LBS

## 2022-12-06 DIAGNOSIS — M47.816 LUMBAR SPONDYLOSIS: Primary | ICD-10-CM

## 2022-12-06 DIAGNOSIS — E66.01 SEVERE OBESITY (BMI >= 40): ICD-10-CM

## 2022-12-06 DIAGNOSIS — M51.36 DDD (DEGENERATIVE DISC DISEASE), LUMBAR: ICD-10-CM

## 2022-12-06 DIAGNOSIS — M54.16 LUMBAR RADICULOPATHY: ICD-10-CM

## 2022-12-06 DIAGNOSIS — M54.9 DORSALGIA, UNSPECIFIED: ICD-10-CM

## 2022-12-06 PROCEDURE — 99214 PR OFFICE/OUTPT VISIT, EST, LEVL IV, 30-39 MIN: ICD-10-PCS | Mod: S$GLB,,,

## 2022-12-06 PROCEDURE — 3060F PR POS MICROALBUMINURIA RESULT DOCUMENTED/REVIEW: ICD-10-PCS | Mod: CPTII,S$GLB,,

## 2022-12-06 PROCEDURE — 3078F PR MOST RECENT DIASTOLIC BLOOD PRESSURE < 80 MM HG: ICD-10-PCS | Mod: CPTII,S$GLB,,

## 2022-12-06 PROCEDURE — 3074F PR MOST RECENT SYSTOLIC BLOOD PRESSURE < 130 MM HG: ICD-10-PCS | Mod: CPTII,S$GLB,,

## 2022-12-06 PROCEDURE — 4010F ACE/ARB THERAPY RXD/TAKEN: CPT | Mod: CPTII,S$GLB,,

## 2022-12-06 PROCEDURE — 3046F PR MOST RECENT HEMOGLOBIN A1C LEVEL > 9.0%: ICD-10-PCS | Mod: CPTII,S$GLB,,

## 2022-12-06 PROCEDURE — 4010F PR ACE/ARB THEARPY RXD/TAKEN: ICD-10-PCS | Mod: CPTII,S$GLB,,

## 2022-12-06 PROCEDURE — 3066F NEPHROPATHY DOC TX: CPT | Mod: CPTII,S$GLB,,

## 2022-12-06 PROCEDURE — 3046F HEMOGLOBIN A1C LEVEL >9.0%: CPT | Mod: CPTII,S$GLB,,

## 2022-12-06 PROCEDURE — 3078F DIAST BP <80 MM HG: CPT | Mod: CPTII,S$GLB,,

## 2022-12-06 PROCEDURE — 3008F BODY MASS INDEX DOCD: CPT | Mod: CPTII,S$GLB,,

## 2022-12-06 PROCEDURE — 3008F PR BODY MASS INDEX (BMI) DOCUMENTED: ICD-10-PCS | Mod: CPTII,S$GLB,,

## 2022-12-06 PROCEDURE — 1159F PR MEDICATION LIST DOCUMENTED IN MEDICAL RECORD: ICD-10-PCS | Mod: CPTII,S$GLB,,

## 2022-12-06 PROCEDURE — 3074F SYST BP LT 130 MM HG: CPT | Mod: CPTII,S$GLB,,

## 2022-12-06 PROCEDURE — 3060F POS MICROALBUMINURIA REV: CPT | Mod: CPTII,S$GLB,,

## 2022-12-06 PROCEDURE — 3066F PR DOCUMENTATION OF TREATMENT FOR NEPHROPATHY: ICD-10-PCS | Mod: CPTII,S$GLB,,

## 2022-12-06 PROCEDURE — 1159F MED LIST DOCD IN RCRD: CPT | Mod: CPTII,S$GLB,,

## 2022-12-06 PROCEDURE — 99214 OFFICE O/P EST MOD 30 MIN: CPT | Mod: S$GLB,,,

## 2022-12-06 PROCEDURE — 99999 PR PBB SHADOW E&M-EST. PATIENT-LVL V: ICD-10-PCS | Mod: PBBFAC,,,

## 2022-12-06 PROCEDURE — 99999 PR PBB SHADOW E&M-EST. PATIENT-LVL V: CPT | Mod: PBBFAC,,,

## 2022-12-06 RX ORDER — TIZANIDINE 4 MG/1
4 TABLET ORAL EVERY 6 HOURS PRN
Qty: 40 TABLET | Refills: 1 | Status: SHIPPED | OUTPATIENT
Start: 2022-12-06 | End: 2022-12-16

## 2022-12-06 RX ORDER — SODIUM CHLORIDE, SODIUM LACTATE, POTASSIUM CHLORIDE, CALCIUM CHLORIDE 600; 310; 30; 20 MG/100ML; MG/100ML; MG/100ML; MG/100ML
INJECTION, SOLUTION INTRAVENOUS CONTINUOUS
Status: CANCELLED | OUTPATIENT
Start: 2022-12-06

## 2022-12-06 NOTE — PROGRESS NOTES
"    [facility] Follow Up Evaluation    Referred by: Dr. Smith  Reason for referral: back pain    CC:   Chief Complaint   Patient presents with    Back Pain        No flowsheet data found.    Interval HPI 12/6/2022: Virgilio Acuña returns to the clinic for follow up.  He is s/p L5/S1 WHITLEY with spread to the right on 10/25/2022 with 30% relief.  Today he is reporting continued right-sided lower back pain with radiation down right leg, 5/10, constant, significantly worsened with any movement or being seated incorrectly.  He reports chronic neuropathy in his bilateral feet but otherwise denies any new numbness, weakness or any changes with his bowel bladder function.       HPI:   Virgilio Acuña is a 62 y.o. male who presents with back pain.   The patient has had chronic back pain for the past couple years that has been gradually worsening.  Today he reports primarily right-sided lower back pain that goes into the right buttock and down the back of the right leg.  His pain is worse when he has some prolonged sitting.    Pain intervention history:  - s/p right SI joint injection with Dr. Ramon  - s/p L5/S1 WHITLEY with Dr. Ramon  - s/p L5/S1 WHITLEY with Dr. Ramon  - s/p bilateral L4/5 TFESI with Dr. Ramon  - s/p L5/S1 WHITLEY with spread to the right on 10/25/2022 with 30% relief.    History:    Current Outpatient Medications:     amLODIPine (NORVASC) 10 MG tablet, Take 1 tablet (10 mg total) by mouth once daily., Disp: 90 tablet, Rfl: 3    BD ULTRA-FINE ORIG PEN NEEDLE 29 gauge x 1/2" Ndle, USE WITH LANTUS SOLOSTAR   PEN, Disp: 90 each, Rfl: 3    blood sugar diagnostic (BLOOD GLUCOSE TEST) Strp, Use 1-2 x a day to check glucoses before meals., Disp: 100 strip, Rfl: 11    blood-glucose meter (ONETOUCH VERIO REFLECT METER) [facility] [place], USE AS INSTRUCTED., Disp: 1 each, Rfl: 0    dapagliflozin (FARXIGA) 10 mg tablet, Take 1 tablet (10 mg total) by mouth once daily., Disp: 90 tablet, Rfl: 3    DULoxetine (CYMBALTA) 30 MG capsule, " "Take 1 capsule (30 mg total) by mouth once daily., Disp: 30 capsule, Rfl: 11    enalapril (VASOTEC) 20 MG tablet, Take 1 tablet (20 mg total) by mouth 2 (two) times daily., Disp: 180 tablet, Rfl: 3    gabapentin (NEURONTIN) 300 MG capsule, TAKE 3 CAPSULES (900MG     TOTAL) 3 TIMES A DAY, Disp: 270 capsule, Rfl: 5    gemfibroziL (LOPID) 600 MG tablet, TAKE 1 TABLET ONCE DAILY, Disp: 90 tablet, Rfl: 3    HYDROcodone-acetaminophen (NORCO) 5-325 mg per tablet, Take 1 tablet by mouth every 12 (twelve) hours as needed for Pain., Disp: 14 tablet, Rfl: 0    insulin (LANTUS SOLOSTAR U-100 INSULIN) glargine 100 units/mL SubQ pen, Inject 40 Units into the skin 2 (two) times a day. Supply pen needles, Disp: 72 mL, Rfl: 1    insulin aspart U-100 (NOVOLOG FLEXPEN U-100 INSULIN) 100 unit/mL (3 mL) InPn pen, Inject 40 Units into the skin 3 (three) times daily with meals., Disp: 90 mL, Rfl: 0    insulin syringe-needle U-100 (INSULIN SYRINGE) 1 mL 29 gauge x 1/2" Syrg, Inject 4 times a day, Disp: 100 each, Rfl: 12    lancets Misc, 1 Units by Misc.(Non-Drug; Combo Route) route daily as needed., Disp: 100 each, Rfl: 11    metFORMIN (GLUCOPHAGE-XR) 500 MG ER 24hr tablet, Take 2 tablets (1,000 mg total) by mouth 2 (two) times daily with meals., Disp: 360 tablet, Rfl: 1    ONETOUCH DELICA PLUS LANCET 33 gauge Misc, Apply topically 3 (three) times daily., Disp: , Rfl:     pantoprazole (PROTONIX) 40 MG tablet, Take 1 tablet (40 mg total) by mouth once daily., Disp: 90 tablet, Rfl: 3    pen needle, diabetic (BD ULTRA-FINE WILDA PEN NEEDLE) 32 gauge x 5/32" Ndle, INJECT FOUR TIMES DAILY, Disp: 4 each, Rfl: 12    pravastatin (PRAVACHOL) 40 MG tablet, TAKE 1 TABLET ONCE DAILY, Disp: 90 tablet, Rfl: 3    pulse oximeter (PULSE OXIMETER) device, by Apply Externally route 2 (two) times a day. Use twice daily at 8 AM and 3 PM and record the value in MyChart as directed., Disp: 1 each, Rfl: 0    safety needles 22 gauge x 1 1/2" Ndle, To be used once a " "month for testosterone injections, Disp: 50 each, Rfl: 6    SITagliptin (JANUVIA) 100 MG Tab, Take 1 tablet (100 mg total) by mouth once daily., Disp: 90 tablet, Rfl: 3    syringe with needle 3 mL 25 gauge x 1" Syrg, To be used with monthly testosterone injections, Disp: 100 Syringe, Rfl: 4    traZODone (DESYREL) 150 MG tablet, TAKE 1 TABLET NIGHTLY, Disp: 90 tablet, Rfl: 3    Current Facility-Administered Medications:     acetaminophen tablet 650 mg, 650 mg, Oral, Once PRN, Virgilio Avilez MD    albuterol inhaler 2 puff, 2 puff, Inhalation, Q20 Min PRN, Virgilio Avilez MD    diphenhydrAMINE injection 25 mg, 25 mg, Intravenous, Once PRN, Virgilio Avilez MD    EPINEPHrine (EPIPEN) 0.3 mg/0.3 mL pen injection 0.3 mg, 0.3 mg, Intramuscular, PRN, Virgilio Avilez MD    methylPREDNISolone sodium succinate injection 40 mg, 40 mg, Intravenous, Once PRN, Virgilio Avilez MD    ondansetron disintegrating tablet 4 mg, 4 mg, Oral, Once PRN, Virgilio Avilez MD    sodium chloride 0.9% 500 mL flush bag, , Intravenous, PRN, Virgilio Avilez MD    sodium chloride 0.9% flush 10 mL, 10 mL, Intravenous, PRN, Virgilio Avilez MD    Facility-Administered Medications Ordered in Other Visits:     lactated ringers infusion, , Intravenous, Continuous, Vadim Buchanan MD, Stopped at 08/27/18 0953    Past Medical History:   Diagnosis Date    Cellulitis of left index finger 02/16/2015    Charcot's joint of foot, left 08/2018    Dyslipidemia     Essential hypertension 02/16/2017    Group B streptococcal infection 01/15/2018    Hypotestosteronemia 07/07/2017    Infected finger joint 02/17/2015    Infected skin ulcer limited to breakdown of skin 01/13/2018    MSSA (methicillin susceptible Staphylococcus aureus) 01/13/2018    Obese     S/P knee surgery, medial/lateral menisectomy 11/04/2013    Sleep apnea     does not use CPAP    Type 2 diabetes mellitus with diabetic polyneuropathy, with long-term current use of insulin 2003 "       Past Surgical History:   Procedure Laterality Date    ELBOW SURGERY      EPIDURAL STEROID INJECTION N/A 7/14/2020    Procedure: Lumbar L5/S1 IL WHITLEY;  Surgeon: Nirav Ramon MD;  Location: [facility];  Service: Pain Management;  Laterality: N/A;    EPIDURAL STEROID INJECTION N/A 10/20/2020    Procedure: Lumbar L5/S1 IL WHITLEY;  Surgeon: Nirav Ramon MD;  Location: [facility];  Service: Pain Management;  Laterality: N/A;    EPIDURAL STEROID INJECTION INTO LUMBAR SPINE N/A 10/25/2022    Procedure: Injection-steroid-epidural-lumbar L5/S1 to right;  Surgeon: Lavell Monsalve MD;  Location: [facility] OR;  Service: Pain Management;  Laterality: N/A;    FOOT SURGERY Left     INJECTION OF ANESTHETIC AGENT INTO SACROILIAC JOINT Right 5/18/2020    Procedure: right Sacroiliac Joint Injection;  Surgeon: Nirav Ramon MD;  Location: [facility];  Service: Pain Management;  Laterality: Right;    INJECTION OF JOINT Right 5/18/2020    Procedure: right GT bursa injection;  Surgeon: Nirav Ramon MD;  Location: [facility];  Service: Pain Management;  Laterality: Right;    KNEE CARTILAGE SURGERY  10/21/2013    right knee    KNEE SURGERY Right 02/17/2017    Left index finger surgery  03/2015    MIDFOOT ARTHRODESIS Left 8/27/2018    Procedure: FUSION, JOINT, MIDFOOT - FIRST METATARSOCUNEIFORM JOINT AND NAVICULOCUNEIFORM JOINT;  Surgeon: Inocente Smith DPM;  Location: [facility] OR;  Service: Podiatry;  Laterality: Left;    SELECTIVE INJECTION OF ANESTHETIC AGENT AROUND LUMBAR SPINAL NERVE ROOT BY TRANSFORAMINAL APPROACH Bilateral 10/4/2021    Procedure: Bilateral L4/5 TF WHITLEY//wants latest time if poss;  Surgeon: Nirav Ramon MD;  Location: [facility];  Service: Pain Management;  Laterality: Bilateral;    TOE AMPUTATION  03/2018    left great toe       Family History   Problem Relation Age of Onset    COPD Father        Social History     Socioeconomic History    Marital status:    Occupational  "History    Occupation:      Employer: [employer]   Tobacco Use    Smoking status: Never    Smokeless tobacco: Never   Substance and Sexual Activity    Alcohol use: Yes     Alcohol/week: 2.0 - 3.0 standard drinks     Types: 2 - 3 Cans of beer per week     Comment: weekly    Drug use: No    Sexual activity: Yes     Partners: Female       Review of patient's allergies indicates:   Allergen Reactions    Victoza [liraglutide] Swelling       Review of Systems:  General ROS: negative for - fever  Psychological ROS: negative for - hostility  Hematological and Lymphatic ROS: negative for - bleeding problems  Endocrine ROS: negative for - unexpected weight changes  Respiratory ROS: no cough, shortness of breath, or wheezing  Cardiovascular ROS: no chest pain or dyspnea on exertion  Gastrointestinal ROS: no abdominal pain, change in bowel habits, or black or bloody stools  Musculoskeletal ROS: negative for - muscular weakness  Neurological ROS:  Positive for - numbness/tingling  Dermatological ROS: negative for rash    Physical Exam:  Vitals:    12/06/22 1506   BP: 128/62   Pulse: 77   SpO2: 96%   Weight: (!) 150.8 kg (332 lb 5.5 oz)   Height: 6' 1" (1.854 m)   PainSc:   5   PainLoc: Back     Body mass index is 43.85 kg/m².    Gen: NAD  Gait:  Slow steady antalgic gait  Psych:  Mood appropriate for given condition  HEENT: eyes anicteric   GI: Abd soft  CV: RRR  Lungs: breathing unlabored   ROM: limited AROM of the L spine in all planes, full ROM at ankles, knees and hips   Sensation: intact to light touch in all dermatomes tested from L2-S1 bilaterally, reduced sensation to touch in bilateral feet  Reflexes:  0/0 bilateral patella, 0/0 right Achilles, left Achilles deferred  Palpation: Diffusely tender over lumbar paraspinals  mildly tender over right GTB  Tone: normal in the b/l knees and hips   Skin: intact  Extremities: No edema in b/l ankles or hands  Provacative " tests:  Increased pain with bilateral axial facet loading           Right Left   L2/3 Iliacus Hip flexion  5  5   L3/4 Qudratus Femoris Knee Extension  5  5   L4/5 Tib Anterior Ankle Dorsiflexion   5  5   L5/S1 Extensor Hallicus Longus Great toe extension  5  5                 S1/S2 Gastroc/Soleus Plantar Flexion  5  5       Imaging:  MRI lumbar spine 6/25/2020  FINDINGS:  Alignment: Slight dextroconvex curvature of the lumbar spine.  Lumbar lordosis is maintained.     Vertebrae: No evidence of an acute fracture or diffuse marrow placement process.     Discs: Multilevel degenerative disc disease with disc desiccation at L3-L4-L5-S1 and disc space narrowing, which is most pronounced at L5-S1 with moderate to severe disc space narrowing.     Cord: Normal.  Conus terminates at L1.     Degenerative findings:     L1-L2: The disc is normal in configuration.  Mild bilateral facet arthropathy and ligamentum flavum infolding.  There is no neuroforaminal stenosis.  There is no spinal canal stenosis.  L2-L3: Mild circumferential disc bulge.  Moderate bilateral facet arthropathy.  Ligamentum flavum infolding.  Mild bilateral neural foraminal stenosis.  There is no spinal canal stenosis.  L3-L4: Mild disc space narrowing.  Circumferential disc bulge, asymmetric to the right, which abuts the descending right L4 nerve root.  Moderate bilateral facet arthropathy.  Ligamentum flavum infolding.  Mild bilateral neural foraminal stenosis.  Posterior epidural lipomatosis.  There is no spinal canal stenosis.  L4-L5: Mild disc space narrowing.  Circumferential disc bulge with superimposed central disc protrusion through an annular fissure.  Moderate bilateral facet arthropathy.  Ligamentum flavum infolding.  Posterior epidural lipomatosis.  Mild-to-moderate bilateral neural foraminal stenosis.  Moderate spinal canal stenosis and narrowing of lateral recesses bilaterally.  L5-S1: Moderate to severe disc space narrowing.  Circumferential  disc bulge with posterior osteophytic ridging and right subarticular disc extrusion, which abuts the descending right S1 nerve root.  Moderate bilateral facet arthropathy.  Ligamentum flavum infolding.  Moderate bilateral neural foraminal stenosis.  There is no spinal canal stenosis.  Paraspinal muscles & soft tissues: Large exophytic T2 hyperintense lesion arising from the lower pole of the right kidney, best seen on  images, probably representing a cyst.  Additional exophytic T2 hyperintense lesion arising from the lower pole of the left kidney on  images, likely an additional cyst.  Correlation with ultrasound may be helpful.    MRI lumbar spine 10/04/2022  FINDINGS:  Marrow signal is within normal limits.  Vertebral body heights are within normal limits.  There is trace retrolisthesis of L5 on S1.  Moderate loss of disc height is present at the L5-S1 level with disc heights otherwise well maintained.  There is multilevel facet arthropathy.  The conus terminates at L1.  There is a small hemangioma of the T12 vertebral body.     T12-L1 and L1-2 demonstrate no disc herniation, spinal canal narrowing, or neuroforaminal narrowing.  L2-3 demonstrates mild bilateral neuroforaminal narrowing related to facet arthropathy.  L3-4 demonstrates a small right central disc protrusion as well as advanced bilateral facet arthropathy.  These result in mild spinal canal narrowing and mild right and moderate left neuroforaminal narrowing.  L4-5 demonstrates mild circumferential disc bulging along with annular fissure of the disc.  This along with advanced bilateral facet arthropathy results in moderate spinal canal narrowing as well as mild right and moderate left neuroforaminal narrowing.  L5-S1 demonstrates moderate broad-based posterior disc bulging.  There is near complete effacement the right lateral recess along with severe bilateral neuroforaminal narrowing.     Impression:     Multilevel degenerative lumbar  spondylosis with level by level comments above.  Spinal canal narrowing ranges from mild to moderate, while of neuroforaminal narrowing ranges from mild to severe.    Labs:  BMP  Lab Results   Component Value Date     (L) 09/20/2022    K 4.5 09/20/2022     09/20/2022    CO2 23 09/20/2022    BUN 17 09/20/2022    CREATININE 0.9 09/20/2022    CALCIUM 9.5 09/20/2022    ANIONGAP 11 09/20/2022    ESTGFRAFRICA >60.0 02/16/2022    EGFRNONAA >60.0 02/16/2022     Lab Results   Component Value Date    ALT 18 09/20/2022    AST 16 09/20/2022    ALKPHOS 67 09/20/2022    BILITOT 0.5 09/20/2022       Assessment:   Problem List Items Addressed This Visit          Neuro    Lumbar radiculopathy    Relevant Medications    tiZANidine (ZANAFLEX) 4 MG tablet    Other Relevant Orders    Ambulatory referral/consult to Neurosurgery       Endocrine    Severe obesity (BMI >= 40)     Other Visit Diagnoses       Lumbar spondylosis    -  Primary    Relevant Orders    Case Request Operating Room: Block-nerve-medial branch-lumbar L4/5, L5/S1 (Completed)    Dorsalgia, unspecified        DDD (degenerative disc disease), lumbar                  62 y.o. year old male  with PMH HTN, DM II, GERD who presents with back pain.   The patient has had chronic back pain for the past couple years that has been gradually worsening.  Today he reports primarily right-sided lower back pain that goes into the right buttock and down the back of the right leg.  His pain is worse when he has some prolonged sitting.    12/6/2022: Virgilio Acuña returns to the clinic for follow up.  He is s/p L5/S1 WHITLEY with spread to the right on 10/25/2022 with 30% relief.  Today he is reporting continued right-sided lower back pain with radiation down right leg, 5/10, constant, significantly worsened with any movement or being seated incorrectly.  He reports chronic neuropathy in his bilateral feet but otherwise denies any new numbness, weakness or any changes with his bowel  bladder function.     - on exam he is full strength, he is increased pain with bilateral axial facet loading  - He is s/p L5/S1 WHITLEY with spread to the right on 10/25/2022 with 30% relief.  - we went over his lumbar MRI in clinic today and I independently reviewed it is consistent with L4-5 demonstrates mild circumferential disc bulging along with annular fissure of the disc.  This along with advanced bilateral facet arthropathy results in moderate spinal canal narrowing as well as mild right and moderate left neuroforaminal narrowing. L5-S1 demonstrates moderate broad-based posterior disc bulging.  There is near complete effacement the right lateral recess along with severe bilateral neuroforaminal narrowing.  - I believe the severe narrowing at L5-S1 is contributing to his significant radicular pain.  - for his continued radicular pain like to refer him to Neurosurgery for further evaluation.  - I also believe he is having some degree of facet mediated pain due to his significant facet arthropathy  - he is taking Cymbalta 30 mg once daily, recommend he increase this to 60 mg daily.  We will reach out to his primary care to confirm  - continue take gabapentin as prescribed.  - for his facet mediated pain I would like to schedule him for bilateral L4/5 and L5/S1 diagnostic medial branch blocks.  If successful we will repeat the blocks prior to proceeding with radiofrequency ablation.  - follow-up 4 weeks post procedure or sooner if needed.  Can consider switching from Cymbalta to amitriptyline if he is not finding relief Cymbalta      : Reviewed      This note was completed with dictation software and grammatical errors may exist.

## 2022-12-16 ENCOUNTER — PATIENT MESSAGE (OUTPATIENT)
Dept: RESEARCH | Facility: HOSPITAL | Age: 62
End: 2022-12-16
Payer: COMMERCIAL

## 2022-12-22 ENCOUNTER — PATIENT MESSAGE (OUTPATIENT)
Dept: SURGERY | Facility: HOSPITAL | Age: 62
End: 2022-12-22
Payer: COMMERCIAL

## 2022-12-23 ENCOUNTER — TELEPHONE (OUTPATIENT)
Dept: SURGERY | Facility: HOSPITAL | Age: 62
End: 2022-12-23
Payer: COMMERCIAL

## 2022-12-23 NOTE — TELEPHONE ENCOUNTER
Good morning,     Mr. Acuña would like to cancel his procedure with Dr. Monsalve on Tuesday, 12/27. No specific reason was given during our call.     Thank you

## 2023-01-12 ENCOUNTER — PATIENT OUTREACH (OUTPATIENT)
Dept: ADMINISTRATIVE | Facility: HOSPITAL | Age: 63
End: 2023-01-12
Payer: COMMERCIAL

## 2023-01-12 NOTE — LETTER
AUTHORIZATION FOR RELEASE OF   CONFIDENTIAL INFORMATION      We are seeing Virgilio Acuña, date of birth 1960, in the clinic at [facility]. Anne Wright MD is the patient's PCP. Virgilio Acuña has an outstanding lab/procedure at the time we reviewed his chart. In order to help keep his health information updated, he has authorized us to request the following medical record(s):        (  )  MAMMOGRAM                                      (  )  COLONOSCOPY      (  )  PAP SMEAR                                          (  )  OUTSIDE LAB RESULTS     (  )  DEXA SCAN                                          ( X )  DIABETIC EYE EXAM            (  )  FOOT EXAM                                          (  )  ENTIRE RECORD     (  )  OUTSIDE IMMUNIZATIONS                 (  )  _______________         Please fax records to [facility], Kacie S Watts, MD, [phone]     If you have any questions, please contact Kelvin Hung           Patient Name: Virgilio Acuña  : 1960  Patient Phone #: [phone]

## 2023-01-24 ENCOUNTER — PATIENT OUTREACH (OUTPATIENT)
Dept: ADMINISTRATIVE | Facility: HOSPITAL | Age: 63
End: 2023-01-24
Payer: COMMERCIAL

## 2023-01-24 NOTE — PROGRESS NOTES
HTN Report: Attempted to contact the patient to obtain a home BP reading, no answer, no voicemail.

## 2023-01-25 ENCOUNTER — PATIENT MESSAGE (OUTPATIENT)
Dept: ADMINISTRATIVE | Facility: HOSPITAL | Age: 63
End: 2023-01-25
Payer: COMMERCIAL

## 2023-02-02 ENCOUNTER — PATIENT OUTREACH (OUTPATIENT)
Dept: ADMINISTRATIVE | Facility: HOSPITAL | Age: 63
End: 2023-02-02
Payer: COMMERCIAL

## 2023-02-03 ENCOUNTER — PATIENT MESSAGE (OUTPATIENT)
Dept: PRIMARY CARE CLINIC | Facility: CLINIC | Age: 63
End: 2023-02-03
Payer: COMMERCIAL

## 2023-02-04 ENCOUNTER — PATIENT MESSAGE (OUTPATIENT)
Dept: PRIMARY CARE CLINIC | Facility: CLINIC | Age: 63
End: 2023-02-04
Payer: COMMERCIAL

## 2023-02-05 NOTE — TELEPHONE ENCOUNTER
No new care gaps identified.  [facility] Embedded Care Gaps. Reference number: [id number]. 6/21/2022   9:20:06 AM ZACHT  
Refill Decision Note   Virgilio Kraftod  is requesting a refill authorization.  Brief Assessment and Rationale for Refill:  Approve     Medication Therapy Plan:       Medication Reconciliation Completed: No   Comments:     No Care Gaps recommended.     Note composed:12:56 PM 06/21/2022              
74

## 2023-03-22 ENCOUNTER — PATIENT MESSAGE (OUTPATIENT)
Dept: PRIMARY CARE CLINIC | Facility: CLINIC | Age: 63
End: 2023-03-22
Payer: COMMERCIAL

## 2023-03-22 DIAGNOSIS — Z79.4 TYPE 2 DIABETES MELLITUS WITH DIABETIC POLYNEUROPATHY, WITH LONG-TERM CURRENT USE OF INSULIN: ICD-10-CM

## 2023-03-22 DIAGNOSIS — E11.9 TYPE 2 DIABETES MELLITUS WITHOUT COMPLICATION, UNSPECIFIED WHETHER LONG TERM INSULIN USE: ICD-10-CM

## 2023-03-22 DIAGNOSIS — E11.42 TYPE 2 DIABETES MELLITUS WITH DIABETIC POLYNEUROPATHY, WITH LONG-TERM CURRENT USE OF INSULIN: ICD-10-CM

## 2023-03-22 RX ORDER — INSULIN ASPART 100 [IU]/ML
40 INJECTION, SOLUTION INTRAVENOUS; SUBCUTANEOUS
Qty: 90 ML | Refills: 0 | Status: SHIPPED | OUTPATIENT
Start: 2023-03-22 | End: 2023-03-27 | Stop reason: SDUPTHER

## 2023-03-22 RX ORDER — INSULIN GLARGINE 100 [IU]/ML
INJECTION, SOLUTION SUBCUTANEOUS
Qty: 75 ML | Refills: 0 | Status: SHIPPED | OUTPATIENT
Start: 2023-03-22 | End: 2023-03-27 | Stop reason: SDUPTHER

## 2023-03-27 DIAGNOSIS — Z79.4 TYPE 2 DIABETES MELLITUS WITH DIABETIC POLYNEUROPATHY, WITH LONG-TERM CURRENT USE OF INSULIN: ICD-10-CM

## 2023-03-27 DIAGNOSIS — E11.42 TYPE 2 DIABETES MELLITUS WITH DIABETIC POLYNEUROPATHY, WITH LONG-TERM CURRENT USE OF INSULIN: ICD-10-CM

## 2023-03-28 RX ORDER — INSULIN ASPART 100 [IU]/ML
40 INJECTION, SOLUTION INTRAVENOUS; SUBCUTANEOUS
Qty: 90 ML | Refills: 0 | Status: SHIPPED | OUTPATIENT
Start: 2023-03-28 | End: 2023-08-21 | Stop reason: SDUPTHER

## 2023-03-28 RX ORDER — INSULIN GLARGINE 100 [IU]/ML
INJECTION, SOLUTION SUBCUTANEOUS
Qty: 75 ML | Refills: 0 | Status: SHIPPED | OUTPATIENT
Start: 2023-03-28 | End: 2023-10-07

## 2023-03-28 RX ORDER — METFORMIN HYDROCHLORIDE 500 MG/1
1000 TABLET, EXTENDED RELEASE ORAL 2 TIMES DAILY WITH MEALS
Qty: 360 TABLET | Refills: 0 | Status: SHIPPED | OUTPATIENT
Start: 2023-03-28 | End: 2023-09-26

## 2023-03-28 NOTE — TELEPHONE ENCOUNTER
Care Due:                  Date            Visit Type   Department     Provider  --------------------------------------------------------------------------------                                EP -                              PRIMARY  Last Visit: 10-      CARE ([facility])   None Found     Anne Wright  Next Visit: None Scheduled  None         None Found                                                            Last  Test          Frequency    Reason                     Performed    Due Date  --------------------------------------------------------------------------------    CBC.........  12 months..  gemfibroziL..............  Not Found    Overdue    [employer] Embedded Care Gaps. Reference number: [id number]. 3/27/2023   8:13:57 PM CDT

## 2023-03-28 NOTE — TELEPHONE ENCOUNTER
Request refilled with no additional refills. Please make a follow up appointment with me or Laura.

## 2023-04-04 ENCOUNTER — PATIENT MESSAGE (OUTPATIENT)
Dept: ADMINISTRATIVE | Facility: HOSPITAL | Age: 63
End: 2023-04-04
Payer: COMMERCIAL

## 2023-04-04 ENCOUNTER — PATIENT OUTREACH (OUTPATIENT)
Dept: ADMINISTRATIVE | Facility: HOSPITAL | Age: 63
End: 2023-04-04
Payer: COMMERCIAL

## 2023-04-19 ENCOUNTER — PATIENT MESSAGE (OUTPATIENT)
Dept: ADMINISTRATIVE | Facility: HOSPITAL | Age: 63
End: 2023-04-19
Payer: COMMERCIAL

## 2023-04-23 DIAGNOSIS — E78.2 MIXED HYPERLIPIDEMIA: ICD-10-CM

## 2023-04-24 DIAGNOSIS — E11.42 TYPE 2 DIABETES MELLITUS WITH DIABETIC POLYNEUROPATHY, WITH LONG-TERM CURRENT USE OF INSULIN: Primary | ICD-10-CM

## 2023-04-24 DIAGNOSIS — Z79.4 TYPE 2 DIABETES MELLITUS WITH DIABETIC POLYNEUROPATHY, WITH LONG-TERM CURRENT USE OF INSULIN: Primary | ICD-10-CM

## 2023-04-24 RX ORDER — GEMFIBROZIL 600 MG/1
600 TABLET, FILM COATED ORAL DAILY
Qty: 90 TABLET | Refills: 3 | Status: SHIPPED | OUTPATIENT
Start: 2023-04-24 | End: 2023-09-13 | Stop reason: SDUPTHER

## 2023-06-06 ENCOUNTER — PATIENT MESSAGE (OUTPATIENT)
Dept: ADMINISTRATIVE | Facility: HOSPITAL | Age: 63
End: 2023-06-06
Payer: COMMERCIAL

## 2023-06-06 ENCOUNTER — PATIENT OUTREACH (OUTPATIENT)
Dept: ADMINISTRATIVE | Facility: HOSPITAL | Age: 63
End: 2023-06-06
Payer: COMMERCIAL

## 2023-06-06 NOTE — PROGRESS NOTES
Blood Pressure Report: Called Pt to obtain home blood pressure reading & schedule Primary Care Visit. Pt did not answer, left voicemail with callback number.

## 2023-06-09 DIAGNOSIS — E78.2 MIXED HYPERLIPIDEMIA: ICD-10-CM

## 2023-06-09 RX ORDER — PRAVASTATIN SODIUM 40 MG/1
TABLET ORAL
Qty: 90 TABLET | Refills: 3 | Status: SHIPPED | OUTPATIENT
Start: 2023-06-09 | End: 2023-12-07 | Stop reason: SDUPTHER

## 2023-06-09 NOTE — TELEPHONE ENCOUNTER
Care Due:                  Date            Visit Type   Department     Provider  --------------------------------------------------------------------------------                                EP -                              PRIMARY  Last Visit: 10-      CARE ([facility])   None Found     Anne Wright  Next Visit: None Scheduled  None         None Found                                                            Last  Test          Frequency    Reason                     Performed    Due Date  --------------------------------------------------------------------------------    CBC.........  12 months..  gemfibroziL..............  Not Found    Overdue    HBA1C.......  6 months...  SITagliptin,               09- 03-                             dapagliflozin, insulin,                             metFORMIN................    Lipid Panel.  12 months..  gemfibroziL, pravastatin.  02- 02-    [facility] Embedded Care Due Messages. Reference number: [id number].   6/09/2023 7:11:46 AM CDT

## 2023-06-09 NOTE — TELEPHONE ENCOUNTER
Refill Routing Note   Medication(s) are not appropriate for processing by [facility] for the following reason(s):      Required labs outdated    [facility] action(s):  Defer Labs due            Appointments  past 12m or future 3m with PCP    Date Provider   Last Visit   10/11/2022 Anne Wright MD   Next Visit   Visit date not found Anne Wright MD   ED visits in past 90 days: 0        Note composed:11:23 AM 06/09/2023

## 2023-06-13 DIAGNOSIS — I10 ESSENTIAL HYPERTENSION: ICD-10-CM

## 2023-06-13 RX ORDER — ENALAPRIL MALEATE 20 MG/1
20 TABLET ORAL 2 TIMES DAILY
Qty: 180 TABLET | Refills: 1 | Status: SHIPPED | OUTPATIENT
Start: 2023-06-13 | End: 2023-11-17 | Stop reason: SDUPTHER

## 2023-06-13 NOTE — TELEPHONE ENCOUNTER
Refill Decision Note   Virgilio Acuña  is requesting a refill authorization.  Brief Assessment and Rationale for Refill:  Approve     Medication Therapy Plan:         Comments:     Note composed:11:52 AM 06/13/2023

## 2023-06-13 NOTE — TELEPHONE ENCOUNTER
No care due was identified.  [facility] Embedded Care Due Messages. Reference number: [id number].   6/13/2023 10:08:54 AM CDT

## 2023-07-09 DIAGNOSIS — G47.00 INSOMNIA, UNSPECIFIED TYPE: ICD-10-CM

## 2023-07-09 RX ORDER — TRAZODONE HYDROCHLORIDE 150 MG/1
TABLET ORAL
Qty: 90 TABLET | Refills: 0 | Status: SHIPPED | OUTPATIENT
Start: 2023-07-09 | End: 2023-09-26

## 2023-07-09 NOTE — TELEPHONE ENCOUNTER
Care Due:                  Date            Visit Type   Department     Provider  --------------------------------------------------------------------------------                                EP -                              PRIMARY  Last Visit: 10-      CARE ([facility])   None Found     Anne Wright  Next Visit: None Scheduled  None         None Found                                                            Last  Test          Frequency    Reason                     Performed    Due Date  --------------------------------------------------------------------------------    Office Visit  12 months..  DULoxetine, SITagliptin,   10-   10-                             amLODIPine,                             dapagliflozin, enalapril,                             gemfibroziL, insulin,                             metFORMIN, traZODone.....    CMP.........  12 months..  DULoxetine, SITagliptin,   09-   09-                             dapagliflozin, enalapril,                             gemfibroziL, insulin,                             metFORMIN................    [employer] Embedded Care Due Messages. Reference number: [id number].   7/09/2023 7:14:57 AM CDT

## 2023-07-09 NOTE — TELEPHONE ENCOUNTER
Provider Staff:  Action required for this patient     Please see care gap opportunities below in Care Due Message.    Thanks!  [facility]     Appointments      Date Provider   Last Visit   10/11/2022 Anne Wright MD   Next Visit   Visit date not found Anne Wright MD     Refill Decision Note   Virgilio Acuña  is requesting a refill authorization.  Brief Assessment and Rationale for Refill:  Approve     Medication Therapy Plan:         Comments:     Note composed:12:40 PM 07/09/2023             Appointments     Last Visit   10/11/2022 Anne Wright MD   Next Visit   Visit date not found Anne Wright MD

## 2023-07-20 ENCOUNTER — PATIENT MESSAGE (OUTPATIENT)
Dept: PRIMARY CARE CLINIC | Facility: CLINIC | Age: 63
End: 2023-07-20
Payer: COMMERCIAL

## 2023-07-24 RX ORDER — AMOXICILLIN 875 MG/1
875 TABLET, FILM COATED ORAL 2 TIMES DAILY
Qty: 14 TABLET | Refills: 0 | Status: SHIPPED | OUTPATIENT
Start: 2023-07-24 | End: 2023-07-31

## 2023-08-08 DIAGNOSIS — K21.9 GASTROESOPHAGEAL REFLUX DISEASE: ICD-10-CM

## 2023-08-08 RX ORDER — PANTOPRAZOLE SODIUM 40 MG/1
TABLET, DELAYED RELEASE ORAL
Qty: 90 TABLET | Refills: 0 | Status: SHIPPED | OUTPATIENT
Start: 2023-08-08 | End: 2023-11-19

## 2023-08-08 NOTE — TELEPHONE ENCOUNTER
Care Due:                  Date            Visit Type   Department     Provider  --------------------------------------------------------------------------------                                EP -                              PRIMARY  Last Visit: 10-      CARE ([facility])   None Found     Anne Wright  Next Visit: None Scheduled  None         None Found                                                            Last  Test          Frequency    Reason                     Performed    Due Date  --------------------------------------------------------------------------------    CBC.........  12 months..  gemfibroziL..............  Not Found    Overdue    HBA1C.......  6 months...  SITagliptin,               09- 03-                             dapagliflozin, insulin,                             metFORMIN................    Lipid Panel.  12 months..  gemfibroziL..............  02- 02-    [facility] Embedded Care Due Messages. Reference number: [id number].   8/08/2023 7:16:40 AM CDT

## 2023-08-08 NOTE — TELEPHONE ENCOUNTER
Provider Staff:  Action required for this patient     Please see care gap opportunities below in Care Due Message.    Thanks!  [facility]     Appointments      Date Provider   Last Visit   10/11/2022 Anne Wright MD   Next Visit   Visit date not found Anne Wright MD     Refill Decision Note   Virgilio Acuña  is requesting a refill authorization.  Brief Assessment and Rationale for Refill:  Approve     Medication Therapy Plan:         Comments:     Note composed:5:29 PM 08/08/2023             Appointments     Last Visit   10/11/2022 Anne Wright MD   Next Visit   Visit date not found Anne Wright MD

## 2023-08-20 ENCOUNTER — PATIENT MESSAGE (OUTPATIENT)
Dept: PRIMARY CARE CLINIC | Facility: CLINIC | Age: 63
End: 2023-08-20
Payer: COMMERCIAL

## 2023-08-20 DIAGNOSIS — Z79.4 TYPE 2 DIABETES MELLITUS WITH DIABETIC POLYNEUROPATHY, WITH LONG-TERM CURRENT USE OF INSULIN: ICD-10-CM

## 2023-08-20 DIAGNOSIS — E11.42 TYPE 2 DIABETES MELLITUS WITH DIABETIC POLYNEUROPATHY, WITH LONG-TERM CURRENT USE OF INSULIN: ICD-10-CM

## 2023-08-21 RX ORDER — INSULIN ASPART 100 [IU]/ML
40 INJECTION, SOLUTION INTRAVENOUS; SUBCUTANEOUS
Qty: 90 ML | Refills: 0 | Status: SHIPPED | OUTPATIENT
Start: 2023-08-21 | End: 2023-09-13 | Stop reason: SDUPTHER

## 2023-08-24 ENCOUNTER — PATIENT MESSAGE (OUTPATIENT)
Dept: PRIMARY CARE CLINIC | Facility: CLINIC | Age: 63
End: 2023-08-24
Payer: COMMERCIAL

## 2023-08-24 DIAGNOSIS — Z79.4 TYPE 2 DIABETES MELLITUS WITH DIABETIC POLYNEUROPATHY, WITH LONG-TERM CURRENT USE OF INSULIN: ICD-10-CM

## 2023-08-24 DIAGNOSIS — E11.42 TYPE 2 DIABETES MELLITUS WITH DIABETIC POLYNEUROPATHY, WITH LONG-TERM CURRENT USE OF INSULIN: ICD-10-CM

## 2023-09-07 DIAGNOSIS — G62.9 NEUROPATHY: ICD-10-CM

## 2023-09-07 RX ORDER — GABAPENTIN 300 MG/1
CAPSULE ORAL
Qty: 270 CAPSULE | Refills: 5 | Status: SHIPPED | OUTPATIENT
Start: 2023-09-07

## 2023-09-07 NOTE — TELEPHONE ENCOUNTER
No care due was identified.  [facility] Embedded Care Due Messages. Reference number: [id number].   9/07/2023 1:09:07 AM CDT

## 2023-09-13 DIAGNOSIS — E11.42 TYPE 2 DIABETES MELLITUS WITH DIABETIC POLYNEUROPATHY, WITH LONG-TERM CURRENT USE OF INSULIN: ICD-10-CM

## 2023-09-13 DIAGNOSIS — Z79.4 TYPE 2 DIABETES MELLITUS WITH DIABETIC POLYNEUROPATHY, WITH LONG-TERM CURRENT USE OF INSULIN: ICD-10-CM

## 2023-09-13 DIAGNOSIS — E78.2 MIXED HYPERLIPIDEMIA: ICD-10-CM

## 2023-09-13 RX ORDER — GEMFIBROZIL 600 MG/1
600 TABLET, FILM COATED ORAL DAILY
Qty: 90 TABLET | Refills: 0 | Status: SHIPPED | OUTPATIENT
Start: 2023-09-13 | End: 2023-11-17 | Stop reason: SDUPTHER

## 2023-09-13 RX ORDER — INSULIN ASPART 100 [IU]/ML
40 INJECTION, SOLUTION INTRAVENOUS; SUBCUTANEOUS
Qty: 90 ML | Refills: 0 | Status: SHIPPED | OUTPATIENT
Start: 2023-09-13 | End: 2023-10-19 | Stop reason: SDUPTHER

## 2023-09-13 NOTE — TELEPHONE ENCOUNTER
Care Due:                  Date            Visit Type   Department     Provider  --------------------------------------------------------------------------------                                EP -                              PRIMARY  Last Visit: 10-      CARE ([facility])   None Found     Anne Wright  Next Visit: None Scheduled  None         None Found                                                            Last  Test          Frequency    Reason                     Performed    Due Date  --------------------------------------------------------------------------------    Office Visit  12 months..  DULoxetine, SITagliptin,   10-   10-                             amLODIPine,                             dapagliflozin, enalapril,                             gemfibroziL, insulin,                             metFORMIN, traZODone.....    CMP.........  12 months..  DULoxetine, SITagliptin,   09-   09-                             dapagliflozin, enalapril,                             gemfibroziL, insulin,                             metFORMIN................    [employer] Embedded Care Due Messages. Reference number: [id number].   9/13/2023 8:27:01 AM CDT

## 2023-09-19 ENCOUNTER — PATIENT MESSAGE (OUTPATIENT)
Dept: PRIMARY CARE CLINIC | Facility: CLINIC | Age: 63
End: 2023-09-19
Payer: COMMERCIAL

## 2023-09-22 ENCOUNTER — PATIENT OUTREACH (OUTPATIENT)
Dept: ADMINISTRATIVE | Facility: HOSPITAL | Age: 63
End: 2023-09-22
Payer: COMMERCIAL

## 2023-09-22 VITALS — DIASTOLIC BLOOD PRESSURE: 70 MMHG | SYSTOLIC BLOOD PRESSURE: 129 MMHG

## 2023-09-22 NOTE — TELEPHONE ENCOUNTER
Patient has not been seen since Dec 2022, I called and spoke with the patient and appointment has been made for Tuesday with Laura

## 2023-09-23 ENCOUNTER — PATIENT MESSAGE (OUTPATIENT)
Dept: PODIATRY | Facility: CLINIC | Age: 63
End: 2023-09-23
Payer: COMMERCIAL

## 2023-09-26 ENCOUNTER — OFFICE VISIT (OUTPATIENT)
Dept: FAMILY MEDICINE | Facility: CLINIC | Age: 63
End: 2023-09-26
Payer: COMMERCIAL

## 2023-09-26 ENCOUNTER — PATIENT MESSAGE (OUTPATIENT)
Dept: FAMILY MEDICINE | Facility: CLINIC | Age: 63
End: 2023-09-26

## 2023-09-26 ENCOUNTER — LAB VISIT (OUTPATIENT)
Dept: LAB | Facility: HOSPITAL | Age: 63
End: 2023-09-26
Attending: INTERNAL MEDICINE
Payer: COMMERCIAL

## 2023-09-26 VITALS
BODY MASS INDEX: 41.6 KG/M2 | OXYGEN SATURATION: 96 % | DIASTOLIC BLOOD PRESSURE: 72 MMHG | RESPIRATION RATE: 18 BRPM | WEIGHT: 313.88 LBS | SYSTOLIC BLOOD PRESSURE: 127 MMHG | HEIGHT: 73 IN | HEART RATE: 72 BPM | TEMPERATURE: 97 F

## 2023-09-26 DIAGNOSIS — E78.5 HYPERLIPIDEMIA ASSOCIATED WITH TYPE 2 DIABETES MELLITUS: ICD-10-CM

## 2023-09-26 DIAGNOSIS — I15.2 HYPERTENSION ASSOCIATED WITH TYPE 2 DIABETES MELLITUS: Primary | ICD-10-CM

## 2023-09-26 DIAGNOSIS — G47.00 INSOMNIA, UNSPECIFIED TYPE: ICD-10-CM

## 2023-09-26 DIAGNOSIS — Z79.4 TYPE 2 DIABETES MELLITUS WITH DIABETIC POLYNEUROPATHY, WITH LONG-TERM CURRENT USE OF INSULIN: Primary | ICD-10-CM

## 2023-09-26 DIAGNOSIS — E66.01 SEVERE OBESITY (BMI >= 40): ICD-10-CM

## 2023-09-26 DIAGNOSIS — K21.9 GASTROESOPHAGEAL REFLUX DISEASE WITHOUT ESOPHAGITIS: ICD-10-CM

## 2023-09-26 DIAGNOSIS — E11.69 HYPERLIPIDEMIA ASSOCIATED WITH TYPE 2 DIABETES MELLITUS: ICD-10-CM

## 2023-09-26 DIAGNOSIS — Z79.4 TYPE 2 DIABETES MELLITUS WITH DIABETIC POLYNEUROPATHY, WITH LONG-TERM CURRENT USE OF INSULIN: ICD-10-CM

## 2023-09-26 DIAGNOSIS — E11.42 TYPE 2 DIABETES MELLITUS WITH DIABETIC POLYNEUROPATHY, WITH LONG-TERM CURRENT USE OF INSULIN: ICD-10-CM

## 2023-09-26 DIAGNOSIS — I15.2 HYPERTENSION ASSOCIATED WITH TYPE 2 DIABETES MELLITUS: ICD-10-CM

## 2023-09-26 DIAGNOSIS — G89.29 CHRONIC BILATERAL LOW BACK PAIN WITH SCIATICA, SCIATICA LATERALITY UNSPECIFIED: ICD-10-CM

## 2023-09-26 DIAGNOSIS — E11.59 HYPERTENSION ASSOCIATED WITH TYPE 2 DIABETES MELLITUS: ICD-10-CM

## 2023-09-26 DIAGNOSIS — E11.59 HYPERTENSION ASSOCIATED WITH TYPE 2 DIABETES MELLITUS: Primary | ICD-10-CM

## 2023-09-26 DIAGNOSIS — E11.42 TYPE 2 DIABETES MELLITUS WITH DIABETIC POLYNEUROPATHY, WITH LONG-TERM CURRENT USE OF INSULIN: Primary | ICD-10-CM

## 2023-09-26 DIAGNOSIS — M54.40 CHRONIC BILATERAL LOW BACK PAIN WITH SCIATICA, SCIATICA LATERALITY UNSPECIFIED: ICD-10-CM

## 2023-09-26 DIAGNOSIS — Z12.11 COLON CANCER SCREENING: ICD-10-CM

## 2023-09-26 DIAGNOSIS — Z89.422 HISTORY OF AMPUTATION OF LESSER TOE, LEFT: ICD-10-CM

## 2023-09-26 LAB
ALBUMIN SERPL BCP-MCNC: 3.8 G/DL (ref 3.5–5.2)
ALBUMIN/CREAT UR: 269.2 UG/MG (ref 0–30)
ALP SERPL-CCNC: 56 U/L (ref 55–135)
ALT SERPL W/O P-5'-P-CCNC: 19 U/L (ref 10–44)
ANION GAP SERPL CALC-SCNC: 9 MMOL/L (ref 8–16)
AST SERPL-CCNC: 18 U/L (ref 10–40)
BASOPHILS # BLD AUTO: 0.07 K/UL (ref 0–0.2)
BASOPHILS NFR BLD: 1 % (ref 0–1.9)
BILIRUB SERPL-MCNC: 0.6 MG/DL (ref 0.1–1)
BUN SERPL-MCNC: 12 MG/DL (ref 8–23)
CALCIUM SERPL-MCNC: 9.8 MG/DL (ref 8.7–10.5)
CHLORIDE SERPL-SCNC: 97 MMOL/L (ref 95–110)
CHOLEST SERPL-MCNC: 196 MG/DL (ref 120–199)
CHOLEST/HDLC SERPL: 5.9 {RATIO} (ref 2–5)
CO2 SERPL-SCNC: 30 MMOL/L (ref 23–29)
CREAT SERPL-MCNC: 0.9 MG/DL (ref 0.5–1.4)
CREAT UR-MCNC: 78 MG/DL (ref 23–375)
DIFFERENTIAL METHOD: ABNORMAL
EOSINOPHIL # BLD AUTO: 0.1 K/UL (ref 0–0.5)
EOSINOPHIL NFR BLD: 1.8 % (ref 0–8)
ERYTHROCYTE [DISTWIDTH] IN BLOOD BY AUTOMATED COUNT: 13.3 % (ref 11.5–14.5)
EST. GFR  (NO RACE VARIABLE): >60 ML/MIN/1.73 M^2
ESTIMATED AVG GLUCOSE: 235 MG/DL (ref 68–131)
GLUCOSE SERPL-MCNC: 262 MG/DL (ref 70–110)
HBA1C MFR BLD: 9.8 % (ref 4–5.6)
HCT VFR BLD AUTO: 45.2 % (ref 40–54)
HDLC SERPL-MCNC: 33 MG/DL (ref 40–75)
HDLC SERPL: 16.8 % (ref 20–50)
HGB BLD-MCNC: 15.5 G/DL (ref 14–18)
IMM GRANULOCYTES # BLD AUTO: 0.06 K/UL (ref 0–0.04)
IMM GRANULOCYTES NFR BLD AUTO: 0.8 % (ref 0–0.5)
LDLC SERPL CALC-MCNC: ABNORMAL MG/DL (ref 63–159)
LYMPHOCYTES # BLD AUTO: 1.3 K/UL (ref 1–4.8)
LYMPHOCYTES NFR BLD: 18.4 % (ref 18–48)
MCH RBC QN AUTO: 29.1 PG (ref 27–31)
MCHC RBC AUTO-ENTMCNC: 34.3 G/DL (ref 32–36)
MCV RBC AUTO: 85 FL (ref 82–98)
MICROALBUMIN UR DL<=1MG/L-MCNC: 210 UG/ML
MONOCYTES # BLD AUTO: 0.5 K/UL (ref 0.3–1)
MONOCYTES NFR BLD: 7.1 % (ref 4–15)
NEUTROPHILS # BLD AUTO: 5.2 K/UL (ref 1.8–7.7)
NEUTROPHILS NFR BLD: 70.9 % (ref 38–73)
NONHDLC SERPL-MCNC: 163 MG/DL
NRBC BLD-RTO: 0 /100 WBC
PLATELET # BLD AUTO: 273 K/UL (ref 150–450)
PMV BLD AUTO: 10.2 FL (ref 9.2–12.9)
POTASSIUM SERPL-SCNC: 4.5 MMOL/L (ref 3.5–5.1)
PROT SERPL-MCNC: 7.2 G/DL (ref 6–8.4)
RBC # BLD AUTO: 5.32 M/UL (ref 4.6–6.2)
SODIUM SERPL-SCNC: 136 MMOL/L (ref 136–145)
TRIGL SERPL-MCNC: 415 MG/DL (ref 30–150)
WBC # BLD AUTO: 7.28 K/UL (ref 3.9–12.7)

## 2023-09-26 PROCEDURE — 1159F MED LIST DOCD IN RCRD: CPT | Mod: CPTII,S$GLB,, | Performed by: PHYSICIAN ASSISTANT

## 2023-09-26 PROCEDURE — 99214 OFFICE O/P EST MOD 30 MIN: CPT | Mod: S$GLB,,, | Performed by: PHYSICIAN ASSISTANT

## 2023-09-26 PROCEDURE — 1160F PR REVIEW ALL MEDS BY PRESCRIBER/CLIN PHARMACIST DOCUMENTED: ICD-10-PCS | Mod: CPTII,S$GLB,, | Performed by: PHYSICIAN ASSISTANT

## 2023-09-26 PROCEDURE — 83036 HEMOGLOBIN GLYCOSYLATED A1C: CPT | Performed by: PHYSICIAN ASSISTANT

## 2023-09-26 PROCEDURE — 3078F PR MOST RECENT DIASTOLIC BLOOD PRESSURE < 80 MM HG: ICD-10-PCS | Mod: CPTII,S$GLB,, | Performed by: PHYSICIAN ASSISTANT

## 2023-09-26 PROCEDURE — 99214 PR OFFICE/OUTPT VISIT, EST, LEVL IV, 30-39 MIN: ICD-10-PCS | Mod: S$GLB,,, | Performed by: PHYSICIAN ASSISTANT

## 2023-09-26 PROCEDURE — 80053 COMPREHEN METABOLIC PANEL: CPT | Performed by: PHYSICIAN ASSISTANT

## 2023-09-26 PROCEDURE — 3074F PR MOST RECENT SYSTOLIC BLOOD PRESSURE < 130 MM HG: ICD-10-PCS | Mod: CPTII,S$GLB,, | Performed by: PHYSICIAN ASSISTANT

## 2023-09-26 PROCEDURE — 1159F PR MEDICATION LIST DOCUMENTED IN MEDICAL RECORD: ICD-10-PCS | Mod: CPTII,S$GLB,, | Performed by: PHYSICIAN ASSISTANT

## 2023-09-26 PROCEDURE — 3074F SYST BP LT 130 MM HG: CPT | Mod: CPTII,S$GLB,, | Performed by: PHYSICIAN ASSISTANT

## 2023-09-26 PROCEDURE — 36415 COLL VENOUS BLD VENIPUNCTURE: CPT | Mod: PO | Performed by: PHYSICIAN ASSISTANT

## 2023-09-26 PROCEDURE — 3078F DIAST BP <80 MM HG: CPT | Mod: CPTII,S$GLB,, | Performed by: PHYSICIAN ASSISTANT

## 2023-09-26 PROCEDURE — 3008F PR BODY MASS INDEX (BMI) DOCUMENTED: ICD-10-PCS | Mod: CPTII,S$GLB,, | Performed by: PHYSICIAN ASSISTANT

## 2023-09-26 PROCEDURE — 99999 PR PBB SHADOW E&M-EST. PATIENT-LVL V: ICD-10-PCS | Mod: PBBFAC,,, | Performed by: PHYSICIAN ASSISTANT

## 2023-09-26 PROCEDURE — 1160F RVW MEDS BY RX/DR IN RCRD: CPT | Mod: CPTII,S$GLB,, | Performed by: PHYSICIAN ASSISTANT

## 2023-09-26 PROCEDURE — 80061 LIPID PANEL: CPT | Performed by: PHYSICIAN ASSISTANT

## 2023-09-26 PROCEDURE — 85025 COMPLETE CBC W/AUTO DIFF WBC: CPT | Performed by: PHYSICIAN ASSISTANT

## 2023-09-26 PROCEDURE — 4010F ACE/ARB THERAPY RXD/TAKEN: CPT | Mod: CPTII,S$GLB,, | Performed by: PHYSICIAN ASSISTANT

## 2023-09-26 PROCEDURE — 99999 PR PBB SHADOW E&M-EST. PATIENT-LVL V: CPT | Mod: PBBFAC,,, | Performed by: PHYSICIAN ASSISTANT

## 2023-09-26 PROCEDURE — 4010F PR ACE/ARB THEARPY RXD/TAKEN: ICD-10-PCS | Mod: CPTII,S$GLB,, | Performed by: PHYSICIAN ASSISTANT

## 2023-09-26 PROCEDURE — 82043 UR ALBUMIN QUANTITATIVE: CPT | Performed by: INTERNAL MEDICINE

## 2023-09-26 PROCEDURE — 3008F BODY MASS INDEX DOCD: CPT | Mod: CPTII,S$GLB,, | Performed by: PHYSICIAN ASSISTANT

## 2023-09-26 RX ORDER — GLIMEPIRIDE 2 MG/1
2 TABLET ORAL
Qty: 90 TABLET | Refills: 3 | Status: SHIPPED | OUTPATIENT
Start: 2023-09-26 | End: 2024-09-25

## 2023-09-26 RX ORDER — METOPROLOL SUCCINATE 25 MG/1
25 TABLET, EXTENDED RELEASE ORAL DAILY
Qty: 30 TABLET | Refills: 0 | Status: SHIPPED | OUTPATIENT
Start: 2023-09-26 | End: 2023-10-12

## 2023-09-26 RX ORDER — METOPROLOL SUCCINATE 25 MG/1
25 TABLET, EXTENDED RELEASE ORAL DAILY
Qty: 90 TABLET | Refills: 3 | Status: SHIPPED | OUTPATIENT
Start: 2023-09-26 | End: 2023-10-12

## 2023-09-26 RX ORDER — GLIMEPIRIDE 2 MG/1
2 TABLET ORAL
Qty: 90 TABLET | Refills: 3 | Status: SHIPPED | OUTPATIENT
Start: 2023-09-26 | End: 2023-09-26

## 2023-09-26 RX ORDER — TRAZODONE HYDROCHLORIDE 150 MG/1
TABLET ORAL
Qty: 90 TABLET | Refills: 0 | Status: SHIPPED | OUTPATIENT
Start: 2023-09-26 | End: 2023-12-07 | Stop reason: SDUPTHER

## 2023-09-26 RX ORDER — METFORMIN HYDROCHLORIDE 500 MG/1
TABLET, EXTENDED RELEASE ORAL
Qty: 360 TABLET | Refills: 0 | Status: SHIPPED | OUTPATIENT
Start: 2023-09-26 | End: 2023-11-17 | Stop reason: SDUPTHER

## 2023-09-26 NOTE — TELEPHONE ENCOUNTER
Please let patient know that I have reviewed his labs. His CBC and CMP were normal with the exception of elevated glucose. Cholesterol is at acceptable ranges with the exception of elevated triglycerides. His urine sample shows protein related to diabetes which can can be a sign of early kidney damage. Treatment is to keep the sugar down. A1c is still elevated at 9.8. Due to these findings, I would like to add another oral medication for his diabetes called Glimepiride. I would also like for him to see our diabetes specialist. Referral has been placed. Please also schedule a repeat A1c in 3 months. Let me know if he has any questions.     I have signed for the following orders AND/OR meds.  Please call the patient and ask the patient to schedule the testing AND/OR inform about any medications that were sent. Medications have been sent to pharmacy listed below      Orders Placed This Encounter   Procedures    Hemoglobin A1C     Standing Status:   Future     Standing Expiration Date:   9/25/2024    Ambulatory referral/consult to Diabetic Advanced Practice Providers (Medical Management)     Standing Status:   Future     Standing Expiration Date:   10/26/2024     Referral Priority:   Routine     Referral Type:   Consultation     Referral Reason:   Specialty Services Required     Requested Specialty:   Endocrinology     Number of Visits Requested:   1       Medications Ordered This Encounter   Medications    glimepiride (AMARYL) 2 MG tablet     Sig: Take 1 tablet (2 mg total) by mouth before breakfast.     Dispense:  90 tablet     Refill:  3         [id number] - [place] [address] AT [facility] [address]  [address] [place] [id number]  Phone: [phone] Fax: [phone]    [facility] - [place], [place] - [address]  [address]  [address]  [facility] [address]  Phone: [phone] Fax: [phone]

## 2023-09-26 NOTE — PROGRESS NOTES
Assessment/Plan:    Problem List Items Addressed This Visit          Cardiac/Vascular    Hyperlipidemia associated with type 2 diabetes mellitus    Overview     Hyperlipidemia Medications               gemfibroziL (LOPID) 600 MG tablet Take 1 tablet (600 mg total) by mouth once daily.    pravastatin (PRAVACHOL) 40 MG tablet TAKE 1 TABLET ONCE DAILY   -chronic condition. Currently stable.    -reports compliance with hyperlipidemia treatment as prescribed  -denies any known adverse effects of medications  -most recent labs listed below; due for repeat lipid panel:  Lab Results   Component Value Date    CHOL 198 02/16/2022     Lab Results   Component Value Date    HDL 34 (L) 02/16/2022     Lab Results   Component Value Date    LDLCALC Invalid, Trig>400.0 02/16/2022     Lab Results   Component Value Date    TRIG 414 (H) 02/16/2022     Lab Results   Component Value Date    ALT 18 09/20/2022    AST 16 09/20/2022    ALKPHOS 67 09/20/2022    BILITOT 0.5 09/20/2022          Relevant Orders    Lipid Panel    Hypertension associated with type 2 diabetes mellitus - Primary    Overview     Hypertension Medications               amLODIPine (NORVASC) 10 MG tablet Take 1 tablet (10 mg total) by mouth once daily.    enalapril (VASOTEC) 20 MG tablet Take 1 tablet (20 mg total) by mouth 2 (two) times daily.   -at goal today; has been above goal on recent home checks   -plan to start Toprol XL 25 mg daily  -monitor BP at home; BP check in 2 weeks  -continue lifestyle modification with low sodium diet and exercise   -discussed hypertension disease course and importance of treating high blood pressure  -patient understood and advised of risk of untreated blood pressure. ER precautions were given for symptoms of hypertensive urgency and emergency.           Relevant Medications    metoprolol succinate (TOPROL-XL) 25 MG 24 hr tablet    metoprolol succinate (TOPROL-XL) 25 MG 24 hr tablet    Other Relevant Orders    CBC Auto Differential     Comprehensive Metabolic Panel       Endocrine    Type 2 diabetes mellitus with diabetic polyneuropathy, with long-term current use of insulin    Overview     Diabetes Medications               insulin (LANTUS SOLOSTAR U-100 INSULIN) glargine 100 units/mL SubQ pen INJECT 40 UNITS            SUBCUTANEOUSLY TWO TIMES A DAY    insulin aspart U-100 (NOVOLOG FLEXPEN U-100 INSULIN) 100 unit/mL (3 mL) InPn pen Inject 40 Units into the skin 3 (three) times daily with meals.    metFORMIN (GLUCOPHAGE-XR) 500 MG ER 24hr tablet TAKE 2 TABLETS TWO TIMES A DAY WITH MEALS   -condition is currently uncontrolled  -unable to start Farxiga and Januvia due to insurance coverage; AE w/ Victoza   -plan to repeat A1c today  -see diabetic health maintenance listed below  -on statin: Yes  -on ACE-I/ARB: Yes  -counseling provided on importance of diabetic diet and medication compliance in order to treat diabetes  -discussed diabetes disease course and potential complications           Relevant Orders    CBC Auto Differential    Comprehensive Metabolic Panel    Hemoglobin A1C    Microalbumin/Creatinine Ratio, Urine    Severe obesity (BMI >= 40)    Overview     General weight loss/lifestyle modification strategies discussed (elicit support from others; identify saboteurs; non-food rewards, etc).  Informal exercise measures discussed, e.g. taking stairs instead of elevator.  Regular aerobic exercise program discussed.    Wt Readings from Last 3 Encounters:   09/26/23 1008 (!) 142.4 kg (313 lb 14.4 oz)   12/06/22 1506 (!) 150.8 kg (332 lb 5.5 oz)   10/20/22 1443 (!) 154.7 kg (341 lb)               GI    Gastroesophageal reflux disease without esophagitis    Overview     -symptoms controlled with daily PPI  -denies alarm symptoms, such as dysphagia, weight loss or N/V  -continue lifestyle modification with avoidance of acidic foods, caffeine, late night eating              Orthopedic    Chronic bilateral low back pain with sciatica    Overview      -chronic, continues to have back pain with associated sciatica  -previously followed by pain management  -needs to follow up with pain management to discuss ongoing pain         History of amputation of lesser toe, left    Overview     -followed closely by podiatry for diabetic foot ulcer management          Other Visit Diagnoses       Colon cancer screening        Relevant Orders    Cologuard Screening (Multitarget Stool DNA)          Follow up in about 3 months (around 12/26/2023), or if symptoms worsen or fail to improve.    Laura Buchanan PA-C  _____________________________________________________________________________________________________________________________________________________    CC: follow up for chronic conditions    HPI: Patient is in clinic today as an established patient here for follow up for chronic conditions.     HTN: The patient has a diagnosis of hypertension and the blood pressure has been high on recent checks. The patient has been compliant on the medicine listed and has not had problems with side effects. The patient has had no headache, vision changes, chest pain, shortness of breath, dizziness, or palpitations. Denies any identifiable exacerbating or relieving factors.    DM2: Patient presents for follow up of diabetes. Condition is chronic and uncontrolled. Patient denies symptoms including foot ulcerations, hypoglycemia, nausea, paresthesia of the feet, polydipsia, polyuria and visual disturbances. Evaluation to date has been included: fasting blood sugar, fasting lipid panel, hemoglobin A1C and microalbuminuria. Home fasting sugars: 150s-190s. Denies adverse effects of current medications.     Diabetes Management Status    Statin: Taking  ACE/ARB: Taking    Screening or Prevention Patient's value Goal Complete/Controlled?   HgA1C Testing and Control   Lab Results   Component Value Date    HGBA1C 9.9 (H) 09/20/2022      Annually/Less than 8% No   Lipid profile : 06/19/2023  Annually No   LDL control Lab Results   Component Value Date    LDLCALC Invalid, Trig>400.0 02/16/2022    Annually/Less than 100 mg/dl  No   Nephropathy screening Lab Results   Component Value Date    LABMICR 307.0 02/16/2022     Lab Results   Component Value Date    PROTEINUA Trace (A) 01/10/2018    Annually No   Blood pressure BP Readings from Last 1 Encounters:   09/26/23 127/72    Less than 140/90 Yes   Dilated retinal exam : 10/07/2022 Annually Yes   Foot exam   Most Recent Foot Exam Date: Not Found Annually No     Remaining chronic conditions have been reviewed and remain stable. Further detail as stated above.     No other complaints today.     Past Medical History:   Diagnosis Date    Cellulitis of left index finger 02/16/2015    Charcot's joint of foot, left 08/2018    Dyslipidemia     Essential hypertension 02/16/2017    Group B streptococcal infection 01/15/2018    Hypotestosteronemia 07/07/2017    Infected finger joint 02/17/2015    Infected skin ulcer limited to breakdown of skin 01/13/2018    MSSA (methicillin susceptible Staphylococcus aureus) 01/13/2018    Obese     S/P knee surgery, medial/lateral menisectomy 11/04/2013    Sleep apnea     does not use CPAP    Type 2 diabetes mellitus with diabetic polyneuropathy, with long-term current use of insulin 2003     Past Surgical History:   Procedure Laterality Date    ELBOW SURGERY      EPIDURAL STEROID INJECTION N/A 7/14/2020    Procedure: Lumbar L5/S1 IL WHITLEY;  Surgeon: Nirav Ramon MD;  Location: [facility]IN [facility]GT;  Service: Pain Management;  Laterality: N/A;    EPIDURAL STEROID INJECTION N/A 10/20/2020    Procedure: Lumbar L5/S1 IL WHITLEY;  Surgeon: Nirav Ramon MD;  Location: [facility];  Service: Pain Management;  Laterality: N/A;    EPIDURAL STEROID INJECTION INTO LUMBAR SPINE N/A 10/25/2022    Procedure: Injection-steroid-epidural-lumbar L5/S1 to right;  Surgeon: Lavell Monsalve MD;  Location: [facility] OR;  Service: Pain Management;  Laterality:  N/A;    FOOT SURGERY Left     INJECTION OF ANESTHETIC AGENT INTO SACROILIAC JOINT Right 5/18/2020    Procedure: right Sacroiliac Joint Injection;  Surgeon: Nirav Ramon MD;  Location: [facility];  Service: Pain Management;  Laterality: Right;    INJECTION OF JOINT Right 5/18/2020    Procedure: right GT bursa injection;  Surgeon: Nirav Ramon MD;  Location: [facility];  Service: Pain Management;  Laterality: Right;    KNEE CARTILAGE SURGERY  10/21/2013    right knee    KNEE SURGERY Right 02/17/2017    Left index finger surgery  03/2015    MIDFOOT ARTHRODESIS Left 8/27/2018    Procedure: FUSION, JOINT, MIDFOOT - FIRST METATARSOCUNEIFORM JOINT AND NAVICULOCUNEIFORM JOINT;  Surgeon: Inocente Smith DPM;  Location: [facility] OR;  Service: Podiatry;  Laterality: Left;    SELECTIVE INJECTION OF ANESTHETIC AGENT AROUND LUMBAR SPINAL NERVE ROOT BY TRANSFORAMINAL APPROACH Bilateral 10/4/2021    Procedure: Bilateral L4/5 TF WHITLEY//wants latest time if poss;  Surgeon: Nirav Ramon MD;  Location: [facility];  Service: Pain Management;  Laterality: Bilateral;    TOE AMPUTATION  03/2018    left great toe     Review of patient's allergies indicates:   Allergen Reactions    Victoza [liraglutide] Swelling     Social History     Tobacco Use    Smoking status: Never    Smokeless tobacco: Never   Substance Use Topics    Alcohol use: Yes     Alcohol/week: 2.0 - 3.0 standard drinks of alcohol     Types: 2 - 3 Cans of beer per week     Comment: weekly    Drug use: No     Family History   Problem Relation Age of Onset    COPD Father      Current Outpatient Medications on File Prior to Visit   Medication Sig Dispense Refill    amLODIPine (NORVASC) 10 MG tablet Take 1 tablet (10 mg total) by mouth once daily. 90 tablet 3    blood-glucose meter (ONETOUCH VERIO REFLECT METER) [facility] USE AS INSTRUCTED. 1 each 0    enalapril (VASOTEC) 20 MG tablet Take 1 tablet (20 mg total) by mouth 2 (two) times daily. 180 tablet 1     "gabapentin (NEURONTIN) 300 MG capsule TAKE 3 CAPSULES (900MG     TOTAL) 3 TIMES A  capsule 5    gemfibroziL (LOPID) 600 MG tablet Take 1 tablet (600 mg total) by mouth once daily. 90 tablet 0    insulin (LANTUS SOLOSTAR U-100 INSULIN) glargine 100 units/mL SubQ pen INJECT 40 UNITS            SUBCUTANEOUSLY TWO TIMES A DAY 75 mL 0    insulin aspart U-100 (NOVOLOG FLEXPEN U-100 INSULIN) 100 unit/mL (3 mL) InPn pen Inject 40 Units into the skin 3 (three) times daily with meals. 90 mL 0    insulin syringe-needle U-100 (INSULIN SYRINGE) 1 mL 29 gauge x 1/2" Syrg Inject 4 times a day 100 each 12    lancets Misc 1 Units by Misc.(Non-Drug; Combo Route) route daily as needed. 100 each 11    ONETOUCH DELICA PLUS LANCET 33 gauge Misc USE TO CHECK GLUCOSE 3     TIMES A DAY. 300 each 3    ONETOUCH VERIO TEST STRIPS Strp CHECK GLUCOSE 3 TIMES A DAYBEFORE EACH MEAL. 300 strip 3    pantoprazole (PROTONIX) 40 MG tablet TAKE 1 TABLET(40 MG) BY MOUTH EVERY DAY. 90 tablet 0    pen needle, diabetic (BD ULTRA-FINE ORIG PEN NEEDLE) 29 gauge x 1/2" Ndle Use to administer insulin under the skin five (5) times a day; discard pen needle after each use. 500 each 3    pravastatin (PRAVACHOL) 40 MG tablet TAKE 1 TABLET ONCE DAILY 90 tablet 3    pulse oximeter (PULSE OXIMETER) device by Apply Externally route 2 (two) times a day. Use twice daily at 8 AM and 3 PM and record the value in [facility] as directed. 1 each 0    safety needles 22 gauge x 1 1/2" Ndle To be used once a month for testosterone injections 50 each 6    syringe with needle 3 mL 25 gauge x 1" Syrg To be used with monthly testosterone injections 100 Syringe 4    DULoxetine (CYMBALTA) 30 MG capsule Take 1 capsule (30 mg total) by mouth once daily. (Patient not taking: Reported on 9/26/2023) 30 capsule 11    HYDROcodone-acetaminophen (NORCO) 5-325 mg per tablet Take 1 tablet by mouth every 12 (twelve) hours as needed for Pain. (Patient not taking: Reported on 9/26/2023) 14 " tablet 0    [DISCONTINUED] dapagliflozin (FARXIGA) 10 mg tablet Take 1 tablet (10 mg total) by mouth once daily. 90 tablet 3    [DISCONTINUED] metFORMIN (GLUCOPHAGE-XR) 500 MG ER 24hr tablet Take 2 tablets (1,000 mg total) by mouth 2 (two) times daily with meals. 360 tablet 0    [DISCONTINUED] SITagliptin (JANUVIA) 100 MG Tab Take 1 tablet (100 mg total) by mouth once daily. (Patient not taking: Reported on 9/26/2023) 90 tablet 3    [DISCONTINUED] traZODone (DESYREL) 150 MG tablet TAKE 1 TABLET NIGHTLY 90 tablet 0     Current Facility-Administered Medications on File Prior to Visit   Medication Dose Route Frequency Provider Last Rate Last Admin    acetaminophen tablet 650 mg  650 mg Oral Once PRN Virgilio Avilez MD        albuterol inhaler 2 puff  2 puff Inhalation Q20 Min PRN Virgilio Avilez MD        diphenhydrAMINE injection 25 mg  25 mg Intravenous Once PRN Virgilio Avilez MD        EPINEPHrine (EPIPEN) 0.3 mg/0.3 mL pen injection 0.3 mg  0.3 mg Intramuscular PRN Virgilio Avilez MD        lactated ringers infusion   Intravenous Continuous ChanelVadim MD   Stopped at 08/27/18 0953    methylPREDNISolone sodium succinate injection 40 mg  40 mg Intravenous Once PRN Virgilio Avilez MD        ondansetron disintegrating tablet 4 mg  4 mg Oral Once PRN Virgilio Avilez MD        sodium chloride 0.9% 500 mL flush bag   Intravenous PRN Virgilio Avilez MD        sodium chloride 0.9% flush 10 mL  10 mL Intravenous PRN Virgilio Avilez MD           Review of Systems   Constitutional:  Negative for chills, diaphoresis, fatigue and fever.   HENT:  Negative for congestion, ear pain, postnasal drip, sinus pain and sore throat.    Eyes:  Negative for pain and redness.   Respiratory:  Negative for cough, chest tightness and shortness of breath.    Cardiovascular:  Negative for chest pain and leg swelling.   Gastrointestinal:  Negative for abdominal pain, constipation, diarrhea, nausea and vomiting.  "  Genitourinary:  Negative for dysuria and hematuria.   Musculoskeletal:  Negative for arthralgias and joint swelling.   Skin:  Negative for rash.   Neurological:  Negative for dizziness, syncope and headaches.   Psychiatric/Behavioral:  Negative for dysphoric mood. The patient is not nervous/anxious.        Vitals:    09/26/23 1008   BP: 127/72   BP Location: Right arm   Patient Position: Sitting   BP Method: Large (Automatic)   Pulse: 72   Resp: 18   Temp: 96.8 °F (36 °C)   TempSrc: Tympanic   SpO2: 96%   Weight: (!) 142.4 kg (313 lb 14.4 oz)   Height: 6' 1" (1.854 m)       Wt Readings from Last 3 Encounters:   09/26/23 (!) 142.4 kg (313 lb 14.4 oz)   12/06/22 (!) 150.8 kg (332 lb 5.5 oz)   10/20/22 (!) 154.7 kg (341 lb)       Physical Exam  Constitutional:       General: He is not in acute distress.     Appearance: Normal appearance. He is well-developed.   HENT:      Head: Normocephalic and atraumatic.   Eyes:      Conjunctiva/sclera: Conjunctivae normal.   Cardiovascular:      Rate and Rhythm: Normal rate and regular rhythm.      Pulses: Normal pulses.      Heart sounds: Normal heart sounds. No murmur heard.  Pulmonary:      Effort: Pulmonary effort is normal. No respiratory distress.      Breath sounds: Normal breath sounds.   Abdominal:      General: Bowel sounds are normal. There is no distension.      Palpations: Abdomen is soft.      Tenderness: There is no abdominal tenderness.   Musculoskeletal:         General: Normal range of motion.      Cervical back: Normal range of motion and neck supple.   Skin:     General: Skin is warm and dry.      Findings: No rash.   Neurological:      General: No focal deficit present.      Mental Status: He is alert and oriented to person, place, and time.   Psychiatric:         Mood and Affect: Mood normal.         Behavior: Behavior normal.         Health Maintenance   Topic Date Due    TETANUS VACCINE  Never done    Shingles Vaccine (2 of 2) 11/25/2020    Foot Exam  " 01/14/2021    Colorectal Cancer Screening  03/16/2021    Hemoglobin A1c  09/19/2023    Eye Exam  10/07/2023    Lipid Panel  06/19/2024    Hepatitis C Screening  Completed

## 2023-09-26 NOTE — TELEPHONE ENCOUNTER
No care due was identified.  [facility] Embedded Care Due Messages. Reference number: [id number].   9/26/2023 1:11:57 AM CDT

## 2023-09-26 NOTE — TELEPHONE ENCOUNTER
Refill Routing Note   Medication(s) are not appropriate for processing by [facility] for the following reason(s):      Required labs outdated    [facility] action(s):  Defer  Approve Care Due:  None identified            Appointments  past 12m or future 3m with PCP    Date Provider   Last Visit   10/11/2022 Anne Wright MD   Next Visit   Visit date not found Anne Wright MD   ED visits in past 90 days: 0        Note composed:7:01 AM 09/26/2023

## 2023-09-26 NOTE — PLAN OF CARE
Discharge instructions reviewed with pt. Pt states understanding and has no further questions. Pt AAOx4, stable.   
(1) Female

## 2023-09-27 ENCOUNTER — TELEPHONE (OUTPATIENT)
Dept: DIABETES | Facility: CLINIC | Age: 63
End: 2023-09-27
Payer: COMMERCIAL

## 2023-09-27 NOTE — TELEPHONE ENCOUNTER
I have spoken with the patient, went over results/recommendations/referral information, and he voiced understanding on all.  I have scheduled his Hem A1C fir repeat in 3 months.

## 2023-09-29 ENCOUNTER — TELEPHONE (OUTPATIENT)
Dept: DIABETES | Facility: CLINIC | Age: 63
End: 2023-09-29
Payer: COMMERCIAL

## 2023-10-02 ENCOUNTER — TELEPHONE (OUTPATIENT)
Dept: DIABETES | Facility: CLINIC | Age: 63
End: 2023-10-02
Payer: COMMERCIAL

## 2023-10-07 ENCOUNTER — PATIENT MESSAGE (OUTPATIENT)
Dept: PRIMARY CARE CLINIC | Facility: CLINIC | Age: 63
End: 2023-10-07
Payer: COMMERCIAL

## 2023-10-07 DIAGNOSIS — E11.42 TYPE 2 DIABETES MELLITUS WITH DIABETIC POLYNEUROPATHY, WITH LONG-TERM CURRENT USE OF INSULIN: ICD-10-CM

## 2023-10-07 DIAGNOSIS — Z79.4 TYPE 2 DIABETES MELLITUS WITH DIABETIC POLYNEUROPATHY, WITH LONG-TERM CURRENT USE OF INSULIN: ICD-10-CM

## 2023-10-07 RX ORDER — INSULIN GLARGINE 100 [IU]/ML
INJECTION, SOLUTION SUBCUTANEOUS
Qty: 75 ML | Refills: 1 | Status: SHIPPED | OUTPATIENT
Start: 2023-10-07

## 2023-10-07 NOTE — TELEPHONE ENCOUNTER
Refill Decision Note   Virgilio Acuña  is requesting a refill authorization.  Brief Assessment and Rationale for Refill:  Approve     Medication Therapy Plan:         Comments:     Note composed:12:39 PM 10/07/2023

## 2023-10-07 NOTE — TELEPHONE ENCOUNTER
No care due was identified.  [facility] Embedded Care Due Messages. Reference number: [id number].   10/07/2023 11:31:31 AM CDT

## 2023-10-12 ENCOUNTER — PATIENT MESSAGE (OUTPATIENT)
Dept: PRIMARY CARE CLINIC | Facility: CLINIC | Age: 63
End: 2023-10-12
Payer: COMMERCIAL

## 2023-10-12 DIAGNOSIS — I15.2 HYPERTENSION ASSOCIATED WITH TYPE 2 DIABETES MELLITUS: Primary | ICD-10-CM

## 2023-10-12 DIAGNOSIS — E11.59 HYPERTENSION ASSOCIATED WITH TYPE 2 DIABETES MELLITUS: Primary | ICD-10-CM

## 2023-10-12 RX ORDER — CHLORTHALIDONE 25 MG/1
25 TABLET ORAL DAILY
Qty: 90 TABLET | Refills: 3 | Status: SHIPPED | OUTPATIENT
Start: 2023-10-12 | End: 2023-10-16 | Stop reason: ALTCHOICE

## 2023-10-12 NOTE — TELEPHONE ENCOUNTER
Stop metoprolol. Start chlorthalidone instead for BP    I have signed for the following orders AND/OR meds.  Please call the patient and ask the patient to schedule the testing AND/OR inform about any medications that were sent. Medications have been sent to pharmacy listed below      No orders of the defined types were placed in this encounter.      Medications Ordered This Encounter   Medications    chlorthalidone (HYGROTEN) 25 MG Tab     Sig: Take 1 tablet (25 mg total) by mouth once daily.     Dispense:  90 tablet     Refill:  3     .         [id number] - [place] - [facility] [address] [facility] [address]  [facility] access [facility]  [place] [address]  Phone: [phone] Fax: [phone]    [facility] - [place], [place] - [address]  [address]  [address]  [facility] [address]  Phone: [phone] Fax: [phone]

## 2023-10-13 ENCOUNTER — PATIENT MESSAGE (OUTPATIENT)
Dept: PRIMARY CARE CLINIC | Facility: CLINIC | Age: 63
End: 2023-10-13
Payer: COMMERCIAL

## 2023-10-13 LAB — NONINV COLON CA DNA+OCC BLD SCRN STL QL: NEGATIVE

## 2023-10-16 RX ORDER — SPIRONOLACTONE 25 MG/1
25 TABLET ORAL DAILY
Qty: 90 TABLET | Refills: 3 | Status: SHIPPED | OUTPATIENT
Start: 2023-10-16 | End: 2024-10-15

## 2023-10-16 NOTE — PROGRESS NOTES
Cologuard testing is negative, repeat in 3 years, results released through [employer]. Please verify that patient has viewed results. If not, please call patient with interpretation below:    I have reviewed the results of your Cologuard testing for colon cancer screening. This test is negative. We will plan to repeat in 3 years.

## 2023-10-17 ENCOUNTER — PATIENT MESSAGE (OUTPATIENT)
Dept: PRIMARY CARE CLINIC | Facility: CLINIC | Age: 63
End: 2023-10-17
Payer: COMMERCIAL

## 2023-10-19 ENCOUNTER — PATIENT MESSAGE (OUTPATIENT)
Dept: PRIMARY CARE CLINIC | Facility: CLINIC | Age: 63
End: 2023-10-19
Payer: COMMERCIAL

## 2023-10-19 DIAGNOSIS — E11.42 TYPE 2 DIABETES MELLITUS WITH DIABETIC POLYNEUROPATHY, WITH LONG-TERM CURRENT USE OF INSULIN: ICD-10-CM

## 2023-10-19 DIAGNOSIS — Z79.4 TYPE 2 DIABETES MELLITUS WITH DIABETIC POLYNEUROPATHY, WITH LONG-TERM CURRENT USE OF INSULIN: ICD-10-CM

## 2023-10-19 RX ORDER — INSULIN ASPART 100 [IU]/ML
40 INJECTION, SOLUTION INTRAVENOUS; SUBCUTANEOUS
Qty: 90 ML | Refills: 3 | Status: SHIPPED | OUTPATIENT
Start: 2023-10-19 | End: 2023-10-27 | Stop reason: ALTCHOICE

## 2023-10-20 ENCOUNTER — PATIENT MESSAGE (OUTPATIENT)
Dept: PODIATRY | Facility: CLINIC | Age: 63
End: 2023-10-20
Payer: COMMERCIAL

## 2023-10-23 ENCOUNTER — TELEPHONE (OUTPATIENT)
Dept: FAMILY MEDICINE | Facility: CLINIC | Age: 63
End: 2023-10-23
Payer: COMMERCIAL

## 2023-10-23 NOTE — TELEPHONE ENCOUNTER
Called to speak with pharmacy but the number provided asked for a reference number, which was not provided, and then goes to a phone survey. Unable to contact pharmacy.

## 2023-10-23 NOTE — TELEPHONE ENCOUNTER
----- Message from Tracy Vitor sent at 10/23/2023 11:20 AM CDT -----  Contact: [facility]  .Type:  Pharmacy Calling to Clarify an RX    Name of Caller:Cass  Pharmacy Name: [facility]   Prescription Name:insulin aspart U-100 (NOVOLOG FLEXPEN U-100 INSULIN) 100 unit/mL (3 mL) InPn pen  What do they need to clarify?:Prescription coverage  Best Call Back Number:[phone]  Additional Information: Reports patient's insurance plan has a different prescription they would like to cover. If requesting to keep patient with current prescription, a prior authorization is needed.   Thank you,  GH

## 2023-10-24 ENCOUNTER — TELEPHONE (OUTPATIENT)
Dept: FAMILY MEDICINE | Facility: CLINIC | Age: 63
End: 2023-10-24
Payer: COMMERCIAL

## 2023-10-24 NOTE — TELEPHONE ENCOUNTER
----- Message from Velma Devi sent at 10/24/2023 10:52 AM CDT -----  Regarding: Pharmacy Calling  Contact: Gail  .Type:  Pharmacy Calling to Clarify an RX    Name of Caller:Gail  [facility] Name: [facility]  Prescription Name: insulin aspart U-100 (NOVOLOG FLEXPEN U-100 INSULIN) 100 unit/mL (3 mL) InPn pen  What do they need to clarify?: alternative   Best Call Back Number:  [phone] reference number: [id number]   Additional Information: Gail, from [facility] is requesting a callback in regards to the order to refill a prescription. It may not be covered and she need to verify how to proceed.

## 2023-10-24 NOTE — TELEPHONE ENCOUNTER
[facility] pharmacy asking for status on PA.  Informed pharmacy that we received a status of it being cancelled.  Informed pharmacy we would submit for another PA.

## 2023-10-25 ENCOUNTER — PATIENT MESSAGE (OUTPATIENT)
Dept: PRIMARY CARE CLINIC | Facility: CLINIC | Age: 63
End: 2023-10-25
Payer: COMMERCIAL

## 2023-10-25 DIAGNOSIS — Z79.4 TYPE 2 DIABETES MELLITUS WITH DIABETIC POLYNEUROPATHY, WITH LONG-TERM CURRENT USE OF INSULIN: ICD-10-CM

## 2023-10-25 DIAGNOSIS — E11.42 TYPE 2 DIABETES MELLITUS WITH DIABETIC POLYNEUROPATHY, WITH LONG-TERM CURRENT USE OF INSULIN: ICD-10-CM

## 2023-10-27 RX ORDER — INSULIN LISPRO 100 [IU]/ML
40 INJECTION, SOLUTION INTRAVENOUS; SUBCUTANEOUS 3 TIMES DAILY
Qty: 110 ML | Refills: 1 | Status: SHIPPED | OUTPATIENT
Start: 2023-10-27 | End: 2024-03-12 | Stop reason: SDUPTHER

## 2023-10-30 ENCOUNTER — PATIENT MESSAGE (OUTPATIENT)
Dept: PODIATRY | Facility: CLINIC | Age: 63
End: 2023-10-30
Payer: COMMERCIAL

## 2023-10-31 ENCOUNTER — PATIENT MESSAGE (OUTPATIENT)
Dept: PRIMARY CARE CLINIC | Facility: CLINIC | Age: 63
End: 2023-10-31
Payer: COMMERCIAL

## 2023-10-31 NOTE — TELEPHONE ENCOUNTER
I can only give a temporary tag not a permanent tag for that he will gerda to see his PCP but you can give him the paperwork for the temp tag

## 2023-11-09 ENCOUNTER — PATIENT MESSAGE (OUTPATIENT)
Dept: PRIMARY CARE CLINIC | Facility: CLINIC | Age: 63
End: 2023-11-09
Payer: COMMERCIAL

## 2023-11-17 ENCOUNTER — PATIENT MESSAGE (OUTPATIENT)
Dept: PRIMARY CARE CLINIC | Facility: CLINIC | Age: 63
End: 2023-11-17
Payer: COMMERCIAL

## 2023-11-17 DIAGNOSIS — Z79.4 TYPE 2 DIABETES MELLITUS WITH DIABETIC POLYNEUROPATHY, WITH LONG-TERM CURRENT USE OF INSULIN: ICD-10-CM

## 2023-11-17 DIAGNOSIS — I10 ESSENTIAL HYPERTENSION: ICD-10-CM

## 2023-11-17 DIAGNOSIS — E11.42 TYPE 2 DIABETES MELLITUS WITH DIABETIC POLYNEUROPATHY, WITH LONG-TERM CURRENT USE OF INSULIN: ICD-10-CM

## 2023-11-17 DIAGNOSIS — E78.2 MIXED HYPERLIPIDEMIA: ICD-10-CM

## 2023-11-17 RX ORDER — ENALAPRIL MALEATE 20 MG/1
20 TABLET ORAL 2 TIMES DAILY
Qty: 180 TABLET | Refills: 3 | Status: SHIPPED | OUTPATIENT
Start: 2023-11-17 | End: 2024-01-31 | Stop reason: SDUPTHER

## 2023-11-17 RX ORDER — METFORMIN HYDROCHLORIDE 500 MG/1
TABLET, EXTENDED RELEASE ORAL
Qty: 360 TABLET | Refills: 3 | Status: SHIPPED | OUTPATIENT
Start: 2023-11-17

## 2023-11-17 RX ORDER — GEMFIBROZIL 600 MG/1
600 TABLET, FILM COATED ORAL DAILY
Qty: 90 TABLET | Refills: 3 | Status: SHIPPED | OUTPATIENT
Start: 2023-11-17 | End: 2024-01-31 | Stop reason: SDUPTHER

## 2023-11-19 DIAGNOSIS — K21.9 GASTROESOPHAGEAL REFLUX DISEASE: ICD-10-CM

## 2023-11-19 RX ORDER — PANTOPRAZOLE SODIUM 40 MG/1
TABLET, DELAYED RELEASE ORAL
Qty: 90 TABLET | Refills: 0 | Status: SHIPPED | OUTPATIENT
Start: 2023-11-19 | End: 2024-01-31 | Stop reason: SDUPTHER

## 2023-12-07 ENCOUNTER — PATIENT MESSAGE (OUTPATIENT)
Dept: PRIMARY CARE CLINIC | Facility: CLINIC | Age: 63
End: 2023-12-07
Payer: COMMERCIAL

## 2023-12-07 DIAGNOSIS — I10 ESSENTIAL HYPERTENSION: ICD-10-CM

## 2023-12-07 DIAGNOSIS — G47.00 INSOMNIA, UNSPECIFIED TYPE: ICD-10-CM

## 2023-12-07 DIAGNOSIS — E78.2 MIXED HYPERLIPIDEMIA: ICD-10-CM

## 2023-12-07 RX ORDER — TRAZODONE HYDROCHLORIDE 150 MG/1
150 TABLET ORAL NIGHTLY
Qty: 90 TABLET | Refills: 3 | Status: SHIPPED | OUTPATIENT
Start: 2023-12-07

## 2023-12-07 RX ORDER — AMLODIPINE BESYLATE 10 MG/1
10 TABLET ORAL DAILY
Qty: 90 TABLET | Refills: 3 | Status: SHIPPED | OUTPATIENT
Start: 2023-12-07

## 2023-12-07 RX ORDER — PRAVASTATIN SODIUM 40 MG/1
40 TABLET ORAL DAILY
Qty: 90 TABLET | Refills: 3 | Status: SHIPPED | OUTPATIENT
Start: 2023-12-07

## 2023-12-13 ENCOUNTER — PATIENT MESSAGE (OUTPATIENT)
Dept: PRIMARY CARE CLINIC | Facility: CLINIC | Age: 63
End: 2023-12-13
Payer: COMMERCIAL

## 2023-12-13 DIAGNOSIS — B35.6 TINEA CRURIS: Primary | ICD-10-CM

## 2023-12-14 ENCOUNTER — PATIENT MESSAGE (OUTPATIENT)
Dept: PRIMARY CARE CLINIC | Facility: CLINIC | Age: 63
End: 2023-12-14
Payer: COMMERCIAL

## 2023-12-14 RX ORDER — TERBINAFINE HYDROCHLORIDE 250 MG/1
250 TABLET ORAL DAILY
Qty: 7 TABLET | Refills: 0 | Status: SHIPPED | OUTPATIENT
Start: 2023-12-14 | End: 2023-12-21

## 2023-12-14 NOTE — TELEPHONE ENCOUNTER
I have signed for the following orders AND/OR meds.  Please call the patient and ask the patient to schedule the testing AND/OR inform about any medications that were sent. Medications have been sent to pharmacy listed below      No orders of the defined types were placed in this encounter.      Medications Ordered This Encounter   Medications    terbinafine HCL (LAMISIL) 250 mg tablet     Sig: Take 1 tablet (250 mg total) by mouth once daily. for 7 days     Dispense:  7 tablet     Refill:  0         [facility] - [place], [place] - [facility] [address] [facility]  [address] [facility]  [address]  [facility] [address]  Phone: [phone] Fax: [phone]    [id number] - [place] [facility] [address] [facility] [address]  [address] [place] [address]  Phone: [phone] Fax: [phone]

## 2023-12-25 ENCOUNTER — PATIENT MESSAGE (OUTPATIENT)
Dept: PRIMARY CARE CLINIC | Facility: CLINIC | Age: 63
End: 2023-12-25
Payer: COMMERCIAL

## 2023-12-27 ENCOUNTER — PATIENT MESSAGE (OUTPATIENT)
Dept: PODIATRY | Facility: CLINIC | Age: 63
End: 2023-12-27
Payer: COMMERCIAL

## 2024-01-28 ENCOUNTER — PATIENT MESSAGE (OUTPATIENT)
Dept: PRIMARY CARE CLINIC | Facility: CLINIC | Age: 64
End: 2024-01-28
Payer: COMMERCIAL

## 2024-01-30 ENCOUNTER — PATIENT MESSAGE (OUTPATIENT)
Dept: FAMILY MEDICINE | Facility: CLINIC | Age: 64
End: 2024-01-30
Payer: COMMERCIAL

## 2024-01-30 ENCOUNTER — PATIENT MESSAGE (OUTPATIENT)
Dept: PODIATRY | Facility: CLINIC | Age: 64
End: 2024-01-30
Payer: COMMERCIAL

## 2024-01-30 DIAGNOSIS — E11.42 TYPE 2 DIABETES MELLITUS WITH DIABETIC POLYNEUROPATHY, WITH LONG-TERM CURRENT USE OF INSULIN: ICD-10-CM

## 2024-01-30 DIAGNOSIS — K21.9 GASTROESOPHAGEAL REFLUX DISEASE: ICD-10-CM

## 2024-01-30 DIAGNOSIS — I10 ESSENTIAL HYPERTENSION: ICD-10-CM

## 2024-01-30 DIAGNOSIS — Z79.4 TYPE 2 DIABETES MELLITUS WITH DIABETIC POLYNEUROPATHY, WITH LONG-TERM CURRENT USE OF INSULIN: ICD-10-CM

## 2024-01-30 DIAGNOSIS — E78.2 MIXED HYPERLIPIDEMIA: ICD-10-CM

## 2024-01-31 ENCOUNTER — PATIENT MESSAGE (OUTPATIENT)
Dept: PODIATRY | Facility: CLINIC | Age: 64
End: 2024-01-31
Payer: COMMERCIAL

## 2024-01-31 ENCOUNTER — PATIENT MESSAGE (OUTPATIENT)
Dept: PRIMARY CARE CLINIC | Facility: CLINIC | Age: 64
End: 2024-01-31
Payer: COMMERCIAL

## 2024-01-31 DIAGNOSIS — Z79.4 TYPE 2 DIABETES MELLITUS WITH DIABETIC POLYNEUROPATHY, WITH LONG-TERM CURRENT USE OF INSULIN: ICD-10-CM

## 2024-01-31 DIAGNOSIS — E11.42 TYPE 2 DIABETES MELLITUS WITH DIABETIC POLYNEUROPATHY, WITH LONG-TERM CURRENT USE OF INSULIN: ICD-10-CM

## 2024-01-31 RX ORDER — GEMFIBROZIL 600 MG/1
600 TABLET, FILM COATED ORAL DAILY
Qty: 90 TABLET | Refills: 3 | Status: SHIPPED | OUTPATIENT
Start: 2024-01-31 | End: 2024-04-08 | Stop reason: SDUPTHER

## 2024-01-31 RX ORDER — ENALAPRIL MALEATE 20 MG/1
20 TABLET ORAL 2 TIMES DAILY
Qty: 180 TABLET | Refills: 3 | Status: SHIPPED | OUTPATIENT
Start: 2024-01-31 | End: 2024-04-08 | Stop reason: SDUPTHER

## 2024-01-31 RX ORDER — PANTOPRAZOLE SODIUM 40 MG/1
TABLET, DELAYED RELEASE ORAL
Qty: 90 TABLET | Refills: 3 | Status: SHIPPED | OUTPATIENT
Start: 2024-01-31 | End: 2024-04-08 | Stop reason: SDUPTHER

## 2024-01-31 NOTE — TELEPHONE ENCOUNTER
Care Due:                  Date            Visit Type   Department     Provider  --------------------------------------------------------------------------------                                EP -                              PRIMARY  Last Visit: 10-      CARE ([facility])   None Found     Anne Wright  Next Visit: None Scheduled  None         None Found                                                            Last  Test          Frequency    Reason                     Performed    Due Date  --------------------------------------------------------------------------------    Office Visit  15 months..  insulin, spironolactone..  10-   01-    HBA1C.......  6 months...  insulin..................  09- 03-    [facility] Embedded Care Due Messages. Reference number: [id number].   1/31/2024 7:36:04 AM CST

## 2024-01-31 NOTE — TELEPHONE ENCOUNTER
Refill Routing Note   Medication(s) are not appropriate for processing by [facility] for the following reason(s):        Patient not seen by provider within 15 months    [facility] action(s):  Defer   Requires appointment : Yes     Requires labs : Yes             Appointments  past 12m or future 3m with PCP    Date Provider   Last Visit   10/11/2022 Anne Wright MD   Next Visit   Visit date not found Anne Wright MD   ED visits in past 90 days: 0        Note composed:7:46 AM 01/31/2024

## 2024-02-07 ENCOUNTER — OFFICE VISIT (OUTPATIENT)
Dept: PODIATRY | Facility: CLINIC | Age: 64
End: 2024-02-07
Payer: COMMERCIAL

## 2024-02-07 ENCOUNTER — HOSPITAL ENCOUNTER (OUTPATIENT)
Dept: RADIOLOGY | Facility: HOSPITAL | Age: 64
Discharge: HOME | End: 2024-02-07
Attending: PODIATRIST
Payer: COMMERCIAL

## 2024-02-07 ENCOUNTER — PATIENT MESSAGE (OUTPATIENT)
Dept: PODIATRY | Facility: CLINIC | Age: 64
End: 2024-02-07

## 2024-02-07 VITALS — WEIGHT: 313 LBS | BODY MASS INDEX: 41.48 KG/M2 | HEIGHT: 73 IN

## 2024-02-07 DIAGNOSIS — G89.29 CHRONIC LOW BACK PAIN WITH SCIATICA, SCIATICA LATERALITY UNSPECIFIED, UNSPECIFIED BACK PAIN LATERALITY: ICD-10-CM

## 2024-02-07 DIAGNOSIS — M14.672 CHARCOT'S JOINT OF LEFT FOOT: ICD-10-CM

## 2024-02-07 DIAGNOSIS — M54.40 CHRONIC LOW BACK PAIN WITH SCIATICA, SCIATICA LATERALITY UNSPECIFIED, UNSPECIFIED BACK PAIN LATERALITY: ICD-10-CM

## 2024-02-07 DIAGNOSIS — R60.0 EDEMA OF RIGHT FOOT: ICD-10-CM

## 2024-02-07 DIAGNOSIS — Z89.422 H/O AMPUTATION OF LESSER TOE, LEFT: ICD-10-CM

## 2024-02-07 DIAGNOSIS — E11.49 TYPE II DIABETES MELLITUS WITH NEUROLOGICAL MANIFESTATIONS: ICD-10-CM

## 2024-02-07 DIAGNOSIS — E11.49 TYPE II DIABETES MELLITUS WITH NEUROLOGICAL MANIFESTATIONS: Primary | ICD-10-CM

## 2024-02-07 PROCEDURE — 99214 OFFICE O/P EST MOD 30 MIN: CPT | Mod: S$GLB,,, | Performed by: PODIATRIST

## 2024-02-07 PROCEDURE — 99999 PR PBB SHADOW E&M-EST. PATIENT-LVL III: CPT | Mod: PBBFAC,,, | Performed by: PODIATRIST

## 2024-02-07 PROCEDURE — 73630 X-RAY EXAM OF FOOT: CPT | Mod: TC,50,PO

## 2024-02-07 PROCEDURE — 73610 X-RAY EXAM OF ANKLE: CPT | Mod: 26,LT,, | Performed by: RADIOLOGY

## 2024-02-07 PROCEDURE — 4010F ACE/ARB THERAPY RXD/TAKEN: CPT | Mod: CPTII,S$GLB,, | Performed by: PODIATRIST

## 2024-02-07 PROCEDURE — 73610 X-RAY EXAM OF ANKLE: CPT | Mod: TC,PO,LT

## 2024-02-07 PROCEDURE — 73630 X-RAY EXAM OF FOOT: CPT | Mod: 26,,, | Performed by: RADIOLOGY

## 2024-02-07 PROCEDURE — 1160F RVW MEDS BY RX/DR IN RCRD: CPT | Mod: CPTII,S$GLB,, | Performed by: PODIATRIST

## 2024-02-07 PROCEDURE — 1159F MED LIST DOCD IN RCRD: CPT | Mod: CPTII,S$GLB,, | Performed by: PODIATRIST

## 2024-02-07 PROCEDURE — 3008F BODY MASS INDEX DOCD: CPT | Mod: CPTII,S$GLB,, | Performed by: PODIATRIST

## 2024-02-07 NOTE — PROGRESS NOTES
Subjective:      Patient ID: Virgilio Acuña is a 63 y.o. male.    Chief Complaint: Foot Pain (Right)      Virgilio is a 63 y.o. male    2/7/24: Patient returns noting left foot and ankle pain to the lateral aspect worse after activity when at rest, starting up on the right side some as well but more in the lateral foot. Worsening over the last few weeks. On Gabapentin 900 mg TID. Has history of back pain with radiculopathy as well    PCP: Anne Wright MD    Date Last Seen by PCP:   Current shoe gear:  Affected Foot: Football with darco     Unaffected Foot: Tennis shoes    History of Trauma: negative      Hemoglobin A1C   Date Value Ref Range Status   09/26/2023 9.8 (H) 4.0 - 5.6 % Final     Comment:     ADA Screening Guidelines:  5.7-6.4%  Consistent with prediabetes  >or=6.5%  Consistent with diabetes    High levels of fetal hemoglobin interfere with the HbA1C  assay. Heterozygous hemoglobin variants (HbS, HgC, etc)do  not significantly interfere with this assay.   However, presence of multiple variants may affect accuracy.     09/20/2022 9.9 (H) 4.0 - 5.6 % Final     Comment:     ADA Screening Guidelines:  5.7-6.4%  Consistent with prediabetes  >or=6.5%  Consistent with diabetes    High levels of fetal hemoglobin interfere with the HbA1C  assay. Heterozygous hemoglobin variants (HbS, HgC, etc)do  not significantly interfere with this assay.   However, presence of multiple variants may affect accuracy.     02/16/2022 10.2 (H) 4.0 - 5.6 % Final     Comment:     ADA Screening Guidelines:  5.7-6.4%  Consistent with prediabetes  >or=6.5%  Consistent with diabetes    High levels of fetal hemoglobin interfere with the HbA1C  assay. Heterozygous hemoglobin variants (HbS, HgC, etc)do  not significantly interfere with this assay.   However, presence of multiple variants may affect accuracy.         Review of Systems   Constitutional: Negative for chills and fever.   Cardiovascular:  Positive for leg swelling. Negative  for claudication.   Respiratory:  Negative for shortness of breath.    Skin:  Positive for color change, nail changes and poor wound healing. Negative for itching and rash.   Musculoskeletal:  Negative for muscle cramps, muscle weakness and myalgias.   Gastrointestinal:  Negative for nausea and vomiting.   Neurological:  Positive for numbness and paresthesias. Negative for focal weakness and loss of balance.           Objective:       Physical Exam  Constitutional:       General: He is not in acute distress.     Appearance: He is well-developed. He is not diaphoretic.   Cardiovascular:      Pulses:           Dorsalis pedis pulses are 2+ on the right side and 2+ on the left side.        Posterior tibial pulses are 2+ on the right side and 2+ on the left side.      Comments: < 3 sec capillary refill time to toes 1-5 bilateral. Toes and feet are warm to touch proximally with normal distal cooling b/l. There is no hair growth on the feet and toes b/l. There is moderate edema left foot and mild edema right.     Musculoskeletal:      Comments: Left second toe amputation    Left foot now more narrow in appearance with the first ray properly reduced, minimal edema to the foot and no pain with palpation throughout the area of surgery.    Left ankle there is some pain with palpation to the lateral malleolus and the ATFL area as well as the peroneals     Dorsal left foot midfoot there is a bony prominence with some tenderness to palpation    Equinus noted b/l ankles with < 10 deg DF noted. MMT 5/5 in DF/PF/Inv/Ev resistance with no reproduction of pain in any direction. Passive range of motion of ankle and pedal joints is painless b/l.     Feet:      Right foot:      Protective Sensation: 10 sites tested.  0 sites sensed.      Left foot:      Protective Sensation: 10 sites tested.  0 sites sensed.   Skin:     General: Skin is warm.      Coloration: Skin is not pale.      Findings: No abrasion, bruising, burn, ecchymosis,  erythema, laceration, lesion, petechiae or rash.      Nails: There is no clubbing.      Comments: Incision overlying the left second metatarsal base is well healed      Ulcer Location: left plantar hallux  Measurements: 0 x 0 x 0 cm  Periwound: Intact  Drainage: None.  Pus: None.  Malodor: None.  Base:  100% epithelial tissue.  Signs of infection: None.       Neurological:      Mental Status: He is alert and oriented to person, place, and time.      Sensory: Sensory deficit present.      Motor: No tremor, atrophy or abnormal muscle tone.      Comments: Negative tinel sign bilateral.   Psychiatric:         Behavior: Behavior normal.               Assessment:       Encounter Diagnoses   Name Primary?    Type II diabetes mellitus with neurological manifestations Yes    Chronic low back pain with sciatica, sciatica laterality unspecified, unspecified back pain laterality     Charcot's joint of left foot     Edema of right foot     H/O amputation of lesser toe, left                     Plan:       Virgilio was seen today for foot pain.    Diagnoses and all orders for this visit:    Type II diabetes mellitus with neurological manifestations  -     X-Ray Foot Complete Bilateral; Future  -     X-Ray Ankle Complete Left; Future  -     DIABETIC SHOES FOR HOME USE    Chronic low back pain with sciatica, sciatica laterality unspecified, unspecified back pain laterality  -     X-Ray Foot Complete Bilateral; Future    Charcot's joint of left foot  -     X-Ray Foot Complete Bilateral; Future  -     X-Ray Ankle Complete Left; Future  -     DIABETIC SHOES FOR HOME USE    Edema of right foot  -     X-Ray Foot Complete Bilateral; Future  -     DIABETIC SHOES FOR HOME USE    H/O amputation of lesser toe, left  -     DIABETIC SHOES FOR HOME USE              I counseled the patient on his conditions, their implications and medical management.    Shoe inspection. Diabetic Foot Education. Patient reminded of the importance of good nutrition  and blood sugar control to help prevent podiatric complications of diabetes. Patient instructed on proper foot hygeine. We discussed wearing proper shoe gear, daily foot inspections, never walking without protective shoe gear, never putting sharp instruments to feet    Will begin the process of getting diabetic shoes with a custom insert    Dispensed, fitted and gait trained with orthopedic boot left foot to be worn 3-4 weeks to offload the left foot    Return in 4 weeks for follow up      Inocente Smith DPM

## 2024-02-08 ENCOUNTER — PATIENT MESSAGE (OUTPATIENT)
Dept: PODIATRY | Facility: CLINIC | Age: 64
End: 2024-02-08
Payer: COMMERCIAL

## 2024-02-19 ENCOUNTER — PATIENT MESSAGE (OUTPATIENT)
Dept: PRIMARY CARE CLINIC | Facility: CLINIC | Age: 64
End: 2024-02-19
Payer: COMMERCIAL

## 2024-03-12 ENCOUNTER — PATIENT MESSAGE (OUTPATIENT)
Dept: PRIMARY CARE CLINIC | Facility: CLINIC | Age: 64
End: 2024-03-12
Payer: COMMERCIAL

## 2024-03-12 DIAGNOSIS — Z79.4 TYPE 2 DIABETES MELLITUS WITH DIABETIC POLYNEUROPATHY, WITH LONG-TERM CURRENT USE OF INSULIN: Primary | ICD-10-CM

## 2024-03-12 DIAGNOSIS — E11.42 TYPE 2 DIABETES MELLITUS WITH DIABETIC POLYNEUROPATHY, WITH LONG-TERM CURRENT USE OF INSULIN: Primary | ICD-10-CM

## 2024-03-12 RX ORDER — INSULIN LISPRO 100 [IU]/ML
40 INJECTION, SOLUTION INTRAVENOUS; SUBCUTANEOUS 3 TIMES DAILY
Qty: 110 ML | Refills: 1 | Status: SHIPPED | OUTPATIENT
Start: 2024-03-12 | End: 2025-03-12

## 2024-04-08 ENCOUNTER — PATIENT MESSAGE (OUTPATIENT)
Dept: PRIMARY CARE CLINIC | Facility: CLINIC | Age: 64
End: 2024-04-08
Payer: COMMERCIAL

## 2024-04-08 DIAGNOSIS — Z79.4 TYPE 2 DIABETES MELLITUS WITH DIABETIC POLYNEUROPATHY, WITH LONG-TERM CURRENT USE OF INSULIN: ICD-10-CM

## 2024-04-08 DIAGNOSIS — E78.2 MIXED HYPERLIPIDEMIA: ICD-10-CM

## 2024-04-08 DIAGNOSIS — I10 ESSENTIAL HYPERTENSION: ICD-10-CM

## 2024-04-08 DIAGNOSIS — K21.9 GASTROESOPHAGEAL REFLUX DISEASE: ICD-10-CM

## 2024-04-08 DIAGNOSIS — E11.42 TYPE 2 DIABETES MELLITUS WITH DIABETIC POLYNEUROPATHY, WITH LONG-TERM CURRENT USE OF INSULIN: ICD-10-CM

## 2024-04-08 DIAGNOSIS — G47.00 INSOMNIA, UNSPECIFIED TYPE: ICD-10-CM

## 2024-04-08 RX ORDER — TRAZODONE HYDROCHLORIDE 150 MG/1
150 TABLET ORAL NIGHTLY
Qty: 90 TABLET | Refills: 3 | Status: SHIPPED | OUTPATIENT
Start: 2024-04-08

## 2024-04-08 RX ORDER — PANTOPRAZOLE SODIUM 40 MG/1
TABLET, DELAYED RELEASE ORAL
Qty: 90 TABLET | Refills: 3 | Status: SHIPPED | OUTPATIENT
Start: 2024-04-08

## 2024-04-08 RX ORDER — PRAVASTATIN SODIUM 40 MG/1
40 TABLET ORAL DAILY
Qty: 90 TABLET | Refills: 3 | Status: SHIPPED | OUTPATIENT
Start: 2024-04-08

## 2024-04-08 RX ORDER — AMLODIPINE BESYLATE 10 MG/1
10 TABLET ORAL DAILY
Qty: 90 TABLET | Refills: 3 | Status: SHIPPED | OUTPATIENT
Start: 2024-04-08

## 2024-04-08 RX ORDER — METFORMIN HYDROCHLORIDE 500 MG/1
TABLET, EXTENDED RELEASE ORAL
Qty: 360 TABLET | Refills: 3 | Status: SHIPPED | OUTPATIENT
Start: 2024-04-08

## 2024-04-08 RX ORDER — ENALAPRIL MALEATE 20 MG/1
20 TABLET ORAL 2 TIMES DAILY
Qty: 180 TABLET | Refills: 3 | Status: SHIPPED | OUTPATIENT
Start: 2024-04-08

## 2024-04-08 RX ORDER — GEMFIBROZIL 600 MG/1
600 TABLET, FILM COATED ORAL DAILY
Qty: 90 TABLET | Refills: 3 | Status: SHIPPED | OUTPATIENT
Start: 2024-04-08

## 2024-04-08 NOTE — TELEPHONE ENCOUNTER
Refill Routing Note   Medication(s) are not appropriate for processing by [facility] for the following reason(s):        Non-participating provider    [facility] action(s):  Route               Appointments  past 12m or future 3m with PCP    Date Provider   Last Visit   9/26/2023 Laura Buchanan PA-C   Next Visit   Visit date not found Laura Buchanan PA-C   ED visits in past 90 days: 0        Note composed:3:57 PM 04/08/2024

## 2024-04-15 ENCOUNTER — PATIENT MESSAGE (OUTPATIENT)
Dept: PRIMARY CARE CLINIC | Facility: CLINIC | Age: 64
End: 2024-04-15
Payer: COMMERCIAL

## 2024-04-18 ENCOUNTER — LAB VISIT (OUTPATIENT)
Dept: LAB | Facility: HOSPITAL | Age: 64
End: 2024-04-18
Attending: PHYSICIAN ASSISTANT
Payer: COMMERCIAL

## 2024-04-18 ENCOUNTER — OFFICE VISIT (OUTPATIENT)
Dept: FAMILY MEDICINE | Facility: CLINIC | Age: 64
End: 2024-04-18
Payer: COMMERCIAL

## 2024-04-18 ENCOUNTER — PATIENT MESSAGE (OUTPATIENT)
Dept: PRIMARY CARE CLINIC | Facility: CLINIC | Age: 64
End: 2024-04-18
Payer: COMMERCIAL

## 2024-04-18 VITALS
SYSTOLIC BLOOD PRESSURE: 133 MMHG | BODY MASS INDEX: 41.31 KG/M2 | HEIGHT: 73 IN | WEIGHT: 311.69 LBS | HEART RATE: 71 BPM | DIASTOLIC BLOOD PRESSURE: 75 MMHG | RESPIRATION RATE: 18 BRPM | OXYGEN SATURATION: 97 %

## 2024-04-18 DIAGNOSIS — E11.59 HYPERTENSION ASSOCIATED WITH TYPE 2 DIABETES MELLITUS: ICD-10-CM

## 2024-04-18 DIAGNOSIS — Z89.422 HISTORY OF AMPUTATION OF LESSER TOE, LEFT: ICD-10-CM

## 2024-04-18 DIAGNOSIS — Z79.4 TYPE 2 DIABETES MELLITUS WITH DIABETIC POLYNEUROPATHY, WITH LONG-TERM CURRENT USE OF INSULIN: ICD-10-CM

## 2024-04-18 DIAGNOSIS — B35.6 TINEA CRURIS: ICD-10-CM

## 2024-04-18 DIAGNOSIS — E11.69 HYPERLIPIDEMIA ASSOCIATED WITH TYPE 2 DIABETES MELLITUS: ICD-10-CM

## 2024-04-18 DIAGNOSIS — E78.5 HYPERLIPIDEMIA ASSOCIATED WITH TYPE 2 DIABETES MELLITUS: ICD-10-CM

## 2024-04-18 DIAGNOSIS — I15.2 HYPERTENSION ASSOCIATED WITH TYPE 2 DIABETES MELLITUS: ICD-10-CM

## 2024-04-18 DIAGNOSIS — K21.9 GASTROESOPHAGEAL REFLUX DISEASE WITHOUT ESOPHAGITIS: ICD-10-CM

## 2024-04-18 DIAGNOSIS — E11.42 TYPE 2 DIABETES MELLITUS WITH DIABETIC POLYNEUROPATHY, WITH LONG-TERM CURRENT USE OF INSULIN: ICD-10-CM

## 2024-04-18 DIAGNOSIS — E11.42 TYPE 2 DIABETES MELLITUS WITH DIABETIC POLYNEUROPATHY, WITH LONG-TERM CURRENT USE OF INSULIN: Primary | ICD-10-CM

## 2024-04-18 DIAGNOSIS — E66.01 SEVERE OBESITY (BMI >= 40): ICD-10-CM

## 2024-04-18 DIAGNOSIS — F51.01 PRIMARY INSOMNIA: ICD-10-CM

## 2024-04-18 DIAGNOSIS — G62.9 NEUROPATHY: ICD-10-CM

## 2024-04-18 DIAGNOSIS — Z79.4 TYPE 2 DIABETES MELLITUS WITH DIABETIC POLYNEUROPATHY, WITH LONG-TERM CURRENT USE OF INSULIN: Primary | ICD-10-CM

## 2024-04-18 LAB
ALBUMIN SERPL BCP-MCNC: 3.8 G/DL (ref 3.5–5.2)
ALP SERPL-CCNC: 59 U/L (ref 55–135)
ALT SERPL W/O P-5'-P-CCNC: 23 U/L (ref 10–44)
ANION GAP SERPL CALC-SCNC: 10 MMOL/L (ref 8–16)
AST SERPL-CCNC: 21 U/L (ref 10–40)
BASOPHILS # BLD AUTO: 0.06 K/UL (ref 0–0.2)
BASOPHILS NFR BLD: 0.8 % (ref 0–1.9)
BILIRUB SERPL-MCNC: 0.7 MG/DL (ref 0.1–1)
BUN SERPL-MCNC: 15 MG/DL (ref 8–23)
CALCIUM SERPL-MCNC: 9.6 MG/DL (ref 8.7–10.5)
CHLORIDE SERPL-SCNC: 103 MMOL/L (ref 95–110)
CHOLEST SERPL-MCNC: 194 MG/DL (ref 120–199)
CHOLEST/HDLC SERPL: 5.9 {RATIO} (ref 2–5)
CO2 SERPL-SCNC: 24 MMOL/L (ref 23–29)
CREAT SERPL-MCNC: 1 MG/DL (ref 0.5–1.4)
DIFFERENTIAL METHOD BLD: NORMAL
EOSINOPHIL # BLD AUTO: 0.1 K/UL (ref 0–0.5)
EOSINOPHIL NFR BLD: 1.7 % (ref 0–8)
ERYTHROCYTE [DISTWIDTH] IN BLOOD BY AUTOMATED COUNT: 13.3 % (ref 11.5–14.5)
EST. GFR  (NO RACE VARIABLE): >60 ML/MIN/1.73 M^2
ESTIMATED AVG GLUCOSE: 197 MG/DL (ref 68–131)
GLUCOSE SERPL-MCNC: 181 MG/DL (ref 70–110)
HBA1C MFR BLD: 8.5 % (ref 4–5.6)
HCT VFR BLD AUTO: 47.8 % (ref 40–54)
HDLC SERPL-MCNC: 33 MG/DL (ref 40–75)
HDLC SERPL: 17 % (ref 20–50)
HGB BLD-MCNC: 15.8 G/DL (ref 14–18)
IMM GRANULOCYTES # BLD AUTO: 0.02 K/UL (ref 0–0.04)
IMM GRANULOCYTES NFR BLD AUTO: 0.3 % (ref 0–0.5)
LDLC SERPL CALC-MCNC: 107.4 MG/DL (ref 63–159)
LYMPHOCYTES # BLD AUTO: 1.5 K/UL (ref 1–4.8)
LYMPHOCYTES NFR BLD: 18.9 % (ref 18–48)
MCH RBC QN AUTO: 29.4 PG (ref 27–31)
MCHC RBC AUTO-ENTMCNC: 33.1 G/DL (ref 32–36)
MCV RBC AUTO: 89 FL (ref 82–98)
MONOCYTES # BLD AUTO: 0.6 K/UL (ref 0.3–1)
MONOCYTES NFR BLD: 7.7 % (ref 4–15)
NEUTROPHILS # BLD AUTO: 5.5 K/UL (ref 1.8–7.7)
NEUTROPHILS NFR BLD: 70.6 % (ref 38–73)
NONHDLC SERPL-MCNC: 161 MG/DL
NRBC BLD-RTO: 0 /100 WBC
PLATELET # BLD AUTO: 284 K/UL (ref 150–450)
PMV BLD AUTO: 10.1 FL (ref 9.2–12.9)
POTASSIUM SERPL-SCNC: 4.6 MMOL/L (ref 3.5–5.1)
PROT SERPL-MCNC: 6.8 G/DL (ref 6–8.4)
RBC # BLD AUTO: 5.37 M/UL (ref 4.6–6.2)
SODIUM SERPL-SCNC: 137 MMOL/L (ref 136–145)
TRIGL SERPL-MCNC: 268 MG/DL (ref 30–150)
WBC # BLD AUTO: 7.71 K/UL (ref 3.9–12.7)

## 2024-04-18 PROCEDURE — 80061 LIPID PANEL: CPT | Performed by: PHYSICIAN ASSISTANT

## 2024-04-18 PROCEDURE — 1159F MED LIST DOCD IN RCRD: CPT | Mod: CPTII,S$GLB,, | Performed by: PHYSICIAN ASSISTANT

## 2024-04-18 PROCEDURE — 3008F BODY MASS INDEX DOCD: CPT | Mod: CPTII,S$GLB,, | Performed by: PHYSICIAN ASSISTANT

## 2024-04-18 PROCEDURE — 99214 OFFICE O/P EST MOD 30 MIN: CPT | Mod: S$GLB,,, | Performed by: PHYSICIAN ASSISTANT

## 2024-04-18 PROCEDURE — 85025 COMPLETE CBC W/AUTO DIFF WBC: CPT | Performed by: PHYSICIAN ASSISTANT

## 2024-04-18 PROCEDURE — 80053 COMPREHEN METABOLIC PANEL: CPT | Performed by: PHYSICIAN ASSISTANT

## 2024-04-18 PROCEDURE — 3078F DIAST BP <80 MM HG: CPT | Mod: CPTII,S$GLB,, | Performed by: PHYSICIAN ASSISTANT

## 2024-04-18 PROCEDURE — 83036 HEMOGLOBIN GLYCOSYLATED A1C: CPT | Performed by: PHYSICIAN ASSISTANT

## 2024-04-18 PROCEDURE — 36415 COLL VENOUS BLD VENIPUNCTURE: CPT | Mod: PO | Performed by: PHYSICIAN ASSISTANT

## 2024-04-18 PROCEDURE — 1160F RVW MEDS BY RX/DR IN RCRD: CPT | Mod: CPTII,S$GLB,, | Performed by: PHYSICIAN ASSISTANT

## 2024-04-18 PROCEDURE — 99999 PR PBB SHADOW E&M-EST. PATIENT-LVL V: CPT | Mod: PBBFAC,,, | Performed by: PHYSICIAN ASSISTANT

## 2024-04-18 PROCEDURE — 3075F SYST BP GE 130 - 139MM HG: CPT | Mod: CPTII,S$GLB,, | Performed by: PHYSICIAN ASSISTANT

## 2024-04-18 PROCEDURE — 4010F ACE/ARB THERAPY RXD/TAKEN: CPT | Mod: CPTII,S$GLB,, | Performed by: PHYSICIAN ASSISTANT

## 2024-04-18 RX ORDER — NYSTATIN 100000 [USP'U]/G
POWDER TOPICAL 2 TIMES DAILY
Qty: 60 G | Refills: 2 | Status: SHIPPED | OUTPATIENT
Start: 2024-04-18

## 2024-04-18 RX ORDER — HYDROCHLOROTHIAZIDE 25 MG/1
25 TABLET ORAL DAILY
Qty: 90 TABLET | Refills: 3 | Status: SHIPPED | OUTPATIENT
Start: 2024-04-18 | End: 2025-04-18

## 2024-04-18 RX ORDER — CLOTRIMAZOLE AND BETAMETHASONE DIPROPIONATE 10; .64 MG/G; MG/G
CREAM TOPICAL 2 TIMES DAILY
Qty: 45 G | Refills: 2 | Status: SHIPPED | OUTPATIENT
Start: 2024-04-18

## 2024-04-18 NOTE — PROGRESS NOTES
Assessment/Plan:    Problem List Items Addressed This Visit          Neuro    Neuropathy    Overview     -hx of DDD and diabetic neuropathy  -currently on gabapentin 900 mg TID but still having symptoms  -needs to follow up with pain management for treatment of DDD            Cardiac/Vascular    Hyperlipidemia associated with type 2 diabetes mellitus    Overview     Hyperlipidemia Medications               gemfibroziL (LOPID) 600 MG tablet Take 1 tablet (600 mg total) by mouth once daily.    pravastatin (PRAVACHOL) 40 MG tablet Take 1 tablet (40 mg total) by mouth once daily.          -chronic condition. Currently stable.    -reports compliance with hyperlipidemia treatment as prescribed  -denies any known adverse effects of medications  -most recent labs listed below:  Lab Results   Component Value Date    CHOL 196 09/26/2023     Lab Results   Component Value Date    HDL 33 (L) 09/26/2023     Lab Results   Component Value Date    LDLCALC Invalid, Trig>400.0 09/26/2023     Lab Results   Component Value Date    TRIG 415 (H) 09/26/2023     Lab Results   Component Value Date    ALT 19 09/26/2023    AST 18 09/26/2023    ALKPHOS 56 09/26/2023    BILITOT 0.6 09/26/2023              Relevant Orders    Lipid Panel    Hypertension associated with type 2 diabetes mellitus    Overview     Hypertension Medications               amLODIPine (NORVASC) 10 MG tablet Take 1 tablet (10 mg total) by mouth once daily.    enalapril (VASOTEC) 20 MG tablet Take 1 tablet (20 mg total) by mouth 2 (two) times daily.          -at goal today; has been above goal on recent home checks  -previous AE with Toprol and Aldactone  -plan to start HCTZ 25 mg daily  -monitor BP closely at home; BP check in 2 weeks  -continue lifestyle modification with low sodium diet and exercise   -discussed hypertension disease course and importance of treating high blood pressure  -patient understood and advised of risk of untreated blood pressure. ER precautions  were given for symptoms of hypertensive urgency and emergency.           Relevant Medications    hydroCHLOROthiazide (HYDRODIURIL) 25 MG tablet    Other Relevant Orders    CBC Auto Differential    Comprehensive Metabolic Panel       Endocrine    Type 2 diabetes mellitus with diabetic polyneuropathy, with long-term current use of insulin - Primary    Overview     Diabetes Medications               glimepiride (AMARYL) 2 MG tablet Take 1 tablet (2 mg total) by mouth before breakfast.    insulin (LANTUS SOLOSTAR U-100 INSULIN) glargine 100 units/mL SubQ pen INJECT 40 UNITS            SUBCUTANEOUSLY TWO TIMES A DAY    insulin lispro (HUMALOG KWIKPEN INSULIN) 100 unit/mL pen Inject 40 Units into the skin 3 (three) times daily.    metFORMIN (GLUCOPHAGE-XR) 500 MG ER 24hr tablet TAKE 2 TABLETS TWO TIMES A DAY WITH MEALS        -condition is currently uncontrolled  -unable to start Farxiga and Januvia due to insurance coverage; AE w/ Victoza   -does not want to see diabetes clinic  -plan to repeat A1c today; consider increasing Glimepiride if needed  -see diabetic health maintenance listed below  -on statin: Yes  -on ACE-I/ARB: Yes  -counseling provided on importance of diabetic diet and medication compliance in order to treat diabetes  -discussed diabetes disease course and potential complications           Relevant Orders    Hemoglobin A1C    Severe obesity (BMI >= 40)    Overview     General weight loss/lifestyle modification strategies discussed (elicit support from others; identify saboteurs; non-food rewards, etc).  Informal exercise measures discussed, e.g. taking stairs instead of elevator.  Regular aerobic exercise program discussed.    Wt Readings from Last 3 Encounters:   04/18/24 0846 (!) 141.4 kg (311 lb 11.2 oz)   02/07/24 0935 (!) 142 kg (313 lb)   09/26/23 1008 (!) 142.4 kg (313 lb 14.4 oz)               GI    Gastroesophageal reflux disease without esophagitis    Overview     -symptoms controlled with daily  PPI  -denies alarm symptoms, such as dysphagia, weight loss or N/V  -continue lifestyle modification with avoidance of acidic foods, caffeine, late night eating            Orthopedic    History of amputation of lesser toe, left    Overview     -followed closely by podiatry for diabetic foot ulcer management            Other    Primary insomnia    Overview     -is on trazodone chronically  -denies adverse effects of medication  -has tried multiple OTC medication with minimal benefit  -discussed importance of good sleep hygiene practices            Other Visit Diagnoses       Tinea cruris        Relevant Medications    clotrimazole-betamethasone 1-0.05% (LOTRISONE) cream    nystatin (MYCOSTATIN) powder            Follow up in about 6 months (around 10/18/2024), or if symptoms worsen or fail to improve.    Laura Buchanan PA-C  _____________________________________________________________________________________________________________________________________________________    CC: follow up for chronic conditions     HPI: Patient is in clinic today as an established patient here for follow up for chronic conditions.    HTN: The patient has a diagnosis of hypertension and the blood pressure has been high on recent checks. The patient has been compliant on the medicine listed below and has not had problems with side effects. The patient has had no headache, vision changes, chest pain, shortness of breath, dizziness, palpitations. Denies any identifiable exacerbating or relieving factors.    DM2: Patient presents for follow up of diabetes. Condition is chronic and uncontrolled. Patient denies symptoms including foot ulcerations, hypoglycemia, nausea, paresthesia of the feet, polydipsia, polyuria and visual disturbances. Evaluation to date has been included: fasting blood sugar, fasting lipid panel, hemoglobin A1C and microalbuminuria. Home fasting sugars: 140s-150s. Denies adverse effects of current medications.      Diabetes Management Status    Statin: Taking  ACE/ARB: Taking    Screening or Prevention Patient's value Goal Complete/Controlled?   HgA1C Testing and Control   Lab Results   Component Value Date    HGBA1C 9.8 (H) 09/26/2023      Annually/Less than 8% No   Lipid profile : 09/26/2023 Annually Yes   LDL control Lab Results   Component Value Date    LDLCALC Invalid, Trig>400.0 09/26/2023    Annually/Less than 100 mg/dl  Yes   Nephropathy screening Lab Results   Component Value Date    LABMICR 210.0 09/26/2023     Lab Results   Component Value Date    PROTEINUA Trace (A) 01/10/2018    Annually Yes   Blood pressure BP Readings from Last 1 Encounters:   04/18/24 133/75    Less than 140/90 No   Dilated retinal exam : 10/07/2022 Annually Yes   Foot exam   : 02/07/2024 Annually Yes     Remaining chronic conditions have been reviewed and remain stable. Further detail as stated above.     No other complaints today.     Past Medical History:   Diagnosis Date    Cellulitis of left index finger 02/16/2015    Charcot's joint of foot, left 08/2018    Dyslipidemia     Essential hypertension 02/16/2017    Group B streptococcal infection 01/15/2018    Hypotestosteronemia 07/07/2017    Infected finger joint 02/17/2015    Infected skin ulcer limited to breakdown of skin 01/13/2018    MSSA (methicillin susceptible Staphylococcus aureus) 01/13/2018    Obese     S/P knee surgery, medial/lateral menisectomy 11/04/2013    Sleep apnea     does not use CPAP    Type 2 diabetes mellitus with diabetic polyneuropathy, with long-term current use of insulin 2003     Past Surgical History:   Procedure Laterality Date    ELBOW SURGERY      EPIDURAL STEROID INJECTION N/A 7/14/2020    Procedure: Lumbar L5/S1 IL WHITLEY;  Surgeon: Nirav Ramon MD;  Location: [facility] PAIN MGT;  Service: Pain Management;  Laterality: N/A;    EPIDURAL STEROID INJECTION N/A 10/20/2020    Procedure: Lumbar L5/S1 IL WHITLEY;  Surgeon: Nirav Ramon MD;  Location: [facility] PAIN  "MGT;  Service: Pain Management;  Laterality: N/A;    EPIDURAL STEROID INJECTION INTO LUMBAR SPINE N/A 10/25/2022    Procedure: Injection-steroid-epidural-lumbar L5/S1 to right;  Surgeon: Lavell Monsalve MD;  Location: [facility] OR;  Service: Pain Management;  Laterality: N/A;    FOOT SURGERY Left     INJECTION OF ANESTHETIC AGENT INTO SACROILIAC JOINT Right 5/18/2020    Procedure: right Sacroiliac Joint Injection;  Surgeon: Nirav Ramon MD;  Location: [facility] PAIN MGT;  Service: Pain Management;  Laterality: Right;    INJECTION OF JOINT Right 5/18/2020    Procedure: right GT bursa injection;  Surgeon: Nirav Ramon MD;  Location: [facility] PAIN MGT;  Service: Pain Management;  Laterality: Right;    KNEE CARTILAGE SURGERY  10/21/2013    right knee    KNEE SURGERY Right 02/17/2017    Left index finger surgery  03/2015    MIDFOOT ARTHRODESIS Left 8/27/2018    Procedure: FUSION, JOINT, MIDFOOT - FIRST METATARSOCUNEIFORM JOINT AND NAVICULOCUNEIFORM JOINT;  Surgeon: Inocente Smith DPM;  Location: [facility] OR;  Service: Podiatry;  Laterality: Left;    SELECTIVE INJECTION OF ANESTHETIC AGENT AROUND LUMBAR SPINAL NERVE ROOT BY TRANSFORAMINAL APPROACH Bilateral 10/4/2021    Procedure: Bilateral L4/5 TF WHITLEY//wants latest time if poss;  Surgeon: Nirav Ramon MD;  Location: [facility] PAIN MGT;  Service: Pain Management;  Laterality: Bilateral;    TOE AMPUTATION  03/2018    left great toe     Review of patient's allergies indicates:   Allergen Reactions    Victoza [liraglutide] Swelling    Chlorthalidone      Pt reports multiple side effects and intolerances    Metoprolol      Made his ears 'thump" Just overall he did not feel good on it     Social History     Tobacco Use    Smoking status: Never    Smokeless tobacco: Never   Substance Use Topics    Alcohol use: Yes     Alcohol/week: 2.0 - 3.0 standard drinks of alcohol     Types: 2 - 3 Cans of beer per week     Comment: weekly    Drug use: No     Family History   Problem Relation " "Name Age of Onset    COPD Father       Current Outpatient Medications on File Prior to Visit   Medication Sig Dispense Refill    amLODIPine (NORVASC) 10 MG tablet Take 1 tablet (10 mg total) by mouth once daily. 90 tablet 3    blood sugar diagnostic (ONETOUCH VERIO TEST STRIPS) Strp CHECK GLUCOSE 3 TIMES A DAYBEFORE EACH MEAL. 300 strip 0    blood-glucose meter (ONETOUCH VERIO REFLECT METER) [facility] USE AS INSTRUCTED. 1 each 0    enalapril (VASOTEC) 20 MG tablet Take 1 tablet (20 mg total) by mouth 2 (two) times daily. 180 tablet 3    gabapentin (NEURONTIN) 300 MG capsule TAKE 3 CAPSULES (900MG     TOTAL) 3 TIMES A  capsule 5    gemfibroziL (LOPID) 600 MG tablet Take 1 tablet (600 mg total) by mouth once daily. 90 tablet 3    glimepiride (AMARYL) 2 MG tablet Take 1 tablet (2 mg total) by mouth before breakfast. 90 tablet 3    insulin (LANTUS SOLOSTAR U-100 INSULIN) glargine 100 units/mL SubQ pen INJECT 40 UNITS            SUBCUTANEOUSLY TWO TIMES A DAY 75 mL 1    insulin lispro (HUMALOG KWIKPEN INSULIN) 100 unit/mL pen Inject 40 Units into the skin 3 (three) times daily. 110 mL 1    insulin syringe-needle U-100 (INSULIN SYRINGE) 1 mL 29 gauge x 1/2" Syrg Inject 4 times a day 100 each 12    lancets Misc 1 Units by Misc.(Non-Drug; Combo Route) route daily as needed. 100 each 11    metFORMIN (GLUCOPHAGE-XR) 500 MG ER 24hr tablet TAKE 2 TABLETS TWO TIMES A DAY WITH MEALS 360 tablet 3    ONETOUCH DELICA PLUS LANCET 33 gauge Misc USE TO CHECK GLUCOSE 3     TIMES A DAY. 300 each 3    pantoprazole (PROTONIX) 40 MG tablet TAKE 1 TABLET DAILY 90 tablet 3    pen needle, diabetic (BD ULTRA-FINE ORIG PEN NEEDLE) 29 gauge x 1/2" Ndle Use to administer insulin under the skin five (5) times a day; discard pen needle after each use. 500 each 3    pravastatin (PRAVACHOL) 40 MG tablet Take 1 tablet (40 mg total) by mouth once daily. 90 tablet 3    pulse oximeter (PULSE OXIMETER) device by Apply Externally route 2 (two) times a " "day. Use twice daily at 8 AM and 3 PM and record the value in DLVR Therapeuticst as directed. 1 each 0    safety needles 22 gauge x 1 1/2" Ndle To be used once a month for testosterone injections 50 each 6    syringe with needle 3 mL 25 gauge x 1" Syrg To be used with monthly testosterone injections 100 Syringe 4    traZODone (DESYREL) 150 MG tablet Take 1 tablet (150 mg total) by mouth every evening. 90 tablet 3    [DISCONTINUED] spironolactone (ALDACTONE) 25 MG tablet Take 1 tablet (25 mg total) by mouth once daily. 90 tablet 3     Current Facility-Administered Medications on File Prior to Visit   Medication Dose Route Frequency Provider Last Rate Last Admin    acetaminophen tablet 650 mg  650 mg Oral Once PRN Virgilio Avilez MD        albuterol inhaler 2 puff  2 puff Inhalation Q20 Min PRN Virgilio Avilez MD        diphenhydrAMINE injection 25 mg  25 mg Intravenous Once PRN Virgilio Avilez MD        EPINEPHrine (EPIPEN) 0.3 mg/0.3 mL pen injection 0.3 mg  0.3 mg Intramuscular PRN Virgilio Avilez MD        lactated ringers infusion   Intravenous Continuous ChanelVadim MD   Stopped at 08/27/18 0953    methylPREDNISolone sodium succinate injection 40 mg  40 mg Intravenous Once PRN Virgilio Avilez MD        ondansetron disintegrating tablet 4 mg  4 mg Oral Once PRN Virgilio Avilez MD        sodium chloride 0.9% 500 mL flush bag   Intravenous PRN Virgilio Avilez MD        sodium chloride 0.9% flush 10 mL  10 mL Intravenous PRN Virgilio Avilez MD           Review of Systems   Constitutional:  Negative for chills, diaphoresis, fatigue and fever.   HENT:  Negative for congestion, ear pain, postnasal drip, sinus pain and sore throat.    Eyes:  Negative for pain and redness.   Respiratory:  Negative for cough, chest tightness and shortness of breath.    Cardiovascular:  Negative for chest pain and leg swelling.   Gastrointestinal:  Negative for abdominal pain, constipation, diarrhea, nausea and " "vomiting.   Genitourinary:  Negative for dysuria and hematuria.   Musculoskeletal:  Negative for arthralgias and joint swelling.   Skin:  Negative for rash.   Neurological:  Negative for dizziness, syncope and headaches.   Psychiatric/Behavioral:  Negative for dysphoric mood. The patient is not nervous/anxious.        Vitals:    04/18/24 0846 04/18/24 0902 04/18/24 0938   BP: (!) 141/78 (!) 158/72 133/75   Pulse: 67 65 71   Resp: 18     SpO2: 97%     Weight: (!) 141.4 kg (311 lb 11.2 oz)     Height: 6' 1" (1.854 m)         Wt Readings from Last 3 Encounters:   04/18/24 (!) 141.4 kg (311 lb 11.2 oz)   02/07/24 (!) 142 kg (313 lb)   09/26/23 (!) 142.4 kg (313 lb 14.4 oz)       Physical Exam  Constitutional:       General: He is not in acute distress.     Appearance: Normal appearance. He is well-developed.   HENT:      Head: Normocephalic and atraumatic.   Eyes:      Conjunctiva/sclera: Conjunctivae normal.   Cardiovascular:      Rate and Rhythm: Normal rate and regular rhythm.      Pulses: Normal pulses.      Heart sounds: Normal heart sounds. No murmur heard.  Pulmonary:      Effort: Pulmonary effort is normal. No respiratory distress.      Breath sounds: Normal breath sounds.   Abdominal:      General: Bowel sounds are normal. There is no distension.      Palpations: Abdomen is soft.      Tenderness: There is no abdominal tenderness.   Musculoskeletal:         General: Normal range of motion.      Cervical back: Normal range of motion and neck supple.   Skin:     General: Skin is warm and dry.      Findings: No rash.   Neurological:      General: No focal deficit present.      Mental Status: He is alert and oriented to person, place, and time.   Psychiatric:         Mood and Affect: Mood normal.         Behavior: Behavior normal.         Health Maintenance   Topic Date Due    TETANUS VACCINE  Never done    Shingles Vaccine (2 of 2) 11/25/2020    Eye Exam  10/07/2023    Hemoglobin A1c  12/26/2023    Lipid Panel  " 09/26/2024    Foot Exam  02/07/2025    Colorectal Cancer Screening  10/07/2026    Hepatitis C Screening  Completed

## 2024-04-19 DIAGNOSIS — E11.42 TYPE 2 DIABETES MELLITUS WITH DIABETIC POLYNEUROPATHY, WITH LONG-TERM CURRENT USE OF INSULIN: Primary | ICD-10-CM

## 2024-04-19 DIAGNOSIS — Z79.4 TYPE 2 DIABETES MELLITUS WITH DIABETIC POLYNEUROPATHY, WITH LONG-TERM CURRENT USE OF INSULIN: Primary | ICD-10-CM

## 2024-04-19 RX ORDER — GLIMEPIRIDE 4 MG/1
4 TABLET ORAL
Qty: 90 TABLET | Refills: 3 | Status: SHIPPED | OUTPATIENT
Start: 2024-04-19 | End: 2025-04-19

## 2024-04-19 NOTE — PROGRESS NOTES
Results have been released via [employer]. Please verify that these have been viewed by patient. If not, please call patient with results.     Please schedule the following orders: A1c in 3 months    I have sent a message to them with the following interpretation (see below).    I have reviewed your recent blood work.     A1C ABNORMAL-Your A1C remains above goal, currently 8.5. It is improving and actually is the best you have had in the past few years so this is great news. I would like to go ahead and increase the Glimepiride to 4 mg once daily as discussed at your visit. We will plan to repeat A1C in 3 months. Make sure you are working on diet as well.  CBC NORMAL-The CBC appears to be stable at this time with no sign of major anemia, abnormal white count or platelet abnormality.  CMP/BMP NORMAL-The electrolytes all appear stable at this time. This includes kidney functions along with routine electrolytes like sugar, potassium and sodium. The liver enzymes were noted to be stable also.  LIPID NORMAL ON MEDS-The cholesterol panel screening showed levels that are considered at target with the current medications with the exception of elevated triglycerides. This is much improved from last time. Continue meds & check annually.    Please do not hesitate to call or message with any additional questions or concerns.    Laura Buchanan PA-C

## 2024-04-26 ENCOUNTER — PATIENT MESSAGE (OUTPATIENT)
Dept: PODIATRY | Facility: CLINIC | Age: 64
End: 2024-04-26
Payer: COMMERCIAL

## 2024-04-29 RX ORDER — CEFDINIR 300 MG/1
300 CAPSULE ORAL 2 TIMES DAILY
Qty: 20 CAPSULE | Refills: 0 | Status: SHIPPED | OUTPATIENT
Start: 2024-04-29 | End: 2024-05-09

## 2024-04-30 ENCOUNTER — PATIENT MESSAGE (OUTPATIENT)
Dept: PRIMARY CARE CLINIC | Facility: CLINIC | Age: 64
End: 2024-04-30
Payer: COMMERCIAL

## 2024-05-04 ENCOUNTER — PATIENT MESSAGE (OUTPATIENT)
Dept: PODIATRY | Facility: CLINIC | Age: 64
End: 2024-05-04
Payer: COMMERCIAL

## 2024-05-09 NOTE — TELEPHONE ENCOUNTER
I believe most of the pain is from his neuropathy Is he wearing his diabetic shoes and inserts and if so how old are they, he may need to be fit for new inserts. There is really no new medication I would be able to get him, he may need to follow up with pain management.

## 2024-05-13 ENCOUNTER — PATIENT MESSAGE (OUTPATIENT)
Dept: PRIMARY CARE CLINIC | Facility: CLINIC | Age: 64
End: 2024-05-13
Payer: COMMERCIAL

## 2024-07-23 ENCOUNTER — PATIENT OUTREACH (OUTPATIENT)
Dept: ADMINISTRATIVE | Facility: HOSPITAL | Age: 64
End: 2024-07-23
Payer: COMMERCIAL

## 2024-07-25 ENCOUNTER — PATIENT MESSAGE (OUTPATIENT)
Dept: PRIMARY CARE CLINIC | Facility: CLINIC | Age: 64
End: 2024-07-25
Payer: COMMERCIAL

## 2024-08-05 ENCOUNTER — PATIENT MESSAGE (OUTPATIENT)
Dept: PRIMARY CARE CLINIC | Facility: CLINIC | Age: 64
End: 2024-08-05
Payer: COMMERCIAL

## 2024-08-09 ENCOUNTER — PATIENT MESSAGE (OUTPATIENT)
Dept: PRIMARY CARE CLINIC | Facility: CLINIC | Age: 64
End: 2024-08-09
Payer: COMMERCIAL

## 2024-08-15 ENCOUNTER — PATIENT MESSAGE (OUTPATIENT)
Dept: PRIMARY CARE CLINIC | Facility: CLINIC | Age: 64
End: 2024-08-15
Payer: COMMERCIAL

## 2024-08-15 DIAGNOSIS — G62.9 NEUROPATHY: ICD-10-CM

## 2024-08-15 NOTE — TELEPHONE ENCOUNTER
Refill Routing Note   Medication(s) are not appropriate for processing by [facility] for the following reason(s):        Outside of protocol    [facility] action(s):  Route               Appointments  past 12m or future 3m with PCP    Date Provider   Last Visit   10/11/2022 Anne Wright MD   Next Visit   Visit date not found Anne Wright MD   ED visits in past 90 days: 0        Note composed:1:50 PM 08/15/2024

## 2024-08-16 RX ORDER — GABAPENTIN 300 MG/1
CAPSULE ORAL
Qty: 270 CAPSULE | Refills: 5 | Status: SHIPPED | OUTPATIENT
Start: 2024-08-16

## 2024-08-19 ENCOUNTER — PATIENT MESSAGE (OUTPATIENT)
Dept: PRIMARY CARE CLINIC | Facility: CLINIC | Age: 64
End: 2024-08-19
Payer: COMMERCIAL

## 2024-08-26 ENCOUNTER — PATIENT MESSAGE (OUTPATIENT)
Dept: PRIMARY CARE CLINIC | Facility: CLINIC | Age: 64
End: 2024-08-26
Payer: COMMERCIAL

## 2024-09-05 DIAGNOSIS — Z79.4 TYPE 2 DIABETES MELLITUS WITH DIABETIC POLYNEUROPATHY, WITH LONG-TERM CURRENT USE OF INSULIN: ICD-10-CM

## 2024-09-05 DIAGNOSIS — E11.42 TYPE 2 DIABETES MELLITUS WITH DIABETIC POLYNEUROPATHY, WITH LONG-TERM CURRENT USE OF INSULIN: ICD-10-CM

## 2024-09-05 RX ORDER — INSULIN LISPRO 100 [IU]/ML
INJECTION, SOLUTION INTRAVENOUS; SUBCUTANEOUS
Qty: 110 ML | Refills: 0 | Status: SHIPPED | OUTPATIENT
Start: 2024-09-05

## 2024-09-09 ENCOUNTER — PATIENT MESSAGE (OUTPATIENT)
Dept: PRIMARY CARE CLINIC | Facility: CLINIC | Age: 64
End: 2024-09-09
Payer: COMMERCIAL

## 2024-09-09 DIAGNOSIS — E11.42 TYPE 2 DIABETES MELLITUS WITH DIABETIC POLYNEUROPATHY, WITH LONG-TERM CURRENT USE OF INSULIN: ICD-10-CM

## 2024-09-09 DIAGNOSIS — Z79.4 TYPE 2 DIABETES MELLITUS WITH DIABETIC POLYNEUROPATHY, WITH LONG-TERM CURRENT USE OF INSULIN: ICD-10-CM

## 2024-09-10 RX ORDER — INSULIN LISPRO 100 [IU]/ML
INJECTION, SOLUTION INTRAVENOUS; SUBCUTANEOUS
Qty: 110 ML | Refills: 0 | Status: CANCELLED | OUTPATIENT
Start: 2024-09-10

## 2024-09-10 RX ORDER — INSULIN LISPRO 100 [IU]/ML
INJECTION, SOLUTION INTRAVENOUS; SUBCUTANEOUS
Qty: 110 ML | Refills: 0 | Status: SHIPPED | OUTPATIENT
Start: 2024-09-10

## 2024-09-10 NOTE — TELEPHONE ENCOUNTER
I have signed for the following orders AND/OR meds.  Please call the patient and ask the patient to schedule the testing AND/OR inform about any medications that were sent. Medications have been sent to pharmacy listed below      No orders of the defined types were placed in this encounter.      Medications Ordered This Encounter   Medications    insulin lispro 100 unit/mL pen     Sig: INJECT 40 UNITS UNDER THE SKIN THREE TIMES A DAY     Dispense:  110 mL     Refill:  0         [facility] - [place], [place] - [address]  [address]  [address]  [facility] [address]  Phone: [phone] Fax: [phone]    [id number] [place] - [address] [facility] [address]  [address] [place] [address]  Phone: [phone] Fax: [phone]    [facility] - [place], [place] - [address]  [address] [address]  Phone: [phone] Fax: [phone]    [facility] - [place], [place] - [address] [place] [address]  Phone: [phone] Fax: [phone]

## 2024-09-11 ENCOUNTER — PATIENT MESSAGE (OUTPATIENT)
Dept: PRIMARY CARE CLINIC | Facility: CLINIC | Age: 64
End: 2024-09-11
Payer: COMMERCIAL

## 2024-10-08 ENCOUNTER — PATIENT MESSAGE (OUTPATIENT)
Dept: PRIMARY CARE CLINIC | Facility: CLINIC | Age: 64
End: 2024-10-08
Payer: COMMERCIAL

## 2024-10-08 DIAGNOSIS — I10 ESSENTIAL HYPERTENSION: ICD-10-CM

## 2024-10-08 DIAGNOSIS — E11.59 HYPERTENSION ASSOCIATED WITH TYPE 2 DIABETES MELLITUS: ICD-10-CM

## 2024-10-08 DIAGNOSIS — E11.42 TYPE 2 DIABETES MELLITUS WITH DIABETIC POLYNEUROPATHY, WITH LONG-TERM CURRENT USE OF INSULIN: ICD-10-CM

## 2024-10-08 DIAGNOSIS — G62.9 NEUROPATHY: ICD-10-CM

## 2024-10-08 DIAGNOSIS — K21.9 GASTROESOPHAGEAL REFLUX DISEASE: ICD-10-CM

## 2024-10-08 DIAGNOSIS — Z79.4 TYPE 2 DIABETES MELLITUS WITH DIABETIC POLYNEUROPATHY, WITH LONG-TERM CURRENT USE OF INSULIN: ICD-10-CM

## 2024-10-08 DIAGNOSIS — I15.2 HYPERTENSION ASSOCIATED WITH TYPE 2 DIABETES MELLITUS: ICD-10-CM

## 2024-10-08 DIAGNOSIS — E78.2 MIXED HYPERLIPIDEMIA: ICD-10-CM

## 2024-10-08 DIAGNOSIS — B35.6 TINEA CRURIS: ICD-10-CM

## 2024-10-08 DIAGNOSIS — G47.00 INSOMNIA, UNSPECIFIED TYPE: ICD-10-CM

## 2024-10-08 RX ORDER — GABAPENTIN 300 MG/1
CAPSULE ORAL
Qty: 270 CAPSULE | Refills: 5 | Status: SHIPPED | OUTPATIENT
Start: 2024-10-08

## 2024-10-08 RX ORDER — PRAVASTATIN SODIUM 40 MG/1
40 TABLET ORAL DAILY
Qty: 90 TABLET | Refills: 3 | Status: SHIPPED | OUTPATIENT
Start: 2024-10-08

## 2024-10-08 RX ORDER — GLIMEPIRIDE 4 MG/1
4 TABLET ORAL
Qty: 90 TABLET | Refills: 3 | Status: SHIPPED | OUTPATIENT
Start: 2024-10-08 | End: 2025-10-08

## 2024-10-08 RX ORDER — PANTOPRAZOLE SODIUM 40 MG/1
TABLET, DELAYED RELEASE ORAL
Qty: 90 TABLET | Refills: 3 | Status: SHIPPED | OUTPATIENT
Start: 2024-10-08

## 2024-10-08 RX ORDER — NYSTATIN 100000 [USP'U]/G
POWDER TOPICAL 2 TIMES DAILY
Qty: 60 G | Refills: 2 | Status: SHIPPED | OUTPATIENT
Start: 2024-10-08

## 2024-10-08 RX ORDER — GEMFIBROZIL 600 MG/1
600 TABLET, FILM COATED ORAL DAILY
Qty: 90 TABLET | Refills: 3 | Status: SHIPPED | OUTPATIENT
Start: 2024-10-08

## 2024-10-08 RX ORDER — AMLODIPINE BESYLATE 10 MG/1
10 TABLET ORAL DAILY
Qty: 90 TABLET | Refills: 3 | Status: SHIPPED | OUTPATIENT
Start: 2024-10-08

## 2024-10-08 RX ORDER — ENALAPRIL MALEATE 20 MG/1
20 TABLET ORAL 2 TIMES DAILY
Qty: 180 TABLET | Refills: 3 | Status: SHIPPED | OUTPATIENT
Start: 2024-10-08

## 2024-10-08 RX ORDER — TRAZODONE HYDROCHLORIDE 150 MG/1
150 TABLET ORAL NIGHTLY
Qty: 90 TABLET | Refills: 3 | Status: SHIPPED | OUTPATIENT
Start: 2024-10-08

## 2024-10-08 RX ORDER — METFORMIN HYDROCHLORIDE 500 MG/1
TABLET, EXTENDED RELEASE ORAL
Qty: 360 TABLET | Refills: 3 | Status: SHIPPED | OUTPATIENT
Start: 2024-10-08

## 2024-10-08 RX ORDER — HYDROCHLOROTHIAZIDE 25 MG/1
25 TABLET ORAL DAILY
Qty: 90 TABLET | Refills: 3 | Status: SHIPPED | OUTPATIENT
Start: 2024-10-08 | End: 2025-10-08

## 2024-10-08 NOTE — TELEPHONE ENCOUNTER
No care due was identified.  [facility] Embedded Care Due Messages. Reference number: [id number].   10/08/2024 10:37:45 AM CDT

## 2024-10-08 NOTE — TELEPHONE ENCOUNTER
Pt last ov was 4/18/24 with casimiro . Pt states he only come to the doctor once a year for med refills

## 2024-10-09 RX ORDER — PEN NEEDLE, DIABETIC 29 G X1/2"
NEEDLE, DISPOSABLE MISCELLANEOUS
Qty: 500 EACH | Refills: 3 | Status: SHIPPED | OUTPATIENT
Start: 2024-10-09

## 2024-10-09 RX ORDER — INSULIN GLARGINE 100 [IU]/ML
INJECTION, SOLUTION SUBCUTANEOUS
Qty: 75 ML | Refills: 0 | Status: SHIPPED | OUTPATIENT
Start: 2024-10-09

## 2024-10-09 NOTE — TELEPHONE ENCOUNTER
Refill Routing Note   Medication(s) are not appropriate for processing by [facility] for the following reason(s):        Patient not seen by provider within 15 months    [facility] action(s):  Defer               Appointments  past 12m or future 3m with PCP    Date Provider   Last Visit   10/11/2022 Anne Wright MD   Next Visit   Visit date not found Anne Wright MD   ED visits in past 90 days: 0        Note composed:8:04 PM 10/08/2024

## 2024-10-23 NOTE — TELEPHONE ENCOUNTER
Care Due:                  Date            Visit Type   Department     Provider  --------------------------------------------------------------------------------                                EP -                              PRIMARY  Last Visit: 10-      CARE ([facility])   None Found     Anne Wright  Next Visit: None Scheduled  None         None Found                                                            Last  Test          Frequency    Reason                     Performed    Due Date  --------------------------------------------------------------------------------    Office Visit  15 months..  insulin..................  10-   01-    HBA1C.......  6 months...  insulin..................  04-   10-    [employer] Embedded Care Due Messages. Reference number: [id number].   9/05/2024 11:20:27 AM PORSCHE  
Refill Routing Note   Medication(s) are not appropriate for processing by [facility] for the following reason(s):        Patient not seen by provider within 15 months    [facility] action(s):  Defer   Requires appointment : Yes   Requires labs : Yes             Appointments  past 12m or future 3m with PCP    Date Provider   Last Visit   10/11/2022 Anne Wright MD   Next Visit   Visit date not found Anne Wright MD   ED visits in past 90 days: 0        Note composed:12:39 PM 09/05/2024          
no

## 2024-12-15 ENCOUNTER — PATIENT MESSAGE (OUTPATIENT)
Dept: PODIATRY | Facility: CLINIC | Age: 64
End: 2024-12-15
Payer: COMMERCIAL

## 2024-12-18 ENCOUNTER — PATIENT MESSAGE (OUTPATIENT)
Dept: PRIMARY CARE CLINIC | Facility: CLINIC | Age: 64
End: 2024-12-18
Payer: MEDICARE

## 2024-12-19 RX ORDER — DOXYCYCLINE HYCLATE 100 MG
100 TABLET ORAL 2 TIMES DAILY
Qty: 14 TABLET | Refills: 0 | Status: SHIPPED | OUTPATIENT
Start: 2024-12-19 | End: 2024-12-26

## 2024-12-20 ENCOUNTER — PATIENT MESSAGE (OUTPATIENT)
Dept: PODIATRY | Facility: CLINIC | Age: 64
End: 2024-12-20
Payer: MEDICARE

## 2024-12-22 ENCOUNTER — PATIENT MESSAGE (OUTPATIENT)
Dept: PODIATRY | Facility: CLINIC | Age: 64
End: 2024-12-22
Payer: MEDICARE

## 2024-12-23 ENCOUNTER — PATIENT MESSAGE (OUTPATIENT)
Dept: PRIMARY CARE CLINIC | Facility: CLINIC | Age: 64
End: 2024-12-23
Payer: MEDICARE

## 2024-12-23 ENCOUNTER — PATIENT MESSAGE (OUTPATIENT)
Dept: PODIATRY | Facility: CLINIC | Age: 64
End: 2024-12-23
Payer: MEDICARE

## 2024-12-23 ENCOUNTER — OFFICE VISIT (OUTPATIENT)
Dept: PODIATRY | Facility: CLINIC | Age: 64
End: 2024-12-23
Payer: COMMERCIAL

## 2024-12-23 ENCOUNTER — LAB VISIT (OUTPATIENT)
Dept: LAB | Facility: HOSPITAL | Age: 64
End: 2024-12-23
Attending: INTERNAL MEDICINE
Payer: MEDICARE

## 2024-12-23 VITALS — HEIGHT: 73 IN | WEIGHT: 311.75 LBS | BODY MASS INDEX: 41.32 KG/M2

## 2024-12-23 DIAGNOSIS — E11.49 TYPE II DIABETES MELLITUS WITH NEUROLOGICAL MANIFESTATIONS: Primary | ICD-10-CM

## 2024-12-23 DIAGNOSIS — M14.672 CHARCOT'S JOINT OF LEFT FOOT: ICD-10-CM

## 2024-12-23 DIAGNOSIS — L97.522 SKIN ULCER OF LEFT GREAT TOE WITH FAT LAYER EXPOSED: ICD-10-CM

## 2024-12-23 DIAGNOSIS — Z79.4 TYPE 2 DIABETES MELLITUS WITH DIABETIC POLYNEUROPATHY, WITH LONG-TERM CURRENT USE OF INSULIN: ICD-10-CM

## 2024-12-23 DIAGNOSIS — E11.42 TYPE 2 DIABETES MELLITUS WITH DIABETIC POLYNEUROPATHY, WITH LONG-TERM CURRENT USE OF INSULIN: ICD-10-CM

## 2024-12-23 LAB
ALBUMIN/CREAT UR: 45.9 UG/MG (ref 0–30)
CREAT UR-MCNC: 74 MG/DL (ref 23–375)
ESTIMATED AVG GLUCOSE: 169 MG/DL (ref 68–131)
HBA1C MFR BLD: 7.5 % (ref 4–5.6)
MICROALBUMIN UR DL<=1MG/L-MCNC: 34 UG/ML

## 2024-12-23 PROCEDURE — 87186 SC STD MICRODIL/AGAR DIL: CPT | Performed by: PODIATRIST

## 2024-12-23 PROCEDURE — 1159F MED LIST DOCD IN RCRD: CPT | Mod: CPTII,S$GLB,, | Performed by: PODIATRIST

## 2024-12-23 PROCEDURE — 82043 UR ALBUMIN QUANTITATIVE: CPT | Performed by: INTERNAL MEDICINE

## 2024-12-23 PROCEDURE — 87070 CULTURE OTHR SPECIMN AEROBIC: CPT | Performed by: PODIATRIST

## 2024-12-23 PROCEDURE — 1101F PT FALLS ASSESS-DOCD LE1/YR: CPT | Mod: CPTII,S$GLB,, | Performed by: PODIATRIST

## 2024-12-23 PROCEDURE — 3008F BODY MASS INDEX DOCD: CPT | Mod: CPTII,S$GLB,, | Performed by: PODIATRIST

## 2024-12-23 PROCEDURE — 87075 CULTR BACTERIA EXCEPT BLOOD: CPT | Performed by: PODIATRIST

## 2024-12-23 PROCEDURE — 87077 CULTURE AEROBIC IDENTIFY: CPT | Mod: 59 | Performed by: PODIATRIST

## 2024-12-23 PROCEDURE — 1160F RVW MEDS BY RX/DR IN RCRD: CPT | Mod: CPTII,S$GLB,, | Performed by: PODIATRIST

## 2024-12-23 PROCEDURE — 3288F FALL RISK ASSESSMENT DOCD: CPT | Mod: CPTII,S$GLB,, | Performed by: PODIATRIST

## 2024-12-23 PROCEDURE — 99213 OFFICE O/P EST LOW 20 MIN: CPT | Mod: 25,S$GLB,, | Performed by: PODIATRIST

## 2024-12-23 PROCEDURE — 83036 HEMOGLOBIN GLYCOSYLATED A1C: CPT | Performed by: INTERNAL MEDICINE

## 2024-12-23 PROCEDURE — 3052F HG A1C>EQUAL 8.0%<EQUAL 9.0%: CPT | Mod: CPTII,S$GLB,, | Performed by: PODIATRIST

## 2024-12-23 PROCEDURE — 36415 COLL VENOUS BLD VENIPUNCTURE: CPT | Mod: PO | Performed by: INTERNAL MEDICINE

## 2024-12-23 PROCEDURE — 4010F ACE/ARB THERAPY RXD/TAKEN: CPT | Mod: CPTII,S$GLB,, | Performed by: PODIATRIST

## 2024-12-23 PROCEDURE — 11042 DBRDMT SUBQ TIS 1ST 20SQCM/<: CPT | Mod: S$GLB,,, | Performed by: PODIATRIST

## 2024-12-23 PROCEDURE — 99999 PR PBB SHADOW E&M-EST. PATIENT-LVL IV: CPT | Mod: PBBFAC,,, | Performed by: PODIATRIST

## 2024-12-23 NOTE — PROGRESS NOTES
Subjective:      Patient ID: Virgilio Acuña is a 64 y.o. male.    Chief Complaint: Wound Care      Virgilio is a 64 y.o. male    12/23/24: Patient returns with a new ulcer left great toe IPJ plantar aspect that developed over the last week or so after hitting it into the door plate when barefoot. Has been on antibiotic for the last 4 days or so.     PCP: Anne Wright MD    Date Last Seen by PCP:   Current shoe gear:  Affected Foot: Football with darco     Unaffected Foot: Tennis shoes    History of Trauma: negative      Hemoglobin A1C   Date Value Ref Range Status   04/18/2024 8.5 (H) 4.0 - 5.6 % Final     Comment:     ADA Screening Guidelines:  5.7-6.4%  Consistent with prediabetes  >or=6.5%  Consistent with diabetes    High levels of fetal hemoglobin interfere with the HbA1C  assay. Heterozygous hemoglobin variants (HbS, HgC, etc)do  not significantly interfere with this assay.   However, presence of multiple variants may affect accuracy.     09/26/2023 9.8 (H) 4.0 - 5.6 % Final     Comment:     ADA Screening Guidelines:  5.7-6.4%  Consistent with prediabetes  >or=6.5%  Consistent with diabetes    High levels of fetal hemoglobin interfere with the HbA1C  assay. Heterozygous hemoglobin variants (HbS, HgC, etc)do  not significantly interfere with this assay.   However, presence of multiple variants may affect accuracy.     09/20/2022 9.9 (H) 4.0 - 5.6 % Final     Comment:     ADA Screening Guidelines:  5.7-6.4%  Consistent with prediabetes  >or=6.5%  Consistent with diabetes    High levels of fetal hemoglobin interfere with the HbA1C  assay. Heterozygous hemoglobin variants (HbS, HgC, etc)do  not significantly interfere with this assay.   However, presence of multiple variants may affect accuracy.         Review of Systems   Constitutional: Negative for chills and fever.   Cardiovascular:  Positive for leg swelling. Negative for claudication.   Respiratory:  Negative for shortness of breath.    Skin:  Positive  for color change, nail changes and poor wound healing. Negative for itching and rash.   Musculoskeletal:  Negative for muscle cramps, muscle weakness and myalgias.   Gastrointestinal:  Negative for nausea and vomiting.   Neurological:  Positive for numbness and paresthesias. Negative for focal weakness and loss of balance.           Objective:       Physical Exam  Constitutional:       General: He is not in acute distress.     Appearance: He is well-developed. He is not diaphoretic.   Cardiovascular:      Pulses:           Dorsalis pedis pulses are 2+ on the right side and 2+ on the left side.        Posterior tibial pulses are 2+ on the right side and 2+ on the left side.      Comments: < 3 sec capillary refill time to toes 1-5 bilateral. Toes and feet are warm to touch proximally with normal distal cooling b/l. There is no hair growth on the feet and toes b/l. There is moderate edema left foot and mild edema right.     Musculoskeletal:      Comments: Left second toe amputation    Left foot now more narrow in appearance with the first ray properly reduced, minimal edema to the foot and no pain with palpation throughout the area of surgery.    Left ankle there is some pain with palpation to the lateral malleolus and the ATFL area as well as the peroneals     Dorsal left foot midfoot there is a bony prominence with some tenderness to palpation    Equinus noted b/l ankles with < 10 deg DF noted. MMT 5/5 in DF/PF/Inv/Ev resistance with no reproduction of pain in any direction. Passive range of motion of ankle and pedal joints is painless b/l.     Feet:      Right foot:      Protective Sensation: 10 sites tested.  0 sites sensed.      Left foot:      Protective Sensation: 10 sites tested.  0 sites sensed.   Skin:     General: Skin is warm.      Coloration: Skin is not pale.      Findings: No abrasion, bruising, burn, ecchymosis, erythema, laceration, lesion, petechiae or rash.      Nails: There is no clubbing.       Comments: Incision overlying the left second metatarsal base is well healed      Ulcer Location: left plantar hallux  Measurements: pre 0.4x0.3x0.1 post 0.6x0.4x0.2 cm  Periwound: Intact  Drainage: Mild erythema  Pus: None.  Malodor: None.  Base:  100% granular  Signs of infection: mild erythema       Neurological:      Mental Status: He is alert and oriented to person, place, and time.      Sensory: Sensory deficit present.      Motor: No tremor, atrophy or abnormal muscle tone.      Comments: Negative tinel sign bilateral.   Psychiatric:         Behavior: Behavior normal.               Assessment:       Encounter Diagnoses   Name Primary?    Type II diabetes mellitus with neurological manifestations Yes    Charcot's joint of left foot     Skin ulcer of left great toe with fat layer exposed                       Plan:       Virgilio was seen today for wound care.    Diagnoses and all orders for this visit:    Type II diabetes mellitus with neurological manifestations  -     Aerobic culture  -     Culture, Anaerobic    Charcot's joint of left foot  -     Aerobic culture  -     Culture, Anaerobic    Skin ulcer of left great toe with fat layer exposed  -     Aerobic culture  -     Culture, Anaerobic                I counseled the patient on his conditions, their implications and medical management.    Shoe inspection. Diabetic Foot Education. Patient reminded of the importance of good nutrition and blood sugar control to help prevent podiatric complications of diabetes. Patient instructed on proper foot hygeine. We discussed wearing proper shoe gear, daily foot inspections, never walking without protective shoe gear, never putting sharp instruments to feet    Left foot ulcer debrided and cultured and wrapped in double donut aperture and toe wrap with cast padding and coban    See attached procedure note    Return for weekly wound care      Inocente Smith DPM

## 2024-12-23 NOTE — PROCEDURES
"Wound Debridement    Date/Time: 12/23/2024 2:45 PM    Performed by: Inocente Smith DPM  Authorized by: Inocente Smith DPM    Time out: Immediately prior to procedure a "time out" was called to verify the correct patient, procedure, equipment, support staff and site/side marked as required.    Consent Done?:  Yes (Verbal)    Preparation: Patient was prepped and draped in usual sterile fashion    Local anesthesia used?: No      Wound Details:    Location:  Left foot    Location:  Left 1st Toe    Type of Debridement:  Excisional       Length (cm):  0.6       Width (cm):  0.4       Depth (cm):  0.2       Area (sq cm):  0.24       Percent Debrided (%):  100       Total Area Debrided (sq cm):  0.24    Depth of debridement:  Subcutaneous tissue    Devitalized tissue debrided:  Biofilm and Slough    Instruments:  Curette  Bleeding:  Minimal  Hemostasis Achieved: Yes  Method Used:  Pressure  Patient tolerance:  Patient tolerated the procedure well with no immediate complications    "

## 2024-12-23 NOTE — TELEPHONE ENCOUNTER
Is he taking the antibiotic I sent in, We can get him in this afternoon at one of my openings to begin wound care and get hime in a football wrap and darco shoe

## 2024-12-26 ENCOUNTER — PATIENT MESSAGE (OUTPATIENT)
Dept: PODIATRY | Facility: CLINIC | Age: 64
End: 2024-12-26
Payer: MEDICARE

## 2024-12-26 ENCOUNTER — TELEPHONE (OUTPATIENT)
Dept: PODIATRY | Facility: CLINIC | Age: 64
End: 2024-12-26
Payer: MEDICARE

## 2024-12-26 NOTE — TELEPHONE ENCOUNTER
Spoke with the patient to confirm appointment. Mentioned that the medication prescribed was rough on him, but that he would toughen it out, as he has only one more day on it. Expressed understanding of the message.

## 2024-12-27 LAB
BACTERIA SPEC AEROBE CULT: ABNORMAL
BACTERIA SPEC AEROBE CULT: ABNORMAL
BACTERIA SPEC ANAEROBE CULT: NORMAL

## 2024-12-30 ENCOUNTER — CLINICAL SUPPORT (OUTPATIENT)
Dept: PODIATRY | Facility: CLINIC | Age: 64
End: 2024-12-30
Payer: MEDICARE

## 2024-12-30 ENCOUNTER — TELEPHONE (OUTPATIENT)
Dept: PODIATRY | Facility: CLINIC | Age: 64
End: 2024-12-30
Payer: MEDICARE

## 2024-12-30 VITALS — BODY MASS INDEX: 41.32 KG/M2 | HEIGHT: 73 IN | WEIGHT: 311.75 LBS

## 2024-12-30 DIAGNOSIS — Z48.00 DRESSING CHANGE: Primary | ICD-10-CM

## 2024-12-30 PROCEDURE — 99999 PR PBB SHADOW E&M-EST. PATIENT-LVL IV: CPT | Mod: PBBFAC,,,

## 2024-12-30 NOTE — PROGRESS NOTES
Saw pt today for dressing change. Per Dr. Smith's instructions wound was wrapped in double donut aperture and toe wrap with cast padding and coban. Went over culture results per Dr. Smith and let pt know antibiotics were called into his pharmacy. Pt verbalized understanding. Pt tolerated dressing change well.

## 2024-12-30 NOTE — TELEPHONE ENCOUNTER
Spoke with patient to provide results per the provider's request. Patient stated that he had seen the results already. Confirmed that I was calling to provide the results. He expressed understanding of the message.

## 2024-12-30 NOTE — TELEPHONE ENCOUNTER
----- Message from Inocente Smith DPM sent at 12/30/2024  7:45 AM CST -----  Two bacteria grew on culture the one was covered by the antibiotic I had him on the other was not so I sent in a new antibiotic to cover the other bacteria that grew on culture

## 2025-01-04 ENCOUNTER — PATIENT MESSAGE (OUTPATIENT)
Dept: PODIATRY | Facility: CLINIC | Age: 65
End: 2025-01-04
Payer: MEDICARE

## 2025-01-06 ENCOUNTER — OFFICE VISIT (OUTPATIENT)
Dept: PODIATRY | Facility: CLINIC | Age: 65
End: 2025-01-06
Payer: MEDICARE

## 2025-01-06 VITALS — WEIGHT: 311.75 LBS | HEIGHT: 73 IN | BODY MASS INDEX: 41.32 KG/M2

## 2025-01-06 DIAGNOSIS — E11.49 TYPE II DIABETES MELLITUS WITH NEUROLOGICAL MANIFESTATIONS: Primary | ICD-10-CM

## 2025-01-06 DIAGNOSIS — L97.522 SKIN ULCER OF LEFT GREAT TOE WITH FAT LAYER EXPOSED: ICD-10-CM

## 2025-01-06 DIAGNOSIS — M14.672 CHARCOT'S JOINT OF LEFT FOOT: ICD-10-CM

## 2025-01-06 PROCEDURE — 99999 PR PBB SHADOW E&M-EST. PATIENT-LVL IV: CPT | Mod: PBBFAC,,, | Performed by: PODIATRIST

## 2025-01-06 PROCEDURE — 99499 UNLISTED E&M SERVICE: CPT | Mod: S$GLB,,, | Performed by: PODIATRIST

## 2025-01-06 PROCEDURE — 11042 DBRDMT SUBQ TIS 1ST 20SQCM/<: CPT | Mod: S$GLB,,, | Performed by: PODIATRIST

## 2025-01-06 NOTE — PROGRESS NOTES
Subjective:      Patient ID: Virgilio Acuña is a 64 y.o. male.    Chief Complaint: Wound Care      Virgilio is a 64 y.o. male    12/23/24: Patient returns with a new ulcer left great toe IPJ plantar aspect that developed over the last week or so after hitting it into the door plate when barefoot. Has been on antibiotic for the last 4 days or so.     1/6/25: Patient returns for left great toe ulcer care with toe football wrap on and in his diabetic shoes    PCP: Anne Wright MD    Date Last Seen by PCP:   Current shoe gear:  Affected Foot: Football with darco     Unaffected Foot: Tennis shoes    History of Trauma: negative      Hemoglobin A1C   Date Value Ref Range Status   12/23/2024 7.5 (H) 4.0 - 5.6 % Final     Comment:     ADA Screening Guidelines:  5.7-6.4%  Consistent with prediabetes  >or=6.5%  Consistent with diabetes    High levels of fetal hemoglobin interfere with the HbA1C  assay. Heterozygous hemoglobin variants (HbS, HgC, etc)do  not significantly interfere with this assay.   However, presence of multiple variants may affect accuracy.     04/18/2024 8.5 (H) 4.0 - 5.6 % Final     Comment:     ADA Screening Guidelines:  5.7-6.4%  Consistent with prediabetes  >or=6.5%  Consistent with diabetes    High levels of fetal hemoglobin interfere with the HbA1C  assay. Heterozygous hemoglobin variants (HbS, HgC, etc)do  not significantly interfere with this assay.   However, presence of multiple variants may affect accuracy.     09/26/2023 9.8 (H) 4.0 - 5.6 % Final     Comment:     ADA Screening Guidelines:  5.7-6.4%  Consistent with prediabetes  >or=6.5%  Consistent with diabetes    High levels of fetal hemoglobin interfere with the HbA1C  assay. Heterozygous hemoglobin variants (HbS, HgC, etc)do  not significantly interfere with this assay.   However, presence of multiple variants may affect accuracy.         Review of Systems   Constitutional: Negative for chills and fever.   Cardiovascular:  Positive for  leg swelling. Negative for claudication.   Respiratory:  Negative for shortness of breath.    Skin:  Positive for color change, nail changes and poor wound healing. Negative for itching and rash.   Musculoskeletal:  Negative for muscle cramps, muscle weakness and myalgias.   Gastrointestinal:  Negative for nausea and vomiting.   Neurological:  Positive for numbness and paresthesias. Negative for focal weakness and loss of balance.           Objective:       Physical Exam  Constitutional:       General: He is not in acute distress.     Appearance: He is well-developed. He is not diaphoretic.   Cardiovascular:      Pulses:           Dorsalis pedis pulses are 2+ on the right side and 2+ on the left side.        Posterior tibial pulses are 2+ on the right side and 2+ on the left side.      Comments: < 3 sec capillary refill time to toes 1-5 bilateral. Toes and feet are warm to touch proximally with normal distal cooling b/l. There is no hair growth on the feet and toes b/l. There is moderate edema left foot and mild edema right.     Musculoskeletal:      Comments: Left second toe amputation    Left foot now more narrow in appearance with the first ray properly reduced, minimal edema to the foot and no pain with palpation throughout the area of surgery.    Left ankle there is some pain with palpation to the lateral malleolus and the ATFL area as well as the peroneals     Dorsal left foot midfoot there is a bony prominence with some tenderness to palpation    Equinus noted b/l ankles with < 10 deg DF noted. MMT 5/5 in DF/PF/Inv/Ev resistance with no reproduction of pain in any direction. Passive range of motion of ankle and pedal joints is painless b/l.     Feet:      Right foot:      Protective Sensation: 10 sites tested.  0 sites sensed.      Left foot:      Protective Sensation: 10 sites tested.  0 sites sensed.   Skin:     General: Skin is warm.      Coloration: Skin is not pale.      Findings: No abrasion, bruising,  burn, ecchymosis, erythema, laceration, lesion, petechiae or rash.      Nails: There is no clubbing.      Comments: Incision overlying the left second metatarsal base is well healed      Ulcer Location: left plantar hallux  Measurements: pre 0.4x0.3x0.1 post 0.5x0.3x0.2 cm  Periwound: Intact  Drainage: No erythema  Pus: None.  Malodor: None.  Base:  100% granular  Signs of infection: None       Neurological:      Mental Status: He is alert and oriented to person, place, and time.      Sensory: Sensory deficit present.      Motor: No tremor, atrophy or abnormal muscle tone.      Comments: Negative tinel sign bilateral.   Psychiatric:         Behavior: Behavior normal.               Assessment:       Encounter Diagnoses   Name Primary?    Type II diabetes mellitus with neurological manifestations Yes    Charcot's joint of left foot     Skin ulcer of left great toe with fat layer exposed                         Plan:       Virgilio was seen today for wound care.    Diagnoses and all orders for this visit:    Type II diabetes mellitus with neurological manifestations    Charcot's joint of left foot    Skin ulcer of left great toe with fat layer exposed  -     Wound Debridement                  I counseled the patient on his conditions, their implications and medical management.    Shoe inspection. Diabetic Foot Education. Patient reminded of the importance of good nutrition and blood sugar control to help prevent podiatric complications of diabetes. Patient instructed on proper foot hygeine. We discussed wearing proper shoe gear, daily foot inspections, never walking without protective shoe gear, never putting sharp instruments to feet    Left foot ulcer debrided  and wrapped in felt aperture and toe wrap with medipore tape    See attached procedure note    Return for weekly wound care      Inocetne Smith DPM

## 2025-01-06 NOTE — PROCEDURES
"Wound Debridement    Date/Time: 1/6/2025 11:00 AM    Performed by: Inocente Smith DPM  Authorized by: Inocente Smith DPM    Time out: Immediately prior to procedure a "time out" was called to verify the correct patient, procedure, equipment, support staff and site/side marked as required.    Consent Done?:  Yes (Verbal)    Preparation: Patient was prepped and draped in usual sterile fashion    Local anesthesia used?: No      Wound Details:    Location:  Left foot    Location:  Left 1st Toe    Type of Debridement:  Excisional       Length (cm):  0.5       Width (cm):  0.3       Depth (cm):  0.2       Area (sq cm):  0.15       Percent Debrided (%):  100       Total Area Debrided (sq cm):  0.15    Depth of debridement:  Subcutaneous tissue    Devitalized tissue debrided:  Necrotic/Eschar and Slough    Instruments:  Curette  Bleeding:  Minimal  Hemostasis Achieved: Yes  Method Used:  Pressure  Patient tolerance:  Patient tolerated the procedure well with no immediate complications    "

## 2025-01-17 ENCOUNTER — OFFICE VISIT (OUTPATIENT)
Dept: PODIATRY | Facility: CLINIC | Age: 65
End: 2025-01-17
Payer: MEDICARE

## 2025-01-17 VITALS — WEIGHT: 311.75 LBS | HEIGHT: 73 IN | BODY MASS INDEX: 41.32 KG/M2

## 2025-01-17 DIAGNOSIS — L97.522 SKIN ULCER OF LEFT GREAT TOE WITH FAT LAYER EXPOSED: ICD-10-CM

## 2025-01-17 DIAGNOSIS — E11.49 TYPE II DIABETES MELLITUS WITH NEUROLOGICAL MANIFESTATIONS: Primary | ICD-10-CM

## 2025-01-17 PROCEDURE — 99499 UNLISTED E&M SERVICE: CPT | Mod: S$GLB,,, | Performed by: PODIATRIST

## 2025-01-17 PROCEDURE — 99999 PR PBB SHADOW E&M-EST. PATIENT-LVL IV: CPT | Mod: PBBFAC,,, | Performed by: PODIATRIST

## 2025-01-17 PROCEDURE — 11042 DBRDMT SUBQ TIS 1ST 20SQCM/<: CPT | Mod: S$GLB,,, | Performed by: PODIATRIST

## 2025-01-17 NOTE — PROGRESS NOTES
Subjective:      Patient ID: Virgilio Acuña is a 64 y.o. male.    Chief Complaint: Wound Care      Virgilio is a 64 y.o. male    12/23/24: Patient returns with a new ulcer left great toe IPJ plantar aspect that developed over the last week or so after hitting it into the door plate when barefoot. Has been on antibiotic for the last 4 days or so.     1/6/25: Patient returns for left great toe ulcer care with toe football wrap on and in his diabetic shoes    1/17/25: Patient returns for left great toe ulcer care with toe football wrap no new complaints.    PCP: Anne Wright MD    Date Last Seen by PCP:   Current shoe gear:  Affected Foot: Football with darco     Unaffected Foot: Tennis shoes    History of Trauma: negative      Hemoglobin A1C   Date Value Ref Range Status   12/23/2024 7.5 (H) 4.0 - 5.6 % Final     Comment:     ADA Screening Guidelines:  5.7-6.4%  Consistent with prediabetes  >or=6.5%  Consistent with diabetes    High levels of fetal hemoglobin interfere with the HbA1C  assay. Heterozygous hemoglobin variants (HbS, HgC, etc)do  not significantly interfere with this assay.   However, presence of multiple variants may affect accuracy.     04/18/2024 8.5 (H) 4.0 - 5.6 % Final     Comment:     ADA Screening Guidelines:  5.7-6.4%  Consistent with prediabetes  >or=6.5%  Consistent with diabetes    High levels of fetal hemoglobin interfere with the HbA1C  assay. Heterozygous hemoglobin variants (HbS, HgC, etc)do  not significantly interfere with this assay.   However, presence of multiple variants may affect accuracy.     09/26/2023 9.8 (H) 4.0 - 5.6 % Final     Comment:     ADA Screening Guidelines:  5.7-6.4%  Consistent with prediabetes  >or=6.5%  Consistent with diabetes    High levels of fetal hemoglobin interfere with the HbA1C  assay. Heterozygous hemoglobin variants (HbS, HgC, etc)do  not significantly interfere with this assay.   However, presence of multiple variants may affect accuracy.          Review of Systems   Constitutional: Negative for chills and fever.   Cardiovascular:  Positive for leg swelling. Negative for claudication.   Respiratory:  Negative for shortness of breath.    Skin:  Positive for color change, nail changes and poor wound healing. Negative for itching and rash.   Musculoskeletal:  Negative for muscle cramps, muscle weakness and myalgias.   Gastrointestinal:  Negative for nausea and vomiting.   Neurological:  Positive for numbness and paresthesias. Negative for focal weakness and loss of balance.           Objective:       Physical Exam  Constitutional:       General: He is not in acute distress.     Appearance: He is well-developed. He is not diaphoretic.   Cardiovascular:      Pulses:           Dorsalis pedis pulses are 2+ on the right side and 2+ on the left side.        Posterior tibial pulses are 2+ on the right side and 2+ on the left side.      Comments: < 3 sec capillary refill time to toes 1-5 bilateral. Toes and feet are warm to touch proximally with normal distal cooling b/l. There is no hair growth on the feet and toes b/l. There is moderate edema left foot and mild edema right.     Musculoskeletal:      Comments: Left second toe amputation    Left foot now more narrow in appearance with the first ray properly reduced, minimal edema to the foot and no pain with palpation throughout the area of surgery.    Left ankle there is some pain with palpation to the lateral malleolus and the ATFL area as well as the peroneals     Dorsal left foot midfoot there is a bony prominence with some tenderness to palpation    Equinus noted b/l ankles with < 10 deg DF noted. MMT 5/5 in DF/PF/Inv/Ev resistance with no reproduction of pain in any direction. Passive range of motion of ankle and pedal joints is painless b/l.     Feet:      Right foot:      Protective Sensation: 10 sites tested.  0 sites sensed.      Left foot:      Protective Sensation: 10 sites tested.  0 sites sensed.    Skin:     General: Skin is warm.      Coloration: Skin is not pale.      Findings: No abrasion, bruising, burn, ecchymosis, erythema, laceration, lesion, petechiae or rash.      Nails: There is no clubbing.      Comments: Incision overlying the left second metatarsal base is well healed      Ulcer Location: left plantar hallux  Measurements: pre covered in eschar and callus post 0.1x0.1x0.1 cm  Periwound: Intact  Drainage: No erythema  Pus: None.  Malodor: None.  Base:  100% granular  Signs of infection: None       Neurological:      Mental Status: He is alert and oriented to person, place, and time.      Sensory: Sensory deficit present.      Motor: No tremor, atrophy or abnormal muscle tone.      Comments: Negative tinel sign bilateral.   Psychiatric:         Behavior: Behavior normal.               Assessment:       No diagnosis found.                       Plan:       There are no diagnoses linked to this encounter.                I counseled the patient on his conditions, their implications and medical management.    Shoe inspection. Diabetic Foot Education. Patient reminded of the importance of good nutrition and blood sugar control to help prevent podiatric complications of diabetes. Patient instructed on proper foot hygeine. We discussed wearing proper shoe gear, daily foot inspections, never walking without protective shoe gear, never putting sharp instruments to feet    Left foot ulcer debrided  and wrapped in felt aperture and toe wrap with medipore tape    See attached procedure note    Return for weekly wound care      Inocente Smith DPM

## 2025-01-17 NOTE — PROCEDURES
"Wound Debridement    Date/Time: 1/17/2025 1:00 PM    Performed by: Inocente Smith DPM  Authorized by: Inocente Smith DPM    Time out: Immediately prior to procedure a "time out" was called to verify the correct patient, procedure, equipment, support staff and site/side marked as required.    Consent Done?:  Yes (Verbal)    Preparation: Patient was prepped and draped in usual sterile fashion    Local anesthesia used?: No      Wound Details:    Location:  Left foot    Location:  Left 1st Toe    Type of Debridement:  Excisional       Length (cm):  0.1       Width (cm):  0.1       Depth (cm):  0.1       Area (sq cm):  0.01       Percent Debrided (%):  100       Total Area Debrided (sq cm):  0.01    Depth of debridement:  Subcutaneous tissue    Devitalized tissue debrided:  Necrotic/Eschar and Slough    Instruments:  Curette  Bleeding:  Minimal  Hemostasis Achieved: Yes  Method Used:  Pressure  Patient tolerance:  Patient tolerated the procedure well with no immediate complications    "

## 2025-01-28 ENCOUNTER — PATIENT MESSAGE (OUTPATIENT)
Dept: PODIATRY | Facility: CLINIC | Age: 65
End: 2025-01-28
Payer: MEDICARE

## 2025-02-16 DIAGNOSIS — Z79.4 TYPE 2 DIABETES MELLITUS WITH DIABETIC POLYNEUROPATHY, WITH LONG-TERM CURRENT USE OF INSULIN: ICD-10-CM

## 2025-02-16 DIAGNOSIS — E11.42 TYPE 2 DIABETES MELLITUS WITH DIABETIC POLYNEUROPATHY, WITH LONG-TERM CURRENT USE OF INSULIN: ICD-10-CM

## 2025-02-17 RX ORDER — INSULIN LISPRO 100 [IU]/ML
INJECTION, SOLUTION INTRAVENOUS; SUBCUTANEOUS
Qty: 110 ML | Refills: 0 | Status: SHIPPED | OUTPATIENT
Start: 2025-02-17

## 2025-02-17 NOTE — TELEPHONE ENCOUNTER
No care due was identified.  [facility] Embedded Care Due Messages. Reference number: [id number].   2/17/2025 12:07:21 PM CST

## 2025-02-17 NOTE — TELEPHONE ENCOUNTER
Refill Routing Note   Medication(s) are not appropriate for processing by [facility] for the following reason(s):        Patient not seen by provider within 15 months    [facility] action(s):  Defer               Appointments  past 12m or future 3m with PCP    Date Provider   Last Visit   10/11/2022 Anne Wright MD   Next Visit   Visit date not found Anne Wright MD   ED visits in past 90 days: 0        Note composed:1:11 PM 02/17/2025

## 2025-02-27 DIAGNOSIS — E11.42 TYPE 2 DIABETES MELLITUS WITH DIABETIC POLYNEUROPATHY, WITH LONG-TERM CURRENT USE OF INSULIN: ICD-10-CM

## 2025-02-27 DIAGNOSIS — Z79.4 TYPE 2 DIABETES MELLITUS WITH DIABETIC POLYNEUROPATHY, WITH LONG-TERM CURRENT USE OF INSULIN: ICD-10-CM

## 2025-02-27 NOTE — TELEPHONE ENCOUNTER
No care due was identified.  [facility] Embedded Care Due Messages. Reference number: [id number].   2/27/2025 4:13:41 PM CST

## 2025-02-27 NOTE — TELEPHONE ENCOUNTER
Refill Routing Note   Medication(s) are not appropriate for processing by [facility] for the following reason(s):        No active prescription written by provider    [facility] action(s):  Defer               Appointments  past 12m or future 3m with PCP    Date Provider   Last Visit   10/11/2022 Anne Wright MD   Next Visit   Visit date not found Anne Wright MD   ED visits in past 90 days: 0        Note composed:4:30 PM 02/27/2025

## 2025-02-28 RX ORDER — INSULIN LISPRO 100 [IU]/ML
INJECTION, SOLUTION INTRAVENOUS; SUBCUTANEOUS
Qty: 105 ML | Refills: 0 | Status: SHIPPED | OUTPATIENT
Start: 2025-02-28

## 2025-04-08 ENCOUNTER — PATIENT MESSAGE (OUTPATIENT)
Dept: PRIMARY CARE CLINIC | Facility: CLINIC | Age: 65
End: 2025-04-08
Payer: MEDICARE

## 2025-04-08 DIAGNOSIS — G62.9 NEUROPATHY: ICD-10-CM

## 2025-04-08 RX ORDER — GABAPENTIN 300 MG/1
CAPSULE ORAL
Qty: 270 CAPSULE | Refills: 5 | Status: SHIPPED | OUTPATIENT
Start: 2025-04-08

## 2025-04-08 NOTE — TELEPHONE ENCOUNTER
No care due was identified.  [facility] Embedded Care Due Messages. Reference number: [id number].   4/08/2025 8:49:49 AM CDT

## 2025-04-25 ENCOUNTER — PATIENT MESSAGE (OUTPATIENT)
Dept: PRIMARY CARE CLINIC | Facility: CLINIC | Age: 65
End: 2025-04-25
Payer: MEDICARE

## 2025-04-25 DIAGNOSIS — G62.9 NEUROPATHY: ICD-10-CM

## 2025-04-25 RX ORDER — GABAPENTIN 300 MG/1
CAPSULE ORAL
Qty: 270 CAPSULE | Refills: 5 | Status: SHIPPED | OUTPATIENT
Start: 2025-04-25

## 2025-04-25 NOTE — TELEPHONE ENCOUNTER
No care due was identified.  [facility] Embedded Care Due Messages. Reference number: [id number].   4/25/2025 9:11:38 AM CDT

## 2025-05-06 ENCOUNTER — PATIENT OUTREACH (OUTPATIENT)
Dept: ADMINISTRATIVE | Facility: HOSPITAL | Age: 65
End: 2025-05-06
Payer: MEDICARE

## 2025-05-08 ENCOUNTER — PATIENT OUTREACH (OUTPATIENT)
Dept: ADMINISTRATIVE | Facility: HOSPITAL | Age: 65
End: 2025-05-08
Payer: MEDICARE

## 2025-05-30 ENCOUNTER — PATIENT OUTREACH (OUTPATIENT)
Dept: ADMINISTRATIVE | Facility: HOSPITAL | Age: 65
End: 2025-05-30
Payer: MEDICARE

## 2025-05-30 NOTE — PROGRESS NOTES
Working Eye exam Report: Chart searched, [facility] EXAM faxed to Dr. Bui 1x to request. Reminder set.

## 2025-05-30 NOTE — LETTER
AUTHORIZATION FOR RELEASE OF   CONFIDENTIAL INFORMATION    Dear DR. COBOS,    We are seeing Virgilio Acuña, date of birth 1960, in the clinic at [facility]. Anne Wright MD is the patient's PCP. Virgilio Acuña has an outstanding lab/procedure at the time we reviewed his chart. In order to help keep his health information updated, he has authorized us to request the following medical record(s):        (  )  MAMMOGRAM                                      (  )  COLONOSCOPY      (  )  PAP SMEAR                                          (  )  OUTSIDE LAB RESULTS     (  )  DEXA SCAN                                          ( X )  DM EYE EXAM            (  )  FOOT EXAM                                          (  )  ENTIRE RECORD     (  )  OUTSIDE IMMUNIZATIONS                 (  )  _______________        Please fax/Email records to:  Fax: [phone]  Email: [email or web address]      [facility]  Care Coordination Department  [facility]  Phone: [phone]           Patient Name: Virgilio Acuña  : 1960  Patient Phone #: [phone]

## 2025-06-03 ENCOUNTER — PATIENT MESSAGE (OUTPATIENT)
Dept: PRIMARY CARE CLINIC | Facility: CLINIC | Age: 65
End: 2025-06-03
Payer: MEDICARE

## 2025-06-10 ENCOUNTER — PATIENT MESSAGE (OUTPATIENT)
Dept: FAMILY MEDICINE | Facility: CLINIC | Age: 65
End: 2025-06-10
Payer: MEDICARE

## 2025-06-12 ENCOUNTER — PATIENT MESSAGE (OUTPATIENT)
Dept: PRIMARY CARE CLINIC | Facility: CLINIC | Age: 65
End: 2025-06-12
Payer: MEDICARE

## 2025-06-12 VITALS — SYSTOLIC BLOOD PRESSURE: 132 MMHG | DIASTOLIC BLOOD PRESSURE: 68 MMHG

## 2025-06-12 DIAGNOSIS — E11.42 TYPE 2 DIABETES MELLITUS WITH DIABETIC POLYNEUROPATHY, WITH LONG-TERM CURRENT USE OF INSULIN: ICD-10-CM

## 2025-06-12 DIAGNOSIS — Z79.4 TYPE 2 DIABETES MELLITUS WITH DIABETIC POLYNEUROPATHY, WITH LONG-TERM CURRENT USE OF INSULIN: ICD-10-CM

## 2025-06-12 RX ORDER — INSULIN LISPRO 100 [IU]/ML
INJECTION, SOLUTION INTRAVENOUS; SUBCUTANEOUS
Qty: 105 ML | Refills: 0 | Status: SHIPPED | OUTPATIENT
Start: 2025-06-12

## 2025-06-12 NOTE — TELEPHONE ENCOUNTER
No care due was identified.  [facility] Embedded Care Due Messages. Reference number: [id number].   6/12/2025 10:56:20 AM CDT

## 2025-07-07 ENCOUNTER — LAB VISIT (OUTPATIENT)
Dept: LAB | Facility: HOSPITAL | Age: 65
End: 2025-07-07
Attending: PHYSICIAN ASSISTANT
Payer: MEDICARE

## 2025-07-07 ENCOUNTER — PATIENT MESSAGE (OUTPATIENT)
Dept: FAMILY MEDICINE | Facility: CLINIC | Age: 65
End: 2025-07-07

## 2025-07-07 ENCOUNTER — OFFICE VISIT (OUTPATIENT)
Dept: FAMILY MEDICINE | Facility: CLINIC | Age: 65
End: 2025-07-07
Payer: MEDICARE

## 2025-07-07 VITALS
BODY MASS INDEX: 41.35 KG/M2 | SYSTOLIC BLOOD PRESSURE: 120 MMHG | OXYGEN SATURATION: 97 % | WEIGHT: 312 LBS | HEART RATE: 61 BPM | DIASTOLIC BLOOD PRESSURE: 57 MMHG | HEIGHT: 73 IN

## 2025-07-07 DIAGNOSIS — E11.69 HYPERLIPIDEMIA ASSOCIATED WITH TYPE 2 DIABETES MELLITUS: ICD-10-CM

## 2025-07-07 DIAGNOSIS — I15.2 HYPERTENSION ASSOCIATED WITH TYPE 2 DIABETES MELLITUS: ICD-10-CM

## 2025-07-07 DIAGNOSIS — E78.5 HYPERLIPIDEMIA ASSOCIATED WITH TYPE 2 DIABETES MELLITUS: ICD-10-CM

## 2025-07-07 DIAGNOSIS — G62.9 NEUROPATHY: ICD-10-CM

## 2025-07-07 DIAGNOSIS — E11.59 HYPERTENSION ASSOCIATED WITH TYPE 2 DIABETES MELLITUS: ICD-10-CM

## 2025-07-07 DIAGNOSIS — Z89.422 HISTORY OF AMPUTATION OF LESSER TOE, LEFT: ICD-10-CM

## 2025-07-07 DIAGNOSIS — E11.42 TYPE 2 DIABETES MELLITUS WITH DIABETIC POLYNEUROPATHY, WITH LONG-TERM CURRENT USE OF INSULIN: ICD-10-CM

## 2025-07-07 DIAGNOSIS — J30.9 ALLERGIC RHINITIS, UNSPECIFIED SEASONALITY, UNSPECIFIED TRIGGER: ICD-10-CM

## 2025-07-07 DIAGNOSIS — E66.01 SEVERE OBESITY (BMI >= 40): ICD-10-CM

## 2025-07-07 DIAGNOSIS — Z79.4 TYPE 2 DIABETES MELLITUS WITH DIABETIC POLYNEUROPATHY, WITH LONG-TERM CURRENT USE OF INSULIN: ICD-10-CM

## 2025-07-07 DIAGNOSIS — F51.01 PRIMARY INSOMNIA: ICD-10-CM

## 2025-07-07 DIAGNOSIS — K21.9 GASTROESOPHAGEAL REFLUX DISEASE WITHOUT ESOPHAGITIS: ICD-10-CM

## 2025-07-07 DIAGNOSIS — G62.9 NEUROPATHY: Primary | ICD-10-CM

## 2025-07-07 LAB
ABSOLUTE EOSINOPHIL (OHS): 0.16 K/UL
ABSOLUTE MONOCYTE (OHS): 0.61 K/UL (ref 0.3–1)
ABSOLUTE NEUTROPHIL COUNT (OHS): 4.44 K/UL (ref 1.8–7.7)
ALBUMIN SERPL BCP-MCNC: 4.2 G/DL (ref 3.5–5.2)
ALBUMIN/CREAT UR: 53.3 UG/MG
ALP SERPL-CCNC: 53 UNIT/L (ref 40–150)
ALT SERPL W/O P-5'-P-CCNC: 28 UNIT/L (ref 10–44)
ANION GAP (OHS): 11 MMOL/L (ref 8–16)
AST SERPL-CCNC: 22 UNIT/L (ref 11–45)
BASOPHILS # BLD AUTO: 0.07 K/UL
BASOPHILS NFR BLD AUTO: 1 %
BILIRUB SERPL-MCNC: 0.6 MG/DL (ref 0.1–1)
BUN SERPL-MCNC: 17 MG/DL (ref 8–23)
CALCIUM SERPL-MCNC: 9.3 MG/DL (ref 8.7–10.5)
CHLORIDE SERPL-SCNC: 101 MMOL/L (ref 95–110)
CHOLEST SERPL-MCNC: 176 MG/DL (ref 120–199)
CHOLEST/HDLC SERPL: 5 {RATIO} (ref 2–5)
CO2 SERPL-SCNC: 25 MMOL/L (ref 23–29)
CREAT SERPL-MCNC: 0.9 MG/DL (ref 0.5–1.4)
CREAT UR-MCNC: 107 MG/DL (ref 23–375)
EAG (OHS): 212 MG/DL (ref 68–131)
ERYTHROCYTE [DISTWIDTH] IN BLOOD BY AUTOMATED COUNT: 13.5 % (ref 11.5–14.5)
GFR SERPLBLD CREATININE-BSD FMLA CKD-EPI: >60 ML/MIN/1.73/M2
GLUCOSE SERPL-MCNC: 105 MG/DL (ref 70–110)
HBA1C MFR BLD: 9 % (ref 4–5.6)
HCT VFR BLD AUTO: 47.1 % (ref 40–54)
HDLC SERPL-MCNC: 35 MG/DL (ref 40–75)
HDLC SERPL: 19.9 % (ref 20–50)
HGB BLD-MCNC: 15.8 GM/DL (ref 14–18)
IMM GRANULOCYTES # BLD AUTO: 0.07 K/UL (ref 0–0.04)
IMM GRANULOCYTES NFR BLD AUTO: 1 % (ref 0–0.5)
LDLC SERPL CALC-MCNC: 71.8 MG/DL (ref 63–159)
LYMPHOCYTES # BLD AUTO: 1.45 K/UL (ref 1–4.8)
MCH RBC QN AUTO: 30.6 PG (ref 27–31)
MCHC RBC AUTO-ENTMCNC: 33.5 G/DL (ref 32–36)
MCV RBC AUTO: 91 FL (ref 82–98)
MICROALBUMIN UR-MCNC: 57 UG/ML (ref ?–5000)
NONHDLC SERPL-MCNC: 141 MG/DL
NUCLEATED RBC (/100WBC) (OHS): 0 /100 WBC
PLATELET # BLD AUTO: 248 K/UL (ref 150–450)
PMV BLD AUTO: 10.1 FL (ref 9.2–12.9)
POTASSIUM SERPL-SCNC: 4.1 MMOL/L (ref 3.5–5.1)
PROT SERPL-MCNC: 7.2 GM/DL (ref 6–8.4)
RBC # BLD AUTO: 5.17 M/UL (ref 4.6–6.2)
RELATIVE EOSINOPHIL (OHS): 2.4 %
RELATIVE LYMPHOCYTE (OHS): 21.3 % (ref 18–48)
RELATIVE MONOCYTE (OHS): 9 % (ref 4–15)
RELATIVE NEUTROPHIL (OHS): 65.3 % (ref 38–73)
SODIUM SERPL-SCNC: 137 MMOL/L (ref 136–145)
TRIGL SERPL-MCNC: 346 MG/DL (ref 30–150)
WBC # BLD AUTO: 6.8 K/UL (ref 3.9–12.7)

## 2025-07-07 PROCEDURE — 4010F ACE/ARB THERAPY RXD/TAKEN: CPT | Mod: CPTII,S$GLB,, | Performed by: PHYSICIAN ASSISTANT

## 2025-07-07 PROCEDURE — 80053 COMPREHEN METABOLIC PANEL: CPT

## 2025-07-07 PROCEDURE — 1160F RVW MEDS BY RX/DR IN RCRD: CPT | Mod: CPTII,S$GLB,, | Performed by: PHYSICIAN ASSISTANT

## 2025-07-07 PROCEDURE — 82043 UR ALBUMIN QUANTITATIVE: CPT

## 2025-07-07 PROCEDURE — G2211 COMPLEX E/M VISIT ADD ON: HCPCS | Mod: S$GLB,,, | Performed by: PHYSICIAN ASSISTANT

## 2025-07-07 PROCEDURE — 99214 OFFICE O/P EST MOD 30 MIN: CPT | Mod: S$GLB,,, | Performed by: PHYSICIAN ASSISTANT

## 2025-07-07 PROCEDURE — 1159F MED LIST DOCD IN RCRD: CPT | Mod: CPTII,S$GLB,, | Performed by: PHYSICIAN ASSISTANT

## 2025-07-07 PROCEDURE — 3288F FALL RISK ASSESSMENT DOCD: CPT | Mod: CPTII,S$GLB,, | Performed by: PHYSICIAN ASSISTANT

## 2025-07-07 PROCEDURE — 3078F DIAST BP <80 MM HG: CPT | Mod: CPTII,S$GLB,, | Performed by: PHYSICIAN ASSISTANT

## 2025-07-07 PROCEDURE — 99999 PR PBB SHADOW E&M-EST. PATIENT-LVL V: CPT | Mod: PBBFAC,,, | Performed by: PHYSICIAN ASSISTANT

## 2025-07-07 PROCEDURE — 1101F PT FALLS ASSESS-DOCD LE1/YR: CPT | Mod: CPTII,S$GLB,, | Performed by: PHYSICIAN ASSISTANT

## 2025-07-07 PROCEDURE — 3074F SYST BP LT 130 MM HG: CPT | Mod: CPTII,S$GLB,, | Performed by: PHYSICIAN ASSISTANT

## 2025-07-07 PROCEDURE — 83036 HEMOGLOBIN GLYCOSYLATED A1C: CPT

## 2025-07-07 PROCEDURE — 80061 LIPID PANEL: CPT

## 2025-07-07 PROCEDURE — 3008F BODY MASS INDEX DOCD: CPT | Mod: CPTII,S$GLB,, | Performed by: PHYSICIAN ASSISTANT

## 2025-07-07 PROCEDURE — 1125F AMNT PAIN NOTED PAIN PRSNT: CPT | Mod: CPTII,S$GLB,, | Performed by: PHYSICIAN ASSISTANT

## 2025-07-07 PROCEDURE — 36415 COLL VENOUS BLD VENIPUNCTURE: CPT | Mod: PO

## 2025-07-07 PROCEDURE — 85025 COMPLETE CBC W/AUTO DIFF WBC: CPT

## 2025-07-07 RX ORDER — AZELASTINE 1 MG/ML
1 SPRAY, METERED NASAL 2 TIMES DAILY
Qty: 30 ML | Refills: 11 | Status: SHIPPED | OUTPATIENT
Start: 2025-07-07 | End: 2025-07-07 | Stop reason: SDUPTHER

## 2025-07-07 RX ORDER — AZELASTINE 1 MG/ML
1 SPRAY, METERED NASAL 2 TIMES DAILY
Qty: 30 ML | Refills: 11 | Status: SHIPPED | OUTPATIENT
Start: 2025-07-07 | End: 2026-07-07

## 2025-07-07 RX ORDER — GABAPENTIN 600 MG/1
1200 TABLET ORAL 3 TIMES DAILY
Qty: 540 TABLET | Refills: 5 | Status: SHIPPED | OUTPATIENT
Start: 2025-07-07 | End: 2025-07-07 | Stop reason: SDUPTHER

## 2025-07-07 RX ORDER — GABAPENTIN 600 MG/1
1200 TABLET ORAL 3 TIMES DAILY
Qty: 540 TABLET | Refills: 5 | Status: SHIPPED | OUTPATIENT
Start: 2025-07-07 | End: 2026-12-29

## 2025-07-07 NOTE — PROGRESS NOTES
Assessment/Plan:    1. Neuropathy  Overview:  -hx of DDD and diabetic neuropathy  -currently on gabapentin 900 mg TID but still having symptoms-requesting to increase dose to 1200 mg TID  -needs to follow up with pain management for treatment of DDD-pt declined at this time    Orders:  -     gabapentin (NEURONTIN) 600 MG tablet; Take 2 tablets (1,200 mg total) by mouth 3 (three) times daily.  Dispense: 540 tablet; Refill: 5    2. Hyperlipidemia associated with type 2 diabetes mellitus  Overview:  Hyperlipidemia Medications              gemfibroziL (LOPID) 600 MG tablet Take 1 tablet (600 mg total) by mouth once daily.    pravastatin (PRAVACHOL) 40 MG tablet Take 1 tablet (40 mg total) by mouth once daily.          -chronic condition. Currently stable.    -reports compliance with hyperlipidemia treatment as prescribed  -denies any known adverse effects of medications  -most recent labs listed below:  Lab Results   Component Value Date    CHOL 194 04/18/2024     Lab Results   Component Value Date    HDL 33 (L) 04/18/2024     Lab Results   Component Value Date    LDLCALC 107.4 04/18/2024     Lab Results   Component Value Date    TRIG 268 (H) 04/18/2024     Lab Results   Component Value Date    ALT 23 04/18/2024    AST 21 04/18/2024    ALKPHOS 59 04/18/2024    BILITOT 0.7 04/18/2024         Orders:  -     Cancel: Lipid Panel; Standing  -     Lipid Panel; Future; Expected date: 07/07/2025    3. Hypertension associated with type 2 diabetes mellitus  Overview:  Hypertension Medications              amLODIPine (NORVASC) 10 MG tablet Take 1 tablet (10 mg total) by mouth once daily.    enalapril (VASOTEC) 20 MG tablet Take 1 tablet (20 mg total) by mouth 2 (two) times daily.    hydroCHLOROthiazide (HYDRODIURIL) 25 MG tablet Take 1 tablet (25 mg total) by mouth once daily.          -at goal today  -continue lifestyle modification with low sodium diet and exercise   -discussed hypertension disease course and importance of  treating high blood pressure  -patient understood and advised of risk of untreated blood pressure. ER precautions were given for symptoms of hypertensive urgency and emergency.    Orders:  -     Cancel: Comprehensive Metabolic Panel; Standing  -     Cancel: CBC Auto Differential; Standing  -     CBC Auto Differential; Future; Expected date: 07/07/2025  -     Comprehensive Metabolic Panel; Future; Expected date: 07/07/2025    4. Type 2 diabetes mellitus with diabetic polyneuropathy, with long-term current use of insulin  Overview:  Diabetes Medications              glimepiride (AMARYL) 4 MG tablet Take 1 tablet (4 mg total) by mouth before breakfast.    insulin glargine U-100, Lantus, (LANTUS SOLOSTAR U-100 INSULIN) 100 unit/mL (3 mL) InPn pen INJECT 40 UNITS            SUBCUTANEOUSLY TWO TIMES A DAY    insulin lispro 100 unit/mL pen INJECT 40 UNITS INTO THE   SKIN 3 TIMES A DAY    metFORMIN (GLUCOPHAGE-XR) 500 MG ER 24hr tablet TAKE 2 TABLETS TWO TIMES A DAY WITH MEALS        -condition is currently uncontrolled, but significantly improved  -unable to start Farxiga and Januvia due to insurance coverage; AE w/ Victoza   -does not want to see diabetes clinic  -due for repeat A1c  -see diabetic health maintenance listed below  -on statin: Yes  -on ACE-I/ARB: Yes  -counseling provided on importance of diabetic diet and medication compliance in order to treat diabetes  -discussed diabetes disease course and potential complications    Orders:  -     Cancel: Hemoglobin A1C; Standing  -     Hemoglobin A1C; Future; Expected date: 07/07/2025    5. Severe obesity (BMI >= 40)  Overview:  General weight loss/lifestyle modification strategies discussed (elicit support from others; identify saboteurs; non-food rewards, etc).  Informal exercise measures discussed, e.g. taking stairs instead of elevator.  Regular aerobic exercise program discussed.    Wt Readings from Last 3 Encounters:   07/07/25 0815 (!) 141.5 kg (312 lb)    01/17/25 1300 (!) 141.4 kg (311 lb 11.7 oz)   01/06/25 1052 (!) 141.4 kg (311 lb 11.7 oz)         6. Gastroesophageal reflux disease without esophagitis  Overview:  -symptoms controlled with daily PPI  -denies alarm symptoms, such as dysphagia, weight loss or N/V  -continue lifestyle modification with avoidance of acidic foods, caffeine, late night eating      7. History of amputation of lesser toe, left  Overview:  -followed closely by podiatry for diabetic foot ulcer management      8. Primary insomnia  Overview:  -is on trazodone chronically  -denies adverse effects of medication  -has tried multiple OTC medication with minimal benefit  -discussed importance of good sleep hygiene practices        9. Allergic rhinitis, unspecified seasonality, unspecified trigger  -     azelastine (ASTELIN) 137 mcg (0.1 %) nasal spray; 1 spray (137 mcg total) by Nasal route 2 (two) times daily.  Dispense: 30 mL; Refill: 11    Visit today included increased complexity associated with the care of the episodic problem addressed and managing the longitudinal care of the patient due to the serious and/or complex managed problem(s).    Follow up in about 6 months (around 1/7/2026), or if symptoms worsen or fail to improve.    Laura Buchanan PA-C  _____________________________________________________________________________________________________________________________________________________    CC: follow up for chronic conditions    HPI: Patient is in clinic today as an established patient here for follow up for chronic conditions.     CHRONIC PAIN AND NEUROPATHY:  He reports chronic neuropathic pain with severe shooting pains in bilateral hands and feet, describing pain intensity as equivalent to being stabbed. Pain occurs intermittently. He currently manages pain with gabapentin 900 mg 3 times daily, occasionally requiring an extra dose for breakthrough pain. He has a history of pain management with oxycodone, which he  discontinued abruptly and does not wish to restart opioid treatment. He acknowledges ongoing challenges with pain management and limited treatment options for neuropathic pain. He has spinal stenosis and notes that interventional treatments like injections and ablations provide only temporary relief. He is currently not interested in pursuing additional pain management options at this time.    CURRENT MEDICATIONS:  Current medications include cholesterol medications (Pravastatin and Lopid), blood pressure medications (Amlodipine, Enalapril, Hydrochlorothiazide), diabetes medications (Lantus 40 units twice daily, Lispro 40 units before meals 3 times daily, Glimepiride and Metformin), Pantoprazole for acid reflux, and Trazodone nightly for sleep. He verbalized uncertainty about recent A1C results, expressing hope that the results remain good despite his skepticism. He does not monitor his glucose at home.     NASAL CONGESTION:  He reports persistent nasal congestion that occurs within 10 minutes of lying down, resulting in mouth breathing and dry mouth. He previously used Afrin but discontinued two years ago due to concerns about addiction potential. He has also tried Flonase in the past without significant relief. He currently experiences difficulty sleeping and must sleep in an upright position due to nasal congestion. He is interested in exploring alternative nasal spray treatments to manage his symptoms.    No other complaints today.     Past Medical History:   Diagnosis Date    Cellulitis of left index finger 02/16/2015    Charcot's joint of foot, left 08/2018    Dyslipidemia     Essential hypertension 02/16/2017    Group B streptococcal infection 01/15/2018    Hypotestosteronemia 07/07/2017    Infected finger joint 02/17/2015    Infected skin ulcer limited to breakdown of skin 01/13/2018    MSSA (methicillin susceptible Staphylococcus aureus) 01/13/2018    Obese     S/P knee surgery, medial/lateral menisectomy  11/04/2013    Sleep apnea     does not use CPAP    Type 2 diabetes mellitus with diabetic polyneuropathy, with long-term current use of insulin 2003     Past Surgical History:   Procedure Laterality Date    ELBOW SURGERY      EPIDURAL STEROID INJECTION N/A 7/14/2020    Procedure: Lumbar L5/S1 IL WHITLEY;  Surgeon: Nirav Ramon MD;  Location: [facility];  Service: Pain Management;  Laterality: N/A;    EPIDURAL STEROID INJECTION N/A 10/20/2020    Procedure: Lumbar L5/S1 IL WHITLEY;  Surgeon: Nirav Ramon MD;  Location: [facility];  Service: Pain Management;  Laterality: N/A;    EPIDURAL STEROID INJECTION INTO LUMBAR SPINE N/A 10/25/2022    Procedure: Injection-steroid-epidural-lumbar L5/S1 to right;  Surgeon: Lavell Monsalve MD;  Location: [facility] OR;  Service: Pain Management;  Laterality: N/A;    FOOT SURGERY Left     INJECTION OF ANESTHETIC AGENT INTO SACROILIAC JOINT Right 5/18/2020    Procedure: right Sacroiliac Joint Injection;  Surgeon: Nirav Ramon MD;  Location: [facility];  Service: Pain Management;  Laterality: Right;    INJECTION OF JOINT Right 5/18/2020    Procedure: right GT bursa injection;  Surgeon: Nirav Ramon MD;  Location: [facility];  Service: Pain Management;  Laterality: Right;    KNEE CARTILAGE SURGERY  10/21/2013    right knee    KNEE SURGERY Right 02/17/2017    Left index finger surgery  03/2015    MIDFOOT ARTHRODESIS Left 8/27/2018    Procedure: FUSION, JOINT, MIDFOOT - FIRST METATARSOCUNEIFORM JOINT AND NAVICULOCUNEIFORM JOINT;  Surgeon: Inocente Smith DPM;  Location: [facility] OR;  Service: Podiatry;  Laterality: Left;    SELECTIVE INJECTION OF ANESTHETIC AGENT AROUND LUMBAR SPINAL NERVE ROOT BY TRANSFORAMINAL APPROACH Bilateral 10/4/2021    Procedure: Bilateral L4/5 TF WHITLEY//wants latest time if poss;  Surgeon: Nirav Ramon MD;  Location: [facility];  Service: Pain Management;  Laterality: Bilateral;    TOE AMPUTATION  03/2018    left great toe     Review of  "patient's allergies indicates:   Allergen Reactions    Victoza [liraglutide] Swelling    Chlorthalidone      Pt reports multiple side effects and intolerances    Metoprolol      Made his ears 'thump" Just overall he did not feel good on it     Social History[1]  Family History   Problem Relation Name Age of Onset    COPD Father       Medications Ordered Prior to Encounter[2]    Review of Systems   Constitutional:  Negative for chills, diaphoresis, fatigue and fever.   HENT:  Positive for congestion. Negative for ear pain, postnasal drip, sinus pain and sore throat.    Eyes:  Negative for pain and redness.   Respiratory:  Negative for cough, chest tightness and shortness of breath.    Cardiovascular:  Negative for chest pain and leg swelling.   Gastrointestinal:  Negative for abdominal pain, constipation, diarrhea, nausea and vomiting.   Genitourinary:  Negative for dysuria and hematuria.   Musculoskeletal:  Negative for arthralgias and joint swelling.   Skin:  Negative for rash.   Neurological:  Negative for dizziness, syncope and headaches.   Psychiatric/Behavioral:  Negative for dysphoric mood. The patient is not nervous/anxious.        Vitals:    07/07/25 0815   BP: (!) 120/57   Pulse: 61   SpO2: 97%   Weight: (!) 141.5 kg (312 lb)   Height: 6' 1" (1.854 m)       Wt Readings from Last 3 Encounters:   07/07/25 (!) 141.5 kg (312 lb)   01/17/25 (!) 141.4 kg (311 lb 11.7 oz)   01/06/25 (!) 141.4 kg (311 lb 11.7 oz)       Physical Exam  Constitutional:       General: He is not in acute distress.     Appearance: Normal appearance. He is well-developed.   HENT:      Head: Normocephalic and atraumatic.      Right Ear: Tympanic membrane normal.      Left Ear: Tympanic membrane normal.      Nose: Nose normal.      Mouth/Throat:      Mouth: Mucous membranes are moist.      Pharynx: Oropharynx is clear.   Eyes:      Conjunctiva/sclera: Conjunctivae normal.   Cardiovascular:      Rate and Rhythm: Normal rate and regular " rhythm.      Pulses: Normal pulses.      Heart sounds: Normal heart sounds. No murmur heard.  Pulmonary:      Effort: Pulmonary effort is normal. No respiratory distress.      Breath sounds: Normal breath sounds.   Abdominal:      General: Bowel sounds are normal. There is no distension.      Palpations: Abdomen is soft.      Tenderness: There is no abdominal tenderness.   Musculoskeletal:         General: Normal range of motion.      Cervical back: Normal range of motion and neck supple.   Skin:     General: Skin is warm and dry.      Findings: No rash.   Neurological:      General: No focal deficit present.      Mental Status: He is alert and oriented to person, place, and time.   Psychiatric:         Mood and Affect: Mood normal.         Behavior: Behavior normal.         Health Maintenance   Topic Date Due    TETANUS VACCINE  Never done    RSV Vaccine (Age 60+ and Pregnant patients) (1 - Risk 60-74 years 1-dose series) Never done    Shingles Vaccine (2 of 2) 11/25/2020    Diabetic Eye Exam  10/07/2023    Pneumococcal Vaccines (Age 50+) (3 of 3 - PCV20 or PCV21) 03/04/2025    Lipid Panel  04/18/2025    Hemoglobin A1c  06/23/2025    Influenza Vaccine (1) 09/01/2025    Diabetes Urine Screening  12/23/2025    Foot Exam  01/17/2026    Colorectal Cancer Screening  10/07/2026    Hepatitis C Screening  Completed    COVID-19 Vaccine  Completed     DISCLAIMER: This note was compiled by using a speech recognition dictation system and therefore please be aware that typographical / speech recognition errors can and do occur.  Please contact me if you see any errors specifically.  Consent was obtained for [employer] recording system prior to the visit.          [1]   Social History  Tobacco Use    Smoking status: Never    Smokeless tobacco: Never   Substance Use Topics    Alcohol use: Yes     Alcohol/week: 2.0 - 3.0 standard drinks of alcohol     Types: 2 - 3 Cans of beer per week     Comment: weekly    Drug use: No   [2]  "  Current Outpatient Medications on File Prior to Visit   Medication Sig Dispense Refill    amLODIPine (NORVASC) 10 MG tablet Take 1 tablet (10 mg total) by mouth once daily. 90 tablet 3    blood sugar diagnostic (ONETOUCH VERIO TEST STRIPS) Strp CHECK GLUCOSE 3 TIMES A DAYBEFORE EACH MEAL. 300 strip 0    blood-glucose meter (ONETOUCH VERIO REFLECT METER) [facility] USE AS INSTRUCTED. 1 each 0    clotrimazole-betamethasone 1-0.05% (LOTRISONE) cream Apply topically 2 (two) times daily. 45 g 2    enalapril (VASOTEC) 20 MG tablet Take 1 tablet (20 mg total) by mouth 2 (two) times daily. 180 tablet 3    gemfibroziL (LOPID) 600 MG tablet Take 1 tablet (600 mg total) by mouth once daily. 90 tablet 3    glimepiride (AMARYL) 4 MG tablet Take 1 tablet (4 mg total) by mouth before breakfast. 90 tablet 3    hydroCHLOROthiazide (HYDRODIURIL) 25 MG tablet Take 1 tablet (25 mg total) by mouth once daily. 90 tablet 3    insulin glargine U-100, Lantus, (LANTUS SOLOSTAR U-100 INSULIN) 100 unit/mL (3 mL) InPn pen INJECT 40 UNITS            SUBCUTANEOUSLY TWO TIMES A DAY 75 mL 0    insulin lispro 100 unit/mL pen INJECT 40 UNITS INTO THE   SKIN 3 TIMES A  mL 0    insulin syringe-needle U-100 (INSULIN SYRINGE) 1 mL 29 gauge x 1/2" Syrg Inject 4 times a day 100 each 12    lancets Misc 1 Units by Misc.(Non-Drug; Combo Route) route daily as needed. 100 each 11    metFORMIN (GLUCOPHAGE-XR) 500 MG ER 24hr tablet TAKE 2 TABLETS TWO TIMES A DAY WITH MEALS 360 tablet 3    nystatin (MYCOSTATIN) powder Apply topically 2 (two) times daily. 60 g 2    ONETOUCH DELICA PLUS LANCET 33 gauge Misc USE TO CHECK GLUCOSE 3     TIMES A DAY. 300 each 3    pantoprazole (PROTONIX) 40 MG tablet TAKE 1 TABLET DAILY 90 tablet 3    pen needle, diabetic (BD ULTRA-FINE ORIG PEN NEEDLE) 29 gauge x 1/2" Ndle Use to administer insulin under the skin five (5) times a day; discard pen needle after each use. 500 each 3    pravastatin (PRAVACHOL) 40 MG tablet Take 1 tablet " "(40 mg total) by mouth once daily. 90 tablet 3    pulse oximeter (PULSE OXIMETER) device by Apply Externally route 2 (two) times a day. Use twice daily at 8 AM and 3 PM and record the value in [facility] as directed. 1 each 0    safety needles 22 gauge x 1 1/2" Ndle To be used once a month for testosterone injections 50 each 6    syringe with needle 3 mL 25 gauge x 1" Syrg To be used with monthly testosterone injections 100 Syringe 4    traZODone (DESYREL) 150 MG tablet Take 1 tablet (150 mg total) by mouth every evening. 90 tablet 3    [DISCONTINUED] gabapentin (NEURONTIN) 300 MG capsule TAKE 3 CAPSULES (900MG     TOTAL) 3 TIMES A  capsule 5     Current Facility-Administered Medications on File Prior to Visit   Medication Dose Route Frequency Provider Last Rate Last Admin    acetaminophen tablet 650 mg  650 mg Oral Once PRN Virgilio Avilez MD        albuterol inhaler 2 puff  2 puff Inhalation Q20 Min Virgilio Sultana MD        diphenhydrAMINE injection 25 mg  25 mg Intravenous Once PRN Virgilio Avilez MD        EPINEPHrine (EPIPEN) 0.3 mg/0.3 mL pen injection 0.3 mg  0.3 mg Intramuscular PRN Virgilio Avilez MD        lactated ringers infusion   Intravenous Continuous ChanelVadim MD   Stopped at 08/27/18 0953    methylPREDNISolone sodium succinate injection 40 mg  40 mg Intravenous Once PRN Virgilio Avilez MD        ondansetron disintegrating tablet 4 mg  4 mg Oral Once PRN Virgilio Avilez MD        sodium chloride 0.9% 500 mL flush bag   Intravenous PRN Virgilio Avilez MD        sodium chloride 0.9% flush 10 mL  10 mL Intravenous PRN Virgilio Avilez MD         "

## 2025-07-08 ENCOUNTER — PATIENT MESSAGE (OUTPATIENT)
Dept: FAMILY MEDICINE | Facility: CLINIC | Age: 65
End: 2025-07-08
Payer: MEDICARE

## 2025-07-08 DIAGNOSIS — E11.42 TYPE 2 DIABETES MELLITUS WITH DIABETIC POLYNEUROPATHY, WITH LONG-TERM CURRENT USE OF INSULIN: Primary | ICD-10-CM

## 2025-07-08 DIAGNOSIS — Z79.4 TYPE 2 DIABETES MELLITUS WITH DIABETIC POLYNEUROPATHY, WITH LONG-TERM CURRENT USE OF INSULIN: Primary | ICD-10-CM

## 2025-07-09 RX ORDER — TIRZEPATIDE 2.5 MG/.5ML
2.5 INJECTION, SOLUTION SUBCUTANEOUS
Qty: 6 ML | Refills: 1 | Status: SHIPPED | OUTPATIENT
Start: 2025-07-09 | End: 2025-12-24

## 2025-07-09 NOTE — TELEPHONE ENCOUNTER
I have signed for the following orders AND/OR meds.  Please call the patient and ask the patient to schedule the testing AND/OR inform about any medications that were sent. Medications have been sent to pharmacy listed below      No orders of the defined types were placed in this encounter.      Medications Ordered This Encounter   Medications    tirzepatide (MOUNJARO) 2.5 mg/0.5 mL PnIj     Sig: Inject 2.5 mg into the skin every 7 days.     Dispense:  6 mL     Refill:  1     This medication typically requires a prior authorization.  Send to [facility] retail pharmacy for prior auth services, patient financial assistance, patient education, medication management, and home delivery options?:   No, I will select a different retail pharmacy         [id number] - [place] - [address] [place] [address] AT [facility] [address]  [address] [place] [address]  Phone: [phone] Fax: [phone]    [facility] - [facility] [address]  [address] [facility] [address]  Phone: [phone] Fax: [phone]    [facility] - [place], [place] - [address] [place] [id number]  Phone: [phone] Fax: [phone]

## 2025-07-15 ENCOUNTER — PATIENT MESSAGE (OUTPATIENT)
Dept: PODIATRY | Facility: CLINIC | Age: 65
End: 2025-07-15
Payer: MEDICARE

## 2025-07-16 ENCOUNTER — PATIENT MESSAGE (OUTPATIENT)
Dept: FAMILY MEDICINE | Facility: CLINIC | Age: 65
End: 2025-07-16
Payer: MEDICARE

## 2025-08-06 ENCOUNTER — PATIENT MESSAGE (OUTPATIENT)
Dept: FAMILY MEDICINE | Facility: CLINIC | Age: 65
End: 2025-08-06
Payer: MEDICARE

## 2025-08-06 DIAGNOSIS — K21.9 GASTROESOPHAGEAL REFLUX DISEASE: ICD-10-CM

## 2025-08-06 RX ORDER — PANTOPRAZOLE SODIUM 40 MG/1
TABLET, DELAYED RELEASE ORAL
Qty: 90 TABLET | Refills: 3 | Status: SHIPPED | OUTPATIENT
Start: 2025-08-06

## 2025-08-08 ENCOUNTER — PATIENT MESSAGE (OUTPATIENT)
Dept: FAMILY MEDICINE | Facility: CLINIC | Age: 65
End: 2025-08-08
Payer: MEDICARE

## 2025-08-08 DIAGNOSIS — I10 ESSENTIAL HYPERTENSION: ICD-10-CM

## 2025-08-08 DIAGNOSIS — Z79.4 TYPE 2 DIABETES MELLITUS WITH DIABETIC POLYNEUROPATHY, WITH LONG-TERM CURRENT USE OF INSULIN: ICD-10-CM

## 2025-08-08 DIAGNOSIS — E11.59 HYPERTENSION ASSOCIATED WITH TYPE 2 DIABETES MELLITUS: ICD-10-CM

## 2025-08-08 DIAGNOSIS — G47.00 INSOMNIA, UNSPECIFIED TYPE: ICD-10-CM

## 2025-08-08 DIAGNOSIS — I15.2 HYPERTENSION ASSOCIATED WITH TYPE 2 DIABETES MELLITUS: ICD-10-CM

## 2025-08-08 DIAGNOSIS — E11.42 TYPE 2 DIABETES MELLITUS WITH DIABETIC POLYNEUROPATHY, WITH LONG-TERM CURRENT USE OF INSULIN: ICD-10-CM

## 2025-08-08 RX ORDER — GLIMEPIRIDE 4 MG/1
4 TABLET ORAL
Qty: 90 TABLET | Refills: 3 | Status: SHIPPED | OUTPATIENT
Start: 2025-08-08 | End: 2026-08-08

## 2025-08-08 RX ORDER — HYDROCHLOROTHIAZIDE 25 MG/1
25 TABLET ORAL DAILY
Qty: 90 TABLET | Refills: 3 | Status: SHIPPED | OUTPATIENT
Start: 2025-08-08 | End: 2026-08-08

## 2025-08-08 RX ORDER — TRAZODONE HYDROCHLORIDE 150 MG/1
150 TABLET ORAL NIGHTLY
Qty: 90 TABLET | Refills: 3 | Status: SHIPPED | OUTPATIENT
Start: 2025-08-08

## 2025-08-08 RX ORDER — ENALAPRIL MALEATE 20 MG/1
20 TABLET ORAL 2 TIMES DAILY
Qty: 180 TABLET | Refills: 3 | Status: SHIPPED | OUTPATIENT
Start: 2025-08-08

## 2025-08-08 RX ORDER — METFORMIN HYDROCHLORIDE 500 MG/1
TABLET, EXTENDED RELEASE ORAL
Qty: 360 TABLET | Refills: 3 | Status: SHIPPED | OUTPATIENT
Start: 2025-08-08

## 2025-08-08 RX ORDER — AMLODIPINE BESYLATE 10 MG/1
10 TABLET ORAL DAILY
Qty: 90 TABLET | Refills: 3 | Status: SHIPPED | OUTPATIENT
Start: 2025-08-08

## 2025-08-08 NOTE — TELEPHONE ENCOUNTER
METFORMIN ER 500MG TAB (AB1)       [id number] - GLIMEPIRIDE 4MG TAB    [id number] - HYDROCHLOROTHIAZIDE 25MG TAB             [id number] - AMLODIPINE 10MG TAB   [id number] - ENALAPRIL 20MG TAB     [id number] - TRAZODONE 150MG TAB

## 2025-08-11 ENCOUNTER — PATIENT MESSAGE (OUTPATIENT)
Dept: FAMILY MEDICINE | Facility: CLINIC | Age: 65
End: 2025-08-11
Payer: MEDICARE

## 2025-08-11 DIAGNOSIS — Z79.4 TYPE 2 DIABETES MELLITUS WITH DIABETIC POLYNEUROPATHY, WITH LONG-TERM CURRENT USE OF INSULIN: ICD-10-CM

## 2025-08-11 DIAGNOSIS — G47.00 INSOMNIA, UNSPECIFIED TYPE: ICD-10-CM

## 2025-08-11 DIAGNOSIS — I15.2 HYPERTENSION ASSOCIATED WITH TYPE 2 DIABETES MELLITUS: ICD-10-CM

## 2025-08-11 DIAGNOSIS — E11.59 HYPERTENSION ASSOCIATED WITH TYPE 2 DIABETES MELLITUS: ICD-10-CM

## 2025-08-11 DIAGNOSIS — I10 ESSENTIAL HYPERTENSION: ICD-10-CM

## 2025-08-11 DIAGNOSIS — E11.42 TYPE 2 DIABETES MELLITUS WITH DIABETIC POLYNEUROPATHY, WITH LONG-TERM CURRENT USE OF INSULIN: ICD-10-CM

## 2025-08-11 RX ORDER — AMLODIPINE BESYLATE 10 MG/1
10 TABLET ORAL DAILY
Qty: 90 TABLET | Refills: 3 | Status: SHIPPED | OUTPATIENT
Start: 2025-08-11

## 2025-08-11 RX ORDER — METFORMIN HYDROCHLORIDE 500 MG/1
TABLET, EXTENDED RELEASE ORAL
Qty: 360 TABLET | Refills: 3 | Status: SHIPPED | OUTPATIENT
Start: 2025-08-11

## 2025-08-11 RX ORDER — GLIMEPIRIDE 4 MG/1
4 TABLET ORAL
Qty: 90 TABLET | Refills: 3 | Status: SHIPPED | OUTPATIENT
Start: 2025-08-11 | End: 2026-08-11

## 2025-08-11 RX ORDER — HYDROCHLOROTHIAZIDE 25 MG/1
25 TABLET ORAL DAILY
Qty: 90 TABLET | Refills: 3 | Status: SHIPPED | OUTPATIENT
Start: 2025-08-11 | End: 2026-08-11

## 2025-08-11 RX ORDER — ENALAPRIL MALEATE 20 MG/1
20 TABLET ORAL 2 TIMES DAILY
Qty: 180 TABLET | Refills: 3 | Status: SHIPPED | OUTPATIENT
Start: 2025-08-11

## 2025-08-11 RX ORDER — TRAZODONE HYDROCHLORIDE 150 MG/1
150 TABLET ORAL NIGHTLY
Qty: 90 TABLET | Refills: 3 | Status: SHIPPED | OUTPATIENT
Start: 2025-08-11

## 2025-08-12 ENCOUNTER — PATIENT MESSAGE (OUTPATIENT)
Dept: FAMILY MEDICINE | Facility: CLINIC | Age: 65
End: 2025-08-12
Payer: MEDICARE

## 2025-08-13 ENCOUNTER — PATIENT MESSAGE (OUTPATIENT)
Dept: FAMILY MEDICINE | Facility: CLINIC | Age: 65
End: 2025-08-13
Payer: MEDICARE

## 2025-08-13 DIAGNOSIS — E78.2 MIXED HYPERLIPIDEMIA: ICD-10-CM

## 2025-08-13 RX ORDER — GEMFIBROZIL 600 MG/1
600 TABLET, FILM COATED ORAL DAILY
Qty: 90 TABLET | Refills: 3 | Status: SHIPPED | OUTPATIENT
Start: 2025-08-13

## 2025-08-13 RX ORDER — PRAVASTATIN SODIUM 40 MG/1
40 TABLET ORAL DAILY
Qty: 90 TABLET | Refills: 3 | Status: SHIPPED | OUTPATIENT
Start: 2025-08-13

## 2025-08-19 ENCOUNTER — PATIENT MESSAGE (OUTPATIENT)
Dept: ADMINISTRATIVE | Facility: HOSPITAL | Age: 65
End: 2025-08-19
Payer: MEDICARE

## 2025-08-20 ENCOUNTER — PATIENT OUTREACH (OUTPATIENT)
Dept: ADMINISTRATIVE | Facility: HOSPITAL | Age: 65
End: 2025-08-20
Payer: MEDICARE

## 2025-08-27 ENCOUNTER — PATIENT MESSAGE (OUTPATIENT)
Dept: PRIMARY CARE CLINIC | Facility: CLINIC | Age: 65
End: 2025-08-27
Payer: MEDICARE

## 2025-08-27 DIAGNOSIS — Z79.4 TYPE 2 DIABETES MELLITUS WITH DIABETIC POLYNEUROPATHY, WITH LONG-TERM CURRENT USE OF INSULIN: ICD-10-CM

## 2025-08-27 DIAGNOSIS — E11.42 TYPE 2 DIABETES MELLITUS WITH DIABETIC POLYNEUROPATHY, WITH LONG-TERM CURRENT USE OF INSULIN: ICD-10-CM

## 2025-08-27 RX ORDER — INSULIN LISPRO 100 [IU]/ML
INJECTION, SOLUTION INTRAVENOUS; SUBCUTANEOUS
Qty: 110 ML | Refills: 1 | Status: SHIPPED | OUTPATIENT
Start: 2025-08-27

## (undated) DEVICE — Device

## (undated) DEVICE — DRAPE PLASTIC U 60X72

## (undated) DEVICE — PADDING CAST 6IN

## (undated) DEVICE — ELECTRODE REM PLYHSV RETURN 9

## (undated) DEVICE — BANDAGE ACE 6 NSTRL

## (undated) DEVICE — TRAY NERVE BLOCK

## (undated) DEVICE — SUT ETHILON 3-0 PS2 18 BLK

## (undated) DEVICE — GLOVE 7.5 PROTEXIS PI MICRO

## (undated) DEVICE — SET TUR BLADDER IRRIG Y TYPE

## (undated) DEVICE — SHEET DRAPE FAN-FOLDED 3/4

## (undated) DEVICE — NDL SPINAL 18GX3.5 SPINOCAN

## (undated) DEVICE — SOL IRR NACL .9% 3000ML

## (undated) DEVICE — SET IRR URLGY 2LINE UNIV SPIKE

## (undated) DEVICE — BANDAGE ACE ELASTIC 6"

## (undated) DEVICE — BANDAGE ESMARK 6X12

## (undated) DEVICE — DRESSING XEROFORM FOIL PK 1X8

## (undated) DEVICE — DRAPE STERI U-SHAPED 47X51IN

## (undated) DEVICE — SOL SOD CHLORIDE 0.9% 10ML

## (undated) DEVICE — APPLICATOR CHLORAPREP ORN 26ML

## (undated) DEVICE — DRESSING SPONGE 16PLY 4X4 NS

## (undated) DEVICE — TRAY ARTHROSCOPY 2/CS

## (undated) DEVICE — GAUZE SPONGE 4X4 12PLY

## (undated) DEVICE — DRAPE EMERALD 87X114.75X113

## (undated) DEVICE — DRESSING N ADH OIL EMUL 3X8

## (undated) DEVICE — APPLICATOR CHLORAPREP CLR 10.5

## (undated) DEVICE — TUBE SET INFLOW/OUTFLOW

## (undated) DEVICE — TOURNIQUET SB QC DP 24X4IN